# Patient Record
Sex: FEMALE | Race: WHITE | Employment: OTHER | URBAN - METROPOLITAN AREA
[De-identification: names, ages, dates, MRNs, and addresses within clinical notes are randomized per-mention and may not be internally consistent; named-entity substitution may affect disease eponyms.]

---

## 2017-02-02 ENCOUNTER — HOSPITAL ENCOUNTER (EMERGENCY)
Age: 82
Discharge: HOME | End: 2017-02-03
Attending: EMERGENCY MEDICINE
Payer: MEDICARE

## 2017-02-02 ENCOUNTER — APPOINTMENT (OUTPATIENT)
Dept: GENERAL RADIOLOGY | Age: 82
End: 2017-02-02
Attending: PHYSICIAN ASSISTANT
Payer: MEDICARE

## 2017-02-02 ENCOUNTER — HOSPITAL ENCOUNTER (OUTPATIENT)
Dept: MAMMOGRAPHY | Age: 82
Discharge: HOME | End: 2017-02-02
Payer: MEDICARE

## 2017-02-02 DIAGNOSIS — D64.9 NORMOCYTIC ANEMIA: ICD-10-CM

## 2017-02-02 DIAGNOSIS — R94.31 ABNORMAL EKG: ICD-10-CM

## 2017-02-02 DIAGNOSIS — I10 ESSENTIAL HYPERTENSION: ICD-10-CM

## 2017-02-02 DIAGNOSIS — R11.2 NAUSEA AND VOMITING IN ADULT: ICD-10-CM

## 2017-02-02 DIAGNOSIS — I83.891 BLEEDING FROM VARICOSE VEINS OF LOWER EXTREMITY, RIGHT: Primary | ICD-10-CM

## 2017-02-02 DIAGNOSIS — N18.30 CKD (CHRONIC KIDNEY DISEASE) STAGE 3, GFR 30-59 ML/MIN (HCC): ICD-10-CM

## 2017-02-02 DIAGNOSIS — R73.9 HYPERGLYCEMIA: ICD-10-CM

## 2017-02-02 DIAGNOSIS — Z12.31 VISIT FOR SCREENING MAMMOGRAM: ICD-10-CM

## 2017-02-02 PROCEDURE — 93005 ELECTROCARDIOGRAM TRACING: CPT

## 2017-02-02 PROCEDURE — 80053 COMPREHEN METABOLIC PANEL: CPT | Performed by: PHYSICIAN ASSISTANT

## 2017-02-02 PROCEDURE — 82550 ASSAY OF CK (CPK): CPT | Performed by: PHYSICIAN ASSISTANT

## 2017-02-02 PROCEDURE — 96374 THER/PROPH/DIAG INJ IV PUSH: CPT

## 2017-02-02 PROCEDURE — 77063 BREAST TOMOSYNTHESIS BI: CPT

## 2017-02-02 PROCEDURE — 85025 COMPLETE CBC W/AUTO DIFF WBC: CPT | Performed by: PHYSICIAN ASSISTANT

## 2017-02-02 PROCEDURE — 96361 HYDRATE IV INFUSION ADD-ON: CPT

## 2017-02-02 PROCEDURE — 83690 ASSAY OF LIPASE: CPT | Performed by: PHYSICIAN ASSISTANT

## 2017-02-02 PROCEDURE — 81003 URINALYSIS AUTO W/O SCOPE: CPT | Performed by: PHYSICIAN ASSISTANT

## 2017-02-02 PROCEDURE — 77030022017 HC DRSG HEMO QCLOT ZMED -A

## 2017-02-02 PROCEDURE — 99285 EMERGENCY DEPT VISIT HI MDM: CPT

## 2017-02-02 RX ORDER — MELATONIN
5000 DAILY
COMMUNITY

## 2017-02-02 RX ORDER — GABAPENTIN 300 MG/1
1 CAPSULE ORAL 3 TIMES DAILY
Refills: 2 | COMMUNITY
Start: 2016-12-22

## 2017-02-02 RX ORDER — SILVER NITRATE 38.21; 12.74 MG/1; MG/1
STICK TOPICAL
Status: DISCONTINUED
Start: 2017-02-02 | End: 2017-02-03 | Stop reason: HOSPADM

## 2017-02-02 RX ORDER — ONDANSETRON 2 MG/ML
4 INJECTION INTRAMUSCULAR; INTRAVENOUS
Status: COMPLETED | OUTPATIENT
Start: 2017-02-02 | End: 2017-02-03

## 2017-02-02 RX ORDER — ASPIRIN 325 MG
325 TABLET ORAL DAILY
COMMUNITY
End: 2017-03-01

## 2017-02-03 ENCOUNTER — APPOINTMENT (OUTPATIENT)
Dept: GENERAL RADIOLOGY | Age: 82
End: 2017-02-03
Attending: PHYSICIAN ASSISTANT
Payer: MEDICARE

## 2017-02-03 VITALS
SYSTOLIC BLOOD PRESSURE: 125 MMHG | RESPIRATION RATE: 13 BRPM | DIASTOLIC BLOOD PRESSURE: 81 MMHG | HEART RATE: 81 BPM | OXYGEN SATURATION: 97 % | TEMPERATURE: 97.3 F

## 2017-02-03 LAB
ALBUMIN SERPL BCP-MCNC: 3.2 G/DL (ref 3.4–5)
ALBUMIN/GLOB SERPL: 1.1 {RATIO} (ref 0.8–1.7)
ALP SERPL-CCNC: 96 U/L (ref 45–117)
ALT SERPL-CCNC: 15 U/L (ref 13–56)
ANION GAP BLD CALC-SCNC: 11 MMOL/L (ref 3–18)
APPEARANCE UR: CLEAR
AST SERPL W P-5'-P-CCNC: 11 U/L (ref 15–37)
ATRIAL RATE: 73 BPM
ATRIAL RATE: 79 BPM
BASOPHILS # BLD AUTO: 0.1 K/UL (ref 0–0.06)
BASOPHILS # BLD: 1 % (ref 0–2)
BILIRUB SERPL-MCNC: 0.4 MG/DL (ref 0.2–1)
BILIRUB UR QL: NEGATIVE
BUN SERPL-MCNC: 33 MG/DL (ref 7–18)
BUN/CREAT SERPL: 23 (ref 12–20)
CALCIUM SERPL-MCNC: 7.9 MG/DL (ref 8.5–10.1)
CALCULATED P AXIS, ECG09: 44 DEGREES
CALCULATED P AXIS, ECG09: 56 DEGREES
CALCULATED R AXIS, ECG10: 19 DEGREES
CALCULATED T AXIS, ECG11: 111 DEGREES
CALCULATED T AXIS, ECG11: 94 DEGREES
CHLORIDE SERPL-SCNC: 110 MMOL/L (ref 100–108)
CK MB CFR SERPL CALC: 1.7 % (ref 0–4)
CK MB SERPL-MCNC: 1.1 NG/ML (ref 0.5–3.6)
CK SERPL-CCNC: 66 U/L (ref 26–192)
CO2 SERPL-SCNC: 24 MMOL/L (ref 21–32)
COLOR UR: YELLOW
CREAT SERPL-MCNC: 1.43 MG/DL (ref 0.6–1.3)
DIAGNOSIS, 93000: NORMAL
DIAGNOSIS, 93000: NORMAL
DIFFERENTIAL METHOD BLD: ABNORMAL
EOSINOPHIL # BLD: 0.2 K/UL (ref 0–0.4)
EOSINOPHIL NFR BLD: 3 % (ref 0–5)
ERYTHROCYTE [DISTWIDTH] IN BLOOD BY AUTOMATED COUNT: 15.9 % (ref 11.6–14.5)
GLOBULIN SER CALC-MCNC: 2.8 G/DL (ref 2–4)
GLUCOSE SERPL-MCNC: 165 MG/DL (ref 74–99)
GLUCOSE UR STRIP.AUTO-MCNC: NEGATIVE MG/DL
HCT VFR BLD AUTO: 31.1 % (ref 35–45)
HGB BLD-MCNC: 9.6 G/DL (ref 12–16)
HGB UR QL STRIP: NEGATIVE
KETONES UR QL STRIP.AUTO: NEGATIVE MG/DL
LEUKOCYTE ESTERASE UR QL STRIP.AUTO: NEGATIVE
LIPASE SERPL-CCNC: 203 U/L (ref 73–393)
LYMPHOCYTES # BLD AUTO: 26 % (ref 21–52)
LYMPHOCYTES # BLD: 2.3 K/UL (ref 0.9–3.6)
MCH RBC QN AUTO: 25.9 PG (ref 24–34)
MCHC RBC AUTO-ENTMCNC: 30.9 G/DL (ref 31–37)
MCV RBC AUTO: 84.1 FL (ref 74–97)
MONOCYTES # BLD: 0.5 K/UL (ref 0.05–1.2)
MONOCYTES NFR BLD AUTO: 6 % (ref 3–10)
NEUTS SEG # BLD: 5.7 K/UL (ref 1.8–8)
NEUTS SEG NFR BLD AUTO: 64 % (ref 40–73)
NITRITE UR QL STRIP.AUTO: NEGATIVE
P-R INTERVAL, ECG05: 144 MS
P-R INTERVAL, ECG05: 160 MS
PH UR STRIP: 5 [PH] (ref 5–8)
PLATELET # BLD AUTO: 204 K/UL (ref 135–420)
PMV BLD AUTO: 11.2 FL (ref 9.2–11.8)
POTASSIUM SERPL-SCNC: 3.9 MMOL/L (ref 3.5–5.5)
PROT SERPL-MCNC: 6 G/DL (ref 6.4–8.2)
PROT UR STRIP-MCNC: NEGATIVE MG/DL
Q-T INTERVAL, ECG07: 398 MS
Q-T INTERVAL, ECG07: 416 MS
QRS DURATION, ECG06: 70 MS
QRS DURATION, ECG06: 70 MS
QTC CALCULATION (BEZET), ECG08: 456 MS
QTC CALCULATION (BEZET), ECG08: 458 MS
RBC # BLD AUTO: 3.7 M/UL (ref 4.2–5.3)
SODIUM SERPL-SCNC: 145 MMOL/L (ref 136–145)
SP GR UR REFRACTOMETRY: >1.03 (ref 1–1.03)
TROPONIN I SERPL-MCNC: <0.02 NG/ML (ref 0–0.04)
UROBILINOGEN UR QL STRIP.AUTO: 1 EU/DL (ref 0.2–1)
VENTRICULAR RATE, ECG03: 73 BPM
VENTRICULAR RATE, ECG03: 79 BPM
WBC # BLD AUTO: 8.8 K/UL (ref 4.6–13.2)

## 2017-02-03 PROCEDURE — 93005 ELECTROCARDIOGRAM TRACING: CPT

## 2017-02-03 PROCEDURE — 71010 XR CHEST PORT: CPT

## 2017-02-03 PROCEDURE — 77030011943

## 2017-02-03 PROCEDURE — 77030018836 HC SOL IRR NACL ICUM -A

## 2017-02-03 PROCEDURE — 74011250636 HC RX REV CODE- 250/636: Performed by: EMERGENCY MEDICINE

## 2017-02-03 PROCEDURE — 74011250636 HC RX REV CODE- 250/636: Performed by: PHYSICIAN ASSISTANT

## 2017-02-03 RX ADMIN — ONDANSETRON 4 MG: 2 INJECTION INTRAMUSCULAR; INTRAVENOUS at 00:34

## 2017-02-03 RX ADMIN — SODIUM CHLORIDE 250 ML: 900 INJECTION, SOLUTION INTRAVENOUS at 00:37

## 2017-02-03 NOTE — ED NOTES
9:20 AM  Dr. Kimberley Holbrook notes. Received a call from the radiologist regarding an soft tissue density noted in the left chest area. Patient was contacted at home an informed of the result. She states that she will be following up with Dr. Oswaldo Queen on 2/22/17 and will make Dr. Bhanu Marquez aware of the abnormal result to arrange for further follow up.

## 2017-02-03 NOTE — DISCHARGE INSTRUCTIONS
[employer] Activation    Thank you for requesting access to [employer]. Please follow the instructions below to securely access and download your online medical record. [employer] allows you to send messages to your doctor, view your test results, renew your prescriptions, schedule appointments, and more. How Do I Sign Up? 1. In your internet browser, go to [email or web address]  2. Click on the First Time User? Click Here link in the Sign In box. You will be redirect to the New Member Sign Up page. 3. Enter your [employer] Access Code exactly as it appears below. You will not need to use this code after youve completed the sign-up process. If you do not sign up before the expiration date, you must request a new code. [employer] Access Code: [email or web address]  Expires: 2017  3:21 PM (This is the date your [employer] access code will )    4. Enter the last four digits of your Social Security Number (xxxx) and Date of Birth (mm/dd/yyyy) as indicated and click Submit. You will be taken to the next sign-up page. 5. Create a [employer] ID. This will be your [employer] login ID and cannot be changed, so think of one that is secure and easy to remember. 6. Create a [employer] password. You can change your password at any time. 7. Enter your Password Reset Question and Answer. This can be used at a later time if you forget your password. 8. Enter your e-mail address. You will receive e-mail notification when new information is available in [address] 9. Click Sign Up. You can now view and download portions of your medical record. 10. Click the Download Summary menu link to download a portable copy of your medical information. Additional Information    If you have questions, please visit the Frequently Asked Questions section of the [employer] website at [email or web address] Remember, [employer] is NOT to be used for urgent needs. For medical emergencies, dial 911.

## 2017-02-03 NOTE — ED NOTES
Patients wound cleaned, and dressed. Bleeding has stopped at this time. Dressing is clean, dry, intact. Purposeful rounding completed:    Side rails up x 1:  YES  Bed low and wheels and locked: YES  Call bell in reach: YES  Comfort addressed: YES    Toileting needs addressed: YES  Plan of care reviewed/updated with patient and or family members: YES  IV site assessed: YES  Pain assessed and addressed: YES, 0.

## 2017-02-03 NOTE — ED PROVIDER NOTES
HPI Comments: Isra Flores is a  [de-identified] y.o. Frail elderly white female h/o Varicose Veins, TIA x 2, Rhabdomyolysis, Leukocytosis, HTN, NIDDM, CKD Stage 3, CAD, Elevated TSH, Normocytic Anemia, Vitamin D Deficiency, Chronic Osteomyelitis Ankle, Lumbar Spinal Stenosis, Splenomegaly presents to the ED via EMS c/o right lateral leg bleeding varicose vein that she says began after she was scratching the affected area. She says she vomited once at home, continues to feel nauseous. She denies fever/chills, dizziness/LOC, HA, slurred speech, jaw/neck pain/stiffness, cp, palpitations, AMOS, Orthopnea, Calf Pain/Swelling, SOB, Abd Pain, blood in vomitus, diarrhea, constipation, brbpr, melena, flank pain, blood in urine, difficulty urinating, decreased urination, burning/urgency/freq of urination, extremity weakness/numbness/tingling, difficulty ambulating, back pain. PSX: BLE Vein Stripping 9/18/2013 by Dr. Samuel Human       Patient is a [de-identified] y.o. female presenting with nausea. Nausea    Pertinent negatives include no chills, no fever, no abdominal pain, no diarrhea, no headaches, no arthralgias, no cough and no headaches.         Past Medical History:   Diagnosis Date    CAD (coronary artery disease)     Chronic osteomyelitis of ankle ([address]     CKD (chronic kidney disease) stage 3, GFR 30-59 ml/min 06/24/2013     Worst noted (08/06/13) BUN/sCr: 54/2.49, GFR 20.     DM (diabetes mellitus) (HCC)     Elevated TSH 06/25/2012     5.08     Hypertension     Leukocytosis     Lumbar spinal stenosis     Normocytic anemia     Rhabdomyolysis     Splenomegaly     Stroke ([address]     Vitamin D deficiency        Past Surgical History:   Procedure Laterality Date    Hx cholecystectomy      Hx free skin graft      Pr debridement open wound 20 sq cm<      Hx hysterectomy      Hx open reduction internal fixation Left      Tx LLE Ankle Trimalleolar F[facility])    Hx orthopaedic Left 06/11/2013     [address] Ilya MOLINA Ankle Removal of Hardware     Hx vein stripping Bilateral 09/18/2013     Dr. Georgina Potts         Family History:   Problem Relation Age of Onset    Diabetes Other     Hypertension Other        Social History     Social History    Marital status:      Spouse name: N/A    Number of children: N/A    Years of education: N/A     Occupational History    Not on file. Social History Main Topics    Smoking status: Never Smoker    Smokeless tobacco: Not on file    Alcohol use No    Drug use: No    Sexual activity: No     Other Topics Concern    Not on file     Social History Narrative         ALLERGIES: Review of patient's allergies indicates no known allergies. Review of Systems   Constitutional: Negative for activity change, appetite change, chills, diaphoresis, fatigue and fever. HENT: Negative for ear pain and sore throat. Eyes: Negative for pain and visual disturbance. Respiratory: Negative for cough and shortness of breath. Cardiovascular: Negative for chest pain, palpitations and leg swelling. Gastrointestinal: Positive for nausea and vomiting. Negative for abdominal pain, blood in stool, constipation and diarrhea. Genitourinary: Negative for decreased urine volume, difficulty urinating, dysuria, flank pain, frequency, hematuria and urgency. Musculoskeletal: Negative for arthralgias, back pain, joint swelling, neck pain and neck stiffness. Skin: Positive for wound (RLE lateral leg). Negative for color change, pallor and rash. Neurological: Negative for dizziness, syncope, weakness, light-headedness and headaches. Psychiatric/Behavioral: Negative for behavioral problems. The patient is not nervous/anxious.         Vitals:    02/03/17 0115 02/03/17 0215 02/03/17 0230 02/03/17 0245   BP: 127/56 139/43 142/75 125/81   Pulse: 80 75 85 81   Resp: 20 18 15 13   Temp:       SpO2: 100% 99% 97%             Physical Exam   Constitutional: She is oriented to person, place, and time. She appears well-developed and well-nourished. No distress. HENT:   Head: Normocephalic and atraumatic. Right Ear: Hearing, tympanic membrane, external ear and ear canal normal.   Left Ear: Hearing, tympanic membrane, external ear and ear canal normal.   Nose: Nose normal. No mucosal edema or rhinorrhea. Mouth/Throat: Uvula is midline, oropharynx is clear and moist and mucous membranes are normal. No uvula swelling. No oropharyngeal exudate, posterior oropharyngeal edema, posterior oropharyngeal erythema or tonsillar abscesses. Eyes: Conjunctivae and EOM are normal. Pupils are equal, round, and reactive to light. Right eye exhibits no discharge. Left eye exhibits no discharge. No scleral icterus. Neck: Trachea normal, normal range of motion, full passive range of motion without pain and phonation normal. Neck supple. No tracheal tenderness, no spinous process tenderness and no muscular tenderness present. No rigidity. No edema, no erythema and normal range of motion present. No thyroid mass and no thyromegaly present. Cardiovascular: Normal rate, regular rhythm, normal heart sounds and intact distal pulses. Exam reveals no gallop and no friction rub. No murmur heard. Pulmonary/Chest: Effort normal and breath sounds normal. No accessory muscle usage. No tachypnea. No respiratory distress. She has no decreased breath sounds. She has no wheezes. She has no rhonchi. She has no rales. Abdominal: Soft. Normal appearance and bowel sounds are normal. She exhibits no distension, no abdominal bruit and no pulsatile midline mass. There is no tenderness. There is no rigidity, no rebound, no guarding, no CVA tenderness, no tenderness at McBurney's point and negative Kline's sign. Normal inspection. Non-distended. Normoactive BS. Soft. Completely NTTP. No guarding. No rebound TTP. No CVAT.         Musculoskeletal:        Right lower leg: Normal. She exhibits no tenderness, no bony tenderness, no swelling, no edema, no deformity and no laceration. Left lower leg: Normal. She exhibits no tenderness, no bony tenderness, no swelling, no edema, no deformity and no laceration. Right foot: Normal. There is normal range of motion, no tenderness, no bony tenderness, no swelling, normal capillary refill, no crepitus, no deformity and no laceration. Left foot: Normal. There is normal range of motion, no tenderness, no bony tenderness, no swelling, normal capillary refill, no crepitus, no deformity and no laceration. Lymphadenopathy:     She has no cervical adenopathy. Neurological: She is alert and oriented to person, place, and time. She has normal strength and normal reflexes. She is not disoriented. No cranial nerve deficit or sensory deficit. A&O x 3. Speech is clear. No focal neuro deficits. MS 5/5 BUE/BLE. Skin: Skin is warm and dry. Abrasion noted. No bruising, no ecchymosis, no lesion, no petechiae and no rash noted. She is not diaphoretic. No cyanosis or erythema. No pallor. RLE Lateral Aspect of the lower leg < 1.5 cm area of Varicosity, scabbed without current bleeding. No surrounding erythema. No hematoma. DP/PT 2+ BLE. Neurvascularly intact. Compartments of the BLE are soft. Ext are warm. No pallor or pulselessness. Psychiatric: She has a normal mood and affect. Her behavior is normal.   Nursing note and vitals reviewed.        MDM  Number of Diagnoses or Management Options  Abnormal EKG: new and requires workup  Bleeding from varicose veins of lower extremity, right: new and requires workup  CKD (chronic kidney disease) stage 3, GFR 30-59 ml/min: new and requires workup  Essential hypertension: new and requires workup  Hyperglycemia: new and requires workup  Nausea and vomiting in adult: new and requires workup  Normocytic anemia: new and requires workup  Diagnosis management comments: DDX: Abrasion, Laceration (Simple v. Complex), Epidermal Inclusion Cyst, Cutaneous Abscess, Contact Dermatitis, Allergic Reaction, Urticaria, Anaphylaxis, Cellulitis, Erysipelas, Viral Exanthem, Burn Injury, Contusion, Hematoma, Bleeding Varicosity (Venous v. Arterial), DVT, Superficial Thrombophlebitis. ED EKG interpretation:  Rhythm: Sinus Rhythm w/ Marked Sinus Arrhythmia w/ Frequent Premature Ventricular Complexes; Vent rate: 73 bpm; VT Interval: 144 ms; QRS duration: 70 ms; QT/QTc: 416/458 ms; P-R-T axes: 44 -1 94. Other findings: Possible Inferior Infarct, Age Undetermined, Anteroseptal Infarct, Age Undetermined, Abnormal ekg. EKG was completed at 2335. This EKG was interpreted by Dr. Barbra Novoa (ED attending) at [phone]. Radiology Initial Reading:    Patient had a CXR Portable performed, pt tolerated procedure well, No acute process or infiltrate seen by my eyes. 1:15 AM     2:33 AM: Awaiting UA results. Pt reports feeling much improved. Nursing has cleaned the affected area which has not bled since PTA. She has placed QuickClot dressing topically and then Pressure dressing. Distal Sensation remains intact. Cap refill < 2 seconds after dressing. DP/PT 2+ BLE following dressing application. Rechecked EKG, pulse felt irregular. ED EKG interpretation:  Rhythm: Sinus Rhythm w/ Marked Sinus Arrhythmia w/ PAC;  Vent rate: 79 bpm; VT Interval: 160 ms; QRS duration: 70 ms; QT/QTc: 398/456 ms; P-R-T axes: 56 19 111. Other findings: Cannot r/o Anteroseptal Infarct, Age Undetermined, T Wave Abnormality, Consider Lateral Ischemia, abnormal ekg. EKG was completed at 0241. This EKG was interpreted by Dr. Barbra Novoa (ED attending) at 2181. Consulted with Dr. Barbra Novoa concerning patient Abdullahiota Bang, standard discussion of reason for visit, HPI, ROS, PE, and current results available. Recommendation for discharge w/ outpatient follow-up regarding abnormal EKG, as patient is feeling better, VSS.     BRANDIN Espinoza  February 3, 2017  2:53 AM Reviewed workup results, any meds, and discharge instructions OR admission plan with patient and any family present. Answered all questions. BRANDIN Hernandez  2:53 AM    PLEASE FOLLOW-UP AS DIRECTED WITHOUT FAIL WITHIN THE TIME FRAME RECOMMENDED AS FAILURE TO DO SO COULD RESULT IN WORSENING OF YOUR PHYSICAL CONDITION, DEATH, AND OR PERMANENT DISABILITY. RETURN TO THE EMERGENCY DEPARTMENT AT IF YOU ARE UNABLE TO FOLLOW-UP AS DIRECTED. RETURN TO THE EMERGENCY DEPARTMENT AT ONCE IF YOU HAVE SYMPTOMS THAT DO NOT IMPROVE WITH TREATMENT, NEW SYMPTOMS, WORSENING SYMPTOMS, OR ANY OTHER CONCERNS. THE PATIENT AGREES WITH THE DISCHARGE PLAN AND FOLLOW-UP INSTRUCTIONS. THE PATIENT AGREES TO REVIEW ALL HANDOUTS. Amount and/or Complexity of Data Reviewed  Clinical lab tests: reviewed and ordered  Tests in the radiology section of CPT®: ordered and reviewed  Tests in the medicine section of CPT®: reviewed and ordered  Discussion of test results with the performing providers: yes  Decide to obtain previous medical records or to obtain history from someone other than the patient: yes  Obtain history from someone other than the patient: yes ()  Review and summarize past medical records: yes  Independent visualization of images, tracings, or specimens: yes    Risk of Complications, Morbidity, and/or Mortality  Presenting problems: moderate  Diagnostic procedures: moderate  Management options: moderate    Patient Progress  Patient progress: stable      Procedures    Diagnosis:   1. Bleeding from varicose veins of lower extremity, right    2. Nausea and vomiting in adult    3. Normocytic anemia    4. Hyperglycemia    5. CKD (chronic kidney disease) stage 3, GFR 30-59 ml/min    6. Abnormal EKG    7.  Essential hypertension          Disposition: HOME    Follow-up Information     Follow up With Details Comments Contact Info    [facility] EMERGENCY DEPT  As needed, If symptoms worsen [address] [place] [address] [address] [facility]    Noralyn Cockayne, MD Call today Schedule appointment to be seen same day without fail for re-evaluation, review all labs/imaging results, recheck blood pressure. [address] [facility] [address] [address] [facility],  Call today Cardiology: Schedule appointment to be seen within 3 days for evaluation of Abnormal EKG without fail. [address] [address] [address] [facility]      Demetrius Kapoor MD Call today Schedule appointment to be seen within 3-5 days for evaluation of Varicose Veins. [address] [facility] [address] [facility]            Patient's Medications   Start Taking    No medications on file   Continue Taking    ASPIRIN (ASPIRIN) 325 MG TABLET    Take 325 mg by mouth daily. CHOLECALCIFEROL (VITAMIN D3) 1,000 UNIT TABLET    Take 3,000 Units by mouth daily. CYCLOBENZAPRINE (FLEXERIL) 10 MG TABLET    Take 10 mg by mouth three (3) times daily as needed. ERGOCALCIFEROL (VITAMIN D2) 50,000 UNIT CAPSULE    Take 50,000 Units by mouth daily. GABAPENTIN (NEURONTIN) 300 MG CAPSULE    Take 1 Cap by mouth three (3) times daily. 12/22/16, QTY#90, 30 Days, 2 Refills. Dr. Ester Casper    LISINOPRIL-HYDROCHLOROTHIAZIDE (PRINZIDE, ZESTORETIC) 20-25 MG PER TABLET    Take 1 Tab by mouth daily. METOPROLOL (LOPRESSOR) 25 MG TABLET    Take 25 mg by mouth two (2) times a day. These Medications have changed    No medications on file   Stop Taking    GABAPENTIN (NEURONTIN) 100 MG CAPSULE    Take 200 mg by mouth three (3) times daily.        Recent Results (from the past 24 hour(s))   EKG, 12 LEAD, INITIAL    Collection Time: 02/02/17 11:35 PM   Result Value Ref Range    Ventricular Rate 73 BPM    Atrial Rate 73 BPM    P-R Interval 144 ms    QRS Duration 70 ms    Q-T Interval 416 ms    QTC Calculation (Bezet) 458 ms    Calculated P Axis 44 degrees    Calculated T Axis 94 degrees    Diagnosis Sinus rhythm with marked sinus arrhythmia with frequent premature ventricular   complexes  Possible Inferior infarct , age undetermined  Anteroseptal infarct (cited on or before 21-JUN-2012)  Abnormal ECG  When compared with ECG of 12-MAY-2016 12:27,  premature ventricular complexes are now present  Borderline criteria for Inferior infarct are now present  ST no longer elevated in Inferior leads     CBC WITH AUTOMATED DIFF    Collection Time: 02/02/17 11:47 PM   Result Value Ref Range    WBC 8.8 4.6 - 13.2 K/uL    RBC 3.70 (L) 4.20 - 5.30 M/uL    HGB 9.6 (L) 12.0 - 16.0 g/dL    HCT 31.1 (L) 35.0 - 45.0 %    MCV 84.1 74.0 - 97.0 FL    MCH 25.9 24.0 - 34.0 PG    MCHC 30.9 (L) 31.0 - 37.0 g/dL    RDW 15.9 (H) 11.6 - 14.5 %    PLATELET 697 042 - 511 K/uL    MPV 11.2 9.2 - 11.8 FL    NEUTROPHILS 64 40 - 73 %    LYMPHOCYTES 26 21 - 52 %    MONOCYTES 6 3 - 10 %    EOSINOPHILS 3 0 - 5 %    BASOPHILS 1 0 - 2 %    ABS. NEUTROPHILS 5.7 1.8 - 8.0 K/UL    ABS. LYMPHOCYTES 2.3 0.9 - 3.6 K/UL    ABS. MONOCYTES 0.5 0.05 - 1.2 K/UL    ABS. EOSINOPHILS 0.2 0.0 - 0.4 K/UL    ABS.  BASOPHILS 0.1 (H) 0.0 - 0.06 K/UL    DF AUTOMATED     CARDIAC PANEL,(CK, CKMB & TROPONIN)    Collection Time: 02/02/17 11:47 PM   Result Value Ref Range    CK 66 26 - 192 U/L    CK - MB 1.1 0.5 - 3.6 ng/ml    CK-MB Index 1.7 0.0 - 4.0 %    Troponin-I, Qt. <0.02 0.0 - 8.119 NG/ML   METABOLIC PANEL, COMPREHENSIVE    Collection Time: 02/02/17 11:47 PM   Result Value Ref Range    Sodium 145 136 - 145 mmol/L    Potassium 3.9 3.5 - 5.5 mmol/L    Chloride 110 (H) 100 - 108 mmol/L    CO2 24 21 - 32 mmol/L    Anion gap 11 3.0 - 18 mmol/L    Glucose 165 (H) 74 - 99 mg/dL    BUN 33 (H) 7.0 - 18 MG/DL    Creatinine 1.43 (H) 0.6 - 1.3 MG/DL    BUN/Creatinine ratio 23 (H) 12 - 20      GFR est AA 43 (L) >60 ml/min/1.73m2    GFR est non-AA 35 (L) >60 ml/min/1.73m2    Calcium 7.9 (L) 8.5 - 10.1 MG/DL    Bilirubin, total 0.4 0.2 - 1.0 MG/DL    ALT (SGPT) 15 13 - 56 U/L AST (SGOT) 11 (L) 15 - 37 U/L    Alk.  phosphatase 96 45 - 117 U/L    Protein, total 6.0 (L) 6.4 - 8.2 g/dL    Albumin 3.2 (L) 3.4 - 5.0 g/dL    Globulin 2.8 2.0 - 4.0 g/dL    A-G Ratio 1.1 0.8 - 1.7     LIPASE    Collection Time: 02/02/17 11:47 PM   Result Value Ref Range    Lipase 203 73 - 393 U/L   EKG, 12 LEAD, SUBSEQUENT    Collection Time: 02/03/17  2:41 AM   Result Value Ref Range    Ventricular Rate 79 BPM    Atrial Rate 79 BPM    P-R Interval 160 ms    QRS Duration 70 ms    Q-T Interval 398 ms    QTC Calculation (Bezet) 456 ms    Calculated P Axis 56 degrees    Calculated R Axis 19 degrees    Calculated T Axis 111 degrees    Diagnosis       Sinus rhythm with marked sinus arrhythmia with premature atrial complexes  Low voltage QRS  Cannot rule out Anteroseptal infarct (cited on or before 21-JUN-2012)  T wave abnormality, consider lateral ischemia  Abnormal ECG  When compared with ECG of 02-FEB-2017 23:35,  premature ventricular complexes are no longer present  premature atrial complexes are now present  Borderline criteria for Inferior infarct are no longer present  T wave inversion more evident in Lateral leads

## 2017-02-03 NOTE — ED TRIAGE NOTES
Pt reports that she scratched her right lower leg and popped a varicose vein that has continued to bleed. Bleeding controlled at this time with kerlix.

## 2017-02-09 ENCOUNTER — HOSPITAL ENCOUNTER (OUTPATIENT)
Dept: GENERAL RADIOLOGY | Age: 82
Discharge: HOME | End: 2017-02-09
Payer: MEDICARE

## 2017-02-09 DIAGNOSIS — R93.89 ABNORMAL CHEST X-RAY: ICD-10-CM

## 2017-02-09 PROCEDURE — 71020 XR CHEST PA LAT: CPT

## 2017-02-25 ENCOUNTER — HOSPITAL ENCOUNTER (INPATIENT)
Age: 82
LOS: 4 days | Discharge: HOME HEALTH | DRG: 812 | End: 2017-03-01
Attending: EMERGENCY MEDICINE | Admitting: HOSPITALIST
Payer: MEDICARE

## 2017-02-25 ENCOUNTER — APPOINTMENT (OUTPATIENT)
Dept: CT IMAGING | Age: 82
DRG: 812 | End: 2017-02-25
Attending: EMERGENCY MEDICINE
Payer: MEDICARE

## 2017-02-25 ENCOUNTER — APPOINTMENT (OUTPATIENT)
Dept: GENERAL RADIOLOGY | Age: 82
DRG: 812 | End: 2017-02-25
Attending: EMERGENCY MEDICINE
Payer: MEDICARE

## 2017-02-25 DIAGNOSIS — R50.9 FEVER, UNSPECIFIED FEVER CAUSE: Primary | ICD-10-CM

## 2017-02-25 DIAGNOSIS — D64.9 SEVERE ANEMIA: ICD-10-CM

## 2017-02-25 DIAGNOSIS — L03.119 CELLULITIS OF LOWER EXTREMITY, UNSPECIFIED LATERALITY: ICD-10-CM

## 2017-02-25 LAB
ALBUMIN SERPL BCP-MCNC: 3.1 G/DL (ref 3.4–5)
ALBUMIN/GLOB SERPL: 1 {RATIO} (ref 0.8–1.7)
ALP SERPL-CCNC: 92 U/L (ref 45–117)
ALT SERPL-CCNC: 14 U/L (ref 13–56)
ANION GAP BLD CALC-SCNC: 9 MMOL/L (ref 3–18)
APPEARANCE UR: CLEAR
AST SERPL W P-5'-P-CCNC: 13 U/L (ref 15–37)
BACTERIA URNS QL MICRO: ABNORMAL /HPF
BASOPHILS # BLD AUTO: 0 K/UL (ref 0–0.06)
BASOPHILS # BLD: 0 % (ref 0–2)
BILIRUB SERPL-MCNC: 0.3 MG/DL (ref 0.2–1)
BILIRUB UR QL: NEGATIVE
BUN SERPL-MCNC: 31 MG/DL (ref 7–18)
BUN/CREAT SERPL: 21 (ref 12–20)
CALCIUM SERPL-MCNC: 8.4 MG/DL (ref 8.5–10.1)
CHLORIDE SERPL-SCNC: 108 MMOL/L (ref 100–108)
CO2 SERPL-SCNC: 24 MMOL/L (ref 21–32)
COLOR UR: YELLOW
CREAT SERPL-MCNC: 1.51 MG/DL (ref 0.6–1.3)
DIFFERENTIAL METHOD BLD: ABNORMAL
EOSINOPHIL # BLD: 0.2 K/UL (ref 0–0.4)
EOSINOPHIL NFR BLD: 3 % (ref 0–5)
EPITH CASTS URNS QL MICRO: ABNORMAL /LPF (ref 0–5)
ERYTHROCYTE [DISTWIDTH] IN BLOOD BY AUTOMATED COUNT: 16.2 % (ref 11.6–14.5)
GLOBULIN SER CALC-MCNC: 3.2 G/DL (ref 2–4)
GLUCOSE SERPL-MCNC: 125 MG/DL (ref 74–99)
GLUCOSE UR STRIP.AUTO-MCNC: NEGATIVE MG/DL
HCT VFR BLD AUTO: 22.7 % (ref 35–45)
HGB BLD-MCNC: 6.9 G/DL (ref 12–16)
HGB UR QL STRIP: NEGATIVE
HYALINE CASTS URNS QL MICRO: ABNORMAL /LPF (ref 0–2)
KETONES UR QL STRIP.AUTO: NEGATIVE MG/DL
LACTATE BLD-SCNC: 1.3 MMOL/L (ref 0.4–2)
LEUKOCYTE ESTERASE UR QL STRIP.AUTO: ABNORMAL
LYMPHOCYTES # BLD AUTO: 18 % (ref 21–52)
LYMPHOCYTES # BLD: 1.4 K/UL (ref 0.9–3.6)
MCH RBC QN AUTO: 24.6 PG (ref 24–34)
MCHC RBC AUTO-ENTMCNC: 30.4 G/DL (ref 31–37)
MCV RBC AUTO: 80.8 FL (ref 74–97)
MONOCYTES # BLD: 0.7 K/UL (ref 0.05–1.2)
MONOCYTES NFR BLD AUTO: 9 % (ref 3–10)
NEUTS SEG # BLD: 5.4 K/UL (ref 1.8–8)
NEUTS SEG NFR BLD AUTO: 70 % (ref 40–73)
NITRITE UR QL STRIP.AUTO: NEGATIVE
PH UR STRIP: 5 [PH] (ref 5–8)
PLATELET # BLD AUTO: 229 K/UL (ref 135–420)
PMV BLD AUTO: 11 FL (ref 9.2–11.8)
POTASSIUM SERPL-SCNC: 3.9 MMOL/L (ref 3.5–5.5)
PROT SERPL-MCNC: 6.3 G/DL (ref 6.4–8.2)
PROT UR STRIP-MCNC: NEGATIVE MG/DL
RBC # BLD AUTO: 2.81 M/UL (ref 4.2–5.3)
RBC #/AREA URNS HPF: ABNORMAL /HPF (ref 0–5)
SODIUM SERPL-SCNC: 141 MMOL/L (ref 136–145)
SP GR UR REFRACTOMETRY: 1.02 (ref 1–1.03)
UROBILINOGEN UR QL STRIP.AUTO: 0.2 EU/DL (ref 0.2–1)
WBC # BLD AUTO: 7.6 K/UL (ref 4.6–13.2)
WBC URNS QL MICRO: ABNORMAL /HPF (ref 0–4)

## 2017-02-25 PROCEDURE — 36430 TRANSFUSION BLD/BLD COMPNT: CPT

## 2017-02-25 PROCEDURE — 87040 BLOOD CULTURE FOR BACTERIA: CPT | Performed by: EMERGENCY MEDICINE

## 2017-02-25 PROCEDURE — 65270000029 HC RM PRIVATE

## 2017-02-25 PROCEDURE — 96365 THER/PROPH/DIAG IV INF INIT: CPT

## 2017-02-25 PROCEDURE — 99285 EMERGENCY DEPT VISIT HI MDM: CPT

## 2017-02-25 PROCEDURE — 80053 COMPREHEN METABOLIC PANEL: CPT | Performed by: EMERGENCY MEDICINE

## 2017-02-25 PROCEDURE — 96367 TX/PROPH/DG ADDL SEQ IV INF: CPT

## 2017-02-25 PROCEDURE — 74011000258 HC RX REV CODE- 258: Performed by: EMERGENCY MEDICINE

## 2017-02-25 PROCEDURE — 74176 CT ABD & PELVIS W/O CONTRAST: CPT

## 2017-02-25 PROCEDURE — 96366 THER/PROPH/DIAG IV INF ADDON: CPT

## 2017-02-25 PROCEDURE — 71010 XR CHEST PORT: CPT

## 2017-02-25 PROCEDURE — 81001 URINALYSIS AUTO W/SCOPE: CPT | Performed by: EMERGENCY MEDICINE

## 2017-02-25 PROCEDURE — 83605 ASSAY OF LACTIC ACID: CPT

## 2017-02-25 PROCEDURE — 70450 CT HEAD/BRAIN W/O DYE: CPT

## 2017-02-25 PROCEDURE — 51701 INSERT BLADDER CATHETER: CPT

## 2017-02-25 PROCEDURE — 30233N1 TRANSFUSION OF NONAUTOLOGOUS RED BLOOD CELLS INTO PERIPHERAL VEIN, PERCUTANEOUS APPROACH: ICD-10-PCS | Performed by: INTERNAL MEDICINE

## 2017-02-25 PROCEDURE — 85025 COMPLETE CBC W/AUTO DIFF WBC: CPT | Performed by: EMERGENCY MEDICINE

## 2017-02-25 PROCEDURE — 74011250636 HC RX REV CODE- 250/636: Performed by: EMERGENCY MEDICINE

## 2017-02-25 PROCEDURE — 74011250637 HC RX REV CODE- 250/637: Performed by: EMERGENCY MEDICINE

## 2017-02-25 PROCEDURE — 77030011943

## 2017-02-25 PROCEDURE — P9016 RBC LEUKOCYTES REDUCED: HCPCS | Performed by: EMERGENCY MEDICINE

## 2017-02-25 PROCEDURE — 93005 ELECTROCARDIOGRAM TRACING: CPT

## 2017-02-25 PROCEDURE — 86900 BLOOD TYPING SEROLOGIC ABO: CPT | Performed by: EMERGENCY MEDICINE

## 2017-02-25 PROCEDURE — 74011636320 HC RX REV CODE- 636/320: Performed by: EMERGENCY MEDICINE

## 2017-02-25 PROCEDURE — 86920 COMPATIBILITY TEST SPIN: CPT | Performed by: EMERGENCY MEDICINE

## 2017-02-25 RX ORDER — SODIUM CHLORIDE 9 MG/ML
50 INJECTION, SOLUTION INTRAVENOUS CONTINUOUS
Status: DISCONTINUED | OUTPATIENT
Start: 2017-02-25 | End: 2017-03-01 | Stop reason: HOSPADM

## 2017-02-25 RX ORDER — SODIUM CHLORIDE 9 MG/ML
250 INJECTION, SOLUTION INTRAVENOUS AS NEEDED
Status: DISCONTINUED | OUTPATIENT
Start: 2017-02-25 | End: 2017-03-01 | Stop reason: HOSPADM

## 2017-02-25 RX ORDER — MAGNESIUM SULFATE 100 %
4 CRYSTALS MISCELLANEOUS AS NEEDED
Status: DISCONTINUED | OUTPATIENT
Start: 2017-02-25 | End: 2017-03-01 | Stop reason: HOSPADM

## 2017-02-25 RX ORDER — ACETAMINOPHEN 325 MG/1
975 TABLET ORAL
Status: COMPLETED | OUTPATIENT
Start: 2017-02-25 | End: 2017-02-25

## 2017-02-25 RX ORDER — INSULIN LISPRO 100 [IU]/ML
INJECTION, SOLUTION INTRAVENOUS; SUBCUTANEOUS
Status: DISCONTINUED | OUTPATIENT
Start: 2017-02-26 | End: 2017-03-01 | Stop reason: HOSPADM

## 2017-02-25 RX ORDER — DEXTROSE 50 % IN WATER (D50W) INTRAVENOUS SYRINGE
25-50 AS NEEDED
Status: DISCONTINUED | OUTPATIENT
Start: 2017-02-25 | End: 2017-03-01 | Stop reason: HOSPADM

## 2017-02-25 RX ADMIN — SODIUM CHLORIDE 1000 MG: 900 INJECTION, SOLUTION INTRAVENOUS at 22:17

## 2017-02-25 RX ADMIN — IOHEXOL 50 ML: 240 INJECTION, SOLUTION INTRATHECAL; INTRAVASCULAR; INTRAVENOUS; ORAL at 16:02

## 2017-02-25 RX ADMIN — SODIUM CHLORIDE 1000 MG: 900 INJECTION, SOLUTION INTRAVENOUS at 21:46

## 2017-02-25 RX ADMIN — CEFTRIAXONE SODIUM 1 G: 1 INJECTION, POWDER, FOR SOLUTION INTRAMUSCULAR; INTRAVENOUS at 21:07

## 2017-02-25 RX ADMIN — ACETAMINOPHEN 975 MG: 325 TABLET, FILM COATED ORAL at 13:21

## 2017-02-25 NOTE — ED NOTES
The Sepsis Screening has been completed on arrival in the Emergency Department. Vital signs:  Patient Vitals for the past 4 hrs:   Temp Pulse Resp BP SpO2   02/25/17 1250 100 °F (37.8 °C) 71 18 141/44 98 %            =monitored (data validate)  MAP (Calculated): 76    =calculated (manual entry)    Patient meets 2 or more SIRS criteria with suspected infection? NO    IF ANSWER IS YES, INITIATE NURSE DRIVEN SEPSIS ORDERS OR REQUEST SEPSIS BUNDLE ORDER BY PROVIDER.      SIRS Criteria is achieved when two or more of the following are present:    Temperature < 96.8°F (36°C) or > 100.9°F (38.3°C)   Heart Rate > 90 beats per minute   Respiratory Rate > 20 beats per minute   WBC count > 12,000 or <4,000 or > 10% bands

## 2017-02-25 NOTE — IP AVS SNAPSHOT
Smith Tinoco 
 
 
 [address] [place] [address] 
[phone] Patient: Alecia Matson MRN: [id number] CHR:4/3/0604 You are allergic to the following No active allergies Recent Documentation Height  
  
  
  
  
  
 1.575 m Unresulted Labs Order Current Status CULTURE, URINE In process CULTURE, BLOOD Preliminary result CULTURE, BLOOD Preliminary result Emergency Contacts Name Discharge Info Relation Home Work Mobile Willy Bro DISCHARGE CAREGIVER [3] Spouse [3] [phone] About your hospitalization You were admitted on:  February 25, 2017 You last received care in the:  [facility] You were discharged on:  March 1, 2017 Unit phone number:  [phone] Why you were hospitalized Your primary diagnosis was:  Iron (Fe) Deficiency Anemia Your diagnoses also included:  Anemia Providers Seen During Your Hospitalizations Provider Role Specialty Primary office phone Wilmer Trevino MD Attending Provider Emergency Medicine [phone] Jonny Gamino MD Attending Provider Emergency Medicine [phone] Chrissy Middleton MD Attending Provider [facility] [phone] Bard Mejia MD Attending Provider Internal Medicine [phone] Meredith Hinds MD Attending Provider Internal Medicine [phone] Your Primary Care Physician (PCP) Primary Care Physician Office Phone Office Fax Génesis Oreilly [phone] Follow-up Information Follow up With Details Comments Contact Info Renato Willis MD In 1 week  [address] [facility] [address] [facility] [address] [phone] Karley Hurley MD In 1 week  [place] [address] [address] [facility] Leena Skaggs [address] 
[phone] Current Discharge Medication List  
  
 START taking these medications Dose & Instructions Dispensing Information Comments Morning Noon Evening Bedtime  
 cephALEXin 500 mg capsule Commonly known as:  Latrelle Decree Your next dose is: Today, Tomorrow Other:  _________ Dose:  500 mg Take 1 Cap by mouth four (4) times daily. Quantity:  12 Cap Refills:  0  
     
   
   
   
  
 pantoprazole 40 mg tablet Commonly known as:  PROTONIX Your next dose is: Today, Tomorrow Other:  _________ Dose:  40 mg Take 1 Tab by mouth Daily (before breakfast). Quantity:  30 Tab Refills:  0 CONTINUE these medications which have NOT CHANGED Dose & Instructions Dispensing Information Comments Morning Noon Evening Bedtime  
 cholecalciferol 1,000 unit tablet Commonly known as:  VITAMIN D3 Your next dose is: Today, Tomorrow Other:  _________ Dose:  3000 Units Take 3,000 Units by mouth daily. Refills:  0  
     
   
   
   
  
 FLEXERIL 10 mg tablet Generic drug:  cyclobenzaprine Your next dose is: Today, Tomorrow Other:  _________ Dose:  10 mg Take 10 mg by mouth three (3) times daily as needed. Refills:  0  
     
   
   
   
  
 gabapentin 300 mg capsule Commonly known as:  NEURONTIN Your next dose is: Today, Tomorrow Other:  _________ Dose:  1 Cap Take 1 Cap by mouth three (3) times daily. 12/22/16, QTY#90, 30 Days, 2 Refills. Dr. Laura Gonzalez Refills:  2  
     
   
   
   
  
 lisinopril-hydroCHLOROthiazide 20-25 mg per tablet Commonly known as:  Conda Franc Your next dose is: Today, Tomorrow Other:  _________ Dose:  1 Tab Take 1 Tab by mouth daily. Refills:  0  
     
   
   
   
  
 metoprolol tartrate 25 mg tablet Commonly known as:  LOPRESSOR Your next dose is: Today, Tomorrow Other:  _________  Dose:  25 mg  
 Take 25 mg by mouth two (2) times a day. Refills:  0  
     
   
   
   
  
 VITAMIN D2 50,000 unit capsule Generic drug:  ergocalciferol Your next dose is: Today, Tomorrow Other:  _________ Dose:  44304 Units Take 50,000 Units by mouth daily. Refills:  0 STOP taking these medications   
 aspirin 325 mg tablet Commonly known as:  ASPIRIN Where to Get Your Medications Information on where to get these meds will be given to you by the nurse or doctor. ! Ask your nurse or doctor about these medications  
  cephALEXin 500 mg capsule  
 pantoprazole 40 mg tablet Discharge Instructions Anemia: Care Instructions Your Care Instructions Anemia is a low level of red blood cells, which carry oxygen throughout your body. Many things can cause anemia. Lack of iron is one of the most common causes. Your body needs iron to make hemoglobin, a substance in red blood cells that carries oxygen from the lungs to your body's cells. Without enough iron, the body produces fewer and smaller red blood cells. As a result, your body's cells do not get enough oxygen, and you feel tired and weak. And you may have trouble concentrating. Bleeding is the most common cause of a lack of iron. You may have heavy menstrual bleeding or bleeding caused by conditions such as ulcers, hemorrhoids, or cancer. Regular use of aspirin or other anti-inflammatory medicines (such as ibuprofen) also can cause bleeding in some people. A lack of iron in your diet also can cause anemia, especially at times when the body needs more iron, such as during pregnancy, infancy, and the teen years. Your doctor may have prescribed iron pills. It may take several months of treatment for your iron levels to return to normal. Your doctor also may suggest that you eat foods that are rich in iron, such as meat and beans. There are many other causes of anemia. It is not always due to a lack of iron. Finding the specific cause of your anemia will help your doctor find the right treatment for you. Follow-up care is a key part of your treatment and safety. Be sure to make and go to all appointments, and call your doctor if you are having problems. It's also a good idea to know your test results and keep a list of the medicines you take. How can you care for yourself at home? · Take your medicines exactly as prescribed. Call your doctor if you think you are having a problem with your medicine. · If your doctor recommends iron pills, take them as directed: ¨ Try to take the pills on an empty stomach about 1 hour before or 2 hours after meals. But you may need to take iron with food to avoid an upset stomach. ¨ Do not take antacids or drink milk or caffeine drinks (such as coffee, tea, or cola) at the same time or within 2 hours of the time that you take your iron. They can make it hard for your body to absorb the iron. ¨ Vitamin C (from food or supplements) helps your body absorb iron. Try taking iron pills with a glass of orange juice or some other food that is high in vitamin C, such as citrus fruits. ¨ Iron pills may cause stomach problems, such as heartburn, nausea, diarrhea, constipation, and cramps. Be sure to drink plenty of fluids, and include fruits, vegetables, and fiber in your diet each day. Iron pills often make your bowel movements dark or green. ¨ If you forget to take an iron pill, do not take a double dose of iron the next time you take a pill. ¨ Keep iron pills out of the reach of small children. An overdose of iron can be very dangerous. · Follow your doctor's advice about eating iron-rich foods. These include red meat, shellfish, poultry, eggs, beans, raisins, whole-grain bread, and leafy green vegetables. · Steam vegetables to help them keep their iron content. When should you call for help? Call 911 anytime you think you may need emergency care. For example, call if: 
· You have symptoms of a heart attack. These may include: ¨ Chest pain or pressure, or a strange feeling in the chest. 
¨ Sweating. ¨ Shortness of breath. ¨ Nausea or vomiting. ¨ Pain, pressure, or a strange feeling in the back, neck, jaw, or upper belly or in one or both shoulders or arms. ¨ Lightheadedness or sudden weakness. ¨ A fast or irregular heartbeat. After you call 911, the  may tell you to chew 1 adult-strength or 2 to 4 low-dose aspirin. Wait for an ambulance. Do not try to drive yourself. · You passed out (lost consciousness). Call your doctor now or seek immediate medical care if: 
· You have new or increased shortness of breath. · You are dizzy or lightheaded, or you feel like you may faint. · Your fatigue and weakness continue or get worse. · You have any abnormal bleeding, such as: 
¨ Nosebleeds. ¨ Vaginal bleeding that is different (heavier, more frequent, at a different time of the month) than what you are used to. ¨ Bloody or black stools, or rectal bleeding. ¨ Bloody or pink urine. Watch closely for changes in your health, and be sure to contact your doctor if: 
· You do not get better as expected. Where can you learn more? Go to http://dorinda-jn.info/. Enter R301 in the search box to learn more about \"Anemia: Care Instructions. \" Current as of: February 5, 2016 Content Version: 11.1 © 5958-1246 [employer]. Care instructions adapted under license by [employer] (which disclaims liability or warranty for this information). If you have questions about a medical condition or this instruction, always ask your healthcare professional. [place] [address] any warranty or liability for your use of this information. Discharge Orders None Introducing [facility] SERVICES! John Llanes introduces [employer] patient portal. Now you can access parts of your medical record, email your doctor's office, and request medication refills online. 1. In your internet browser, go to [email or web address] 2. Click on the First Time User? Click Here link in the Sign In box. You will see the New Member Sign Up page. 3. Enter your [employer] Access Code exactly as it appears below. You will not need to use this code after youve completed the sign-up process. If you do not sign up before the expiration date, you must request a new code. · [employer] Access Code: [email or web address] Expires: 4/13/2017  3:21 PM 
 
4. Enter the last four digits of your Social Security Number (xxxx) and Date of Birth (mm/dd/yyyy) as indicated and click Submit. You will be taken to the next sign-up page. 5. Create a [employer] ID. This will be your [employer] login ID and cannot be changed, so think of one that is secure and easy to remember. 6. Create a [employer] password. You can change your password at any time. 7. Enter your Password Reset Question and Answer. This can be used at a later time if you forget your password. 8. Enter your e-mail address. You will receive e-mail notification when new information is available in [facility] 9. Click Sign Up. You can now view and download portions of your medical record. 10. Click the Download Summary menu link to download a portable copy of your medical information. If you have questions, please visit the Frequently Asked Questions section of the [employer] website. Remember, [employer] is NOT to be used for urgent needs. For medical emergencies, dial 911. Now available from your iPhone and Android! General Information Please provide this summary of care documentation to your next provider. Patient Signature:  ____________________________________________________________ Date:  ____________________________________________________________  
  
Laisha Childwold Provider Signature:  ____________________________________________________________ Date:  ____________________________________________________________

## 2017-02-25 NOTE — IP AVS SNAPSHOT
Current Discharge Medication List  
  
Take these medications at their scheduled times Dose & Instructions Dispensing Information Comments Morning Noon Evening Bedtime  
 cephALEXin 500 mg capsule Commonly known as:  Merdis Perone Your next dose is: Today, Tomorrow Other:  ____________ Dose:  500 mg Take 1 Cap by mouth four (4) times daily. Quantity:  12 Cap Refills:  0  
     
   
   
   
  
 cholecalciferol 1,000 unit tablet Commonly known as:  VITAMIN D3 Your next dose is: Today, Tomorrow Other:  ____________ Dose:  3000 Units Take 3,000 Units by mouth daily. Refills:  0  
     
   
   
   
  
 gabapentin 300 mg capsule Commonly known as:  NEURONTIN Your next dose is: Today, Tomorrow Other:  ____________ Dose:  1 Cap Take 1 Cap by mouth three (3) times daily. 12/22/16, QTY#90, 30 Days, 2 Refills. Dr. Chau Alvarenga Refills:  2  
     
   
   
   
  
 lisinopril-hydroCHLOROthiazide 20-25 mg per tablet Commonly known as:  Lucetta Juan Your next dose is: Today, Tomorrow Other:  ____________ Dose:  1 Tab Take 1 Tab by mouth daily. Refills:  0  
     
   
   
   
  
 metoprolol tartrate 25 mg tablet Commonly known as:  LOPRESSOR Your next dose is: Today, Tomorrow Other:  ____________ Dose:  25 mg Take 25 mg by mouth two (2) times a day. Refills:  0  
     
   
   
   
  
 pantoprazole 40 mg tablet Commonly known as:  PROTONIX Your next dose is: Today, Tomorrow Other:  ____________ Dose:  40 mg Take 1 Tab by mouth Daily (before breakfast). Quantity:  30 Tab Refills:  0  
     
   
   
   
  
 VITAMIN D2 50,000 unit capsule Generic drug:  ergocalciferol Your next dose is: Today, Tomorrow Other:  ____________ Dose:  63984 Units Take 50,000 Units by mouth daily. Refills:  0 Take these medications as needed Dose & Instructions Dispensing Information Comments Morning Noon Evening Bedtime FLEXERIL 10 mg tablet Generic drug:  cyclobenzaprine Your next dose is: Today, Tomorrow Other:  ____________ Dose:  10 mg Take 10 mg by mouth three (3) times daily as needed. Refills:  0 Where to Get Your Medications Information about where to get these medications is not yet available ! Ask your nurse or doctor about these medications  
  cephALEXin 500 mg capsule  
 pantoprazole 40 mg tablet

## 2017-02-25 NOTE — ED PROVIDER NOTES
HPI Comments: 1:18 PM Fernie Estrella is a [de-identified] y.o. female with history of CAD, anemia, stroke, splenomegaly, HTN, and CKD who presents to the ED c/o lethargy that began one month ago and has worsened this morning. Pt's  states she was so weak this morning she could not hold any objects. Pt denies any vomiting, abdominal pain, urinary problems, rhinorrhea, and congestion or any other symptoms at this time. PCP: Lane Cespedes MD    The history is provided by the patient. Past Medical History:   Diagnosis Date    CAD (coronary artery disease)     Chronic osteomyelitis of ankle (HCC)     CKD (chronic kidney disease) stage 3, GFR 30-59 ml/min 06/24/2013    Worst noted (08/06/13) BUN/sCr: 54/2.49, GFR 20.     DM (diabetes mellitus) (HCC)     Elevated TSH 06/25/2012    5.08     Hypertension     Leukocytosis     Lumbar spinal stenosis     Normocytic anemia     Rhabdomyolysis     Splenomegaly     Stroke ([facility] [address]     Vitamin D deficiency        Past Surgical History:   Procedure Laterality Date    HX CHOLECYSTECTOMY      HX FREE SKIN GRAFT      HX HYSTERECTOMY      HX OPEN REDUCTION INTERNAL FIXATION Left     Tx LLE Ankle Trimalleolar Fx by Dr. Lit [facility])    HX ORTHOPAEDIC Left 06/11/2013    [address] - LLE Ankle Removal of Hardware     HX VEIN STRIPPING Bilateral 09/18/2013    Dr. Garth Servin 20 SQ CM<           Family History:   Problem Relation Age of Onset    Diabetes Other     Hypertension Other        Social History     Social History    Marital status:      Spouse name: N/A    Number of children: N/A    Years of education: N/A     Occupational History    Not on file.      Social History Main Topics    Smoking status: Never Smoker    Smokeless tobacco: Not on file    Alcohol use No    Drug use: No    Sexual activity: No     Other Topics Concern    Not on file     Social History Narrative         ALLERGIES: Review of patient's allergies indicates no known allergies. Review of Systems   Constitutional: Negative for fever. HENT: Negative for congestion, rhinorrhea and trouble swallowing. Respiratory: Negative for shortness of breath. Cardiovascular: Negative for chest pain. Gastrointestinal: Negative for abdominal pain, diarrhea and vomiting. Genitourinary: Negative for difficulty urinating. Musculoskeletal: Negative for back pain and neck pain. Skin: Negative for wound. Neurological: Negative for syncope. Psychiatric/Behavioral: Negative for behavioral problems. All other systems reviewed and are negative. Vitals:    02/25/17 1715 02/25/17 1749 02/25/17 1754 02/25/17 1911   BP: (!) 133/36 120/44  (!) 128/38   Pulse: (!) 59  (!) 59 (!) 58   Resp: 14  19 19   Temp:    98 °F (36.7 °C)   SpO2: 98%  100% 98%   Weight:       Height:                Physical Exam   Constitutional: She is oriented to person, place, and time. She appears well-developed. No distress. Chronically ill-appearing, nad   HENT:   Head: Normocephalic and atraumatic. Eyes: EOM are normal.   Neck: Normal range of motion. Cardiovascular: Normal rate. Pulmonary/Chest: Effort normal and breath sounds normal. No respiratory distress. Abdominal: Soft. There is no tenderness. Genitourinary: Rectal exam shows guaiac negative stool. Musculoskeletal: Normal range of motion. She exhibits edema. Bilateral lower extremity edema and erythema bilat ankle with a small sore lower right ankle   Neurological: She is alert and oriented to person, place, and time. No focal deficits noted   Psychiatric: Her behavior is normal.   Nursing note and vitals reviewed.        MDM  Number of Diagnoses or Management Options  Cellulitis of lower extremity, unspecified laterality:   Fever, unspecified fever cause:   Severe anemia:   Diagnosis management comments: [de-identified] yo CF with PMHx DM, CAD, HTN presents with 1-day no feeling well but no other specific symptoms. Examination significant for fever and bilateral LE redness, which may be cellulitis. Will evaluate for sepsis, acute process. 3:15 PM  Wbc and lactic ok. H/H low at 6.9, which is significant drop from recent. Pt guaiac negative on exam.  Pt was consented for blood, will transfuse. I discussed with hospitalist, Dr. Magali Vieira, who agreed to evaluate pt for admission, recommended CT scan, which is pending. 7:35 PM  CT's unremarkable. Spoke with Dr. Lisa Gallego, who agreed to evaluate pt for admission.        Amount and/or Complexity of Data Reviewed  Clinical lab tests: ordered and reviewed  Tests in the radiology section of CPT®: ordered and reviewed  Decide to obtain previous medical records or to obtain history from someone other than the patient: yes  Obtain history from someone other than the patient: yes  Review and summarize past medical records: yes  Discuss the patient with other providers: yes  Independent visualization of images, tracings, or specimens: yes    Risk of Complications, Morbidity, and/or Mortality  Presenting problems: high  Diagnostic procedures: high  Management options: high    Patient Progress  Patient progress: stable    ED Course       EKG  Date/Time: 2/25/2017 2:49 PM  Performed by: Estee Garcia by: Emerson Islas     ECG reviewed by ED Physician in the absence of a cardiologist: yes    Previous ECG:     Previous ECG:  Compared to current    Comparison ECG info:  2/3/17    Similarity:  No change  Interpretation:     Interpretation: normal    Rate:     ECG rate:  63    ECG rate assessment: normal    Rhythm:     Rhythm: sinus rhythm    Ectopy:     Ectopy: none    QRS:     QRS axis:  Normal    QRS intervals:  Normal  Conduction:     Conduction: normal    ST segments:     ST segments:  Normal  T waves:     T waves: normal                 Vitals:  Patient Vitals for the past 12 hrs:   Temp Pulse Resp BP SpO2   02/25/17 1911 98 °F (36.7 °C) (!) 58 19 (!) 128/38 98 %   02/25/17 1754 - (!) 59 19 - 100 %   02/25/17 1749 - - - 120/44 -   02/25/17 1715 - (!) 59 14 (!) 133/36 98 %   02/25/17 1700 97.9 °F (36.6 °C) (!) 56 15 (!) 122/38 98 %   02/25/17 1645 98.3 °F (36.8 °C) 66 17 (!) 126/34 100 %   02/25/17 1630 98.3 °F (36.8 °C) 66 18 131/51 100 %   02/25/17 1617 98.2 °F (36.8 °C) 85 18 (!) 115/38 100 %   02/25/17 1615 - 65 16 (!) 121/103 100 %   02/25/17 1600 - (!) 56 16 (!) 118/105 98 %   02/25/17 1545 - 63 17 133/43 99 %   02/25/17 1530 - 62 19 119/44 99 %   02/25/17 1515 - (!) 58 23 111/42 98 %   02/25/17 1500 - (!) 59 19 119/47 99 %   02/25/17 1445 - 61 18 128/55 100 %   02/25/17 1430 - (!) 59 17 138/45 98 %   02/25/17 1415 - 66 22 135/42 99 %   02/25/17 1400 - 63 16 137/45 98 %   02/25/17 1345 - 66 17 141/45 98 %   02/25/17 1330 - 67 20 140/43 98 %   02/25/17 1315 - - - 144/50 -   02/25/17 1250 100 °F (37.8 °C) 71 18 141/44 98 %        Medications ordered:   Medications   0.9% sodium chloride infusion 250 mL (not administered)   cefTRIAXone (ROCEPHIN) 1 g in 0.9% sodium chloride (MBP/ADV) 50 mL MBP (not administered)   vancomycin (VANCOCIN) 1,000 mg in 0.9% sodium chloride (MBP/ADV) 250 mL adv (not administered)   vancomycin (VANCOCIN) 1,000 mg in 0.9% sodium chloride (MBP/ADV) 250 mL adv (not administered)   acetaminophen (TYLENOL) tablet 975 mg (975 mg Oral Given 2/25/17 1321)   iohexol (OMNIPAQUE) solution 50 mL (50 mL Oral Given 2/25/17 1602)         Lab findings:  Recent Results (from the past 12 hour(s))   URINALYSIS W/ RFLX MICROSCOPIC    Collection Time: 02/25/17  1:00 PM   Result Value Ref Range    Color YELLOW      Appearance CLEAR      Specific gravity 1.019 1.005 - 1.030      pH (UA) 5.0 5.0 - 8.0      Protein NEGATIVE  NEG mg/dL    Glucose NEGATIVE  NEG mg/dL    Ketone NEGATIVE  NEG mg/dL    Bilirubin NEGATIVE  NEG      Blood NEGATIVE  NEG      Urobilinogen 0.2 0.2 - 1.0 EU/dL    Nitrites NEGATIVE  NEG      Leukocyte Esterase TRACE (A) NEG     URINE MICROSCOPIC ONLY    Collection Time: 02/25/17  1:00 PM   Result Value Ref Range    WBC 4 to 10 0 - 4 /hpf    RBC NONE 0 - 5 /hpf    Epithelial cells FEW 0 - 5 /lpf    Bacteria 1+ (A) NEG /hpf    Hyaline cast 0 to 3 0 - 2 /lpf   METABOLIC PANEL, COMPREHENSIVE    Collection Time: 02/25/17  1:20 PM   Result Value Ref Range    Sodium 141 136 - 145 mmol/L    Potassium 3.9 3.5 - 5.5 mmol/L    Chloride 108 100 - 108 mmol/L    CO2 24 21 - 32 mmol/L    Anion gap 9 3.0 - 18 mmol/L    Glucose 125 (H) 74 - 99 mg/dL    BUN 31 (H) 7.0 - 18 MG/DL    Creatinine 1.51 (H) 0.6 - 1.3 MG/DL    BUN/Creatinine ratio 21 (H) 12 - 20      GFR est AA 40 (L) >60 ml/min/1.73m2    GFR est non-AA 33 (L) >60 ml/min/1.73m2    Calcium 8.4 (L) 8.5 - 10.1 MG/DL    Bilirubin, total 0.3 0.2 - 1.0 MG/DL    ALT (SGPT) 14 13 - 56 U/L    AST (SGOT) 13 (L) 15 - 37 U/L    Alk. phosphatase 92 45 - 117 U/L    Protein, total 6.3 (L) 6.4 - 8.2 g/dL    Albumin 3.1 (L) 3.4 - 5.0 g/dL    Globulin 3.2 2.0 - 4.0 g/dL    A-G Ratio 1.0 0.8 - 1.7     CBC WITH AUTOMATED DIFF    Collection Time: 02/25/17  1:20 PM   Result Value Ref Range    WBC 7.6 4.6 - 13.2 K/uL    RBC 2.81 (L) 4.20 - 5.30 M/uL    HGB 6.9 (L) 12.0 - 16.0 g/dL    HCT 22.7 (L) 35.0 - 45.0 %    MCV 80.8 74.0 - 97.0 FL    MCH 24.6 24.0 - 34.0 PG    MCHC 30.4 (L) 31.0 - 37.0 g/dL    RDW 16.2 (H) 11.6 - 14.5 %    PLATELET 988 292 - 574 K/uL    MPV 11.0 9.2 - 11.8 FL    NEUTROPHILS 70 40 - 73 %    LYMPHOCYTES 18 (L) 21 - 52 %    MONOCYTES 9 3 - 10 %    EOSINOPHILS 3 0 - 5 %    BASOPHILS 0 0 - 2 %    ABS. NEUTROPHILS 5.4 1.8 - 8.0 K/UL    ABS. LYMPHOCYTES 1.4 0.9 - 3.6 K/UL    ABS. MONOCYTES 0.7 0.05 - 1.2 K/UL    ABS. EOSINOPHILS 0.2 0.0 - 0.4 K/UL    ABS.  BASOPHILS 0.0 0.0 - 0.06 K/UL    DF AUTOMATED     POC LACTIC ACID    Collection Time: 02/25/17  1:30 PM   Result Value Ref Range    Lactic Acid (POC) 1.3 0.4 - 2.0 mmol/L   EKG, 12 LEAD, INITIAL    Collection Time: 02/25/17  2:24 PM   Result Value Ref Range Ventricular Rate 63 BPM    Atrial Rate 63 BPM    P-R Interval 162 ms    QRS Duration 74 ms    Q-T Interval 386 ms    QTC Calculation (Bezet) 395 ms    Calculated P Axis 79 degrees    Calculated R Axis 22 degrees    Calculated T Axis 73 degrees    Diagnosis       Normal sinus rhythm  Low voltage QRS  Cannot rule out Anterior infarct (cited on or before 21-JUN-2012)  Abnormal ECG  When compared with ECG of 03-FEB-2017 02:41,  premature atrial complexes are no longer present  QT has shortened     TYPE & CROSSMATCH    Collection Time: 02/25/17  2:55 PM   Result Value Ref Range    Crossmatch Expiration 02/28/2017     ABO/Rh(D) A POSITIVE     Antibody screen NEG     CALLED TO: NINA BROWNE, ER, 2/25/17 @1552 BY [facility]     Unit number [id number]     Blood component type RC LR AS1     Unit division 00     Status of unit ISSUED     Crossmatch result Compatible     Unit number [id number]     Blood component type RC LR AS3,1     Unit division 00     Status of unit ISSUED     Crossmatch result Compatible        EKG interpretation by ED Physician:       X-Ray, CT or other radiology findings or impressions:  XR CHEST PORT   Impression:     1. No acute radiographic or pulmonary abnormality. 2. Unchanged appearance to vague area of peripheral nodularity in the left  midlung. As previously, comparison with prior cross-sectional imaging of the  chest is recommended. If no prior imaging is available, consideration may be  given to nonemergent CT scan of the chest for further characterization.   Per radiologist      CT ABD PELV WO CONT    (Results Pending)   CT HEAD WO CONT    (Results Pending)       Orders:  Orders Placed This Encounter    SEVERE SEPSIS AND SEPTIC SHOCK BUNDLE INITIATED     Standing Status:   Standing     Number of Occurrences:   1    CULTURE, BLOOD     Standing Status:   Standing     Number of Occurrences:   1    CULTURE, BLOOD     Standing Status:   Standing     Number of Occurrences:   1    XR CHEST PORT Standing Status:   Standing     Number of Occurrences:   1     Order Specific Question:   Reason for Exam     Answer:   Sepsis    CT ABD PELV WO CONT     Oral contrast only per MD     Standing Status:   Standing     Number of Occurrences:   1     Order Specific Question:   Transport     Answer:   BED [2]     Order Specific Question:   Is Patient Allergic to Contrast Dye? Answer:   No    CT HEAD WO CONT     Standing Status:   Standing     Number of Occurrences:   1     Order Specific Question:   Transport     Answer:   BED [2]     Order Specific Question:   Is Patient Allergic to Contrast Dye? Answer: Yes    URINALYSIS W/ RFLX MICROSCOPIC     Standing Status:   Standing     Number of Occurrences:   1    METABOLIC PANEL, COMPREHENSIVE     Standing Status:   Standing     Number of Occurrences:   1    CBC WITH AUTOMATED DIFF     Standing Status:   Standing     Number of Occurrences:   1    URINE MICROSCOPIC ONLY     Standing Status:   Standing     Number of Occurrences:   1    VITAL SIGNS - PER UNIT ROUTINE     PER UNIT ROUTINE     Standing Status:   Standing     Number of Occurrences:   1    STRICT I & O     Standing Status:   Standing     Number of Occurrences:   1    NEUROLOGIC STATUS ASSESSMENT - PER UNIT ROUTINE     PER UNIT ROUTINE     Standing Status:   Standing     Number of Occurrences:   1    POC LACTIC ACID     Standing Status:   Standing     Number of Occurrences:   1    STRAIGHT CATHETER (NURSING) ONE TIME STAT     Standing Status:   Standing     Number of Occurrences:   1    TRANSFUSE PACKED RBC'S     Standing Status:   Standing     Number of Occurrences:   2     Order Specific Question:   Reason for transfusion     Answer:   Hgb LESS THAN 7 g/dL in asymptomatic and/or hemodynamically stable patient without significant comorbidities.     EKG NOTEWRITER(ASAP ONLY)     This order was created via procedure documentation     Standing Status:   Standing     Number of Occurrences:   1    POC LACTIC ACID     Standing Status:   Standing     Number of Occurrences:   1    EKG, 12 LEAD, INITIAL     Standing Status:   Standing     Number of Occurrences:   1     Order Specific Question:   Reason for Exam:     Answer:   Sepsis    BLOOD BANK REQUEST FOR RED BLOOD CELLS     Standing Status:   Standing     Number of Occurrences:   1     Order Specific Question:   Type of Product     Answer:   PRBC (Packed RBCs)     Order Specific Question:   Date needed for transfusion     Answer:   2/25/2017     Order Specific Question:   Number of units requested     Answer:   2     Order Specific Question:   Has patient been transfused or pregnant in the last 3 mos. ? Answer:   Unknown    SALINE LOCK IV ONE TIME STAT     Standing Status:   Standing     Number of Occurrences:   1    acetaminophen (TYLENOL) tablet 975 mg    0.9% sodium chloride infusion 250 mL    iohexol (OMNIPAQUE) solution 50 mL    cefTRIAXone (ROCEPHIN) 1 g in 0.9% sodium chloride (MBP/ADV) 50 mL MBP     Order Specific Question:   Antibiotic Indications     Answer:   Skin and Soft Tissue Infection    vancomycin (VANCOCIN) 1,000 mg in 0.9% sodium chloride (MBP/ADV) 250 mL adv     Pharmacy to dose after once dose. Order Specific Question:   Antibiotic Indications     Answer:   Skin and Soft Tissue Infection    vancomycin (VANCOCIN) 1,000 mg in 0.9% sodium chloride (MBP/ADV) 250 mL adv     Order Specific Question:   Antibiotic Indications     Answer:   Skin and Soft Tissue Infection    IP CONSULT TO HOSPITALIST     Standing Status:   Standing     Number of Occurrences:   1     Order Specific Question:   Reason for Consult: Answer: FEVER     Order Specific Question:   Did you call or speak to the consulting provider? Answer:   No     Order Specific Question:   Consult To     Answer:   hospitalist    IP CONSULT TO Shyam Greenfield to dose vancomycin after once dose.      Standing Status:   Standing     Number of Occurrences:   1     Order Specific Question:   Reason for Consult: Answer:   Pharmacy to dose vancomycin     Order Specific Question:   Antibiotic Indications     Answer:   Skin and Soft Tissue Infection       Reevaluation, Progress notes, Consult notes, or additional Procedure notes:   3:11 PM Consult: I discussed care with Dr. Ruth Denton (Hospitalist). It was a standard discussion including patient history, chief complaint, available diagnostic results, and predicted treatment course. He agrees to evaluate patient for admission. Disposition:  Diagnosis:   1. Fever, unspecified fever cause    2. Severe anemia    3. Cellulitis of lower extremity, unspecified laterality        Disposition: Admit    Follow-up Information     None           Patient's Medications   Start Taking    No medications on file   Continue Taking    ASPIRIN (ASPIRIN) 325 MG TABLET    Take 325 mg by mouth daily. CHOLECALCIFEROL (VITAMIN D3) 1,000 UNIT TABLET    Take 3,000 Units by mouth daily. CYCLOBENZAPRINE (FLEXERIL) 10 MG TABLET    Take 10 mg by mouth three (3) times daily as needed. ERGOCALCIFEROL (VITAMIN D2) 50,000 UNIT CAPSULE    Take 50,000 Units by mouth daily. GABAPENTIN (NEURONTIN) 300 MG CAPSULE    Take 1 Cap by mouth three (3) times daily. 12/22/16, QTY#90, 30 Days, 2 Refills. Dr. Corrie Irwin    LISINOPRIL-HYDROCHLOROTHIAZIDE (PRINZIDE, ZESTORETIC) 20-25 MG PER TABLET    Take 1 Tab by mouth daily. METOPROLOL (LOPRESSOR) 25 MG TABLET    Take 25 mg by mouth two (2) times a day.    These Medications have changed    No medications on file   Stop Taking    No medications on file         Scribe Attestation  Jeffrey Quick scribing for and in the presence of Hillary Saavedra MD (02/25/17)      Physician Attestation  I personally performed the services described in this documentation, reviewed and edited the documentation which was dictated to the scribe in my presence, and it accurately records my words and actions.     Beckie Steward MD (02/25/17)      Signed by: Ray Vera, February 25, 2017 at 7:36 PM

## 2017-02-26 LAB
ABO + RH BLD: NORMAL
ANION GAP BLD CALC-SCNC: 9 MMOL/L (ref 3–18)
ATRIAL RATE: 63 BPM
BLD PROD TYP BPU: NORMAL
BLD PROD TYP BPU: NORMAL
BLOOD GROUP ANTIBODIES SERPL: NORMAL
BPU ID: NORMAL
BPU ID: NORMAL
BUN SERPL-MCNC: 26 MG/DL (ref 7–18)
BUN/CREAT SERPL: 22 (ref 12–20)
CALCIUM SERPL-MCNC: 8.2 MG/DL (ref 8.5–10.1)
CALCULATED P AXIS, ECG09: 79 DEGREES
CALCULATED R AXIS, ECG10: 22 DEGREES
CALCULATED T AXIS, ECG11: 73 DEGREES
CALLED TO:,BCALL1: NORMAL
CHLORIDE SERPL-SCNC: 106 MMOL/L (ref 100–108)
CO2 SERPL-SCNC: 25 MMOL/L (ref 21–32)
CREAT SERPL-MCNC: 1.17 MG/DL (ref 0.6–1.3)
CROSSMATCH RESULT,%XM: NORMAL
CROSSMATCH RESULT,%XM: NORMAL
DIAGNOSIS, 93000: NORMAL
EST. AVERAGE GLUCOSE BLD GHB EST-MCNC: 111 MG/DL
GLUCOSE BLD STRIP.AUTO-MCNC: 104 MG/DL (ref 70–110)
GLUCOSE BLD STRIP.AUTO-MCNC: 108 MG/DL (ref 70–110)
GLUCOSE BLD STRIP.AUTO-MCNC: 110 MG/DL (ref 70–110)
GLUCOSE BLD STRIP.AUTO-MCNC: 119 MG/DL (ref 70–110)
GLUCOSE BLD STRIP.AUTO-MCNC: 128 MG/DL (ref 70–110)
GLUCOSE SERPL-MCNC: 105 MG/DL (ref 74–99)
HBA1C MFR BLD: 5.5 % (ref 4.2–5.6)
HCT VFR BLD AUTO: 29.2 % (ref 35–45)
HCT VFR BLD AUTO: 30 % (ref 35–45)
HGB BLD-MCNC: 9 G/DL (ref 12–16)
HGB BLD-MCNC: 9.1 G/DL (ref 12–16)
P-R INTERVAL, ECG05: 162 MS
POTASSIUM SERPL-SCNC: 4.3 MMOL/L (ref 3.5–5.5)
Q-T INTERVAL, ECG07: 386 MS
QRS DURATION, ECG06: 74 MS
QTC CALCULATION (BEZET), ECG08: 395 MS
SODIUM SERPL-SCNC: 140 MMOL/L (ref 136–145)
SPECIMEN EXP DATE BLD: NORMAL
STATUS OF UNIT,%ST: NORMAL
STATUS OF UNIT,%ST: NORMAL
UNIT DIVISION, %UDIV: 0
UNIT DIVISION, %UDIV: 0
VENTRICULAR RATE, ECG03: 63 BPM

## 2017-02-26 PROCEDURE — 80048 BASIC METABOLIC PNL TOTAL CA: CPT | Performed by: HOSPITALIST

## 2017-02-26 PROCEDURE — 74011250636 HC RX REV CODE- 250/636: Performed by: EMERGENCY MEDICINE

## 2017-02-26 PROCEDURE — 74011250636 HC RX REV CODE- 250/636: Performed by: HOSPITALIST

## 2017-02-26 PROCEDURE — 74011250637 HC RX REV CODE- 250/637: Performed by: INTERNAL MEDICINE

## 2017-02-26 PROCEDURE — 97116 GAIT TRAINING THERAPY: CPT

## 2017-02-26 PROCEDURE — 97162 PT EVAL MOD COMPLEX 30 MIN: CPT

## 2017-02-26 PROCEDURE — 77030032490 HC SLV COMPR SCD KNE COVD -B

## 2017-02-26 PROCEDURE — 77030011256 HC DRSG MEPILEX <16IN NO BORD MOLN -A

## 2017-02-26 PROCEDURE — 83036 HEMOGLOBIN GLYCOSYLATED A1C: CPT | Performed by: HOSPITALIST

## 2017-02-26 PROCEDURE — 65270000029 HC RM PRIVATE

## 2017-02-26 PROCEDURE — 36415 COLL VENOUS BLD VENIPUNCTURE: CPT | Performed by: HOSPITALIST

## 2017-02-26 PROCEDURE — 85018 HEMOGLOBIN: CPT | Performed by: HOSPITALIST

## 2017-02-26 PROCEDURE — 77030020263 HC SOL INJ SOD CL0.9% LFCR 1000ML

## 2017-02-26 PROCEDURE — 74011000258 HC RX REV CODE- 258: Performed by: EMERGENCY MEDICINE

## 2017-02-26 PROCEDURE — 82962 GLUCOSE BLOOD TEST: CPT

## 2017-02-26 RX ORDER — HYDROCODONE BITARTRATE AND ACETAMINOPHEN 5; 325 MG/1; MG/1
1 TABLET ORAL
Status: DISCONTINUED | OUTPATIENT
Start: 2017-02-26 | End: 2017-03-01 | Stop reason: HOSPADM

## 2017-02-26 RX ORDER — DIPHENHYDRAMINE HCL 25 MG
25 CAPSULE ORAL
Status: DISCONTINUED | OUTPATIENT
Start: 2017-02-26 | End: 2017-03-01 | Stop reason: HOSPADM

## 2017-02-26 RX ADMIN — SODIUM CHLORIDE 50 ML/HR: 900 INJECTION, SOLUTION INTRAVENOUS at 23:21

## 2017-02-26 RX ADMIN — SODIUM CHLORIDE 1000 MG: 900 INJECTION, SOLUTION INTRAVENOUS at 23:22

## 2017-02-26 RX ADMIN — HYDROCODONE BITARTRATE AND ACETAMINOPHEN 1 TABLET: 5; 325 TABLET ORAL at 16:24

## 2017-02-26 RX ADMIN — DIPHENHYDRAMINE HYDROCHLORIDE 25 MG: 25 CAPSULE ORAL at 15:43

## 2017-02-26 RX ADMIN — HYDROCODONE BITARTRATE AND ACETAMINOPHEN 1 TABLET: 5; 325 TABLET ORAL at 10:11

## 2017-02-26 RX ADMIN — SODIUM CHLORIDE 50 ML/HR: 900 INJECTION, SOLUTION INTRAVENOUS at 01:06

## 2017-02-26 RX ADMIN — CEFTRIAXONE SODIUM 1 G: 1 INJECTION, POWDER, FOR SOLUTION INTRAMUSCULAR; INTRAVENOUS at 21:18

## 2017-02-26 RX ADMIN — SODIUM CHLORIDE 50 ML/HR: 900 INJECTION, SOLUTION INTRAVENOUS at 18:55

## 2017-02-26 NOTE — PROGRESS NOTES
Assumed care; Pt resting in bed; no distress noted; Pt denies pain; Pt assisted to bedside commode and back to bed; bed in low position; Side rails up x 3; Call light and personal belonging within reach. Will continue to monitor.

## 2017-02-26 NOTE — PROGRESS NOTES
0040: Admitted from ED, AOX4, on room air, no apparent distress, generally weak on both lower extremities; Admission history and assessment done; oriented to unit, room, callbell use; discussed plan of care; IVF NS initiated at 50 ml/hr; NSR on tele; denies any pain or other discomforts at this time; \"I had a varicose vein that I scratched and I have been bleeding from that\" per pt; denies any rectal bleeding; denies any vomiting of blood; wound care done on bilateral ankle areas; mepilex applied; will further monitor pt    0300: quietly sleeping; no apparent distress noted    0500: awake, assisted with bedside commode use; had 600 ml of clear, yellow urine; no bleeding noted    0710: Bedside and Verbal shift change report given to Fifi JOSHI (oncoming nurse) by Kashmir Lewis RN (offgoing nurse). Report included the following information SBAR, Kardex, Intake/Output, Recent Results and Cardiac Rhythm NSR.

## 2017-02-26 NOTE — PROGRESS NOTES
Problem: Mobility Impaired (Adult and Pediatric)  Goal: *Acute Goals and Plan of Care (Insert Text)  Physical Therapy Goals  Initiated 2/26/2017 and to be accomplished within 7 day(s)  1. Patient will move from supine to sit and sit to supine , scoot up and down and roll side to side in bed with modified independence. 2. Patient will transfer from bed to chair and chair to bed with modified independence using the least restrictive device. 3. Patient will perform sit to stand with modified independence. 4. Patient will ambulate with modified independence for >/= 150 feet with the least restrictive device. 5. Patient will ascend/descend 5 stairs with 1 handrail(s) with modified independence. PHYSICAL THERAPY EVALUATION     Patient: Fernie Estrella (73 y.o. female)  Date: 2/26/2017  Primary Diagnosis: Anemia        Precautions: Fall         ASSESSMENT :  Based on the objective data described below, the patient presents with c/o pain, decreased balance, decreased bed mobility independence, decreased transfer independence, decreased gait independence compared with baseline. Patient will benefit from skilled intervention to address the above impairments. Patients rehabilitation potential is considered to be Good  Factors which may influence rehabilitation potential include:   [ ]         None noted  [ ]         Mental ability/status  [X]         Medical condition  [ ]         Home/family situation and support systems  [ ]         Safety awareness  [ ]         Pain tolerance/management  [ ]         Other:      Recommendations for nursing:  Written on communication board: OOB 2-3x/day for meals with assist of 1. May ambulate 2-3x/day as tolerated with rolling walker with assist of 1 .   Verbally communicated to: nurse Calix       PLAN :  Recommendations and Planned Interventions:  [X]           Bed Mobility Training             [ ]    Neuromuscular Re-Education  [X]           Transfer Training [ ]    Orthotic/Prosthetic Training  [X]           Gait Training                          [ ]    Modalities  [ ]           Therapeutic Exercises          [ ]    Edema Management/Control  [ ]           Therapeutic Activities            [X]    Patient and Family Training/Education   [ ]           Other (comment): Plan of care. Advantage of rolling walker versus straight cane in giving additional support for increased balance. Frequency/Duration: Patient will be followed by physical therapy 1 time per day/3-5 days per week to address goals. Discharge Recommendations: Home Health  Further Equipment Recommendations for Discharge: N/A       SUBJECTIVE:   Patient stated I have not been up for 2 days. I'm stiff.       OBJECTIVE DATA SUMMARY:       Past Medical History:   Diagnosis Date    CAD (coronary artery disease)      Chronic osteomyelitis of ankle (HCC)      CKD (chronic kidney disease) stage 3, GFR 30-59 ml/min 06/24/2013     Worst noted (08/06/13) BUN/sCr: 54/2.49, GFR 20.     DM [facility]di[facility]) [facility] [address]      Elevated TSH 06/25/2012     5.08     Hypertension      Leukocytosis      Lumbar spinal stenosis      Normocytic anemia      Rhabdomyolysis      Splenomegaly      Stroke ([facility] [address]      Vitamin D deficiency       Past Surgical History:   Procedure Laterality Date    HX CHOLECYSTECTOMY        HX FREE SKIN GRAFT        HX HYSTERECTOMY        HX OPEN REDUCTION INTERNAL FIXATION Left       Tx LLE Ankle Trimalleolar Fx by Dr. Elizabeth Montano [facility])    HX ORTHOPAEDIC Left 06/11/2013     [address] - LLE Ankle Removal of Hardware     HX VEIN STRIPPING Bilateral 09/18/2013     Dr. Evelia Antoine OPEN WOUND 20 SQ CM<         Barriers to Learning/Limitations: None  Compensate with: visual, verbal, tactile, kinesthetic cues/model     G CODE:Mobility  Current  CL= 60-79%   Goal  CI= 1-19%.   The severity rating is based on the Other [facility] Standing Balance Scale     [facility] Standing Balance Scale  0: Pt performs 25% or less of standing activity (Max assist) CN, 100% impaired. 1: Pt supports self with upper extremities but requires therapist assistance. Pt performs 25-50% of effort (Mod assist) CM, 80% to <100% impaired. 1+: Pt supports self with upper extremities but requires therapist assistance. Pt performs >50% effort. (Min assist). CL, 60% to <80% impaired. 2: Pt supports self independently with both upper extremities (walker, crutches, parallel bars). CL, 60% to <80% impaired. 2+: Pt support self independently with 1 upper extremity (cane, crutch, 1 parallel bar). CK, 40% to <60% impaired. 3: Pt stands without upper extremity support for up to 30 seconds. CK, 40% to <60% impaired. 3+: Pt stands without upper extremity support for 30 seconds or greater. CJ, 20% to <40% impaired. 4: Pt independently moves and returns center of gravity 1-2 inches in one plane. CJ, 20% to <40% impaired. 4+: Pt independently moves and returns center of gravity 1-2 inches in multiple planes. CI, 1% to <20% impaired. 5: Pt independently moves and returns center of gravity in all planes greater than 2 inches. CH, 0% impaired. Eval Complexity: History: MEDIUM  Complexity : 1-2 comorbidities / personal factors will impact the outcome/ POC Exam:MEDIUM Complexity : 3 Standardized tests and measures addressing body structure, function, activity limitation and / or participation in recreation  Presentation: MEDIUM Complexity : Evolving with changing characteristics  Clinical Decision Making:Medium Complexity [facility] Standing Balance Scale Overall Complexity:MEDIUM     Prior Level of Function/Home Situation: Ambulatory with straight cane.   Home Situation  Home Environment: Private residence  # Steps to Enter: 4  One/Two Story Residence: Two story  # of Interior Steps: 16  Height of Each Step (in): 8 inches  [employer]: None  Lift Chair Available: No  Living Alone: No  Support Systems: Family member(s)  Patient Expects to be Discharged to[de-identified] Private residence  Current DME Used/Available at Home: [address], straight  Critical Behavior:  Neurologic State: Alert  Orientation Level: Oriented X4  Cognition: Appropriate decision making; Appropriate for age attention/concentration; Appropriate safety awareness; Follows commands  Safety/Judgement: Awareness of environment;Good awareness of safety precautions; Insight into deficits  Psychosocial  Patient Behaviors: Calm; Cooperative  Family  Behaviors: Calm;Supportive  Purposeful Interaction: Yes  Pt Identified Daily Priority: Clinical issues (comment)  Caritas Process: Establish trust;Nurture loving kindness; Teaching/learning; Attend basic human needs;Create healing environment  Caring Interventions: Reassure; Therapeutic modalities  Reassure: Informing; Therapeutic listening;Quiet presence;Caring rounds  Therapeutic Modalities: Humor;Deep breathing  Skin Condition/Temp: Warm  Family  Behaviors: Calm;Supportive  Skin Integrity: Wound (add Wound LDA) (Scbas to BLE)  Skin Integumentary  Skin Color: Appropriate for ethnicity  Skin Condition/Temp: Warm  Skin Integrity: Wound (add Wound LDA) (Scbas to BLE)  Turgor: Non-tenting  Hair Growth: Sparce  Varicosities: Present     Strength:    Strength: Generally decreased, functional (both LE)      Tone & Sensation:   Tone: Normal       Range Of Motion:  AROM: Generally decreased, functional (both LE)      Functional Mobility:  Bed Mobility:  Rolling: Minimum assistance  Supine to Sit: Minimum assistance  Sit to Supine: Contact guard assistance  Transfers:  Sit to Stand: Minimum assistance  Stand to Sit: Contact guard assistance  Balance:   Sitting - Static: Good (unsupported)  Sitting - Dynamic: Good (unsupported)  Standing - Static: Fair  Standing - Dynamic : Fair (Minus)  Ambulation/Gait Training:  Distance (ft): 85 Feet (ft)  Assistive Device: Walker, rolling  Ambulation - Level of Assistance: Contact guard assistance  Gait Abnormalities: Ataxic;Decreased step clearance;Trunk sway increased  Base of Support: Center of gravity altered  Speed/Hailey: Pace decreased (<100 feet/min)  Step Length: Left shortened;Right shortened     Pain:  Pre treatment pain level:  3, right knee  Post treatment pain level: 6, right knee (informed nursing patient requesting for pain medication.)  Pain Scale 1: Numeric (0 - 10)  Pain Intensity 1: 2  Pain Location 1: Knee  Pain Orientation 1: Anterior;Left;Right  Pain Description 1: Aching  Pain Intervention(s) 1: Medication (see MAR)      Activity Tolerance:   Fair Minus, increased pain right knee with weight bearing     Please refer to the flowsheet for vital signs taken during this treatment. After treatment:   [ ]         Patient left in no apparent distress sitting up in chair  [X]         Patient left in no apparent distress in bed  [X]         Call bell left within reach  [ ]         Nursing notified  [ ]         Caregiver present   [ ]         Bed alarm activated     COMMUNICATION/EDUCATION:   [X]         Fall prevention education was provided and the patient/caregiver indicated understanding. [X]         Patient/family have participated as able in goal setting and plan of care. [X]         Patient/family agree to work toward stated goals and plan of care. [ ]         Patient understands intent and goals of therapy, but is neutral about his/her participation. [ ]         Patient is unable to participate in goal setting and plan of care.      Thank you for this referral.  Triny Cleaning, PT   Time Calculation: 24 mins

## 2017-02-26 NOTE — H&P
History and Physical    Patient: Kaylie Kiran               Sex: female          DOA: 2/25/2017       YOB: 1935      Age:  [de-identified] y.o.        LOS:  LOS: 0 days        HPI:     Kaylie Kiran is a [de-identified] y.o. female who presents to the ED complaining of fatigue. Patient states that beginning last night, she was not feeling well. This morning she began having slurred speech. Was unable to hold anything in her hand, could not write. She denies any headache or blurry vision. While in ED patient found to be anemic. Stool guaiac was negative. 2 units blood to transfuse. Past Medical History:   Diagnosis Date    CAD (coronary artery disease)     Chronic osteomyelitis of ankle (HCC)     CKD (chronic kidney disease) stage 3, GFR 30-59 ml/min 06/24/2013    Worst noted (08/06/13) BUN/sCr: 54/2.49, GFR 20.     DM (diabetes mellitus) (HCC)     Elevated TSH 06/25/2012    5.08     Hypertension     Leukocytosis     Lumbar spinal stenosis     Normocytic anemia     Rhabdomyolysis     Splenomegaly     Stroke ([facility] [address]     Vitamin D deficiency        Past Surgical History:   Procedure Laterality Date    HX CHOLECYSTECTOMY      HX FREE SKIN GRAFT      HX HYSTERECTOMY      HX OPEN REDUCTION INTERNAL FIXATION Left     Tx LLE Ankle Trimalleolar Fx by Dr. Lazaro Guerra [facility])    HX ORTHOPAEDIC Left 06/11/2013    [address] - LLE Ankle Removal of Hardware     HX VEIN STRIPPING Bilateral 09/18/2013    Dr. Latasha Cintron 20 SQ CM<         Social History     Social History    Marital status:      Spouse name: N/A    Number of children: N/A    Years of education: N/A     Occupational History    Not on file.      Social History Main Topics    Smoking status: Never Smoker    Smokeless tobacco: Not on file    Alcohol use No    Drug use: No    Sexual activity: No     Other Topics Concern    Not on file     Social History Narrative       Family History   Problem Relation Age of Onset    Diabetes Other     Hypertension Other        No Known Allergies    Review of Systems:  Positive in bold. CONST: Fever, weight loss, fatigue or chills  GI: Nausea, vomiting, abdominal pain, change in bowel habits, hematochezia, melena, and GERD   INTEG: Dermatitis, abnormal moles  HEENT: Recent changes in vision, vertigo, epistaxis, dysphagia, hoarseness  CV: Chest pain, palpitations, HTN, edema and varicosities  RESP: Cough, shortness of breath, wheezing, hemoptysis, snoring. : Hematuria, dysuria, frequency, urgency, retention, incontinence   MS: Weakness, joint pain and arthritis  ENDO: Diabetes, thyroid disease, polyuria, polydipsia, polyphagia, poor wound healing, heat intolerance, cold intolerance  LYMPH/HEME: Anemia, bruising and history of blood transfusions  NEURO: Dizziness, headache, fainting, seizures and stroke, fatigue  PSYCH: Anxiety , depression    Physical Exam:      Visit Vitals    BP (!) 112/93    Pulse 67    Temp 98 °F (36.7 °C)    Resp 18    Ht 5' 2\" (1.575 m)    Wt 108.9 kg (240 lb)    SpO2 100%    BMI 43.9 kg/m2       Physical Exam:  Gen:  No distress, alert, awake and oriented x 4  HEENT:  Normal cephalic atraumatic, extra-occular movements are intact. Neck:  Supple, No JVD  Lungs:  Clear bilaterally, no wheeze, no rales, normal effort  Cardiovascular:  Regular Rate and Rhythm, normal S1 and J6,+ systolic murmur. Capillary refill: < 3 seconds. Abdomen:  Soft, non tender, non distended, normal bowel sounds, no guarding. Extremities:  Well perfused, no cyanosis, + edema of b/l lower extremities. band aid on right lower shin from prior cut. Neurological:  Awake and alert, CN's are intact, 4/5l strength throughout extremities  Skin:  No rashes or moles. Turgor and color normal  Mental Status:  Normal thought process, does not appear anxious      Laboratory Studies:     All lab results for the last 24 hours reviewed. Assessment/Plan     Active Problems:    * No active hospital problems. *      PLAN:  Infectious: ? cellulits of bilateral lower extremities   Continue IV antibiotics- vancopmycin   Repeat lactic acid levels every 4 hours until normal    CV: diastolic hypertension   Continue home antihypertensives. Heme:  Anemia and fatigue. DVT prophylaxis   Type and cross   Transfuse 2 units   H/H Q8 hours   Apply sequential compression device. Metabolic/Endo: DM   FS AC&HS with sliding scale coverage   Hold all home oral antihyperglycemics   Diabetic diet    Nephro:  Acute renal insufficiency   Continuous hydration. NS @ 75 cc/hr   Recheck chemistry in the am    Misc:  FULL CODE   Physical therapy to evaluate and treat   Fall precautions   Ambulate with assistance. Monitor intake and output   Monitor vitals as per unit   Neurovascular checks   Replace electrolytes as needed. Follow up am labs.           Siddhartha Mcfarland MD

## 2017-02-26 NOTE — PROGRESS NOTES
[facility] Dosing Services     Pharmacy dosing Vancomycin for treatment of Skin and Soft Tissue Infection. Starting on a regimen of Vancomycin 1 g IV x 1 dose followed with Vancomycin 1 g IV x 1 dose to complete 2 g loading dose. Day of Therapy: 0    Other Antibiotics: Ceftriaxone    Labs:   Bun/Serum Creatinine - 31/1.51  CrCl - 34.5 ml/min  WBC - 7.6    Cultures:    2/25/17 Blood x 2: Pending     Plan:  Initiated Vancomycin 1 g IV Q 24H for goal peak/trough of 15-20. Pharmacy to order level before the 4th dose. Pharmacy to follow and will make changes to dose and/or frequency based on clinical status. Thank you for the consult,  Victoria Blanton, PharmD, M.S.   [address] [facility] [address] [place] [address]   21 [phone]

## 2017-02-26 NOTE — PROGRESS NOTES
Problem: Discharge Planning  Goal: *Discharge to safe environment  Outcome: Resolved/Met Date Met:  02/26/17  Home with hh

## 2017-02-26 NOTE — PROGRESS NOTES
[place]   Discharge Planning/ Assessment    Reasons for Intervention: Spoke with pt, lives with spouse, designates him for dcp. States is independent with adls , amb with a cane has a walker at home butdoes not use. pcp Dr Diana Hernandez. Demographics confirmed correct. Has never used hh or snf benefit. Refuses snf , agrees to hh if needed. Plan home, with poss hh.     High Risk Criteria  [x] Yes  []No   Physician Referral  [] Yes  [x]No        Date    Nursing Referral  [] Yes  [x]No        Date    Patient/Family Request  [] Yes  [x]No        Date       Resources:    Medicare  [x] Yes  []No   Medicaid  [] Yes  []No   No Resources  [] Yes  []No   Private Insurance  [x] Yes  []No    Name/Phone Number    Other  [] Yes  []No        (i.e. Workman's Comp)         Prior Services:    Prior Services  [] Yes  [x]No   Home Health  [] Yes  []No   [address] [place]  [] Yes  []No        Number of [address]  [] Yes  []No       Meals on Wheels  [] Yes  []No   Office on Aging  [] Yes  []No   Transportation Services  [] Yes  []No   Nursing Home  [] Yes  []No        Nursing Home Name    [address]  [] Yes  []No        [address] Name    Other       Information Source:      Information obtained from  [x] Patient  [] Parent   [] [address]  [] Child  [] Spouse   [] Significant Other/Partner   [] Friend      [] EMS    [] Nursing Home Chart          [] Other:   Chart Review  [] Yes  []No     Family/Support System:    Patient lives with  [] Alone    [x] Spouse   [] Significant Other  [] Children  [] Caretaker   [] Parent  [] Sibling     [] Other       Other Support System:    Is the patient responsible for care of others  [] Yes  [x]No   Information of person caring for patient on  discharge    Managers financial affairs independently  [] Yes  [x]No   If no, explain:      Status Prior to Admission:    Mental Status  [] Awake  [] Alert  [x] Oriented  [] Quiet/Calm [] Lethargic/Sedated   [] Disoriented  [] Restless/Anxious  [] Combative   Personal Care  [] Dependent  [x] Independent Personal Care  [] Requires Assistance   Meal Preparation Ability  [x] Independent   [] Standby Assistance   [] Minimal Assistance   [] Moderate Assistance  [] Maximum Assistance     [] Total Assistance   Chores  [x] Independent with Chores   [] N/A Nursing Home Resident   [] Requires Assistance   Bowel/Bladder  [] Continent  [] Catheter  [] Incontinent  [] Ostomy Self-Care    [] Urine Diversion Self-Care  [] Maximum Assistance     [] Total Assistance   Number of Persons needed for assistance    DME at home  [] [address], [place]  [x] [address] [facility], Straight   [] Commode    [] Bathroom/Grab Bars  [] Hospital Bed  [] Nebulizer  [] Oxygen           [] Raised Toilet Seat  [] Shower Chair  [] Side Rails for Bed   [] Tub Transfer Bench   [x] Deatreye Nguyen  [] Kassy Locke, Standard      [] Other:   Vendor      Treatment Presently Receiving:    Current Treatments  [] Chemotherapy  [] Dialysis  [] Insulin  [] IVAB [] IVF   [] O2  [] PCA   [] PT   [] RT   [] Tube Feedings   [] Wound Care     Psychosocial Evaluation:    Verbalized Knowledge of Disease Process  [x] Patient  []Family   Coping with Disease Process  [] Patient  []Family   Requires Further Counseling Coping with Disease Process  [] Patient  []Family     Identified Projected Needs:    Home Health Aid  [] Yes  [x]No   Transportation  [] Yes  [x]No   Education  [] Yes  [x]No        Specific Education     Financial Counseling  [] Yes  [x]No   Inability to Care for Self/Will Require 24 hour care  [] Yes  [x]No   Pain Management  [] Yes  [x]No   Home Infusion Therapy  [] Yes  [x]No   Oxygen Therapy  [] Yes  [x]No   DME  [] Yes  [x]No   Long Term Care Placement  [] Yes  [x]No   Rehab  [] Yes  [x]No   Physical Therapy  [x] Yes  []No   Needs Anticipated At This Time  [] Yes  [x]No     Intra-Hospital Referral:    [address] [facility]  [] Yes  [x]No     [] Yes  [x]No Patient Representative  [] Yes  [x]No   Staff for Teaching Needs  [] Yes  [x]No   Specialty Teaching Needs     Diabetic Educator  [] Yes  [x]No   Referral for Diabetic Educator Needed  [] Yes  [x]No  If Yes, place order for Nutritionist or Diabetic Consult     Tentative Discharge Plan:    Home with No Services  [] Yes  []No   Home with Home Health Follow-up  [x] Yes  []No        If Yes, specify type    [address]  [] Yes  []No        If Yes, specify type    Meals on Wheels  [] Yes  []No   Office of Aging  [] Yes  []No   NHP  [] Yes  []No   Return to the [employer]  [] Yes  []No   Rehab Therapy  [] Yes  []No   Acute Rehab  [] Yes  []No   Subacute Rehab  [] Yes  []No   Private Care  [] Yes  []No   Substance Abuse Referral  [] Yes  []No   Transportation  [] Yes  []No   Chore Service  [] Yes  []No   Inpatient Hospice  [] Yes  []No   OP RT  [] Yes  [] No   OP Hemo  [] Yes  [] No   OP PT  [] Yes  []No   Support Group  [] Yes  []No   Reach to Recovery  [] Yes  []No   OP Oncology Clinic  [] Yes  []No   Clinic Appointment  [] Yes  []No   DME  [] Yes  []No   Comments    Name of D/C Planner or  Given to Patient or Family Debora douglass rn cm   Phone Number [phone]        Extension    Date 2/26/17   Time    If you are discharged home, whom do you designate to participate in your discharge plan and receive any information needed?      Enter name of designee         Phone # of designee         Address of designee         Updated         Patient refused to designate any           individual

## 2017-02-26 NOTE — PROGRESS NOTES
Patient has designated ________________spouse________ to participate in his/her discharge plan and to receive any needed information.      Name: Jabiergm ramirez  Address:  Phone number:[phone]

## 2017-02-26 NOTE — PROGRESS NOTES
Hospitalist Progress Note    Subjective:   Daily Progress Note: 2017 10:44 AM  Admitted for anemia,uti,possible celllulitis of legs after she presented with fatigue in the emergency room and was noted to have Hgb 6. 9. Family reports that patient was week and her speech was slurry. Stool for occult blood was negative in ER. Pt received 2 units of blood. She is feeling much better today    Current Facility-Administered Medications   Medication Dose Route Frequency    HYDROcodone-acetaminophen (NORCO) 5-325 mg per tablet 1 Tab  1 Tab Oral Q6H PRN    0.9% sodium chloride infusion 250 mL  250 mL IntraVENous PRN    cefTRIAXone (ROCEPHIN) 1 g in 0.9% sodium chloride (MBP/ADV) 50 mL MBP  1 g IntraVENous Q24H    vancomycin (VANCOCIN) 1,000 mg in 0.9% sodium chloride (MBP/ADV) 250 mL adv  1,000 mg IntraVENous Q24H    0.9% sodium chloride infusion  50 mL/hr IntraVENous CONTINUOUS    insulin lispro (HUMALOG) injection   SubCUTAneous AC&HS    glucose chewable tablet 16 g  4 Tab Oral PRN    glucagon (GLUCAGEN) injection 1 mg  1 mg IntraMUSCular PRN    dextrose (D50W) injection syrg 12.5-25 g  25-50 mL IntraVENous PRN        Review of Systems  Feeling much improving and reporting that she did fine with physical therapy today.     Objective:     Visit Vitals    /58 (BP 1 Location: Right arm, BP Patient Position: At rest)    Pulse 66    Temp 97.8 °F (36.6 °C)    Resp 20    Ht 5' 2\" (1.575 m)    Wt 87.3 kg (192 lb 6.4 oz)    SpO2 100%    Breastfeeding No    BMI 35.19 kg/m2      O2 Device: Room air    Temp (24hrs), Av.3 °F (36.8 °C), Min:97.8 °F (36.6 °C), Max:100 °F (37.8 °C)      701 - 1900  In: -   Out: 400 [Urine:400]  1901 -  07  In: 294.2 [I.V.:294.2]  Out: 1000 [Urine:1000]  P/E  Lying comfortably in bed,talking to visiting family members,NAD  Heent:perrla,at/nc,mouth moist  Lungs ctab  Heart s1s2 nl,no m/g  Extr:no edema,dressing in place rt leg,stasis dermatitis both legs  Neuro:aaox3  Data Review    Recent Results (from the past 12 hour(s))   GLUCOSE, POC    Collection Time: 02/26/17 12:08 AM   Result Value Ref Range    Glucose (POC) 108 70 - 110 mg/dL   HEMOGLOBIN A1C WITH EAG    Collection Time: 02/26/17  4:50 AM   Result Value Ref Range    Hemoglobin A1c 5.5 4.2 - 5.6 %    Est. average glucose 401 mg/dL   METABOLIC PANEL, BASIC    Collection Time: 02/26/17  4:50 AM   Result Value Ref Range    Sodium 140 136 - 145 mmol/L    Potassium 4.3 3.5 - 5.5 mmol/L    Chloride 106 100 - 108 mmol/L    CO2 25 21 - 32 mmol/L    Anion gap 9 3.0 - 18 mmol/L    Glucose 105 (H) 74 - 99 mg/dL    BUN 26 (H) 7.0 - 18 MG/DL    Creatinine 1.17 0.6 - 1.3 MG/DL    BUN/Creatinine ratio 22 (H) 12 - 20      GFR est AA 54 (L) >60 ml/min/1.73m2    GFR est non-AA 45 (L) >60 ml/min/1.73m2    Calcium 8.2 (L) 8.5 - 10.1 MG/DL   HGB & HCT    Collection Time: 02/26/17  4:50 AM   Result Value Ref Range    HGB 9.0 (L) 12.0 - 16.0 g/dL    HCT 29.2 (L) 35.0 - 45.0 %   GLUCOSE, POC    Collection Time: 02/26/17  7:44 AM   Result Value Ref Range    Glucose (POC) 119 (H) 70 - 110 mg/dL         Assessment/Plan:     Active Problems:    Anemia (6/11/2013)      DM    CKD    CAD    Chronic osteomyelitis of ankle    Care Plan   1-Anemia on admission with Hgb 6.9    -Pt received blood transfusion in ER. Hgb 9.0 today    -Stool was neg for occult blood    -Repeat lab in am and if drop in hgb>7.0,consider GI consult.   2-?Cellulitis both legs    -Likely stasis dermatitis    -D/C vanc  3-LAURA    -Resolved  4-CAD/HTN    -Metoprolol 25 mg po bid,lisinopril 20 mg po daily  5-DM    -ssi  Review lab results in am  DVT prophylaxis  Full code  Disposition:2/26

## 2017-02-27 LAB
BASOPHILS # BLD AUTO: 0 K/UL (ref 0–0.06)
BASOPHILS # BLD: 0 % (ref 0–2)
DIFFERENTIAL METHOD BLD: ABNORMAL
EOSINOPHIL # BLD: 0.2 K/UL (ref 0–0.4)
EOSINOPHIL NFR BLD: 3 % (ref 0–5)
ERYTHROCYTE [DISTWIDTH] IN BLOOD BY AUTOMATED COUNT: 16.5 % (ref 11.6–14.5)
GLUCOSE BLD STRIP.AUTO-MCNC: 103 MG/DL (ref 70–110)
GLUCOSE BLD STRIP.AUTO-MCNC: 106 MG/DL (ref 70–110)
GLUCOSE BLD STRIP.AUTO-MCNC: 107 MG/DL (ref 70–110)
GLUCOSE BLD STRIP.AUTO-MCNC: 124 MG/DL (ref 70–110)
HCT VFR BLD AUTO: 28.8 % (ref 35–45)
HCT VFR BLD AUTO: 28.9 % (ref 35–45)
HCT VFR BLD AUTO: 29.4 % (ref 35–45)
HGB BLD-MCNC: 8.7 G/DL (ref 12–16)
HGB BLD-MCNC: 8.9 G/DL (ref 12–16)
HGB BLD-MCNC: 8.9 G/DL (ref 12–16)
LYMPHOCYTES # BLD AUTO: 19 % (ref 21–52)
LYMPHOCYTES # BLD: 1.1 K/UL (ref 0.9–3.6)
MCH RBC QN AUTO: 24.9 PG (ref 24–34)
MCHC RBC AUTO-ENTMCNC: 30.3 G/DL (ref 31–37)
MCV RBC AUTO: 82.1 FL (ref 74–97)
MONOCYTES # BLD: 0.4 K/UL (ref 0.05–1.2)
MONOCYTES NFR BLD AUTO: 7 % (ref 3–10)
NEUTS SEG # BLD: 4.4 K/UL (ref 1.8–8)
NEUTS SEG NFR BLD AUTO: 71 % (ref 40–73)
PLATELET # BLD AUTO: 209 K/UL (ref 135–420)
PMV BLD AUTO: 10.5 FL (ref 9.2–11.8)
RBC # BLD AUTO: 3.58 M/UL (ref 4.2–5.3)
WBC # BLD AUTO: 6.1 K/UL (ref 4.6–13.2)

## 2017-02-27 PROCEDURE — 74011250637 HC RX REV CODE- 250/637: Performed by: INTERNAL MEDICINE

## 2017-02-27 PROCEDURE — 77030020263 HC SOL INJ SOD CL0.9% LFCR 1000ML

## 2017-02-27 PROCEDURE — 74011250636 HC RX REV CODE- 250/636: Performed by: EMERGENCY MEDICINE

## 2017-02-27 PROCEDURE — 82962 GLUCOSE BLOOD TEST: CPT

## 2017-02-27 PROCEDURE — 85025 COMPLETE CBC W/AUTO DIFF WBC: CPT | Performed by: INTERNAL MEDICINE

## 2017-02-27 PROCEDURE — 74011250636 HC RX REV CODE- 250/636: Performed by: HOSPITALIST

## 2017-02-27 PROCEDURE — 85018 HEMOGLOBIN: CPT | Performed by: HOSPITALIST

## 2017-02-27 PROCEDURE — 97530 THERAPEUTIC ACTIVITIES: CPT

## 2017-02-27 PROCEDURE — 36415 COLL VENOUS BLD VENIPUNCTURE: CPT | Performed by: INTERNAL MEDICINE

## 2017-02-27 PROCEDURE — 74011000258 HC RX REV CODE- 258: Performed by: EMERGENCY MEDICINE

## 2017-02-27 PROCEDURE — 65270000029 HC RM PRIVATE

## 2017-02-27 RX ORDER — PANTOPRAZOLE SODIUM 40 MG/1
40 TABLET, DELAYED RELEASE ORAL
Status: DISCONTINUED | OUTPATIENT
Start: 2017-02-27 | End: 2017-03-01 | Stop reason: HOSPADM

## 2017-02-27 RX ORDER — LANOLIN ALCOHOL/MO/W.PET/CERES
1 CREAM (GRAM) TOPICAL 2 TIMES DAILY WITH MEALS
Status: DISCONTINUED | OUTPATIENT
Start: 2017-02-27 | End: 2017-02-28

## 2017-02-27 RX ADMIN — SODIUM CHLORIDE 50 ML/HR: 900 INJECTION, SOLUTION INTRAVENOUS at 20:25

## 2017-02-27 RX ADMIN — SODIUM CHLORIDE 1000 MG: 900 INJECTION, SOLUTION INTRAVENOUS at 21:19

## 2017-02-27 RX ADMIN — FERROUS SULFATE TAB 325 MG (65 MG ELEMENTAL FE) 325 MG: 325 (65 FE) TAB at 17:21

## 2017-02-27 RX ADMIN — DIPHENHYDRAMINE HYDROCHLORIDE 25 MG: 25 CAPSULE ORAL at 05:55

## 2017-02-27 RX ADMIN — DIPHENHYDRAMINE HYDROCHLORIDE 25 MG: 25 CAPSULE ORAL at 21:18

## 2017-02-27 RX ADMIN — FERROUS SULFATE TAB 325 MG (65 MG ELEMENTAL FE) 325 MG: 325 (65 FE) TAB at 10:00

## 2017-02-27 RX ADMIN — CEFTRIAXONE SODIUM 1 G: 1 INJECTION, POWDER, FOR SOLUTION INTRAMUSCULAR; INTRAVENOUS at 20:25

## 2017-02-27 NOTE — PROGRESS NOTES
1900- Bedside and Verbal shift change report given to Fareed Polk RN (oncoming nurse) by Selina Sams RN (offgoing nurse). Report included the following information SBAR, Kardex and MAR.     2350- Removed R hand, 20 gauge IV. Patient demonstrates discomfort upon flush attempt. Site dressed with gauze and tape. No apparent bleeding. Tolerated procedure well. Patient assisted to bathroom. Patient ambulates with one assist. Gait is unsteady and stiff. Patient utilizes staff and wall to maintain balance. 0555- PRN benadryl administered PO for itching to all extremities. 0620-MD paged regarding home BP meds to be transferred in. Patient presenting with elevated BP ranging in the 150's-170's.     0700- Call not returned at this time. Will pass information on to dayshift RN.     0730- Bedside and Verbal shift change report given to Alicia De La Torre RN (oncoming nurse) by Fareed Polk RN (offgoing nurse). Report included the following information SBAR, Kardex, Intake/Output, MAR and Recent Results.

## 2017-02-27 NOTE — CONSULTS
Yves Estrella    Name:  Alphonse Driscoll  MR#:  [id number]  :  1935  Account #:  [de-identified]  Date of Adm:  2017  Date of Consultation:  2017      GASTROINTESTINAL CONSULTATION    DATE OF ADMISSION: 2017    CONSULTATION DATE: 2017    REFERRING PHYSICIAN: Dr. Carrie Braswell: Joseph Ortega. MD Nael    REASON FOR CONSULTATION: Asked to see the patient because of  anemia and possible GI bleed. HISTORY OF PRESENT ILLNESS: The patient is an 81-year-old  female admitted to the hospital several days ago with fatigue,  weakness, poor energy. She reports that perhaps several weeks prior,  she had bleeding from a varicosity in the right lower extremity that she  irritated by scratching. She lost a large amount of blood, came to the  emergency room, was given intravenous fluids, but no transfusions. She was not placed on oral iron. She has had no bleeding from her leg  since then. The patient denies melena, hematochezia. Does not take  ____ or NSAIDs on a regular basis other than Aspirin 81 mg daily for  the last many years after her stroke, but 325 mg once or even twice  maybe over the last 6 months or so. She has no history of peptic ulcer  disease, pancreatitis, hiatal hernia. She has no previous history of  anemia to require blood transfusion or iron therapy. When she was  admitted to the hospital, she was found to have a hemoglobin of 6.9. She received 2 units of packed red blood cells, hemoglobin is up to  8.9. Stool was Hemoccult negative in the emergency room. We are  asked to see the patient regarding possible GI blood loss,  superimposed upon bleeding from her right lower extremity causing  her anemia. In reviewing the patient's chart, she had a colonoscopy performed in   revealing adenomatous polyps anterior lip of the ileocecal valve.   She was advised to return in  for repeat examination which  showed just diverticulosis. She had her last colonoscopy performed in  2010, this time by Dr. Faythe Collet. Colonoscopy showed several  polyps, diverticulosis and hemorrhoids. No new polyps. She was  advised to return in 5 years for next colonoscopy depending on her  overall health. She has not seen us since 2010. The patient has no  chronic regional abdominal pain, indigestion or heartburn. Her appetite  has been good, her weight has been stable. Her main complaint has  been poor exercise tolerance, poor energy, some shortness of breath. She actually felt somewhat weak and was slightly slurred speech and  very weak to the point she was not able to hold anything in her hand or  write when she came to the hospital. These symptoms resolved  completely after 2 units of packed red blood cells. She does report  previous CVA with similar symptoms. PAST MEDICAL HISTORY: Adenomatous polyps. Last colonoscopy  2010. ASCFD, chronic osteomyelitis of the ankle, renal failure, chronic  azotemia stage 3 with baseline BUN and creatinine 54/2.49, diabetes  mellitus, hypertension, lumbar spinal stenosis, normochromic  normocytic anemia, thought related to azotemia,  splenomegaly  etiology uncertain, CVA remote, status post cholecystectomy,  hysterectomy, open reduction internal fixation left lower extremity, vein  stripping in the past, recent bleeding from varicose vein as above. ALLERGIES: VOLTAREN. MEDICATIONS AT TIME OF THIS ADMISSION: Include. 1. Aspirin 325 mg once or twice a day. 2. Vitamin D tablets 3000 mg once a day. 3. Flexeril 10 mg 3 times a day. 4. Vitamin D2 at 50,000 units once a day. 5. Neurontin 300 mg 3 times a day. 6. Prinizide/Zestoretic combination 20/25 mg once a day. 7. Lopressor 25 mg once a day. SOCIAL HISTORY: The patient is , lives with her . Has  a daughter lives close by and looks after her to some extent.  She has  never been a regular drinker of alcoholic beverages, no recreational  drugs, tattoos. Never been a smoker. She is a retired homemaker. FAMILY HISTORY: Negative for colon cancer, polyps, inflammatory  bowel disease, chronic liver disease. PAST SURGICAL HISTORY: In terms from up above in her past  medical history. She has had a cholecystectomy, colonoscopy in 2003,  2005 and 2010, with adenomatous polyps, hysterectomy, bladder  suspension, left ankle, internal fixation and she has had vein stripping. REVIEW OF SYSTEMS  A complete review was taken, positive as per history of present illness. Negatives not listed here. PHYSICAL EXAMINATION  GENERAL: The patient is an elderly, somewhat frail female in no acute  distress. NECK: Supple. No mass or thyroid palpable. NODES: No supraclavicular, axillary, or cervical nodes palpable. CHEST: Clear to auscultation, symmetric breath sounds, good air  movement. HEART: First and second heart sounds are normal. No murmurs, rubs  or gallops. ABDOMEN: Soft, nontender. No organomegaly or mass palpable. Bowel sounds are normal. There are no bruits. EXTREMITIES: No pedal edema. RECTAL: Examination not performed. Hemoccult negative stool in the  ER. LABORATORY STUDIES: From February 25th, hemoglobin and  hematocrit 6.9 and 22.7 with an MCV 80.8, white count of 7.6, platelet  count 364. After 2 units of packed red blood cells, hemoglobin and  hematocrit have been running between 8.9 and 9.1 with hematocrit of  28.9 to 30, white count 6.1, platelet count 976. BMP on admission was  abnormal for glucose of 124, BUN 31, creatinine 1.51. Her total  bilirubin 0.3, total protein 6.3, albumin 3.1, ALT of 14, AST of 13,  alkaline phosphatase is 92. Hemoglobin A1c yesterday was 5.5. CT scan of the abdomen and pelvis on February 25th because of  complaints of abdominal discomfort according to the note.  No evidence  of bowel obstruction, normal caliber small and large intestine, small  hiatal hernia, cholecystectomy, normal appendix, distal left and  sigmoid colonic diverticulosis without diverticulitis. IMPRESSION:  1. Anemia, likely secondary to significant blood loss from varicosity  right lower extremity by the patient's history. She lost a large amount of  blood 3 weeks or so ago, was given intravenous fluids. No iron pills or  transfusions through the ER. Hemoglobin resulting in 6.9. I understand  that the patient has a mild azotemia, but not certain exactly what her  baseline hemoglobin is. She is responding appropriately to 2 units  packed red blood cells with a hemoglobin now post-transfusion 8.9. The patient has Hemoccult negative stool through the emergency  room. She does take aspirin 325-650 mg daily on a regular basis, last  6 months, which may be contributing to mild anemia, but there is  nothing suggesting acute or significant chronic gastrointestinal blood  loss at this point. I am asked specifically however, by Dr. Demarcus Yu who spoken to Dr. Chase Alvarado, the patient's PCP, to  perform upper endoscopy while the patient is in the hospital, looking  for significant upper GI pathology. Colonoscopy originally from as  2010. The likelihood she had a neoplastic process in the colon is very  small. 2. Arteriosclerotic cardiovascular disease. 3. Chronic azotemia with mild anemia at baseline, I am uncertain  the baseline, however. 4. Varicose veins, recent bleeding as above. PLAN:  1. Will arrange upper endoscopy for tomorrow, the patient has already  eaten breakfast.  2. Further suggestions depending above. 3. Strongly advised the patient to reduce her aspirin dose based on her  history of bleeding and unexplained anemia if there is concern about  occult GI blood loss, but it sounds like her acute bleed was secondary  to a varicosity in the right lower extremity.   4. Given the patient's age, I am not clear whether or not colonoscopy is  indicated despite her history of adenomatous polyps, this will be  discussed with the patient on an outpatient basis. LOCATION: Room [facility].         Seth King MD    SD / CD  D:  02/27/2017   12:47  T:  02/27/2017   13:37  Job #:  [id number]

## 2017-02-27 NOTE — PROGRESS NOTES
[facility]  Hospitalist Division    Daily progress Note    Patient: Victor M Camargo MRN: [id number]  [facility]: [id number]    YOB: 1935  Age: [de-identified] y.o. Sex: female    DOA: 2/25/2017 LOS:  LOS: 2 days                    Subjective:     CC: anemia   Alert and awake, NAD  Still c/o rash/itching in both UE     Objective:      Visit Vitals    /64    Pulse 76    Temp 98 °F (36.7 °C)    Resp 16    Ht 5' 2\" (1.575 m)    Wt 92.4 kg (203 lb 11.2 oz)    SpO2 94%    Breastfeeding No    BMI 37.26 kg/m2       Physical Exam:  NC/AT  NO JVD,TMG  S1/S2 RRR  CTAB, NO WHEEZING  NT,ND, NABS  NO EDEMA  Min erythema in Rt LE  B/L UE dry rash         Intake and Output:  Current Shift:  02/27 0701 - 02/27 1900  In: -   Out: 600 [Urine:600]  Last three shifts:  02/25 1901 - 02/27 0700  In: 1595 [I.V.:1595]  Out: 2150 [Urine:2150]    Recent Results (from the past 24 hour(s))   GLUCOSE, POC    Collection Time: 02/26/17  9:21 PM   Result Value Ref Range    Glucose (POC) 110 70 - 110 mg/dL   HGB & HCT    Collection Time: 02/27/17 12:15 AM   Result Value Ref Range    HGB 8.9 (L) 12.0 - 16.0 g/dL    HCT 28.9 (L) 35.0 - 45.0 %   CBC WITH AUTOMATED DIFF    Collection Time: 02/27/17  6:20 AM   Result Value Ref Range    WBC 6.1 4.6 - 13.2 K/uL    RBC 3.58 (L) 4.20 - 5.30 M/uL    HGB 8.9 (L) 12.0 - 16.0 g/dL    HCT 29.4 (L) 35.0 - 45.0 %    MCV 82.1 74.0 - 97.0 FL    MCH 24.9 24.0 - 34.0 PG    MCHC 30.3 (L) 31.0 - 37.0 g/dL    RDW 16.5 (H) 11.6 - 14.5 %    PLATELET 807 860 - 693 K/uL    MPV 10.5 9.2 - 11.8 FL    NEUTROPHILS 71 40 - 73 %    LYMPHOCYTES 19 (L) 21 - 52 %    MONOCYTES 7 3 - 10 %    EOSINOPHILS 3 0 - 5 %    BASOPHILS 0 0 - 2 %    ABS. NEUTROPHILS 4.4 1.8 - 8.0 K/UL    ABS. LYMPHOCYTES 1.1 0.9 - 3.6 K/UL    ABS. MONOCYTES 0.4 0.05 - 1.2 K/UL    ABS. EOSINOPHILS 0.2 0.0 - 0.4 K/UL    ABS.  BASOPHILS 0.0 0.0 - 0.06 K/UL    DF AUTOMATED     GLUCOSE, POC    Collection Time: 02/27/17  8:01 AM   Result Value Ref Range    Glucose (POC) 106 70 - 110 mg/dL   GLUCOSE, POC    Collection Time: 02/27/17 12:19 PM   Result Value Ref Range    Glucose (POC) 103 70 - 110 mg/dL   HGB & HCT    Collection Time: 02/27/17  4:27 PM   Result Value Ref Range    HGB 8.7 (L) 12.0 - 16.0 g/dL    HCT 28.8 (L) 35.0 - 45.0 %   GLUCOSE, POC    Collection Time: 02/27/17  5:17 PM   Result Value Ref Range    Glucose (POC) 107 70 - 110 mg/dL         Current Facility-Administered Medications:     ferrous sulfate tablet 325 mg, 1 Tab, Oral, BID WITH MEALS, Katy Cunningham MD, 325 mg at 02/27/17 1721    HYDROcodone-acetaminophen (NORCO) 5-325 mg per tablet 1 Tab, 1 Tab, Oral, Q6H PRN, Jackie Thompson MD, 1 Tab at 02/26/17 1624    diphenhydrAMINE (BENADRYL) capsule 25 mg, 25 mg, Oral, Q6H PRN, Jackie Thompson MD, 25 mg at 02/27/17 0555    [START ON 2/28/2017] VANCOMYCIN INFORMATION NOTE, , Other, ONCE, Jackie Thompson MD    0.9% sodium chloride infusion 250 mL, 250 mL, IntraVENous, PRN, Giovana Lawrence MD    cefTRIAXone (ROCEPHIN) 1 g in 0.9% sodium chloride (MBP/ADV) 50 mL MBP, 1 g, IntraVENous, Q24H, Giovana Lawrence MD, Last Rate: 100 mL/hr at 02/26/17 2118, 1 g at 02/26/17 2118    vancomycin (VANCOCIN) 1,000 mg in 0.9% sodium chloride (MBP/ADV) 250 mL adv, 1,000 mg, IntraVENous, Q24H, Giovana Lawrence MD, Last Rate: 125 mL/hr at 02/26/17 2322, 1,000 mg at 02/26/17 2322    0.9% sodium chloride infusion, 50 mL/hr, IntraVENous, CONTINUOUS, Alexandre Ramirez MD, Last Rate: 50 mL/hr at 02/26/17 2321, 50 mL/hr at 02/26/17 2321    insulin lispro (HUMALOG) injection, , SubCUTAneous, AC&HS, Alexandre Ramirez MD, Stopped at 02/26/17 0730    glucose chewable tablet 16 g, 4 Tab, Oral, PRN, Will MD James    glucagon (GLUCAGEN) injection 1 mg, 1 mg, IntraMUSCular, PRN, Will MD James    dextrose (D50W) injection syrg 12.5-25 g, 25-50 mL, IntraVENous, PRN, Will MD James    Lab Results   Component Value Date/Time    Glucose 105 02/26/2017 04:50 AM    Glucose 125 02/25/2017 01:20 PM    Glucose 165 02/02/2017 11:47 PM    Glucose 107 05/12/2016 10:58 AM    Glucose 90 08/08/2013 04:59 AM        Assessment/Plan     Active Problems:    Anemia (6/11/2013)      Acute blood loss anemia ?  Etiology LE varicose bleed Vs GI source  Appreciate GI help, for EGD in AM  JANIS, add PO Iron BID    LE cellulitis, improving   LAURA  HTN  DM    moniotr H/H  Add PPI  EGD in AM     Mason Lucero MD  2/27/2017, 5:33 PM

## 2017-02-27 NOTE — PROGRESS NOTES
Assumed care of alert and oriented female pt. Pt denies pain. Drsg to R lower leg dry and intact. SR on monitor, HR 70.

## 2017-02-27 NOTE — PROGRESS NOTES
Problem: Mobility Impaired (Adult and Pediatric)  Goal: *Acute Goals and Plan of Care (Insert Text)  Physical Therapy Goals  Initiated 2/26/2017 and to be accomplished within 7 day(s)  1. Patient will move from supine to sit and sit to supine , scoot up and down and roll side to side in bed with modified independence. 2. Patient will transfer from bed to chair and chair to bed with modified independence using the least restrictive device. 3. Patient will perform sit to stand with modified independence. 4. Patient will ambulate with modified independence for >/= 150 feet with the least restrictive device. 5. Patient will ascend/descend 5 stairs with 1 handrail(s) with modified independence. Outcome: Progressing Towards Goal  PHYSICAL THERAPY TREATMENT     Patient: Aly Floyd (55 y.o. female)  Date: 2/27/2017  Diagnosis: Anemia  anemia <principal problem not specified>  Procedure(s) (LRB):  ESOPHAGOGASTRODUODENOSCOPY (EGD) (N/A)    Precautions:    Chart, physical therapy assessment, plan of care and goals were reviewed. ASSESSMENT:  Pt is progressing well towards goals, limited by decreased overall mobility, strength, and R ankle pain during weight bearing activities. Pt performed sit<->stand from EOB with min A+cues for proper hand placement and technique with pt demonstrating mild instability upon standing secondary to pain. Attempted taking few steps using SPC but pt demo moderate instability, short step length, and increase difficulty weight shifting/ advancing B LE's. Pt amb 100ft CGA using FWW with improved stability and requiring cues for increasing step length during swing, increasing upright posture, and keeping BUCKY inside AD at all times for safety. EDUCATION: Pt and pt's daughter education on utilizing FWW vs. SPC at this time during functional transfers and gait upon return to home in order to maximize overall stability and minimize risk of falls. Pt verbalized understanding. Progression toward goals:  X    Improving appropriately and progressing toward goals        Improving slowly and progressing toward goals        Not making progress toward goals and plan of care will be adjusted       PLAN:  Patient continues to benefit from skilled intervention to address the above impairments. Continue treatment per established plan of care. Discharge Recommendations:  Home Health  Further Equipment Recommendations for Discharge:  Pt owns a rolling walker and single point cane       SUBJECTIVE:   Patient stated I use a cane to walk at home\". OBJECTIVE DATA SUMMARY:   Critical Behavior:  Neurologic State: Alert  Orientation Level: Oriented X4  Cognition: Appropriate decision making  Safety/Judgement: Awareness of environment, Good awareness of safety precautions, Insight into deficits     G CODE:  Functional Mobility Training:  Bed Mobility:  Rolling: Minimum assistance  Supine to Sit: Minimum assistance  Transfers:  Sit to Stand: Minimum assistance  Stand to Sit: Contact guard assistance  Balance:  Sitting: Intact  Sitting - Static: Good (unsupported)  Sitting - Dynamic: Good (unsupported)  Standing: Impaired  Standing - Static: Fair  Standing - Dynamic : Fair (-)  Ambulation/Gait Training:  Distance (ft): 100 Feet (ft)  Assistive Device: Gait belt;Walker, rolling  Ambulation - Level of Assistance: Contact guard assistance  Gait Abnormalities: Antalgic;Decreased step clearance;Shuffling gait; Step to gait  Base of Support: Center of gravity altered  Speed/Hailey: Pace decreased (<100 feet/min); Shuffled  Step Length: Left shortened;Right shortened  Neuro Re-Education:  Therapeutic Exercises:      Vital Signs  Temp: 98.1 °F (36.7 °C)     Pulse (Heart Rate): 72     BP: 148/48     Resp Rate: 18     O2 Sat (%): 100 %  Pain:  Pre treatment pain level: 0/10  Post treatment pain level: 0/10  Pain Scale 1: Numeric (0 - 10)  Pain Intensity 1: 0  Activity Tolerance:   Fair      After treatment: Patient left in no apparent distress sitting up in chair  Patient left in no apparent distress in bed X  Call bell left within reach X  Nursing notified X  Caregiver present  Bed alarm activated      Chantel Tapia PTA   Time Calculation: 24 mins

## 2017-02-27 NOTE — PROGRESS NOTES
conducted an initial consultation and Spiritual Assessment for Jaleesa Siddiqi, who is a [de-identified] y.o.,female. Patients Primary Language is: [place]. According to the patients EMR Rastafari Affiliation is: [place]. The reason the Patient came to the hospital is:   Patient Active Problem List    Diagnosis Date Noted    Normocytic anemia     Stroke ([facility]     Hypertension     DM (diabetes mellitus) ([facility]     Type 2 diabetes mellitus ([facility] 08/08/2013    CVA, old, hemiparesis ([facility] 08/07/2013    Benign hypertension 08/07/2013    Acute on chronic renal insufficiency 07/11/2013    Leukocytosis 07/11/2013    Microscopic hematuria 07/11/2013    Rhabdomyolysis 07/11/2013    Injury of foot 07/10/2013    CKD (chronic kidney disease) stage 3, GFR 30-59 ml/min 06/24/2013    Abnormal finding on thyroid function test 06/11/2013    Anemia 06/11/2013    Chronic osteomyelitis of ankle ([facility] [facility] 06/11/2013    Spinal stenosis of lumbar region 06/11/2013    Vitamin D deficiency 06/11/2013    Elevated TSH 06/25/2012        The  provided the following Interventions:  Initiated a relationship of care and support. Explored issues of win, belief, spirituality and Baptism/ritual needs while hospitalized. Listened empathically. Provided chaplaincy education. Provided information about Spiritual Care Services. Offered prayer and assurance of continued prayers on patient's behalf. Chart reviewed. The following outcomes were achieved:  Patient shared limited information about both their medical narrative and spiritual journey/beliefs. Patient processed feeling about current hospitalization. Patient expressed gratitude for the 's visit. Assessment:  Patient does not have any Baptism/cultural needs that will affect patients preferences in health care. Patient did not indicate any spiritual or Baptism issues which require Spiritual Care Services interventions at this time. Plan:  Chaplains will continue to follow and will provide pastoral care on an as needed/requested basis.  recommends bedside caregivers page  on duty if patient shows signs of acute spiritual or emotional distress.     [facility]   Staff [address] [place] [address]   [phone]

## 2017-02-28 ENCOUNTER — SURGERY (OUTPATIENT)
Age: 82
End: 2017-02-28

## 2017-02-28 PROBLEM — D50.9 IRON (FE) DEFICIENCY ANEMIA: Status: ACTIVE | Noted: 2017-02-28

## 2017-02-28 LAB
DATE LAST DOSE: 0
GLUCOSE BLD STRIP.AUTO-MCNC: 103 MG/DL (ref 70–110)
GLUCOSE BLD STRIP.AUTO-MCNC: 104 MG/DL (ref 70–110)
GLUCOSE BLD STRIP.AUTO-MCNC: 105 MG/DL (ref 70–110)
GLUCOSE BLD STRIP.AUTO-MCNC: 107 MG/DL (ref 70–110)
HCT VFR BLD AUTO: 27.4 % (ref 35–45)
HCT VFR BLD AUTO: 29 % (ref 35–45)
HCT VFR BLD AUTO: 30.1 % (ref 35–45)
HCT VFR BLD AUTO: 30.7 % (ref 35–45)
HGB BLD-MCNC: 8.5 G/DL (ref 12–16)
HGB BLD-MCNC: 8.8 G/DL (ref 12–16)
HGB BLD-MCNC: 9.2 G/DL (ref 12–16)
HGB BLD-MCNC: 9.4 G/DL (ref 12–16)
REPORTED DOSE,DOSE: NORMAL UNITS
REPORTED DOSE/TIME,TMG: 0
VANCOMYCIN TROUGH SERPL-MCNC: 10.1 UG/ML (ref 10–20)

## 2017-02-28 PROCEDURE — 36415 COLL VENOUS BLD VENIPUNCTURE: CPT | Performed by: HOSPITALIST

## 2017-02-28 PROCEDURE — 87086 URINE CULTURE/COLONY COUNT: CPT | Performed by: INTERNAL MEDICINE

## 2017-02-28 PROCEDURE — 82962 GLUCOSE BLOOD TEST: CPT

## 2017-02-28 PROCEDURE — 0DB98ZX EXCISION OF DUODENUM, VIA NATURAL OR ARTIFICIAL OPENING ENDOSCOPIC, DIAGNOSTIC: ICD-10-PCS | Performed by: INTERNAL MEDICINE

## 2017-02-28 PROCEDURE — 74011250637 HC RX REV CODE- 250/637: Performed by: INTERNAL MEDICINE

## 2017-02-28 PROCEDURE — 76040000007: Performed by: INTERNAL MEDICINE

## 2017-02-28 PROCEDURE — 74011250636 HC RX REV CODE- 250/636

## 2017-02-28 PROCEDURE — 88305 TISSUE EXAM BY PATHOLOGIST: CPT | Performed by: INTERNAL MEDICINE

## 2017-02-28 PROCEDURE — 80202 ASSAY OF VANCOMYCIN: CPT | Performed by: INTERNAL MEDICINE

## 2017-02-28 PROCEDURE — 77030009426 HC FCPS BIOP ENDOSC BSC -B: Performed by: INTERNAL MEDICINE

## 2017-02-28 PROCEDURE — 65270000029 HC RM PRIVATE

## 2017-02-28 PROCEDURE — 74011250636 HC RX REV CODE- 250/636: Performed by: EMERGENCY MEDICINE

## 2017-02-28 PROCEDURE — 74011000258 HC RX REV CODE- 258: Performed by: EMERGENCY MEDICINE

## 2017-02-28 PROCEDURE — 74011250637 HC RX REV CODE- 250/637: Performed by: NURSE PRACTITIONER

## 2017-02-28 PROCEDURE — 85018 HEMOGLOBIN: CPT | Performed by: HOSPITALIST

## 2017-02-28 PROCEDURE — 97110 THERAPEUTIC EXERCISES: CPT

## 2017-02-28 PROCEDURE — 74011000250 HC RX REV CODE- 250: Performed by: INTERNAL MEDICINE

## 2017-02-28 PROCEDURE — 0W3P8ZZ CONTROL BLEEDING IN GASTROINTESTINAL TRACT, VIA NATURAL OR ARTIFICIAL OPENING ENDOSCOPIC: ICD-10-PCS | Performed by: INTERNAL MEDICINE

## 2017-02-28 PROCEDURE — 97116 GAIT TRAINING THERAPY: CPT

## 2017-02-28 PROCEDURE — 97530 THERAPEUTIC ACTIVITIES: CPT

## 2017-02-28 PROCEDURE — 76060000032 HC ANESTHESIA 0.5 TO 1 HR: Performed by: INTERNAL MEDICINE

## 2017-02-28 PROCEDURE — 74011250636 HC RX REV CODE- 250/636: Performed by: INTERNAL MEDICINE

## 2017-02-28 PROCEDURE — 77030004927 HC CATH ELECHEMSTAS BSC -C: Performed by: INTERNAL MEDICINE

## 2017-02-28 RX ORDER — FENTANYL CITRATE 50 UG/ML
INJECTION, SOLUTION INTRAMUSCULAR; INTRAVENOUS AS NEEDED
Status: DISCONTINUED | OUTPATIENT
Start: 2017-02-28 | End: 2017-03-01 | Stop reason: HOSPADM

## 2017-02-28 RX ORDER — MIDAZOLAM HYDROCHLORIDE 1 MG/ML
.5-5 INJECTION, SOLUTION INTRAMUSCULAR; INTRAVENOUS
Status: DISCONTINUED | OUTPATIENT
Start: 2017-02-28 | End: 2017-02-28 | Stop reason: HOSPADM

## 2017-02-28 RX ORDER — POLYETHYLENE GLYCOL 3350, SODIUM SULFATE ANHYDROUS, SODIUM BICARBONATE, SODIUM CHLORIDE, POTASSIUM CHLORIDE 236; 22.74; 6.74; 5.86; 2.97 G/4L; G/4L; G/4L; G/4L; G/4L
2000 POWDER, FOR SOLUTION ORAL ONCE
Status: DISCONTINUED | OUTPATIENT
Start: 2017-03-01 | End: 2017-02-28

## 2017-02-28 RX ORDER — METOPROLOL TARTRATE 25 MG/1
25 TABLET, FILM COATED ORAL 2 TIMES DAILY
Status: DISCONTINUED | OUTPATIENT
Start: 2017-02-28 | End: 2017-03-01 | Stop reason: HOSPADM

## 2017-02-28 RX ORDER — MIDAZOLAM HYDROCHLORIDE 5 MG/ML
INJECTION INTRAMUSCULAR; INTRAVENOUS AS NEEDED
Status: DISCONTINUED | OUTPATIENT
Start: 2017-02-28 | End: 2017-03-01 | Stop reason: HOSPADM

## 2017-02-28 RX ORDER — SODIUM CHLORIDE 9 MG/ML
100 INJECTION, SOLUTION INTRAVENOUS CONTINUOUS
Status: DISCONTINUED | OUTPATIENT
Start: 2017-02-28 | End: 2017-02-28 | Stop reason: HOSPADM

## 2017-02-28 RX ORDER — LISINOPRIL 20 MG/1
20 TABLET ORAL DAILY
Status: DISCONTINUED | OUTPATIENT
Start: 2017-02-28 | End: 2017-03-01 | Stop reason: HOSPADM

## 2017-02-28 RX ORDER — MIDAZOLAM HYDROCHLORIDE 1 MG/ML
INJECTION, SOLUTION INTRAMUSCULAR; INTRAVENOUS
Status: DISPENSED
Start: 2017-02-28 | End: 2017-02-28

## 2017-02-28 RX ORDER — HYDRALAZINE HYDROCHLORIDE 20 MG/ML
10 INJECTION INTRAMUSCULAR; INTRAVENOUS
Status: DISCONTINUED | OUTPATIENT
Start: 2017-02-28 | End: 2017-03-01 | Stop reason: HOSPADM

## 2017-02-28 RX ORDER — MIDAZOLAM HYDROCHLORIDE 1 MG/ML
.5-2 INJECTION, SOLUTION INTRAMUSCULAR; INTRAVENOUS
Status: DISCONTINUED | OUTPATIENT
Start: 2017-03-01 | End: 2017-02-28 | Stop reason: HOSPADM

## 2017-02-28 RX ORDER — FENTANYL CITRATE 50 UG/ML
50-100 INJECTION, SOLUTION INTRAMUSCULAR; INTRAVENOUS
Status: DISCONTINUED | OUTPATIENT
Start: 2017-02-28 | End: 2017-02-28 | Stop reason: HOSPADM

## 2017-02-28 RX ORDER — POLYETHYLENE GLYCOL 3350, SODIUM SULFATE ANHYDROUS, SODIUM BICARBONATE, SODIUM CHLORIDE, POTASSIUM CHLORIDE 236; 22.74; 6.74; 5.86; 2.97 G/4L; G/4L; G/4L; G/4L; G/4L
2000 POWDER, FOR SOLUTION ORAL ONCE
Status: DISCONTINUED | OUTPATIENT
Start: 2017-02-28 | End: 2017-02-28

## 2017-02-28 RX ORDER — FENTANYL CITRATE 50 UG/ML
INJECTION, SOLUTION INTRAMUSCULAR; INTRAVENOUS
Status: DISPENSED
Start: 2017-02-28 | End: 2017-02-28

## 2017-02-28 RX ADMIN — LISINOPRIL 20 MG: 20 TABLET ORAL at 17:41

## 2017-02-28 RX ADMIN — MIDAZOLAM HYDROCHLORIDE 0.5 MG: 5 INJECTION, SOLUTION INTRAMUSCULAR; INTRAVENOUS at 10:24

## 2017-02-28 RX ADMIN — METOPROLOL TARTRATE 25 MG: 25 TABLET ORAL at 17:41

## 2017-02-28 RX ADMIN — FENTANYL CITRATE 25 MCG: 50 INJECTION, SOLUTION INTRAMUSCULAR; INTRAVENOUS at 10:25

## 2017-02-28 RX ADMIN — POLYETHYLENE GLYCOL 3350, SODIUM SULFATE ANHYDROUS, SODIUM BICARBONATE, SODIUM CHLORIDE, POTASSIUM CHLORIDE 2000 ML: 236; 22.74; 6.74; 5.86; 2.97 POWDER, FOR SOLUTION ORAL at 17:45

## 2017-02-28 RX ADMIN — MIDAZOLAM HYDROCHLORIDE 0.5 MG: 5 INJECTION, SOLUTION INTRAMUSCULAR; INTRAVENOUS at 10:22

## 2017-02-28 RX ADMIN — IRON SUCROSE 500 MG: 20 INJECTION, SOLUTION INTRAVENOUS at 19:04

## 2017-02-28 RX ADMIN — FENTANYL CITRATE 50 MCG: 50 INJECTION, SOLUTION INTRAMUSCULAR; INTRAVENOUS at 10:22

## 2017-02-28 RX ADMIN — MIDAZOLAM HYDROCHLORIDE 0.5 MG: 5 INJECTION, SOLUTION INTRAMUSCULAR; INTRAVENOUS at 10:27

## 2017-02-28 RX ADMIN — FENTANYL CITRATE 25 MCG: 50 INJECTION, SOLUTION INTRAMUSCULAR; INTRAVENOUS at 10:27

## 2017-02-28 RX ADMIN — DIPHENHYDRAMINE HYDROCHLORIDE 25 MG: 25 CAPSULE ORAL at 13:19

## 2017-02-28 RX ADMIN — DIPHENHYDRAMINE HYDROCHLORIDE 25 MG: 25 CAPSULE ORAL at 21:12

## 2017-02-28 RX ADMIN — CEFTRIAXONE SODIUM 1 G: 1 INJECTION, POWDER, FOR SOLUTION INTRAMUSCULAR; INTRAVENOUS at 19:03

## 2017-02-28 RX ADMIN — DIPHENHYDRAMINE HYDROCHLORIDE 25 MG: 25 CAPSULE ORAL at 05:06

## 2017-02-28 NOTE — PROGRESS NOTES
Problem: Mobility Impaired (Adult and Pediatric)  Goal: *Acute Goals and Plan of Care (Insert Text)  Physical Therapy Goals  Initiated 2/26/2017 and to be accomplished within 7 day(s)  1. Patient will move from supine to sit and sit to supine , scoot up and down and roll side to side in bed with modified independence. 2. Patient will transfer from bed to chair and chair to bed with modified independence using the least restrictive device. 3. Patient will perform sit to stand with modified independence. 4. Patient will ambulate with modified independence for >/= 150 feet with the least restrictive device. 5. Patient will ascend/descend 5 stairs with 1 handrail(s) with modified independence. Outcome: Resolved/Met Date Met:  02/28/17  PHYSICAL THERAPY TREATMENT     Patient: Leann Soliz (81 y.o. female)  Date: 2/28/2017  Diagnosis: Anemia  anemia Iron (Fe) deficiency anemia  Procedure(s) (LRB):  ESOPHAGOGASTRODUODENOSCOPY (EGD) (N/A) Day of Surgery  Precautions:    Chart, physical therapy assessment, plan of care and goals were reviewed. ASSESSMENT:  Pt is seen for PM session and he is combative and pulling at his catheter. Nursing is addressing the pt's agitation and he is able to perform bed mobility and postioning. Pt is being transferred to [facility] for further investigation. Progression toward goals:  [ ]      Improving appropriately and progressing toward goals  [X]      Improving slowly and progressing toward goals  [ ]      Not making progress toward goals and plan of care will be adjusted       PLAN:  Patient continues to benefit from skilled intervention to address the above impairments. Continue treatment per established plan of care. Discharge Recommendations: To Be Determined  Further Equipment Recommendations for Discharge:  N/A       G-CODES:      Mobility  Current  CM= 80-99%. The severity rating is based on the Level of Assistance required for Functional Mobility and ADLs. SUBJECTIVE:   Patient stated No one is listening to me. Stacy Colón      OBJECTIVE DATA SUMMARY:   Critical Behavior:  Neurologic State: Alert  Orientation Level: Oriented X4  Cognition: Appropriate for age attention/concentration  Safety/Judgement: Awareness of environment, Good awareness of safety precautions, Insight into deficits  Functional Mobility Training:  Bed Mobility:  Rolling: Stand-by asssistance  Supine to Sit: Stand-by asssistance  Sit to Supine: Stand-by asssistance  Transfers:  Sit to Stand: Contact guard assistance  Stand to Sit: Contact guard assistance  Balance:  Sitting: Intact  Sitting - Static: Good (unsupported)  Sitting - Dynamic: Good (unsupported)  Standing: Impaired  Standing - Static: Fair  Standing - Dynamic : Fair  Ambulation/Gait Training:  Distance (ft):  (120 ft)  Assistive Device: Gait belt;Walker, rolling  Ambulation - Level of Assistance: Stand-by asssistance     Gait Description (WDL): Exceptions to WDL  Gait Abnormalities: Antalgic;Decreased step clearance;Circumduction  Base of Support: Center of gravity altered;Shift to right     Speed/Hailey: Pace decreased (<100 feet/min)  Step Length: Left shortened   Pain:  Pt reports 0/10 pain or discomfort prior to treatment. Pt reports 0/10 pain or discomfort post treatment. Activity Tolerance: Tolerates rx fair. He is agitated and pulling at his catheter. Please refer to the flowsheet for vital signs taken during this treatment.   After treatment:   [ ] Patient left in no apparent distress sitting up in chair  [X] Patient left in no apparent distress in bed  [ ] Call bell left within reach  [X] Nursing notified (Nurse transferring pt to [facility])  [ ] Caregiver present  [ ] Bed alarm activated      Lani Montano   Time Calculation: 12 mins

## 2017-02-28 NOTE — MED STUDENT NOTES
*ATTENTION:  This note has been created by a medical student for educational purposes only. Please do not refer to the content of this note for clinical decision-making, billing, or other purposes. Please see attending physicians note to obtain clinical information on this patient. *       Student Progress Note  Please refer to attendings daily rounding note for full details      Patient: Toni Light MRN: [id number]  CSN: [id number]    YOB: 1935  Age: [de-identified] y.o. Sex: female    DOA: 2017 LOS:  LOS: 3 days          Chief Complaint:  Fatigue    Assessment/ Plan:         A/P: Anemia= Order and review CBC, Complete Guaiac or FIT testing, Perform Endoscopy and colonoscopy          Diabetes= Continue treatment          Hypertension= continue treatment  -     HPI:     Toni Light is a [de-identified] y.o. female with a hx of CAD, DM, HTN, and Vitamin D defieciency who was admitted due to fatigue on 17. She narrates that it began 17 after she scratched a varicose vein and \"lost a lot of blood\" in her bathroom. Her  and daughter who was in the room during the interview added that they soaked towels to clean up the blood that day. She admits that this has happened before, but not to this extent. She arrived in the ED that day and she was treated with saline prior to discharge. She narrates that since she was discharged she progressively felt worse with activities of daily living. She endorses prior to being admitted on 17 she was getting weak at the bank and she was having slurred speech. She endorses episodes of pruritis, but denies nausea and vomiting.        Medical History:        Past Medical History  CAD  DM  HTN  Anemia  Stroke  Vitamin D defiency    Surgical History  Cholecystectomy  Skin graft (left thigh)  Hysterectomy  ORIF (left ankle)  Vein stripping  Cataracts Surgery    Family History  Mother=  at 80 with Parkinson's disease  Father=  at 54 with a myocardial infarction. Siblings= 4 brothers all healthy  Children= 2 daughters, 2 sons all healthy    Social History      Denies tobacco use  Denies alcohol use  Denies Illicit drug use  Normal western diet  Denies recent travel  [address] are UTD  No [address] [facility] work for exercise. Home medications  Lisinopril/HCTZ 20/25mg 1X/day  Metaprolol- tartrate 25mg 1-2X/day  Alva   Neurotin 300mg 3X/day  Flexeral 3X/day  Allergies  NKA    Review of Systems:      Gen:  Denies weakness, fatigue, fever, and chills. HEENT: Head                 Eyes: Vision changes due to hx with cataracts                  Ears: Denies hearing loss, tinnitus, and vertigo  Cardiovascular: Denies chest pain  Pulm: Denies dyspnes  GI: Denies nausea, vomiting, and rectal bleeding  :  Denies dysuria, nocturia, and hematuria. Musculoskeletal: Varicose veins. Denies weakness   Skin: N/A  Neuro: N/A    Physical Exam:      Visit Vitals    /69 (BP 1 Location: Left arm, BP Patient Position: Supine)    Pulse 89    Temp 98.8 °F (37.1 °C)    Resp 20    Ht 5' 2\" (1.575 m)    Wt 92.4 kg (203 lb 11.2 oz)    SpO2 90%    Breastfeeding No    BMI 37.26 kg/m2         Physical Exam:  Gen: . Well appearing female in no acute distress. She is alert and orientated  To person, place, and time. HEENT:   Head: No contusions or bruises noted. Eyes: Pupils are equally round and reactive to light. Good ROM. Ears: Normal external canal anatomy. Tympanic membrane is white with appropriate light reflect. Nose: Normal mucosa, nasal septum is midline. Nasal turbinates are non erythematous. Throat: Normal oral mucosa. Dentures are present. Neck is supple with normal anatomy. CV:  Heart had irregular regular rate and rhythm. No murmurs , rubs, or bruits noted. Brisk capillary refills (<3secs). Palpable radial pulse.    Resp: Equal excursion present. Lungs are clear to ausculation in all fields. Abd: Relatively flat abdomen. No organomegaly noted with percussion. No tenderness noted with palpation. No guarding or rebound tenderness present. Extrem:  Varicose veins are present bilaterally  Skin: Red rash noted on right arm   Neuro: Normal sensation throughout the face              Equal movement throughout the face              Auditory testing normal (rub fingers)              5/5 strength in spinal accessory muscles. Tongue midline with 5/5 strength. Normal sensation present in all extremities             5/5 strength in all extremities. I have reviewed the patient's Labs and Radiology studies. Jean Carlos Guy  2/28/2017  4:34 PM      *ATTENTION:  This note has been created by a medical student for educational purposes only. Please do not refer to the content of this note for clinical decision-making, billing, or other purposes. Please see attending physicians note to obtain clinical information on this patient. *

## 2017-02-28 NOTE — ROUTINE PROCESS
0800-Assessment completed, call bell within reach, no distress noted, voiced no complaints at this time. 0930-patient taken to Endo via stretcher. 1130-Returned from EGD. Tele-box applied, no distress noted. 1230-no change in condition, no distress noted. 1400-no change, resting in bed. 1630-no change in condition, no distress noted, family at bedside. 1740-pm medications given. GI prep started. 1910-Bedside and Verbal shift change report given to Suni (oncoming nurse) by Jose Morgan (offgoing nurse). Report included the following information SBAR, MAR and Recent Results.

## 2017-02-28 NOTE — PROCEDURES
Endoscopy Procedure Note    Patient: Remigio Herrera MRN: [id number]  SSN: [id number]    YOB: 1935  Age: [de-identified] y.o. Sex: female      Date/Time:  2/28/2017 10:45 AM    Esophagogastroduodenoscopy (EGD) Procedure Report    Procedure: Esophagogastroduodenoscopy with biopsy,Push Enteroscopy with control of bleeding    IMPRESSION:   1. Pinpoint red spot in the duodenal 4th portion with some fresh blood in the area---cauterized with bicap probe  2. Irregular mucosa of the duodenal apex----? ??ulceration or adenoma, Bx'd  3. Several active, shallow white based ulcers in the prepyloric antrum and pyloric channel without stigmata  4. Small sliding hiatal hernia and normal esophageal mucosa    RECOMMENDATIONS:  1. Stop  mg which she has been taking 1-2/day over the last 6 months or so'  2. IV Iron today and give additional doses weekly for 2 more doses  3. Colonoscopy tomorrow to r/o serious colonic pathology -she has a history of adenomas and is 1 -2  years late for her 5 year exam    Indication: iron deficiency anemia  :  Janine Werner MD  Referring Provider:   Soha Lawler MD  Assistants: Endoscopy Technician-1: Aga Willoughby  Endoscopy RN-1: Luca Burnett RN  History: The history and physical exam were reviewed and updated. Endoscope: PCF 190L  ASA: ASA 3 - Patient with moderate systemic disease with functional limitations  Mallampati: II (soft palate, uvula, fauces visible)  Sedation:  Versed 1.5 mg IV and Fentanyl 100 mcg IV    Description of the procedure: The procedure was discussed with the patient including risks, benefits, alternatives including risks of iv sedation, bleeding, perforation and aspiration. A safety timeout was performed. The patient was placed in the left lateral decubitus position. A bite block was placed. The patient was given incremental doses of intravenous medication until moderate sedation was achieved.   The patients vital signs were monitored at all times including heart rate/rhythm, blood pressure and oxygen saturation. The endoscope was then passed under direct visualization into the esophagus, across the GE junction into the stomach and through the pylorus into the duodenal bulb and second, third, fourth portion and proximal jejunum. The endoscope was then slowly withdrawn while visualizing the mucosa. In the stomach a retroflexion was performed and gastric fundus and cardia visualized. .The endoscope was then slowly withdrawn. The patient was then transferred to recovery in stable condition. Findings: see impression above    Complications:   none    EBL: 1 cc    Discharge disposition:  Back to nursing floor    Jeanie Wilkerson.  MD Nael, Lisbeth Huggins, [address] [facility]  February 28, 2017  10:45 AM

## 2017-02-28 NOTE — PROGRESS NOTES
[facility]  Hospitalist Division        Inpatient Daily Progress Note    Daily progress Note    Patient: Aparna Davis MRN: [id number]  CSN: [id number]    YOB: 1935  Age: [de-identified] y.o. Sex: female    DOA: 2/25/2017 LOS:  LOS: 3 days                    Chief Complaint:  Fatigue       Subjective:      Asking to eat. Denies nausea or abdominal pain. In NAD. Objective:      Visit Vitals    /69 (BP 1 Location: Left arm, BP Patient Position: Supine)    Pulse 89    Temp 98.8 °F (37.1 °C)    Resp 20    Ht 5' 2\" (1.575 m)    Wt 92.4 kg (203 lb 11.2 oz)    SpO2 90%    Breastfeeding No    BMI 37.26 kg/m2       Physical Exam:  General appearance: alert and oriented, cooperative, no distress, appears stated age  Head: Normocephalic, without obvious abnormality, atraumatic  Lungs: clear to auscultation throughout   Heart: regular rate and rhythm, S1, S2 normal, no murmur, click, rub or gallop  Abdomen: soft, non tender, non distended. Normoactive bowel sounds. No masses, no organomegaly  Extremities: BUE excoriation rash.  Trace BLE edema, cellulitic appearing rash to BLE   Skin: Skin color, texture, turgor normal.   Neurologic: Grossly normal  PSY: Mood and affect normal, appropriately behaved        Intake and Output:  Current Shift:  02/28 0701 - 02/28 1900  In: 460 [P.O.:460]  Out: -   Last three shifts:  02/26 1901 - 02/28 0700  In: 700 [I.V.:700]  Out: 1800 [Urine:1800]    Recent Results (from the past 24 hour(s))   HGB & HCT    Collection Time: 02/27/17  4:27 PM   Result Value Ref Range    HGB 8.7 (L) 12.0 - 16.0 g/dL    HCT 28.8 (L) 35.0 - 45.0 %   GLUCOSE, POC    Collection Time: 02/27/17  5:17 PM   Result Value Ref Range    Glucose (POC) 107 70 - 110 mg/dL   GLUCOSE, POC    Collection Time: 02/27/17  8:52 PM   Result Value Ref Range    Glucose (POC) 124 (H) 70 - 110 mg/dL   HGB & HCT    Collection Time: 02/28/17 12:00 AM   Result Value Ref Range    HGB 8.5 (L) 12.0 - 16.0 g/dL    HCT 27.4 (L) 35.0 - 45.0 %   HGB & HCT    Collection Time: 02/28/17  7:17 AM   Result Value Ref Range    HGB 8.8 (L) 12.0 - 16.0 g/dL    HCT 29.0 (L) 35.0 - 45.0 %   GLUCOSE, POC    Collection Time: 02/28/17  7:55 AM   Result Value Ref Range    Glucose (POC) 105 70 - 110 mg/dL   GLUCOSE, POC    Collection Time: 02/28/17 11:59 AM   Result Value Ref Range    Glucose (POC) 104 70 - 110 mg/dL           Lab Results   Component Value Date/Time    Glucose 105 02/26/2017 04:50 AM    Glucose 125 02/25/2017 01:20 PM    Glucose 165 02/02/2017 11:47 PM    Glucose 107 05/12/2016 10:58 AM    Glucose 90 08/08/2013 04:59 AM        Assessment/Plan:     Patient Active Problem List   Diagnosis Code    CVA, old, hemiparesis ([facility] I69.359    Type 2 diabetes mellitus (HCC) E11.9    Benign hypertension I10    Abnormal finding on thyroid function test R94.6    Acute on chronic renal insufficiency N28.9, N18.9    Anemia D64.9    Chronic osteomyelitis of ankle ([facility]) M86.679    Injury of foot S99.929A    Leukocytosis D72.829    Spinal stenosis of lumbar region M48.06    Microscopic hematuria R31.29    Rhabdomyolysis M62.82    Vitamin D deficiency E55.9    Stroke ([facility]) I63.9    Hypertension I10    DM (diabetes mellitus) ([facility]) E11.9    Normocytic anemia D64.9    CKD (chronic kidney disease) stage 3, GFR 30-59 ml/min N18.3    Elevated TSH R94.6    Iron (Fe) deficiency anemia D50.9       A/P:  JANIS: ? GI source- GI following- s/p EGD- EGD noted reviewed. Bowel prep tonight for colonoscopy tomorrow. Continue IV Venofer, monitoring H/H   CDK, stage III  HTN- resume Lopressor, Lisinopril. Monitor BP control. Hydralazine PRN   DM- SSI. Monitor glucose   Hx CVA  ?  Cellulitis BLE- continue Vanc  GI prophylaxis- Protonix  DVT prophylaxis- contraindicated at this time 2/2 to above         Bri Perry, FNP-[facility] [address]  Pager:  [phone]  Office: [phone]    I examined the patient , reviewed the history, labs, and radiology reports  independently. I have reviewed the NP documentation, agree with the documentation, medical decision making and treatment plan as outlined by my NP.     Chandrika Akins MD

## 2017-02-28 NOTE — PROGRESS NOTES
Bedside and Verbal shift change report received from [address] [place] [address] (offgoing nurse). Report included the following information SBAR, Kardex, Intake/Output, MAR and Recent Results. 2020-Shift assessment completed, no distress noted, family at bedside, call bell within reach, will continue to monitor. 2118-Pt c/o itching, prn benadryl administered, will continue to monitor. 0020-Reassessment completed, no  changes from previous. 0420-Reassessment com[leted, no changes from previous. 0506-c/o itching, prn benadryl administered. Bedside and Verbal shift change report given to [address] [place] [address] Doris Hickman (oncoming nurse) by Sonya Fuentes RN (offgoing nurse). Report included the following information SBAR, Kardex, Intake/Output, MAR and Recent Results.

## 2017-02-28 NOTE — PROGRESS NOTES
Problem: Mobility Impaired (Adult and Pediatric)  Goal: *Acute Goals and Plan of Care (Insert Text)  Physical Therapy Goals  Initiated 2/26/2017 and to be accomplished within 7 day(s)  1. Patient will move from supine to sit and sit to supine , scoot up and down and roll side to side in bed with modified independence. 2. Patient will transfer from bed to chair and chair to bed with modified independence using the least restrictive device. 3. Patient will perform sit to stand with modified independence. 4. Patient will ambulate with modified independence for >/= 150 feet with the least restrictive device. 5. Patient will ascend/descend 5 stairs with 1 handrail(s) with modified independence. Outcome: Progressing Towards Goal  PHYSICAL THERAPY TREATMENT     Patient: Jaycee Siemens (51 y.o. female)  Date: 2/28/2017  Diagnosis: Anemia  anemia Iron (Fe) deficiency anemia  Procedure(s) (LRB):  ESOPHAGOGASTRODUODENOSCOPY (EGD) (N/A) Day of Surgery  Precautions:    Chart, physical therapy assessment, plan of care and goals were reviewed. ASSESSMENT:  Pt seen for skilled PT session and she is agreeable to transfer, gait and safety training. Pt is making gains functionally with increased distance with ambulation, however she continues to require VCs for RW management for safety as she tends to circumduct LLE out of base of walker. She was able to correct this with ambulation today 75% of the time. Increased mobility noted by decreased assistance with bed mobility and with increased strength of BLEs. Continue to see pt for current deficits with strength, endurance, balance, safety and ambulation.   Progression toward goals:  [X]      Improving appropriately and progressing toward goals  [ ]      Improving slowly and progressing toward goals  [ ]      Not making progress toward goals and plan of care will be adjusted       PLAN:  Patient continues to benefit from skilled intervention to address the above impairments. Continue treatment per established plan of care. Discharge Recommendations: To Be Determined  Further Equipment Recommendations for Discharge:  N/A       G-CODES:      Mobility . The severity rating is based on the Level of Assistance required for Functional Mobility and ADLs. Mobility [id number] Current  CI= 1-19%. The severity rating is based on the Level of Assistance required for Functional Mobility and ADLs. SUBJECTIVE:   Patient stated I am ready to walk and I want to go further than yesterday.       OBJECTIVE DATA SUMMARY:   Critical Behavior:  Neurologic State: Alert  Orientation Level: Oriented X4  Cognition: Appropriate for age attention/concentration  Safety/Judgement: Awareness of environment, Good awareness of safety precautions, Insight into deficits  Functional Mobility Training:  Bed Mobility:  Rolling: Stand-by asssistance  Supine to Sit: Stand-by asssistance  Sit to Supine: Stand-by asssistance                 Transfers:  Sit to Stand: Contact guard assistance  Stand to Sit: Contact guard assistance  Balance:  Sitting: Intact  Sitting - Static: Good (unsupported)  Sitting - Dynamic: Good (unsupported)  Standing: Impaired  Standing - Static: Fair  Standing - Dynamic : Fair  Ambulation/Gait Training:  Distance (ft):  (120 ft)  Assistive Device: Gait belt;Walker, rolling  Ambulation - Level of Assistance: Stand-by asssistance     Gait Description (WDL): Exceptions to WDL  Gait Abnormalities: Antalgic;Decreased step clearance;Circumduction        Base of Support: Center of gravity altered;Shift to right     Speed/Hailey: Pace decreased (<100 feet/min)  Step Length: Left shortened  Therapeutic Exercises:   B-AROM-Seated- TR/HR, LAQs, hip flexion x 15 reps each, 2 sets. Pain:  Pt reports 0/10 pain or discomfort prior to treatment. Pt reports 0/10 pain or discomfort post treatment.          Activity Tolerance:   Good tolerance to session, increase acitvities as tolerance improves  Please refer to the flowsheet for vital signs taken during this treatment.   After treatment:   [ ] Patient left in no apparent distress sitting up in chair  [X] Patient left in no apparent distress in bed  [X] Call bell left within reach  [X] Nursing notified  [ ] Caregiver present  [ ] Bed alarm activated      Zenon Esparza   Time Calculation: 25 mins

## 2017-02-28 NOTE — ROUTINE PROCESS
TRANSFER - OUT REPORT:    Verbal report given to PATIENCE Ahmadi (name) on Anjali Cardona  being transferred to [facility](unit) for routine post - op       Report consisted of patients Situation, Background, Assessment and   Recommendations(SBAR). Information from the following report(s) SBAR and Procedure Summary was reviewed with the receiving nurse. Lines:   Peripheral IV 02/25/17 Left Wrist (Active)   Site Assessment Clean, dry, & intact 2/28/2017  8:00 AM   Phlebitis Assessment 0 2/28/2017  8:00 AM   Infiltration Assessment 0 2/28/2017  8:00 AM   Dressing Status Clean, dry, & intact 2/28/2017  8:00 AM   Dressing Type Transparent 2/28/2017  8:00 AM   Hub Color/Line Status Infusing 2/28/2017  8:00 AM   Action Taken Open ports on tubing capped 2/27/2017  8:20 PM   Alcohol Cap Used Yes 2/28/2017  8:00 AM        Opportunity for questions and clarification was provided.       Patient transported with:   [employer]

## 2017-03-01 ENCOUNTER — SURGERY (OUTPATIENT)
Age: 82
End: 2017-03-01

## 2017-03-01 VITALS
OXYGEN SATURATION: 92 % | BODY MASS INDEX: 37.49 KG/M2 | RESPIRATION RATE: 16 BRPM | TEMPERATURE: 98.3 F | WEIGHT: 203.7 LBS | DIASTOLIC BLOOD PRESSURE: 65 MMHG | HEART RATE: 61 BPM | HEIGHT: 62 IN | SYSTOLIC BLOOD PRESSURE: 135 MMHG

## 2017-03-01 LAB
GLUCOSE BLD STRIP.AUTO-MCNC: 88 MG/DL (ref 70–110)
GLUCOSE BLD STRIP.AUTO-MCNC: 92 MG/DL (ref 70–110)
GLUCOSE BLD STRIP.AUTO-MCNC: 94 MG/DL (ref 70–110)
GLUCOSE BLD STRIP.AUTO-MCNC: 94 MG/DL (ref 70–110)
HCT VFR BLD AUTO: 30.8 % (ref 35–45)
HGB BLD-MCNC: 9.3 G/DL (ref 12–16)

## 2017-03-01 PROCEDURE — 74011250636 HC RX REV CODE- 250/636

## 2017-03-01 PROCEDURE — 85018 HEMOGLOBIN: CPT | Performed by: HOSPITALIST

## 2017-03-01 PROCEDURE — 36415 COLL VENOUS BLD VENIPUNCTURE: CPT | Performed by: HOSPITALIST

## 2017-03-01 PROCEDURE — 0DJD8ZZ INSPECTION OF LOWER INTESTINAL TRACT, VIA NATURAL OR ARTIFICIAL OPENING ENDOSCOPIC: ICD-10-PCS | Performed by: INTERNAL MEDICINE

## 2017-03-01 PROCEDURE — 74011250636 HC RX REV CODE- 250/636: Performed by: EMERGENCY MEDICINE

## 2017-03-01 PROCEDURE — 77030020263 HC SOL INJ SOD CL0.9% LFCR 1000ML

## 2017-03-01 PROCEDURE — 77030031670 HC DEV INFL ENDOTEK BIG60 MRTM -B: Performed by: INTERNAL MEDICINE

## 2017-03-01 PROCEDURE — 74011250636 HC RX REV CODE- 250/636: Performed by: INTERNAL MEDICINE

## 2017-03-01 PROCEDURE — 74011250637 HC RX REV CODE- 250/637: Performed by: NURSE PRACTITIONER

## 2017-03-01 PROCEDURE — 74011250637 HC RX REV CODE- 250/637: Performed by: INTERNAL MEDICINE

## 2017-03-01 PROCEDURE — 76040000007: Performed by: INTERNAL MEDICINE

## 2017-03-01 PROCEDURE — 74011000250 HC RX REV CODE- 250: Performed by: INTERNAL MEDICINE

## 2017-03-01 PROCEDURE — 82962 GLUCOSE BLOOD TEST: CPT

## 2017-03-01 PROCEDURE — 77010033678 HC OXYGEN DAILY

## 2017-03-01 RX ORDER — PANTOPRAZOLE SODIUM 40 MG/1
40 TABLET, DELAYED RELEASE ORAL
Qty: 30 TAB | Refills: 0 | Status: SHIPPED | OUTPATIENT
Start: 2017-03-01 | End: 2017-11-16

## 2017-03-01 RX ORDER — CEPHALEXIN 500 MG/1
500 CAPSULE ORAL 4 TIMES DAILY
Qty: 12 CAP | Refills: 0 | Status: SHIPPED | OUTPATIENT
Start: 2017-03-01 | End: 2017-03-07 | Stop reason: CLARIF

## 2017-03-01 RX ORDER — MIDAZOLAM HYDROCHLORIDE 1 MG/ML
INJECTION, SOLUTION INTRAMUSCULAR; INTRAVENOUS
Status: DISCONTINUED
Start: 2017-03-01 | End: 2017-03-01 | Stop reason: HOSPADM

## 2017-03-01 RX ADMIN — MEPERIDINE HYDROCHLORIDE 50 MG: 100 INJECTION, SOLUTION INTRAMUSCULAR; INTRAVENOUS; SUBCUTANEOUS at 09:56

## 2017-03-01 RX ADMIN — LISINOPRIL 20 MG: 20 TABLET ORAL at 11:48

## 2017-03-01 RX ADMIN — DIPHENHYDRAMINE HYDROCHLORIDE 25 MG: 25 CAPSULE ORAL at 04:43

## 2017-03-01 RX ADMIN — MIDAZOLAM HYDROCHLORIDE 1.5 MG: 5 INJECTION, SOLUTION INTRAMUSCULAR; INTRAVENOUS at 09:56

## 2017-03-01 RX ADMIN — SODIUM CHLORIDE 1000 MG: 900 INJECTION, SOLUTION INTRAVENOUS at 00:52

## 2017-03-01 RX ADMIN — METOPROLOL TARTRATE 25 MG: 25 TABLET ORAL at 11:48

## 2017-03-01 RX ADMIN — POLYETHYLENE GLYCOL 3350, SODIUM SULFATE ANHYDROUS, SODIUM BICARBONATE, SODIUM CHLORIDE, POTASSIUM CHLORIDE 2000 ML: 236; 22.74; 6.74; 5.86; 2.97 POWDER, FOR SOLUTION ORAL at 04:00

## 2017-03-01 NOTE — DISCHARGE INSTRUCTIONS
Anemia: Care Instructions  Your Care Instructions    Anemia is a low level of red blood cells, which carry oxygen throughout your body. Many things can cause anemia. Lack of iron is one of the most common causes. Your body needs iron to make hemoglobin, a substance in red blood cells that carries oxygen from the lungs to your body's cells. Without enough iron, the body produces fewer and smaller red blood cells. As a result, your body's cells do not get enough oxygen, and you feel tired and weak. And you may have trouble concentrating. Bleeding is the most common cause of a lack of iron. You may have heavy menstrual bleeding or bleeding caused by conditions such as ulcers, hemorrhoids, or cancer. Regular use of aspirin or other anti-inflammatory medicines (such as ibuprofen) also can cause bleeding in some people. A lack of iron in your diet also can cause anemia, especially at times when the body needs more iron, such as during pregnancy, infancy, and the teen years. Your doctor may have prescribed iron pills. It may take several months of treatment for your iron levels to return to normal. Your doctor also may suggest that you eat foods that are rich in iron, such as meat and beans. There are many other causes of anemia. It is not always due to a lack of iron. Finding the specific cause of your anemia will help your doctor find the right treatment for you. Follow-up care is a key part of your treatment and safety. Be sure to make and go to all appointments, and call your doctor if you are having problems. It's also a good idea to know your test results and keep a list of the medicines you take. How can you care for yourself at home? · Take your medicines exactly as prescribed. Call your doctor if you think you are having a problem with your medicine. · If your doctor recommends iron pills, take them as directed:  ¨ Try to take the pills on an empty stomach about 1 hour before or 2 hours after meals. But you may need to take iron with food to avoid an upset stomach. ¨ Do not take antacids or drink milk or caffeine drinks (such as coffee, tea, or cola) at the same time or within 2 hours of the time that you take your iron. They can make it hard for your body to absorb the iron. ¨ Vitamin C (from food or supplements) helps your body absorb iron. Try taking iron pills with a glass of orange juice or some other food that is high in vitamin C, such as citrus fruits. ¨ Iron pills may cause stomach problems, such as heartburn, nausea, diarrhea, constipation, and cramps. Be sure to drink plenty of fluids, and include fruits, vegetables, and fiber in your diet each day. Iron pills often make your bowel movements dark or green. ¨ If you forget to take an iron pill, do not take a double dose of iron the next time you take a pill. ¨ Keep iron pills out of the reach of small children. An overdose of iron can be very dangerous. · Follow your doctor's advice about eating iron-rich foods. These include red meat, shellfish, poultry, eggs, beans, raisins, whole-grain bread, and leafy green vegetables. · Steam vegetables to help them keep their iron content. When should you call for help? Call 911 anytime you think you may need emergency care. For example, call if:  · You have symptoms of a heart attack. These may include:  ¨ Chest pain or pressure, or a strange feeling in the chest.  ¨ Sweating. ¨ Shortness of breath. ¨ Nausea or vomiting. ¨ Pain, pressure, or a strange feeling in the back, neck, jaw, or upper belly or in one or both shoulders or arms. ¨ Lightheadedness or sudden weakness. ¨ A fast or irregular heartbeat. After you call 911, the  may tell you to chew 1 adult-strength or 2 to 4 low-dose aspirin. Wait for an ambulance. Do not try to drive yourself. · You passed out (lost consciousness).   Call your doctor now or seek immediate medical care if:  · You have new or increased shortness of breath. · You are dizzy or lightheaded, or you feel like you may faint. · Your fatigue and weakness continue or get worse. · You have any abnormal bleeding, such as:  ¨ Nosebleeds. ¨ Vaginal bleeding that is different (heavier, more frequent, at a different time of the month) than what you are used to. ¨ Bloody or black stools, or rectal bleeding. ¨ Bloody or pink urine. Watch closely for changes in your health, and be sure to contact your doctor if:  · You do not get better as expected. Where can you learn more? Go to http://dorinda-jn.info/. Enter R301 in the search box to learn more about \"Anemia: Care Instructions. \"  Current as of: February 5, 2016  Content Version: 11.1  © 1181-5833 [employer], [employer]. Care instructions adapted under license by [employer] (which disclaims liability or warranty for this information). If you have questions about a medical condition or this instruction, always ask your healthcare professional. [address] any warranty or liability for your use of this information.

## 2017-03-01 NOTE — PROGRESS NOTES
9500- Bedside and Verbal shift change report given to Torrey Frank RN (oncoming nurse) by Daina Madden RN (offgoing nurse). Report included the following information SBAR, Procedure Summary, Intake/Output, MAR, Recent Results. Bed locked and lowered, siderails up x3. Whiteboard updated with information. Call bell at bedside, will continue to monitor. Pt completed colon prep, she is scheduled for a colonoscopy today. Order placed for H and H to be completed. Max Garner from endo called pt is being picked up for transport. Morning medications will be held until her return.     6150 -- Daughter Anahi Connell and pt's spouse Justin at bedside. Aware pt will be leaving shortly for procedure, no questions or concerns at this time. 5241 -- Pt off floor to endoscopy. Heart monitor removed. 1120 -- Pt returned to floor, reports no pain, will continue to monitor. Blood pressure elevated due to morning medications being held. Medications administered will reassess. Heart monitor reapplied. 1300-- Blood pressure WDL.      1403 -- Shift reassessment, pt condition unchanged will continue to monitor.      1645 -- Pt discharged home, reviewed instructions, removed IV and armbands, room cleared for all personal belongings. Influenza vaccine administered prior to admission.

## 2017-03-01 NOTE — PROGRESS NOTES
[facility] Dosing Services     Pharmacy dosing ABX empirically for treatment cellulitis     Day of Therapy: 4    Labs:   Vancomycin trough - 10.1 mcg/ml (23.8 hrs post dose)  Bun/Serum Creatinine - 26 mg/dl / 1.17 mg/dl  CrCl - 40.6 ml/min  WBC - 6.1  Max temp: - 98.8    Cultures:   neg    Plan:   Vancomycin:   Goal trough 15-20. Increase to 1 gm iv q18h. Check Vanco-trough on 3/3. Ceftriaxone:  Continue 1 gm IV q24h. Pharmacy to follow and will make changes to dose and/or frequency based on clinical status. Thanks for the consult.

## 2017-03-01 NOTE — MANAGEMENT PLAN
Discharge plan is home, home health if needed      Piyush Berrios RN BSN  Outcomes Manager  Pager # [phone]

## 2017-03-01 NOTE — PROCEDURES
Colonoscopy Report    Patient: Carson Vaughn MRN: [id number]  SSN: [id number]    YOB: 1935  Age: [de-identified] y.o. Sex: female      Date of Procedure: 3/1/2017    IMPRESSION:  1. Excellent prep----normal terminal ileum for 5 cm  2. Mild diverticulosis in the left and sigmoid colon  3. Normal colonic mucosa from the dentate line to the cecum----no AVM, mass, polyp, colitis  4. Small hemorrhoids in the anus    RECOMMENDATIONS:  1. Stop aspirin completely and avoid all other NSAIAs as well  2. IV venofer next week and again the week after as outpatient----my office will arrange. This should fully replace patient's body iron stores (she received 500 mg Venofer yesterday as inpatient )  3. Repeat Hgb, Iron, TIBC, Ferritin 4-5 weeks after she completes IV Iron to verify that Ferritin is 100 or so and Hgb normal  4. If inadequate response to IV Iron-----normal Iron/ferritin but persistent anemia, then she will need hematology consultation looking for concomitant causes of persistent anemia. If Iron studies and Hgb back to normal however, then I will monitor Hgb and Iron studies periodically with the patient off aspirin and all other NSAIAs. If despite NSAIA avoidance, she develops recurrent JANIS, then she will require further Small Bowel evaluation for ongoing GI blood loss with SBFT and Capsule Endoscopy    Indication:  Iron deficiency anemia and dropping Hgb  History: The history and physical exam were reviewed and updated.    Procedure Performed: Colonoscopy   Endoscopist: Segun Pierson MD  Assistant: Endoscopy Technician-1: Leandro Lindsey  Endoscopy RN-1: Bryan Broussard RN   ASA: ASA 2 - Patient with mild systemic disease with no functional limitations  Mallampati Score: II (soft palate, uvula, fauces visible)  Sedation:  Demerol 50 mg IV and Versed 1.5 mg IV  Endoscope: [id number]   Extent of Examination:terminal ileum  Quality of Preparation: excellent    Technique: The procedure was discussed with the patient including risks, benefits, alternatives including risks of IV sedation, bleeding, perforation and missed polyp. A safety timeout was performed. The patient was placed in left lateral position. The patient was given incremental doses of IV medication to achieve moderate  sedation. The patients vital signs were monitored at all times including heart rate, rhythm, blood pressure and oxygen saturation. When adequate sedation was achieved a perianal inspection and a digital rectal exam were performed. The video colonoscope was introduced into the rectum and advanced under direct vision up to the cecum and 5-15 cm into the terminal ileum. The cecum was identified by IC valve, appendiceal orifice and convergence of the tineal folds. Careful examination of the colonic mucosa was then performed on slow withdrawal of the endoscope. Retroflexion was performed in the rectum and the distal rectum visualized as was the anal canal.  The patient tolerated the procedure very well and was transferred to recovery area. Findings:  See impression above  EBL: none  Specimen: none    Andree Fitzgerald.  Chanell Banks MD, [facility], [address] [facility]  March 1, 2017  10:25 AM

## 2017-03-01 NOTE — ROUTINE PROCESS
TRANSFER - IN REPORT:    Verbal report received from [place] (name) on Kala Cobble  being received from 3000(unit) for ordered procedure      Report consisted of patients Situation, Background, Assessment and   Recommendations(SBAR). Information from the following report(s) SBAR was reviewed with the receiving nurse. Opportunity for questions and clarification was provided. Assessment completed upon patients arrival to unit and care assumed.

## 2017-03-01 NOTE — ROUTINE PROCESS
TRANSFER - OUT REPORT:    Verbal report given to [employer] on [place] [employer]  being transferred to [facility]  for routine post - op       Report consisted of patients Situation, Background, Assessment and   Recommendations(SBAR). Information from the following report(s) Kardex and Procedure Summary was reviewed with the receiving nurse. Lines:   Peripheral IV 02/28/17 Left Antecubital (Active)   Site Assessment Clean, dry, & intact 3/1/2017  8:11 AM   Phlebitis Assessment 0 3/1/2017  8:11 AM   Infiltration Assessment 0 3/1/2017  8:11 AM   Dressing Status Clean, dry, & intact 3/1/2017  8:11 AM   Dressing Type Transparent 3/1/2017  8:11 AM   Hub Color/Line Status End cap changed 3/1/2017  8:11 AM   Action Taken Open ports on tubing capped 3/1/2017  8:11 AM   Alcohol Cap Used Yes 3/1/2017  8:11 AM        Opportunity for questions and clarification was provided.       Patient transported with:   Registered Nurse

## 2017-03-01 NOTE — PROGRESS NOTES
Physical Therapist attempted f/u treatment today at 1500, however pt states she is leaving for home. Pt states she does not have any concerns related to getting into her home or managing inside her home. She has no concerns related to transfers, ambulation, strength or balance. Pt politely refuses treatment today.     HANS OrdazT

## 2017-03-01 NOTE — MED STUDENT NOTES
*ATTENTION:  This note has been created by a medical student for educational purposes only. Please do not refer to the content of this note for clinical decision-making, billing, or other purposes. Please see attending physicians note to obtain clinical information on this patient. *       Student Progress Note  Please refer to attendings daily rounding note for full details      Patient: Gema Arguello MRN: [id number]  CSN: [id number]    YOB: 1935  Age: [de-identified] y.o. Sex: female    DOA: 2017 LOS:  LOS: 4 days          Chief Complaint:  Fatigue    Assessment/ Plan:         A/P: Iron deficiency anemia= Initiate iron supplementation. Follow up with PCP. Follow up with gastroenterology outpatient. -     HPI:     Gema Arguello is a [de-identified] y.o. female with a hx of CAD, DM, HTN, and Vitamin D defieciency who was admitted due to fatigue on 17. She states that she is feeling a lot better and is excited to be discharged. She is in good spirits, eating and moving around well. She has no further complaints. Medical History:        Past Medical History  CAD  DM  HTN  Anemia  Stroke  Vitamin D defiency    Surgical History  Cholecystectomy  Skin graft (left thigh)  Hysterectomy  ORIF (left ankle)  Vein stripping  Cataracts Surgery    Family History  Mother=  at 80 with Parkinson's disease  Father=  at 54 with a myocardial infarction. Siblings= 4 brothers all healthy  Children= 2 daughters, 2 sons all healthy    Social History      Denies tobacco use  Denies alcohol use  Denies Illicit drug use  Normal western diet  Denies recent travel  [address] are UTD  [address] [facility] work for exercise. Home medications  Lisinopril/HCTZ 20/25mg 1X/day  Metaprolol- tartrate 25mg 1-2X/day  Alva   Neurotin 300mg 3X/day  Flexeral 3X/day    Allergies  NKA    Review of Systems:      Gen: Denies weakness, fatigue, fever, and chills.    HEENT: Head  Eyes: Denies vision changes  Ears: Denies hearing loss, tinnitus, and vertigo  Cardiovascular: Denies chest pain  Pulm: Denies dyspnes  GI: Denies nausea, vomiting, and rectal bleeding  : Denies dysuria, nocturia, and hematuria. Musculoskeletal: Varicose veins. Denies weakness        Physical Exam:      Visit Vitals    /65 (BP 1 Location: Right arm, BP Patient Position: At rest)    Pulse 61    Temp 98.3 °F (36.8 °C)    Resp 16    Ht 5' 2\" (1.575 m)    Wt 92.4 kg (203 lb 11.2 oz)    SpO2 92%    Breastfeeding No    BMI 37.26 kg/m2         Physical Exam:  Gen: . Well appearing female who is in no acute distress. She is alert and oriented to person, place, and time. CV: Heart had irregular regular rate and rhythm. No murmurs , rubs, or bruits noted. Brisk capillary refills (<3secs). Palpable radial pulse. Resp: Equal excursion present. Lungs are clear to ausculation in all fields       I have reviewed the patient's Labs and Radiology studies. Jenifer Chilel  3/1/2017  4:16 PM      *ATTENTION:  This note has been created by a medical student for educational purposes only. Please do not refer to the content of this note for clinical decision-making, billing, or other purposes. Please see attending physicians note to obtain clinical information on this patient. *

## 2017-03-01 NOTE — PROGRESS NOTES
Bedside and Verbal shift change report received from Brianna WOOD RN (offgoing nurse). Report included the following information SBAR, Kardex, Intake/Output, MAR and Recent Results. 2010-Shift assessment completed, alert, on room air, no distress noted, call bell within reach, family at bedside, will continue to monitor. 2112-Benadryl administered for itching. 0010-Reassessment completed, no changes from previous. 0410-Reassessment completed, no changes from previous, pt drinking colonoscopy prep, benadryl administered for itching, will reassess. Bedside and Verbal shift change report given to Xavi Burch RN (oncoming nurse) by Belle Gar RN (offgoing nurse). Report included the following information SBAR, Kardex, Intake/Output, MAR and Recent Results.

## 2017-03-02 LAB
BACTERIA SPEC CULT: NORMAL
SERVICE CMNT-IMP: NORMAL

## 2017-03-02 NOTE — DISCHARGE SUMMARY
[facility]    [facility]    Patient: Nic Christopher MRN: [id number]  CSN: [id number]    YOB: 1935  Age: [de-identified] y.o. Sex: female    DOA: 2/25/2017 LOS:  LOS: 4 days   Discharge Date: 3/1/2017     Admission Diagnoses: Anemia  anemia  anemia    Discharge Diagnoses:    Problem List as of 3/1/2017  Date Reviewed: 3/1/2017          Codes Class Noted - Resolved    * (Principal)Iron (Fe) deficiency anemia ICD-10-CM: D50.9  ICD-9-CM: 280.9  2/28/2017 - Present        Normocytic anemia ICD-10-CM: D64.9  ICD-9-CM: 656. 9  Unknown - Present        Stroke ([facility] ICD-10-CM: I63.9  ICD-9-CM: 434.91  Unknown - Present        Hypertension ICD-10-CM: I10  ICD-9-CM: 401.9  Unknown - Present        DM (diabetes mellitus) ([facility] ICD-10-CM: E11.9  ICD-9-CM: 250.00  Unknown - Present        Type 2 diabetes mellitus ([facility] ICD-10-CM: E11.9  ICD-9-CM: 250.00  8/8/2013 - Present        CVA, old, hemiparesis ([facility] ICD-10-CM: P34.542  ICD-9-CM: 438.20  8/7/2013 - Present        Benign hypertension ICD-10-CM: I10  ICD-9-CM: 401.1  8/7/2013 - Present        Acute on chronic renal insufficiency ICD-10-CM: N28.9, N18.9  ICD-9-CM: 593.9, 585.9  7/11/2013 - Present    Overview Signed 2/2/2017 11:39 PM by BRANDIN Noguera     Overview:    7/8/13 7/10/13  Cr  1.2 1.5             Leukocytosis ICD-10-CM: C78.118  ICD-9-CM: 288.60  7/11/2013 - Present    Overview Signed 2/2/2017 11:39 PM by BRANDIN Noguera     Overview:   WBC 11.4  P 86             Microscopic hematuria ICD-10-CM: R31.29  ICD-9-CM: 599.72  7/11/2013 - Present        Rhabdomyolysis ICD-10-CM: D40.36  ICD-9-CM: 728.88  7/11/2013 - Present    Overview Signed 2/2/2017 11:39 PM by BRANDIN Noguera     Overview:   Ck  930, 1053             Injury of foot ICD-10-CM: P38.772G  ICD-9-CM: 959.7  7/10/2013 - Present        CKD (chronic kidney disease) stage 3, GFR 30-59 ml/min ICD-10-CM: N18.3  ICD-9-CM: 585.3  6/24/2013 - Present    Overview Signed 2/3/2017 12:25 AM by Edita Gupta BRANDIN Richards     Worst noted (08/06/13) BUN/sCr: 54/2.49, GFR 20. Abnormal finding on thyroid function test ICD-10-CM: R94.6  ICD-9-CM: 794.5  6/11/2013 - Present        Anemia ICD-10-CM: D64.9  ICD-9-CM: 285.9  6/11/2013 - Present        Chronic osteomyelitis of ankle ([facility] ICD-10-CM: M86.679  ICD-9-CM: 730.17  6/11/2013 - Present        Spinal stenosis of lumbar region ICD-10-CM: M48.06  ICD-9-CM: 724.02  6/11/2013 - Present        Vitamin D deficiency ICD-10-CM: E55.9  ICD-9-CM: 268.9  6/11/2013 - Present        Elevated TSH ICD-10-CM: R94.6  ICD-9-CM: 794.5  6/25/2012 - Present    Overview Signed 2/3/2017 12:29 AM by BRANDIN Silva     5.08              RESOLVED: ARF (acute renal failure) ([facility] ICD-10-CM: N17.9  ICD-9-CM: 584.9  8/7/2013 - 8/8/2013        RESOLVED: Dehydration ICD-10-CM: E86.0  ICD-9-CM: 276.51  8/7/2013 - 8/8/2013        RESOLVED: Altered mental status ICD-10-CM: R41.82  ICD-9-CM: 780.97  8/7/2013 - 8/8/2013        RESOLVED: Confusion ICD-10-CM: R41.0  ICD-9-CM: 298.9  8/7/2013 - 8/8/2013        RESOLVED: Metabolic encephalopathy WKS-93-MM: G93.41  ICD-9-CM: 348.31  8/7/2013 - 8/8/2013              Discharge Condition: Stable    Discharge To: Home    Consults: Gastroenterology and Hospitalist    Hospital Course: Sharon Montejo is a [de-identified] y.o. female who presented to the ED complaining of fatigue. Patient stated that beginning last night, she was not feeling well. This morning she began having slurred speech. Was unable to hold anything in her hand, could not write. She denied any headache or blurry vision. While in ED patient found to be anemic. Patient underwent CT head in ER which showed no acute intracranial abnormality. Stable examination. Stool guaiac was negative. 2 units blood to transfuse. Patient was admitted for further management. GI was consulted. Patient's Hgb increased from 6.9 to 9.2 following 2 units PRBC and remained stable throughout hospital admission.  EGD was performed which showed:   1. Pinpoint red spot in the duodenal 4th portion with some fresh blood in the area---cauterized with bicap probe  2. Irregular mucosa of the duodenal apex----? ??ulceration or adenoma, Bx'd  3. Several active, shallow white based ulcers in the prepyloric antrum and pyloric channel without stigmata  4. Small sliding hiatal hernia and normal esophageal mucosa  Colonoscopy was performed the following day which showed:   1. Excellent prep----normal terminal ileum for 5 cm  2. Mild diverticulosis in the left and sigmoid colon  3. Normal colonic mucosa from the dentate line to the cecum----no AVM, mass, polyp, colitis  4. Small hemorrhoids in the anus  Patient was recommended to stop ASA and avoid all NSAIDs. In addition to two units PRBC patient received 2 doses of IV Venofer. GI office to arrange for 3rd infusion as outpatient. If the patient continues with persistent anemia despite blood transfusion and Venofer, GI to further investigate causes of anemia with Small Bowel evaluation. Patient is stable for discharge home. Patient was advised to follow up with GI within 1-2 weeks for biopsy results.       Significant Diagnostic Studies:   Recent Results (from the past 24 hour(s))   GLUCOSE, POC    Collection Time: 03/01/17 10:43 AM   Result Value Ref Range    Glucose (POC) 94 70 - 110 mg/dL   GLUCOSE, POC    Collection Time: 03/01/17 11:10 AM   Result Value Ref Range    Glucose (POC) 94 70 - 110 mg/dL   GLUCOSE, POC    Collection Time: 03/01/17 11:52 AM   Result Value Ref Range    Glucose (POC) 88 70 - 110 mg/dL         Discharge Medications:   Discharge Medication List as of 3/1/2017  3:41 PM      START taking these medications    Details   pantoprazole (PROTONIX) 40 mg tablet Take 1 Tab by mouth Daily (before breakfast). , Print, Disp-30 Tab, R-0      cephALEXin (KEFLEX) 500 mg capsule Take 1 Cap by mouth four (4) times daily. , Print, Disp-12 Cap, R-0         CONTINUE these medications which have NOT CHANGED    Details   gabapentin (NEURONTIN) 300 mg capsule Take 1 Cap by mouth three (3) times daily. 12/22/16, QTY#90, 30 Days, 2 Refills. Dr. Corrinne Siemens, Historical Med, R-2      cholecalciferol (VITAMIN D3) 1,000 unit tablet Take 3,000 Units by mouth daily. , Historical Med      lisinopril-hydrochlorothiazide (PRINZIDE, ZESTORETIC) 20-25 mg per tablet Take 1 Tab by mouth daily. Historical Med, 1 Tab      metoprolol (LOPRESSOR) 25 mg tablet Take 25 mg by mouth two (2) times a day. Historical Med, 25 mg      cyclobenzaprine (FLEXERIL) 10 mg tablet Take 10 mg by mouth three (3) times daily as needed. Historical Med, 10 mg      ergocalciferol (VITAMIN D2) 50,000 unit capsule Take 50,000 Units by mouth daily. Historical Med, 50,000 Units         STOP taking these medications       aspirin (ASPIRIN) 325 mg tablet Comments:   Reason for Stopping:                 Activity: Activity as tolerated    Diet: Resume previous diet    Wound Care: None needed    Follow-up: PCP in 1 week   GI in 1 week for biopsy results    Discharge time: 40 minutes   Tommy Suazo NP  3/2/2017, 7:40 AM      I examined the patient , reviewed the history, labs, and radiology reports  independently. I have reviewed the NP documentation, agree with the documentation, medical decision making and treatment plan as outlined by my NP.     Jenny Tompkins MD

## 2017-03-03 LAB
BACTERIA SPEC CULT: NORMAL
BACTERIA SPEC CULT: NORMAL
SERVICE CMNT-IMP: NORMAL
SERVICE CMNT-IMP: NORMAL

## 2017-03-06 NOTE — ANCILLARY DISCHARGE INSTRUCTIONS
[facility] [address]  Discharge Phone Call       After-Care Discharge Phone Call Questions:    Were you able to get your prescriptions filled? Comment:      [x] Yes  []No    Comment if answer is \"No\"   Are you taking your medication(s) as your doctor ordered? Do you understand the purpose of your medications? Comment:    [x] Yes  []No    Comment if answer is \"No\"   Are you taking any other medications that are not on the list?  Comment:      [x] Yes  []No    Comment if answer is \"Yes\"   Do you have any questions about your medications? Comment:    [] Yes  [x]No    Comment if answer is \"Yes\"   Did you make your follow-up appointments (if the hospital did not do this before  discharge)? Comment:    [x] Yes  []No    Comment if answer is \"No\"   Is there any reason you might not be able to keep your follow-up appointments? Comment:     [] Yes  [x]No    Comment if answer is \"Yes\"   Do you have any questions about your care plan? Comment:    [] Yes  [x]No    Comment if answer is \"Yes\"   Do you have a good understanding of how you should manage your health? Comment:    [x] Yes  []No    Comment if answer is \"Yes\"   Do you know which symptoms to watch for that would mean you would need to call your doctor right away? Comment:      [x] Yes  []No    Comment if answer is \"No\"   Do you have any questions about the follow up process or any instructions that we have provided? Comment:    [] Yes  [x]No    Comment if answer is \"Yes\"   Did staff take your preferences into account?

## 2017-03-07 ENCOUNTER — HOSPITAL ENCOUNTER (OUTPATIENT)
Dept: INFUSION THERAPY | Age: 82
Discharge: HOME | End: 2017-03-07
Payer: MEDICARE

## 2017-03-07 VITALS
DIASTOLIC BLOOD PRESSURE: 65 MMHG | SYSTOLIC BLOOD PRESSURE: 167 MMHG | BODY MASS INDEX: 32.11 KG/M2 | WEIGHT: 188.1 LBS | HEART RATE: 60 BPM | TEMPERATURE: 96.6 F | HEIGHT: 64 IN | OXYGEN SATURATION: 98 % | RESPIRATION RATE: 20 BRPM

## 2017-03-07 PROCEDURE — 74011250636 HC RX REV CODE- 250/636: Performed by: INTERNAL MEDICINE

## 2017-03-07 PROCEDURE — 96365 THER/PROPH/DIAG IV INF INIT: CPT

## 2017-03-07 PROCEDURE — 96366 THER/PROPH/DIAG IV INF ADDON: CPT

## 2017-03-07 RX ORDER — SODIUM CHLORIDE 0.9 % (FLUSH) 0.9 %
10-40 SYRINGE (ML) INJECTION AS NEEDED
Status: DISCONTINUED | OUTPATIENT
Start: 2017-03-07 | End: 2017-03-11 | Stop reason: HOSPADM

## 2017-03-07 RX ADMIN — Medication 20 ML: at 13:10

## 2017-03-07 RX ADMIN — IRON SUCROSE 500 MG: 20 INJECTION, SOLUTION INTRAVENOUS at 09:41

## 2017-03-07 RX ADMIN — Medication 10 ML: at 09:41

## 2017-03-07 NOTE — PROGRESS NOTES
[facility] Progress Note    Date: 2017    Name: Yannick Solo    MRN: [id number]         : 1935    Venofer infuison 1 of 2    Ms. Roe Jacob was assessed and education was provided. Patient had Venofer infusion while in the hospital on 17. Ms. Duvall Hands vitals were reviewed and patient was observed for 5 minutes prior to treatment. Visit Vitals    /65 (BP 1 Location: Right arm, BP Patient Position: Sitting)    Pulse 60    Temp 96.6 °F (35.9 °C)    Resp 20    Ht 5' 4\" (1.626 m)    Wt 85.3 kg (188 lb 1.6 oz)    SpO2 98%    Breastfeeding No    BMI 32.29 kg/m2     Patient Vitals for the past 12 hrs:   Temp Pulse Resp BP SpO2   17 1305 96.6 °F (35.9 °C) 60 20 167/65 -   17 1229 - 60 20 159/62 -   17 1155 97.1 °F (36.2 °C) 60 20 184/69 -   17 1115 97 °F (36.1 °C) 60 20 153/62 -   17 1041 - (!) 59 20 - -   17 1010 96.8 °F (36 °C) 69 20 137/55 -   17 0916 97.6 °F (36.4 °C) 70 20 157/68 98 %     IV started in left forearm w/22 gauge INT w/o difficulty. Good blood return obtained, then flushed with 10 ml NS. Lab results were obtained and reviewed. No results found for this or any previous visit (from the past 12 hour(s)). Venofer 500 mg was infused over 3 hours and 20 minutes. Patient has history of recent infusion. Patient was observed for 20 minutes, no s/s reaction noted. IV flushed with 20 ml NS, then removed. No irritation noted at site, 2x2 guaze and Coban applied. Ms. Roe Jacob tolerated the infusion, and had no complaints. Patient armband removed and shredded. Ms. Roe Jacob was discharged from [place] [address] in stable condition at 1320. She is to return on 3/14/2017 at 0900 for her next appointment for second and final infusion of Venofer.     Alex Mireles RN  2017  1:22 PM

## 2017-03-14 ENCOUNTER — HOSPITAL ENCOUNTER (OUTPATIENT)
Dept: INFUSION THERAPY | Age: 82
Discharge: HOME | End: 2017-03-14
Payer: MEDICARE

## 2017-03-14 VITALS
OXYGEN SATURATION: 100 % | TEMPERATURE: 97 F | WEIGHT: 184.1 LBS | RESPIRATION RATE: 20 BRPM | BODY MASS INDEX: 31.6 KG/M2 | DIASTOLIC BLOOD PRESSURE: 67 MMHG | SYSTOLIC BLOOD PRESSURE: 152 MMHG | HEART RATE: 55 BPM

## 2017-03-14 PROCEDURE — 74011250636 HC RX REV CODE- 250/636: Performed by: INTERNAL MEDICINE

## 2017-03-14 PROCEDURE — 96366 THER/PROPH/DIAG IV INF ADDON: CPT

## 2017-03-14 PROCEDURE — 96365 THER/PROPH/DIAG IV INF INIT: CPT

## 2017-03-14 RX ORDER — SODIUM CHLORIDE 0.9 % (FLUSH) 0.9 %
10-40 SYRINGE (ML) INJECTION AS NEEDED
Status: DISCONTINUED | OUTPATIENT
Start: 2017-03-14 | End: 2017-03-18 | Stop reason: HOSPADM

## 2017-03-14 RX ADMIN — Medication 10 ML: at 12:45

## 2017-03-14 RX ADMIN — Medication 10 ML: at 09:20

## 2017-03-14 RX ADMIN — IRON SUCROSE 500 MG: 20 INJECTION, SOLUTION INTRAVENOUS at 09:25

## 2017-03-14 NOTE — PROGRESS NOTES
[facility] Progress Note    Date: 2017    Name: Earney Krabbe    MRN: [id number]         : 1935    Ms. Bro arrived at [address] [place] [address]. Ms. Serena Lucero was assessed and education was provided. Ms. Srinath Monroe vitals were reviewed and patient was observed for 5 minutes prior to treatment. Visit Vitals    /67 (BP 1 Location: Left arm, BP Patient Position: Sitting)    Pulse (!) 55    Temp 97 °F (36.1 °C)    Resp 20    Wt 83.5 kg (184 lb 1.6 oz)    SpO2 100%    BMI 31.6 kg/m2       No results found for this or any previous visit (from the past 12 hour(s)). Saline lock started to right arm x1 attempt using 22g catheter, line flushes briskly. Venofer 500mg was infused over 3 hours. Saline lock flushed with normal saline then removed. Gauze and coban applied to site. Ms. Serena Lucero tolerated the infusion, and had no complaints. Patient armband removed and shredded. Ms. Serena Lucero was discharged from [place] [address] in stable condition at [phone]. She is to follow up with physician for her next appointment.     Mary Rice RN  2017  5:05 PM

## 2017-03-30 ENCOUNTER — HOSPITAL ENCOUNTER (OUTPATIENT)
Dept: LAB | Age: 82
Discharge: HOME | End: 2017-03-30
Payer: MEDICARE

## 2017-03-30 DIAGNOSIS — D50.9 IRON DEFICIENCY ANEMIA, UNSPECIFIED: ICD-10-CM

## 2017-03-30 LAB
BASOPHILS # BLD AUTO: 0 K/UL (ref 0–0.06)
BASOPHILS # BLD: 1 % (ref 0–2)
DIFFERENTIAL METHOD BLD: ABNORMAL
EOSINOPHIL # BLD: 0.1 K/UL (ref 0–0.4)
EOSINOPHIL NFR BLD: 2 % (ref 0–5)
ERYTHROCYTE [DISTWIDTH] IN BLOOD BY AUTOMATED COUNT: 20 % (ref 11.6–14.5)
HCT VFR BLD AUTO: 38.7 % (ref 35–45)
HGB BLD-MCNC: 11.8 G/DL (ref 12–16)
LYMPHOCYTES # BLD AUTO: 23 % (ref 21–52)
LYMPHOCYTES # BLD: 1.5 K/UL (ref 0.9–3.6)
MCH RBC QN AUTO: 25.8 PG (ref 24–34)
MCHC RBC AUTO-ENTMCNC: 30.5 G/DL (ref 31–37)
MCV RBC AUTO: 84.5 FL (ref 74–97)
MONOCYTES # BLD: 0.4 K/UL (ref 0.05–1.2)
MONOCYTES NFR BLD AUTO: 6 % (ref 3–10)
NEUTS SEG # BLD: 4.5 K/UL (ref 1.8–8)
NEUTS SEG NFR BLD AUTO: 68 % (ref 40–73)
PLATELET # BLD AUTO: 172 K/UL (ref 135–420)
PMV BLD AUTO: 11.4 FL (ref 9.2–11.8)
RBC # BLD AUTO: 4.58 M/UL (ref 4.2–5.3)
WBC # BLD AUTO: 6.6 K/UL (ref 4.6–13.2)

## 2017-03-30 PROCEDURE — 85025 COMPLETE CBC W/AUTO DIFF WBC: CPT | Performed by: INTERNAL MEDICINE

## 2017-03-30 PROCEDURE — 36415 COLL VENOUS BLD VENIPUNCTURE: CPT | Performed by: INTERNAL MEDICINE

## 2017-06-04 ENCOUNTER — HOSPITAL ENCOUNTER (EMERGENCY)
Age: 82
Discharge: HOME | End: 2017-06-04
Attending: EMERGENCY MEDICINE
Payer: MEDICARE

## 2017-06-04 ENCOUNTER — APPOINTMENT (OUTPATIENT)
Dept: GENERAL RADIOLOGY | Age: 82
End: 2017-06-04
Attending: EMERGENCY MEDICINE
Payer: MEDICARE

## 2017-06-04 VITALS
RESPIRATION RATE: 18 BRPM | OXYGEN SATURATION: 100 % | TEMPERATURE: 97.6 F | DIASTOLIC BLOOD PRESSURE: 95 MMHG | HEART RATE: 75 BPM | SYSTOLIC BLOOD PRESSURE: 195 MMHG

## 2017-06-04 DIAGNOSIS — R03.0 ELEVATED BLOOD PRESSURE READING: ICD-10-CM

## 2017-06-04 DIAGNOSIS — M19.90 ARTHRITIS: ICD-10-CM

## 2017-06-04 DIAGNOSIS — M25.562 ACUTE PAIN OF LEFT KNEE: Primary | ICD-10-CM

## 2017-06-04 PROCEDURE — 73564 X-RAY EXAM KNEE 4 OR MORE: CPT

## 2017-06-04 PROCEDURE — 99284 EMERGENCY DEPT VISIT MOD MDM: CPT

## 2017-06-04 PROCEDURE — L1830 KO IMMOB CANVAS LONG PRE OTS: HCPCS

## 2017-06-04 RX ORDER — MELOXICAM 7.5 MG/1
7.5 TABLET ORAL DAILY
Qty: 15 TAB | Refills: 0 | Status: SHIPPED | OUTPATIENT
Start: 2017-06-04 | End: 2017-11-16

## 2017-06-04 NOTE — ED NOTES
I have reviewed discharge instructions with the patient. The patient verbalized understanding. Patient NAD, VSS. Patient armband removed and shredded.

## 2017-06-04 NOTE — ED PROVIDER NOTES
Patient is a 81 y.o. female presenting with knee pain. The history is provided by the patient. Knee Pain      Jameson Pinon is a 81 y.o. female presents with left knee pain. States rolled over in bed last night and started having knee pain and was unable to bend knee. Has been having knee pain for some time. Denies any new trauma. Past Medical History:   Diagnosis Date    CAD (coronary artery disease)     [facility] osteomyelitis of ankle ([facility] [address]     CKD (chronic kidney disease) stage 3, GFR 30-59 ml/min 06/24/2013    Worst noted (08/06/13) BUN/sCr: 54/2.49, GFR 20.     DM (diabetes mellitus) ([facility]     Elevated TSH 06/25/2012    5.08     Hypertension     Leukocytosis     Lumbar spinal stenosis     Normocytic anemia     Rhabdomyolysis     Splenomegaly     Stroke ([facility] [address]     Vitamin D deficiency        Past Surgical History:   Procedure Laterality Date    COLONOSCOPY N/A 3/1/2017    COLONOSCOPY performed [facility] at [facility]OSCOPY    HX CHOLECYSTECTOMY      HX FREE SKIN GRAFT      HX HYSTERECTOMY      HX OPEN REDUCTION INTERNAL FIXATION Left     Tx LLE Ankle Trimalleolar Fx by Dr. Gregory Scott [facility])    HX ORTHOPAEDIC Left 06/11/2013    [facility] Dr. Gregory Scott - LLE Ankle Removal of Hardware     HX VEIN STRIPPING Bilateral 09/18/2013    Dr. Corrinne Alar 20 SQ CM<           Family History:   Problem Relation Age of Onset    Diabetes Other     Hypertension Other        Social History     Social History    Marital status:      Spouse name: N/A    Number of children: N/A    Years of education: N/A     Occupational History    Not on file.      Social History Main Topics    Smoking status: Never Smoker    Smokeless tobacco: Not on file    Alcohol use No    Drug use: No    Sexual activity: No     Other Topics Concern    Not on file     Social History Narrative         ALLERGIES: Review of patient's allergies indicates no known allergies. Review of Systems  Constitutional:  Denies malaise, fever, chills. Head:  Denies injury. Face:  Denies injury or pain. ENMT:  Denies sore throat. Neck:  Denies injury or pain. Chest:  Denies injury. Cardiac:  Denies chest pain or palpitations. Respiratory:  Denies cough, wheezing, difficulty breathing, shortness of breath. GI/ABD:  Denies injury, pain, distention, nausea, vomiting, diarrhea. :  Denies injury, pain, dysuria or urgency. Back:  Denies injury or pain. Pelvis:  Denies injury or pain. Extremity/MS:  Knee pain  Neuro:  Denies headache, LOC, dizziness, neurologic symptoms/deficits/paresthesias. Skin: Denies injury, rash, itching or skin changes. Vitals:    06/04/17 0956   BP: (!) 195/95   Pulse: 75   Resp: 18   Temp: 97.6 °F (36.4 °C)   SpO2: 100%            Physical Exam   Nursing note and vitals reviewed. CONSTITUTIONAL: Alert, in no apparent distress; well-developed; well-nourished. HEAD:  Normocephalic, atraumatic. EYES: PERRL; EOM's intact. ENTM: Nose: No rhinorrhea; Throat: mucous membranes moist. Posterior pharynx-normal.  Neck:  No JVD, supple without lymphadenopathy. RESP: Chest clear, equal breath sounds. CV: S1 and S2 WNL; No murmurs, gallops or rubs. GI: Abdomen soft and non-tender. No masses or organomegaly. UPPER EXT:  Normal inspection. LOWER EXT: Left knee unable to flex due to pain,  n/v intact, moderate joint line tenderness, no swelling, effusion or edema, no ecchymosis,  Fullness to the knee, Arthritic appearing, Varus Valgus laxity equal bilat. Good pedal pulse, good cap refill, Pt able to flex and dorsiflex foot, able to flex at hip. NEURO: strength 5/5 and sym, sensation intact. SKIN: No rashes; Normal for age and stage. PSYCH:  Alert and oriented, normal affect.        MDM  Number of Diagnoses or Management Options  Diagnosis management comments: DDx: knee strain/sprain, tear/strain (ACL/PCL , med/lat collateral ligament), med/lat meniscus tear, patella dislocation/Fx, septic knee, gout, arthritis, osteomyelitis, Osgood-Schlatter's dz, Baker's cyst, chondromalacia patella, bursitis (prepatellar/superficial infrapatellar, deep infrapatellar and anserine), overuse injury, cellulitis, neuropathy  IMPRESSION AND MEDICAL DECISION MAKING:  Based upon the patient's presentation with noted HPI and PE, along with the work up done in the emergency department, I believe that the patient has 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 She is bone on bone. Will place in immobilizer and have her follow up with Ortho this week. PROGRESS NOTE: One or more blood pressure readings were noted elevated during the Pt's presentation in the emergency department this date. This abnormal reading has been cited in the Pt's diagnosis, and they have been encouraged to follow up with their primary care physician, or referred to a consultant for further evaluation and treatment. Pt advised of elevated BP. Advised Pt the need for follow up for the elevated BP. Advised Pt to monitor and record BP readings. ACEP guidelines are  Initiating treatment for asymptomatic hypertension in the ED is not necessary when patients have follow-up; (2) Rapidly lowering blood pressure in asymptomatic patients in the ED is unnecessary and may be harmful in some patients; (3) When ED treatment for asymptomatic hypertension is initiated, blood pressure management should attempt to gradually lower blood pressure and should not be expected to be normalized during the initial ED visit. The patient will be discharged home. Warning signs of worsening condition were discussed and understood by the patient. Based on patient's age, coexisting illness, exam, and the results of this ED evaluation, the decision to treat as an outpatient was made. Based on the information available at time of discharge, acute pathology requiring immediate intervention was deemed relative unlikely. While it is impossible to completely exclude the possibility of underlying serious disease or worsening of condition, I feel the relative likelihood is extremely low. I discussed this uncertainty with the patient, who understood ED evaluation and treatment and felt comfortable with the outpatient treatment plan. All questions regarding care, test results, and follow up were answered. The patient is stable and appropriate to discharge. They understand that they should return to the emergency department for any new or worsening symptoms. I stressed the importance of follow up for repeat assessment and possibly further evaluation/treatment. Amount and/or Complexity of Data Reviewed  Tests in the radiology section of CPT®: ordered and reviewed  Independent visualization of images, tracings, or specimens: yes (Severe multi compartment degenerative changes. )      ED Course       Procedures      Splint Check Note    Patient: Karoline Blount  MRN: [id number]  Date: 6/4/2017  Age:  81 y.o.,      Sex: female    YOB: 1935      Type of Splint: knee immobilizer    Location: Left knee    Applied neurovascular intact prior to splint placement neurovascular intact after splint placement splint is placed in good position. BRANDIN Edward June 4, 2017 11:01 AM           Vitals:  Patient Vitals for the past 12 hrs:   Temp Pulse Resp BP SpO2   06/04/17 0956 97.6 °F (36.4 °C) 75 18 (!) 195/95 100 %         Medications ordered:   Medications - No data to display      Lab findings:  No results found for this or any previous visit (from the past 12 hour(s)). EKG interpretation by ED Physician:      X-Ray, CT or other radiology findings or impressions:  XR KNEE RT MIN 4 V    (Results Pending)       Progress notes, Consult notes or additional Procedure notes:       Disposition:  Diagnosis:   1. Acute pain of left knee    2. Arthritis    3.  Elevated blood pressure reading        Disposition:   11:05 AM  Pt reevaluated at this time and is resting comfortably in NAD. Discussed results and findings, as well as, diagnosis and plan for discharge. Pt verbalizes understanding and agreement with plan. All questions addressed at this time. Follow-up Information     Follow up With Details Comments Contact Info    Rick Hudson MD Schedule an appointment as soon as possible for a visit in 2 days  [address] [facility] [place]reyda Schedule an appointment as soon as possible for a visit in 2 days  [address] [place] [address]  [phone]      [facility] EMERGENCY DEPT  If symptoms worsen [address]  [phone]           Patient's Medications   Start Taking    MELOXICAM (MOBIC) 7.5 MG TABLET    Take 1 Tab by mouth daily. Continue Taking    CHOLECALCIFEROL (VITAMIN D3) 1,000 UNIT TABLET    Take 3,000 Units by mouth daily. CYCLOBENZAPRINE (FLEXERIL) 10 MG TABLET    Take 10 mg by mouth three (3) times daily as needed. ERGOCALCIFEROL (VITAMIN D2) 50,000 UNIT CAPSULE    Take 50,000 Units by mouth daily. GABAPENTIN (NEURONTIN) 300 MG CAPSULE    Take 1 Cap by mouth three (3) times daily. 12/22/16, QTY#90, 30 Days, 2 Refills. Dr. Mario Alberto Gordon    LISINOPRIL-HYDROCHLOROTHIAZIDE (PRINZIDE, ZESTORETIC) 20-25 MG PER TABLET    Take 1 Tab by mouth daily. METOPROLOL (LOPRESSOR) 25 MG TABLET    Take 25 mg by mouth two (2) times a day. PANTOPRAZOLE (PROTONIX) 40 MG TABLET    Take 1 Tab by mouth Daily (before breakfast).    These Medications have changed    No medications on file   Stop Taking    No medications on file

## 2017-06-04 NOTE — DISCHARGE INSTRUCTIONS
Joint Pain: Care Instructions  Your Care Instructions  Many people have small aches and pains from overuse or injury to muscles and joints. Joint injuries often happen during sports or recreation, work tasks, or projects around the home. An overuse injury can happen when you put too much stress on a joint or when you do an activity that stresses the joint over and over, such as using the computer or rowing a boat. You can take action at home to help your muscles and joints get better. You should feel better in 1 to 2 weeks, but it can take 3 months or more to heal completely. Follow-up care is a key part of your treatment and safety. Be sure to make and go to all appointments, and call your doctor if you are having problems. It's also a good idea to know your test results and keep a list of the medicines you take. How can you care for yourself at home? · Do not put weight on the injured joint for at least a day or two. · For the first day or two after an injury, do not take hot showers or baths, and do not use hot packs. The heat could make swelling worse. · Put ice or a cold pack on the sore joint for 10 to 20 minutes at a time. Try to do this every 1 to 2 hours for the next 3 days (when you are awake) or until the swelling goes down. Put a thin cloth between the ice and your skin. · Wrap the injury in an elastic bandage. Do not wrap it too tightly because this can cause more swelling. · Prop up the sore joint on a pillow when you ice it or anytime you sit or lie down during the next 3 days. Try to keep it above the level of your heart. This will help reduce swelling. · Take an over-the-counter pain medicine, such as acetaminophen (Tylenol), ibuprofen (Advil, Motrin), or naproxen (Aleve). Read and follow all instructions on the label. · After 1 or 2 days of rest, begin moving the joint gently.  While the joint is still healing, you can begin to exercise using activities that do not strain or hurt the painful joint. When should you call for help? Call your doctor now or seek immediate medical care if:  · You have signs of infection, such as:  ¨ Increased pain, swelling, warmth, and redness. ¨ Red streaks leading from the joint. ¨ A fever. Watch closely for changes in your health, and be sure to contact your doctor if:  · Your movement or symptoms are not getting better after 1 to 2 weeks of home treatment. Where can you learn more? Go to http://dorinda-jn.info/. Enter P205 in the search box to learn more about \"Joint Pain: Care Instructions. \"  Current as of: May 23, 2016  Content Version: 11.2  © 9631-5926 [employer]. Care instructions adapted under license by [employer] (which disclaims liability or warranty for this information). If you have questions about a medical condition or this instruction, always ask your healthcare professional. [address] any warranty or liability for your use of this information.

## 2017-11-06 ENCOUNTER — APPOINTMENT (OUTPATIENT)
Dept: CT IMAGING | Age: 82
DRG: 689 | End: 2017-11-06
Attending: EMERGENCY MEDICINE
Payer: MEDICARE

## 2017-11-06 ENCOUNTER — APPOINTMENT (OUTPATIENT)
Dept: GENERAL RADIOLOGY | Age: 82
DRG: 689 | End: 2017-11-06
Attending: PHYSICIAN ASSISTANT
Payer: MEDICARE

## 2017-11-06 ENCOUNTER — HOSPITAL ENCOUNTER (INPATIENT)
Age: 82
LOS: 2 days | Discharge: HOME | DRG: 689 | End: 2017-11-08
Attending: EMERGENCY MEDICINE | Admitting: FAMILY MEDICINE
Payer: MEDICARE

## 2017-11-06 DIAGNOSIS — R53.83 FATIGUE, UNSPECIFIED TYPE: ICD-10-CM

## 2017-11-06 DIAGNOSIS — R47.81 SLURRED SPEECH: ICD-10-CM

## 2017-11-06 DIAGNOSIS — N30.00 ACUTE CYSTITIS WITHOUT HEMATURIA: Primary | ICD-10-CM

## 2017-11-06 DIAGNOSIS — N17.9 AKI (ACUTE KIDNEY INJURY) (HCC): ICD-10-CM

## 2017-11-06 PROBLEM — N39.0 UTI (URINARY TRACT INFECTION): Status: ACTIVE | Noted: 2017-11-06

## 2017-11-06 PROBLEM — G93.40 ENCEPHALOPATHY: Status: ACTIVE | Noted: 2017-11-06

## 2017-11-06 LAB
ALBUMIN SERPL-MCNC: 3.5 G/DL (ref 3.4–5)
ALBUMIN/GLOB SERPL: 0.8 {RATIO} (ref 0.8–1.7)
ALP SERPL-CCNC: 99 U/L (ref 45–117)
ALT SERPL-CCNC: 34 U/L (ref 13–56)
ANION GAP SERPL CALC-SCNC: 12 MMOL/L (ref 3–18)
APPEARANCE UR: ABNORMAL
APTT PPP: 27 SEC (ref 23–36.4)
AST SERPL-CCNC: 24 U/L (ref 15–37)
ATRIAL RATE: 80 BPM
BACTERIA URNS QL MICRO: ABNORMAL /HPF
BASOPHILS # BLD: 0 K/UL (ref 0–0.06)
BASOPHILS NFR BLD: 0 % (ref 0–2)
BILIRUB DIRECT SERPL-MCNC: 0.1 MG/DL (ref 0–0.2)
BILIRUB SERPL-MCNC: 0.4 MG/DL (ref 0.2–1)
BILIRUB UR QL: NEGATIVE
BUN SERPL-MCNC: 44 MG/DL (ref 7–18)
BUN/CREAT SERPL: 21 (ref 12–20)
CALCIUM SERPL-MCNC: 8.9 MG/DL (ref 8.5–10.1)
CALCULATED P AXIS, ECG09: 63 DEGREES
CALCULATED R AXIS, ECG10: 34 DEGREES
CALCULATED T AXIS, ECG11: 104 DEGREES
CHLORIDE SERPL-SCNC: 106 MMOL/L (ref 100–108)
CK MB CFR SERPL CALC: 2.2 % (ref 0–4)
CK MB SERPL-MCNC: 1.3 NG/ML (ref 5–25)
CK SERPL-CCNC: 60 U/L (ref 26–192)
CO2 SERPL-SCNC: 22 MMOL/L (ref 21–32)
COLOR UR: YELLOW
CREAT SERPL-MCNC: 2.11 MG/DL (ref 0.6–1.3)
DIAGNOSIS, 93000: NORMAL
DIFFERENTIAL METHOD BLD: ABNORMAL
EOSINOPHIL # BLD: 0.1 K/UL (ref 0–0.4)
EOSINOPHIL NFR BLD: 1 % (ref 0–5)
EPITH CASTS URNS QL MICRO: ABNORMAL /LPF (ref 0–5)
ERYTHROCYTE [DISTWIDTH] IN BLOOD BY AUTOMATED COUNT: 14.3 % (ref 11.6–14.5)
EST. AVERAGE GLUCOSE BLD GHB EST-MCNC: 123 MG/DL
GLOBULIN SER CALC-MCNC: 4.2 G/DL (ref 2–4)
GLUCOSE SERPL-MCNC: 149 MG/DL (ref 74–99)
GLUCOSE UR STRIP.AUTO-MCNC: NEGATIVE MG/DL
HBA1C MFR BLD: 5.9 % (ref 4.2–5.6)
HCT VFR BLD AUTO: 36.1 % (ref 35–45)
HGB BLD-MCNC: 11.7 G/DL (ref 12–16)
HGB UR QL STRIP: ABNORMAL
INR PPP: 1.1 (ref 0.8–1.2)
KETONES UR QL STRIP.AUTO: ABNORMAL MG/DL
LEUKOCYTE ESTERASE UR QL STRIP.AUTO: ABNORMAL
LIPASE SERPL-CCNC: 192 U/L (ref 73–393)
LYMPHOCYTES # BLD: 1.5 K/UL (ref 0.9–3.6)
LYMPHOCYTES NFR BLD: 15 % (ref 21–52)
MCH RBC QN AUTO: 29 PG (ref 24–34)
MCHC RBC AUTO-ENTMCNC: 32.4 G/DL (ref 31–37)
MCV RBC AUTO: 89.4 FL (ref 74–97)
MONOCYTES # BLD: 0.7 K/UL (ref 0.05–1.2)
MONOCYTES NFR BLD: 7 % (ref 3–10)
NEUTS SEG # BLD: 7.6 K/UL (ref 1.8–8)
NEUTS SEG NFR BLD: 77 % (ref 40–73)
NITRITE UR QL STRIP.AUTO: NEGATIVE
P-R INTERVAL, ECG05: 164 MS
PH UR STRIP: 5 [PH] (ref 5–8)
PLATELET # BLD AUTO: 248 K/UL (ref 135–420)
PMV BLD AUTO: 10.7 FL (ref 9.2–11.8)
POTASSIUM SERPL-SCNC: 3.6 MMOL/L (ref 3.5–5.5)
PROT SERPL-MCNC: 7.7 G/DL (ref 6.4–8.2)
PROT UR STRIP-MCNC: ABNORMAL MG/DL
PROTHROMBIN TIME: 14.1 SEC (ref 11.5–15.2)
Q-T INTERVAL, ECG07: 372 MS
QRS DURATION, ECG06: 76 MS
QTC CALCULATION (BEZET), ECG08: 429 MS
RBC # BLD AUTO: 4.04 M/UL (ref 4.2–5.3)
RBC #/AREA URNS HPF: ABNORMAL /HPF (ref 0–5)
SODIUM SERPL-SCNC: 140 MMOL/L (ref 136–145)
SP GR UR REFRACTOMETRY: 1.02 (ref 1–1.03)
TROPONIN I SERPL-MCNC: <0.02 NG/ML (ref 0–0.04)
UROBILINOGEN UR QL STRIP.AUTO: 0.2 EU/DL (ref 0.2–1)
VENTRICULAR RATE, ECG03: 80 BPM
WBC # BLD AUTO: 10 K/UL (ref 4.6–13.2)
WBC URNS QL MICRO: ABNORMAL /HPF (ref 0–4)

## 2017-11-06 PROCEDURE — 82550 ASSAY OF CK (CPK): CPT | Performed by: PHYSICIAN ASSISTANT

## 2017-11-06 PROCEDURE — 74011250637 HC RX REV CODE- 250/637: Performed by: NURSE PRACTITIONER

## 2017-11-06 PROCEDURE — 96374 THER/PROPH/DIAG INJ IV PUSH: CPT

## 2017-11-06 PROCEDURE — 93005 ELECTROCARDIOGRAM TRACING: CPT

## 2017-11-06 PROCEDURE — 85025 COMPLETE CBC W/AUTO DIFF WBC: CPT | Performed by: EMERGENCY MEDICINE

## 2017-11-06 PROCEDURE — 71010 XR CHEST PORT: CPT

## 2017-11-06 PROCEDURE — 80076 HEPATIC FUNCTION PANEL: CPT | Performed by: EMERGENCY MEDICINE

## 2017-11-06 PROCEDURE — 85610 PROTHROMBIN TIME: CPT | Performed by: EMERGENCY MEDICINE

## 2017-11-06 PROCEDURE — 74011250636 HC RX REV CODE- 250/636: Performed by: FAMILY MEDICINE

## 2017-11-06 PROCEDURE — 99285 EMERGENCY DEPT VISIT HI MDM: CPT

## 2017-11-06 PROCEDURE — 81001 URINALYSIS AUTO W/SCOPE: CPT | Performed by: EMERGENCY MEDICINE

## 2017-11-06 PROCEDURE — 74011000250 HC RX REV CODE- 250: Performed by: NURSE PRACTITIONER

## 2017-11-06 PROCEDURE — 85730 THROMBOPLASTIN TIME PARTIAL: CPT | Performed by: EMERGENCY MEDICINE

## 2017-11-06 PROCEDURE — 87086 URINE CULTURE/COLONY COUNT: CPT | Performed by: NURSE PRACTITIONER

## 2017-11-06 PROCEDURE — 70450 CT HEAD/BRAIN W/O DYE: CPT

## 2017-11-06 PROCEDURE — 83690 ASSAY OF LIPASE: CPT | Performed by: EMERGENCY MEDICINE

## 2017-11-06 PROCEDURE — 83036 HEMOGLOBIN GLYCOSYLATED A1C: CPT | Performed by: FAMILY MEDICINE

## 2017-11-06 PROCEDURE — 80048 BASIC METABOLIC PNL TOTAL CA: CPT | Performed by: EMERGENCY MEDICINE

## 2017-11-06 PROCEDURE — 74011250636 HC RX REV CODE- 250/636: Performed by: NURSE PRACTITIONER

## 2017-11-06 PROCEDURE — 65270000029 HC RM PRIVATE

## 2017-11-06 RX ORDER — SODIUM CHLORIDE 0.9 % (FLUSH) 0.9 %
5-10 SYRINGE (ML) INJECTION AS NEEDED
Status: DISCONTINUED | OUTPATIENT
Start: 2017-11-06 | End: 2017-11-08 | Stop reason: HOSPADM

## 2017-11-06 RX ORDER — SODIUM CHLORIDE 9 MG/ML
125 INJECTION, SOLUTION INTRAVENOUS CONTINUOUS
Status: DISCONTINUED | OUTPATIENT
Start: 2017-11-06 | End: 2017-11-08 | Stop reason: HOSPADM

## 2017-11-06 RX ORDER — HEPARIN SODIUM 5000 [USP'U]/ML
5000 INJECTION, SOLUTION INTRAVENOUS; SUBCUTANEOUS EVERY 8 HOURS
Status: DISCONTINUED | OUTPATIENT
Start: 2017-11-06 | End: 2017-11-08 | Stop reason: HOSPADM

## 2017-11-06 RX ORDER — INSULIN LISPRO 100 [IU]/ML
INJECTION, SOLUTION INTRAVENOUS; SUBCUTANEOUS
Status: DISCONTINUED | OUTPATIENT
Start: 2017-11-06 | End: 2017-11-08 | Stop reason: HOSPADM

## 2017-11-06 RX ORDER — GUAIFENESIN 100 MG/5ML
81 LIQUID (ML) ORAL
Status: COMPLETED | OUTPATIENT
Start: 2017-11-06 | End: 2017-11-06

## 2017-11-06 RX ORDER — SODIUM CHLORIDE 0.9 % (FLUSH) 0.9 %
5-10 SYRINGE (ML) INJECTION EVERY 8 HOURS
Status: DISCONTINUED | OUTPATIENT
Start: 2017-11-06 | End: 2017-11-08 | Stop reason: HOSPADM

## 2017-11-06 RX ORDER — DEXTROSE 50 % IN WATER (D50W) INTRAVENOUS SYRINGE
25-50 AS NEEDED
Status: DISCONTINUED | OUTPATIENT
Start: 2017-11-06 | End: 2017-11-08 | Stop reason: HOSPADM

## 2017-11-06 RX ORDER — ONDANSETRON 2 MG/ML
4 INJECTION INTRAMUSCULAR; INTRAVENOUS
Status: DISCONTINUED | OUTPATIENT
Start: 2017-11-06 | End: 2017-11-08 | Stop reason: HOSPADM

## 2017-11-06 RX ORDER — GABAPENTIN 100 MG/1
300 CAPSULE ORAL 3 TIMES DAILY
Status: DISCONTINUED | OUTPATIENT
Start: 2017-11-06 | End: 2017-11-08 | Stop reason: HOSPADM

## 2017-11-06 RX ORDER — MAGNESIUM SULFATE 100 %
4 CRYSTALS MISCELLANEOUS AS NEEDED
Status: DISCONTINUED | OUTPATIENT
Start: 2017-11-06 | End: 2017-11-08 | Stop reason: HOSPADM

## 2017-11-06 RX ORDER — SODIUM CHLORIDE 9 MG/ML
100 INJECTION, SOLUTION INTRAVENOUS CONTINUOUS
Status: DISCONTINUED | OUTPATIENT
Start: 2017-11-06 | End: 2017-11-08 | Stop reason: HOSPADM

## 2017-11-06 RX ORDER — METOPROLOL TARTRATE 25 MG/1
25 TABLET, FILM COATED ORAL 2 TIMES DAILY
Status: DISCONTINUED | OUTPATIENT
Start: 2017-11-06 | End: 2017-11-08 | Stop reason: HOSPADM

## 2017-11-06 RX ADMIN — SODIUM CHLORIDE 500 ML: 900 INJECTION, SOLUTION INTRAVENOUS at 20:04

## 2017-11-06 RX ADMIN — ASPIRIN 81 MG 81 MG: 81 TABLET ORAL at 20:04

## 2017-11-06 RX ADMIN — WATER 1 G: 1 INJECTION INTRAMUSCULAR; INTRAVENOUS; SUBCUTANEOUS at 18:53

## 2017-11-06 RX ADMIN — SODIUM CHLORIDE 125 ML/HR: 900 INJECTION, SOLUTION INTRAVENOUS at 23:00

## 2017-11-06 NOTE — IP AVS SNAPSHOT
[address] [address] 
[phone] Patient: Rafa Vizcaino MRN: [id number] RUK:[birth year] About your hospitalization You were admitted on:  November 6, 2017 You last received care in the:  [address] NEURO MED You were discharged on:  November 8, 2017 Why you were hospitalized Your primary diagnosis was:  Encephalopathy Your diagnoses also included:  Uti (Urinary Tract Infection), Chet (Acute Kidney Injury) (Hcc), Type 2 Diabetes Mellitus (Hcc), Benign Hypertension Things You Need To Do (next 8 weeks) Schedule an appointment with [facility] OP WOUND CARE as soon as possible for a visit in 1 week(s) Phone:  [phone] Where:  [address], [address] [facility], [address] [facility] Wednesday Nov 15, 2017 Follow up with Lenard Douglas MD  
832am  
  
Phone:  [phone] Where:  [address] [facility], [facility] [address] [facility] [address] Discharge Orders Procedure Order Date Status Priority Quantity Spec Type Associated Dx REFERRAL TO WOUND CARE 11/08/17 1042 Normal Routine 1 Comments:  Bi-weekly wound care A check ifrah indicates which time of day the medication should be taken. My Medications TAKE these medications as instructed Instructions Each Dose to Equal  
 Morning Noon Evening Bedtime  
 cholecalciferol 1,000 unit tablet Commonly known as:  VITAMIN D3 Your last dose was: Your next dose is: Take 3,000 Units by mouth daily. 3000 Units FLEXERIL 10 mg tablet Generic drug:  cyclobenzaprine Your last dose was: Your next dose is: Take 10 mg by mouth three (3) times daily as needed. 10 mg  
    
   
   
   
  
 gabapentin 300 mg capsule Commonly known as:  NEURONTIN Your last dose was: Your next dose is: Take 1 Cap by mouth three (3) times daily. 12/22/16, QTY#90, 30 Days, 2 Refills. Dr. Michelle Tran 1 Cap  
    
   
   
   
  
 lisinopril-hydroCHLOROthiazide 20-25 mg per tablet Commonly known as:  Ronnie Crow Your last dose was: Your next dose is: Take 1 Tab by mouth daily. 1 Tab  
    
   
   
   
  
 meloxicam 7.5 mg tablet Commonly known as:  MOBIC Your last dose was: Your next dose is: Take 1 Tab by mouth daily. 7.5 mg  
    
   
   
   
  
 metoprolol tartrate 25 mg tablet Commonly known as:  LOPRESSOR Your last dose was: Your next dose is: Take 25 mg by mouth two (2) times a day. 25 mg  
    
   
   
   
  
 pantoprazole 40 mg tablet Commonly known as:  PROTONIX Your last dose was: Your next dose is: Take 1 Tab by mouth Daily (before breakfast). 40 mg  
    
   
   
   
  
 trimethoprim-sulfamethoxazole 160-800 mg per tablet Commonly known as:  BACTRIM DS, SEPTRA DS Your last dose was: Your next dose is: Take 1 Tab by mouth two (2) times a day for 7 days. 1 Tab VITAMIN D2 50,000 unit capsule Generic drug:  ergocalciferol Your last dose was: Your next dose is: Take 50,000 Units by mouth daily. 82900 Units Where to Get Your Medications Information on where to get these meds will be given to you by the nurse or doctor. ! Ask your nurse or doctor about these medications  
  trimethoprim-sulfamethoxazole 160-800 mg per tablet Discharge Instructions DISCHARGE SUMMARY from Nurse PATIENT INSTRUCTIONS: 
 
 
F-face looks uneven A-arms unable to move or move unevenly S-speech slurred or non-existent T-time-call 911 as soon as signs and symptoms begin-DO NOT go Back to bed or wait to see if you get better-TIME IS BRAIN. Warning Signs of HEART ATTACK Call 911 if you have these symptoms: 
? Chest discomfort. Most heart attacks involve discomfort in the center of the chest that lasts more than a few minutes, or that goes away and comes back. It can feel like uncomfortable pressure, squeezing, fullness, or pain. ? Discomfort in other areas of the upper body. Symptoms can include pain or discomfort in one or both arms, the back, neck, jaw, or stomach. ? Shortness of breath with or without chest discomfort. ? Other signs may include breaking out in a cold sweat, nausea, or lightheadedness. Don't wait more than five minutes to call [address]! Fast action can save your life. Calling 911 is almost always the fastest way to get lifesaving treatment. Emergency Medical Services staff can begin treatment when they arrive  up to an hour sooner than if someone gets to the hospital by car. The discharge information has been reviewed with the patient. The patient verbalized understanding. Discharge medications reviewed with the patient and appropriate educational materials and side effects teaching were provided. ___________________________________________________________________________________________________________________________________ High Blood Pressure: Care Instructions Your Care Instructions If your blood pressure is usually above 140/90, you have high blood pressure, or hypertension.  That means the top number is 140 or higher or the bottom number is 90 or higher, or both. Despite what a lot of people think, high blood pressure usually doesn't cause headaches or make you feel dizzy or lightheaded. It usually has no symptoms. But it does increase your risk for heart attack, stroke, and kidney or eye damage. The higher your blood pressure, the more your risk increases. Your doctor will give you a goal for your blood pressure. Your goal will be based on your health and your age. An example of a goal is to keep your blood pressure below 140/90. Lifestyle changes, such as eating healthy and being active, are always important to help lower blood pressure. You might also take medicine to reach your blood pressure goal. 
Follow-up care is a key part of your treatment and safety. Be sure to make and go to all appointments, and call your doctor if you are having problems. It's also a good idea to know your test results and keep a list of the medicines you take. How can you care for yourself at home? Medical treatment · If you stop taking your medicine, your blood pressure will go back up. You may take one or more types of medicine to lower your blood pressure. Be safe with medicines. Take your medicine exactly as prescribed. Call your doctor if you think you are having a problem with your medicine. · Talk to your doctor before you start taking aspirin every day. Aspirin can help certain people lower their risk of a heart attack or stroke. But taking aspirin isn't right for everyone, because it can cause serious bleeding. · See your doctor regularly. You may need to see the doctor more often at first or until your blood pressure comes down. · If you are taking blood pressure medicine, talk to your doctor before you take decongestants or anti-inflammatory medicine, such as ibuprofen. Some of these medicines can raise blood pressure. · Learn how to check your blood pressure at home. Lifestyle changes · Stay at a healthy weight. This is especially important if you put on weight around the waist. Losing even 10 pounds can help you lower your blood pressure. · If your doctor recommends it, get more exercise. Walking is a good choice. Bit by bit, increase the amount you walk every day. Try for at least 30 minutes on most days of the week. You also may want to swim, bike, or do other activities. · Avoid or limit alcohol. Talk to your doctor about whether you can drink any alcohol. · Try to limit how much sodium you eat to less than 2,300 milligrams (mg) a day. Your doctor may ask you to try to eat less than 1,500 mg a day. · Eat plenty of fruits (such as bananas and oranges), vegetables, legumes, whole grains, and low-fat dairy products. · Lower the amount of saturated fat in your diet. Saturated fat is found in animal products such as milk, cheese, and meat. Limiting these foods may help you lose weight and also lower your risk for heart disease. · Do not smoke. Smoking increases your risk for heart attack and stroke. If you need help quitting, talk to your doctor about stop-smoking programs and medicines. These can increase your chances of quitting for good. When should you call for help? Call 911 anytime you think you may need emergency care. This may mean having symptoms that suggest that your blood pressure is causing a serious heart or blood vessel problem. Your blood pressure may be over 180/110. ? For example, call 911 if: 
? · You have symptoms of a heart attack. These may include: ¨ Chest pain or pressure, or a strange feeling in the chest. 
¨ Sweating. ¨ Shortness of breath. ¨ Nausea or vomiting. ¨ Pain, pressure, or a strange feeling in the back, neck, jaw, or upper belly or in one or both shoulders or arms. ¨ Lightheadedness or sudden weakness. ¨ A fast or irregular heartbeat. ? · You have symptoms of a stroke. These may include: ¨ Sudden numbness, tingling, weakness, or loss of movement in your face, arm, or leg, especially on only one side of your body. ¨ Sudden vision changes. ¨ Sudden trouble speaking. ¨ Sudden confusion or trouble understanding simple statements. ¨ Sudden problems with walking or balance. ¨ A sudden, severe headache that is different from past headaches. ? · You have severe back or belly pain. ?Do not wait until your blood pressure comes down on its own. Get help right away. ?Call your doctor now or seek immediate care if: 
? · Your blood pressure is much higher than normal (such as 180/110 or higher), but you don't have symptoms. ? · You think high blood pressure is causing symptoms, such as: ¨ Severe headache. ¨ Blurry vision. ? Watch closely for changes in your health, and be sure to contact your doctor if: 
? · Your blood pressure measures 140/90 or higher at least 2 times. That means the top number is 140 or higher or the bottom number is 90 or higher, or both. ? · You think you may be having side effects from your blood pressure medicine. ? · Your blood pressure is usually normal, but it goes above normal at least 2 times. Where can you learn more? Go to http://dorinda-jn.info/. Enter C979 in the search box to learn more about \"High Blood Pressure: Care Instructions. \" Current as of: September 21, 2016 Content Version: 11.4 © 7206-1007 [employer]. Care instructions adapted under license by [employer] (which disclaims liability or warranty for this information). If you have questions about a medical condition or this instruction, always ask your healthcare professional. [address] [address] any warranty or liability for your use of this information. Type 2 Diabetes: Care Instructions Your Care Instructions Type 2 diabetes is a disease that develops when the body's tissues cannot use insulin properly. Over time, the pancreas cannot make enough insulin. Insulin is a hormone that helps the body's cells use sugar (glucose) for energy. It also helps the body store extra sugar in muscle, fat, and liver cells. Without insulin, the sugar cannot get into the cells to do its work. It stays in the blood instead. This can cause high blood sugar levels. A person has diabetes when the blood sugar stays too high too much of the time. Over time, diabetes can lead to diseases of the heart, blood vessels, nerves, kidneys, and eyes. You may be able to control your blood sugar by losing weight, eating a healthy diet, and getting daily exercise. You may also have to take insulin or other diabetes medicine. Follow-up care is a key part of your treatment and safety. Be sure to make and go to all appointments. Call your doctor if you are having problems. It's also a good idea to know your test results and keep a list of the medicines you take. How can you care for yourself at home? · Keep your blood sugar at a target level (which you set with your doctor). ¨ Eat a good diet that spreads carbohydrate throughout the day. Carbohydrate-the body's main source of fuel-affects blood sugar more than any other nutrient. Carbohydrate is in fruits, vegetables, milk, and yogurt. It also is in breads, cereals, vegetables such as potatoes and corn, and sugary foods such as candy and cakes. ¨ Aim for 30 minutes of exercise on most, preferably all, days of the week. Walking is a good choice. You also may want to do other activities, such as running, swimming, cycling, or playing tennis or team sports. If your doctor says it's okay, do muscle-strengthening exercises at least 2 times a week. ¨ Take your medicines exactly as prescribed. Call your doctor if you think you are having a problem with your medicine. You will get more details on the specific medicines your doctor prescribes. · Check your blood sugar as often as your doctor recommends. It is important to keep track of any symptoms you have, such as low blood sugar. Also tell your doctor if you have any changes in your activities, diet, or insulin use. · Talk to your doctor before you start taking aspirin every day. Aspirin can help certain people lower their risk of a heart attack or stroke. But taking aspirin isn't right for everyone, because it can cause serious bleeding. · Do not smoke. If you need help quitting, talk to your doctor about stop-smoking programs and medicines. These can increase your chances of quitting for good. · Keep your cholesterol and blood pressure at normal levels. You may need to take one or more medicines to reach your goals. Take them exactly as directed. Do not stop or change a medicine without talking to your doctor first. 
When should you call for help? Call 911 anytime you think you may need emergency care. For example, call if: 
? · You passed out (lost consciousness), or you suddenly become very sleepy or confused. (You may have very low blood sugar.) ? Call your doctor now or seek immediate medical care if: 
? · Your blood sugar is 300 mg/dL or is higher than the level your doctor has set for you. ? · You have symptoms of low blood sugar, such as: ¨ Sweating. ¨ Feeling nervous, shaky, and weak. ¨ Extreme hunger and slight nausea. ¨ Dizziness and headache. ¨ Blurred vision. ¨ Confusion. ? Watch closely for changes in your health, and be sure to contact your doctor if: 
? · You often have problems controlling your blood sugar. ? · You have symptoms of long-term diabetes problems, such as: ¨ New vision changes. ¨ New pain, numbness, or tingling in your hands or feet. ¨ Skin problems. Where can you learn more? Go to http://dorinda-jn.info/. Enter C553 in the search box to learn more about \"Type 2 Diabetes: Care Instructions. \" Current as of: March 13, 2017 Content Version: 11.4 © 6615-6699 [employer]. Care instructions adapted under license by [employer] (which disclaims liability or warranty for this information). If you have questions about a medical condition or this instruction, always ask your healthcare professional. [address] any warranty or liability for your use of this information. Patient armband removed and shredded. [employer] Announcement We are excited to announce that we are making your provider's discharge notes available to you in [employer]. You will see these notes when they are completed and signed by the physician that discharged you from your recent hospital stay. If you have any questions or concerns about any information you see in [employer], please call the Health Information Department where you were seen or reach out to your Primary Care Provider for more information about your plan of care. Introducing [facility]! John Llanes introduces [employer] patient portal. Now you can access parts of your medical record, email your doctor's office, and request medication refills online. 1. In your internet browser, go to [email or web address] 2. Click on the First Time User? Click Here link in the Sign In box. You will see the New Member Sign Up page. 3. Enter your [employer] Access Code exactly as it appears below. You will not need to use this code after youve completed the sign-up process. If you do not sign up before the expiration date, you must request a new code. · [employer] Access Code: [email or web address] Expires: 2/6/2018  3:15 PM 
 
4. Enter the last four digits of your Social Security Number (xxxx) and Date of Birth (mm/dd/yyyy) as indicated and click Submit. You will be taken to the next sign-up page. 5. Create a [employer] ID.  This will be your [employer] login ID and cannot be changed, so think of one that is secure and easy to remember. 6. Create a [employer] password. You can change your password at any time. 7. Enter your Password Reset Question and Answer. This can be used at a later time if you forget your password. 8. Enter your e-mail address. You will receive e-mail notification when new information is available in [address] 9. Click Sign Up. You can now view and download portions of your medical record. 10. Click the Download Summary menu link to download a portable copy of your medical information. If you have questions, please visit the Frequently Asked Questions section of the ACT[employer] website. Remember, ACT[employer] is NOT to be used for urgent needs. For medical emergencies, dial 911. Now available from your iPhone and Android! Providers Seen During Your Hospitalization Provider Specialty Primary office phone Araceli Groves MD Emergency Medicine [phone] Katie Wang MD Emergency Medicine [phone] Abad Killian MD Emergency Medicine [phone] Lynne Rios MD Family [facility] [phone] Clau Green MD Internal Medicine [phone] Edith Hackberry[address] Internal Medicine [phone] Your Primary Care Physician (PCP) Primary Care Physician Office Phone Office Fax Ursula Ng [phone] You are allergic to the following No active allergies Recent Documentation OB Status Smoking Status Postmenopausal Never Smoker Emergency Contacts Name Discharge Info Relation Home Work Mobile AdaliWilly DISCHARGE CAREGIVER [3] Spouse [3] [phone] Patient Belongings The following personal items are in your possession at time of discharge: 
  Dental Appliances:  At bedside, Lowers, Uppers, With patient  Visual Aid: None      Home Medications: None   Jewelry: Ring, Watch, With patient Clothing: None, At bedside, Footwear, Pants, Shirt, Socks, Undergarments, With patient    Other Valuables: Leonard Gomez, With patient Discharge Instructions Attachments/References UTI (URINARY TRACT INFECTION): FEMALE (ENGLISH) Patient Handouts Urinary Tract Infection in Women: Care Instructions Your Care Instructions A urinary tract infection, or UTI, is a general term for an infection anywhere between the kidneys and the urethra (where urine comes out). Most UTIs are bladder infections. They often cause pain or burning when you urinate. UTIs are caused by bacteria and can be cured with antibiotics. Be sure to complete your treatment so that the infection goes away. Follow-up care is a key part of your treatment and safety. Be sure to make and go to all appointments, and call your doctor if you are having problems. It's also a good idea to know your test results and keep a list of the medicines you take. How can you care for yourself at home? · Take your antibiotics as directed. Do not stop taking them just because you feel better. You need to take the full course of antibiotics. · Drink extra water and other fluids for the next day or two. This may help wash out the bacteria that are causing the infection. (If you have kidney, heart, or liver disease and have to limit fluids, talk with your doctor before you increase your fluid intake.) · Avoid drinks that are carbonated or have caffeine. They can irritate the bladder. · Urinate often. Try to empty your bladder each time. · To relieve pain, take a hot bath or lay a heating pad set on low over your lower belly or genital area. Never go to sleep with a heating pad in place. To prevent UTIs · Drink plenty of water each day. This helps you urinate often, which clears bacteria from your system.  (If you have kidney, heart, or liver disease and have to limit fluids, talk with your doctor before you ____________________________________________________________ Date:  ____________________________________________________________

## 2017-11-06 NOTE — Clinical Note
Status[de-identified] Inpatient [101] Type of Bed: Remote Telemetry [29] Inpatient Hospitalization Certified Necessary for the Following Reasons: 3. Patient receiving treatment that can only be provided in an inpatient setting (further clarification in H&P documentation) Admitting Diagnosis: Encephalopathy [317020] Admitting Diagnosis: UTI (urinary tract infection) [315755] Admitting Diagnosis: [facility] [[id number]] Admitting Physician: Julio Damian Attending Physician: Julio Damian Estimated Length of Stay: 2 Midnights Discharge Plan[de-identified] Home with Office Follow-up

## 2017-11-06 NOTE — ED TRIAGE NOTES
hasnt been eating x 2-3 days. vomiting    Overall feel bad, weak    States she has been intermittently slurring her speech.

## 2017-11-06 NOTE — IP AVS SNAPSHOT
[address] [address] 
[phone] Patient: Isra Flores MRN: [id number] NZF:3/1/[birth year] My Medications TAKE these medications as instructed Instructions Each Dose to Equal  
 Morning Noon Evening Bedtime  
 cholecalciferol 1,000 unit tablet Commonly known as:  VITAMIN D3 Your last dose was: Your next dose is: Take 3,000 Units by mouth daily. 3000 Units FLEXERIL 10 mg tablet Generic drug:  cyclobenzaprine Your last dose was: Your next dose is: Take 10 mg by mouth three (3) times daily as needed. 10 mg  
    
   
   
   
  
 gabapentin 300 mg capsule Commonly known as:  NEURONTIN Your last dose was: Your next dose is: Take 1 Cap by mouth three (3) times daily. 12/22/16, QTY#90, 30 Days, 2 Refills. Dr. Christophe Orta 1 Cap  
    
   
   
   
  
 lisinopril-hydroCHLOROthiazide 20-25 mg per tablet Commonly known as:  Maggie Marie Your last dose was: Your next dose is: Take 1 Tab by mouth daily. 1 Tab  
    
   
   
   
  
 meloxicam 7.5 mg tablet Commonly known as:  MOBIC Your last dose was: Your next dose is: Take 1 Tab by mouth daily. 7.5 mg  
    
   
   
   
  
 metoprolol tartrate 25 mg tablet Commonly known as:  LOPRESSOR Your last dose was: Your next dose is: Take 25 mg by mouth two (2) times a day. 25 mg  
    
   
   
   
  
 pantoprazole 40 mg tablet Commonly known as:  PROTONIX Your last dose was: Your next dose is: Take 1 Tab by mouth Daily (before breakfast). 40 mg  
    
   
   
   
  
 trimethoprim-sulfamethoxazole 160-800 mg per tablet Commonly known as:  BACTRIM DS, SEPTRA DS Your last dose was: Your next dose is: Take 1 Tab by mouth two (2) times a day for 7 days. 1 Tab VITAMIN D2 50,000 unit capsule Generic drug:  ergocalciferol Your last dose was: Your next dose is: Take 50,000 Units by mouth daily. 72378 Units Where to Get Your Medications Information on where to get these meds will be given to you by the nurse or doctor. ! Ask your nurse or doctor about these medications  
  trimethoprim-sulfamethoxazole 160-800 mg per tablet

## 2017-11-06 NOTE — ED NOTES
Fatigued, intermittent slurred speech per family. I performed a brief evaluation, including history and physical, of the patient here in triage and I have determined that pt will need further treatment and evaluation from the main side ER physician. I have placed initial orders to help in expediting patients care. November 06, 2017 at 2:38 PM - Froylan Dillard MD        There were no vitals taken for this visit.

## 2017-11-07 LAB
ANION GAP SERPL CALC-SCNC: 9 MMOL/L (ref 3–18)
BASOPHILS # BLD: 0 K/UL (ref 0–0.06)
BASOPHILS NFR BLD: 0 % (ref 0–2)
BUN SERPL-MCNC: 34 MG/DL (ref 7–18)
BUN/CREAT SERPL: 26 (ref 12–20)
CALCIUM SERPL-MCNC: 8.2 MG/DL (ref 8.5–10.1)
CALCIUM SERPL-MCNC: 8.5 MG/DL (ref 8.5–10.1)
CHLORIDE SERPL-SCNC: 109 MMOL/L (ref 100–108)
CK SERPL-CCNC: 42 U/L (ref 26–192)
CO2 SERPL-SCNC: 26 MMOL/L (ref 21–32)
CREAT SERPL-MCNC: 1.32 MG/DL (ref 0.6–1.3)
DIFFERENTIAL METHOD BLD: ABNORMAL
EOSINOPHIL # BLD: 0.1 K/UL (ref 0–0.4)
EOSINOPHIL NFR BLD: 2 % (ref 0–5)
ERYTHROCYTE [DISTWIDTH] IN BLOOD BY AUTOMATED COUNT: 14.3 % (ref 11.6–14.5)
FOLATE SERPL-MCNC: >20 NG/ML (ref 3.1–17.5)
GLUCOSE BLD STRIP.AUTO-MCNC: 106 MG/DL (ref 70–110)
GLUCOSE BLD STRIP.AUTO-MCNC: 108 MG/DL (ref 70–110)
GLUCOSE BLD STRIP.AUTO-MCNC: 82 MG/DL (ref 70–110)
GLUCOSE BLD STRIP.AUTO-MCNC: 88 MG/DL (ref 70–110)
GLUCOSE BLD STRIP.AUTO-MCNC: 98 MG/DL (ref 70–110)
GLUCOSE SERPL-MCNC: 97 MG/DL (ref 74–99)
HCT VFR BLD AUTO: 32.5 % (ref 35–45)
HGB BLD-MCNC: 10.4 G/DL (ref 12–16)
LYMPHOCYTES # BLD: 1.7 K/UL (ref 0.9–3.6)
LYMPHOCYTES NFR BLD: 26 % (ref 21–52)
MAGNESIUM SERPL-MCNC: 2.2 MG/DL (ref 1.6–2.6)
MCH RBC QN AUTO: 28.7 PG (ref 24–34)
MCHC RBC AUTO-ENTMCNC: 32 G/DL (ref 31–37)
MCV RBC AUTO: 89.5 FL (ref 74–97)
MONOCYTES # BLD: 0.5 K/UL (ref 0.05–1.2)
MONOCYTES NFR BLD: 8 % (ref 3–10)
NEUTS SEG # BLD: 4.3 K/UL (ref 1.8–8)
NEUTS SEG NFR BLD: 64 % (ref 40–73)
PLATELET # BLD AUTO: 189 K/UL (ref 135–420)
PMV BLD AUTO: 10.3 FL (ref 9.2–11.8)
POTASSIUM SERPL-SCNC: 3.4 MMOL/L (ref 3.5–5.5)
RBC # BLD AUTO: 3.63 M/UL (ref 4.2–5.3)
SODIUM SERPL-SCNC: 144 MMOL/L (ref 136–145)
T4 FREE SERPL-MCNC: 1.1 NG/DL (ref 0.7–1.5)
TROPONIN I SERPL-MCNC: <0.02 NG/ML (ref 0–0.04)
TSH SERPL DL<=0.05 MIU/L-ACNC: 1.07 UIU/ML (ref 0.36–3.74)
VIT B12 SERPL-MCNC: 559 PG/ML (ref 211–911)
WBC # BLD AUTO: 6.7 K/UL (ref 4.6–13.2)

## 2017-11-07 PROCEDURE — 97116 GAIT TRAINING THERAPY: CPT

## 2017-11-07 PROCEDURE — 74011250636 HC RX REV CODE- 250/636: Performed by: FAMILY MEDICINE

## 2017-11-07 PROCEDURE — 74011250636 HC RX REV CODE- 250/636: Performed by: NURSE PRACTITIONER

## 2017-11-07 PROCEDURE — 84439 ASSAY OF FREE THYROXINE: CPT | Performed by: FAMILY MEDICINE

## 2017-11-07 PROCEDURE — 84443 ASSAY THYROID STIM HORMONE: CPT | Performed by: FAMILY MEDICINE

## 2017-11-07 PROCEDURE — 74011250636 HC RX REV CODE- 250/636: Performed by: EMERGENCY MEDICINE

## 2017-11-07 PROCEDURE — 85025 COMPLETE CBC W/AUTO DIFF WBC: CPT | Performed by: FAMILY MEDICINE

## 2017-11-07 PROCEDURE — 74011250637 HC RX REV CODE- 250/637: Performed by: FAMILY MEDICINE

## 2017-11-07 PROCEDURE — 65660000000 HC RM CCU STEPDOWN

## 2017-11-07 PROCEDURE — 74011000250 HC RX REV CODE- 250: Performed by: EMERGENCY MEDICINE

## 2017-11-07 PROCEDURE — 82962 GLUCOSE BLOOD TEST: CPT

## 2017-11-07 PROCEDURE — 77030020263 HC SOL INJ SOD CL0.9% LFCR 1000ML

## 2017-11-07 PROCEDURE — 82746 ASSAY OF FOLIC ACID SERUM: CPT | Performed by: FAMILY MEDICINE

## 2017-11-07 PROCEDURE — 83735 ASSAY OF MAGNESIUM: CPT | Performed by: FAMILY MEDICINE

## 2017-11-07 PROCEDURE — 80048 BASIC METABOLIC PNL TOTAL CA: CPT | Performed by: FAMILY MEDICINE

## 2017-11-07 PROCEDURE — 82550 ASSAY OF CK (CPK): CPT | Performed by: FAMILY MEDICINE

## 2017-11-07 PROCEDURE — 84484 ASSAY OF TROPONIN QUANT: CPT | Performed by: FAMILY MEDICINE

## 2017-11-07 PROCEDURE — 82310 ASSAY OF CALCIUM: CPT | Performed by: FAMILY MEDICINE

## 2017-11-07 PROCEDURE — 97162 PT EVAL MOD COMPLEX 30 MIN: CPT

## 2017-11-07 PROCEDURE — 82607 VITAMIN B-12: CPT | Performed by: FAMILY MEDICINE

## 2017-11-07 RX ADMIN — SODIUM CHLORIDE 100 ML/HR: 900 INJECTION, SOLUTION INTRAVENOUS at 14:04

## 2017-11-07 RX ADMIN — HEPARIN SODIUM 5000 UNITS: 5000 INJECTION, SOLUTION INTRAVENOUS; SUBCUTANEOUS at 19:34

## 2017-11-07 RX ADMIN — GABAPENTIN 300 MG: 100 CAPSULE ORAL at 19:25

## 2017-11-07 RX ADMIN — HEPARIN SODIUM 5000 UNITS: 5000 INJECTION, SOLUTION INTRAVENOUS; SUBCUTANEOUS at 00:58

## 2017-11-07 RX ADMIN — GABAPENTIN 300 MG: 100 CAPSULE ORAL at 23:38

## 2017-11-07 RX ADMIN — GABAPENTIN 300 MG: 100 CAPSULE ORAL at 10:04

## 2017-11-07 RX ADMIN — GABAPENTIN 300 MG: 100 CAPSULE ORAL at 00:57

## 2017-11-07 RX ADMIN — Medication 10 ML: at 23:39

## 2017-11-07 RX ADMIN — SODIUM CHLORIDE 125 ML/HR: 900 INJECTION, SOLUTION INTRAVENOUS at 23:34

## 2017-11-07 RX ADMIN — WATER 1 G: 1 INJECTION INTRAMUSCULAR; INTRAVENOUS; SUBCUTANEOUS at 19:29

## 2017-11-07 RX ADMIN — METOPROLOL TARTRATE 25 MG: 25 TABLET, FILM COATED ORAL at 19:26

## 2017-11-07 RX ADMIN — METOPROLOL TARTRATE 25 MG: 25 TABLET, FILM COATED ORAL at 10:04

## 2017-11-07 RX ADMIN — Medication 10 ML: at 00:00

## 2017-11-07 RX ADMIN — SODIUM CHLORIDE 125 ML/HR: 900 INJECTION, SOLUTION INTRAVENOUS at 23:44

## 2017-11-07 RX ADMIN — Medication 10 ML: at 19:32

## 2017-11-07 NOTE — ED NOTES
Patient report received from Ravin Santa RN. Assuming care of patient at this time.  Patient resting in bed in NAD at this time

## 2017-11-07 NOTE — ED NOTES
Spoke with PT at this time who was asking whether patient was going to floor. I notifed PT that patient is supposed to be going to floor but their are holds down in the ER. They stated they would wait and see patient on the floor.  I told PT to come see patient in ER because unsure when patient will get bed assigned

## 2017-11-07 NOTE — PROGRESS NOTES
Daily Progress Note: 11/7/2017 3:47 PM   Admit Date: 11/6/2017    Patient seen in follow up for multiple medical problems as listed below:  Patient Active Problem List   Diagnosis Code    CVA, old, hemiparesis ([facility] [address] I69.359    Type 2 diabetes mellitus ([facility] E11.9    Benign hypertension I10    Abnormal finding on thyroid function test R94.6    Acute on chronic renal insufficiency N28.9, N18.9    Anemia D64.9    Chronic osteomyelitis of ankle ([facility]) M86.679    Injury of foot S99.929A    Leukocytosis D72.829    Spinal stenosis of lumbar region M48.061    Microscopic hematuria R31.29    Rhabdomyolysis M62.82    Vitamin D deficiency E55.9    Stroke ([facility]) I63.9    Hypertension I10    DM (diabetes mellitus) ([facility]) E11.9    Normocytic anemia D64.9    CKD (chronic kidney disease) stage 3, GFR 30-59 ml/min N18.3    Elevated TSH R94.6    Iron (Fe) deficiency anemia D50.9    UTI (urinary tract infection) N39.0    Encephalopathy G93.40    LAURA (acute kidney injury) ([facility]) N17.9       Assesment     81 y.o. female who presents with malaise and generalized weakness today. Patient reports that she was having difficulty ambulating and weak in her legs. She denies any visual changes or change in speech or unilateral weakness. She also had suprapubic abdominal pain and emesis x 3. Negative for chest pain, unilateral weakness, back pain, fever, cough, sob. In the ED she was seen and evaluated by tele neurology. She had CT head was negative for acute abnormal finding. Her UA was positive for UTI . She was started on rocephin IV . She feels much better. Patient will be admitted for encephalopathy UTI. Mentation greatly improved 11/7.     Encephalopathy - improved ,2/2 UTI. CT head wnl. Continue IV rocephin and IVF . If not at baseline soon MRI brain.     UTI - Rocephin pending urine culture-NGTD      LAURA - cr 2.11->1.32  Hold Prinzide      HTN - Home med as appropraite     DM - diet controlled .  Check A1c     Neuropathy - gabapentin      Gait abnormality - 2/2 AMS , as above , recommend MRI if not ok by AM. PT eval     Chronic anemia - due to GI bleed. Admitted 17 on PPI    DVT Protocol Active: yes  Code Status:  DNR     Disposition: Home tomorrow likely    Subjective:     CC: Other    Interval History: mentation has greatly improved. Cr improving. ROS: 11 point ROS negative except for    Objective:     Visit Vitals    /58 (BP 1 Location: Left arm, BP Patient Position: At rest)    Pulse 74    Temp 97.6 °F (36.4 °C)    Resp 15    SpO2 100%       Temp (24hrs), Av.5 °F (36.4 °C), Min:97.4 °F (36.3 °C), Max:97.6 °F (36.4 °C)      No intake or output data in the 24 hours ending 17 1547    Gen: AOx3, NAD  HEENT:  DARIA, EOMI. Neck: No Bruits/JVD   Lungs:   CTAB. Good respiratory effort  Heart:   RR S1 S2 without M/R/G  Abdomen: ND,NT, BSX4,   Extremities:   No LE edema. No cyanosis.   Skin:  no jaundice/lesions      Data Review:     Meds/Labs/Tests reviewed    Current Shift:     Last three shifts:     Recent Labs      17   0555  17   1512   WBC  6.7  10.0   RBC  3.63*  4.04*   HGB  10.4*  11.7*   HCT  32.5*  36.1   PLT  189  248   GRANS  64  77*   LYMPH  26  15*   EOS  2  1       Recent Labs      17   0555  17   0001  17   1512   BUN  34*   --   44*   CREA  1.32*   --   2.11*   CA  8.2*  8.5  8.9   ALB   --    --   3.5   K  3.4*   --   3.6   NA  144   --   140   CL  109*   --   106   CO2  26   --   22   GLU  97   --   149*        Lab Results   Component Value Date/Time    Glucose 97 2017 05:55 AM    Glucose 149 2017 03:12 PM    Glucose 105 2017 04:50 AM    Glucose 125 2017 01:20 PM    Glucose 165 2017 11:47 PM          Care coordination with Nursing/Consultants/staff: 15  Prior history, labs, and charting reviewed: 15    Procedures/Imaging:  CT head    Total time spent with chart review, patient examination/education, discussion with staff on case,documentation and medication management / adjustment  :  30 Minutes      Dr Michael Gallegos  [phone]

## 2017-11-07 NOTE — ED NOTES
Patient ambulatory to the restroom with assistance from staff, using a cane, pt A&O x 4. Denies pain.

## 2017-11-07 NOTE — PROGRESS NOTES
Problem: Mobility Impaired (Adult and Pediatric)  Goal: *Acute Goals and Plan of Care (Insert Text)  Physical Therapy Goals  Initiated 11/7/2017 and to be accomplished within 7 day(s)  1. Patient will move from supine to sit and sit to supine , scoot up and down and roll side to side in bed with independence. 2.  Patient will transfer from bed to chair and chair to bed with independence using the least restrictive device. 3.  Patient will perform sit to stand with independence. 4.  Patient will ambulate with independence for 150 feet with the least restrictive device. 5.  Patient will ascend/descend 16 stairs with handrail(s) with independence. physical Therapy EVALUATION    Patient: Pamela Garcia (66 y.o. female)  Date: 11/7/2017  Primary Diagnosis: Encephalopathy  UTI (urinary tract infection)  LAURA (acute kidney injury) ([facility] [address]  Encephalopathy  UTI (urinary tract infection)  Precautions: Fall    ASSESSMENT :  Upon evaluation, pt presented with decreased bed mobility, transfers, gait, balance, stair negotiation, and general functional mobility. Pt educated on role and benefit of skilled PT services. Supervision for bed mobility and transfers. Pt had noted TTP on medial upper R calf; no warmth, redness, or edema noted. Strength assessed at 4/5 in BLE. Gait training of 40 ft with SPC in RUE and SBA. Pt demonstrated fair balance with widened BUCKY and slight circumduction d/t lack of knee flexion in BLE. No increase in pain with ambulation. Pt left in ER bed. Vitals HR 66, /56, O2 sat 96%. Nursing notified. Patient presents with deficits in:   Bed Mobility, Transfers, Gait, Strength, Balance and Stairs    Patient will benefit from skilled intervention to address the above impairments.   Patients rehabilitation potential is considered to be Good  Factors which may influence rehabilitation potential include:   []         None noted  []         Mental ability/status  [x]         Medical condition  [] Home/family situation and support systems  [x]         Safety awareness  [x]         Pain tolerance/management  []         Other:        PLAN :  Recommendations and Planned Interventions:  [x]           Bed Mobility Training             [x]    Neuromuscular Re-Education  [x]           Transfer Training                   []    Orthotic/Prosthetic Training  [x]           Gait Training                          []    Modalities  [x]           Therapeutic Exercises          []    Edema Management/Control  [x]           Therapeutic Activities            [x]    Patient and Family   Training/Education  []           Other (comment):    Frequency/Duration: Patient will be followed by physical therapy 3-5 times a week to address goals.   Discharge Recommendations: home/ home with Home Health  Further Equipment Recommendations for Discharge: straight cane and rolling walker     SUBJECTIVE:   Agreeable to PT.    OBJECTIVE DATA SUMMARY:     Past Medical History:   Diagnosis Date    CAD (coronary artery disease)     Chronic osteomyelitis of ankle ([facility] [address]     CKD (chronic kidney disease) stage 3, GFR 30-59 ml/min 06/24/2013    Worst noted (08/06/13) BUN/sCr: 54/2.49, GFR 20.     DM (diabetes mellitus) ([facility] [address]     Elevated TSH 06/25/2012    5.08     Hypertension     Leukocytosis     Lumbar spinal stenosis     Normocytic anemia     Rhabdomyolysis     Splenomegaly     Stroke ([facility] [address]     Vitamin D deficiency      Past Surgical History:   Procedure Laterality Date    COLONOSCOPY N/A 3/1/2017    COLONOSCOPY performed by Yannick Robertson MD at [address] HX CHOLECYSTECTOMY      HX FREE SKIN GRAFT      HX HYSTERECTOMY      HX OPEN REDUCTION INTERNAL FIXATION Left     Tx LLE Ankle Trimalleolar Fx by Dr. Magdaleno Goodpasture [facility])    HX ORTHOPAEDIC Left 06/11/2013    [address] Magdaleno Goodpasture - LLE Ankle Removal of Hardware     HX VEIN STRIPPING Bilateral 09/18/2013    Dr. Gela Dunaway 20 SQ CM<       Barriers to Learning/Limitations: none  Compensate with: visual, verbal, tactile, kinesthetic cues/model    G CODE:Mobility [id number] Current  CK= 40-59%   Goal  CH= 0%. The severity rating is based on the [employer] Balance Scale: 2+/5    Eval Complexity: History: HIGH Complexity :3+ comorbidities / personal factors will impact the outcome/ POC Exam:MEDIUM Complexity : 3 Standardized tests and measures addressing body structure, function, activity limitation and / or participation in recreation  Presentation: MEDIUM Complexity : Evolving with changing characteristics  Clinical Decision Making:Medium Complexity [employer] Balance Scale: 2+/5 Overall Complexity:MEDIUM    [employer] Sitting Balance Scale: 3/5  0: Pt performs 25% or less of sitting activity (Max assist) CN, 100% impaired. 1: Pt supports self with upper extremities but requires therapist assistance. Pt performs 25-50% of effort (Mod assist) CM, 80% to <100% impaired. 1+: Pt supports self with upper extremities but requires therapist assistance. Pt performs >50% effort. (Min assist). CL, 60% to <80% impaired. 2: Pt supports self independently with both upper extremities. CL, 60% to <80% impaired. 2+: Pt support self independently with 1 upper extremity. CK, 40% to <60% impaired. 3: Pt sits without upper extremity support for up to 30 seconds. CK, 40% to <60% impaired. 3+: Pt sits without upper extremity support for 30 seconds or greater. CJ, 20% to <40% impaired. 4: Pt moves and returns trunkal midpoint 1-2 inches in one plane. CJ, 20% to <40% impaired. 4+: Pt moves and returns trunkal midpoint 1-2 inches in multiple planes. CI, 1% to <20% impaired. 5: Pt moves and returns trunkal midpoint in all planes greater than 2 inches. CH, 0% impaired. [employer] Standing Balance Scale: 2+/5  0: Pt performs 25% or less of standing activity (Max assist) CN, 100% impaired.   1: Pt supports self with upper extremities but requires therapist assistance. Pt performs 25-50% of effort (Mod assist) CM, 80% to <100% impaired. 1+: Pt supports self with upper extremities but requires therapist assistance. Pt performs >50% effort. (Min assist). CL, 60% to <80% impaired. 2: Pt supports self independently with both upper extremities (walker, crutches, parallel bars). CL, 60% to <80% impaired. 2+: Pt support self independently with 1 upper extremity (cane, crutch, 1 parallel bar). CK, 40% to <60% impaired. 3: Pt stands without upper extremity support for up to 30 seconds. CK, 40% to <60% impaired. 3+: Pt stands without upper extremity support for 30 seconds or greater. CJ, 20% to <40% impaired. 4: Pt independently moves and returns center of gravity 1-2 inches in one plane. CJ, 20% to <40% impaired. 4+: Pt independently moves and returns center of gravity 1-2 inches in multiple planes. CI, 1% to <20% impaired. 5: Pt independently moves and returns center of gravity in all planes greater than 2 inches. CH, 0% impaired. Prior Level of Function/Home Situation:   Home Situation  Home Environment: Private residence  # Steps to Enter: 5  Rails to Enter: Yes  Hand Rails : Left  Wheelchair Ramp: No  One/Two Story Residence: Two story  # of Interior Steps: 16  Living Alone: No  Support Systems: Spouse/Significant Other/Partner  Patient Expects to be Discharged to[de-identified] Private residence  Current DME Used/Available at Home: maria g [employer]  Critical Behavior:  Neurologic State: Alert  Orientation Level: Appropriate for age  Cognition: Appropriate decision making; Appropriate safety awareness; Follows commands  Strength:    Strength:  Within functional limits (BLE 4+/5)  Range Of Motion:  AROM: Within functional limits  PROM: Within functional limits  Functional Mobility:  Bed Mobility:  Supine to Sit: Supervision  Sit to Supine: Supervision  Transfers:  Sit to Stand: Supervision  Stand to Sit: Supervision  Balance: Sitting: Intact; With support  Standing: Intact; With support  Standing - Static: Good  Standing - Dynamic : Fair  Ambulation/Gait Training:  Distance (ft): 40 Feet (ft)  Assistive Device: Cane, straight  Ambulation - Level of Assistance: Stand-by asssistance  Gait Description (WDL): Exceptions to WDL  Gait Abnormalities: Circumduction; Step to gait  Base of Support: Widened  Speed/Hailey: Slow  Step Length: Left shortened;Right shortened  Swing Pattern: Left symmetrical;Right symmetrical  Activity Tolerance:   Fair  Please refer to the flowsheet for vital signs taken during this treatment. After treatment:   []         Patient left in no apparent distress sitting up in chair  [x]         Patient left in no apparent distress in bed  [x]         Call bell left within reach  [x]         Nursing notified  []         Caregiver present  []         Bed alarm activated    COMMUNICATION/EDUCATION:   [x]         Fall prevention education was provided and the patient/caregiver indicated understanding. [x]         Patient/family have participated as able in goal setting and plan of care. [x]         Patient/family agree to work toward stated goals and plan of care. []         Patient understands intent and goals of therapy, but is neutral about his/her participation. []         Patient is unable to participate in goal setting and plan of care.     Thank you for this referral.  Dianelys Brand, SPT   Time Calculation: 12 mins

## 2017-11-07 NOTE — PROGRESS NOTES
Received patient from ED accompanied by RN. Patient is alert, awake. Patient denies any pain at this time. Bed is locked and in lowest position and call bell is within reach. Not in acute distress.

## 2017-11-07 NOTE — ED NOTES
Bedside shift change report given to Kaleb Stewart RN (oncoming nurse) by Finesse Leonard RN   (offgoing nurse). Report included the following information SBAR.

## 2017-11-07 NOTE — H&P
History and Physical    Patient: Clarisse Johnson               Sex: female          DOA: 11/6/2017       YOB: 1935      Age:  81 y.o.        LOS:  LOS: 0 days        Chief Complaint   Patient presents with    Other         HPI:     Clarisse Johnson is a 81 y.o. female who presents with malaise and generalized weakness today. Patient reports that she was having difficulty ambulating and weak in her legs. She denies any visual changes or change in speech or unilateral weakness. She also had suprapubic abdominal pain and emesis x 3. Negative for chest pain, unilateral weakness, back pain, fever, cough, sob. In the ED she was seen and evaluated by tele neurology. She had CT head was negative for acute abnormal finding. Her UA was positive for UTI . She was started on rocephin IV . She feels much better. Patient will be admitted for encephalopathy UTI. Per neurology recommend MRI in AM if AMS is not improved. Past Medical History:   Diagnosis Date    CAD (coronary artery disease)     Chronic osteomyelitis of ankle ([facility] [address]     CKD (chronic kidney disease) stage 3, GFR 30-59 ml/min 06/24/2013    Worst noted (08/06/13) BUN/sCr: 54/2.49, GFR 20.     [facility]di[facility] mellitus[facility]     Elevated TSH 06/25/2012    5.08     Hypertension     Leukocytosis     Lumbar spinal stenosis     Normocytic anemia     Rhabdomyolysis     Splenomegaly     Stroke ([facility] [address]     Vitamin D deficiency    .     Past Surgical History:   Procedure Laterality Date    COLONOSCOPY N/A 3/1/2017    COLONOSCOPY performed by Haylee Lee MD at [address] [place] [address] HX CHOLECYSTECTOMY      HX FREE SKIN GRAFT      HX HYSTERECTOMY      HX OPEN REDUCTION INTERNAL FIXATION Left     Tx LLE Ankle Trimalleolar Fx by Dr. Justin Palmer [facility])    HX ORTHOPAEDIC Left 06/11/2013    [facility] Dr. Justin Palmer - LLE Ankle Removal of Hardware     HX VEIN STRIPPING Bilateral 09/18/2013    Dr. Reji Lozano 20 SQ CM<         No current facility-administered medications on file prior to encounter. Current Outpatient Prescriptions on File Prior to Encounter   Medication Sig Dispense Refill    meloxicam (MOBIC) 7.5 mg tablet Take 1 Tab by mouth daily. 15 Tab 0    pantoprazole (PROTONIX) 40 mg tablet Take 1 Tab by mouth Daily (before breakfast). 30 Tab 0    gabapentin (NEURONTIN) 300 mg capsule Take 1 Cap by mouth three (3) times daily. 12/22/16, QTY#90, 30 Days, 2 Refills. Dr. Abi Landa  2    cholecalciferol (VITAMIN D3) 1,000 unit tablet Take 3,000 Units by mouth daily.  lisinopril-hydrochlorothiazide (PRINZIDE, ZESTORETIC) 20-25 mg per tablet Take 1 Tab by mouth daily.  metoprolol (LOPRESSOR) 25 mg tablet Take 25 mg by mouth two (2) times a day.  cyclobenzaprine (FLEXERIL) 10 mg tablet Take 10 mg by mouth three (3) times daily as needed.  ergocalciferol (VITAMIN D2) 50,000 unit capsule Take 50,000 Units by mouth daily. Social History     Social History    Marital status:      Spouse name: N/A    Number of children: N/A    Years of education: N/A     Occupational History    Not on file. Social History Main Topics    Smoking status: Never Smoker    Smokeless tobacco: Not on file    Alcohol use No    Drug use: No    Sexual activity: No     Other Topics Concern    Not on file     Social History Narrative       Prior to Admission Medications   Prescriptions Last Dose Informant Patient Reported? Taking? cholecalciferol (VITAMIN D3) 1,000 unit tablet   Yes No   Sig: Take 3,000 Units by mouth daily. cyclobenzaprine (FLEXERIL) 10 mg tablet   Yes No   Sig: Take 10 mg by mouth three (3) times daily as needed. ergocalciferol (VITAMIN D2) 50,000 unit capsule   Yes No   Sig: Take 50,000 Units by mouth daily. gabapentin (NEURONTIN) 300 mg capsule   Yes No   Sig: Take 1 Cap by mouth three (3) times daily. 12/22/16, QTY#90, 30 Days, 2 Refills.  Dr. Abi Landa lisinopril-hydrochlorothiazide (PRINZIDE, ZESTORETIC) 20-25 mg per tablet   Yes No   Sig: Take 1 Tab by mouth daily. meloxicam (MOBIC) 7.5 mg tablet   No No   Sig: Take 1 Tab by mouth daily. metoprolol (LOPRESSOR) 25 mg tablet   Yes No   Sig: Take 25 mg by mouth two (2) times a day. pantoprazole (PROTONIX) 40 mg tablet   No No   Sig: Take 1 Tab by mouth Daily (before breakfast). Facility-Administered Medications: None       Family History   Problem Relation Age of Onset    Diabetes Other     Hypertension Other        No Known Allergies  Review of Systems   Constitutional: Positive for malaise/fatigue. Negative for chills and fever. HENT: Negative. Eyes: Negative. Respiratory: Negative. Cardiovascular: Negative. Gastrointestinal: Positive for abdominal pain and vomiting. Negative for constipation and diarrhea. Genitourinary: Negative for dysuria and flank pain. Musculoskeletal: Negative. Skin: Negative. Neurological: Positive for weakness. Negative for speech change, focal weakness, seizures and loss of consciousness. Psychiatric/Behavioral: Negative. Physical Exam:       Visit Vitals    BP (!) 142/116    Pulse 80    Temp 98.6 °F (37 °C)    Resp 15    SpO2 100%       Physical Exam   Constitutional: She is oriented to person, place, and time. She appears well-developed and well-nourished. No distress. HENT:   Head: Normocephalic and atraumatic. Mouth/Throat: No oropharyngeal exudate. Eyes: Conjunctivae are normal. No scleral icterus. Neck: Neck supple. Cardiovascular: Normal rate, regular rhythm and normal heart sounds. No murmur heard. Pulmonary/Chest: Effort normal and breath sounds normal. No respiratory distress. She has no wheezes. She has no rales. Abdominal: Soft. Bowel sounds are normal. She exhibits no distension. There is tenderness. There is no rebound and no guarding. Musculoskeletal: She exhibits no edema.    Neurological: She is alert and oriented to person, place, and time. She has normal strength. No sensory deficit. Skin: Skin is warm. No rash noted. She is not diaphoretic. No erythema. No pallor. Psychiatric: She has a normal mood and affect. Ancillary Studies: All lab and imaging reviewed for the past 24 hours. Recent Results (from the past 24 hour(s))   EKG, 12 LEAD, INITIAL    Collection Time: 11/06/17  3:03 PM   Result Value Ref Range    Ventricular Rate 80 BPM    Atrial Rate 80 BPM    P-R Interval 164 ms    QRS Duration 76 ms    Q-T Interval 372 ms    QTC Calculation (Bezet) 429 ms    Calculated P Axis 63 degrees    Calculated R Axis 34 degrees    Calculated T Axis 104 degrees    Diagnosis       Normal sinus rhythm  Low voltage QRS  Septal infarct (cited on or before 21-JUN-2012)  Abnormal ECG  When compared with ECG of 25-FEB-2017 14:24,  Nonspecific T wave abnormality, worse in Lateral leads     CBC WITH AUTOMATED DIFF    Collection Time: 11/06/17  3:12 PM   Result Value Ref Range    WBC 10.0 4.6 - 13.2 K/uL    RBC 4.04 (L) 4.20 - 5.30 M/uL    HGB 11.7 (L) 12.0 - 16.0 g/dL    HCT 36.1 35.0 - 45.0 %    MCV 89.4 74.0 - 97.0 FL    MCH 29.0 24.0 - 34.0 PG    MCHC 32.4 31.0 - 37.0 g/dL    RDW 14.3 11.6 - 14.5 %    PLATELET 340 596 - 723 K/uL    MPV 10.7 9.2 - 11.8 FL    NEUTROPHILS 77 (H) 40 - 73 %    LYMPHOCYTES 15 (L) 21 - 52 %    MONOCYTES 7 3 - 10 %    EOSINOPHILS 1 0 - 5 %    BASOPHILS 0 0 - 2 %    ABS. NEUTROPHILS 7.6 1.8 - 8.0 K/UL    ABS. LYMPHOCYTES 1.5 0.9 - 3.6 K/UL    ABS. MONOCYTES 0.7 0.05 - 1.2 K/UL    ABS. EOSINOPHILS 0.1 0.0 - 0.4 K/UL    ABS.  BASOPHILS 0.0 0.0 - 0.06 K/UL    DF AUTOMATED     METABOLIC PANEL, BASIC    Collection Time: 11/06/17  3:12 PM   Result Value Ref Range    Sodium 140 136 - 145 mmol/L    Potassium 3.6 3.5 - 5.5 mmol/L    Chloride 106 100 - 108 mmol/L    CO2 22 21 - 32 mmol/L    Anion gap 12 3.0 - 18 mmol/L    Glucose 149 (H) 74 - 99 mg/dL    BUN 44 (H) 7.0 - 18 MG/DL    Creatinine 2.11 (H) 0.6 - 1.3 MG/DL    BUN/Creatinine ratio 21 (H) 12 - 20      GFR est AA 27 (L) >60 ml/min/1.73m2    GFR est non-AA 22 (L) >60 ml/min/1.73m2    Calcium 8.9 8.5 - 10.1 MG/DL   PTT    Collection Time: 11/06/17  3:12 PM   Result Value Ref Range    aPTT 27.0 23.0 - 36.4 SEC   PROTHROMBIN TIME + INR    Collection Time: 11/06/17  3:12 PM   Result Value Ref Range    Prothrombin time 14.1 11.5 - 15.2 sec    INR 1.1 0.8 - 1.2     HEPATIC FUNCTION PANEL    Collection Time: 11/06/17  3:12 PM   Result Value Ref Range    Protein, total 7.7 6.4 - 8.2 g/dL    Albumin 3.5 3.4 - 5.0 g/dL    Globulin 4.2 (H) 2.0 - 4.0 g/dL    A-G Ratio 0.8 0.8 - 1.7      Bilirubin, total 0.4 0.2 - 1.0 MG/DL    Bilirubin, direct 0.1 0.0 - 0.2 MG/DL    Alk.  phosphatase 99 45 - 117 U/L    AST (SGOT) 24 15 - 37 U/L    ALT (SGPT) 34 13 - 56 U/L   LIPASE    Collection Time: 11/06/17  3:12 PM   Result Value Ref Range    Lipase 192 73 - 393 U/L   CARDIAC PANEL,(CK, CKMB & TROPONIN)    Collection Time: 11/06/17  3:12 PM   Result Value Ref Range    CK 60 26 - 192 U/L    CK - MB 1.3 <3.6 ng/ml    CK-MB Index 2.2 0.0 - 4.0 %    Troponin-I, Qt. <0.02 0.0 - 0.045 NG/ML   URINALYSIS W/ RFLX MICROSCOPIC    Collection Time: 11/06/17  5:30 PM   Result Value Ref Range    Color YELLOW      Appearance CLOUDY      Specific gravity 1.021 1.005 - 1.030      pH (UA) 5.0 5.0 - 8.0      Protein TRACE (A) NEG mg/dL    Glucose NEGATIVE  NEG mg/dL    Ketone TRACE (A) NEG mg/dL    Bilirubin NEGATIVE  NEG      Blood MODERATE (A) NEG      Urobilinogen 0.2 0.2 - 1.0 EU/dL    Nitrites NEGATIVE  NEG      Leukocyte Esterase LARGE (A) NEG     URINE MICROSCOPIC ONLY    Collection Time: 11/06/17  5:30 PM   Result Value Ref Range    WBC TOO NUMEROUS TO COUNT 0 - 4 /hpf    RBC 3 to 6 0 - 5 /hpf    Epithelial cells FEW 0 - 5 /lpf    Bacteria 3+ (A) NEG /hpf       Assessment/Plan     Principal Problem:    Encephalopathy (11/6/2017)    Active Problems:    Type 2 diabetes mellitus ([facility] [address] (8/8/2013)      Benign hypertension (8/7/2013)      UTI (urinary tract infection) (11/6/2017)      LAURA (acute kidney injury) ([facility] [address] (11/6/2017)      Neuropathy    PLAN:    Encephalopathy - improved , more klikely 2/2 UTI. Continue IV rocephin and IVF . If not at baseline by AM will recommend MRI brain r/o Stroke    UTI - Rocephin pending urine culture     LAURA - cr 2.11 , hydrate patient and recheck BMP. Hold Prinzide     HTN - Home med as appropraite    DM - diet controlled .  Check A1c    Neuropathy - gabapentin     Gait abnormality - 2/2 AMS , as above , recommend MRI if not ok by AM. PT eval    DVT prophylaxis - heparin    Code status - DNR    Nenita Murphy MD  11/6/2017  8:07 PM

## 2017-11-08 ENCOUNTER — HOME HEALTH ADMISSION (OUTPATIENT)
Dept: HOME HEALTH SERVICES | Facility: HOME HEALTH | Age: 82
End: 2017-11-08

## 2017-11-08 ENCOUNTER — HOME CARE VISIT (OUTPATIENT)
Dept: HOME HEALTH SERVICES | Facility: HOME HEALTH | Age: 82
End: 2017-11-08

## 2017-11-08 VITALS
OXYGEN SATURATION: 98 % | HEART RATE: 76 BPM | DIASTOLIC BLOOD PRESSURE: 76 MMHG | SYSTOLIC BLOOD PRESSURE: 177 MMHG | TEMPERATURE: 98 F | RESPIRATION RATE: 17 BRPM

## 2017-11-08 LAB
ANION GAP SERPL CALC-SCNC: 7 MMOL/L (ref 3–18)
BACTERIA SPEC CULT: NORMAL
BUN SERPL-MCNC: 20 MG/DL (ref 7–18)
BUN/CREAT SERPL: 20 (ref 12–20)
CALCIUM SERPL-MCNC: 8.7 MG/DL (ref 8.5–10.1)
CHLORIDE SERPL-SCNC: 110 MMOL/L (ref 100–108)
CO2 SERPL-SCNC: 26 MMOL/L (ref 21–32)
CREAT SERPL-MCNC: 0.98 MG/DL (ref 0.6–1.3)
GLUCOSE BLD STRIP.AUTO-MCNC: 97 MG/DL (ref 70–110)
GLUCOSE BLD STRIP.AUTO-MCNC: 99 MG/DL (ref 70–110)
GLUCOSE SERPL-MCNC: 108 MG/DL (ref 74–99)
POTASSIUM SERPL-SCNC: 4.2 MMOL/L (ref 3.5–5.5)
SERVICE CMNT-IMP: NORMAL
SODIUM SERPL-SCNC: 143 MMOL/L (ref 136–145)

## 2017-11-08 PROCEDURE — 77030011255 HC DSG AQUACEL AG BMS -A

## 2017-11-08 PROCEDURE — 74011250637 HC RX REV CODE- 250/637: Performed by: FAMILY MEDICINE

## 2017-11-08 PROCEDURE — 36415 COLL VENOUS BLD VENIPUNCTURE: CPT | Performed by: INTERNAL MEDICINE

## 2017-11-08 PROCEDURE — 97530 THERAPEUTIC ACTIVITIES: CPT

## 2017-11-08 PROCEDURE — 77030011256 HC DRSG MEPILEX <16IN NO BORD MOLN -A

## 2017-11-08 PROCEDURE — 74011250636 HC RX REV CODE- 250/636: Performed by: INTERNAL MEDICINE

## 2017-11-08 PROCEDURE — 82962 GLUCOSE BLOOD TEST: CPT

## 2017-11-08 PROCEDURE — 80048 BASIC METABOLIC PNL TOTAL CA: CPT | Performed by: INTERNAL MEDICINE

## 2017-11-08 PROCEDURE — 74011250636 HC RX REV CODE- 250/636: Performed by: FAMILY MEDICINE

## 2017-11-08 RX ORDER — CEFDINIR 300 MG/1
300 CAPSULE ORAL 2 TIMES DAILY
Qty: 6 CAP | Refills: 0 | Status: SHIPPED | OUTPATIENT
Start: 2017-11-08 | End: 2017-11-08

## 2017-11-08 RX ORDER — HYDRALAZINE HYDROCHLORIDE 20 MG/ML
20 INJECTION INTRAMUSCULAR; INTRAVENOUS ONCE
Status: COMPLETED | OUTPATIENT
Start: 2017-11-08 | End: 2017-11-08

## 2017-11-08 RX ORDER — SULFAMETHOXAZOLE AND TRIMETHOPRIM 800; 160 MG/1; MG/1
1 TABLET ORAL 2 TIMES DAILY
Qty: 14 TAB | Refills: 0 | Status: SHIPPED | OUTPATIENT
Start: 2017-11-08 | End: 2017-11-16

## 2017-11-08 RX ADMIN — HEPARIN SODIUM 5000 UNITS: 5000 INJECTION, SOLUTION INTRAVENOUS; SUBCUTANEOUS at 03:00

## 2017-11-08 RX ADMIN — Medication 10 ML: at 06:24

## 2017-11-08 RX ADMIN — METOPROLOL TARTRATE 25 MG: 25 TABLET, FILM COATED ORAL at 10:44

## 2017-11-08 RX ADMIN — HEPARIN SODIUM 5000 UNITS: 5000 INJECTION, SOLUTION INTRAVENOUS; SUBCUTANEOUS at 10:46

## 2017-11-08 RX ADMIN — HYDRALAZINE HYDROCHLORIDE 20 MG: 20 INJECTION INTRAMUSCULAR; INTRAVENOUS at 14:20

## 2017-11-08 NOTE — PROGRESS NOTES
Problem: Mobility Impaired (Adult and Pediatric)  Goal: *Acute Goals and Plan of Care (Insert Text)  Physical Therapy Goals  Initiated 11/7/2017 and to be accomplished within 7 day(s)  1. Patient will move from supine to sit and sit to supine , scoot up and down and roll side to side in bed with independence. 2.  Patient will transfer from bed to chair and chair to bed with independence using the least restrictive device. 3.  Patient will perform sit to stand with independence. 4.  Patient will ambulate with independence for 150 feet with the least restrictive device. 5.  Patient will ascend/descend 16 stairs with handrail(s) with independence. Outcome: Progressing Towards Goal  physical Therapy TREATMENT    Patient: Talat Villegas (50 y.o. female)  Date: 11/8/2017  Diagnosis: Encephalopathy  UTI (urinary tract infection)  LAURA (acute kidney injury) ([facility] [address]  Encephalopathy  UTI (urinary tract infection) Encephalopathy  Precautions: Fall   Chart, physical therapy assessment, plan of care and goals were reviewed. ASSESSMENT:  Nurse reports pt BP elevated this a.m. And had been given BP meds. Pt sat up at EOB without assistance, good sitting balance without UE support. No reports of dizziness or lightheadedness. BP sitting /75, nurse asks that we defer PT at this time. Pt able to scoot up to [facility] without assistance and returned to supine. Nurse to notify Dr about pt BP. Education: n/a  Progression toward goals:  []      Improving appropriately and progressing toward goals  [x]      Improving slowly and progressing toward goals  []      Not making progress toward goals and plan of care will be adjusted     PLAN:  Patient continues to benefit from skilled intervention to address the above impairments. Continue treatment per established plan of care.   Discharge Recommendations:  None  Further Equipment Recommendations for Discharge:  N/A     SUBJECTIVE:   Patient stated I have been doing stairs for a long time, I am ok.     OBJECTIVE DATA SUMMARY:   Critical Behavior:  Neurologic State: Alert  Orientation Level: Oriented X4  Cognition: Appropriate decision making, Appropriate safety awareness, Appropriate for age attention/concentration, Follows commands    Functional Mobility Training:  Bed Mobility:  Supine to Sit: Supervision  Sit to Supine: Supervision  Balance:  Sitting: Intact  Pain:  Pre:0  Post:0  Pain Scale 1: Numeric (0 - 10)  Activity Tolerance:   Elevated BP  Please refer to the flowsheet for vital signs taken during this treatment.   After treatment:   [] Patient left in no apparent distress sitting up in chair  [x] Patient left in no apparent distress in bed  [x] Call bell left within reach  [x] Nursing notified  [] Caregiver present  [] Bed alarm activated      Janis Carlin PTA   Time Calculation: 14 mins

## 2017-11-08 NOTE — PROGRESS NOTES
Problem: Falls - Risk of  Goal: *Absence of Falls  Document Tomasa Fall Risk and appropriate interventions in the flowsheet.    Outcome: Progressing Towards Goal  Fall Risk Interventions:  Mobility Interventions: Patient to call before getting OOB, PT Consult for mobility concerns              Elimination Interventions: Call light in reach, Patient to call for help with toileting needs    History of Falls Interventions: Room close to nurse's station, Door open when patient unattended, Investigate reason for fall

## 2017-11-08 NOTE — DISCHARGE INSTRUCTIONS
DISCHARGE SUMMARY from Nurse    PATIENT INSTRUCTIONS:    After general anesthesia or intravenous sedation, for 24 hours or while taking prescription Narcotics:  · Limit your activities  · Do not drive and operate hazardous machinery  · Do not make important personal or business decisions  · Do  not drink alcoholic beverages  · If you have not urinated within 8 hours after discharge, please contact your surgeon on call. Report the following to your surgeon:  · Excessive pain, swelling, redness or odor of or around the surgical area  · Temperature over 100.5  · Nausea and vomiting lasting longer than 4 hours or if unable to take medications  · Any signs of decreased circulation or nerve impairment to extremity: change in color, persistent  numbness, tingling, coldness or increase pain  · Any questions    What to do at Home:  Recommended activity: as physician advised    If you experience any of the following symptoms listed under Warning Signs of a Heart Attack, Signs and Symptoms of a Stroke and \"When to Call for Help\" listed under discharge teaching please follow up with your primary care physician and/or call 911. *  Please give a list of your current medications to your Primary Care Provider. *  Please update this list whenever your medications are discontinued, doses are      changed, or new medications (including over-the-counter products) are added. *  Please carry medication information at all times in case of emergency situations. These are general instructions for a healthy lifestyle:    No smoking/ No tobacco products/ Avoid exposure to second hand smoke  Surgeon General's Warning:  Quitting smoking now greatly reduces serious risk to your health.     Obesity, smoking, and sedentary lifestyle greatly increases your risk for illness    A healthy diet, regular physical exercise & weight monitoring are important for maintaining a healthy lifestyle    You may be retaining fluid if you have a history of heart failure or if you experience any of the following symptoms:  Weight gain of 3 pounds or more overnight or 5 pounds in a week, increased swelling in our hands or feet or shortness of breath while lying flat in bed. Please call your doctor as soon as you notice any of these symptoms; do not wait until your next office visit. Recognize signs and symptoms of STROKE:    F-face looks uneven    A-arms unable to move or move unevenly    S-speech slurred or non-existent    T-time-call 911 as soon as signs and symptoms begin-DO NOT go       Back to bed or wait to see if you get better-TIME IS BRAIN. Warning Signs of HEART ATTACK     Call 911 if you have these symptoms:   Chest discomfort. Most heart attacks involve discomfort in the center of the chest that lasts more than a few minutes, or that goes away and comes back. It can feel like uncomfortable pressure, squeezing, fullness, or pain.  Discomfort in other areas of the upper body. Symptoms can include pain or discomfort in one or both arms, the back, neck, jaw, or stomach.  Shortness of breath with or without chest discomfort.  Other signs may include breaking out in a cold sweat, nausea, or lightheadedness. Don't wait more than five minutes to call 911 - MINUTES MATTER! Fast action can save your life. Calling 911 is almost always the fastest way to get lifesaving treatment. Emergency Medical Services staff can begin treatment when they arrive -- up to an hour sooner than if someone gets to the hospital by car. The discharge information has been reviewed with the patient. The patient verbalized understanding. Discharge medications reviewed with the patient and appropriate educational materials and side effects teaching were provided.   ___________________________________________________________________________________________________________________________________  High Blood Pressure: Care Instructions  Your Care Instructions    If your blood pressure is usually above 140/90, you have high blood pressure, or hypertension. That means the top number is 140 or higher or the bottom number is 90 or higher, or both. Despite what a lot of people think, high blood pressure usually doesn't cause headaches or make you feel dizzy or lightheaded. It usually has no symptoms. But it does increase your risk for heart attack, stroke, and kidney or eye damage. The higher your blood pressure, the more your risk increases. Your doctor will give you a goal for your blood pressure. Your goal will be based on your health and your age. An example of a goal is to keep your blood pressure below 140/90. Lifestyle changes, such as eating healthy and being active, are always important to help lower blood pressure. You might also take medicine to reach your blood pressure goal.  Follow-up care is a key part of your treatment and safety. Be sure to make and go to all appointments, and call your doctor if you are having problems. It's also a good idea to know your test results and keep a list of the medicines you take. How can you care for yourself at home? Medical treatment  · If you stop taking your medicine, your blood pressure will go back up. You may take one or more types of medicine to lower your blood pressure. Be safe with medicines. Take your medicine exactly as prescribed. Call your doctor if you think you are having a problem with your medicine. · Talk to your doctor before you start taking aspirin every day. Aspirin can help certain people lower their risk of a heart attack or stroke. But taking aspirin isn't right for everyone, because it can cause serious bleeding. · See your doctor regularly. You may need to see the doctor more often at first or until your blood pressure comes down. · If you are taking blood pressure medicine, talk to your doctor before you take decongestants or anti-inflammatory medicine, such as ibuprofen.  Some of these medicines can raise blood pressure. · Learn how to check your blood pressure at home. Lifestyle changes  · Stay at a healthy weight. This is especially important if you put on weight around the waist. Losing even 10 pounds can help you lower your blood pressure. · If your doctor recommends it, get more exercise. Walking is a good choice. Bit by bit, increase the amount you walk every day. Try for at least 30 minutes on most days of the week. You also may want to swim, bike, or do other activities. · Avoid or limit alcohol. Talk to your doctor about whether you can drink any alcohol. · Try to limit how much sodium you eat to less than 2,300 milligrams (mg) a day. Your doctor may ask you to try to eat less than 1,500 mg a day. · Eat plenty of fruits (such as bananas and oranges), vegetables, legumes, whole grains, and low-fat dairy products. · Lower the amount of saturated fat in your diet. Saturated fat is found in animal products such as milk, cheese, and meat. Limiting these foods may help you lose weight and also lower your risk for heart disease. · Do not smoke. Smoking increases your risk for heart attack and stroke. If you need help quitting, talk to your doctor about stop-smoking programs and medicines. These can increase your chances of quitting for good. When should you call for help? Call 911 anytime you think you may need emergency care. This may mean having symptoms that suggest that your blood pressure is causing a serious heart or blood vessel problem. Your blood pressure may be over 180/110. ? For example, call 911 if:  ? · You have symptoms of a heart attack. These may include:  ¨ Chest pain or pressure, or a strange feeling in the chest.  ¨ Sweating. ¨ Shortness of breath. ¨ Nausea or vomiting. ¨ Pain, pressure, or a strange feeling in the back, neck, jaw, or upper belly or in one or both shoulders or arms. ¨ Lightheadedness or sudden weakness. ¨ A fast or irregular heartbeat.    ? · You have symptoms of a stroke. These may include:  ¨ Sudden numbness, tingling, weakness, or loss of movement in your face, arm, or leg, especially on only one side of your body. ¨ Sudden vision changes. ¨ Sudden trouble speaking. ¨ Sudden confusion or trouble understanding simple statements. ¨ Sudden problems with walking or balance. ¨ A sudden, severe headache that is different from past headaches. ? · You have severe back or belly pain. ?Do not wait until your blood pressure comes down on its own. Get help right away. ?Call your doctor now or seek immediate care if:  ? · Your blood pressure is much higher than normal (such as 180/110 or higher), but you don't have symptoms. ? · You think high blood pressure is causing symptoms, such as:  ¨ Severe headache. ¨ Blurry vision. ? Watch closely for changes in your health, and be sure to contact your doctor if:  ? · Your blood pressure measures 140/90 or higher at least 2 times. That means the top number is 140 or higher or the bottom number is 90 or higher, or both. ? · You think you may be having side effects from your blood pressure medicine. ? · Your blood pressure is usually normal, but it goes above normal at least 2 times. Where can you learn more? Go to http://dorinda-jn.info/. Enter W654 in the search box to learn more about \"High Blood Pressure: Care Instructions. \"  Current as of: September 21, 2016  Content Version: 11.4  © 3390-9825 [employer]. Care instructions adapted under license by [employer] (which disclaims liability or warranty for this information). If you have questions about a medical condition or this instruction, always ask your healthcare professional. [place] [address] any warranty or liability for your use of this information.          Type 2 Diabetes: Care Instructions  Your Care Instructions    Type 2 diabetes is a disease that develops when the body's tissues cannot use insulin properly. Over time, the pancreas cannot make enough insulin. Insulin is a hormone that helps the body's cells use sugar (glucose) for energy. It also helps the body store extra sugar in muscle, fat, and liver cells. Without insulin, the sugar cannot get into the cells to do its work. It stays in the blood instead. This can cause high blood sugar levels. A person has diabetes when the blood sugar stays too high too much of the time. Over time, diabetes can lead to diseases of the heart, blood vessels, nerves, kidneys, and eyes. You may be able to control your blood sugar by losing weight, eating a healthy diet, and getting daily exercise. You may also have to take insulin or other diabetes medicine. Follow-up care is a key part of your treatment and safety. Be sure to make and go to all appointments. Call your doctor if you are having problems. It's also a good idea to know your test results and keep a list of the medicines you take. How can you care for yourself at home? · Keep your blood sugar at a target level (which you set with your doctor). ¨ Eat a good diet that spreads carbohydrate throughout the day. Carbohydrate-the body's main source of fuel-affects blood sugar more than any other nutrient. Carbohydrate is in fruits, vegetables, milk, and yogurt. It also is in breads, cereals, vegetables such as potatoes and corn, and sugary foods such as candy and cakes. ¨ Aim for 30 minutes of exercise on most, preferably all, days of the week. Walking is a good choice. You also may want to do other activities, such as running, swimming, cycling, or playing tennis or team sports. If your doctor says it's okay, do muscle-strengthening exercises at least 2 times a week. ¨ Take your medicines exactly as prescribed. Call your doctor if you think you are having a problem with your medicine. You will get more details on the specific medicines your doctor prescribes.   · Check your blood sugar as often as your doctor recommends. It is important to keep track of any symptoms you have, such as low blood sugar. Also tell your doctor if you have any changes in your activities, diet, or insulin use. · Talk to your doctor before you start taking aspirin every day. Aspirin can help certain people lower their risk of a heart attack or stroke. But taking aspirin isn't right for everyone, because it can cause serious bleeding. · Do not smoke. If you need help quitting, talk to your doctor about stop-smoking programs and medicines. These can increase your chances of quitting for good. · Keep your cholesterol and blood pressure at normal levels. You may need to take one or more medicines to reach your goals. Take them exactly as directed. Do not stop or change a medicine without talking to your doctor first.  When should you call for help? Call 911 anytime you think you may need emergency care. For example, call if:  ? · You passed out (lost consciousness), or you suddenly become very sleepy or confused. (You may have very low blood sugar.)   ? Call your doctor now or seek immediate medical care if:  ? · Your blood sugar is 300 mg/dL or is higher than the level your doctor has set for you. ? · You have symptoms of low blood sugar, such as:  ¨ Sweating. ¨ Feeling nervous, shaky, and weak. ¨ Extreme hunger and slight nausea. ¨ Dizziness and headache. ¨ Blurred vision. ¨ Confusion. ? Watch closely for changes in your health, and be sure to contact your doctor if:  ? · You often have problems controlling your blood sugar. ? · You have symptoms of long-term diabetes problems, such as:  ¨ New vision changes. ¨ New pain, numbness, or tingling in your hands or feet. ¨ Skin problems. Where can you learn more? Go to http://dorinda-jn.info/. Enter C553 in the search box to learn more about \"Type 2 Diabetes: Care Instructions. \"  Current as of: March 13, 2017  Content Version: 11.4  © 2024-4750 [employer]. Care instructions adapted under license by [employer] (which disclaims liability or warranty for this information). If you have questions about a medical condition or this instruction, always ask your healthcare professional. [place] [address] any warranty or liability for your use of this information. Patient armband removed and shredded.

## 2017-11-08 NOTE — PROGRESS NOTES
Pt is agreeable for a [facility] visit and verified the contact information on the face sheet is correct. Referral sent to intake via [employer] and called to the [facility] for f/u.

## 2017-11-08 NOTE — PROGRESS NOTES
1342:  Attempted a 2nd PT session and pt ambulating from the bathroom and adamantly refusing any PT. Pt discharging today.

## 2017-11-08 NOTE — PROGRESS NOTES
[facility]/[address]   Discharge Planning/ Assessment    Reasons for Intervention: Spoke with pt, lives with spouse, and a dtr, designates spouse for dcp and transport home. States she is independent with adls and amb.has a walker, cane and crutches. Demographics correct, pcp dr Aroldo Mackey. Plan home. Pt refuses hh.     High Risk Criteria  [x] Yes  []No   Physician Referral  [] Yes  [x]No        Date    Nursing Referral  [] Yes  [x]No        Date    Patient/Family Request  [] Yes  [x]No        Date       Resources:    Medicare  [x] Yes  []No   Medicaid  [] Yes  []No   No Resources  [] Yes  []No   Private Insurance  [x] Yes  []No    Name/Phone Number    Other  [] Yes  []No        (i.e. Workman's Comp)         Prior Services:    Prior Services  [] Yes  [x]No   Home Health  [] Yes  []No   [address] [place]  [] Yes  []No        Number of [address]  [] Yes  []No       Meals on Wheels  [] Yes  []No   Office on Aging  [] Yes  []No   Transportation Services  [] Yes  []No   Nursing Home  [] Yes  []No        Nursing Home Name    [email or web address]  [] Yes  []No        [address] Name    Other       Information Source:      Information obtained from  [x] Patient  [] Parent   [] [address]   [] Child  [] Spouse   [] Significant Other/Partner   [] Friend      [] EMS    [] Nursing Home Chart          [] Other:   Chart Review  [] Yes  []No     Family/Support System:    Patient lives with  [] Alone    [x] Spouse   [] Significant Other  [x] Children  [] Caretaker   [] Parent  [] Sibling     [] Other       Other Support System:    Is the patient responsible for care of others  [] Yes  [x]No   Information of person caring for patient on  discharge    Managers financial affairs independently  [] Yes  [x]No   If no, explain:      Status Prior to Admission:    Mental Status  [] Awake  [] Alert  [x] Oriented  [] Quiet/Calm [] Lethargic/Sedated   [] Disoriented  [] Restless/Anxious  [] Combative   Personal Care  [] Dependent  [x] Independent Personal Care  [] Requires Assistance   Meal Preparation Ability  [x] Independent   [] Standby Assistance   [] Minimal Assistance   [] Moderate Assistance  [] Maximum Assistance     [] Total Assistance   Chores  [x] Independent with Chores   [] N/A Nursing Home Resident   [] Requires Assistance   Bowel/Bladder  [x] Continent  [] Catheter  [] Incontinent  [] Ostomy Self-Care    [] Urine Diversion Self-Care  [] Maximum Assistance     [] Total Assistance   Number of Persons needed for assistance    DME at home  [] Alka Engel Sis  [x] Bethany Engel   [] Commode    [] Bathroom/Grab Bars  [] Hospital Bed  [] Nebulizer  [] Oxygen           [] Raised Toilet Seat  [] Shower Chair  [] Side Rails for Bed   [] Tub Transfer Bench   [x] Mingo Fuentes  [] Braden Canavan, Standard      [] Other:   Vendor      Treatment Presently Receiving:    Current Treatments  [] Chemotherapy  [] Dialysis  [] Insulin  [] IVAB [] IVF   [] O2  [] PCA   [] PT   [] RT   [] Tube Feedings   [] Wound Care     Psychosocial Evaluation:    Verbalized Knowledge of Disease Process  [x] Patient  []Family   Coping with Disease Process  [] Patient  []Family   Requires Further Counseling Coping with Disease Process  [] Patient  []Family     Identified Projected Needs:    Home Health Aid  [] Yes  []No   Transportation  [] Yes  []No   Education  [] Yes  []No        Specific Education     Financial Counseling  [] Yes  []No   Inability to Care for Self/Will Require 24 hour care  [] Yes  []No   Pain Management  [] Yes  []No   Home Infusion Therapy  [] Yes  []No   Oxygen Therapy  [] Yes  []No   DME  [] Yes  []No   Long Term Care Placement  [] Yes  []No   Rehab  [] Yes  []No   Physical Therapy  [x] Yes  []No   Needs Anticipated At This Time  [] Yes  []No     Intra-Hospital Referral:    [address] [facility]  [] Yes  [x]No     [] Yes  [x]No   Patient Representative  [] Yes  [x]No   Staff for Teaching Needs  [] Yes [x]No   Specialty Teaching Needs     Diabetic Educator  [] Yes  [x]No   Referral for Diabetic Educator Needed  [] Yes  [x]No  If Yes, place order for Nutritionist or Diabetic Consult     Tentative Discharge Plan:    Home with No Services  [] Yes  []No   Home with Home Health Follow-up  [x] Yes  []No        If Yes, specify type    [address]  [] Yes  []No        If Yes, specify type    Meals on Wheels  [] Yes  []No   Office of Aging  [] Yes  []No   NHP  [] Yes  []No   Return to the [facility]  [] Yes  []No   Rehab Therapy  [] Yes  []No   Acute Rehab  [] Yes  []No   Subacute Rehab  [] Yes  []No   Private Care  [] Yes  []No   Substance Abuse Referral  [] Yes  []No   Transportation  [] Yes  []No   Chore Service  [] Yes  []No   Inpatient Hospice  [] Yes  []No   OP RT  [] Yes  [] No   OP Hemo  [] Yes  [] No   OP PT  [] Yes  []No   Support Group  [] Yes  []No   Reach to Recovery  [] Yes  []No   OP Oncology Clinic  [] Yes  []No   Clinic Appointment  [] Yes  []No   DME  [] Yes  []No   Comments    Name of D/C Planner or  Given to Patient or Family Debora douglass rn cm   Phone Number [phone]        Extension    Date 11/8/17   Time    If you are discharged home, whom do you designate to participate in your discharge plan and receive any information needed?      Enter name of designee         Phone # of designee         Address of designee         Updated         Patient refused to designate any           individual

## 2017-11-08 NOTE — PROGRESS NOTES
0815: Bedside and Verbal shift change report given to Catherine Armendariz RN (oncoming nurse) by Zoe Landry RN (offgoing nurse). Report included the following information SBAR, Kardex and MAR.

## 2017-11-08 NOTE — PROGRESS NOTES
Problem: Discharge Planning  Goal: *Discharge to safe environment  Outcome: Progressing Towards Goal  Home with hh

## 2017-11-08 NOTE — PROGRESS NOTES
Patient has designated _________spouse_______________ to participate in his/her discharge plan and to receive any needed information.      Name: Knvg ramirez   Address:  Phone number:[phone]

## 2017-11-08 NOTE — PROGRESS NOTES
conducted an initial consultation and Spiritual Assessment for Pamela Garcia, who is a 81 y.o.,female. Patients Primary Language is: [place]. According to the patients EMR Mormonism Affiliation is: Mena. The reason the Patient came to the hospital is:   Patient Active Problem List    Diagnosis Date Noted    UTI (urinary tract infection) 11/06/2017    Encephalopathy 11/06/2017    LAURA (acute kidney injury) ([address] 11/06/2017    Iron (Fe) deficiency anemia 02/28/2017    Normocytic anemia     Stroke ([address]     Hypertension     DM (diabetes mellitus) ([address]     Type 2 diabetes mellitus ([address] 08/08/2013    CVA, old, hemiparesis ([address] 08/07/2013    Benign hypertension 08/07/2013    Acute on chronic renal insufficiency 07/11/2013    Leukocytosis 07/11/2013    Microscopic hematuria 07/11/2013    Rhabdomyolysis 07/11/2013    Injury of foot 07/10/2013    CKD (chronic kidney disease) stage 3, GFR 30-59 ml/min 06/24/2013    Abnormal finding on thyroid function test 06/11/2013    Anemia 06/11/2013    Chronic osteomyelitis of ankle ([address] 06/11/2013    Spinal stenosis of lumbar region 06/11/2013    Vitamin D deficiency 06/11/2013    Elevated TSH 06/25/2012        The  provided the following Interventions:  Initiated a relationship of care and support. Explored issues of win, spirituality and/or Advent needs while hospitalized. Listened empathically. Provided chaplaincy education. Provided information about Spiritual Care Services. Offered assurance of continued prayers on patient's behalf. Chart reviewed. The following outcomes were achieved:  Patient shared some information about their medical narrative and spiritual journey/beliefs. Patient processed feeling about current hospitalization. Patient expressed gratitude for the 's visit.     Assessment:  Patient did not indicate any spiritual or Advent issues which require Spiritual Care Services interventions at this time. Patient does not have any Alevism/cultural needs that will affect patients preferences in health care. Plan:  Chaplains will continue to follow and will provide pastoral care on an as needed or requested basis.  recommends bedside caregivers page  on duty if patient shows signs of acute spiritual or emotional distress.     [address], [facility] [address] [facility]

## 2017-11-08 NOTE — ROUTINE PROCESS
Bedside and Verbal shift change report given to Mely Rose RN (oshncoming nurse) by Te Cruz RN (offgoing nurse). Report included the folowing information SBAR, Kardex, Intake/Output, MAR and Recent Results.

## 2017-11-08 NOTE — WOUND CARE
Received IP WOUND CARE CONSULT per Dr. Delia Harada and reviewed EMR. Visited patient, patient resting in bed, no distress noted. Left ankle and lower extremity noted to have increased edema, redness, and warmth. Wound noted on lateral aspect of left ankle and one wound with 2 scabs noted on medial aspect of ankle. Cleansed, assessed, and dressed ankle. Please see details below. Assessed right ankle and lower extremity. Increased redness noted, no edema or increased warmth noted. Partial thickness abrasion noted to lateral aspect of right ankle. Cleansed, assessed, and dressed right ankle. Please see details below. Instructed patient on wound care and dressing changes. Verbalized understanding. Provided patient with WC information, encouraged patient to call and make appointments for outpatient follow up. Reported findings and wound care to [facility]. Wound Care team recommends evaluation and treatment for left lower extremity cellulitis. Reported findings and wound care to Dr. Delia Harada, suggested evaluation and treatment for  left lower extremity cellulitis. PLEASE SEE WOUND CARE ORDERS.                      11/08/17 1022   Wound Ankle Lateral;Right   Date First Assessed/Time First Assessed: 11/08/17 1000   POA: Yes  Wound Type: Abrasion  Location: Ankle  Orientation: Lateral;Right   DRESSING TYPE Open to air   Non-Pressure Injury Partial thickness (epider/derm)   Wound Length (cm) 0.7 cm   Wound Width (cm) 0.9 cm   Wound Depth (cm) 0.1   Wound Surface area (cm^3) 0.06 cm^2   Condition of Base Pink   Condition of Edges Open   Tissue Type Pink   Tissue Type Percent Pink 100   Drainage Amount  None   Wound Odor None   Periwound Skin Condition Intact   Cleansing and Cleansing Agents  Dermal wound cleanser   Dressing Type Applied Other (Comment)  (Mepilex Foam Border)   Procedure Tolerated Well   Wound Ankle Left;Lateral   Date First Assessed/Time First Assessed: 11/08/17 1000   POA: Yes  Wound Type: Diabetic Location: Ankle  Orientation: Left;Lateral   DRESSING TYPE Open to air   Non-Pressure Injury Full thickness (subcut/muscle)   Wound Length (cm) 2.2 cm   Wound Width (cm) 1.5 cm   Wound Depth (cm) 0.3   Wound Surface area (cm^3) 0.99 cm^2   Condition of Base Pink;Slough   Condition of Edges Open   Tissue Type Pink;Yellow   Tissue Type Percent Pink 70   Tissue Type Percent Yellow 30   Drainage Amount  Small    Drainage Color Serous   Wound Odor None   Periwound Skin Condition Erythema, blanchable;Edematous; Increased warmth   Cleansing and Cleansing Agents  Dermal wound cleanser   Dressing Type Applied Other (Comment)  (Aquacel AG, Mepilex Foam border)   Procedure Tolerated Well   Wound Ankle Left;Medial   Date First Assessed/Time First Assessed: 11/08/17 1000   POA: Yes  Wound Type: Diabetic  Location: Ankle  Orientation: Left;Medial   DRESSING TYPE Open to air   Non-Pressure Injury Full thickness (subcut/muscle)   Wound Length (cm) 2.2 cm   Wound Width (cm) 1.8 cm   Wound Depth (cm) 0.4   Wound Surface area (cm^3) 1.58 cm^2   Condition of Base Pink;Slough   Condition of Edges Open   Tissue Type Pink;Yellow   Tissue Type Percent Pink 50   Tissue Type Percent Yellow 50   Drainage Amount  Moderate   Drainage Color Serous   Wound Odor None   Periwound Skin Condition Edematous; Erythema, blanchable; Increased warmth   Cleansing and Cleansing Agents  Dermal wound cleanser   Dressing Type Applied Other (Comment)  (Aquacel AG, Mepilex Foam border)   Procedure Tolerated Well

## 2017-11-08 NOTE — PROGRESS NOTES
2000: Received report from Marcus Loaiza [facility] (offgoing nurse). Report included SBAR, Kardex, and MAR. Patient resting in bed, denies pain. Call light and possessions within reach. 2128: Performed complex assessment. Patient alert and orientated x4.  Wound found on patient's ankle bilateral     0245: Patient's orientation reassessed, patient alert and orientated x4

## 2017-11-08 NOTE — PROGRESS NOTES
Care Management Interventions  PCP Verified by CM:  Yes  Palliative Care Criteria Met (RRAT>21 & CHF Dx)?: No  Transition of Care Consult (CM Consult): Home Health [facility])  [address]: Yes  Physical Therapy Consult: Yes  Current Support Network: Lives with Spouse  Plan discussed with Pt/Family/Caregiver: Yes  Freedom of Choice Offered: Yes  Discharge Location  Discharge Placement: Home with home health

## 2017-11-08 NOTE — DISCHARGE SUMMARY
2 [facility]  Hospitalist Division    Discharge Summary      Patient: Keisha Crum MRN: [id number]  CSN: [id number]    YOB: 1935  Age: 81 y.o. Sex: female    DOA: 11/6/2017 LOS:  LOS: 2 days   Discharge Date: 11/08/17     PCP:  Nella Fuentes MD    Chief Complaint:    Chief Complaint   Patient presents with    Other     Encephalopathy    Admission Diagnosis:   Hospital Problems as of 11/8/2017  Date Reviewed: 3/1/2017          Codes Class Noted - Resolved POA    UTI (urinary tract infection) ICD-10-CM: N39.0  ICD-9-CM: 599.0  11/6/2017 - Present Unknown        * (Principal)Encephalopathy ICD-10-CM: G93.40  ICD-9-CM: 348.30  11/6/2017 - Present Unknown        LAURA (acute kidney injury) ([facility] ICD-10-CM: N17.9  ICD-9-CM: 584.9  11/6/2017 - Present Unknown        Type 2 diabetes mellitus ([facility] ICD-10-CM: E11.9  ICD-9-CM: 250.00  8/8/2013 - Present Yes        Benign hypertension ICD-10-CM: I10  ICD-9-CM: 401.1  8/7/2013 - Present Yes              Discharge Diagnoses:    Encephalopathy - improved ,2/2 UTI. CT head wnl. Continue IV rocephin and IVF . Resolved 11/7. At baseline 11/8      UTI - Rocephin pending urine culture-NGTD. Plan for TMP/SMX      LAURA - cr 2.11->1.32 ->0.98 . Hold Prinzide till discharge     L ankle cellulitis - wound care as OP. TMP/SMX to complete      HTN - Home med as appropraite      DM - diet controlled . A1c 5.9      Neuropathy - gabapentin       Gait abnormality - 2/2 AMS , as above. PT eval. Refused home Health      Chronic anemia - due to GI bleed. Admitted 2/25/17 on PPI    Hospital Course:   81 y.o. female who presents with malaise and generalized weakness today. Patient reports that she was having difficulty ambulating and weak in her legs. She denies any visual changes or change in speech or unilateral weakness. She also had suprapubic abdominal pain and emesis x 3.  Negative for chest pain, unilateral weakness, back pain, fever, cough, sob. In the ED she was seen and evaluated by tele neurology. She had CT head was negative for acute abnormal finding. Her UA was positive for UTI . She was started on rocephin IV . She feels much better. Patient will be admitted for encephalopathy UTI. Mentation greatly improved 11/7. Cr down to 0.98 at time of discharge. She was hypertensive however this was thought due to holding her lisinopril/HCTZ due to the LAURA. Urine had no growth. She did have L ankle cellulitis after fall injury and wound care was consulted. She refused home services and would like to follow-up with wound care. Rx provided. Significant Diagnostic Studies:  CT head    Consults:  Treatment Team: Consulting Provider: Janelle Lord MD; Consulting Provider: Luba Ng MD; Care Manager: Mercedez Grace RN   Wound Care  Tele Neuro    Operative Procedures:  N/A    Disposition:  Home    Diet:  Regular/Diabetic    Discharge Condition:   Good    Discharge Medications:    Discharge Medication List as of 11/8/2017  3:15 PM      START taking these medications    Details   trimethoprim-sulfamethoxazole (BACTRIM DS, SEPTRA DS) 160-800 mg per tablet Take 1 Tab by mouth two (2) times a day for 7 days. , Print, Disp-14 Tab, R-0         CONTINUE these medications which have NOT CHANGED    Details   meloxicam (MOBIC) 7.5 mg tablet Take 1 Tab by mouth daily. , Print, Disp-15 Tab, R-0      pantoprazole (PROTONIX) 40 mg tablet Take 1 Tab by mouth Daily (before breakfast). , Print, Disp-30 Tab, R-0      gabapentin (NEURONTIN) 300 mg capsule Take 1 Cap by mouth three (3) times daily. 12/22/16, QTY#90, 30 Days, 2 Refills. Dr. Christophe Orta, Historical Med, R-2      cholecalciferol (VITAMIN D3) 1,000 unit tablet Take 3,000 Units by mouth daily. , Historical Med      lisinopril-hydrochlorothiazide (PRINZIDE, ZESTORETIC) 20-25 mg per tablet Take 1 Tab by mouth daily. Historical Med, 1 Tab      metoprolol (LOPRESSOR) 25 mg tablet Take 25 mg by mouth two (2) times a day. Historical Med, 25 mg      cyclobenzaprine (FLEXERIL) 10 mg tablet Take 10 mg by mouth three (3) times daily as needed. Historical Med, 10 mg      ergocalciferol (VITAMIN D2) 50,000 unit capsule Take 50,000 Units by mouth daily. Historical Med, 50,000 Units             Recent Results (from the past 24 hour(s))   GLUCOSE, POC    Collection Time: 11/07/17  4:56 PM   Result Value Ref Range    Glucose (POC) 88 70 - 110 mg/dL   GLUCOSE, POC    Collection Time: 11/07/17  9:32 PM   Result Value Ref Range    Glucose (POC) 98 70 - 110 mg/dL   GLUCOSE, POC    Collection Time: 11/08/17  9:05 AM   Result Value Ref Range    Glucose (POC) 97 70 - 752 mg/dL   METABOLIC PANEL, BASIC    Collection Time: 11/08/17 10:00 AM   Result Value Ref Range    Sodium 143 136 - 145 mmol/L    Potassium 4.2 3.5 - 5.5 mmol/L    Chloride 110 (H) 100 - 108 mmol/L    CO2 26 21 - 32 mmol/L    Anion gap 7 3.0 - 18 mmol/L    Glucose 108 (H) 74 - 99 mg/dL    BUN 20 (H) 7.0 - 18 MG/DL    Creatinine 0.98 0.6 - 1.3 MG/DL    BUN/Creatinine ratio 20 12 - 20      GFR est AA >60 >60 ml/min/1.73m2    GFR est non-AA 54 (L) >60 ml/min/1.73m2    Calcium 8.7 8.5 - 10.1 MG/DL   GLUCOSE, POC    Collection Time: 11/08/17 11:17 AM   Result Value Ref Range    Glucose (POC) 99 70 - 110 mg/dL       Follow-Up And Discharge Instructions:    Follow-up Information     Follow up With Details Comments [facility] Jerrod Ramirez MD On 11/15/2017 [address]  [address] [place] [address] OP WOUND CARE Schedule an appointment as soon as possible for a visit in 1 week  [phone]            Wound Care/Supplies:   N/A      Dr Alba Knox        Time Spent:  35min    Cc: Marcell Mosquera MD

## 2017-11-08 NOTE — PROGRESS NOTES
Problem: Falls - Risk of  Goal: *Absence of Falls  Document Tomasa Fall Risk and appropriate interventions in the flowsheet. Outcome: Progressing Towards Goal  Fall Risk Interventions:  Mobility Interventions: Patient to call before getting OOB              Elimination Interventions: Call light in reach    History of Falls Interventions: Room close to nurse's station        Problem: Falls - Risk of  Goal: *Absence of Falls  Document Tomasa Fall Risk and appropriate interventions in the flowsheet.   Outcome: Progressing Towards Goal  Fall Risk Interventions:  Mobility Interventions: Patient to call before getting OOB              Elimination Interventions: Call light in reach    History of Falls Interventions: Room close to nurse's station

## 2017-11-16 ENCOUNTER — HOSPITAL ENCOUNTER (OUTPATIENT)
Dept: WOUND CARE | Age: 82
Discharge: HOME | End: 2017-11-16
Payer: MEDICARE

## 2017-11-16 VITALS
DIASTOLIC BLOOD PRESSURE: 63 MMHG | SYSTOLIC BLOOD PRESSURE: 160 MMHG | HEART RATE: 93 BPM | RESPIRATION RATE: 18 BRPM | TEMPERATURE: 98.3 F

## 2017-11-16 DIAGNOSIS — R09.89 WEAK PULSE: ICD-10-CM

## 2017-11-16 DIAGNOSIS — I83.023 VENOUS ULCER OF ANKLE, LEFT (HCC): ICD-10-CM

## 2017-11-16 DIAGNOSIS — L97.329 VENOUS ULCER OF ANKLE, LEFT (HCC): ICD-10-CM

## 2017-11-16 DIAGNOSIS — L97.322 SKIN ULCER OF LEFT ANKLE, WITH FAT LAYER EXPOSED (HCC): Primary | ICD-10-CM

## 2017-11-16 PROCEDURE — 77030011255 HC DSG AQUACEL AG BMS -A: Performed by: PODIATRIST

## 2017-11-16 PROCEDURE — 74011000250 HC RX REV CODE- 250

## 2017-11-16 PROCEDURE — 77030011256 HC DRSG MEPILEX <16IN NO BORD MOLN -A: Performed by: PODIATRIST

## 2017-11-16 PROCEDURE — 11042 DBRDMT SUBQ TIS 1ST 20SQCM/<: CPT

## 2017-11-16 RX ORDER — LIDOCAINE AND PRILOCAINE 25; 25 MG/G; MG/G
CREAM TOPICAL
Status: COMPLETED
Start: 2017-11-16 | End: 2017-11-16

## 2017-11-16 RX ADMIN — LIDOCAINE AND PRILOCAINE 10 G: 25; 25 CREAM TOPICAL at 10:50

## 2017-11-16 NOTE — WOUND CARE
Patient here for evaluation of bilateral lower leg/ankle ulcers. Vascular studies ordered and scheduled for Tuesday 11/21/17. Patient refused home care and states her  and daughter will do q 48 h dressing changes with Aquacel Ag and bordered foam.  Light tubigrip was applied to hold dressing in place. Supplies ordered for patients from [address] [place] [address]. She will follow up with Dr. Kizzy Jorge in 3 weeks. 11/16/17 1008   Wound Ankle Left;Medial;Other (comment)   Date First Assessed/Time First Assessed: 11/08/17 1000   POA: Yes  Wound Type: Diabetic  Location: Ankle  Orientation: (c) Left;Medial;Other (comment)   DRESSING STATUS Removed   DRESSING TYPE Foam  (Bordered Foam)   Non-Pressure Injury Partial thickness (epider/derm)   Wound Length (cm) 1.9 cm   Wound Width (cm) 1 cm   Wound Depth (cm) 0.1   Wound Surface area (cm^3) 0.19 cm^2   Condition of Base Granulation;Slough   Condition of Edges Open   Tissue Type Red;Yellow   Tissue Type Percent Red 50   Tissue Type Percent Yellow 50   Drainage Amount  Moderate   Drainage Color Serosanguinous   Wound Odor None   Periwound Skin Condition Edematous; Erythema, blanchable   Cleansing and Cleansing Agents  Dermal wound cleanser   Dressing Type Applied Other (Comment)  (Aquacel AG, Mepilex Border, tubigrip F)   Wound Ankle Right   Date First Assessed/Time First Assessed: 02/26/17 0100   POA: Yes  Wound Type: Abrasion  Location: Ankle  Wound Description (Optional): diffuse area of abrasion  Orientation: Right   DRESSING STATUS Removed   DRESSING TYPE Foam  (Bordered Foam)   Non-Pressure Injury Partial thickness (epider/derm)   Wound Length (cm) 0.7 cm   Wound Width (cm) 0.6 cm   Wound Depth (cm) 0.1   Wound Surface area (cm^3) 0.04 cm^2   Condition of Base Granulation;Slough   Condition of Edges Open   Tissue Type Yellow;Red   Tissue Type Percent Red 50   Tissue Type Percent Yellow 50   Drainage Amount  Scant   Drainage Color Serous   Wound Odor None Periwound Skin Condition Erythema, blanchable;Edematous   Cleansing and Cleansing Agents  Dermal wound cleanser   Dressing Type Applied Other (Comment)  (Aquacel AG, Mepilex Border, tubigrip F)   Wound Ankle Left;Medial;Distal   Date First Assessed: 11/16/17   POA: Yes  Wound Type: Vascular  Location: Ankle  Orientation: Left;Medial;Distal   DRESSING STATUS Removed   DRESSING TYPE Other (Comment)  (Aquacel Ag, bordered foam)   Non-Pressure Injury Partial thickness (epider/derm)   Wound Length (cm) 1.4 cm   Wound Width (cm) 0.7 cm   Wound Depth (cm) 0.2   Wound Surface area (cm^3) 0.2 cm^2   Condition of Base Slough   Tissue Type Yellow   Tissue Type Percent Yellow 100   Drainage Amount  Moderate   Drainage Color Serosanguinous   Wound Odor None   Periwound Skin Condition Erythema, blanchable   Cleansing and Cleansing Agents  Dermal wound cleanser   Dressing Type Applied Silver products  (Aquacel AG, Mepilex Border, tubigrip F)   Procedure Tolerated Well   Wound Ankle Left;Medial;Proximal   Date First Assessed: 11/16/17   POA: Yes  Wound Type: Vascular  Location: Ankle  Orientation: Left;Medial;Proximal   DRESSING STATUS Removed   DRESSING TYPE Other (Comment)  (Aquacel ag, bordered foam)   Non-Pressure Injury Partial thickness (epider/derm)   Wound Length (cm) 2.3 cm   Wound Width (cm) 1.9 cm   Wound Depth (cm) 0.2   Wound Surface area (cm^3) 0.87 cm^2   Condition of Base Granulation;Slough   Tissue Type Red;Yellow   Tissue Type Percent Red 5   Tissue Type Percent Yellow 95   Drainage Amount  Moderate   Drainage Color Serosanguinous   Wound Odor None   Periwound Skin Condition Erythema, blanchable;Edematous   Cleansing and Cleansing Agents  Dermal wound cleanser   Dressing Type Applied Other (Comment)  (Aquacel AG, Mepilex Border, tubigrip F)   Procedure Tolerated Well   Wound Toe Left   Date First Assessed: 11/16/17   POA: Yes  Wound Type: Vascular  Location: Toe  Wound Description (Optional): (c)   Orientation: Left   DRESSING TYPE Open to air   Non-Pressure Injury Partial thickness (epider/derm)   Wound Length (cm) 0.6 cm   Wound Width (cm) 1.4 cm   Wound Depth (cm) 0.1   Wound Surface area (cm^3) 0.08 cm^2   Condition of Base Other (comment)  (Hard, crusty, dried serous)   Tissue Type Yellow   Tissue Type Percent Yellow 100   Drainage Amount  None   Wound Odor None   Cleansing and Cleansing Agents  Dermal wound cleanser   Dressing Type Applied Open to air   Wound Toe Left   Date First Assessed: 11/16/17   POA: Yes  Wound Type: Vascular  Location: Toe  Wound Description (Optional): (c)   Orientation: Left   DRESSING STATUS Removed   DRESSING TYPE Open to air   Non-Pressure Injury Partial thickness (epider/derm)   Wound Length (cm) 0.3 cm   Wound Width (cm) 0.4 cm   Wound Depth (cm) 0.1   Wound Surface area (cm^3) 0.01 cm^2   Condition of Base Other (comment)  (Hard, dried serous drainage)   Tissue Type Yellow   Tissue Type Percent Yellow 100   Drainage Amount  None   Wound Odor None   Periwound Skin Condition Intact   Dressing Type Applied Open to air   Wound Ankle Left;Lateral   Date First Assessed/Time First Assessed: 11/16/17 1327   POA: Yes  Wound Type: Vascular  Location: Ankle  Orientation: Left;Lateral   DRESSING STATUS Removed   DRESSING TYPE Foam  (Bordered foam)   Wound Length (cm) 2.2 cm   Wound Width (cm) 1.6 cm   Wound Depth (cm) 0.2   Wound Surface area (cm^3) 0.7 cm^2   Condition of Base Granulation;Slough   Tissue Type Percent Pink 10   Tissue Type Percent Yellow 90   Drainage Amount  Moderate   Drainage Color Serosanguinous   Wound Odor None   Periwound Skin Condition Edematous   Cleansing and Cleansing Agents  Dermal wound cleanser   Dressing Type Applied Other (Comment)  (Aquacel AG, Mepilex Border, tubigrip F)   Procedure Tolerated Well

## 2017-11-16 NOTE — PROGRESS NOTES
Podiatry Consultation Note    Assessment:       Patient Active Problem List   Diagnosis Code    CVA, old, hemiparesis ([facility] I69.359    Type 2 diabetes mellitus ([facility] E11.9    Benign hypertension I10    Abnormal finding on thyroid function test R94.6    Acute on chronic renal insufficiency N28.9, N18.9    Anemia D64.9    Chronic osteomyelitis of ankle ([facility]) M86.679    Injury of foot S99.929A    Leukocytosis D72.829    Spinal stenosis of lumbar region M48.061    Microscopic hematuria R31.29    Rhabdomyolysis M62.82    Vitamin D deficiency E55.9    Stroke ([facility]) I63.9    Hypertension I10    DM (diabetes mellitus) ([facility]) E11.9    Normocytic anemia D64.9    CKD (chronic kidney disease) stage 3, GFR 30-59 ml/min N18.3    Elevated TSH R94.6    Iron (Fe) deficiency anemia D50.9    UTI (urinary tract infection) N39.0    Encephalopathy G93.40    LAURA (acute kidney injury) ([facility] N17.9    Skin ulcer of left ankle, with fat layer exposed ([facility] L97.322    Venous ulcer of ankle, left ([facility]) I83.023    Weak pulse R09.89       Plan:     Selective excisional debridement with LWC with Aquacel AG and bordered foam with light tubigrip. Will refer for JOLANTA c DBI studies. She is to complete her current antibx     Subjective:     I was asked to evaluate Victor M Camargo for wounds to the LLE and RLE after falling on her steps several weeks ago. She states she did use antibx ointment and dressings. She does have a h/o ORIF left ankle She  is a 81 y.o. female admitted on 11/16/2017 for open wound. No Known Allergies    Current Outpatient Prescriptions   Medication Sig    trimethoprim-sulfamethoxazole (BACTRIM DS, SEPTRA DS) 160-800 mg per tablet Take 1 Tab by mouth two (2) times a day for 7 days.  gabapentin (NEURONTIN) 300 mg capsule Take 1 Cap by mouth three (3) times daily. 12/22/16, QTY#90, 30 Days, 2 Refills.  Dr. Jeni Reyez cholecalciferol (VITAMIN D3) 1,000 unit tablet Take 3,000 Units by mouth daily.  lisinopril-hydrochlorothiazide (PRINZIDE, ZESTORETIC) 20-25 mg per tablet Take 1 Tab by mouth daily.  metoprolol (LOPRESSOR) 25 mg tablet Take 25 mg by mouth two (2) times a day.  cyclobenzaprine (FLEXERIL) 10 mg tablet Take 10 mg by mouth three (3) times daily as needed. No current facility-administered medications for this encounter. Past Medical History:   Diagnosis Date    CAD (coronary artery disease)     Chronic osteomyelitis of ankle ([facility]     CKD (chronic kidney disease) stage 3, GFR 30-59 ml/min 06/24/2013    Worst noted (08/06/13) BUN/sCr: 54/2.49, GFR 20.     DM (diabetes mellitus) ([facility])     Elevated TSH 06/25/2012    5.08     Hypertension     Leukocytosis     Lumbar spinal stenosis     Normocytic anemia     Rhabdomyolysis     Splenomegaly     Stroke ([facility]     Vitamin D deficiency      Past Surgical History:   Procedure Laterality Date    COLONOSCOPY N/A 3/1/2017    COLONOSCOPY performed by [facility]ckinley Rojas [facility]COPY    HX CHOLECYSTECTOMY      HX FREE SKIN GRAFT      HX HYSTERECTOMY      HX OPEN REDUCTION INTERNAL FIXATION Left     Tx LLE Ankle Trimalleolar Fx by Dr. Mitchell Avila [facility])    HX ORTHOPAEDIC Left 06/11/2013    [facility]. [facility] - [facility] Ankle Removal of Hardware     HX VEIN STRIPPING Bilateral 09/18/2013    Dr. Lisbeth Briceño 20 SQ CM<       Family History   Problem Relation Age of Onset    Diabetes Other     Hypertension Other      Social History     Social History    Marital status:      Spouse name: N/A    Number of children: N/A    Years of education: N/A     Occupational History    Not on file.      Social History Main Topics    Smoking status: Never Smoker    Smokeless tobacco: Not on file    Alcohol use No    Drug use: No    Sexual activity: No     Other Topics Concern    Not on file     Social History Narrative       Physical Exam:  GENERAL: alert, cooperative, no distress, appears stated age    REVIEW OF SYSTEMS:  General: denies chronic fatigue, weight loss, fever, anemia, bruising, depression, nervousness, panic attacks  HEENT: denies ringing in ears, ear infections, dizzy spells, poor vision, glaucoma, sinus trouble, hoarseness, eye infections  GI: denies diarrhea, gas, bloating, heartburn, regurgitation, difficulty swallowing, painful swallowing, nausea, vomiting, constipation, abdominal pain, decreased appetite, blood in stools, black stools, jaundice, dark urine  Lungs: denies pneumonia, asthma, cough, SOB, hemoptysis  Heart: denies chest pain, irregular heart beat, ankle swelling, HTN  Skin: denies rashes, hives, allergic reaction  Urinary: denies UTI, kidney stones, decreased urine force and flow, urination at night, blood in urine, painful urination  Bones and Joints: denies arthritis, rheumatism, back pain, gout, osteoporosis  Neurologic: denies stroke, seizures, headaches, numbness, tingling      Vitals:    11/16/17 1006   BP: 160/63   Pulse: 93   Resp: 18   Temp: 98.3 °F (36.8 °C)       OBJECTIVE:    Patient is alert, well developed, well nourished, cooperative, pleasant and in no apparent distress. Lower Extremity Exam:     VASCULAR EXAM:. Pedal pulses are non palpable 0/4 DP 0/4 PT right and left foot. Skin temperature is warm to warm right and left foot. Digital capillary fill time is 6 seconds right and left foot. There is edema of the right and left foot and ankle. NEUROLOGICAL EXAM:. Sensation Intact with 5.07g monofilament wire right and left foot. Deep tendon reflexes intact and symmetrical on the right and left foot. MUSCULOSKELETAL EXAM:. Muscle tone is normal.  Muscle strength of the flexor and extensor group inversion and eversion Bilateral. 4/5.        DERMATOLOGICAL EXAM:. Skin is of abnormal texture and turgor with atrophic skin changes noting hair growth, nail changes (thickening), Bilateral. There is a ulcer full thickness noted over the lateral and medial lower legs      No results found for this or any previous visit (from the past 24 hour(s)). Imaging: deferred        EXCISIONAL DEBRIDEMENT NOTE    Selective sharp instrument debridement of slough and devitilized tissue    After the benefits/risks/SE were discussed, the patient agreed to proceed. Time out was done:   * Patient was identified by name and date of birth   * Agreement on procedure being performed was verified   * Procedure site verified and marked as necessary   * Patient was positioned for comfort   * Consent was signed and verified. Site: Medial and lateral LLE (4) and lateral RLE    Instruments used:    [x]  Dermal curette  [] Blade        [] #15  [] #10  [] Forceps  [] Tissue nippers  [] sterile scissors  [] Other     Anesthesia:    [x]  EMLA 2.5% cream: applied to wound beds for approximately 15minutes. []   Lidocaine 2% Topical Gel      []  Lidocaine injectable 1% with epinephrine 1:100,000    []  Lidocaine injectable 1% without epinephrine    []  Other:     []  None         [] patient is insensate due to neuropathy         [] patient declines        [] allergy to anesthetic        [] tissue for debridement is either superficial, loosely adherent and/or necrotic and denervated     After satisfactory anesthesia achieved, the wound/s was/were sharply excisional debrided necrotic, devitalized and granulation tissue down to the sub Q layer, revealing a clean and viable wound bed. Post debridement measurement was2.0_x1.5_x_0.2_cm medial proximal LLE . Post debridement measurement was 2.1_x1.1x_0.2__cm  Lateral LLE    Post debridement measurement was 2.0_x_1.0__x_0.1__cm medial LLE    Post debridement measurement was 1.5_x0.7__x0.2___cm distal LLE  Bleeding: <5mL Resolved with light focal pressure. Wounds were cleaned and irrigated with saline.      Wound care applied:   []   Hydrogell   []  Hypergel   [x]   Hydrofiber/Aquacel  AG    []   Cadexomer Iodine (Iodosorb)   []  Silver Alginate    []   Medihoney:    []   Collagenase:Santyl   []  Calcium Alginate   []   Collagen:    []   Foam   []  Non-Adherent Contact Layer   []   Xeroform    []   Adaptec:   []   Hydrocolloid   []   Transparent Film    []   Promogran   []   Lisa   []   Promogran       []  Antibiotic ointment/cream  []   Wound VAC   []   Other (see below)     Other:     []   Dry Gauze and Roll Gauze    []   Foam and Roll Gauze    []   Dry Gauze    []    Bordered gauze:     []   Secure with Tape    [x]   Other  Mepilex    [x]   Compression Wrap:          []   Unna Boot    []Multi-Layer    [x]Tubular Bandages       Ligia-Ulcer Care    []   Cream     []   Lotion     []   Ointment     []   Barrier     []   Other:       The patient tolerated the procedure well with no complications. The patient left the exam room in satisfactory and stable condition.          Vlad Ramirez DPM  [facility]  [phone] [place]  11/16/2017, 11:10 AM

## 2017-11-21 ENCOUNTER — HOSPITAL ENCOUNTER (OUTPATIENT)
Dept: VASCULAR SURGERY | Age: 82
Discharge: HOME | End: 2017-11-21
Attending: PODIATRIST
Payer: MEDICARE

## 2017-11-21 DIAGNOSIS — L97.329 VENOUS ULCER OF ANKLE, LEFT (HCC): ICD-10-CM

## 2017-11-21 DIAGNOSIS — L97.322 SKIN ULCER OF LEFT ANKLE, WITH FAT LAYER EXPOSED (HCC): ICD-10-CM

## 2017-11-21 DIAGNOSIS — R09.89 WEAK PULSE: ICD-10-CM

## 2017-11-21 DIAGNOSIS — I83.023 VENOUS ULCER OF ANKLE, LEFT (HCC): ICD-10-CM

## 2017-11-21 PROCEDURE — 93923 UPR/LXTR ART STDY 3+ LVLS: CPT

## 2017-11-21 NOTE — PROCEDURES
[facility]  *** FINAL REPORT ***    Name: Teresia Canavan  MRN: [id number]    Outpatient  : 1935  HIS Order #: [id number]  [id number] [facility] Visit #: [id number]  Date: 2017    TYPE OF TEST: Peripheral Arterial Testing    REASON FOR TEST  Extremity ulceration (both sides), Ulcer    Right Leg  Segmentals: Abnormal                     mmHg  Brachial         167  High thigh  Low thigh  Calf  Posterior tibial 128  Dorsalis pedis  Peroneal  Metatarsal  Toe pressure      82  Doppler:    Abnormal  Ankle/Brachial: 0.77    Site of occlusive disease:-  tibioperoneal segment    Left Leg  Segmentals: Abnormal                     mmHg  Brachial         158  High thigh  Low thigh  Calf  Posterior tibial  Dorsalis pedis   116  Peroneal  Metatarsal  Toe pressure      49  Doppler:    Abnormal  Ankle/Brachial: 0.69    Site of occlusive disease:-  femoral segment    INTERPRETATION/FINDINGS  Physiologic testing was performed using continuous wave Doppler and  segmental pressures. Technically difficult, limited examination due to multiple ulcers on  lower legs and patient pain. Only able to obtain distal pressures from   one site bilaterally, patient could not tolerate discomfort from  pressure. 1. Moderate peripheral arterial disease indicated at rest in the right   leg. 2. Disease is located in the tibio/peroneal segment on the right side. 3. Moderate peripheral arterial disease indicated at rest in the left  leg. 4. Disease is located in the ilio-femoral segment on the left side. 5. The right ankle/brachial index is 0.77 and the left ankle/brachial  index is 0.69. 6. The digital brachial index is abnormal measuring <.60 suggestive of   digital disease bilaterally. ADDITIONAL COMMENTS    I have personally reviewed the data relevant to the interpretation of  this  study. TECHNOLOGIST: Patty Rich ChatID  Signed: 2017 12:06 PM    PHYSICIAN: Clarke Munson MD  Signed: 2017 03:49 PM

## 2017-11-28 ENCOUNTER — HOSPITAL ENCOUNTER (OUTPATIENT)
Dept: MRI IMAGING | Age: 82
Discharge: HOME | End: 2017-11-28
Payer: MEDICARE

## 2017-11-28 DIAGNOSIS — I67.9 CEREBROVASCULAR DISEASE: ICD-10-CM

## 2017-11-28 PROCEDURE — 70551 MRI BRAIN STEM W/O DYE: CPT

## 2017-12-03 ENCOUNTER — APPOINTMENT (OUTPATIENT)
Dept: GENERAL RADIOLOGY | Age: 82
DRG: 689 | End: 2017-12-03
Attending: EMERGENCY MEDICINE
Payer: MEDICARE

## 2017-12-03 ENCOUNTER — APPOINTMENT (OUTPATIENT)
Dept: CT IMAGING | Age: 82
DRG: 689 | End: 2017-12-03
Attending: EMERGENCY MEDICINE
Payer: MEDICARE

## 2017-12-03 ENCOUNTER — HOSPITAL ENCOUNTER (INPATIENT)
Age: 82
LOS: 2 days | Discharge: HOME | DRG: 689 | End: 2017-12-05
Attending: EMERGENCY MEDICINE | Admitting: HOSPITALIST
Payer: MEDICARE

## 2017-12-03 DIAGNOSIS — N39.0 URINARY TRACT INFECTION WITHOUT HEMATURIA, SITE UNSPECIFIED: ICD-10-CM

## 2017-12-03 DIAGNOSIS — G93.40 ENCEPHALOPATHY: ICD-10-CM

## 2017-12-03 DIAGNOSIS — N17.9 AKI (ACUTE KIDNEY INJURY) (HCC): ICD-10-CM

## 2017-12-03 DIAGNOSIS — R55 SYNCOPE AND COLLAPSE: Primary | ICD-10-CM

## 2017-12-03 LAB
ALBUMIN SERPL-MCNC: 3.3 G/DL (ref 3.4–5)
ALBUMIN/GLOB SERPL: 0.9 {RATIO} (ref 0.8–1.7)
ALP SERPL-CCNC: 89 U/L (ref 45–117)
ALT SERPL-CCNC: 18 U/L (ref 13–56)
ANION GAP SERPL CALC-SCNC: 10 MMOL/L (ref 3–18)
APPEARANCE UR: ABNORMAL
AST SERPL-CCNC: 17 U/L (ref 15–37)
ATRIAL RATE: 76 BPM
BACTERIA URNS QL MICRO: ABNORMAL /HPF
BASOPHILS # BLD: 0 K/UL (ref 0–0.06)
BASOPHILS NFR BLD: 0 % (ref 0–2)
BILIRUB SERPL-MCNC: 0.4 MG/DL (ref 0.2–1)
BILIRUB UR QL: NEGATIVE
BUN SERPL-MCNC: 45 MG/DL (ref 7–18)
BUN/CREAT SERPL: 28 (ref 12–20)
CALCIUM SERPL-MCNC: 8.4 MG/DL (ref 8.5–10.1)
CALCULATED P AXIS, ECG09: 80 DEGREES
CALCULATED R AXIS, ECG10: 15 DEGREES
CALCULATED T AXIS, ECG11: 114 DEGREES
CHLORIDE SERPL-SCNC: 110 MMOL/L (ref 100–108)
CK MB CFR SERPL CALC: 2.5 % (ref 0–4)
CK MB SERPL-MCNC: 2.3 NG/ML (ref 5–25)
CK SERPL-CCNC: 91 U/L (ref 26–192)
CO2 SERPL-SCNC: 22 MMOL/L (ref 21–32)
COLOR UR: YELLOW
CREAT SERPL-MCNC: 1.6 MG/DL (ref 0.6–1.3)
DIAGNOSIS, 93000: NORMAL
DIFFERENTIAL METHOD BLD: ABNORMAL
EOSINOPHIL # BLD: 0.3 K/UL (ref 0–0.4)
EOSINOPHIL NFR BLD: 4 % (ref 0–5)
EPITH CASTS URNS QL MICRO: ABNORMAL /LPF (ref 0–5)
ERYTHROCYTE [DISTWIDTH] IN BLOOD BY AUTOMATED COUNT: 14.3 % (ref 11.6–14.5)
GLOBULIN SER CALC-MCNC: 3.5 G/DL (ref 2–4)
GLUCOSE SERPL-MCNC: 114 MG/DL (ref 74–99)
GLUCOSE UR STRIP.AUTO-MCNC: NEGATIVE MG/DL
HCT VFR BLD AUTO: 33.7 % (ref 35–45)
HGB BLD-MCNC: 10.6 G/DL (ref 12–16)
HGB UR QL STRIP: NEGATIVE
KETONES UR QL STRIP.AUTO: NEGATIVE MG/DL
LEUKOCYTE ESTERASE UR QL STRIP.AUTO: ABNORMAL
LYMPHOCYTES # BLD: 1.3 K/UL (ref 0.9–3.6)
LYMPHOCYTES NFR BLD: 18 % (ref 21–52)
MCH RBC QN AUTO: 28 PG (ref 24–34)
MCHC RBC AUTO-ENTMCNC: 31.5 G/DL (ref 31–37)
MCV RBC AUTO: 88.9 FL (ref 74–97)
MONOCYTES # BLD: 0.6 K/UL (ref 0.05–1.2)
MONOCYTES NFR BLD: 9 % (ref 3–10)
MUCOUS THREADS URNS QL MICRO: ABNORMAL /LPF
NEUTS SEG # BLD: 4.7 K/UL (ref 1.8–8)
NEUTS SEG NFR BLD: 69 % (ref 40–73)
NITRITE UR QL STRIP.AUTO: NEGATIVE
P-R INTERVAL, ECG05: 154 MS
PH UR STRIP: 5 [PH] (ref 5–8)
PLATELET # BLD AUTO: 190 K/UL (ref 135–420)
PMV BLD AUTO: 11.5 FL (ref 9.2–11.8)
POTASSIUM SERPL-SCNC: 3.6 MMOL/L (ref 3.5–5.5)
PROT SERPL-MCNC: 6.8 G/DL (ref 6.4–8.2)
PROT UR STRIP-MCNC: ABNORMAL MG/DL
Q-T INTERVAL, ECG07: 374 MS
QRS DURATION, ECG06: 72 MS
QTC CALCULATION (BEZET), ECG08: 420 MS
RBC # BLD AUTO: 3.79 M/UL (ref 4.2–5.3)
RBC #/AREA URNS HPF: ABNORMAL /HPF (ref 0–5)
SODIUM SERPL-SCNC: 142 MMOL/L (ref 136–145)
SP GR UR REFRACTOMETRY: 1.01 (ref 1–1.03)
TROPONIN I SERPL-MCNC: <0.02 NG/ML (ref 0–0.04)
UROBILINOGEN UR QL STRIP.AUTO: 0.2 EU/DL (ref 0.2–1)
VENTRICULAR RATE, ECG03: 76 BPM
WBC # BLD AUTO: 6.9 K/UL (ref 4.6–13.2)
WBC URNS QL MICRO: >100 /HPF (ref 0–4)

## 2017-12-03 PROCEDURE — 81001 URINALYSIS AUTO W/SCOPE: CPT

## 2017-12-03 PROCEDURE — 70450 CT HEAD/BRAIN W/O DYE: CPT

## 2017-12-03 PROCEDURE — 94761 N-INVAS EAR/PLS OXIMETRY MLT: CPT

## 2017-12-03 PROCEDURE — 36415 COLL VENOUS BLD VENIPUNCTURE: CPT | Performed by: HOSPITALIST

## 2017-12-03 PROCEDURE — 85025 COMPLETE CBC W/AUTO DIFF WBC: CPT

## 2017-12-03 PROCEDURE — 93005 ELECTROCARDIOGRAM TRACING: CPT

## 2017-12-03 PROCEDURE — 74011000250 HC RX REV CODE- 250: Performed by: EMERGENCY MEDICINE

## 2017-12-03 PROCEDURE — 74011250636 HC RX REV CODE- 250/636: Performed by: HOSPITALIST

## 2017-12-03 PROCEDURE — 96374 THER/PROPH/DIAG INJ IV PUSH: CPT

## 2017-12-03 PROCEDURE — 74011250637 HC RX REV CODE- 250/637: Performed by: HOSPITALIST

## 2017-12-03 PROCEDURE — 80053 COMPREHEN METABOLIC PANEL: CPT

## 2017-12-03 PROCEDURE — 77030020263 HC SOL INJ SOD CL0.9% LFCR 1000ML

## 2017-12-03 PROCEDURE — 65660000000 HC RM CCU STEPDOWN

## 2017-12-03 PROCEDURE — 74011250636 HC RX REV CODE- 250/636: Performed by: EMERGENCY MEDICINE

## 2017-12-03 PROCEDURE — 99285 EMERGENCY DEPT VISIT HI MDM: CPT

## 2017-12-03 PROCEDURE — 82550 ASSAY OF CK (CPK): CPT

## 2017-12-03 PROCEDURE — 71010 XR CHEST PORT: CPT

## 2017-12-03 RX ORDER — SODIUM CHLORIDE 9 MG/ML
75 INJECTION, SOLUTION INTRAVENOUS CONTINUOUS
Status: DISCONTINUED | OUTPATIENT
Start: 2017-12-03 | End: 2017-12-05

## 2017-12-03 RX ORDER — CEFTRIAXONE 1 G/1
1 INJECTION, POWDER, FOR SOLUTION INTRAMUSCULAR; INTRAVENOUS
Status: DISCONTINUED | OUTPATIENT
Start: 2017-12-03 | End: 2017-12-03 | Stop reason: DRUGHIGH

## 2017-12-03 RX ORDER — GABAPENTIN 300 MG/1
300 CAPSULE ORAL 3 TIMES DAILY
Status: DISCONTINUED | OUTPATIENT
Start: 2017-12-03 | End: 2017-12-05 | Stop reason: HOSPADM

## 2017-12-03 RX ORDER — LORAZEPAM 2 MG/ML
1 INJECTION INTRAMUSCULAR
Status: DISCONTINUED | OUTPATIENT
Start: 2017-12-03 | End: 2017-12-05 | Stop reason: HOSPADM

## 2017-12-03 RX ORDER — HALOPERIDOL 5 MG/ML
4 INJECTION INTRAMUSCULAR
Status: DISCONTINUED | OUTPATIENT
Start: 2017-12-03 | End: 2017-12-05 | Stop reason: HOSPADM

## 2017-12-03 RX ORDER — METOPROLOL TARTRATE 25 MG/1
25 TABLET, FILM COATED ORAL 2 TIMES DAILY
Status: DISCONTINUED | OUTPATIENT
Start: 2017-12-03 | End: 2017-12-04

## 2017-12-03 RX ORDER — HYDROCHLOROTHIAZIDE 25 MG/1
25 TABLET ORAL DAILY
Status: DISCONTINUED | OUTPATIENT
Start: 2017-12-03 | End: 2017-12-05 | Stop reason: HOSPADM

## 2017-12-03 RX ORDER — LISINOPRIL 20 MG/1
20 TABLET ORAL DAILY
Status: DISCONTINUED | OUTPATIENT
Start: 2017-12-03 | End: 2017-12-05 | Stop reason: HOSPADM

## 2017-12-03 RX ORDER — HEPARIN SODIUM 5000 [USP'U]/ML
5000 INJECTION, SOLUTION INTRAVENOUS; SUBCUTANEOUS EVERY 12 HOURS
Status: DISCONTINUED | OUTPATIENT
Start: 2017-12-03 | End: 2017-12-05 | Stop reason: HOSPADM

## 2017-12-03 RX ADMIN — WATER 1 G: 1 INJECTION INTRAMUSCULAR; INTRAVENOUS; SUBCUTANEOUS at 05:30

## 2017-12-03 RX ADMIN — LORAZEPAM 1 MG: 2 INJECTION INTRAMUSCULAR; INTRAVENOUS at 10:57

## 2017-12-03 RX ADMIN — SODIUM CHLORIDE 75 ML/HR: 900 INJECTION, SOLUTION INTRAVENOUS at 14:21

## 2017-12-03 RX ADMIN — GABAPENTIN 300 MG: 300 CAPSULE ORAL at 21:06

## 2017-12-03 RX ADMIN — HALOPERIDOL LACTATE 4 MG: 5 INJECTION, SOLUTION INTRAMUSCULAR at 09:01

## 2017-12-03 RX ADMIN — HEPARIN SODIUM 5000 UNITS: 5000 INJECTION, SOLUTION INTRAVENOUS; SUBCUTANEOUS at 15:46

## 2017-12-03 NOTE — ED NOTES
Assumed care of pt. Pt removing monitor and clothing, climbing out of stretcher, and attempting to walk around ER. Pt assisted back onto stretcher, hospital gown placed, monitor reconnected. Pt alert and oriented only to self. Charge nurse aware pt is a fall risk and should have sitter at bedside for pt safety.

## 2017-12-03 NOTE — ROUTINE PROCESS
1400- Assumed care. Pt in bed sleeping. No acute distress. 1430- Shift assessment completed. No respiratory distress noted. No complaints of pain. Alert to self. Drowsy. Call light in reach. Spouse at the bed side. 1545- Attempted to give due meds. Pt is still drowsy and unable to swallow pills at this time. Will continue to monitor. 1800- Attempted to give oral meds. Pt still drowsy. Unable to swallow pills at this time. Bedside and Verbal shift change report given to [address] (oncoming nurse) by Gualberto Shin RN   (offgoing nurse). Report included the following information SBAR, Kardex, Procedure Summary, Intake/Output, MAR, Recent Results and Med Rec Status.

## 2017-12-03 NOTE — ED NOTES
Pt pulled out IV in left hand, removed gown, and took off monitor. Bandage placed over hand and bleeding controlled. Gown placed back on pt and monitor placed back on pt.

## 2017-12-03 NOTE — H&P
[facility]  Hospitalist Division      History & Physical    Patient: Darby Zhou MRN: [id number]  [facility]: [id number]    YOB: 1935  Age: 81 y.o. Sex: female    DOA: 12/3/2017 LOS:  LOS: 0 days        DOA: 12/3/2017        Assessment/Plan     Active Problems:    Syncope (12/3/2017)        Plan:  1. Syncope & collapse - unclear etiology, Tele monitoring , Echo , cardiac enzymes, Carotid PVL   2. UTI - IV Rocephin   3. H/o HTN - continue home meds - monitor BP   4. ? Neuropathy - continue Neurontin   DVT Px - Heparin   Full code                 HPI:     Darby Zhou is a 81 y.o. female who is being admitted to the hosp 2y to syncope. She is a poor historian & is unable to provide any significant info -- currently sleepy , arousable , is able to answer a few questions. She has a hx of chronic kidney disease, diabetes, and CAD who presents to the ED via EMS from home after she was found on the ground by her daughter RENETTA. Pt's daughter reports that she was in the shower when she heard the patient fall on the porch. She rushed out and found the patient with her purse underneath her and her cane behind her laying on the porch. The pt is unsure how she fell, she does not remember the incident at all. Her daughter reports that they were seen a couple of weeks ago due to the pt developing slurred speech and that slurred speech has persisted since then. Other than that, the pt seems confused from baseline as her memory is usually pretty good, but she is unable to recall the events leading to her fall. . She currently lives at home with her daughter and her . She is currently being followed by the wound clinic for her legs, they have been steadily improving since she started being seen at the wound clinic. No further complaints at this time.   ER eval - CT head negative   Hospitalist service to admit pt for further eval of syncope       Past Medical History: Diagnosis Date    CAD (coronary artery disease)     [facility]     CKD (chronic kidney disease) stage 3, GFR 30-59 ml/min 06/24/2013    Worst noted (08/06/13) BUN/sCr: 54/2.49, GFR 20.     DM (diabetes mellitus) ([facility])     Elevated TSH 06/25/2012    5.08     Hypertension     Leukocytosis     Lumbar spinal stenosis     Normocytic anemia     Rhabdomyolysis     Splenomegaly     Stroke ([facility]     Vitamin D deficiency        Past Surgical History:   Procedure Laterality Date    COLONOSCOPY N/A 3/1/2017    [facility]COPY    HX CHOLECYSTECTOMY      HX FREE SKIN GRAFT      HX HYSTERECTOMY      HX OPEN REDUCTION INTERNAL FIXATION Left     Tx LLE Ankle Trimalleolar Fx by Dr. Mitchell Avila [facility])    HX ORTHOPAEDIC Left 06/11/2013    [address] - LLE Ankle Removal of Hardware     HX VEIN STRIPPING Bilateral 09/18/2013    Dr. Lisbeth Briceño 20 SQ CM<         Family History   Problem Relation Age of Onset    Diabetes Other     Hypertension Other        Social History     Social History    Marital status:      Spouse name: N/A    Number of children: N/A    Years of education: N/A     Social History Main Topics    Smoking status: Never Smoker    Smokeless tobacco: None    Alcohol use No    Drug use: No    Sexual activity: No     Other Topics Concern    None     Social History Narrative       Prior to Admission medications    Medication Sig Start Date End Date Taking? Authorizing Provider   gabapentin (NEURONTIN) 300 mg capsule Take 1 Cap by mouth three (3) times daily. 12/22/16, QTY#90, 30 Days, 2 Refills. Dr. Jessica Cade 12/22/16   Akash Yu MD   cholecalciferol (VITAMIN D3) 1,000 unit tablet Take 3,000 Units by mouth daily. Akash Yu MD   lisinopril-hydrochlorothiazide (PRINZIDE, ZESTORETIC) 20-25 mg per tablet Take 1 Tab by mouth daily.     Akash Yu MD metoprolol (LOPRESSOR) 25 mg tablet Take 25 mg by mouth two (2) times a day. Akash Yu MD   cyclobenzaprine (FLEXERIL) 10 mg tablet Take 10 mg by mouth three (3) times daily as needed. Akash Yu MD       No Known Allergies    Review of Systems  Review of systems not obtained due to patient factors. Physical Exam:      Visit Vitals    BP (!) 141/109    Pulse (!) 108    Temp 97.9 °F (36.6 °C)    Resp 21    Ht 5' 6\" (1.676 m)    Wt 78.5 kg (173 lb)    SpO2 100%    BMI 27.92 kg/m2       Physical Exam:    Gen: In general, this is a well nourished female in no acute distress  HEENT: Sclerae nonicteric. Oral mucous membranes moist. Dentition normal  Neck: Supple with midline trachea. CV: RRR without murmur or rub appreciated. Resp:Respirations are unlabored without use of accessory muscles. Lung fields bilaterally without wheezes or rhonchi. Abd: Soft, nontender, nondistended. Extrem: Extremities are warm, without cyanosis or clubbing. No pitting pretibial edema. Redness & wounds on both lower extremities   Skin: Warm, no visible rashes. Neuro: Patient is sleepy but arousable     Labs Reviewed:    Recent Results (from the past 24 hour(s))   CBC WITH AUTOMATED DIFF    Collection Time: 12/03/17  3:41 AM   Result Value Ref Range    WBC 6.9 4.6 - 13.2 K/uL    RBC 3.79 (L) 4.20 - 5.30 M/uL    HGB 10.6 (L) 12.0 - 16.0 g/dL    HCT 33.7 (L) 35.0 - 45.0 %    MCV 88.9 74.0 - 97.0 FL    MCH 28.0 24.0 - 34.0 PG    MCHC 31.5 31.0 - 37.0 g/dL    RDW 14.3 11.6 - 14.5 %    PLATELET 651 042 - 114 K/uL    MPV 11.5 9.2 - 11.8 FL    NEUTROPHILS 69 40 - 73 %    LYMPHOCYTES 18 (L) 21 - 52 %    MONOCYTES 9 3 - 10 %    EOSINOPHILS 4 0 - 5 %    BASOPHILS 0 0 - 2 %    ABS. NEUTROPHILS 4.7 1.8 - 8.0 K/UL    ABS. LYMPHOCYTES 1.3 0.9 - 3.6 K/UL    ABS. MONOCYTES 0.6 0.05 - 1.2 K/UL    ABS. EOSINOPHILS 0.3 0.0 - 0.4 K/UL    ABS.  BASOPHILS 0.0 0.0 - 0.06 K/UL    DF AUTOMATED     METABOLIC PANEL, COMPREHENSIVE Collection Time: 12/03/17  3:41 AM   Result Value Ref Range    Sodium 142 136 - 145 mmol/L    Potassium 3.6 3.5 - 5.5 mmol/L    Chloride 110 (H) 100 - 108 mmol/L    CO2 22 21 - 32 mmol/L    Anion gap 10 3.0 - 18 mmol/L    Glucose 114 (H) 74 - 99 mg/dL    BUN 45 (H) 7.0 - 18 MG/DL    Creatinine 1.60 (H) 0.6 - 1.3 MG/DL    BUN/Creatinine ratio 28 (H) 12 - 20      GFR est AA 37 (L) >60 ml/min/1.73m2    GFR est non-AA 31 (L) >60 ml/min/1.73m2    Calcium 8.4 (L) 8.5 - 10.1 MG/DL    Bilirubin, total 0.4 0.2 - 1.0 MG/DL    ALT (SGPT) 18 13 - 56 U/L    AST (SGOT) 17 15 - 37 U/L    Alk.  phosphatase 89 45 - 117 U/L    Protein, total 6.8 6.4 - 8.2 g/dL    Albumin 3.3 (L) 3.4 - 5.0 g/dL    Globulin 3.5 2.0 - 4.0 g/dL    A-G Ratio 0.9 0.8 - 1.7     CARDIAC PANEL,(CK, CKMB & TROPONIN)    Collection Time: 12/03/17  3:41 AM   Result Value Ref Range    CK 91 26 - 192 U/L    CK - MB 2.3 <3.6 ng/ml    CK-MB Index 2.5 0.0 - 4.0 %    Troponin-I, Qt. <0.02 0.0 - 0.045 NG/ML   EKG, 12 LEAD, INITIAL    Collection Time: 12/03/17  3:51 AM   Result Value Ref Range    Ventricular Rate 76 BPM    Atrial Rate 76 BPM    P-R Interval 154 ms    QRS Duration 72 ms    Q-T Interval 374 ms    QTC Calculation (Bezet) 420 ms    Calculated P Axis 80 degrees    Calculated R Axis 15 degrees    Calculated T Axis 114 degrees    Diagnosis       Sinus rhythm with premature atrial complexes  Low voltage QRS  Cannot rule out Anteroseptal infarct (cited on or before 21-JUN-2012)  T wave abnormality, consider lateral ischemia  Abnormal ECG  When compared with ECG of 06-NOV-2017 15:03,  premature atrial complexes are now present  Questionable change in initial forces of Anterior leads     URINALYSIS W/ RFLX MICROSCOPIC    Collection Time: 12/03/17  4:20 AM   Result Value Ref Range    Color YELLOW      Appearance CLOUDY      Specific gravity 1.014 1.005 - 1.030      pH (UA) 5.0 5.0 - 8.0      Protein TRACE (A) NEG mg/dL    Glucose NEGATIVE  NEG mg/dL    Ketone NEGATIVE  NEG mg/dL    Bilirubin NEGATIVE  NEG      Blood NEGATIVE  NEG      Urobilinogen 0.2 0.2 - 1.0 EU/dL    Nitrites NEGATIVE  NEG      Leukocyte Esterase LARGE (A) NEG     URINE MICROSCOPIC ONLY    Collection Time: 12/03/17  4:20 AM   Result Value Ref Range    WBC >100 (H) 0 - 4 /hpf    RBC NONE 0 - 5 /hpf    Epithelial cells 3+ 0 - 5 /lpf    Bacteria 1+ (A) NEG /hpf    Mucus FEW (A) NEG /lpf       Imaging Reviewed:    CT head - Negative - prelim report , final report pending           Devin Jin MD  12/3/2017, 6:39 AM

## 2017-12-03 NOTE — PROGRESS NOTES
Problem: Falls - Risk of  Goal: *Absence of Falls  Document Tomasa Fall Risk and appropriate interventions in the flowsheet. Outcome: Progressing Towards Goal  Fall Risk Interventions:  Mobility Interventions: Patient to call before getting OOB    Mentation Interventions: Adequate sleep, hydration, pain control    Medication Interventions: Patient to call before getting OOB    Elimination Interventions: Call light in reach    History of Falls Interventions:  Investigate reason for fall

## 2017-12-03 NOTE — IP AVS SNAPSHOT
Aleja Fuentes 
 
 
 [address] [place] [address] 
[phone] Patient: Sharon Montejo MRN: [id number] About your hospitalization You were admitted on:  December 3, 2017 You last received care in the:  [address] [place] [address] Road You were discharged on:  December 5, 2017 Why you were hospitalized Your primary diagnosis was:  Syncope Your diagnoses also included:  Ckd (Chronic Kidney Disease) Stage 3, Gfr 30-59 Ml/Min, Benign Hypertension, Uti (Urinary Tract Infection) Things You Need To Do (next 8 weeks) Thursday Dec 07, 2017 WOUND CARE FOLLOW UP with LIU Bills DPM at  9:00 AM  
Where:  [facility] OP WOUND CARE [facility]) Tuesday Dec 12, 2017 Follow up with Noralyn Cockayne, MD  
1pm   
  
Phone:  [phone] Where:  [facility] [address] Discharge Orders None A check ifrah indicates which time of day the medication should be taken. My Medications STOP taking these medications FLEXERIL 10 mg tablet Generic drug:  cyclobenzaprine TAKE these medications as instructed Instructions Each Dose to Equal  
 Morning Noon Evening Bedtime  
 cholecalciferol 1,000 unit tablet Commonly known as:  VITAMIN D3 Your last dose was: Your next dose is: Take 3,000 Units by mouth daily. 3000 Units  
    
   
   
   
  
 gabapentin 300 mg capsule Commonly known as:  NEURONTIN Your last dose was: Your next dose is: Take 1 Cap by mouth three (3) times daily. 12/22/16, QTY#90, 30 Days, 2 Refills. Dr. Ester Casper 1 Cap  
    
   
   
   
  
 levoFLOXacin 500 mg tablet Commonly known as:  Darlene Chaffee Start taking on:  12/7/2017 Your last dose was: Your next dose is: Take 1 Tab by mouth every fourty-eight (48) hours.   
 500 mg  
    
   
   
 lisinopril-hydroCHLOROthiazide 20-25 mg per tablet Commonly known as:  Kristen Mora Your last dose was: Your next dose is: Take 1 Tab by mouth daily. 1 Tab  
    
   
   
   
  
 metoprolol tartrate 50 mg tablet Commonly known as:  LOPRESSOR Your last dose was: Your next dose is: Take 1 Tab by mouth two (2) times a day. 50 mg Where to Get Your Medications Information on where to get these meds will be given to you by the nurse or doctor. ! Ask your nurse or doctor about these medications  
  levoFLOXacin 500 mg tablet  
 metoprolol tartrate 50 mg tablet Discharge Instructions FOLLOW UP VISIT Appointment in: Other (Specify) PCP in 1 week- repeat BMP DISCHARGE SUMMARY from Nurse PATIENT INSTRUCTIONS: 
 
 
F-face looks uneven A-arms unable to move or move unevenly S-speech slurred or non-existent T-time-call 911 as soon as signs and symptoms begin-DO NOT go Back to bed or wait to see if you get better-TIME IS BRAIN. Warning Signs of HEART ATTACK Call 911 if you have these symptoms: 
? Chest discomfort. Most heart attacks involve discomfort in the center of the chest that lasts more than a few minutes, or that goes away and comes back. It can feel like uncomfortable pressure, squeezing, fullness, or pain. ? Discomfort in other areas of the upper body. Symptoms can include pain or discomfort in one or both arms, the back, neck, jaw, or stomach. ? Shortness of breath with or without chest discomfort. ? Other signs may include breaking out in a cold sweat, nausea, or lightheadedness. Don't wait more than five minutes to call [address]! Fast action can save your life. Calling 911 is almost always the fastest way to get lifesaving treatment. Emergency Medical Services staff can begin treatment when they arrive  up to an hour sooner than if someone gets to the hospital by car. The discharge information has been reviewed with the patient and daughter. The patient and daughter verbalized understanding. Discharge medications reviewed with the patient and daughter and appropriate educational materials and side effects teaching were provided. ___________________________________________________________________________________________________________________________________ Patient armband removed and shredded. [employer] Activation Thank you for enrolling in [facility]. Please follow the instructions below to securely access your online medical record. CreatorBox allows you to send messages to your doctor, view your test results, renew your prescriptions, schedule appointments, and more. How Do I Sign Up? 1. In your internet browser, go to [email or web address]. 2. Click on the First Time User? Click Here link in the Sign In box. You will see the New Member Sign Up page. 3. Enter your CreatorBox Access Code exactly as it appears below. You will not need to use this code after youve completed the sign-up process. If you do not sign up before the expiration date, you must request a new code. CreatorBox Access Code: [email or web address] Expires: 2/6/2018  3:15 PM  
 
4. Enter the last four digits of your Social Security Number (xxxx) and Date of Birth (mm/dd/yyyy) as indicated and click Submit. You will be taken to the next sign-up page. 5. Create a [employer]t ID. This will be your MyChart login ID and cannot be changed, so think of one that is secure and easy to remember. 6. Create a CreatorBox password. You can change your password at any time. 7. Enter your Password Reset Question and Answer. This can be used at a later time if you forget your password. 8. Enter your e-mail address. You will receive e-mail notification when new information is available in [address] 9. Click Sign Up. You can now view your medical record. Additional Information Remember, [employer] is NOT to be used for urgent needs. For medical emergencies, dial 911. Now available from your iPhone and Android! Fainting: Care Instructions Your Care Instructions When you faint, or pass out, you lose consciousness for a short time. A brief drop in blood flow to the brain often causes it. When you fall or lie down, more blood flows to your brain and you regain consciousness. Emotional stress, pain, or overheating-especially if you have been standing-can make you faint. In these cases, fainting is usually not serious. But fainting can be a sign of a more serious problem. Your doctor may want you to have more tests to rule out other causes. The treatment you need depends on the reason why you fainted. The doctor has checked you carefully, but problems can develop later. If you notice any problems or new symptoms, get medical treatment right away. Follow-up care is a key part of your treatment and safety. Be sure to make and go to all appointments, and call your doctor if you are having problems. It's also a good idea to know your test results and keep a list of the medicines you take. How can you care for yourself at home? · Drink plenty of fluids to prevent dehydration. If you have kidney, heart, or liver disease and have to limit fluids, talk with your doctor before you increase your fluid intake. When should you call for help? Call 911 anytime you think you may need emergency care. For example, call if: 
? · You have symptoms of a heart problem. These may include: ¨ Chest pain or pressure. ¨ Severe trouble breathing. ¨ A fast or irregular heartbeat. ¨ Lightheadedness or sudden weakness. ¨ Coughing up pink, foamy mucus. ¨ Passing out. After you call 911, the  may tell you to chew 1 adult-strength or 2 to 4 low-dose aspirin. Wait for an ambulance. Do not try to drive yourself. ? · You have symptoms of a stroke. These may include: 
¨ Sudden numbness, tingling, weakness, or loss of movement in your face, arm, or leg, especially on only one side of your body. ¨ Sudden vision changes. ¨ Sudden trouble speaking. ¨ Sudden confusion or trouble understanding simple statements. ¨ Sudden problems with walking or balance. ¨ A sudden, severe headache that is different from past headaches. ? · You passed out (lost consciousness) again. ? Watch closely for changes in your health, and be sure to contact your doctor if: 
? · You do not get better as expected. Where can you learn more? Go to http://dorindaEPSjn.info/. Enter W140 in the search box to learn more about \"Fainting: Care Instructions. \" Current as of: March 20, 2017 Content Version: 11.4 © 3272-0686 [employer]. Care instructions adapted under license by [employer] (which disclaims liability or warranty for this information). If you have questions about a medical condition or this instruction, always ask your healthcare professional. [address] any warranty or liability for your use of this information. DASH Diet: Care Instructions Your Care Instructions The DASH diet is an eating plan that can help lower your blood pressure. DASH stands for Dietary Approaches to Stop Hypertension. Hypertension is high blood pressure. The DASH diet focuses on eating foods that are high in calcium, potassium, and magnesium. These nutrients can lower blood pressure. The foods that are highest in these nutrients are fruits, vegetables, low-fat dairy products, nuts, seeds, and legumes.  But taking calcium, potassium, and magnesium supplements instead of eating foods that are high in those nutrients does not have the same effect. The DASH diet also includes whole grains, fish, and poultry. The DASH diet is one of several lifestyle changes your doctor may recommend to lower your high blood pressure. Your doctor may also want you to decrease the amount of sodium in your diet. Lowering sodium while following the DASH diet can lower blood pressure even further than just the DASH diet alone. Follow-up care is a key part of your treatment and safety. Be sure to make and go to all appointments, and call your doctor if you are having problems. It's also a good idea to know your test results and keep a list of the medicines you take. How can you care for yourself at home? Following the DASH diet · Eat 4 to 5 servings of fruit each day. A serving is 1 medium-sized piece of fruit, ½ cup chopped or canned fruit, 1/4 cup dried fruit, or 4 ounces (½ cup) of fruit juice. Choose fruit more often than fruit juice. · Eat 4 to 5 servings of vegetables each day. A serving is 1 cup of lettuce or raw leafy vegetables, ½ cup of chopped or cooked vegetables, or 4 ounces (½ cup) of vegetable juice. Choose vegetables more often than vegetable juice. · Get 2 to 3 servings of low-fat and fat-free dairy each day. A serving is 8 ounces of milk, 1 cup of yogurt, or 1 ½ ounces of cheese. · Eat 6 to 8 servings of grains each day. A serving is 1 slice of bread, 1 ounce of dry cereal, or ½ cup of cooked rice, pasta, or cooked cereal. Try to choose whole-grain products as much as possible. · Limit lean meat, poultry, and fish to 2 servings each day. A serving is 3 ounces, about the size of a deck of cards. · Eat 4 to 5 servings of nuts, seeds, and legumes (cooked dried beans, lentils, and split peas) each week. A serving is 1/3 cup of nuts, 2 tablespoons of seeds, or ½ cup of cooked beans or peas. · Limit fats and oils to 2 to 3 servings each day. A serving is 1 teaspoon of vegetable oil or 2 tablespoons of salad dressing. · Limit sweets and added sugars to 5 servings or less a week. A serving is 1 tablespoon jelly or jam, ½ cup sorbet, or 1 cup of lemonade. · Eat less than 2,300 milligrams (mg) of sodium a day. If you limit your sodium to 1,500 mg a day, you can lower your blood pressure even more. Tips for success · Start small. Do not try to make dramatic changes to your diet all at once. You might feel that you are missing out on your favorite foods and then be more likely to not follow the plan. Make small changes, and stick with them. Once those changes become habit, add a few more changes. · Try some of the following: ¨ Make it a goal to eat a fruit or vegetable at every meal and at snacks. This will make it easy to get the recommended amount of fruits and vegetables each day. ¨ Try yogurt topped with fruit and nuts for a snack or healthy dessert. ¨ Add lettuce, tomato, cucumber, and onion to sandwiches. ¨ Combine a ready-made pizza crust with low-fat mozzarella cheese and lots of vegetable toppings. Try using tomatoes, squash, spinach, broccoli, carrots, cauliflower, and onions. ¨ Have a variety of cut-up vegetables with a low-fat dip as an appetizer instead of chips and dip. ¨ Sprinkle sunflower seeds or chopped almonds over salads. Or try adding chopped walnuts or almonds to cooked vegetables. ¨ Try some vegetarian meals using beans and peas. Add garbanzo or kidney beans to salads. Make burritos and tacos with mashed villatoro beans or black beans. Where can you learn more? Go to http://dorinda-jn.info/. Enter Z996 in the search box to learn more about \"DASH Diet: Care Instructions. \" Current as of: September 21, 2016 Content Version: 11.4 © 6504-6740 Healthwise, [place].  Care instructions adapted under license by [address] (which disclaims liability or warranty for this information). If you have questions about a medical condition or this instruction, always ask your healthcare professional. [address] any warranty or liability for your use of this information. Acute High Blood Pressure: Care Instructions Your Care Instructions Acute high blood pressure is very high blood pressure. It's a serious problem. Very high blood pressure can damage your brain, heart, eyes, and kidneys. You may have been given medicines to lower your blood pressure. You may have gotten them as pills or through a needle in one of your veins. This is called an IV. And maybe you were given other medicines too. These can be needed when high blood pressure causes other problems. To keep your blood pressure at a lower level, you may need to make healthy lifestyle changes. And you will probably need to take medicines. Be sure to follow up with your doctor about your blood pressure and what you can do about it. Follow-up care is a key part of your treatment and safety. Be sure to make and go to all appointments, and call your doctor if you are having problems. It's also a good idea to know your test results and keep a list of the medicines you take. How can you care for yourself at home? · See your doctor as often as he or she recommends. This is to make sure your blood pressure is under control. You will probably go at least 2 times a year. But it may be more often at first. 
· Take your blood pressure medicine exactly as prescribed. You may take one or more types. They include diuretics, beta-blockers, ACE inhibitors, calcium channel blockers, and angiotensin II receptor blockers. Call your doctor if you think you are having a problem with your medicine.  
· If you take blood pressure medicine, talk to your doctor before you take decongestants or anti-inflammatory medicine, such as ibuprofen. These can raise blood pressure. · Learn how to check your blood pressure at home. Check it often. · Ask your doctor if it's okay to drink alcohol. · Talk to your doctor about lifestyle changes that can help blood pressure. These include being active and not smoking. When should you call for help? Call 911 anytime you think you may need emergency care. This may mean having symptoms that suggest that your blood pressure is causing a serious heart or blood vessel problem. Your blood pressure may be over 180/110. ? For example, call 911 if: 
? · You have symptoms of a heart attack. These may include: ¨ Chest pain or pressure, or a strange feeling in the chest. 
¨ Sweating. ¨ Shortness of breath. ¨ Nausea or vomiting. ¨ Pain, pressure, or a strange feeling in the back, neck, jaw, or upper belly or in one or both shoulders or arms. ¨ Lightheadedness or sudden weakness. ¨ A fast or irregular heartbeat. ? · You have symptoms of a stroke. These may include: 
¨ Sudden numbness, tingling, weakness, or loss of movement in your face, arm, or leg, especially on only one side of your body. ¨ Sudden vision changes. ¨ Sudden trouble speaking. ¨ Sudden confusion or trouble understanding simple statements. ¨ Sudden problems with walking or balance. ¨ A sudden, severe headache that is different from past headaches. ? · You have severe back or belly pain. ?Do not wait until your blood pressure comes down on its own. Get help right away. ?Call your doctor now or seek immediate care if: 
? · Your blood pressure is much higher than normal (such as 180/110 or higher), but you don't have symptoms. ? · You think high blood pressure is causing symptoms, such as: ¨ Severe headache. ¨ Blurry vision. ? Watch closely for changes in your health, and be sure to contact your doctor if: 
? · Your blood pressure measures 140/90 or higher at least 2 times.  That means the top number is 140 or higher or the bottom number is 90 or higher, or both. ? · You think you may be having side effects from your blood pressure medicine. ? · Your blood pressure is usually normal, but it goes above normal at least 2 times. Where can you learn more? Go to http://dorinda-jn.info/. Enter J595 in the search box to learn more about \"Acute High Blood Pressure: Care Instructions. \" Current as of: September 21, 2016 Content Version: 11.4 © 9689-4798 [employer]. Care instructions adapted under license by [employer] (which disclaims liability or warranty for this information). If you have questions about a medical condition or this instruction, always ask your healthcare professional. [address] any warranty or liability for your use of this information. Lightheadedness or Faintness: Care Instructions Your Care Instructions Lightheadedness is a feeling that you are about to faint or \"pass out. \" You do not feel as if you or your surroundings are moving. It is different from vertigo, which is the feeling that you or things around you are spinning or tilting. Lightheadedness usually goes away or gets better when you lie down. If lightheadedness gets worse, it can lead to a fainting spell. It is common to feel lightheaded from time to time. Lightheadedness usually is not caused by a serious problem. It often is caused by a short-lasting drop in blood pressure and blood flow to your head that occurs when you get up too quickly from a seated or lying position. Follow-up care is a key part of your treatment and safety. Be sure to make and go to all appointments, and call your doctor if you are having problems. It's also a good idea to know your test results and keep a list of the medicines you take. How can you care for yourself at home? · Lie down for 1 or 2 minutes when you feel lightheaded.  After lying down, sit up slowly and remain sitting for 1 to 2 minutes before slowly standing up. · Avoid movements, positions, or activities that have made you lightheaded in the past. 
· Get plenty of rest, especially if you have a cold or flu, which can cause lightheadedness. · Make sure you drink plenty of fluids, especially if you have a fever or have been sweating. · Do not drive or put yourself and others in danger while you feel lightheaded. When should you call for help? Call 911 anytime you think you may need emergency care. For example, call if: 
? · You have symptoms of a stroke. These may include: 
¨ Sudden numbness, tingling, weakness, or loss of movement in your face, arm, or leg, especially on only one side of your body. ¨ Sudden vision changes. ¨ Sudden trouble speaking. ¨ Sudden confusion or trouble understanding simple statements. ¨ Sudden problems with walking or balance. ¨ A sudden, severe headache that is different from past headaches. ? · You have symptoms of a heart attack. These may include: ¨ Chest pain or pressure, or a strange feeling in the chest. 
¨ Sweating. ¨ Shortness of breath. ¨ Nausea or vomiting. ¨ Pain, pressure, or a strange feeling in the back, neck, jaw, or upper belly or in one or both shoulders or arms. ¨ Lightheadedness or sudden weakness. ¨ A fast or irregular heartbeat. After you call 911, the  may tell you to chew 1 adult-strength or 2 to 4 low-dose aspirin. Wait for an ambulance. Do not try to drive yourself. ? Watch closely for changes in your health, and be sure to contact your doctor if: 
? · Your lightheadedness gets worse or does not get better with home care. Where can you learn more? Go to http://dorinda-jn.info/. Enter N362 in the search box to learn more about \"Lightheadedness or Faintness: Care Instructions. \" Current as of: March 20, 2017 Content Version: 11.4 © 5914-3089 Healthwise, [place]. Care instructions adapted under license by [employer] (which disclaims liability or warranty for this information). If you have questions about a medical condition or this instruction, always ask your healthcare professional. [address] any warranty or liability for your use of this information. Low Sodium Diet (2,000 Milligram): Care Instructions Your Care Instructions Too much sodium causes your body to hold on to extra water. This can raise your blood pressure and force your heart and kidneys to work harder. In very serious cases, this could cause you to be put in the hospital. It might even be life-threatening. By limiting sodium, you will feel better and lower your risk of serious problems. The most common source of sodium is salt. People get most of the salt in their diet from canned, prepared, and packaged foods. Fast food and restaurant meals also are very high in sodium. Your doctor will probably limit your sodium to less than 2,000 milligrams (mg) a day. This limit counts all the sodium in prepared and packaged foods and any salt you add to your food. Follow-up care is a key part of your treatment and safety. Be sure to make and go to all appointments, and call your doctor if you are having problems. It's also a good idea to know your test results and keep a list of the medicines you take. How can you care for yourself at home? Read food labels · Read labels on cans and food packages. The labels tell you how much sodium is in each serving. Make sure that you look at the serving size. If you eat more than the serving size, you have eaten more sodium. · Food labels also tell you the Percent Daily Value for sodium. Choose products with low Percent Daily Values for sodium.  
· Be aware that sodium can come in forms other than salt, including monosodium glutamate (MSG), sodium citrate, and sodium bicarbonate (baking soda). MSG is often added to Asian food. When you eat out, you can sometimes ask for food without MSG or added salt. Buy low-sodium foods · Buy foods that are labeled \"unsalted\" (no salt added), \"sodium-free\" (less than 5 mg of sodium per serving), or \"low-sodium\" (less than 140 mg of sodium per serving). Foods labeled \"reduced-sodium\" and \"light sodium\" may still have too much sodium. Be sure to read the label to see how much sodium you are getting. · Buy fresh vegetables, or frozen vegetables without added sauces. Buy low-sodium versions of canned vegetables, soups, and other canned goods. Prepare low-sodium meals · Cut back on the amount of salt you use in cooking. This will help you adjust to the taste. Do not add salt after cooking. One teaspoon of salt has about 2,300 mg of sodium. · Take the salt shaker off the table. · Flavor your food with garlic, lemon juice, onion, vinegar, herbs, and spices. Do not use soy sauce, lite soy sauce, steak sauce, onion salt, garlic salt, celery salt, mustard, or ketchup on your food. · Use low-sodium salad dressings, sauces, and ketchup. Or make your own salad dressings and sauces without adding salt. · Use less salt (or none) when recipes call for it. You can often use half the salt a recipe calls for without losing flavor. Other foods such as rice, pasta, and grains do not need added salt. · Rinse canned vegetables, and cook them in fresh water. This removes some-but not all-of the salt. · Avoid water that is naturally high in sodium or that has been treated with water softeners, which add sodium. Call your local water company to find out the sodium content of your water supply. If you buy bottled water, read the label and choose a sodium-free brand. Avoid high-sodium foods · Avoid eating: ¨ Smoked, cured, salted, and canned meat, fish, and poultry. ¨ Ham, kwon, hot dogs, and luncheon meats. ¨ Regular, hard, and processed cheese and regular peanut butter. ¨ Crackers with salted tops, and other salted snack foods such as pretzels, chips, and salted popcorn. ¨ Frozen prepared meals, unless labeled low-sodium. ¨ Canned and dried soups, broths, and bouillon, unless labeled sodium-free or low-sodium. ¨ Canned vegetables, unless labeled sodium-free or low-sodium. ¨ Western [place] fries, pizza, tacos, and other fast foods. ¨ Pickles, olives, ketchup, and other condiments, especially soy sauce, unless labeled sodium-free or low-sodium. Where can you learn more? Go to http://dorinda-jn.info/. Enter U927 in the search box to learn more about \"Low Sodium Diet (2,000 Milligram): Care Instructions. \" Current as of: May 12, 2017 Content Version: 11.4 © 4528-4282 Storage By [employer] Box. Care instructions adapted under license by Traction (which disclaims liability or warranty for this information). If you have questions about a medical condition or this instruction, always ask your healthcare professional. [place] [address] any warranty or liability for your use of this information. Urinary Tract Infection in Women: Care Instructions Your Care Instructions A urinary tract infection, or UTI, is a general term for an infection anywhere between the kidneys and the urethra (where urine comes out). Most UTIs are bladder infections. They often cause pain or burning when you urinate. UTIs are caused by bacteria and can be cured with antibiotics. Be sure to complete your treatment so that the infection goes away. Follow-up care is a key part of your treatment and safety. Be sure to make and go to all appointments, and call your doctor if you are having problems. It's also a good idea to know your test results and keep a list of the medicines you take. How can you care for yourself at home? · Take your antibiotics as directed.  Do not stop taking them just because you feel better. You need to take the full course of antibiotics. · Drink extra water and other fluids for the next day or two. This may help wash out the bacteria that are causing the infection. (If you have kidney, heart, or liver disease and have to limit fluids, talk with your doctor before you increase your fluid intake.) · Avoid drinks that are carbonated or have caffeine. They can irritate the bladder. · Urinate often. Try to empty your bladder each time. · To relieve pain, take a hot bath or lay a heating pad set on low over your lower belly or genital area. Never go to sleep with a heating pad in place. To prevent UTIs · Drink plenty of water each day. This helps you urinate often, which clears bacteria from your system. (If you have kidney, heart, or liver disease and have to limit fluids, talk with your doctor before you increase your fluid intake.) · Urinate when you need to. · Urinate right after you have sex. · Change sanitary pads often. · Avoid douches, bubble baths, feminine hygiene sprays, and other feminine hygiene products that have deodorants. · After going to the bathroom, wipe from front to back. When should you call for help? Call your doctor now or seek immediate medical care if: 
? · Symptoms such as fever, chills, nausea, or vomiting get worse or appear for the first time. ? · You have new pain in your back just below your rib cage. This is called flank pain. ? · There is new blood or pus in your urine. ? · You have any problems with your antibiotic medicine. ? Watch closely for changes in your health, and be sure to contact your doctor if: 
? · You are not getting better after taking an antibiotic for 2 days. ? · Your symptoms go away but then come back. Where can you learn more? Go to http://dorinda-jn.info/. Enter C591 in the search box to learn more about \"Urinary Tract Infection in Women: Care Instructions. \" Current as of: May 12, 2017 Content Version: 11.4 © 8591-4806 Dejam[employer]. Care instructions adapted under license by Kixer (which disclaims liability or warranty for this information). If you have questions about a medical condition or this instruction, always ask your healthcare professional. [address] any warranty or liability for your use of this information. Vasovagal Syncope: Care Instructions Your Care Instructions Vasovagal syncope (say \"was-zsh-UGF-tomasl KZFO-kdh-hvw\")is sudden dizziness or fainting that can be set off by things such as pain, stress, fear, or trauma. You may sweat or feel lightheaded, sick to your stomach, or tingly. The problem causes the heart rate to slow and the blood vessels to widen, or dilate, for a short time. When this happens, blood pools in the lower body, and less blood goes to the brain. You can usually get relief by lying down with your legs raised (elevated). This helps more blood to flow to your brain and may help relieve symptoms like feeling dizzy. Some doctors may recommend a technique that involves tensing your fists and arms. This type of fainting is often easy to predict. For example, it happens to some people when they see blood or have to get a shot. They may feel symptoms before they faint. An episode of vasovagal syncope usually responds well to self-care. Other treatment often isn't needed. But if the fainting keeps happening, your doctor may suggest further treatments. Follow-up care is a key part of your treatment and safety. Be sure to make and go to all appointments, and call your doctor if you are having problems. It's also a good idea to know your test results and keep a list of the medicines you take. How can you care for yourself at home? · Drink plenty of fluids to prevent dehydration.  If you have kidney, heart, or liver disease and have to limit fluids, talk with your doctor before you increase your fluid intake. · Try to avoid things that you think may set off vasovagal syncope. · Talk to your doctor about any medicines you take. Some medicines may increase the chance of this condition occurring. · If you feel symptoms, lie down with your legs raised. Talk to your doctor about what to do if your symptoms come back. When should you call for help? Call 911 anytime you think you may need emergency care. For example, call if: 
? · You have symptoms of a heart problem. These may include: ¨ Chest pain or pressure. ¨ Severe trouble breathing. ¨ A fast or irregular heartbeat. ? Watch closely for changes in your health, and be sure to contact your doctor if: 
? · You have more episodes of fainting at home. ? · You do not get better as expected. Where can you learn more? Go to http://dorinda-jn.info/. Enter L754 in the search box to learn more about \"Vasovagal Syncope: Care Instructions. \" Current as of: March 20, 2017 Content Version: 11.4 © 2187-0408 Blue Ocean Software. Care instructions adapted under license by Follica (which disclaims liability or warranty for this information). If you have questions about a medical condition or this instruction, always ask your healthcare professional. [address] any warranty or liability for your use of this information. [email or web address] Announcement We are excited to announce that we are making your provider's discharge notes available to you in [email or web address]. You will see these notes when they are completed and signed by the physician that discharged you from your recent hospital stay. If you have any questions or concerns about any information you see in [email or web address], please call the Health Information Department where you were seen or reach out to your Primary Care Provider for more information about your plan of care. Introducing [facility]! Yokasta Sofia introduces [employer] patient portal. Now you can access parts of your medical record, email your doctor's office, and request medication refills online. 1. In your internet browser, go to [email or web address] 2. Click on the First Time User? Click Here link in the Sign In box. You will see the New Member Sign Up page. 3. Enter your [employer] Access Code exactly as it appears below. You will not need to use this code after youve completed the sign-up process. If you do not sign up before the expiration date, you must request a new code. · [employer] Access Code: [email or web address] Expires: 2/6/2018  3:15 PM 
 
4. Enter the last four digits of your Social Security Number (xxxx) and Date of Birth (mm/dd/yyyy) as indicated and click Submit. You will be taken to the next sign-up page. 5. Create a [employer] ID. This will be your [employer] login ID and cannot be changed, so think of one that is secure and easy to remember. 6. Create a [employer] password. You can change your password at any time. 7. Enter your Password Reset Question and Answer. This can be used at a later time if you forget your password. 8. Enter your e-mail address. You will receive e-mail notification when new information is available in [address] 9. Click Sign Up. You can now view and download portions of your medical record. 10. Click the Download Summary menu link to download a portable copy of your medical information. If you have questions, please visit the Frequently Asked Questions section of the [employer] website. Remember, [employer] is NOT to be used for urgent needs. For medical emergencies, dial 911. Now available from your iPhone and Android! Providers Seen During Your Hospitalization Provider Specialty Primary office phone Micheline Bianchi MD Emergency Medicine [phone] Wendi Arroyo MD Internal Medicine [phone] Denver Lares, MD Internal Medicine [phone] Your Primary Care Physician (PCP) Primary Care Physician Office Phone Office Fax Avril Potts [phone] You are allergic to the following No active allergies Recent Documentation Height Weight BMI OB Status Smoking Status 1.676 m 74.8 kg 26.63 kg/m2 Postmenopausal Never Smoker Emergency Contacts Name Discharge Info Relation Home Work Mobile Willy Bro DISCHARGE CAREGIVER [3] Spouse [3] [phone] [facility] CAREGIVER [3] Child [2] [phone] Patient Belongings The following personal items are in your possession at time of discharge: 
     Visual Aid: None Please provide this summary of care documentation to your next provider. Signatures-by signing, you are acknowledging that this After Visit Summary has been reviewed with you and you have received a copy. Patient Signature:  ____________________________________________________________ Date:  ____________________________________________________________  
  
Courtney José Provider Signature:  ____________________________________________________________ Date:  ____________________________________________________________

## 2017-12-03 NOTE — ED NOTES
Bedside shift change report given to Kala Olea RN (oncoming nurse) by Ysabel Calero RN (offgoing nurse). Report included the following information SBAR.

## 2017-12-03 NOTE — ED PROVIDER NOTES
EMERGENCY DEPARTMENT HISTORY AND PHYSICAL EXAM    2:46 AM      Date: 12/3/2017  Patient Name: Ratna Leija    History of Presenting Illness     Chief Complaint   Patient presents with    Fall         History Provided By: Patient, Patient's Daughter, Patient's Father, and EMS    Chief Complaint: Found on the ground  Duration:  Minutes  Timing:  Acute  Location:   Quality:   Severity: Moderate  Modifying Factors: None  Associated Symptoms: None      Additional History (Context): Ratna Leija is a 81 y.o. female with a hx of chronic kidney disease, diabetes, and CAD who presents to the ED via EMS from home after she was found on the ground by her daughter RENETTA. Pt's daughter reports that she was in the shower when she heard the patient fall on the porch. She rushed out and found the patient with her purse underneath her and her cane behind her laying on the porch. The pt is unsure how she fell, she does not remember the incident at all. Her daughter reports that they were seen a couple of weeks ago due to the pt developing slurred speech and that slurred speech has persisted since then. Other than that, the pt seems confused from baseline as her memory is usually pretty good, but she is unable to recall the events leading to her fall. . She currently lives at home with her daughter and her . She is currently being followed by the wound clinic for her legs, they have been steadily improving since she started being seen at the wound clinic. No further complaints at this time.      PCP: Jayjay Rosado MD    Current Facility-Administered Medications   Medication Dose Route Frequency Provider Last Rate Last Dose    [START ON 12/4/2017] cefTRIAXone (ROCEPHIN) 1 g in sterile water (preservative free) 10 mL IV syringe  1 g IntraVENous Q24H Gregorio Lima MD        haloperidol lactate (HALDOL) injection 4 mg  4 mg IntraVENous Q6H PRN Connor Solano MD   4 mg at 12/03/17 0901     Current Outpatient Prescriptions   Medication Sig Dispense Refill    gabapentin (NEURONTIN) 300 mg capsule Take 1 Cap by mouth three (3) times daily. 12/22/16, QTY#90, 30 Days, 2 Refills. Dr. Jose Cruz Dougherty  2    cholecalciferol (VITAMIN D3) 1,000 unit tablet Take 3,000 Units by mouth daily.  lisinopril-hydrochlorothiazide (PRINZIDE, ZESTORETIC) 20-25 mg per tablet Take 1 Tab by mouth daily.  metoprolol (LOPRESSOR) 25 mg tablet Take 25 mg by mouth two (2) times a day.  cyclobenzaprine (FLEXERIL) 10 mg tablet Take 10 mg by mouth three (3) times daily as needed.          Past History     Past Medical History:  Past Medical History:   Diagnosis Date    CAD (coronary artery disease)     Chronic osteomyelitis of ankle (HCC)     CKD (chronic kidney disease) stage 3, GFR 30-59 ml/min 06/24/2013    Worst noted (08/06/13) BUN/sCr: 54/2.49, GFR 20.     DM (diabetes mellitus) (HCC)     Elevated TSH 06/25/2012    5.08     Hypertension     Leukocytosis     Lumbar spinal stenosis     Normocytic anemia     Rhabdomyolysis     Splenomegaly     Stroke ([facility] [address]     Vitamin D deficiency        Past Surgical History:  Past Surgical History:   Procedure Laterality Date    COLONOSCOPY N/A 3/1/2017    [facility] ENDOSCOPY    HX CHOLECYSTECTOMY      HX FREE SKIN GRAFT      HX HYSTERECTOMY      HX OPEN REDUCTION INTERNAL FIXATION Left     Tx LLE Ankle Trimalleolar Fx by Dr. Ian Najera [facility])    HX ORTHOPAEDIC Left 06/11/2013    [address] - LLE Ankle Removal of Hardware     HX VEIN STRIPPING Bilateral 09/18/2013    Dr. Fifi Carolina 20 SQ CM<         Family History:  Family History   Problem Relation Age of Onset    Diabetes Other     Hypertension Other        Social History:  Social History   Substance Use Topics    Smoking status: Never Smoker    Smokeless tobacco: None    Alcohol use No       Allergies:  No Known Allergies      Review of Systems       Review of Systems   Constitutional: Negative for chills. HENT: Negative for congestion, drooling and sore throat. Respiratory: Positive for cough. Negative for shortness of breath. Cardiovascular: Negative for chest pain. Gastrointestinal: Negative for abdominal pain, diarrhea, nausea and vomiting. Genitourinary: Negative for dysuria. Musculoskeletal: Negative for back pain. Skin: Negative for rash. Neurological: Negative for dizziness and headaches. All other systems reviewed and are negative. Physical Exam     Visit Vitals    /73    Pulse 73    Temp 97.9 °F (36.6 °C)    Resp 21    Ht 5' 6\" (1.676 m)    Wt 78.5 kg (173 lb)    SpO2 98%    BMI 27.92 kg/m2         Physical Exam   Constitutional: She is oriented to person, place, and time. She appears well-developed and well-nourished. Non-toxic appearance. She does not appear ill. No distress. Appears elderly and frail   HENT:   Head: Normocephalic and atraumatic. Mouth/Throat: Oropharynx is clear and moist.   Eyes: Conjunctivae and EOM are normal. Pupils are equal, round, and reactive to light. Right eye exhibits no discharge. Left eye exhibits no discharge. Neck: Normal range of motion. Neck supple. Cardiovascular: Normal rate, regular rhythm, normal heart sounds and intact distal pulses. Exam reveals no gallop and no friction rub. No murmur heard. Pulmonary/Chest: Effort normal and breath sounds normal. No respiratory distress. She has no wheezes. She has no rales. Abdominal: Soft. Bowel sounds are normal. She exhibits no distension. There is no tenderness. There is no rebound and no guarding. Musculoskeletal: Normal range of motion. She exhibits edema (bilateral lower extremity edema). She exhibits no tenderness. Erythema to the bilateral lower extremities   Lymphadenopathy:     She has no cervical adenopathy.    Neurological: She is alert and oriented to person, place, and time. She has normal strength. No cranial nerve deficit or sensory deficit. GCS eye subscore is 4. GCS verbal subscore is 5. GCS motor subscore is 6. No focal deficits   Skin: Skin is warm and dry. No rash noted. Psychiatric: She has a normal mood and affect. Nursing note and vitals reviewed. Diagnostic Study Results     Labs -  Recent Results (from the past 12 hour(s))   CBC WITH AUTOMATED DIFF    Collection Time: 12/03/17  3:41 AM   Result Value Ref Range    WBC 6.9 4.6 - 13.2 K/uL    RBC 3.79 (L) 4.20 - 5.30 M/uL    HGB 10.6 (L) 12.0 - 16.0 g/dL    HCT 33.7 (L) 35.0 - 45.0 %    MCV 88.9 74.0 - 97.0 FL    MCH 28.0 24.0 - 34.0 PG    MCHC 31.5 31.0 - 37.0 g/dL    RDW 14.3 11.6 - 14.5 %    PLATELET 389 220 - 210 K/uL    MPV 11.5 9.2 - 11.8 FL    NEUTROPHILS 69 40 - 73 %    LYMPHOCYTES 18 (L) 21 - 52 %    MONOCYTES 9 3 - 10 %    EOSINOPHILS 4 0 - 5 %    BASOPHILS 0 0 - 2 %    ABS. NEUTROPHILS 4.7 1.8 - 8.0 K/UL    ABS. LYMPHOCYTES 1.3 0.9 - 3.6 K/UL    ABS. MONOCYTES 0.6 0.05 - 1.2 K/UL    ABS. EOSINOPHILS 0.3 0.0 - 0.4 K/UL    ABS. BASOPHILS 0.0 0.0 - 0.06 K/UL    DF AUTOMATED     METABOLIC PANEL, COMPREHENSIVE    Collection Time: 12/03/17  3:41 AM   Result Value Ref Range    Sodium 142 136 - 145 mmol/L    Potassium 3.6 3.5 - 5.5 mmol/L    Chloride 110 (H) 100 - 108 mmol/L    CO2 22 21 - 32 mmol/L    Anion gap 10 3.0 - 18 mmol/L    Glucose 114 (H) 74 - 99 mg/dL    BUN 45 (H) 7.0 - 18 MG/DL    Creatinine 1.60 (H) 0.6 - 1.3 MG/DL    BUN/Creatinine ratio 28 (H) 12 - 20      GFR est AA 37 (L) >60 ml/min/1.73m2    GFR est non-AA 31 (L) >60 ml/min/1.73m2    Calcium 8.4 (L) 8.5 - 10.1 MG/DL    Bilirubin, total 0.4 0.2 - 1.0 MG/DL    ALT (SGPT) 18 13 - 56 U/L    AST (SGOT) 17 15 - 37 U/L    Alk.  phosphatase 89 45 - 117 U/L    Protein, total 6.8 6.4 - 8.2 g/dL    Albumin 3.3 (L) 3.4 - 5.0 g/dL    Globulin 3.5 2.0 - 4.0 g/dL    A-G Ratio 0.9 0.8 - 1.7     CARDIAC PANEL,(CK, CKMB & TROPONIN) Collection Time: 12/03/17  3:41 AM   Result Value Ref Range    CK 91 26 - 192 U/L    CK - MB 2.3 <3.6 ng/ml    CK-MB Index 2.5 0.0 - 4.0 %    Troponin-I, Qt. <0.02 0.0 - 0.045 NG/ML   EKG, 12 LEAD, INITIAL    Collection Time: 12/03/17  3:51 AM   Result Value Ref Range    Ventricular Rate 76 BPM    Atrial Rate 76 BPM    P-R Interval 154 ms    QRS Duration 72 ms    Q-T Interval 374 ms    QTC Calculation (Bezet) 420 ms    Calculated P Axis 80 degrees    Calculated R Axis 15 degrees    Calculated T Axis 114 degrees    Diagnosis       Sinus rhythm with premature atrial complexes  Low voltage QRS  Cannot rule out Anteroseptal infarct (cited on or before 21-JUN-2012)  T wave abnormality, consider lateral ischemia  Abnormal ECG  When compared with ECG of 06-NOV-2017 15:03,  premature atrial complexes are now present  Questionable change in initial forces of Anterior leads     URINALYSIS W/ RFLX MICROSCOPIC    Collection Time: 12/03/17  4:20 AM   Result Value Ref Range    Color YELLOW      Appearance CLOUDY      Specific gravity 1.014 1.005 - 1.030      pH (UA) 5.0 5.0 - 8.0      Protein TRACE (A) NEG mg/dL    Glucose NEGATIVE  NEG mg/dL    Ketone NEGATIVE  NEG mg/dL    Bilirubin NEGATIVE  NEG      Blood NEGATIVE  NEG      Urobilinogen 0.2 0.2 - 1.0 EU/dL    Nitrites NEGATIVE  NEG      Leukocyte Esterase LARGE (A) NEG     URINE MICROSCOPIC ONLY    Collection Time: 12/03/17  4:20 AM   Result Value Ref Range    WBC >100 (H) 0 - 4 /hpf    RBC NONE 0 - 5 /hpf    Epithelial cells 3+ 0 - 5 /lpf    Bacteria 1+ (A) NEG /hpf    Mucus FEW (A) NEG /lpf       Radiologic Studies -   CT HEAD WO CONT      1. No acute intracranial process  2. Mild burden of probable chronic white matter ischemic changes and cerebral atrophy greater on the right   XR CHEST PORT     No effusion  No pneumothorax  No infiltrate  Per Dr. Emanuel Watts am the first provider for this patient.     I reviewed the vital signs, available nursing notes, past medical history, past surgical history, family history and social history. Vital Signs-Reviewed the patient's vital signs. Pulse Oximetry Analysis -  100% on room air (Interpretation)WNL    Cardiac Monitor:  Rate: 108 BPM  Rhythm:  Sinus Tachycardia     EKG: Interpreted by the EP. Time Interpreted: 351   Rate: 76 BPM   Rhythm: Normal Sinus Rhythm with frequent PACs   Interpretation:No STEMI   Comparison: None    Records Reviewed: Nursing Notes (Time of Review: 2:46 AM)    ED Course: Progress Notes, Reevaluation, and Consults:  Consult:  Discussed care with Dr. Talon Orozco, Specialty: Hispitalist  Standard discussion; including history of patients chief complaint, available diagnostic results, and treatment course. 5:46 AM, 12/03/17         Provider Notes (Medical Decision Making): Pt presented with fall due to unclear cause. Pt unable to recall events of fall. Concern for syncope as no clear mechanical reason for fall. AO x 3 otherwise. NIH 0. EKG and labs reviewed. Pt with UTI. Started on antibiotics. Per family, leg wounds improving. Aware of plan for admission. For Hospitalized Patients:    1. Hospitalization Decision Time:  The decision to hospitalize the patient was made by Dr. Alonso Bains at 5:07 AM on 12/3/2017    2. Aspirin: Aspirin was not given because the patient did not present with a stroke at the time of their Emergency Department evaluation    Diagnosis     Clinical Impression:   1. Syncope and collapse    2. Urinary tract infection without hematuria, site unspecified    3. LAURA (acute kidney injury) ([facility] [address]    4.  Encephalopathy        Disposition: Admit  _______________________________    Attestations:  Tisha Aranda MD acting as a scribe for and in the presence of Sherly Peralta MD      December 03, 2017 at 9:03 AM       Provider Attestation:      I personally performed the services described in the documentation, reviewed the documentation, as recorded by the scribe in my presence, and it accurately and completely records my words and actions.  December 03, 2017 at 9:03 AM - Jacy Avitia MD    _______________________________

## 2017-12-03 NOTE — PROGRESS NOTES
Patient will need to continue tele monitor today, abx for uti and get echo. If everything is negative and clinical course does not worsen plan dc tomorrow    Update:  Patient apparently extremely agitated, encephalopathic, pulling ivs etc.  Ordered haldol and ativan, monitor. It appears patient may need rehab.

## 2017-12-03 NOTE — ED NOTES
Pt medicated per MD order, see MAR. Pt changed, cleaned, new brief placed, monitor reconnected, and warm blankets placed on pt.

## 2017-12-03 NOTE — ED NOTES
TRANSFER - OUT REPORT:    SBAR given on Jaleesa Siddiqi  being transferred to 3018(unit) for routine progression of care       Report consisted of patients Situation, Background, Assessment and   Recommendations(SBAR). Information from the following report(s) SBAR, ED Summary, Intake/Output, MAR, Recent Results and Med Rec Status was reviewed with the receiving nurse. Lines:   Peripheral IV 12/03/17 Left Wrist (Active)   Site Assessment Clean, dry, & intact 12/3/2017  8:37 AM   Phlebitis Assessment 0 12/3/2017  8:37 AM   Infiltration Assessment 0 12/3/2017  8:37 AM   Dressing Status Clean, dry, & intact 12/3/2017  8:37 AM        Opportunity for questions and clarification was provided.       Patient transported with:   Monitor  Registered Nurse

## 2017-12-03 NOTE — PROGRESS NOTES
Patient arrived to floor via stretcher with ED tech and daughter at bedside. Patient alert but confused. Patient transferred over to cardiac bed. Patient incontinent of urine, fazal care provided and bed alarm applied for patient safety.

## 2017-12-04 LAB
ALBUMIN SERPL-MCNC: 3 G/DL (ref 3.4–5)
ALBUMIN/GLOB SERPL: 0.9 {RATIO} (ref 0.8–1.7)
ALP SERPL-CCNC: 87 U/L (ref 45–117)
ALT SERPL-CCNC: 17 U/L (ref 13–56)
ANION GAP SERPL CALC-SCNC: 10 MMOL/L (ref 3–18)
AST SERPL-CCNC: 16 U/L (ref 15–37)
BILIRUB SERPL-MCNC: 0.7 MG/DL (ref 0.2–1)
BUN SERPL-MCNC: 27 MG/DL (ref 7–18)
BUN/CREAT SERPL: 27 (ref 12–20)
CALCIUM SERPL-MCNC: 8.4 MG/DL (ref 8.5–10.1)
CHLORIDE SERPL-SCNC: 111 MMOL/L (ref 100–108)
CHOLEST SERPL-MCNC: 159 MG/DL
CO2 SERPL-SCNC: 22 MMOL/L (ref 21–32)
CREAT SERPL-MCNC: 1.02 MG/DL (ref 0.6–1.3)
ERYTHROCYTE [DISTWIDTH] IN BLOOD BY AUTOMATED COUNT: 14.4 % (ref 11.6–14.5)
GLOBULIN SER CALC-MCNC: 3.3 G/DL (ref 2–4)
GLUCOSE SERPL-MCNC: 85 MG/DL (ref 74–99)
HCT VFR BLD AUTO: 33.3 % (ref 35–45)
HDLC SERPL-MCNC: 44 MG/DL (ref 40–60)
HDLC SERPL: 3.6 {RATIO} (ref 0–5)
HGB BLD-MCNC: 10.4 G/DL (ref 12–16)
LDLC SERPL CALC-MCNC: 91.6 MG/DL (ref 0–100)
LIPID PROFILE,FLP: NORMAL
MCH RBC QN AUTO: 28.1 PG (ref 24–34)
MCHC RBC AUTO-ENTMCNC: 31.2 G/DL (ref 31–37)
MCV RBC AUTO: 90 FL (ref 74–97)
PLATELET # BLD AUTO: 234 K/UL (ref 135–420)
PMV BLD AUTO: 11.7 FL (ref 9.2–11.8)
POTASSIUM SERPL-SCNC: 3.4 MMOL/L (ref 3.5–5.5)
PROT SERPL-MCNC: 6.3 G/DL (ref 6.4–8.2)
RBC # BLD AUTO: 3.7 M/UL (ref 4.2–5.3)
SODIUM SERPL-SCNC: 143 MMOL/L (ref 136–145)
TRIGL SERPL-MCNC: 117 MG/DL (ref ?–150)
VLDLC SERPL CALC-MCNC: 23.4 MG/DL
WBC # BLD AUTO: 6.1 K/UL (ref 4.6–13.2)

## 2017-12-04 PROCEDURE — 93306 TTE W/DOPPLER COMPLETE: CPT

## 2017-12-04 PROCEDURE — 77030011255 HC DSG AQUACEL AG BMS -A

## 2017-12-04 PROCEDURE — 74011250636 HC RX REV CODE- 250/636: Performed by: HOSPITALIST

## 2017-12-04 PROCEDURE — 74011250637 HC RX REV CODE- 250/637: Performed by: HOSPITALIST

## 2017-12-04 PROCEDURE — 85027 COMPLETE CBC AUTOMATED: CPT | Performed by: HOSPITALIST

## 2017-12-04 PROCEDURE — 80053 COMPREHEN METABOLIC PANEL: CPT | Performed by: HOSPITALIST

## 2017-12-04 PROCEDURE — 65660000000 HC RM CCU STEPDOWN

## 2017-12-04 PROCEDURE — 77030020263 HC SOL INJ SOD CL0.9% LFCR 1000ML

## 2017-12-04 PROCEDURE — 36415 COLL VENOUS BLD VENIPUNCTURE: CPT | Performed by: HOSPITALIST

## 2017-12-04 PROCEDURE — 74011000250 HC RX REV CODE- 250: Performed by: HOSPITALIST

## 2017-12-04 PROCEDURE — 80061 LIPID PANEL: CPT | Performed by: HOSPITALIST

## 2017-12-04 PROCEDURE — 77030011256 HC DRSG MEPILEX <16IN NO BORD MOLN -A

## 2017-12-04 RX ORDER — IBUPROFEN 200 MG
800 TABLET ORAL
Status: DISCONTINUED | OUTPATIENT
Start: 2017-12-04 | End: 2017-12-05 | Stop reason: HOSPADM

## 2017-12-04 RX ORDER — METOPROLOL TARTRATE 50 MG/1
50 TABLET ORAL 2 TIMES DAILY
Status: DISCONTINUED | OUTPATIENT
Start: 2017-12-04 | End: 2017-12-05 | Stop reason: HOSPADM

## 2017-12-04 RX ADMIN — HEPARIN SODIUM 5000 UNITS: 5000 INJECTION, SOLUTION INTRAVENOUS; SUBCUTANEOUS at 14:30

## 2017-12-04 RX ADMIN — GABAPENTIN 300 MG: 300 CAPSULE ORAL at 21:38

## 2017-12-04 RX ADMIN — METOPROLOL TARTRATE 50 MG: 50 TABLET ORAL at 18:05

## 2017-12-04 RX ADMIN — HYDROCHLOROTHIAZIDE 25 MG: 25 TABLET ORAL at 08:55

## 2017-12-04 RX ADMIN — HEPARIN SODIUM 5000 UNITS: 5000 INJECTION, SOLUTION INTRAVENOUS; SUBCUTANEOUS at 04:25

## 2017-12-04 RX ADMIN — METOPROLOL TARTRATE 25 MG: 25 TABLET ORAL at 09:00

## 2017-12-04 RX ADMIN — WATER 1 G: 1 INJECTION INTRAMUSCULAR; INTRAVENOUS; SUBCUTANEOUS at 06:03

## 2017-12-04 RX ADMIN — GABAPENTIN 300 MG: 300 CAPSULE ORAL at 08:56

## 2017-12-04 RX ADMIN — LISINOPRIL 20 MG: 20 TABLET ORAL at 08:56

## 2017-12-04 RX ADMIN — IBUPROFEN 800 MG: 200 TABLET, FILM COATED ORAL at 14:30

## 2017-12-04 RX ADMIN — GABAPENTIN 300 MG: 300 CAPSULE ORAL at 18:05

## 2017-12-04 RX ADMIN — HALOPERIDOL LACTATE 4 MG: 5 INJECTION, SOLUTION INTRAMUSCULAR at 21:39

## 2017-12-04 NOTE — ACP (ADVANCE CARE PLANNING)
Patient has designated __spouse and child______________________ to participate in his/her discharge plan and to receive any needed information.      Name: Unique Giang and [address] [place] [address] as pt  Phone number: [phone] and [phone]

## 2017-12-04 NOTE — PROGRESS NOTES
conducted an initial consultation and Spiritual Assessment for Aparna Davis, who is a 81 y.o.,female. Patients Primary Language is: [place]. According to the patients EMR Restorationism Affiliation is: [place]. The reason the Patient came to the hospital is:   Patient Active Problem List    Diagnosis Date Noted    Syncope 12/03/2017    Skin ulcer of left ankle, with fat layer exposed ([address] 11/16/2017    Venous ulcer of ankle, left (HCC) 11/16/2017    Weak pulse 11/16/2017    UTI (urinary tract infection) 11/06/2017    Encephalopathy 11/06/2017    LAURA (acute kidney injury) ([address] 11/06/2017    Iron (Fe) deficiency anemia 02/28/2017    Normocytic anemia     Stroke ([address]     Hypertension     DM (diabetes mellitus) ([address]     Type 2 diabetes mellitus ([address] 08/08/2013    CVA, old, hemiparesis ([address] 08/07/2013    Benign hypertension 08/07/2013    Acute on chronic renal insufficiency 07/11/2013    Leukocytosis 07/11/2013    Microscopic hematuria 07/11/2013    Rhabdomyolysis 07/11/2013    Injury of foot 07/10/2013    CKD (chronic kidney disease) stage 3, GFR 30-59 ml/min 06/24/2013    Abnormal finding on thyroid function test 06/11/2013    Anemia 06/11/2013    Chronic osteomyelitis of ankle ([address] 06/11/2013    Spinal stenosis of lumbar region 06/11/2013    Vitamin D deficiency 06/11/2013    Elevated TSH 06/25/2012        The  provided the following Interventions:  Initiated a relationship of care and support. Explored issues of win, spirituality and/or Protestant needs while hospitalized. Listened empathically. Provided chaplaincy education. Provided information about Spiritual Care Services. Offered prayer and assurance of continued prayers on patient's behalf. Chart reviewed. The following outcomes were achieved:  Patient shared some information about their medical narrative and spiritual journey/beliefs.   Patient processed feeling about current hospitalization. Patient expressed gratitude for the 's visit. Assessment:  Patient did not indicate any spiritual or Islam issues which require Spiritual Care Services interventions at this time. Patient does not have any Islam/cultural needs that will affect patients preferences in health care. Plan:  Chaplains will continue to follow and will provide pastoral care on an as needed or requested basis.  recommends bedside caregivers page  on duty if patient shows signs of acute spiritual or emotional distress.     [facility]   Staff [facility]   [phone]

## 2017-12-04 NOTE — WOUND CARE
Received IP WOUND CARE CONSULT per Dr. Dmitriy Osborn and reviewed EMR. Patient known to Wound Care Team. Visited patient at bedside, family present, no distress noted. Bilateral lower extremities assessed, multiple vascular wounds noted. Please see details below. Cleansed with Dermal Wound Cleanser and Normal Saline, dried with 4 x 4 gauze. Applied Aquacel AG sheet to wound bases. Wrapped bilateral lower extremities with roll gauze and secured with tape. Patient tolerated well. Possible d/c tomorrow, encouraged patient to keep appointment in [facility] clinic for 12.7. 16 with Dr. Emmy Mariscal. Discussed findings and wound care with [facility]. PLEASE SEE WOUND CARE ORDERS:                 12/04/17 1220   Wound Ankle Left;Medial;Distal   Date First Assessed: 11/16/17   POA: Yes  Wound Type: Vascular  Location: Ankle  Orientation: Left;Medial;Distal   DRESSING STATUS Removed   DRESSING TYPE Foam   Non-Pressure Injury Partial thickness (epider/derm)   Wound Length (cm) 2 cm   Wound Width (cm) 1.2 cm   Wound Depth (cm) 0.2   Wound Surface area (cm^3) 0.48 cm^2   Condition of Base Slough   Condition of Edges Open   Tissue Type Yellow   Tissue Type Percent Yellow 100   Drainage Amount  Small    Drainage Color Serous   Wound Odor Mild   Periwound Skin Condition Edematous; Erythema, blanchable   Cleansing and Cleansing Agents  Normal saline   Dressing Type Applied Other (Comment)  (Aquacel AG sheet, roll gauze, tape)   Procedure Tolerated Well   Wound Ankle Left;Medial;Proximal   Date First Assessed: 11/16/17   POA: Yes  Wound Type: Vascular  Location: Ankle  Orientation: Left;Medial;Proximal   DRESSING STATUS Removed   DRESSING TYPE Foam   Non-Pressure Injury Partial thickness (epider/derm)   Wound Length (cm) 2.5 cm   Wound Width (cm) 1.7 cm   Wound Depth (cm) 0.2   Wound Surface area (cm^3) 0.85 cm^2   Condition of Base Pink;Slough   Condition of Edges Open   Tissue Type Pink;Yellow   Tissue Type Percent Pink 60   Tissue Type Percent Yellow 40   Drainage Amount  Moderate   Drainage Color Serous   Wound Odor Mild   Periwound Skin Condition Edematous; Erythema, blanchable   Cleansing and Cleansing Agents  Normal saline   Dressing Type Applied Other (Comment)  (Aquacel AG sheet, roll gauze, tape)   Procedure Tolerated Well   Wound Ankle Left;Lateral   Date First Assessed/Time First Assessed: 11/16/17 1327   POA: Yes  Wound Type: Vascular  Location: Ankle  Orientation: Left;Lateral   DRESSING STATUS Removed   DRESSING TYPE Foam   Non-Pressure Injury Partial thickness (epider/derm)   Wound Length (cm) 5 cm   Wound Width (cm) 3.7 cm   Wound Depth (cm) 0.2   Wound Surface area (cm^3) 3.7 cm^2   Condition of Base Pink;Slough   Condition of Edges Open   Tissue Type Pink;Yellow   Tissue Type Percent Pink 50   Tissue Type Percent Yellow 50   Drainage Amount  Moderate   Drainage Color Serous   Wound Odor Mild   Periwound Skin Condition Edematous; Erythema, blanchable   Cleansing and Cleansing Agents  Normal saline   Dressing Type Applied Other (Comment)  (Aquacel AG sheet, roll gauze, tape)   Procedure Tolerated Well   Wound Foot Left;Medial   Date First Assessed/Time First Assessed: 12/04/17 1100   POA: Yes  Wound Type: Vascular  Location: Foot  Orientation: Left;Medial   DRESSING STATUS Removed   DRESSING TYPE Foam   Non-Pressure Injury Partial thickness (epider/derm)   Wound Length (cm) 1.4 cm   Wound Width (cm) 0.7 cm   Wound Depth (cm) 0.2   Wound Surface area (cm^3) 0.2 cm^2   Condition of Base Pink   Condition of Edges Open   Tissue Type Pink   Tissue Type Percent Pink 100   Drainage Amount  Small    Drainage Color Serous   Wound Odor Mild   Periwound Skin Condition Edematous; Erythema, blanchable   Cleansing and Cleansing Agents  Normal saline   Dressing Type Applied Other (Comment)  (Aquacel AG sheet, Roll gauze, Tape)   Procedure Tolerated Well   Wound Leg Lower Right; Anterior; Lateral   Date First Assessed/Time First Assessed: 12/04/17 1100   POA: Yes  Wound Type: Vascular  Location: Leg Lower  Orientation: Right; Anterior; Lateral   DRESSING STATUS Removed   DRESSING TYPE Foam   Non-Pressure Injury Partial thickness (epider/derm)   Wound Length (cm) 1 cm   Wound Width (cm) 0.8 cm   Wound Depth (cm) 0.1   Wound Surface area (cm^3) 0.08 cm^2   Condition of Base Slough   Condition of Edges Open   Tissue Type Yellow   Tissue Type Percent Yellow 100   Drainage Amount  Small    Drainage Color Serous   Wound Odor Mild   Periwound Skin Condition Edematous; Erythema, blanchable   Cleansing and Cleansing Agents  Normal saline   Dressing Type Applied Other (Comment)  (Aquacel AG sheet, Roll gauze, Tape)   Procedure Tolerated Well   Wound Leg Lower Right; Anterior;Medial   Date First Assessed/Time First Assessed: 12/04/17 1100   POA: Yes  Wound Type: Vascular  Location: Leg Lower  Orientation: Right; Anterior;Medial   DRESSING STATUS Removed   DRESSING TYPE Foam   Non-Pressure Injury Partial thickness (epider/derm)   Wound Length (cm) 0.8 cm   Wound Width (cm) 0.7 cm   Wound Depth (cm) 0.1   Wound Surface area (cm^3) 0.06 cm^2   Condition of Base Pink   Condition of Edges Open   Tissue Type Pink   Tissue Type Percent Pink 100   Drainage Amount  None   Wound Odor None   Periwound Skin Condition Edematous; Erythema, blanchable   Cleansing and Cleansing Agents  Normal saline   Dressing Type Applied Other (Comment)  (Aquacel AG sheet, Roll gauze, Tape)   Procedure Tolerated Well

## 2017-12-04 NOTE — PROGRESS NOTES
Medicine Progress Note    Patient: Kaylie Kiran   Age:  81 y.o.  DOA: 12/3/2017   Admit Dx / CC: Syncope  LOS:  LOS: 1 day     Assessment/Plan   Principal Problem:    Syncope (12/3/2017)    Active Problems:    Benign hypertension (8/7/2013)      CKD (chronic kidney disease) stage 3, GFR 30-59 ml/min (6/24/2013)      Overview: Worst noted (08/06/13) BUN/sCr: 54/2.49, GFR 20. UTI (urinary tract infection) (11/6/2017)        Additional Plan notes       Plan:  1. Syncope & collapse - unclear etiology, Tele monitoring , Echo pending  2. UTI - IV Rocephin   3. H/o HTN - continue home meds, increase lopressor    Encephalopathy resolved, patient back to baseline    DISPO     Anticipated Date of Discharge: 1 day  Anticipated Disposition (home, SNF) : home    Subjective:   Patient seen and examined. Feeling better today, patient alert, much less confused    Objective:     Visit Vitals    /72 (BP 1 Location: Right arm, BP Patient Position: Supine)    Pulse 86    Temp 98.5 °F (36.9 °C)    Resp 16    Ht 5' 6\" (1.676 m)    Wt 75 kg (165 lb 5.5 oz)    SpO2 97%    BMI 26.69 kg/m2       Physical Exam:  General appearance: alert, cooperative, no distress, appears stated age  Head: Normocephalic, without obvious abnormality, atraumatic  Neck: supple, trachea midline  Lungs: clear to auscultation bilaterally  Heart: regular rate and rhythm, S1, S2 normal, no murmur, click, rub or gallop  Abdomen: soft, non-tender. Bowel sounds normal. No masses,  no organomegaly  Extremities: extremities normal, atraumatic, no cyanosis or edema  Skin: Skin color, texture, turgor normal. No rashes or lesions  Neurologic: Grossly normal    Intake and Output:  Current Shift:     Last three shifts:  12/02 1901 - 12/04 0700  In: 1186.3 [P.O.:290;  I.V.:896.3]  Out: 950 [Urine:950]    Lab/Data Reviewed:  CMP:   Lab Results   Component Value Date/Time     12/04/2017 04:35 AM    K 3.4 (L) 12/04/2017 04:35 AM     (H) 12/04/2017 04:35 AM    CO2 22 12/04/2017 04:35 AM    AGAP 10 12/04/2017 04:35 AM    GLU 85 12/04/2017 04:35 AM    BUN 27 (H) 12/04/2017 04:35 AM    CREA 1.02 12/04/2017 04:35 AM    GFRAA >60 12/04/2017 04:35 AM    GFRNA 52 (L) 12/04/2017 04:35 AM    CA 8.4 (L) 12/04/2017 04:35 AM    ALB 3.0 (L) 12/04/2017 04:35 AM    TP 6.3 (L) 12/04/2017 04:35 AM    GLOB 3.3 12/04/2017 04:35 AM    AGRAT 0.9 12/04/2017 04:35 AM    SGOT 16 12/04/2017 04:35 AM    ALT 17 12/04/2017 04:35 AM     CBC:   Lab Results   Component Value Date/Time    WBC 6.1 12/04/2017 04:35 AM    HGB 10.4 (L) 12/04/2017 04:35 AM    HCT 33.3 (L) 12/04/2017 04:35 AM     12/04/2017 04:35 AM     All Cardiac Markers in the last 24 hours:   Lab Results   Component Value Date/Time    TROIQ <0.02 12/03/2017 09:00 PM    TROIQ <0.02 12/03/2017 04:42 PM       Medications Reviewed:  Current Facility-Administered Medications   Medication Dose Route Frequency    metoprolol tartrate (LOPRESSOR) tablet 50 mg  50 mg Oral BID    lisinopril (PRINIVIL, ZESTRIL) tablet 20 mg  20 mg Oral DAILY    gabapentin (NEURONTIN) capsule 300 mg  300 mg Oral TID    0.9% sodium chloride infusion  75 mL/hr IntraVENous CONTINUOUS    heparin (porcine) injection 5,000 Units  5,000 Units SubCUTAneous Q12H    cefTRIAXone (ROCEPHIN) 1 g in sterile water (preservative free) 10 mL IV syringe  1 g IntraVENous Q24H    haloperidol lactate (HALDOL) injection 4 mg  4 mg IntraVENous Q6H PRN    LORazepam (ATIVAN) injection 1 mg  1 mg IntraVENous Q3H PRN    hydroCHLOROthiazide (HYDRODIURIL) tablet 25 mg  25 mg Oral DAILY       Michael Franks MD    December 4, 2017

## 2017-12-04 NOTE — PROGRESS NOTES
Problem: Falls - Risk of  Goal: *Absence of Falls  Document Tomasa Fall Risk and appropriate interventions in the flowsheet.    Outcome: Progressing Towards Goal  Fall Risk Interventions:  Mobility Interventions: Bed/chair exit alarm    Mentation Interventions: Bed/chair exit alarm, Door open when patient unattended, Reorient patient, Update white board, Room close to nurse's station    Medication Interventions: Bed/chair exit alarm    Elimination Interventions: Call light in reach, Bed/chair exit alarm    History of Falls Interventions: Bed/chair exit alarm, Door open when patient unattended, Investigate reason for fall, Room close to nurse's station

## 2017-12-04 NOTE — INTERDISCIPLINARY ROUNDS
Patient off the floor for 2D Echo during rounds. Primary RN without concerns. Dispo: home.  Oumou Garcia, AVRIL-BC

## 2017-12-04 NOTE — PROGRESS NOTES
1934 --  Bedside, Verbal and Written shift change report given to [address]  (oncoming nurse) by Heriberto Mcleod (offgoing nurse). Report included the following information SBAR, Kardex, Intake/Output, MAR and Recent Results. Unable to complete Required documentation, pt A/Ox0, will continue to monitor. Wound consult to be added. Pt was evaluated by Lawanda Murillo (Podiatry) 11/16/17 for fall with injury to LLE and RLE. Fall bracelet placed on pt, sign placed at bedside. Pt has had 2 falls within 30 days. 0000 -- Shift reassessment, pt condition unchanged, will continue to monitor. 0220 -- Pt provided with bath, gown and linen changed. Mexiplex placed bilateral LE for weeping and protection. 0430 -- Shift reassessment, pt condition unchanged, will continue to monitor. 0715 -- Bedside, Verbal and Written shift change report given to Heriberto Mcleod (oncoming nurse) by ARMOND (offgoing nurse).   Report included the following information SBAR, Kardex, Intake/Output, MAR and Recent Results

## 2017-12-04 NOTE — MANAGEMENT PLAN
[facility]   Discharge Planning/ Assessment    Reasons for Intervention:   Interviewed patient. Verified demographics listed on face sheet with patient; all information correct. Pt has Medicare A&B and AARP for secondary. Patient stated their PCP is Dr Nga Collier and last appt 3 weeks ago. Patient lives in single family home with  and daughter. Patient's NOK is  and daughter: Eliud Harley and Mj Faizan at [phone] and [phone] . Patient moderately independent with ADLs prior to admission. DME prior to admission: cane. Has rolling walker.  Discharge plan is Home and she denies need for [address] [employer] RN BSN  Outcomes Manager  Pager # [phone]  High Risk Criteria  [x] Yes  []No   Physician Referral  [] Yes  []No        Date    Nursing Referral  [] Yes  []No        Date    Patient/Family Request  [] Yes  []No        Date       Resources:    Medicare  [x] Yes  []No   Medicaid  [] Yes  []No   No Resources  [] Yes  []No   Private Insurance  [x] Yes  []No    Name/Phone Number    Other  [] Yes  []No        (i.e. Workman's Comp)         Prior Services:    Prior Services  [x] Yes  []No   Home Health  [x] Yes  []No   [address] [place]  [] Yes  []No        Number of [address]  [] Yes  []No       Meals on Wheels  [] Yes  []No   Office on Aging  [] Yes  []No   Transportation Services  [] Yes  []No   Nursing Home  [] Yes  []No        Nursing Home Name    [address]  [] Yes  []No        [address] Name    Other       Information Source:      Information obtained from  [x] Patient  [] Parent   [] Katharina Coels  [] Child  [] Spouse   [] Significant Other/Partner   [] Friend      [] EMS    [] Nursing Home Chart          [] Other:   Chart Review  [x] Yes  []No     Family/Support System:    Patient lives with  [] Alone    [x] Spouse   [] Significant Other  [x] Children  [] Caretaker   [] Parent  [] Sibling     [] Other Other Support System:    Is the patient responsible for care of others  [] Yes  [x]No   Information of person caring for patient on  discharge    Managers financial affairs independently  [x] Yes  []No   If no, explain:      Status Prior to Admission:    Mental Status  [x] Awake  [x] Alert  [x] Oriented  [] Quiet/Calm [] Lethargic/Sedated   [] Disoriented  [] Restless/Anxious  [] Combative   Personal Care  [] Dependent  [x] [address]  [] Requires Assistance   Meal Preparation Ability  [] Independent   [] Standby Assistance   [] Minimal Assistance   [x] Moderate Assistance  [] Maximum Assistance     [] Total Assistance   Chores  [] Independent with Chores   [] [address] Resident   [x] Requires Assistance   Bowel/Bladder  [x] Continent  [] Catheter  [] Incontinent  [] Ostomy Self-Care    [] Urine Diversion Self-Care  [] Maximum Assistance     [] Total Assistance   Number of Persons needed for assistance    DME at home  [] Alka Engel Sis  [x] Bethany Engel   [] Commode    [] Bathroom/Grab Bars  [] Hospital Bed  [] Nebulizer  [] Oxygen           [] Raised Toilet Seat  [] Shower Chair  [] Side Rails for Bed   [] Tub Transfer Bench   [x] Mingo Fuentes  [] Braden Canavan, Standard      [] Other:   Vendor      Treatment Presently Receiving:    Current Treatments  [] Chemotherapy  [] Dialysis  [] Insulin  [] IVAB [x] IVF   [] O2  [] PCA   [] PT   [] RT   [] Tube Feedings   [] Wound Care     Psychosocial Evaluation:    Verbalized Knowledge of Disease Process  [] Patient  []Family   Coping with Disease Process  [] Patient  []Family   Requires Further Counseling Coping with Disease Process  [] Patient  []Family     Identified Projected Needs:    Home Health Aid  [] Yes  []No   Transportation  [] Yes  []No   Education  [] Yes  []No        Specific Education     Financial Counseling  [] Yes  []No   Inability to Care for Self/Will Require 24 hour care  [] Yes  []No   Pain Management  [] Yes  []No   Home Infusion Therapy  [] Yes  []No   Oxygen Therapy  [] Yes  []No   DME  [] Yes  []No   Long Term Care Placement  [] Yes  []No   Rehab  [] Yes  []No   Physical Therapy  [x] Yes  []No   Needs Anticipated At This Time  [x] Yes  []No     Intra-Hospital Referral:    [facility]  [] Yes  []No     [] Yes  []No   Patient Representative  [] Yes  []No   Staff for Teaching Needs  [] Yes  []No   Specialty Teaching Needs     Diabetic Educator  [] Yes  []No   Referral for Diabetic Educator Needed  [] Yes  []No  If Yes, place order for Nutritionist or Diabetic Consult     Tentative Discharge Plan:    Home with No Services  [] Yes  []No   Home with [address]  [] Yes  []No        If Yes, specify type    Home Care Program  [x] Yes  []No        If Yes, specify type SN, PT/OT   Meals on Wheels  [] Yes  []No   Office of Aging  [] Yes  []No   NHP  [] Yes  []No   Return to the [employer]  [] Yes  []No   Rehab Therapy  [] Yes  []No   Acute Rehab  [] Yes  []No   Subacute Rehab  [] Yes  []No   Private Care  [] Yes  []No   Substance Abuse Referral  [] Yes  []No   Transportation  [] Yes  []No   Chore Service  [] Yes  []No   Inpatient Hospice  [] Yes  []No   OP RT  [] Yes  [] No   OP Hemo  [] Yes  [] No   OP PT  [] Yes  []No   Support Group  [] Yes  []No   Reach to Recovery  [] Yes  []No   OP Oncology Clinic  [] Yes  []No   Clinic Appointment  [] Yes  []No   DME  [] Yes  []No   Comments    Name of D/C Planner or  Given to Patient or Family Rani Vazquez RN BSN  Outcomes Manager  Pager # [phone]   Phone Number         Extension    Date 12/4/2017   Time 0830   If you are discharged home, whom do you designate to participate in your discharge plan and receive any information needed?      Enter name of designee  and daughter        Phone # of designee         Address of designee         Updated         Patient refused to designate any           individual

## 2017-12-04 NOTE — ROUTINE PROCESS
9543- Assumed care. Pt in bed sleeping. NAD.     0900- Due meds given. Tolerated well. Shift assessment completed. No respiratory distress noted. No complaints of pain. Alert to self only. Call light in reach. Bed alarm on.     1300- Pt taken to Echo    1400- Pt back from echo    1500- Shift reassessment completed. Complaints of bilateral leg pain from \"wound care\". Order for motrin received. Given. Pt is now more alert and oriented x 4. Call light in reach. Bedside and Verbal shift change report given to [address] [place] [address] (oncoming nurse) by Romero Miller RN   (offgoing nurse). Report included the following information SBAR, Kardex, Procedure Summary, Intake/Output, MAR, Recent Results and Med Rec Status.

## 2017-12-05 VITALS
TEMPERATURE: 97.3 F | RESPIRATION RATE: 18 BRPM | HEART RATE: 60 BPM | DIASTOLIC BLOOD PRESSURE: 71 MMHG | OXYGEN SATURATION: 92 % | WEIGHT: 165 LBS | HEIGHT: 66 IN | BODY MASS INDEX: 26.52 KG/M2 | SYSTOLIC BLOOD PRESSURE: 126 MMHG

## 2017-12-05 PROCEDURE — 74011250637 HC RX REV CODE- 250/637: Performed by: NURSE PRACTITIONER

## 2017-12-05 PROCEDURE — 97162 PT EVAL MOD COMPLEX 30 MIN: CPT

## 2017-12-05 PROCEDURE — 74011250637 HC RX REV CODE- 250/637: Performed by: HOSPITALIST

## 2017-12-05 PROCEDURE — 97165 OT EVAL LOW COMPLEX 30 MIN: CPT

## 2017-12-05 PROCEDURE — 74011250636 HC RX REV CODE- 250/636: Performed by: HOSPITALIST

## 2017-12-05 PROCEDURE — 97530 THERAPEUTIC ACTIVITIES: CPT

## 2017-12-05 PROCEDURE — 74011000250 HC RX REV CODE- 250: Performed by: HOSPITALIST

## 2017-12-05 PROCEDURE — 77030020263 HC SOL INJ SOD CL0.9% LFCR 1000ML

## 2017-12-05 RX ORDER — METOPROLOL TARTRATE 50 MG/1
50 TABLET ORAL 2 TIMES DAILY
Qty: 60 TAB | Refills: 0 | Status: ON HOLD | OUTPATIENT
Start: 2017-12-05 | End: 2018-10-12

## 2017-12-05 RX ORDER — LEVOFLOXACIN 500 MG/1
500 TABLET, FILM COATED ORAL
Qty: 3 TAB | Refills: 0 | Status: SHIPPED | OUTPATIENT
Start: 2017-12-07 | End: 2018-01-03 | Stop reason: ALTCHOICE

## 2017-12-05 RX ORDER — POTASSIUM CHLORIDE 20 MEQ/1
20 TABLET, EXTENDED RELEASE ORAL
Status: COMPLETED | OUTPATIENT
Start: 2017-12-05 | End: 2017-12-05

## 2017-12-05 RX ADMIN — IBUPROFEN 800 MG: 200 TABLET, FILM COATED ORAL at 01:23

## 2017-12-05 RX ADMIN — WATER 1 G: 1 INJECTION INTRAMUSCULAR; INTRAVENOUS; SUBCUTANEOUS at 05:00

## 2017-12-05 RX ADMIN — METOPROLOL TARTRATE 50 MG: 50 TABLET ORAL at 09:54

## 2017-12-05 RX ADMIN — GABAPENTIN 300 MG: 300 CAPSULE ORAL at 09:54

## 2017-12-05 RX ADMIN — HYDROCHLOROTHIAZIDE 25 MG: 25 TABLET ORAL at 09:54

## 2017-12-05 RX ADMIN — LISINOPRIL 20 MG: 20 TABLET ORAL at 09:54

## 2017-12-05 RX ADMIN — HEPARIN SODIUM 5000 UNITS: 5000 INJECTION, SOLUTION INTRAVENOUS; SUBCUTANEOUS at 03:27

## 2017-12-05 RX ADMIN — SODIUM CHLORIDE 75 ML/HR: 900 INJECTION, SOLUTION INTRAVENOUS at 05:00

## 2017-12-05 RX ADMIN — POTASSIUM CHLORIDE 20 MEQ: 1500 TABLET, FILM COATED, EXTENDED RELEASE ORAL at 09:54

## 2017-12-05 NOTE — PROGRESS NOTES
Problem: Falls - Risk of  Goal: *Absence of Falls  Document Tomasa Fall Risk and appropriate interventions in the flowsheet.    Outcome: Progressing Towards Goal  Fall Risk Interventions:  Mobility Interventions: Assess mobility with egress test, Communicate number of staff needed for ambulation/transfer, Patient to call before getting OOB, PT Consult for mobility concerns, PT Consult for assist device competence, Utilize walker, cane, or other assitive device    Mentation Interventions: Adequate sleep, hydration, pain control, Bed/chair exit alarm, Door open when patient unattended, Increase mobility, More frequent rounding, Room close to nurse's station, Toileting rounds, Update white board    Medication Interventions: Bed/chair exit alarm, Evaluate medications/consider consulting pharmacy, Patient to call before getting OOB, Teach patient to arise slowly    Elimination Interventions: Call light in reach, Patient to call for help with toileting needs, Toileting schedule/hourly rounds    History of Falls Interventions: Consult care management for discharge planning, Door open when patient unattended, Room close to nurse's station

## 2017-12-05 NOTE — ROUTINE PROCESS
Bedside, Verbal and Written shift change report given to Oksana Herrera RN (oncoming nurse) by Patrecia Duverney, RN (offgoing nurse). Report included the following information SBAR, Kardex, Intake/Output, MAR, Recent Results, Med Rec Status, Procedure Summary and Cardiac Rhythm NSR w/PAC's.      9193 - Shift assessment completed. Pt alert and oriented x4, with some confusion at times. No respiratory distress noted. No c/o pain reported. Call bell within reach, bed in low position. Will continue to monitor.      1100 - I have reviewed discharge instructions with the patient, spouse and daughter. The patient, spouse and daughter verbalized understanding. Discharge medications reviewed with patient, spouse and daughter and appropriate educational materials and side effects teaching were provided. IV catheter discontinued intact. Site without signs and symptoms of complications. Dressing and pressure applied. Patient armband removed and shredded. 1200 - Pt discharged to home with her spouse and daughter. All pt belongings went with her.

## 2017-12-05 NOTE — DISCHARGE INSTRUCTIONS
FOLLOW UP VISIT Appointment in: Other (Specify) PCP in 1 week- repeat BMP      DISCHARGE SUMMARY from Nurse    PATIENT INSTRUCTIONS:    After general anesthesia or intravenous sedation, for 24 hours or while taking prescription Narcotics:  · Limit your activities  · Do not drive and operate hazardous machinery  · Do not make important personal or business decisions  · Do  not drink alcoholic beverages  · If you have not urinated within 8 hours after discharge, please contact your surgeon on call. Report the following to your surgeon:  · Excessive pain, swelling, redness or odor of or around the surgical area  · Temperature over 100.5  · Nausea and vomiting lasting longer than 4 hours or if unable to take medications  · Any signs of decreased circulation or nerve impairment to extremity: change in color, persistent  numbness, tingling, coldness or increase pain  · Any questions    What to do at Home:  Recommended activity: Activity as tolerated,     If you experience any of the following symptoms of dizziness, or signs/symptoms of urinary tract infection, please follow up with primary care physician. *  Please give a list of your current medications to your Primary Care Provider. *  Please update this list whenever your medications are discontinued, doses are      changed, or new medications (including over-the-counter products) are added. *  Please carry medication information at all times in case of emergency situations. These are general instructions for a healthy lifestyle:    No smoking/ No tobacco products/ Avoid exposure to second hand smoke  Surgeon General's Warning:  Quitting smoking now greatly reduces serious risk to your health.     Obesity, smoking, and sedentary lifestyle greatly increases your risk for illness    A healthy diet, regular physical exercise & weight monitoring are important for maintaining a healthy lifestyle    You may be retaining fluid if you have a history of heart failure or if you experience any of the following symptoms:  Weight gain of 3 pounds or more overnight or 5 pounds in a week, increased swelling in our hands or feet or shortness of breath while lying flat in bed. Please call your doctor as soon as you notice any of these symptoms; do not wait until your next office visit. Recognize signs and symptoms of STROKE:    F-face looks uneven    A-arms unable to move or move unevenly    S-speech slurred or non-existent    T-time-call 911 as soon as signs and symptoms begin-DO NOT go       Back to bed or wait to see if you get better-TIME IS BRAIN. Warning Signs of HEART ATTACK     Call 911 if you have these symptoms:   Chest discomfort. Most heart attacks involve discomfort in the center of the chest that lasts more than a few minutes, or that goes away and comes back. It can feel like uncomfortable pressure, squeezing, fullness, or pain.  Discomfort in other areas of the upper body. Symptoms can include pain or discomfort in one or both arms, the back, neck, jaw, or stomach.  Shortness of breath with or without chest discomfort.  Other signs may include breaking out in a cold sweat, nausea, or lightheadedness. Don't wait more than five minutes to call 911 - MINUTES MATTER! Fast action can save your life. Calling 911 is almost always the fastest way to get lifesaving treatment. Emergency Medical Services staff can begin treatment when they arrive -- up to an hour sooner than if someone gets to the hospital by car. The discharge information has been reviewed with the patient and daughter. The patient and daughter verbalized understanding. Discharge medications reviewed with the patient and daughter and appropriate educational materials and side effects teaching were provided.   ___________________________________________________________________________________________________________________________________    Patient armband removed and shredded. MyChart Activation    Thank you for enrolling in [address] Please follow the instructions below to securely access your online medical record. [employer] allows you to send messages to your doctor, view your test results, renew your prescriptions, schedule appointments, and more. How Do I Sign Up? 1. In your internet browser, go to [email or web address]. 2. Click on the First Time User? Click Here link in the Sign In box. You will see the New Member Sign Up page. 3. Enter your [employer] Access Code exactly as it appears below. You will not need to use this code after youve completed the sign-up process. If you do not sign up before the expiration date, you must request a new code. [employer] Access Code: [email or web address]  Expires: 2/6/2018  3:15 PM     4. Enter the last four digits of your Social Security Number (xxxx) and Date of Birth (mm/dd/yyyy) as indicated and click Submit. You will be taken to the next sign-up page. 5. Create a [employer] ID. This will be your [employer] login ID and cannot be changed, so think of one that is secure and easy to remember. 6. Create a [employer] password. You can change your password at any time. 7. Enter your Password Reset Question and Answer. This can be used at a later time if you forget your password. 8. Enter your e-mail address. You will receive e-mail notification when new information is available in [address] 9. Click Sign Up. You can now view your medical record. Additional Information    Remember, [employer] is NOT to be used for urgent needs. For medical emergencies, dial 911. Now available from your iPhone and Android! Fainting: Care Instructions  Your Care Instructions    When you faint, or pass out, you lose consciousness for a short time. A brief drop in blood flow to the brain often causes it. When you fall or lie down, more blood flows to your brain and you regain consciousness.   Emotional stress, pain, or overheating-especially if you have been standing-can make you faint. In these cases, fainting is usually not serious. But fainting can be a sign of a more serious problem. Your doctor may want you to have more tests to rule out other causes. The treatment you need depends on the reason why you fainted. The doctor has checked you carefully, but problems can develop later. If you notice any problems or new symptoms, get medical treatment right away. Follow-up care is a key part of your treatment and safety. Be sure to make and go to all appointments, and call your doctor if you are having problems. It's also a good idea to know your test results and keep a list of the medicines you take. How can you care for yourself at home? · Drink plenty of fluids to prevent dehydration. If you have kidney, heart, or liver disease and have to limit fluids, talk with your doctor before you increase your fluid intake. When should you call for help? Call 911 anytime you think you may need emergency care. For example, call if:  ? · You have symptoms of a heart problem. These may include:  ¨ Chest pain or pressure. ¨ Severe trouble breathing. ¨ A fast or irregular heartbeat. ¨ Lightheadedness or sudden weakness. ¨ Coughing up pink, foamy mucus. ¨ Passing out. After you call 911, the  may tell you to chew 1 adult-strength or 2 to 4 low-dose aspirin. Wait for an ambulance. Do not try to drive yourself. ? · You have symptoms of a stroke. These may include:  ¨ Sudden numbness, tingling, weakness, or loss of movement in your face, arm, or leg, especially on only one side of your body. ¨ Sudden vision changes. ¨ Sudden trouble speaking. ¨ Sudden confusion or trouble understanding simple statements. ¨ Sudden problems with walking or balance. ¨ A sudden, severe headache that is different from past headaches. ? · You passed out (lost consciousness) again. ? Watch closely for changes in your health, and be sure to contact your doctor if:  ? · You do not get better as expected. Where can you learn more? Go to http://dorinda-jn.info/. Enter N394 in the search box to learn more about \"Fainting: Care Instructions. \"  Current as of: March 20, 2017  Content Version: 11.4  © 9993-8316 [employer]). Care instructions adapted under license by [employer] (which disclaims liability or warranty for this information). If you have questions about a medical condition or this instruction, always ask your healthcare professional. [address] any warranty or liability for your use of this information. DASH Diet: Care Instructions  Your Care Instructions    The DASH diet is an eating plan that can help lower your blood pressure. DASH stands for Dietary Approaches to Stop Hypertension. Hypertension is high blood pressure. The DASH diet focuses on eating foods that are high in calcium, potassium, and magnesium. These nutrients can lower blood pressure. The foods that are highest in these nutrients are fruits, vegetables, low-fat dairy products, nuts, seeds, and legumes. But taking calcium, potassium, and magnesium supplements instead of eating foods that are high in those nutrients does not have the same effect. The DASH diet also includes whole grains, fish, and poultry. The DASH diet is one of several lifestyle changes your doctor may recommend to lower your high blood pressure. Your doctor may also want you to decrease the amount of sodium in your diet. Lowering sodium while following the DASH diet can lower blood pressure even further than just the DASH diet alone. Follow-up care is a key part of your treatment and safety. Be sure to make and go to all appointments, and call your doctor if you are having problems. It's also a good idea to know your test results and keep a list of the medicines you take. How can you care for yourself at home?   Following the [facility] diet  · Eat 4 to 5 servings of fruit each day. A serving is 1 medium-sized piece of fruit, ½ cup chopped or canned fruit, 1/4 cup dried fruit, or 4 ounces (½ cup) of fruit juice. Choose fruit more often than fruit juice. · Eat 4 to 5 servings of vegetables each day. A serving is 1 cup of lettuce or raw leafy vegetables, ½ cup of chopped or cooked vegetables, or 4 ounces (½ cup) of vegetable juice. Choose vegetables more often than vegetable juice. · Get 2 to 3 servings of low-fat and fat-free dairy each day. A serving is 8 ounces of milk, 1 cup of yogurt, or 1 ½ ounces of cheese. · Eat 6 to 8 servings of grains each day. A serving is 1 slice of bread, 1 ounce of dry cereal, or ½ cup of cooked rice, pasta, or cooked cereal. Try to choose whole-grain products as much as possible. · Limit lean meat, poultry, and fish to 2 servings each day. A serving is 3 ounces, about the size of a deck of cards. · Eat 4 to 5 servings of nuts, seeds, and legumes (cooked dried beans, lentils, and split peas) each week. A serving is 1/3 cup of nuts, 2 tablespoons of seeds, or ½ cup of cooked beans or peas. · Limit fats and oils to 2 to 3 servings each day. A serving is 1 teaspoon of vegetable oil or 2 tablespoons of salad dressing. · Limit sweets and added sugars to 5 servings or less a week. A serving is 1 tablespoon jelly or jam, ½ cup sorbet, or 1 cup of lemonade. · Eat less than 2,300 milligrams (mg) of sodium a day. If you limit your sodium to 1,500 mg a day, you can lower your blood pressure even more. Tips for success  · Start small. Do not try to make dramatic changes to your diet all at once. You might feel that you are missing out on your favorite foods and then be more likely to not follow the plan. Make small changes, and stick with them. Once those changes become habit, add a few more changes. · Try some of the following:  ¨ Make it a goal to eat a fruit or vegetable at every meal and at snacks.  This will make it easy to get the recommended amount of fruits and vegetables each day. ¨ Try yogurt topped with fruit and nuts for a snack or healthy dessert. ¨ Add lettuce, tomato, cucumber, and onion to sandwiches. ¨ Combine a ready-made pizza crust with low-fat mozzarella cheese and lots of vegetable toppings. Try using tomatoes, squash, spinach, broccoli, carrots, cauliflower, and onions. ¨ Have a variety of cut-up vegetables with a low-fat dip as an appetizer instead of chips and dip. ¨ Sprinkle sunflower seeds or chopped almonds over salads. Or try adding chopped walnuts or almonds to cooked vegetables. ¨ Try some vegetarian meals using beans and peas. Add garbanzo or kidney beans to salads. Make burritos and tacos with mashed villatoro beans or black beans. Where can you learn more? Go to http://dorinda-jn.info/. Enter C483 in the search box to learn more about \"DASH Diet: Care Instructions. \"  Current as of: September 21, 2016  Content Version: 11.4  © 0732-6631 [employer]. Care instructions adapted under license by [employer] (which disclaims liability or warranty for this information). If you have questions about a medical condition or this instruction, always ask your healthcare professional. [place] [address] any warranty or liability for your use of this information. Acute High Blood Pressure: Care Instructions  Your Care Instructions    Acute high blood pressure is very high blood pressure. It's a serious problem. Very high blood pressure can damage your brain, heart, eyes, and kidneys. You may have been given medicines to lower your blood pressure. You may have gotten them as pills or through a needle in one of your veins. This is called an IV. And maybe you were given other medicines too. These can be needed when high blood pressure causes other problems.   To keep your blood pressure at a lower level, you may need to make healthy lifestyle changes. And you will probably need to take medicines. Be sure to follow up with your doctor about your blood pressure and what you can do about it. Follow-up care is a key part of your treatment and safety. Be sure to make and go to all appointments, and call your doctor if you are having problems. It's also a good idea to know your test results and keep a list of the medicines you take. How can you care for yourself at home? · See your doctor as often as he or she recommends. This is to make sure your blood pressure is under control. You will probably go at least 2 times a year. But it may be more often at first.  · Take your blood pressure medicine exactly as prescribed. You may take one or more types. They include diuretics, beta-blockers, ACE inhibitors, calcium channel blockers, and angiotensin II receptor blockers. Call your doctor if you think you are having a problem with your medicine. · If you take blood pressure medicine, talk to your doctor before you take decongestants or anti-inflammatory medicine, such as ibuprofen. These can raise blood pressure. · Learn how to check your blood pressure at home. Check it often. · Ask your doctor if it's okay to drink alcohol. · Talk to your doctor about lifestyle changes that can help blood pressure. These include being active and not smoking. When should you call for help? Call 911 anytime you think you may need emergency care. This may mean having symptoms that suggest that your blood pressure is causing a serious heart or blood vessel problem. Your blood pressure may be over 180/110. ? For example, call 911 if:  ? · You have symptoms of a heart attack. These may include:  ¨ Chest pain or pressure, or a strange feeling in the chest.  ¨ Sweating. ¨ Shortness of breath. ¨ Nausea or vomiting. ¨ Pain, pressure, or a strange feeling in the back, neck, jaw, or upper belly or in one or both shoulders or arms. ¨ Lightheadedness or sudden weakness.   ¨ A fast or irregular heartbeat. ? · You have symptoms of a stroke. These may include:  ¨ Sudden numbness, tingling, weakness, or loss of movement in your face, arm, or leg, especially on only one side of your body. ¨ Sudden vision changes. ¨ Sudden trouble speaking. ¨ Sudden confusion or trouble understanding simple statements. ¨ Sudden problems with walking or balance. ¨ A sudden, severe headache that is different from past headaches. ? · You have severe back or belly pain. ?Do not wait until your blood pressure comes down on its own. Get help right away. ?Call your doctor now or seek immediate care if:  ? · Your blood pressure is much higher than normal (such as 180/110 or higher), but you don't have symptoms. ? · You think high blood pressure is causing symptoms, such as:  ¨ Severe headache. ¨ Blurry vision. ? Watch closely for changes in your health, and be sure to contact your doctor if:  ? · Your blood pressure measures 140/90 or higher at least 2 times. That means the top number is 140 or higher or the bottom number is 90 or higher, or both. ? · You think you may be having side effects from your blood pressure medicine. ? · Your blood pressure is usually normal, but it goes above normal at least 2 times. Where can you learn more? Go to http://dorinda-jn.info/. Enter X161 in the search box to learn more about \"Acute High Blood Pressure: Care Instructions. \"  Current as of: September 21, 2016  Content Version: 11.4  © 9234-9502 [employer]. Care instructions adapted under license by [employer] (which disclaims liability or warranty for this information). If you have questions about a medical condition or this instruction, always ask your healthcare professional. [place] [address] any warranty or liability for your use of this information.          Lightheadedness or Faintness: Care Instructions  Your Care Instructions  Lightheadedness is a feeling that you are about to faint or \"pass out. \" You do not feel as if you or your surroundings are moving. It is different from vertigo, which is the feeling that you or things around you are spinning or tilting. Lightheadedness usually goes away or gets better when you lie down. If lightheadedness gets worse, it can lead to a fainting spell. It is common to feel lightheaded from time to time. Lightheadedness usually is not caused by a serious problem. It often is caused by a short-lasting drop in blood pressure and blood flow to your head that occurs when you get up too quickly from a seated or lying position. Follow-up care is a key part of your treatment and safety. Be sure to make and go to all appointments, and call your doctor if you are having problems. It's also a good idea to know your test results and keep a list of the medicines you take. How can you care for yourself at home? · Lie down for 1 or 2 minutes when you feel lightheaded. After lying down, sit up slowly and remain sitting for 1 to 2 minutes before slowly standing up. · Avoid movements, positions, or activities that have made you lightheaded in the past.  · Get plenty of rest, especially if you have a cold or flu, which can cause lightheadedness. · Make sure you drink plenty of fluids, especially if you have a fever or have been sweating. · Do not drive or put yourself and others in danger while you feel lightheaded. When should you call for help? Call 911 anytime you think you may need emergency care. For example, call if:  ? · You have symptoms of a stroke. These may include:  ¨ Sudden numbness, tingling, weakness, or loss of movement in your face, arm, or leg, especially on only one side of your body. ¨ Sudden vision changes. ¨ Sudden trouble speaking. ¨ Sudden confusion or trouble understanding simple statements. ¨ Sudden problems with walking or balance.   ¨ A sudden, severe headache that is different from past headaches. ? · You have symptoms of a heart attack. These may include:  ¨ Chest pain or pressure, or a strange feeling in the chest.  ¨ Sweating. ¨ Shortness of breath. ¨ Nausea or vomiting. ¨ Pain, pressure, or a strange feeling in the back, neck, jaw, or upper belly or in one or both shoulders or arms. ¨ Lightheadedness or sudden weakness. ¨ A fast or irregular heartbeat. After you call 911, the  may tell you to chew 1 adult-strength or 2 to 4 low-dose aspirin. Wait for an ambulance. Do not try to drive yourself. ? Watch closely for changes in your health, and be sure to contact your doctor if:  ? · Your lightheadedness gets worse or does not get better with home care. Where can you learn more? Go to http://dorinda-jn.info/. Enter A799 in the search box to learn more about \"Lightheadedness or Faintness: Care Instructions. \"  Current as of: March 20, 2017  Content Version: 11.4  © 2066-1550 [employer]. Care instructions adapted under license by [employer] (which disclaims liability or warranty for this information). If you have questions about a medical condition or this instruction, always ask your healthcare professional. [address] any warranty or liability for your use of this information. Low Sodium Diet (2,000 Milligram): Care Instructions  Your Care Instructions    Too much sodium causes your body to hold on to extra water. This can raise your blood pressure and force your heart and kidneys to work harder. In very serious cases, this could cause you to be put in the hospital. It might even be life-threatening. By limiting sodium, you will feel better and lower your risk of serious problems. The most common source of sodium is salt. People get most of the salt in their diet from canned, prepared, and packaged foods. Fast food and restaurant meals also are very high in sodium.  Your doctor will probably limit your sodium to less than 2,000 milligrams (mg) a day. This limit counts all the sodium in prepared and packaged foods and any salt you add to your food. Follow-up care is a key part of your treatment and safety. Be sure to make and go to all appointments, and call your doctor if you are having problems. It's also a good idea to know your test results and keep a list of the medicines you take. How can you care for yourself at home? Read food labels  · Read labels on cans and food packages. The labels tell you how much sodium is in each serving. Make sure that you look at the serving size. If you eat more than the serving size, you have eaten more sodium. · Food labels also tell you the Percent Daily Value for sodium. Choose products with low Percent Daily Values for sodium. · Be aware that sodium can come in forms other than salt, including monosodium glutamate (MSG), sodium citrate, and sodium bicarbonate (baking soda). MSG is often added to Asian food. When you eat out, you can sometimes ask for food without MSG or added salt. Buy low-sodium foods  · Buy foods that are labeled \"unsalted\" (no salt added), \"sodium-free\" (less than 5 mg of sodium per serving), or \"low-sodium\" (less than 140 mg of sodium per serving). Foods labeled \"reduced-sodium\" and \"light sodium\" may still have too much sodium. Be sure to read the label to see how much sodium you are getting. · Buy fresh vegetables, or frozen vegetables without added sauces. Buy low-sodium versions of canned vegetables, soups, and other canned goods. Prepare low-sodium meals  · Cut back on the amount of salt you use in cooking. This will help you adjust to the taste. Do not add salt after cooking. One teaspoon of salt has about 2,300 mg of sodium. · Take the salt shaker off the table. · Flavor your food with garlic, lemon juice, onion, vinegar, herbs, and spices.  Do not use soy sauce, lite soy sauce, steak sauce, onion salt, garlic salt, celery salt, mustard, or ketchup on your food. · Use low-sodium salad dressings, sauces, and ketchup. Or make your own salad dressings and sauces without adding salt. · Use less salt (or none) when recipes call for it. You can often use half the salt a recipe calls for without losing flavor. Other foods such as rice, pasta, and grains do not need added salt. · Rinse canned vegetables, and cook them in fresh water. This removes some-but not all-of the salt. · Avoid water that is naturally high in sodium or that has been treated with water softeners, which add sodium. Call your local water company to find out the sodium content of your water supply. If you buy bottled water, read the label and choose a sodium-free brand. Avoid high-sodium foods  · Avoid eating:  ¨ Smoked, cured, salted, and canned meat, fish, and poultry. ¨ Ham, kwon, hot dogs, and luncheon meats. ¨ Regular, hard, and processed cheese and regular peanut butter. ¨ Crackers with salted tops, and other salted snack foods such as pretzels, chips, and salted popcorn. ¨ Frozen prepared meals, unless labeled low-sodium. ¨ Canned and dried soups, broths, and bouillon, unless labeled sodium-free or low-sodium. ¨ Canned vegetables, unless labeled sodium-free or low-sodium. ¨ Western [place] fries, pizza, tacos, and other fast foods. ¨ Pickles, olives, ketchup, and other condiments, especially soy sauce, unless labeled sodium-free or low-sodium. Where can you learn more? Go to http://dorinda-jn.info/. Enter Z818 in the search box to learn more about \"Low Sodium Diet (2,000 Milligram): Care Instructions. \"  Current as of: May 12, 2017  Content Version: 11.4  © 3480-2690 [employer]. Care instructions adapted under license by [employer] (which disclaims liability or warranty for this information).  If you have questions about a medical condition or this instruction, always ask your healthcare professional. [employer] disclaims any warranty or liability for your use of this information. Urinary Tract Infection in Women: Care Instructions  Your Care Instructions    A urinary tract infection, or UTI, is a general term for an infection anywhere between the kidneys and the urethra (where urine comes out). Most UTIs are bladder infections. They often cause pain or burning when you urinate. UTIs are caused by bacteria and can be cured with antibiotics. Be sure to complete your treatment so that the infection goes away. Follow-up care is a key part of your treatment and safety. Be sure to make and go to all appointments, and call your doctor if you are having problems. It's also a good idea to know your test results and keep a list of the medicines you take. How can you care for yourself at home? · Take your antibiotics as directed. Do not stop taking them just because you feel better. You need to take the full course of antibiotics. · Drink extra water and other fluids for the next day or two. This may help wash out the bacteria that are causing the infection. (If you have kidney, heart, or liver disease and have to limit fluids, talk with your doctor before you increase your fluid intake.)  · Avoid drinks that are carbonated or have caffeine. They can irritate the bladder. · Urinate often. Try to empty your bladder each time. · To relieve pain, take a hot bath or lay a heating pad set on low over your lower belly or genital area. Never go to sleep with a heating pad in place. To prevent UTIs  · Drink plenty of water each day. This helps you urinate often, which clears bacteria from your system. (If you have kidney, heart, or liver disease and have to limit fluids, talk with your doctor before you increase your fluid intake.)  · Urinate when you need to. · Urinate right after you have sex. · Change sanitary pads often.   · Avoid douches, bubble baths, feminine hygiene sprays, and other feminine hygiene products that have deodorants. · After going to the bathroom, wipe from front to back. When should you call for help? Call your doctor now or seek immediate medical care if:  ? · Symptoms such as fever, chills, nausea, or vomiting get worse or appear for the first time. ? · You have new pain in your back just below your rib cage. This is called flank pain. ? · There is new blood or pus in your urine. ? · You have any problems with your antibiotic medicine. ? Watch closely for changes in your health, and be sure to contact your doctor if:  ? · You are not getting better after taking an antibiotic for 2 days. ? · Your symptoms go away but then come back. Where can you learn more? Go to http://dorinda-jn.info/. Enter T226 in the search box to learn more about \"Urinary Tract Infection in Women: Care Instructions. \"  Current as of: May 12, 2017  Content Version: 11.4  © 4422-5218 [employer]. Care instructions adapted under license by [employer] (which disclaims liability or warranty for this information). If you have questions about a medical condition or this instruction, always ask your healthcare professional. [place] [address] any warranty or liability for your use of this information. Vasovagal Syncope: Care Instructions  Your Care Instructions    Vasovagal syncope (say \"vke-gry-HCY-gul KLPU-fel-twb\")is sudden dizziness or fainting that can be set off by things such as pain, stress, fear, or trauma. You may sweat or feel lightheaded, sick to your stomach, or tingly. The problem causes the heart rate to slow and the blood vessels to widen, or dilate, for a short time. When this happens, blood pools in the lower body, and less blood goes to the brain. You can usually get relief by lying down with your legs raised (elevated). This helps more blood to flow to your brain and may help relieve symptoms like feeling dizzy.  Some doctors may recommend a technique that involves tensing your fists and arms. This type of fainting is often easy to predict. For example, it happens to some people when they see blood or have to get a shot. They may feel symptoms before they faint. An episode of vasovagal syncope usually responds well to self-care. Other treatment often isn't needed. But if the fainting keeps happening, your doctor may suggest further treatments. Follow-up care is a key part of your treatment and safety. Be sure to make and go to all appointments, and call your doctor if you are having problems. It's also a good idea to know your test results and keep a list of the medicines you take. How can you care for yourself at home? · Drink plenty of fluids to prevent dehydration. If you have kidney, heart, or liver disease and have to limit fluids, talk with your doctor before you increase your fluid intake. · Try to avoid things that you think may set off vasovagal syncope. · Talk to your doctor about any medicines you take. Some medicines may increase the chance of this condition occurring. · If you feel symptoms, lie down with your legs raised. Talk to your doctor about what to do if your symptoms come back. When should you call for help? Call 911 anytime you think you may need emergency care. For example, call if:  ? · You have symptoms of a heart problem. These may include:  ¨ Chest pain or pressure. ¨ Severe trouble breathing. ¨ A fast or irregular heartbeat. ? Watch closely for changes in your health, and be sure to contact your doctor if:  ? · You have more episodes of fainting at home. ? · You do not get better as expected. Where can you learn more? Go to http://dorinda-jn.info/. Enter L754 in the search box to learn more about \"Vasovagal Syncope: Care Instructions. \"  Current as of: March 20, 2017  Content Version: 11.4  © 9831-0992 [employer].  Care instructions adapted under license by [employer] (which disclaims liability or warranty for this information). If you have questions about a medical condition or this instruction, always ask your healthcare professional. [address] any warranty or liability for your use of this information.

## 2017-12-05 NOTE — PROGRESS NOTES
Problem: Mobility Impaired (Adult and Pediatric)  Goal: *Acute Goals and Plan of Care (Insert Text)  Physical Therapy Goals  Initiated 12/5/2017 and to be accomplished within 7 day(s)  1. Patient will move from supine to sit and sit to supine , scoot up and down and roll side to side in bed with modified independence. 2.  Patient will transfer from bed to chair and chair to bed with modified independence using the least restrictive device. 3.  Patient will perform sit to stand with modified independence. 4.  Patient will ambulate with modified independence for 150 feet with the least restrictive device. 5.  Patient will ascend/descend 3 stairs with  handrail(s) with supervision/set-up. Outcome: Progressing Towards Goal  physical Therapy EVALUATION    Patient: Jasvir Branch (62 y.o. female)  Date: 12/5/2017  Primary Diagnosis: Syncope        Precautions:   Fall    PROBLEM LIST:  Patient presents with the following problems:   Bed Mobility, Transfers, Gait, Strength, Balance, Home Exercise Program, Stairs and Precautions  ASSESSMENT :   Patient requires between minimal assistance/contact guard assist and supervision/set-up for bed mobility, transfers and ambulation. patient moved from bed to toilet and back using rw no pain reported  Reviewed and  Gave written home program of exercises needs reinforcement family also educated. Patient will benefit from skilled intervention to address the above impairments.   Patients rehabilitation potential is considered to be Fair  Factors which may influence rehabilitation potential include:   []         None noted  []         Mental ability/status  [x]         Medical condition  []         Home/family situation and support systems  []         Safety awareness  []         Pain tolerance/management  []         Other:      PLAN :  Recommendations and Planned Interventions:  [x]           Bed Mobility Training             [x]    Neuromuscular Re-Education  [x] Transfer Training                   []    Orthotic/Prosthetic Training  [x]           Gait Training                          []    Modalities  [x]           Therapeutic Exercises          []    Edema Management/Control  [x]           Therapeutic Activities            [x]    Patient and Family Training/Education  []           Other (comment):    Frequency/Duration: Patient will be followed by physical therapy 1-2 times per day/4-7 days per week to address goals. Discharge Recommendations: Rehab and Home Health  Further Equipment Recommendations for Discharge: N/A     SUBJECTIVE:   Patient stated I don't want home health .     OBJECTIVE DATA SUMMARY:     Past Medical History:   Diagnosis Date    CAD (coronary artery disease)     [facility]omyel[facility]     CKD (chronic kidney disease) stage 3, GFR 30-59 ml/min 06/24/2013    Worst noted (08/06/13) BUN/sCr: 54/2.49, GFR 20.     DM (diabetes mellitus) ([facility]     Elevated TSH 06/25/2012    5.08     Hypertension     Leukocytosis     Lumbar spinal stenosis     Normocytic anemia     Rhabdomyolysis     Splenomegaly     Stroke ([facility]     Vitamin D deficiency      Past Surgical History:   Procedure Laterality Date    COLONOSCOPY N/A 3/1/2017    COLONOSCOPY performed by Dakota Poe MD at [address] [place] [address] HX CHOLECYSTECTOMY      HX FREE SKIN GRAFT      HX HYSTERECTOMY      HX OPEN REDUCTION INTERNAL FIXATION Left     Tx LLE Ankle Trimalleolar Fx by Dr. Lazaro Guerra [facility])    HX ORTHOPAEDIC Left 06/11/2013    [facility] Dr. Lazaro Guerra - LLE Ankle Removal of Hardware     HX VEIN STRIPPING Bilateral 09/18/2013    Dr. Marcella Rader OPEN WOUND 20 SQ CM<       Barriers to Learning/Limitations: None  Compensate with: visual, verbal, tactile, kinesthetic cues/model    G CODES:Mobility  Current  CK= 40-59%   Goal  CJ= 20-39%  D/C  CK= 40-59%.   The severity rating is based on the [employer] Balance Scale3/5   [facility] Balance Scale3/5  0: Pt performs 25% or less of standing activity (Max assist) CN, 100% impaired. 1: Pt supports self with upper extremities but requires therapist assistance. Pt performs 25-50% of effort (Mod assist) CM, 80% to <100% impaired. 1+: Pt supports self with upper extremities but requires therapist assistance. Pt performs >50% effort. (Min assist). CL, 60% to <80% impaired. 2: Pt supports self independently with both upper extremities (walker, crutches, parallel bars). CL, 60% to <80% impaired. 2+: Pt support self independently with 1 upper extremity (cane, crutch, 1 parallel bar). CK, 40% to <60% impaired. 3: Pt stands without upper extremity support for up to 30 seconds. CK, 40% to <60% impaired. 3+: Pt stands without upper extremity support for 30 seconds or greater. CJ, 20% to <40% impaired. 4: Pt independently moves and returns center of gravity 1-2 inches in one plane. CJ, 20% to <40% impaired. 4+: Pt independently moves and returns center of gravity 1-2 inches in multiple planes. CI, 1% to <20% impaired. 5: Pt independently moves and returns center of gravity in all planes greater than 2 inches. CH, 0% impaired.       Eval Complexity: History: MEDIUM  Complexity : 1-2 comorbidities / personal factors will impact the outcome/ POC Exam:MEDIUM Complexity : 3 Standardized tests and measures addressing body structure, function, activity limitation and / or participation in recreation  Presentation: MEDIUM Complexity : Evolving with changing characteristics  Clinical Decision Making:Medium Complexity [facility] Standing Balance Scale3/5 Overall Complexity:MEDIUM    Prior Level of Function/Home Situation:I with adl's and ambulation with cane in home   Home Situation  Home Environment: Private residence  # Steps to Enter: 3  Rails to Enter: Yes  Hand Rails : Bilateral  One/Two Story Residence: One story  Living Alone: No  Support Systems: Child(krysta), Family member(s)  Patient Expects to be Discharged to[de-identified] Private residence  Current DME Used/Available at Home: Kandy Hamper or Shower Type: Tub/Shower combination  Critical Behavior:  Neurologic State: Alert  Orientation Level: Oriented X4  Cognition: Follows commands  Safety/Judgement: Fall prevention  Psychosocial  Patient Behaviors: Calm  Family  Behaviors: Calm;Supportive  Needs Expressed: Educational  Purposeful Interaction: Yes  Pt Identified Daily Priority: Clinical issues (comment)  Caritas Process: Nurture loving kindness;Establish trust;Teaching/learning; Attend basic human needs;Create healing environment;Supportive expression  Caring Interventions: Reassure; Therapeutic modalities  Reassure: Therapeutic listening; Informing; Acceptance; Support family;Quiet presence; Sit with patient;Caring rounds  Therapeutic Modalities: Humor; Intentional therapeutic touch  Skin Condition/Temp: Dry;Warm;Flaky; Other (comment); Fragile (BLE inflamed)  Family  Behaviors: Calm;Supportive  Skin Integrity: Wound (add Wound LDA) (BLE)  Skin Integumentary  Skin Color: Appropriate for ethnicity  Skin Condition/Temp: Dry;Warm;Flaky; Other (comment); Fragile (BLE inflamed)  Skin Integrity: Wound (add Wound LDA) (BLE)  Turgor: Epidermis thin w/ loss of subcut tissue  Hair Growth: Absent  Varicosities: Present  Strength:    Generally decreased 3+/5 both legs   Tone & Sensation:   Tone: Normal (UEs)  Sensation: Intact (UEs)  Range Of Motion:  AROM: Within functional limits (UEs)  Functional Mobility:  Bed Mobility:  Supine to Sit: Minimum assistance  Sit to Supine: Minimum assistance  Transfers:  Sit to Stand: Contact guard assistance  Stand to Sit: Contact guard assistance  Balance:   Sitting: Intact  Standing: With support  Standing - Static: Fair;Good  Standing - Dynamic : Fair  Ambulation/Gait Training:  Distance (ft): 7 Feet (ft) (x2)  Assistive Device: Walker, rolling  Ambulation - Level of Assistance: Contact guard assistance;Stand-by asssistance; Additional time;Assist x1  Gait Description (WDL): Exceptions to WDL  Gait Abnormalities: Decreased step clearance;Shuffling gait; Path deviations  Base of Support: Center of gravity altered  Speed/Hailey: Delayed; Slow  Step Length: Left shortened;Right shortened  Interventions: Safety awareness training;Verbal cues; Visual/Demos  Therapeutic Exercises:   ankle pumps, glut sets quad sets hip abd/add laq's shoulder abd/add log rolling  Handout given and reviewed copy placed in chart . Pain:  Pre treatment pain level:0  Post treatment pain level:0  Pain Scale 1: Numeric (0 - 10)  Pain Intensity 1: 0  Activity Tolerance:   Fair   Please refer to the flowsheet for vital signs taken during this treatment. After treatment:   []         Patient left in no apparent distress sitting up in chair  [x]         Patient left in no apparent distress in bed  [x]         Call bell left within reach  []         Nursing notified  []         Caregiver present  []         Bed alarm activated    COMMUNICATION/EDUCATION:   [x]         Fall prevention education was provided and the patient/caregiver indicated understanding. [x]         Patient/family have participated as able in goal setting and plan of care. [x]         Patient/family agree to work toward stated goals and plan of care. []         Patient understands intent and goals of therapy, but is neutral about his/her participation. []         Patient is unable to participate in goal setting and plan of care. Patient educated on the role of physical therapy during the acute stay  and the importance of mobility. VU.   Needs reinforcement     Thank you for this referral.  Ifeanyi Cui, PT   Time Calculation: 39 mins

## 2017-12-05 NOTE — PROGRESS NOTES
Problem: Self Care Deficits Care Plan (Adult)  Goal: *Acute Goals and Plan of Care (Insert Text)  Outcome: Resolved/Met Date Met: 12/05/17  Occupational Therapy EVALUATION/discharge    Patient: Suzie Maharaj (67 y.o. female)  Date: 12/5/2017  Primary Diagnosis: Syncope        Precautions:   Fall    ASSESSMENT AND RECOMMENDATIONS:  Based on the objective data described below, the patient presents at her PLOF with basic self care tasks and has supportive family at home to assist her prn. CGA given for functional standing and transfers. She has a SPC and RW at home for use. Discussed use of a seat in the shower for safety and patient's daughter to purchase after discharge. Patient declines need for further therapy. Skilled occupational therapy is not indicated at this time. Discharge Recommendations: None  Further Equipment Recommendations for Discharge: Shower chair      SUBJECTIVE:   Patient stated I have all these people at home to help me.     OBJECTIVE DATA SUMMARY:     Past Medical History:   Diagnosis Date    CAD (coronary artery disease)     [facility]     CKD (chronic kidney disease) stage 3, GFR 30-59 ml/min 06/24/2013    Worst noted (08/06/13) BUN/sCr: 54/2.49, GFR 20.     DM (diabetes mellitus) (HCC)     Elevated TSH 06/25/2012    5.08     Hypertension     Leukocytosis     Lumbar spinal stenosis     Normocytic anemia     Rhabdomyolysis     Splenomegaly     Stroke ([facility]     Vitamin D deficiency      Past Surgical History:   Procedure Laterality Date    COLONOSCOPY N/A 3/1/2017    COLONOSCOPY performed by Jennifer Herrera MD at [address] HX CHOLECYSTECTOMY      HX FREE SKIN GRAFT      HX HYSTERECTOMY      HX OPEN REDUCTION INTERNAL FIXATION Left     Tx LLE Ankle Trimalleolar F[facility]    HX ORTHOPAEDIC Left 06/11/2013    [facility] Dr. Dee Harrington [facility] Ankle Removal of Hardware     HX VEIN STRIPPING Bilateral 09/18/2013 Dr. Jade Wilburn 20 SQ CM<       Barriers to Learning/Limitations: None  Compensate with: visual, verbal, tactile, kinesthetic cues/model  G CODES:  Self Care  Current  CJ= 20-39%   Goal  CJ= 20-39%   D/C  CJ= 20-39%. The severity rating is based on the Other :functional assessment    Eval Complexity: History: LOW Complexity : Brief history review ; Examination: LOW Complexity : 1-3 performance deficits relating to physical, cognitive , or psychosocial skils that result in activity limitations and / or participation restrictions ; Decision Making:LOW Complexity : No comorbidities that affect functional and no verbal or physical assistance needed to complete eval tasks   Prior Level of Function/Home Situation: Pt required min assist at times with basic self care tasks and used a [facility] for functional mobility PTA. Home Situation  Home Environment: Private residence  One/Two Story Residence: One story  Living Alone: No  Support Systems: Child(krysta), Family member(s)  Patient Expects to be Discharged to[de-identified] Private residence  Current DME Used/Available at Home: Emily Tammy or Shower Type: Tub/Shower combination  [x]     Right hand dominant   []     Left hand dominant  Cognitive/Behavioral Status:  Neurologic State: Alert  Orientation Level: Oriented X4  Cognition: Follows commands  Safety/Judgement: Fall prevention  Skin: Intact on UEs  Edema: None noted in UEs  Vision/Perceptual:    Acuity: Within Defined Limits    Coordination:  Fine Motor Skills-Upper: Left Intact; Right Intact    Gross Motor Skills-Upper: Left Intact; Right Intact  Balance:  Sitting: Intact  Standing: With support  Strength:  Strength: Generally decreased, functional (UEs; 4/5 throughout)  Tone & Sensation:  Tone: Normal (UEs)  Sensation: Intact (UEs)  Range of Motion:  AROM: Within functional limits (UEs)  Functional Mobility and Transfers for ADLs:  Bed Mobility:  Supine to Sit: Minimum assistance  Sit to Supine: Minimum assistance  Transfers:  Sit to Stand: Contact guard assistance   Toilet Transfer : Contact guard assistance  ADL Assessment:  Feeding: Independent    Oral Facial Hygiene/Grooming: Independent    Bathing: Contact guard assistance    Upper Body Dressing: Setup    Lower Body Dressing: Minimum assistance    Toileting: Supervision    Pain:  Pre-treatment pain level: 0/10  Post treatment pain level  Pain Scale 1: Adult Nonverbal Pain Scale  Pain Intensity 1: 0  Pain Location 1: Leg  Pain Orientation 1: Left;Right  Pain Description 1: Aching;Burning  Pain Intervention(s) 1: Medication (see MAR)  Activity Tolerance:   Good  Please refer to the flowsheet for vital signs taken during this treatment. After treatment:   []  Patient left in no apparent distress sitting up in chair  [x]  Patient left in no apparent distress in bed  [x]  Call bell left within reach  []  Nursing notified  [x]  Caregiver present  []  Bed alarm activated    COMMUNICATION/EDUCATION: Patient educated on role of OT and POC. She verbalized understanding. Communication/Collaboration:  [x]      Home safety education was provided and the patient/caregiver indicated understanding. [x]      Patient/family have participated as able and agree with findings and recommendations. []      Patient is unable to participate in plan of care at this time.     Dariela Cancino MS OTR/L  Time Calculation: 10 mins

## 2017-12-05 NOTE — PROGRESS NOTES
Care Management Interventions  PCP Verified by CM: Yes (Dr Nancy Spurling)  Last Visit to PCP: 11/13/17  Mode of Transport at Discharge:  Other (see comment)  Transition of Care Consult (CM Consult): [address]: No  Reason Outside [place]: Patient declined ordered home care/hospice services  Current Support Network: Lives with Spouse, Own Home (and daughter)  Confirm Follow Up Transport: Family  Plan discussed with Pt/Family/Caregiver: Yes  Discharge Location  Discharge Placement: Home with home health

## 2017-12-05 NOTE — DISCHARGE SUMMARY
[facility] [place]    Discharge Summary    Patient: Carlton Moraes MRN: [id number]  CSN: [id number]    YOB: 1935  Age: 81 y.o. Sex: female    DOA: 12/3/2017 LOS:  LOS: 2 days   Discharge Date: 12/5/2017     Admission Diagnoses: Syncope    Discharge Diagnoses:    Problem List as of 12/5/2017  Date Reviewed: 11/16/2017          Codes Class Noted - Resolved    * (Principal)Syncope ICD-10-CM: R55  ICD-9-CM: 780.2  12/3/2017 - Present        Skin ulcer of left ankle, with fat layer exposed ([facility] ICD-10-CM: O60.273  ICD-9-CM: 707.13  11/16/2017 - Present        Venous ulcer of ankle, left ([facility] ICD-10-CM: E40.342  ICD-9-CM: 454.0  11/16/2017 - Present        Weak pulse ICD-10-CM: R09.89  ICD-9-CM: 785.9  11/16/2017 - Present        UTI (urinary tract infection) ICD-10-CM: N39.0  ICD-9-CM: 599.0  11/6/2017 - Present        Encephalopathy ICD-10-CM: G93.40  ICD-9-CM: 348.30  11/6/2017 - Present        LAURA (acute kidney injury) ([facility] ICD-10-CM: N17.9  ICD-9-CM: 584.9  11/6/2017 - Present        Iron (Fe) deficiency anemia ICD-10-CM: D50.9  ICD-9-CM: 280.9  2/28/2017 - Present        Normocytic anemia ICD-10-CM: D64.9  ICD-9-CM: 285. 9  Unknown - Present        Stroke ([facility] ICD-10-CM: I63.9  ICD-9-CM: 434.91  Unknown - Present        Hypertension ICD-10-CM: I10  ICD-9-CM: 401.9  Unknown - Present        DM (diabetes mellitus) ([facility] ICD-10-CM: E11.9  ICD-9-CM: 250.00  Unknown - Present        Type 2 diabetes mellitus ([facility] ICD-10-CM: E11.9  ICD-9-CM: 250.00  8/8/2013 - Present        CVA, old, hemiparesis ([facility] ICD-10-CM: B27.309  ICD-9-CM: 438.20  8/7/2013 - Present        Benign hypertension ICD-10-CM: I10  ICD-9-CM: 401.1  8/7/2013 - Present        Acute on chronic renal insufficiency ICD-10-CM: N28.9, N18.9  ICD-9-CM: 593.9, 585.9  7/11/2013 - Present    Overview Signed 2/2/2017 11:39 PM by Shamir Henry     Overview:    7/8/13 7/10/13  Cr  1.2 1.5             Leukocytosis ICD-10-CM: Z74.218  ICD-9-CM: 288.60 7/11/2013 - Present    Overview Signed 2/2/2017 11:39 PM by Saji Rios     Overview:   WBC 11.4  P 86             Microscopic hematuria ICD-10-CM: R31.29  ICD-9-CM: 599.72  7/11/2013 - Present        Rhabdomyolysis ICD-10-CM: M62.82  ICD-9-CM: 728.88  7/11/2013 - Present    Overview Signed 2/2/2017 11:39 PM by Saji Rios     Overview:   Ck  930, 1519             Injury of foot ICD-10-CM: O41.293L  ICD-9-CM: 959.7  7/10/2013 - Present        CKD (chronic kidney disease) stage 3, GFR 30-59 ml/min ICD-10-CM: N18.3  ICD-9-CM: 585.3  6/24/2013 - Present    Overview Signed 2/3/2017 12:25 AM by Murphy corado (08/06/13) BUN/sCr: 54/2.49, GFR 20. Abnormal finding on thyroid function test ICD-10-CM: R94.6  ICD-9-CM: 794.5  6/11/2013 - Present        Anemia ICD-10-CM: D64.9  ICD-9-CM: 285.9  6/11/2013 - Present        Chronic osteomyelitis of ankle ([facility] ICD-10-CM: M86.679  ICD-9-CM: 730.17  6/11/2013 - Present        Spinal stenosis of lumbar region ICD-10-CM: M48.061  ICD-9-CM: 724.02  6/11/2013 - Present        Vitamin D deficiency ICD-10-CM: E55.9  ICD-9-CM: 268.9  6/11/2013 - Present        Elevated TSH ICD-10-CM: R94.6  ICD-9-CM: 794.5  6/25/2012 - Present    Overview Signed 2/3/2017 12:29 AM by Saji Rios     5.08              RESOLVED: ARF (acute renal failure) ([facility] ICD-10-CM: N17.9  ICD-9-CM: 584.9  8/7/2013 - 8/8/2013        RESOLVED: Dehydration ICD-10-CM: E86.0  ICD-9-CM: 276.51  8/7/2013 - 8/8/2013        RESOLVED: Altered mental status ICD-10-CM: R41.82  ICD-9-CM: 780.97  8/7/2013 - 8/8/2013        RESOLVED: Confusion ICD-10-CM: R41.0  ICD-9-CM: 298.9  8/7/2013 - 8/8/2013        RESOLVED: Metabolic encephalopathy FCX-70-FH: G93.41  ICD-9-CM: 348.31  8/7/2013 - 8/8/2013              Discharge Condition: Stable    Discharge To: Home    Consults: Hospitalist    Hospital Course:   Clarisse Johnson is a 81 y.o. female who was admitted to the hospital secondary to syncope.   She is a poor historian and was unable to provide any significant info -- currently sleepy , arousable , is able to answer a few questions. She has a hx of chronic kidney disease, diabetes, and CAD who presented to the ED via EMS from home after she was found on the ground by her daughter RENETTA. Patient's daughter reported that she was in the shower when she heard the patient fall on the porch. She rushed out and found the patient with her purse underneath her and her cane behind her laying on the porch. The patient was unsure how she fell. She did not remember the incident at all. Her daughter reported that they were seen a couple of weeks ago due to the patient developing slurred speech and that slurred speech has persisted since then. Other than that, the patient seemed confused from baseline as her memory is usually pretty good, but she was unable to recall the events leading to her fall. She currently lived at home with her daughter and her . She is currently being followed by the wound clinic for her legs, they have been steadily improving since she started being seen at the wound clinic. No further complaints at this time. ER eval - CT head negative. Patient was admitted for further syncope workup. CT head was negative for acute process. 2D Echo showed Left ventricle: Systolic function was normal by visual assessment. Ejection  fraction was estimated to be 55 %. No obvious wall motion abnormalities identified in the views obtained. Wall  thickness was mildly to moderately increased. Left ventricular diastolic function parameters were normal for the patient's age. Patient was noted to be UTI positive on UA. Patient improved with IV Rocephin and was subsequently switched to PO Levaquin in anticipation of discharge. Mental status improved to baseline. Patient's daughter questioned patient being diagnosed with diabetes. Patient's daughter stated she is not currently on medications for diabetes.  HgbA1C was noted to be 5.9 on 11/6/2017. Patient and daughter were educated on DASH diet along with exercise and to follow up with PCP for HgbA1C recheck in February. Patient is stable for discharge home. Physical Exam:  General appearance: alert, cooperative, no distress, appears stated age  Lungs: clear to auscultation throughout, no wheezes   Heart: regular rate and rhythm, S1, S2 normal, no murmur, click, rub or gallop  Abdomen: soft, non-tender. Bowel sounds normal. No masses,  no organomegaly  Extremities: extremities normal, atraumatic, no cyanosis or edema  Skin: Skin color, texture, turgor normal. No rashes or lesions  Neurologic: Grossly normal  PSY: Mood and affect normal, appropriately behaved    Significant Diagnostic Studies:   CT Head 12/3/2017  IMPRESSION:   No evidence of acute intracranial process      2D Echo 12/4/2017:  SUMMARY:  Left ventricle: Systolic function was normal by visual assessment. Ejection fraction was estimated to be 55 %. No  obvious wall motion abnormalities identified in the views obtained. Wall  thickness was mildly to moderately increased. Left ventricular diastolic function parameters were normal for the patient's age. Discharge Medications:     Current Discharge Medication List      START taking these medications    Details   levoFLOXacin (LEVAQUIN) 500 mg tablet Take 1 Tab by mouth every fourty-eight (48) hours. Qty: 3 Tab, Refills: 0         CONTINUE these medications which have CHANGED    Details   metoprolol tartrate (LOPRESSOR) 50 mg tablet Take 1 Tab by mouth two (2) times a day. Qty: 60 Tab, Refills: 0         CONTINUE these medications which have NOT CHANGED    Details   gabapentin (NEURONTIN) 300 mg capsule Take 1 Cap by mouth three (3) times daily. 12/22/16, QTY#90, 30 Days, 2 Refills. Dr. Jovana Menard: 2      cholecalciferol (VITAMIN D3) 1,000 unit tablet Take 3,000 Units by mouth daily.       lisinopril-hydrochlorothiazide (Camella Rasp) 20-25 mg per tablet Take 1 Tab by mouth daily.          STOP taking these medications       cyclobenzaprine (FLEXERIL) 10 mg tablet Comments:   Reason for Stopping:               Activity: Activity as tolerated    Diet: Cardiac Regular Diet    Wound Care: None needed    Follow-up: PCP within 1 week- repeat BMP    Discharge time: 40 minutes   Ryann Rodriguez NP  12/5/2017, 9:27 AM

## 2017-12-05 NOTE — ROUTINE PROCESS
Bedside and Verbal shift change report given to OPAL Lloyd RN (oncoming nurse) by Alejandra Mathew RN (offgoing nurse). Report included the following information SBAR, Kardex and MAR. Patient had no acute events overnight. Currently resting in NAD. No express needs reported at this time.

## 2017-12-05 NOTE — PROGRESS NOTES
Met with the pt to discuss home care services, multiple family members were at the bedside. Pt refused home care stating, \" I go to the [facility] here at the hospital, don't want anyone else coming to my house, I'll f/u ith my doctor is I need anything else\".

## 2017-12-07 ENCOUNTER — HOME HEALTH ADMISSION (OUTPATIENT)
Dept: HOME HEALTH SERVICES | Facility: HOME HEALTH | Age: 82
End: 2017-12-07

## 2017-12-07 ENCOUNTER — HOSPITAL ENCOUNTER (OUTPATIENT)
Dept: WOUND CARE | Age: 82
Discharge: HOME | End: 2017-12-07
Payer: MEDICARE

## 2017-12-07 VITALS
TEMPERATURE: 98.6 F | SYSTOLIC BLOOD PRESSURE: 169 MMHG | RESPIRATION RATE: 18 BRPM | DIASTOLIC BLOOD PRESSURE: 70 MMHG | OXYGEN SATURATION: 94 % | HEART RATE: 87 BPM

## 2017-12-07 PROBLEM — L97.312 NON-PRESSURE CHRONIC ULCER OF RIGHT ANKLE WITH FAT LAYER EXPOSED (HCC): Status: ACTIVE | Noted: 2017-12-07

## 2017-12-07 PROBLEM — I73.9 VASCULAR DISEASE, PERIPHERAL (HCC): Status: ACTIVE | Noted: 2017-12-07

## 2017-12-07 PROCEDURE — 97602 WOUND(S) CARE NON-SELECTIVE: CPT

## 2017-12-07 PROCEDURE — 77030011255 HC DSG AQUACEL AG BMS -A: Performed by: PODIATRIST

## 2017-12-07 NOTE — WOUND CARE
Patient arrived to [facility] clinic via wheelchair with family, no distress noted. Assessed bilateral lower extremities. Left LE has noted edema and multiple scattered wounds, with moderate amount of serous drainage, and no odor. Right LE has blanchable redness with multiple scattered wounds. Cleansed, assessed, and dressed. Patient tolerated well. Follow up appointment in 2 weeks. Patient agreed to start home health care, will fax new orders to [facility]. Will coordinate scheduling appointment for vascular doctor as well. Left clinic, no distress noted. 12/07/17 0956   Wound Ankle Left;Medial;Distal   Date First Assessed: 11/16/17   POA: Yes  Wound Type: Vascular  Location: Ankle  Orientation: Left;Medial;Distal   DRESSING STATUS Removed   DRESSING TYPE Other (Comment)  (Roll gauze, Aquacel AG)   Non-Pressure Injury Partial thickness (epider/derm)   Wound Length (cm) 1.9 cm   Wound Width (cm) 2 cm   Wound Depth (cm) 0.1   Wound Surface area (cm^3) 0.38 cm^2   Condition of Base Pink;Slough   Condition of Edges Open   Tissue Type Pink;Yellow   Tissue Type Percent Pink 50   Tissue Type Percent Yellow 50   Drainage Amount  Moderate   Drainage Color Serous   Wound Odor None   Periwound Skin Condition Edematous; Erythema, blanchable   Cleansing and Cleansing Agents  Soap and water   Dressing Type Applied Other (Comment)  (Aquacel AG, roll gauze, tape)   Procedure Tolerated Well   Wound Ankle Left;Medial;Proximal   Date First Assessed: 11/16/17   POA: Yes  Wound Type: Vascular  Location: Ankle  Orientation: Left;Medial;Proximal   DRESSING STATUS Removed;Breakthrough drainage; Saturated   DRESSING TYPE Other (Comment)  (Roll Gauze, Aquacel AG)   Non-Pressure Injury Partial thickness (epider/derm)   Wound Length (cm) 2.6 cm   Wound Width (cm) 1.8 cm   Wound Depth (cm) 0.2   Wound Surface area (cm^3) 0.94 cm^2   Condition of Base Pink;Slough   Condition of Edges Open   Tissue Type Pink;Yellow   Tissue Type Percent Pink 70   Tissue Type Percent Yellow 30   Drainage Amount  Moderate   Drainage Color Serous   Wound Odor None   Periwound Skin Condition Edematous; Erythema, blanchable   Cleansing and Cleansing Agents  Soap and water   Dressing Type Applied Other (Comment)  (Aquacel AG sheet, roll gauze)   Procedure Tolerated Well   Wound Toe Left   Date First Assessed: 11/16/17   POA: Yes  Wound Type: Vascular  Location: Toe  Wound Description (Optional): (c)   Orientation: Left   DRESSING TYPE Open to air   Wound Toe Left   Date First Assessed: 11/16/17   POA: Yes  Wound Type: Vascular  Location: Toe  Wound Description (Optional): (c)   Orientation: Left   DRESSING TYPE Open to air   Wound Ankle Left;Lateral   Date First Assessed/Time First Assessed: 11/16/17 1327   POA: Yes  Wound Type: Vascular  Location: Ankle  Orientation: Left;Lateral   DRESSING STATUS Removed   DRESSING TYPE Other (Comment)  (Roll Gauze, Aquacel AG)   Non-Pressure Injury Partial thickness (epider/derm)   Wound Length (cm) 7.4 cm   Wound Width (cm) 4.5 cm   Wound Depth (cm) 0.2  (hypergranulated with slough)   Wound Surface area (cm^3) 6.66 cm^2   Condition of Base Granulation;Slough   Condition of Edges Open   Tissue Type Pink;Yellow   Tissue Type Percent Red 50   Tissue Type Percent Yellow 50   Drainage Amount  Moderate   Drainage Color Serous   Wound Odor None   Periwound Skin Condition Edematous; Erythema, blanchable   Cleansing and Cleansing Agents  Soap and water   Wound Ankle Left;Medial;Other (comment)   Date First Assessed/Time First Assessed: 11/08/17 1000   POA: Yes  Wound Type: Diabetic  Location: Ankle  Orientation: (c) Left;Medial;Other (comment)   DRESSING STATUS Removed   DRESSING TYPE Other (Comment)  (Roll Gauze, Aquacel Ag)   Wound Length (cm) 0.5 cm   Wound Width (cm) 1.3 cm   Wound Depth (cm) 0.1   Wound Surface area (cm^3) 0.06 cm^2   Condition of Base Pink   Tissue Type Pink   Tissue Type Percent Pink 100   Drainage Amount Moderate   Drainage Color Serous   Wound Odor None   Periwound Skin Condition Erythema, blanchable;Edematous   Cleansing and Cleansing Agents  Soap and water   Dressing Type Applied Other (Comment)  (Aquacel Ag, roll gauze, tape)   Procedure Tolerated Well   Wound Ankle Right   Date First Assessed/Time First Assessed: 02/26/17 0100   POA: Yes  Wound Type: Abrasion  Location: Ankle  Wound Description (Optional): diffuse area of abrasion  Orientation: Right   DRESSING STATUS Removed   DRESSING TYPE Other (Comment)  (Roll Gauze, Aquacel AG)   Non-Pressure Injury Partial thickness (epider/derm)   Wound Length (cm) 1 cm   Wound Width (cm) 1 cm   Wound Depth (cm) 0.1   Wound Surface area (cm^3) 0.1 cm^2   Condition of Base Slough   Condition of Edges Open   Tissue Type Yellow   Tissue Type Percent Yellow 100   Drainage Amount  Moderate   Drainage Color Serous   Wound Odor None   Periwound Skin Condition Edematous; Erythema, blanchable   Cleansing and Cleansing Agents  Soap and water   Dressing Type Applied Other (Comment)  (Aquacel AG, roll gauze, tape)   Procedure Tolerated Well

## 2017-12-07 NOTE — DISCHARGE INSTRUCTIONS
Wound Care Instructions    Patient: Nic Christopher MRN: [id number]  SSN: [id number]     YOB: 1935  Age:82 y.o. Sex: female       Ms. Chet Caballero, Dr. Naina Conde recommends the following discharge instruction:    Offloading    []   Felt/Foam Offloading   [] Removable Cast [place]       []   Wedge Shoe   []  Wheelchair     []   Total Contact Cast   []  Multiplodus Splint     []   Crutches   []  Surgical Shoe    Mattress:   Other:     Edema Control    [x]   Elevated legs as much as possible. Recommend above level of heart.     []   Layered Wraps             []   Left      []   Right      []  BILATERAL          - Type:            []   Unna Boot       []  Multi-Layer                                   []   Two Layers     []  Three Layers   []  Four Layers     []   Tubular Bandages        []   Left      []   Right     SIZE:      []   Stockings                      []   Left      []   Right     []   Compression Pump     []   Left      []   Right    ___ millimeters of mercury  ___ millimeters of mercury for ___ minutes for ___ day(s)        Other:       Nutritional Supplements    [x]  Multi-Vitamin     []  Other:       [employer]     [x]   Home Health: [id number] [facility]   []    Long Term Care:      []  DME:     Consult    []   Nutrition     []   Vascular     []   Orthotist/Pedorthotist     []   Infectious Disease     []   Lymphedema Therapist   Other:     Dressings:  Another brand of generically equivalent product may be dispensed unless specifically ordered otherwise.     Home Ulcer/wound Hygiene (cleanse with)      []   Distilled Water     []   Normal Saline     [x]   Wound Cleanser     []   Mild antimicrobial soap and water     []   Chlorhexidine liquid soap and water or saline     []   Other:     Apply    []   Hydrogel   []  Hypergel   [x]   Aquacel Ag: Bilateral lower extremities     []   Cadexomer Iodine                 (Iodosorb)   []  Silver Alginate    []   Medihoney:     []   Collagenase:Santyl   [] Calcium Alginate   []   Collagen:     []   Plain Foam   []  Non-Adherent Contact         Layer   []   Xeroform     []   Silver foam   []   Hydrocolloid   []   Transparent Film     []   Acticoat   []   Adaptic:      Other/Specific Instructions:    Cover With    [x]    Roll Gauze     []   Foam and Roll Gauze     []   Dry Gauze     []   Bordered gauze:     []   Foam      []    Bordered         []   Nonbordered     [x]   Secure with Tape     [x]   Other  : ABD pads to lower extermities     Ligia-Ulcer Care    []   Cream     [x]   Lotion     []   Ointment     []   Barrier     []   Other:     Change Dressing    []   Once Daily     []   Every Other Day     []   Every 3 Days       []   Every 5 Days     []   Every 7 Days     []   Do Not Change       []   2 x per week (Mon and Thurs. )     [x]   3 x per week (Mon, Wed, Fri. )     []   Other          Follow-Up:   [x] Schedule follow up in 2 weeks with:   []  As Needed (PRN)     []   Delicia Enriquez MD    []   Rahul Looney DPM    []   Marcellus Edmonds DPM    [x]   Ash Tucker DPM       []   Cynthia APODACAM    []   Nurse Visit        Please call the wound clinic ([phone]) regarding any questions or concerns.

## 2017-12-07 NOTE — IP AVS SNAPSHOT
Summary of Care Report The Summary of Care report has been created to help improve care coordination. Users with access to [facility] or [address] (Web-based application) may access additional patient information including the Discharge Summary. If you are not currently a [address] user and need more information, please call the number listed below in the [place] [address] section and ask to be connected with Medical Records. Facility Information Name Address Phone [address] [facility] [address] [facility] [address] [id number] [phone] Patient Information Patient Name Sex PROSPER Guzman ([id number]) Female 1935 Discharge Information Admitting Provider Service Area Unit  
 (none) [facility] / [phone] Discharge Provider Discharge Date/Time Discharge Disposition Destination (none) (none) (none) (none) Patient Language Language ENGLISH [13] Hospital Problems as of 2017  Reviewed: 2017 10:43 AM by Anthony Tompknis DPM  
  
  
  
 Class Noted - Resolved Last Modified POA Active Problems Skin ulcer of left ankle, with fat layer exposed ([address]  2017 - Present 2017 by Anthony Tompkins DPM Yes Entered by Anthony Tompkins DPM  
  Venous ulcer of ankle, left ([address]  2017 - Present 2017 by Anthony Tompkins DPM Yes Entered by Anthony Tompkins DPM  
  Vascular disease, peripheral ([address]  2017 - Present 2017 by Anthony Tompkins DPM Yes Entered by Anthony Tompkins DPM  
  Non-pressure chronic ulcer of right ankle with fat layer exposed ([address]  2017 - Present 2017 by Anthony Tompkins DPM Yes   Entered by Anthony Tompkins DPM  
  
Non-Hospital Problems as of 2017  Reviewed: 2017 10:43 AM by Anthony Tompkins DPM  
  
  
  
 Class Noted - Resolved Last Modified Active Problems CVA, old, hemiparesis ([facility]  8/7/2013 - Present 8/8/2013 Entered by Edi Parsons Type 2 diabetes mellitus ([facility]  8/8/2013 - Present 11/6/2017 by Bk Davila MD  
  Entered by Silvina Kent MD  
  Benign hypertension  8/7/2013 - Present 12/3/2017 by Jesica Camarillo MD  
  Entered by Silvina Kent MD  
  Abnormal finding on thyroid function test  6/11/2013 - Present 2/2/2017 by Saji Rios Entered by Saji Rios Acute on chronic renal insufficiency  7/11/2013 - Present 12/3/2017 by Jesica Camarillo MD  
  Entered by Saji Rios Overview Signed 2/2/2017 11:39 PM by Saji Rios Overview:  
 7/8/13 7/10/13 Cr  1.2 1.5 Anemia  6/11/2013 - Present 2/25/2017 by Bao Vance MD  
  Entered by Saji Rios Chronic osteomyelitis of ankle ([facility]  6/11/2013 - Present 2/2/2017 by Saji Rios Entered by Saji Rios Injury of foot  7/10/2013 - Present 2/2/2017 by Saji Rios Entered by Saji Rios Leukocytosis  7/11/2013 - Present 2/2/2017 by Saji Rios Entered by Saji Rios Overview Signed 2/2/2017 11:39 PM by Saji Rios Overview: WBC 11.4  P 86 Spinal stenosis of lumbar region  6/11/2013 - Present 2/2/2017 by Saji Rios Entered by Saji Rios Microscopic hematuria  7/11/2013 - Present 2/2/2017 by Saji Rios Entered by Saji Rios Rhabdomyolysis  7/11/2013 - Present 2/2/2017 by Saji Rios Entered by Saji Rios Overview Signed 2/2/2017 11:39 PM by Saji Rios Overview:  
Ck  930, 1053 Vitamin D deficiency  6/11/2013 - Present 2/2/2017 by Saji Rios Entered by Saji Rios Stroke [facility])  Unknown - Present 2/2/2017 by Saji Rios Entered by Saji Rios Hypertension  Unknown - Present 2/2/2017 by Saji Rois Entered by Saji Rios DM (diabetes mellitus) ([facility]  Unknown - Present 2/2/2017 by Renard Lawrence Entered by Renard Lawrence Normocytic anemia  Unknown - Present 2/3/2017 by Renard Lawrence Entered by Renard Lawrence CKD (chronic kidney disease) stage 3, GFR 30-59 ml/min  6/24/2013 - Present 12/3/2017 by Jennifer De Santiago MD  
  Entered by Renard Lawrence Overview Signed 2/3/2017 12:25 AM by Renard Lawrence Worst noted (08/06/13) BUN/sCr: 54/2.49, GFR 20. Elevated TSH  6/25/2012 - Present 2/3/2017 by Renard Lawrence Entered by Renard Lawrnece Overview Signed 2/3/2017 12:29 AM by Renard Lawrence 5.08 Iron (Fe) deficiency anemia  2/28/2017 - Present 2/28/2017 by Rosalina Davila MD  
  Entered by Rosalina Davila MD  
  UTI (urinary tract infection)  11/6/2017 - Present 12/3/2017 by Jennifer De Santiago MD  
  Entered by Emil Freeman MD  
  Encephalopathy  11/6/2017 - Present 11/6/2017 by Emil Freeman MD  
  Entered by Emil Freeman MD  
  LAURA (acute kidney injury) ([facility]  11/6/2017 - Present 11/6/2017 by Emil Freeman MD  
  Entered by Emil Freeman MD  
  Weak pulse  11/16/2017 - Present 11/16/2017 by Sudha Ahumada DPM  
  Entered by Sudha Ahumada DPM  
  Syncope  12/3/2017 - Present 12/3/2017 by Jennifer De Santiago MD  
  Entered by Anshu Franco MD  
  
You are allergic to the following No active allergies Current Discharge Medication List  
  
ASK your doctor about these medications Dose & Instructions Dispensing Information Comments  
 cholecalciferol 1,000 unit tablet Commonly known as:  VITAMIN D3 Dose:  3000 Units Take 3,000 Units by mouth daily. Refills:  0  
   
 gabapentin 300 mg capsule Commonly known as:  NEURONTIN Dose:  1 Cap Take 1 Cap by mouth three (3) times daily. 12/22/16, QTY#90, 30 Days, 2 Refills. Dr. Laura Gonzalez Refills:  2  IBUPROFEN PO  
 Take  by mouth. Refills:  0  
   
 levoFLOXacin 500 mg tablet Commonly known as:  Soren [place] Dose:  500 mg Take 1 Tab by mouth every fourty-eight (48) hours. Quantity:  3 Tab Refills:  0  
   
 lisinopril-hydroCHLOROthiazide 20-25 mg per tablet Commonly known as:  Tildon Gloss Dose:  1 Tab Take 1 Tab by mouth daily. Refills:  0  
   
 metoprolol tartrate 50 mg tablet Commonly known as:  LOPRESSOR Dose:  50 mg Take 1 Tab by mouth two (2) times a day. Quantity:  60 Tab Refills:  0 Current Immunizations Name Date Influenza Vaccine 10/1/2017, 9/28/2016 Pneumococcal Polysaccharide (PPSV-23) 6/12/2005 Follow-up Information None Discharge Instructions Wound Care Instructions Patient: Leann Soliz MRN: [id number]  SSN: [id number] YOB: 1935  Age:82 y.o. Sex: female [address] \[facility]\"" recommends the following discharge instruction: 
 
Ayana Brenda Felt/Foam Offloading    Removable Cast Checo Amsler Wedge Shoe     Wheelchair Total Contact Cast     Multiplodus Splint Crutches     Surgical Shoe Mattress:  
Other:  
 
Edema Control Elevated legs as much as possible. Recommend above level of heart. Layered Wraps                Left         Right        BILATERAL 
        - Type:               Unna Boot         Multi-Layer Two Layers       Three Layers     Four Layers Tubular Bandages           Left         Right     SIZE:   
     Stockings                         Left         Right Compression Pump        Left         Right   
___ millimeters of mercury 
___ millimeters of mercury for ___ minutes for ___ day(s) Other:  
 
 
Nutritional Supplements Multi-Vitamin Other:   
  
[facility]: [address] [facility]:   
    DME:  
 
Consult Nutrition Vascular Orthotist/Pedorthotist  
     Infectious Disease Lymphedema Therapist  
Other:  
 
Dressings: 
Another brand of generically equivalent product may be dispensed unless specifically ordered otherwise. Home Ulcer/wound Hygiene (cleanse with) Distilled Water Normal Saline Wound Cleanser Mild antimicrobial soap and water Chlorhexidine liquid soap and water or saline Other:  
 
Apply Hydrogel     Hypergel      Aquacel Ag: Bilateral lower extremities Cadexomer Iodine                 (Iodosorb)     Silver Alginate       Medihoney:  
     Collagenase:Santyl     Calcium Alginate      Collagen:  
     Plain Foam     Non-Adherent Contact         Layer      Xeroform Silver foam      Hydrocolloid      Transparent Film Acticoat      Adaptic:   
 
Other/Specific Instructions: 
 
Cover With 
      Roll Gauze Foam and Roll Gauze Dry Gauze Bordered gauze: Foam          Bordered            Nonbordered Secure with Tape Other  : ABD pads to lower extermities Ligia-Ulcer Care Cream  
     Lotion Ointment Barrier Other:  
 
Change Dressing Once Daily Every Other Day Every 3 Days Every 5 Days Every 7 Days Do Not Change 2 x per week (Mon and Thurs. ) 3 x per week (Mon, Wed, Fri. ) Other Follow-Up: 
  Schedule follow up in 2 weeks with: 
   As Needed (PRN) PAKO Avelar MD, DPM Wynta Williams DPM Watt Modest DPM  
    Nurse Visit Please call the wound clinic ([phone]) regarding any questions or concerns. Chart Review Routing History Recipient Method Report Sent By Colin Irizarry MD  
Fax: [phone] Phone: [phone] Fax Provider Comm Report Harvey Blizzard [[id number]] 5/14/2012 12:49 PM 05/11/2012 Storm Fuentes MD  
Fax: [phone] Phone: [phone] Fax Provider Comm Report Harvey Blizzard [[id number]] 5/14/2012 12:49 PM 05/11/2012 Rachel Cooper MD  
Fax: [phone] Phone: [phone] Fax IP Auto Routed Rowdy Huerta MD [[id number]] 2/28/2017  8:28 AM 02/28/2017 Quirino Yi MD  
Fax: [phone] Phone: [phone] Fax IP Auto Routed Rowdy Huerta MD [[id number]] 2/28/2017  8:28 AM 02/28/2017

## 2017-12-07 NOTE — PROGRESS NOTES
Podiatry Surgery Progress Note      Patient: Kala Cisse MRN: [id number]  SSN: [id number]    YOB: 1935  Age: 81 y.o. Sex: female      Assessment:     Patient Active Problem List   Diagnosis Code    CVA, old, hemiparesis ([facility] I69.359    Type 2 diabetes mellitus ([facility] E11.9    Benign hypertension I10    Abnormal finding on thyroid function test R94.6    Acute on chronic renal insufficiency N28.9, N18.9    Anemia D64.9    Chronic osteomyelitis of ankle ([facility]) M86.679    Injury of foot S99.929A    Leukocytosis D72.829    Spinal stenosis of lumbar region M48.061    Microscopic hematuria R31.29    Rhabdomyolysis M62.82    Vitamin D deficiency E55.9    Stroke ([facility]) I63.9    Hypertension I10    DM (diabetes mellitus) ([facility]) E11.9    Normocytic anemia D64.9    CKD (chronic kidney disease) stage 3, GFR 30-59 ml/min N18.3    Elevated TSH R94.6    Iron (Fe) deficiency anemia D50.9    UTI (urinary tract infection) N39.0    Encephalopathy G93.40    LAURA (acute kidney injury) ([facility]) N17.9    Skin ulcer of left ankle, with fat layer exposed ([address] L97.322    Venous ulcer of ankle, left ([facility]) I83.023    Weak pulse R09.89    Syncope R55    Vascular disease, peripheral ([facility]) I73.9    Non-pressure chronic ulcer of right ankle with fat layer exposed ([facility] L97.312          Plan:   Continue LWC with Aquacel AG and rolled gauze Q 48 hrs. Reviewed vascular testing results with the patient and family. She will benefit from an appointment with the vascular doctor. [facility], patient has agreed at this visit. Complete abx as given upon discharge on 12/05/17 from [facility]    Total time spent with patient: [address] discussed with: Patient, Family and Nursing Staff    Discussed:  Care Plan and home health    Disposition:  Stable      Ms. Nash Martin is a 81 y.o. female who was admitted for VLU with a vascular insufficient component BLE.   She was recently hospitalized for Syncope and has been placed on Levaquin        Subjective:   Past Medical History  Past Medical History:   Diagnosis Date    CAD (coronary artery disease)     Chronic osteomyelitis of ankle ([address]     CKD (chronic kidney disease) stage 3, GFR 30-59 ml/min 06/24/2013    Worst noted (08/06/13) BUN/sCr: 54/2.49, GFR 20.     DM (diabetes mellitus) ([facility])     Elevated TSH 06/25/2012    5.08     Hypertension     Leukocytosis     Lumbar spinal stenosis     Normocytic anemia     Rhabdomyolysis     Splenomegaly     Stroke ([facility])     Vitamin D deficiency      Social History     Social History    Marital status:      Spouse name: N/A    Number of children: N/A    Years of education: N/A     Occupational History    Not on file. Social History Main Topics    Smoking status: Never Smoker    Smokeless tobacco: Not on file    Alcohol use No    Drug use: No    Sexual activity: No     Other Topics Concern    Not on file     Social History Narrative       Current Medications  Current Outpatient Prescriptions   Medication Sig Dispense Refill    IBUPROFEN PO Take  by mouth.  levoFLOXacin (LEVAQUIN) 500 mg tablet Take 1 Tab by mouth every fourty-eight (48) hours. 3 Tab 0    metoprolol tartrate (LOPRESSOR) 50 mg tablet Take 1 Tab by mouth two (2) times a day. 60 Tab 0    gabapentin (NEURONTIN) 300 mg capsule Take 1 Cap by mouth three (3) times daily. 12/22/16, QTY#90, 30 Days, 2 Refills. Dr. Brina Anthony  2    cholecalciferol (VITAMIN D3) 1,000 unit tablet Take 3,000 Units by mouth daily.  lisinopril-hydrochlorothiazide (PRINZIDE, ZESTORETIC) 20-25 mg per tablet Take 1 Tab by mouth daily.          Patient Allergies  No Known Allergies       Objective:   General Exam  alert, cooperative, no distress, appears stated age    [de-identified]  Visit Vitals    /70 (BP Patient Position: Supine)    Pulse 87    Temp 98.6 °F (37 °C)    Resp 18    SpO2 94%       REVIEW OF SYSTEMS:  General: denies chronic fatigue, weight loss, fever, anemia, bruising, depression, nervousness, panic attacks  HEENT: denies ringing in ears, ear infections, dizzy spells, poor vision, glaucoma, sinus trouble, hoarseness, eye infections  GI: denies diarrhea, gas, bloating, heartburn, regurgitation, difficulty swallowing, painful swallowing, nausea, vomiting, constipation, abdominal pain, decreased appetite, blood in stools, black stools, jaundice, dark urine  Lungs: denies pneumonia, asthma, cough, SOB, hemoptysis  Heart: denies chest pain, irregular heart beat, ankle swelling, HTN  Skin: denies rashes, hives, allergic reaction  Urinary: denies UTI, kidney stones, decreased urine force and flow, urination at night, blood in urine, painful urination  Bones and Joints: denies arthritis, rheumatism, back pain, gout, osteoporosis  Neurologic: denies stroke, seizures, headaches, numbness, tingling    Foot Exam  VASCULAR EXAM:. Pedal pulses are non palpable 0/4 DP 0/4 PT right and left foot. Skin temperature is warm to warm right and left foot. Digital capillary fill time is 6 seconds right and left foot. There is edema of the right and left foot and ankle.      NEUROLOGICAL EXAM:. Sensation Intact with 5.07g monofilament wire right and left foot.  Deep tendon reflexes intact and symmetrical on the right and left foot.     MUSCULOSKELETAL EXAM:. Muscle tone is normal.  Muscle strength of the flexor and extensor group inversion and eversion Bilateral. 4/5.         DERMATOLOGICAL EXAM:. Skin is of abnormal texture and turgor with atrophic skin changes noting hair growth, nail changes (thickening), Bilateral. There is a ulcer full thickness noted over the lateral and medial lower legs       Ulcer  Ulcer Location: R lower leg medial, R lower leg lateral, L lower leg medial, L lower leg lateral, Ulcer base: Mixed Granular/Fibrotic and Ulcer exudate: Scant/small amount Serous exudate  Harris Scale: Stage 2    Wound Ankle Left (Active)   Number of days:1583       Wound Ankle Left (Active)   Number of days:1583       Wound Ankle Left (Active)   Number of days:1583       Wound Ankle Left;Lateral (Active)   Number of days:284       Wound Ankle Right (Active)   DRESSING STATUS Removed 12/7/2017  9:56 AM   DRESSING TYPE Other (Comment) 12/7/2017  9:56 AM   Non-Pressure Injury Partial thickness (epider/derm) 11/16/2017 10:08 AM   Wound Length (cm) 1 cm 12/7/2017  9:56 AM   Wound Width (cm) 1 cm 12/7/2017  9:56 AM   Wound Depth (cm) 0.1 12/7/2017  9:56 AM   Wound Surface area (cm^3) 0.1 cm^2 12/7/2017  9:56 AM   Condition of [address] 12/7/2017  9:56 AM   Condition of Ed[place] Open 11/16/2017 10:08 AM   Tissue Type Yellow 12/7/2017  9:56 AM   Tissue Type Percent Red 50 11/16/2017 10:08 AM   Tissue Type Percent Yellow 100 12/7/2017  9:56 AM   Drainage Amount  Moderate 12/7/2017  9:56 AM   Drainage Color Serous 12/7/2017  9:56 AM   Wound Odor None 12/7/2017  9:56 AM   Periwound Skin Condition Edematous; Erythema, blanchable 12/7/2017  9:56 AM   Cleansing and Cleansing Agents  Soap and water 12/7/2017  9:56 AM   Dressing Type Applied Other (Comment) 11/16/2017 10:08 AM   Number of days:284       Wound Ankle Lateral;Right (Active)   Number of days:29       Wound Ankle Left;Lateral (Active)   Number of days:29       Wound Ankle Left;Medial;Other (comment) (Active)   DRESSING STATUS Removed 12/7/2017  9:56 AM   DRESSING TYPE Other (Comment) 12/7/2017  9:56 AM   Non-Pressure Injury Partial thickness (epider/derm) 11/16/2017 10:08 AM   Wound Length (cm) 0.5 cm 12/7/2017  9:56 AM   Wound Width (cm) 1.3 cm 12/7/2017  9:56 AM   Wound Depth (cm) 0.1 12/7/2017  9:56 AM   Wound Surface area (cm^3) 0.06 cm^2 12/7/2017  9:56 AM   Condition of [place] 12/7/2017  9:56 AM   Condition of Ed[place] Open 11/16/2017 10:08 AM   Tissue Type Pink 12/7/2017  9:56 AM   Tissue Type Percent Pink 100 12/7/2017  9:56 AM   Tissue Type Percent Red 50 11/16/2017 10:08 AM Tissue Type Percent Yellow 50 11/16/2017 10:08 AM   Drainage Amount  Moderate 12/7/2017  9:56 AM   Drainage Color Serous 12/7/2017  9:56 AM   Wound Odor None 12/7/2017  9:56 AM   Periwound Skin Condition Erythema, blanchable;Edematous 12/7/2017  9:56 AM   Cleansing and Cleansing Agents  Soap and water 12/7/2017  9:56 AM   Dressing Type Applied Other (Comment) 11/16/2017 10:08 AM   Number of days:29       Wound Ankle Left;Medial;Distal (Active)   DRESSING STATUS Removed 12/7/2017  9:56 AM   DRESSING TYPE Other (Comment) 12/7/2017  9:56 AM   Non-Pressure Injury Partial thickness (epider/derm) 11/16/2017 10:08 AM   Wound Length (cm) 1.9 cm 12/7/2017  9:56 AM   Wound Width (cm) 2 cm 12/7/2017  9:56 AM   Wound Depth (cm) 0.1 12/7/2017  9:56 AM   Wound Surface area (cm^3) 0.38 cm^2 12/7/2017  9:56 AM   Condition of [place] 12/7/2017  9:56 AM   Tissue Type Pink;Yellow 12/7/2017  9:56 AM   Tissue Type Percent Pink 50 12/7/2017  9:56 AM   Tissue Type Percent Yellow 50 12/7/2017  9:56 AM   Drainage Amount  Moderate 12/7/2017  9:56 AM   Drainage Color Serous 12/7/2017  9:56 AM   Wound Odor None 12/7/2017  9:56 AM   Periwound Skin Condition Edematous; Erythema, blanchable 12/7/2017  9:56 AM   Cleansing and Cleansing Agents  Soap and water 12/7/2017  9:56 AM   Dressing Type Applied Silver products 11/16/2017 10:08 AM   Procedure Tolerated Well 11/16/2017 10:08 AM   Number of days:21       Wound Ankle Left;Medial;Proximal (Active)   DRESSING STATUS Removed;Breakthrough drainage; Saturated 12/7/2017  9:56 AM   DRESSING TYPE Other (Comment) 12/7/2017  9:56 AM   Non-Pressure Injury Partial thickness (epider/derm) 11/16/2017 10:08 AM   Wound Length (cm) 2.6 cm 12/7/2017  9:56 AM   Wound Width (cm) 1.8 cm 12/7/2017  9:56 AM   Wound Depth (cm) 0.2 12/7/2017  9:56 AM   Wound Surface area (cm^3) 0.94 cm^2 12/7/2017  9:56 AM   Condition of [place] 12/7/2017  9:56 AM   Tissue Type Pink;Yellow 12/7/2017  9:56 AM   Tissue Type Percent Pink 70 12/7/2017  9:56 AM   Tissue Type Percent Red 5 11/16/2017 10:08 AM   Tissue Type Percent Yellow 30 12/7/2017  9:56 AM   Drainage Amount  Moderate 12/7/2017  9:56 AM   Drainage Color Serous 12/7/2017  9:56 AM   Wound Odor None 12/7/2017  9:56 AM   Periwound Skin Condition Edematous; Erythema, blanchable 12/7/2017  9:56 AM   Cleansing and Cleansing Agents  Soap and water 12/7/2017  9:56 AM   Dressing Type Applied Other (Comment) 11/16/2017 10:08 AM   Procedure Tolerated Well 11/16/2017 10:08 AM   Number of days:21       Wound Toe Left (Active)   DRESSING TYPE Open to air 12/7/2017  9:56 AM   Non-Pressure Injury Partial thickness (epider/derm) 11/16/2017 10:08 AM   Wound Length (cm) 0.6 cm 11/16/2017 10:08 AM   Wound Width (cm) 1.4 cm 11/16/2017 10:08 AM   Wound Depth (cm) 0.1 11/16/2017 10:08 AM   Wound Surface area (cm^3) 0.08 cm^2 11/16/2017 10:08 AM   Condition of Base Other (comment) 11/16/2017 10:08 AM   Tissue Type Yellow 11/16/2017 10:08 AM   Tissue Type Percent Yellow 100 11/16/2017 10:08 AM   Drainage Amount  None 11/16/2017 10:08 AM   Wound Odor None 11/16/2017 10:08 AM   Cleansing and Cleansing Agents  Dermal wound cleanser 11/16/2017 10:08 AM   Dressing Type Applied Open to air 11/16/2017 10:08 AM   Number of days:21       Wound Toe Left (Active)   DRESSING STATUS Removed 11/16/2017 10:08 AM   DRESSING TYPE Open to air 12/7/2017  9:56 AM   Non-Pressure Injury Partial thickness (epider/derm) 11/16/2017 10:08 AM   Wound Length (cm) 0.3 cm 11/16/2017 10:08 AM   Wound Width (cm) 0.4 cm 11/16/2017 10:08 AM   Wound Depth (cm) 0.1 11/16/2017 10:08 AM   Wound Surface area (cm^3) 0.01 cm^2 11/16/2017 10:08 AM   Condition of Base Other (comment) 11/16/2017 10:08 AM   Tissue Type Yellow 11/16/2017 10:08 AM   Tissue Type Percent Yellow 100 11/16/2017 10:08 AM   Drainage Amount  None 11/16/2017 10:08 AM   Wound Odor None 11/16/2017 10:08 AM   Periwound Skin Condition Intact 11/16/2017 10:08 AM Dressing Type Applied Open to air 2017 10:08 AM   Number of days:21       Wound Ankle Left;Lateral (Active)   DRESSING STATUS Removed 2017  9:56 AM   DRESSING TYPE Other (Comment) 2017  9:56 AM   Wound Length (cm) 7.4 cm 2017  9:56 AM   Wound Width (cm) 4.5 cm 2017  9:56 AM   Wound Depth (cm) 0.2 2017  9:56 AM   Wound Surface area (cm^3) 6.66 cm^2 2017  9:56 AM   Condition of Base [place];Slough 2017  9:56 AM   Tissue Type Percent Pink 10 2017 10:08 AM   Tissue Type Percent Red 50 2017  9:56 AM   Tissue Type Percent Yellow 50 2017  9:56 AM   Drainage Amount  Moderate 2017  9:56 AM   Drainage Color Serous 2017  9:56 AM   Wound Odor None 2017  9:56 AM   Periwound Skin Condition Edematous; Erythema, blanchable 2017  9:56 AM   Cleansing and Cleansing Agents  Soap and water 2017  9:56 AM   Dressing Type Applied Other (Comment) 2017 10:08 AM   Procedure Tolerated Well 2017 10:08 AM   Number of days:21       Wound Foot Left;Medial (Active)   Number of days:3       Wound Leg Lower Right; Anterior; Lateral (Active)   Number of days:3       Wound Leg Lower Right; Anterior;Medial (Active)   Number of days:3       Vascular testing   Name: Meryl Stuart  MRN: [id number]    Outpatient  : 1935  HIS Order #: [id number]  [address] [address] [facility] Visit #: [id number]  Date: 2017     TYPE OF TEST: Peripheral Arterial Testing     REASON FOR TEST  Extremity ulceration (both sides), Ulcer     Right Leg  Segmentals: Abnormal                      mmHg  Brachial         167  High thigh  Low thigh  Calf  Posterior tibial 128  Dorsalis pedis  Peroneal  Metatarsal  Toe pressure      82  Doppler:    Abnormal  Ankle/Brachial: 0.77     Site of occlusive disease:-  tibioperoneal segment     Left Leg  Segmentals: Abnormal                      mmHg  Brachial         158  High thigh  Low thigh  Calf  Posterior tibial  Dorsalis pedis 116  Peroneal  Metatarsal  Toe pressure      49  Doppler:    Abnormal  Ankle/Brachial: 0.69     Site of occlusive disease:-  femoral segment     INTERPRETATION/FINDINGS  Physiologic testing was performed using continuous wave Doppler and  segmental pressures. Technically difficult, limited examination due to multiple ulcers on  lower legs and patient pain. Only able to obtain distal pressures from   one site bilaterally, patient could not tolerate discomfort from  pressure. 1. Moderate peripheral arterial disease indicated at rest in the right   leg. 2. Disease is located in the tibio/peroneal segment on the right side. 3. Moderate peripheral arterial disease indicated at rest in the left  leg. 4. Disease is located in the ilio-femoral segment on the left side. 5. The right ankle/brachial index is 0.77 and the left ankle/brachial  index is 0.69. 6. The digital brachial index is abnormal measuring <.60 suggestive of   digital disease bilaterally.     ADDITIONAL COMMENTS     I have personally reviewed the data relevant to the interpretation of  this  study.     TECHNOLOGIST: Kyle Oviedo  Signed: 11/21/2017 12:06 PM     PHYSICIAN: Pushpa Quick MD  Signed: 11/22/2017 03:49 PM    Labs  No results found for this or any previous visit (from the past 24 hour(s)).     X-Ray:  deferred    Procedures:   none               LIU Lozoya DPM  December 7, 2017

## 2017-12-21 ENCOUNTER — HOSPITAL ENCOUNTER (OUTPATIENT)
Dept: WOUND CARE | Age: 82
Discharge: HOME | End: 2017-12-21
Payer: MEDICARE

## 2017-12-21 VITALS — HEART RATE: 78 BPM | SYSTOLIC BLOOD PRESSURE: 151 MMHG | DIASTOLIC BLOOD PRESSURE: 63 MMHG | RESPIRATION RATE: 16 BRPM

## 2017-12-21 PROCEDURE — 77030011255 HC DSG AQUACEL AG BMS -A: Performed by: PODIATRIST

## 2017-12-21 PROCEDURE — 74011000250 HC RX REV CODE- 250

## 2017-12-21 PROCEDURE — 97602 WOUND(S) CARE NON-SELECTIVE: CPT

## 2017-12-21 RX ORDER — LIDOCAINE HYDROCHLORIDE 20 MG/ML
JELLY TOPICAL
Status: COMPLETED
Start: 2017-12-21 | End: 2017-12-21

## 2017-12-21 RX ADMIN — LIDOCAINE HYDROCHLORIDE: 20 JELLY TOPICAL at 10:23

## 2017-12-21 NOTE — PROGRESS NOTES
Podiatry Surgery Progress Note      Patient: Tosha Moore MRN: [id number]  SSN: [id number]    YOB: 1935  Age: 81 y.o. Sex: female      Assessment:     Patient Active Problem List   Diagnosis Code    CVA, old, hemiparesis ([facility] I69.359    Type 2 diabetes mellitus ([facility] E11.9    Benign hypertension I10    Abnormal finding on thyroid function test R94.6    Acute on chronic renal insufficiency N28.9, N18.9    Anemia D64.9    Chronic osteomyelitis of ankle ([facility]) M86.679    Injury of foot S99.929A    Leukocytosis D72.829    Spinal stenosis of lumbar region M48.061    Microscopic hematuria R31.29    Rhabdomyolysis M62.82    Vitamin D deficiency E55.9    Stroke ([facility]) I63.9    Hypertension I10    DM (diabetes mellitus) ([facility]) E11.9    Normocytic anemia D64.9    CKD (chronic kidney disease) stage 3, GFR 30-59 ml/min N18.3    Elevated TSH R94.6    Iron (Fe) deficiency anemia D50.9    UTI (urinary tract infection) N39.0    Encephalopathy G93.40    LAURA (acute kidney injury) ([facility]) N17.9    Skin ulcer of left ankle, with fat layer exposed ([facility] L97.322    Venous ulcer of ankle, left ([facility]) I83.023    Weak pulse R09.89    Syncope R55    Vascular disease, peripheral ([facility]) I73.9    Non-pressure chronic ulcer of right ankle with fat layer exposed ([facility] L97.312          Plan: Will check on appointment with vascular, once again reviewed JOLANTA results. Advised patient to take MVI w/ A,C and zinc daily. Continue LWC with Aquacel AG and rolled gauze Q 48-72 hours LLE/ankle    Total time spent with patient: 30 [address] discussed with: Patient and Nursing Staff    Discussed:  Care Plan and home health    Disposition:  Stable      Ms. Mimi Coombs is a 81 y.o. female who was seen at the wound clinic for chronic ulcers with a vascular component. She relates she has not seen the vascular doctor.          Subjective:   Past Medical History  Past Medical History:   Diagnosis Date    CAD (coronary artery disease)     [facility] osteomyelitis [facility] ankle ([facility]     CKD (chronic kidney disease) stage 3, GFR 30-59 ml/min 06/24/2013    Worst noted (08/06/13) BUN/sCr: 54/2.49, GFR 20.     DM (diabetes mellitus) ([facility])     Elevated TSH 06/25/2012    5.08     Hypertension     Leukocytosis     Lumbar spinal stenosis     Normocytic anemia     Rhabdomyolysis     Splenomegaly     Stroke (H[facility])     Vitamin D deficiency      Social History     Social History    Marital status:      Spouse name: N/A    Number of children: N/A    Years of education: N/A     Occupational History    Not on file. Social History Main Topics    Smoking status: Never Smoker    Smokeless tobacco: Not on file    Alcohol use No    Drug use: No    Sexual activity: No     Other Topics Concern    Not on file     Social History Narrative       Current Medications  Current Outpatient Prescriptions   Medication Sig Dispense Refill    IBUPROFEN PO Take  by mouth.  levoFLOXacin (LEVAQUIN) 500 mg tablet Take 1 Tab by mouth every fourty-eight (48) hours. 3 Tab 0    metoprolol tartrate (LOPRESSOR) 50 mg tablet Take 1 Tab by mouth two (2) times a day. 60 Tab 0    gabapentin (NEURONTIN) 300 mg capsule Take 1 Cap by mouth three (3) times daily. 12/22/16, QTY#90, 30 Days, 2 Refills. Dr. Jimmie Serrato  2    cholecalciferol (VITAMIN D3) 1,000 unit tablet Take 3,000 Units by mouth daily.  lisinopril-hydrochlorothiazide (PRINZIDE, ZESTORETIC) 20-25 mg per tablet Take 1 Tab by mouth daily. Patient Allergies  No Known Allergies       Objective:   General Exam  alert, cooperative, no distress, appears stated age    Vitals  Visit Vitals    /63 (BP Patient Position: Supine)    Pulse 78    Resp 16       REVIEW OF SYSTEMS:  No changes      Foot Exam    VASCULAR EXAM:. Pedal pulses are non palpable 0/4 DP 0/4 PT right and left foot. Skin temperature is warm to warm right and left foot.  Digital capillary fill time is 7 seconds right and left foot. There is edema of the right and left foot and ankle.       NEUROLOGICAL EXAM:. Sensation Intact with 5.07g monofilament wire right and left foot.  Deep tendon reflexes intact and symmetrical on the right and left foot.      MUSCULOSKELETAL EXAM:. Muscle tone is normal.  Muscle strength of the flexor and extensor group inversion and eversion Bilateral. 4/5.         DERMATOLOGICAL EXAM:. Skin is of abnormal texture and turgor with atrophic skin changes noting hair growth, nail changes (thickening), Bilateral. There is a ulcer full thickness noted over the lateral and medial lower legs with improved measurements as stated below      Ulcer  Ulcer Location: R lower leg medial, R lower leg lateral, L lower leg medial, L lower leg lateral, Ulcer base: Mixed Granular/Fibrotic and Ulcer exudate: Scant/small amount Serous exudate  Harris Scale: Stage 2     Wound Ankle Left (Active)   Number of days:1597       Wound Ankle Left (Active)   Number of days:1597       Wound Ankle Left (Active)   Number of days:1597       Wound Ankle Left;Lateral (Active)   Number of days:298       Wound Ankle Right (Active)   DRESSING STATUS Removed 12/21/2017  9:42 AM   DRESSING TYPE Other (Comment) 12/21/2017  9:42 AM   Non-Pressure Injury Partial thickness (epider/derm) 12/21/2017  9:42 AM   Wound Length (cm) 0.7 cm 12/21/2017  9:42 AM   Wound Width (cm) 0.8 cm 12/21/2017  9:42 AM   Wound Depth (cm) 0.1 12/21/2017  9:42 AM   Wound Surface area (cm^3) 0.06 cm^2 12/21/2017  9:42 AM   Condition of Base [facility] 12/21/2017  9:42 AM   Condition of Edges Open 12/21/2017  9:42 AM   Tissue Type Yellow 12/21/2017  9:42 AM   Tissue Type Percent Red 50 11/16/2017 10:08 AM   Tissue Type Percent Yellow 100 12/21/2017  9:42 AM   Drainage Amount  Moderate 12/21/2017  9:42 AM   Drainage Color Serous 12/21/2017  9:42 AM   Wound Odor None 12/21/2017  9:42 AM   Periwound Skin Condition Erythema, blanchable 12/21/2017  9:42 AM   Cleansing and Cleansing Agents  Dermal wound cleanser; Soap and water 12/21/2017  9:42 AM   Dressing Type Applied Other (Comment) 12/7/2017  9:56 AM   Procedure Tolerated Well 12/7/2017  9:56 AM   Number of days:298       Wound Ankle Lateral;Right (Active)   Number of days:43       Wound Ankle Left;Lateral (Active)   Number of days:43       Wound Ankle Left;Medial;Other (comment) (Active)   DRESSING STATUS Removed 12/21/2017  9:42 AM   DRESSING TYPE Other (Comment) 12/21/2017  9:42 AM   Non-Pressure Injury Partial thickness (epider/derm) 11/16/2017 10:08 AM   Wound Length (cm) 0.5 cm 12/21/2017  9:42 AM   Wound Width (cm) 0.1 cm 12/21/2017  9:42 AM   Wound Depth (cm) 0.1 12/21/2017  9:42 AM   Wound Surface area (cm^3) 0 cm^2 12/21/2017  9:42 AM   Condition of Base Gross 12/21/2017  9:42 AM   Condition of Edges Open 11/16/2017 10:08 AM   Tissue Type Pink 12/21/2017  9:42 AM   Tissue Type Percent Pink 100 12/21/2017  9:42 AM   Tissue Type Percent Red 50 11/16/2017 10:08 AM   Tissue Type Percent Yellow 50 11/16/2017 10:08 AM   Drainage Amount  Small  12/21/2017  9:42 AM   Drainage Color Serous 12/7/2017  9:56 AM   Wound Odor None 12/7/2017  9:56 AM   Periwound Skin Condition Erythema, blanchable;Edematous 12/7/2017  9:56 AM   Cleansing and Cleansing Agents  Soap and water 12/7/2017  9:56 AM   Dressing Type Applied Other (Comment) 12/7/2017  9:56 AM   Procedure Tolerated Well 12/7/2017  9:56 AM   Number of days:43       Wound Ankle Left;Medial;Distal (Active)   DRESSING STATUS Removed 12/21/2017  9:42 AM   DRESSING TYPE Other (Comment) 12/21/2017  9:42 AM   Non-Pressure Injury Partial thickness (epider/derm) 12/7/2017  9:56 AM   Wound Length (cm) 1.5 cm 12/21/2017  9:42 AM   Wound Width (cm) 0.6 cm 12/21/2017  9:42 AM   Wound Depth (cm) 0.1 12/21/2017  9:42 AM   Wound Surface area (cm^3) 0.09 cm^2 12/21/2017  9:42 AM   Condition of Base Gross;Slough 12/21/2017  9:42 AM   Condition of Edges Open 12/21/2017  9:42 AM   Tissue Type Pink;Yellow 12/21/2017  9:42 AM   Tissue Type Percent Pink 50 12/7/2017  9:56 AM   Tissue Type Percent Yellow 50 12/7/2017  9:56 AM   Drainage Amount  Small  12/21/2017  9:42 AM   Drainage Color Serous 12/21/2017  9:42 AM   Wound Odor None 12/21/2017  9:42 AM   Periwound Skin Condition Erythema, blanchable 12/21/2017  9:42 AM   Cleansing and Cleansing Agents  Dermal wound cleanser; Soap and water 12/21/2017  9:42 AM   Dressing Type Applied Other (Comment) 12/7/2017  9:56 AM   Procedure Tolerated Well 12/7/2017  9:56 AM   Number of days:35       Wound Ankle Left;Medial;Proximal (Active)   DRESSING STATUS Removed 12/21/2017  9:42 AM   DRESSING TYPE Other (Comment) 12/21/2017  9:42 AM   Non-Pressure Injury Partial thickness (epider/derm) 12/21/2017  9:42 AM   Wound Length (cm) 2 cm 12/21/2017  9:42 AM   Wound Width (cm) 1.1 cm 12/21/2017  9:42 AM   Wound Depth (cm) 0.2 12/21/2017  9:42 AM   Wound Surface area (cm^3) 0.44 cm^2 12/21/2017  9:42 AM   Condition of Base [place];Slough 12/21/2017  9:42 AM   Condition of Edges Open 12/21/2017  9:42 AM   Tissue Type Pink;Yellow 12/21/2017  9:42 AM   Tissue Type Percent Pink 50 12/21/2017  9:42 AM   Tissue Type Percent Red 5 11/16/2017 10:08 AM   Tissue Type Percent Yellow 50 12/21/2017  9:42 AM   Drainage Amount  Small  12/21/2017  9:42 AM   Drainage Color Serous 12/21/2017  9:42 AM   Wound Odor None 12/21/2017  9:42 AM   Periwound Skin Condition Erythema, blanchable 12/21/2017  9:42 AM   Cleansing and Cleansing Agents  Dermal wound cleanser; Soap and water 12/21/2017  9:42 AM   Dressing Type Applied Other (Comment) 12/7/2017  9:56 AM   Procedure Tolerated Well 12/7/2017  9:56 AM   Number of days:35       Wound Toe Left (Active)   DRESSING TYPE Open to air 12/7/2017  9:56 AM   Non-Pressure Injury Partial thickness (epider/derm) 11/16/2017 10:08 AM   Wound Length (cm) 0.6 cm 11/16/2017 10:08 AM   Wound Width (cm) 1.4 cm 11/16/2017 10:08 AM   Wound Depth (cm) 0.1 11/16/2017 10:08 AM   Wound Surface area (cm^3) 0.08 cm^2 11/16/2017 10:08 AM   Condition of Base Other (comment) 11/16/2017 10:08 AM   Tissue Type Yellow 11/16/2017 10:08 AM   Tissue Type Percent Yellow 100 11/16/2017 10:08 AM   Drainage Amount  None 11/16/2017 10:08 AM   Wound Odor None 11/16/2017 10:08 AM   Cleansing and Cleansing Agents  Dermal wound cleanser 11/16/2017 10:08 AM   Dressing Type Applied Open to air 11/16/2017 10:08 AM   Number of days:35       Wound Toe Left (Active)   DRESSING STATUS Removed 11/16/2017 10:08 AM   DRESSING TYPE Open to air 12/7/2017  9:56 AM   Non-Pressure Injury Partial thickness (epider/derm) 11/16/2017 10:08 AM   Wound Length (cm) 0.3 cm 11/16/2017 10:08 AM   Wound Width (cm) 0.4 cm 11/16/2017 10:08 AM   Wound Depth (cm) 0.1 11/16/2017 10:08 AM   Wound Surface area (cm^3) 0.01 cm^2 11/16/2017 10:08 AM   Condition of Base Other (comment) 11/16/2017 10:08 AM   Tissue Type Yellow 11/16/2017 10:08 AM   Tissue Type Percent Yellow 100 11/16/2017 10:08 AM   Drainage Amount  None 11/16/2017 10:08 AM   Wound Odor None 11/16/2017 10:08 AM   Periwound Skin Condition Intact 11/16/2017 10:08 AM   Dressing Type Applied Open to air 11/16/2017 10:08 AM   Number of days:35       Wound Ankle Left;Lateral (Active)   DRESSING STATUS Removed 12/21/2017  9:42 AM   DRESSING TYPE Other (Comment) 12/21/2017  9:42 AM   Non-Pressure Injury Partial thickness (epider/derm) 12/21/2017  9:42 AM   Wound Length (cm) 1.1 cm 12/21/2017  9:42 AM   Wound Width (cm) 0.9 cm 12/21/2017  9:42 AM   Wound Depth (cm) 0.1 12/21/2017  9:42 AM   Wound Surface area (cm^3) 0.1 cm^2 12/21/2017  9:42 AM   Condition of Base Granulation;Slough 12/21/2017  9:42 AM   Condition of Edges Open 12/21/2017  9:42 AM   Tissue Type Pink;Yellow 12/21/2017  9:42 AM   Tissue Type Percent Pink 10 11/16/2017 10:08 AM   Tissue Type Percent Red 50 12/21/2017  9:42 AM   Tissue Type Percent Yellow 50 12/21/2017  9:42 AM   Drainage Amount  Small  2017  9:42 AM   Drainage Color Serous 2017  9:42 AM   Wound Odor None 2017  9:42 AM   Periwound Skin Condition Erythema, blanchable;Edematous 2017  9:42 AM   Cleansing and Cleansing Agents  Dermal wound cleanser; Soap and water 2017  9:42 AM   Dressing Type Applied Other (Comment) 2017 10:08 AM   Procedure Tolerated Well 2017 10:08 AM   Number of days:35       Wound Foot Left;Medial (Active)   Number of days:17       Wound Leg Lower Right; Anterior; Lateral (Active)   Number of days:17       Wound Leg Lower Right; Anterior;Medial (Active)   Number of days:17            Labs  No results found for this or any previous visit (from the past 24 hour(s)). Name: Debby Taylor  MRN: [id number]    Outpatient  : 1935  HIS Order #: [id number]  [id number] [employer] Visit #: [id number]  Date: 2017     TYPE OF TEST: Peripheral Arterial Testing     REASON FOR TEST  Extremity ulceration (both sides), Ulcer     Right Leg  Segmentals: Abnormal                      mmHg  Brachial         167  High thigh  Low thigh  Calf  Posterior tibial 128  Dorsalis pedis  Peroneal  Metatarsal  Toe pressure      82  Doppler:    Abnormal  Ankle/Brachial: 0.77     Site of occlusive disease:-  tibioperoneal segment     Left Leg  Segmentals: Abnormal                      mmHg  Brachial         158  High thigh  Low thigh  Calf  Posterior tibial  Dorsalis pedis   116  Peroneal  Metatarsal  Toe pressure      49  Doppler:    Abnormal  Ankle/Brachial: 0.69     Site of occlusive disease:-  femoral segment     INTERPRETATION/FINDINGS  Physiologic testing was performed using continuous wave Doppler and  segmental pressures. Technically difficult, limited examination due to multiple ulcers on  lower legs and patient pain. Only able to obtain distal pressures from   one site bilaterally, patient could not tolerate discomfort from  pressure.   1. Moderate peripheral arterial disease indicated at rest in the right   leg. 2. Disease is located in the tibio/peroneal segment on the right side. 3. Moderate peripheral arterial disease indicated at rest in the left  leg. 4. Disease is located in the ilio-femoral segment on the left side. 5. The right ankle/brachial index is 0.77 and the left ankle/brachial  index is 0.69. 6. The digital brachial index is abnormal measuring <.60 suggestive of   digital disease bilaterally.     ADDITIONAL COMMENTS     I have personally reviewed the data relevant to the interpretation of  this  study.     TECHNOLOGIST: Graham Bahena  Signed: 11/21/2017 12:06 PM     PHYSICIAN: Maria Guadalupe Scott MD  Signed: 11/22/2017 03:49 PM       X-Ray:  deferred    Procedures:   none               Kimberlee Nguyen DPM  December 21, 2017

## 2017-12-26 ENCOUNTER — TELEPHONE (OUTPATIENT)
Dept: WOUND CARE | Age: 82
End: 2017-12-26

## 2017-12-26 ENCOUNTER — APPOINTMENT (OUTPATIENT)
Dept: CT IMAGING | Age: 82
End: 2017-12-26
Attending: EMERGENCY MEDICINE
Payer: MEDICARE

## 2017-12-26 ENCOUNTER — APPOINTMENT (OUTPATIENT)
Dept: GENERAL RADIOLOGY | Age: 82
End: 2017-12-26
Attending: EMERGENCY MEDICINE
Payer: MEDICARE

## 2017-12-26 ENCOUNTER — HOSPITAL ENCOUNTER (EMERGENCY)
Age: 82
Discharge: HOME | End: 2017-12-26
Attending: EMERGENCY MEDICINE | Admitting: EMERGENCY MEDICINE
Payer: MEDICARE

## 2017-12-26 VITALS
RESPIRATION RATE: 23 BRPM | HEART RATE: 93 BPM | SYSTOLIC BLOOD PRESSURE: 149 MMHG | DIASTOLIC BLOOD PRESSURE: 72 MMHG | OXYGEN SATURATION: 100 % | TEMPERATURE: 98 F | BODY MASS INDEX: 30.01 KG/M2 | WEIGHT: 185.9 LBS

## 2017-12-26 DIAGNOSIS — T07.XXXA MULTIPLE ABRASIONS: Primary | ICD-10-CM

## 2017-12-26 DIAGNOSIS — R41.0 CONFUSION: ICD-10-CM

## 2017-12-26 LAB
ANION GAP SERPL CALC-SCNC: 10 MMOL/L (ref 3–18)
APPEARANCE UR: CLEAR
BASOPHILS # BLD: 0 K/UL (ref 0–0.06)
BASOPHILS NFR BLD: 0 % (ref 0–2)
BILIRUB UR QL: NEGATIVE
BUN SERPL-MCNC: 35 MG/DL (ref 7–18)
BUN/CREAT SERPL: 23 (ref 12–20)
CALCIUM SERPL-MCNC: 8.9 MG/DL (ref 8.5–10.1)
CHLORIDE SERPL-SCNC: 108 MMOL/L (ref 100–108)
CO2 SERPL-SCNC: 26 MMOL/L (ref 21–32)
COLOR UR: YELLOW
CREAT SERPL-MCNC: 1.52 MG/DL (ref 0.6–1.3)
DIFFERENTIAL METHOD BLD: ABNORMAL
EOSINOPHIL # BLD: 0.3 K/UL (ref 0–0.4)
EOSINOPHIL NFR BLD: 4 % (ref 0–5)
ERYTHROCYTE [DISTWIDTH] IN BLOOD BY AUTOMATED COUNT: 15.5 % (ref 11.6–14.5)
GLUCOSE SERPL-MCNC: 137 MG/DL (ref 74–99)
GLUCOSE UR STRIP.AUTO-MCNC: NEGATIVE MG/DL
HCT VFR BLD AUTO: 32.8 % (ref 35–45)
HGB BLD-MCNC: 10.5 G/DL (ref 12–16)
HGB UR QL STRIP: ABNORMAL
KETONES UR QL STRIP.AUTO: NEGATIVE MG/DL
LEUKOCYTE ESTERASE UR QL STRIP.AUTO: NEGATIVE
LYMPHOCYTES # BLD: 1.2 K/UL (ref 0.9–3.6)
LYMPHOCYTES NFR BLD: 16 % (ref 21–52)
MCH RBC QN AUTO: 28.2 PG (ref 24–34)
MCHC RBC AUTO-ENTMCNC: 32 G/DL (ref 31–37)
MCV RBC AUTO: 87.9 FL (ref 74–97)
MONOCYTES # BLD: 0.7 K/UL (ref 0.05–1.2)
MONOCYTES NFR BLD: 9 % (ref 3–10)
NEUTS SEG # BLD: 5.5 K/UL (ref 1.8–8)
NEUTS SEG NFR BLD: 71 % (ref 40–73)
NITRITE UR QL STRIP.AUTO: NEGATIVE
PH UR STRIP: 5 [PH] (ref 5–8)
PLATELET # BLD AUTO: 174 K/UL (ref 135–420)
PMV BLD AUTO: 11.5 FL (ref 9.2–11.8)
POTASSIUM SERPL-SCNC: 3.2 MMOL/L (ref 3.5–5.5)
PROT UR STRIP-MCNC: ABNORMAL MG/DL
RBC # BLD AUTO: 3.73 M/UL (ref 4.2–5.3)
RBC #/AREA URNS HPF: NEGATIVE /HPF (ref 0–5)
SODIUM SERPL-SCNC: 144 MMOL/L (ref 136–145)
SP GR UR REFRACTOMETRY: 1.02 (ref 1–1.03)
TSH SERPL DL<=0.05 MIU/L-ACNC: 2.37 UIU/ML (ref 0.36–3.74)
UROBILINOGEN UR QL STRIP.AUTO: 0.2 EU/DL (ref 0.2–1)
WBC # BLD AUTO: 7.8 K/UL (ref 4.6–13.2)
WBC URNS QL MICRO: NEGATIVE /HPF (ref 0–4)

## 2017-12-26 PROCEDURE — 81001 URINALYSIS AUTO W/SCOPE: CPT | Performed by: EMERGENCY MEDICINE

## 2017-12-26 PROCEDURE — 99285 EMERGENCY DEPT VISIT HI MDM: CPT

## 2017-12-26 PROCEDURE — 84443 ASSAY THYROID STIM HORMONE: CPT | Performed by: EMERGENCY MEDICINE

## 2017-12-26 PROCEDURE — 74011250636 HC RX REV CODE- 250/636: Performed by: EMERGENCY MEDICINE

## 2017-12-26 PROCEDURE — 80048 BASIC METABOLIC PNL TOTAL CA: CPT | Performed by: EMERGENCY MEDICINE

## 2017-12-26 PROCEDURE — 96360 HYDRATION IV INFUSION INIT: CPT

## 2017-12-26 PROCEDURE — 71010 XR CHEST PORT: CPT

## 2017-12-26 PROCEDURE — 85025 COMPLETE CBC W/AUTO DIFF WBC: CPT | Performed by: EMERGENCY MEDICINE

## 2017-12-26 PROCEDURE — 90715 TDAP VACCINE 7 YRS/> IM: CPT | Performed by: EMERGENCY MEDICINE

## 2017-12-26 PROCEDURE — 90471 IMMUNIZATION ADMIN: CPT

## 2017-12-26 PROCEDURE — 70450 CT HEAD/BRAIN W/O DYE: CPT

## 2017-12-26 RX ORDER — SODIUM CHLORIDE 9 MG/ML
100 INJECTION, SOLUTION INTRAVENOUS CONTINUOUS
Status: DISCONTINUED | OUTPATIENT
Start: 2017-12-26 | End: 2017-12-26 | Stop reason: HOSPADM

## 2017-12-26 RX ORDER — POTASSIUM CHLORIDE 20 MEQ/1
40 TABLET, EXTENDED RELEASE ORAL
Status: DISCONTINUED | OUTPATIENT
Start: 2017-12-26 | End: 2017-12-26 | Stop reason: HOSPADM

## 2017-12-26 RX ADMIN — SODIUM CHLORIDE 500 ML: 900 INJECTION, SOLUTION INTRAVENOUS at 08:02

## 2017-12-26 RX ADMIN — SODIUM CHLORIDE 100 ML/HR: 900 INJECTION, SOLUTION INTRAVENOUS at 08:02

## 2017-12-26 RX ADMIN — TETANUS TOXOID, REDUCED DIPHTHERIA TOXOID AND ACELLULAR PERTUSSIS VACCINE, ADSORBED 0.5 ML: 5; 2.5; 8; 8; 2.5 SUSPENSION INTRAMUSCULAR at 07:07

## 2017-12-26 NOTE — ED NOTES
Pt being discharged home. Discharge instructions given, and pt expresses understanding. Discontinued IV and helped patient to gather belongings. Pt discharged with family member. Redressed patients leg wounds with vaseline gauze and roll gauze, secured with tape. Cleaned with normal saline. Patient brought out to car in wheelchair.

## 2017-12-26 NOTE — ED TRIAGE NOTES
Patient is has early onset dementia according to . Patient wandered out of the front door of her house this morning and wandered around the house to the back yard. Patient tried to get into the house through the back door but found it locked. Patient attempted to gain access to the house by busting out the windows of the door with a hammer. Patient then began yelling for help and police happened to be in the area and heard her yelling and responded and found the patient sitting on the ground against the door.

## 2017-12-26 NOTE — ED PROVIDER NOTES
HPI Comments: Patient with a history of CVA diabetes hypertension renal insufficiency presents via EMS. She was found outside of her house by police. Evidently she was having difficulty entering her house, she found a hammer and attempted to break in through the window was unable do so and was yelling for help when police in the area found her. Per the  the patient had a similar episode of this previously. She was admitted in the past for urinary tract infection. She does not currently on antibiotics. She has no diagnosis of dementia. The  x-ray of her at home she also has multiple children but they were jobs and are not able to watch her all the time,  feels that she should be watched constantly. The patient has no complaints only that she is cold, she denies any chest pain shortness of breath abdominal pain vomiting diarrhea. She does have chronic bilateral lower extremity venous stasis ulcers with pain but is due to see wound care today. History limited due to her depressed mental status    Patient is a 81 y.o. female presenting with altered mental status.    Altered mental status           Past Medical History:   Diagnosis Date    CAD (coronary artery disease)     [facility]el[facility] ([facility])     CKD (chronic kidney disease) stage 3, GFR 30-59 ml/min 06/24/2013    Worst noted (08/06/13) BUN/sCr: 54/2.49, GFR 20.     DM (diabetes mellitus) ([facility])     Elevated TSH 06/25/2012    5.08     Hypertension     Leukocytosis     Lumbar spinal stenosis     Normocytic anemia     Rhabdomyolysis     Splenomegaly     Stroke ([facility]     Vitamin D deficiency        Past Surgical History:   Procedure Laterality Date    COLONOSCOPY N/A 3/1/2017    COLONOSCOPY performed by Marquez Garay MD at [address] [place] [address] HX CHOLECYSTECTOMY      HX FREE SKIN GRAFT      HX HYSTERECTOMY      HX OPEN REDUCTION INTERNAL FIXATION Left     Tx LLE Ankle Trimalleolar Fx by Dr. Ariel Shane TIMUR TIPTON [facility])    HX ORTHOPAEDIC Left 06/11/2013    [address] [facility] Ankle Removal of Hardware     HX VEIN STRIPPING Bilateral 09/18/2013    Dr. Ashkan Hunter 20 SQ CM<           Family History:   Problem Relation Age of Onset    Diabetes Other     Hypertension Other        Social History     Social History    Marital status:      Spouse name: N/A    Number of children: N/A    Years of education: N/A     Occupational History    Not on file. Social History Main Topics    Smoking status: Never Smoker    Smokeless tobacco: Never Used    Alcohol use No    Drug use: No    Sexual activity: No     Other Topics Concern    Not on file     Social History Narrative         ALLERGIES: Review of patient's allergies indicates no known allergies. Review of Systems   Unable to perform ROS: Mental status change       Vitals:    12/26/17 0615 12/26/17 0630 12/26/17 0645 12/26/17 0700   BP: 114/41 132/51 114/58 116/52   Pulse: 71 81 76 80   Resp: 14 27 22 24   Temp:       SpO2: 99% 99% 99% 98%   Weight:                Physical Exam   Constitutional:   General:  Well-developed, well-nourished, lying on stretcher with multiple blankets, eyes closed and slow to follow commands or answer questions  Head:  Normocephalic atraumatic. Eyes:  Pupils midrange extraocular movements intact. No pallor or conjunctival injection. Nose:  No rhinorrhea, inspection grossly normal.    Ears:  Grossly normal to inspection, no discharge. Mouth:  Mucous membranes moist, no appreciable intraoral lesion. Neck/Back:  Trachea midline, no asymmetry. No pain on palpation down cervical, thoracic, or lumbar spine step-off or deformity. Chest:  Grossly normal inspection, symmetric chest rise. Pulmonary:  Clear to auscultation bilaterally no wheezes rhonchi or rales. Cardiovascular:  S1-S2 no murmurs rubs or gallops.     Abdomen: Soft, nontender, nondistended no guarding rebound or peritoneal signs. Extremities:  Grossly normal to inspection, peripheral pulses intact  chronic venous stasis changes bilateral erythema with pitting edema, abrasion RIGHT forearm  Neurologic:  Alert and oriented ×3 able tell me her name location and the date  time prescription equally, legs move equally. No truncal ataxia  Skin:  Warm and dry  Psychiatric:  Grossly normal mood and affect. Nursing note reviewed, vital signs reviewed. MDM  Number of Diagnoses or Management Options  Multiple abrasions:   Diagnosis management comments: ED course:  Patient presenting with odd behavior trying to break into her house after being found outside. She is AAO ×3, suspected early dementia we'll evaluate for urinary tract infection otherwise she is afebrile and not tachycardic saturation normal on room air. Spoke with her  and her daughter about the need for planned if she does require more intensive monitoring and care physician be followed by her primary care physician. Labs unremarkable white count not elevated    Urinalysis is not consistent with urinary tract infection    It is now the end of my shift, I am still awaiting CT head and chest xray. Patient will be signed out to the oncoming physician Dr. Fallon Jason at 0700. Disposition:    Pending      Portions of this chart were created with Dragon medical speech to text program.   Unrecognized errors may be present.         ED Course       Procedures

## 2017-12-26 NOTE — TELEPHONE ENCOUNTER
Patient's family came to the wound center to request we cancel her appointment at the vascular specialist as she is currently in the ED and expected to be admitted. Call placed to [address] and Vascular, appointment cancelled.

## 2017-12-26 NOTE — ED NOTES
7:33 AM  Assumed care of pt chart and labs reviewed    Visit Vitals    /52    Pulse 80    Temp 98 °F (36.7 °C)    Resp 24    Wt 84.3 kg (185 lb 14.4 oz)    SpO2 98%    BMI 30.01 kg/m2     Pt sleepy arouses to voice difficult to open eyes per family has been acting like this for weeks with periods of confusion active behavior then sleeping for hours. Similar to behavior tonight. Ct head neg in ED pt back to baseline per family discussed possible need for increased level of care home vs assisted living. Diagnosis:   1. Multiple abrasions    2. Confusion          Disposition: home     Follow-up Information     None          Patient's Medications   Start Taking    No medications on file   Continue Taking    CHOLECALCIFEROL (VITAMIN D3) 1,000 UNIT TABLET    Take 3,000 Units by mouth daily. GABAPENTIN (NEURONTIN) 300 MG CAPSULE    Take 1 Cap by mouth three (3) times daily. 12/22/16, QTY#90, 30 Days, 2 Refills. Dr. Lopez Kil    IBUPROFEN PO    Take  by mouth. LEVOFLOXACIN (LEVAQUIN) 500 MG TABLET    Take 1 Tab by mouth every fourty-eight (48) hours. LISINOPRIL-HYDROCHLOROTHIAZIDE (PRINZIDE, ZESTORETIC) 20-25 MG PER TABLET    Take 1 Tab by mouth daily. METOPROLOL TARTRATE (LOPRESSOR) 50 MG TABLET    Take 1 Tab by mouth two (2) times a day.    These Medications have changed    No medications on file   Stop Taking    No medications on file

## 2017-12-26 NOTE — ED NOTES
Bedside shift report completed with CF, RN  Safety measures were reviewed  Lines traced/IV pump and IV site double checked  Activity level, PO status and vital sign trends reviewed   Monitor alarm limits on and set per order/protocol   Patient and or family participated in handoff   Clinical quality measures reviewed

## 2017-12-26 NOTE — PROGRESS NOTES
Referral received from ED MD to assist with placement/home health. Chart reviewed. Noted pt was admitted on 12/3/17-12/5/17 and pt refused home health. Pt had followed up with wound care. Spoke with ED MD, Dr Fallon Jason, and states that she spoke with pt's PCP. PCP is working with Dr Kulwinder Longoria, neurologist, to come up with a diagnosis for appropriate discharge plan. Pt has family support at home.

## 2018-01-03 ENCOUNTER — OFFICE VISIT (OUTPATIENT)
Dept: CARDIOLOGY CLINIC | Age: 83
End: 2018-01-03

## 2018-01-03 VITALS
OXYGEN SATURATION: 98 % | SYSTOLIC BLOOD PRESSURE: 158 MMHG | DIASTOLIC BLOOD PRESSURE: 78 MMHG | WEIGHT: 173 LBS | BODY MASS INDEX: 27.8 KG/M2 | HEIGHT: 66 IN | HEART RATE: 60 BPM

## 2018-01-03 DIAGNOSIS — R09.89 BRUIT: Primary | ICD-10-CM

## 2018-01-03 DIAGNOSIS — I10 ESSENTIAL HYPERTENSION: ICD-10-CM

## 2018-01-03 DIAGNOSIS — M86.679 CHRONIC OSTEOMYELITIS OF ANKLE (HCC): ICD-10-CM

## 2018-01-03 DIAGNOSIS — I69.359 CVA, OLD, HEMIPARESIS (HCC): ICD-10-CM

## 2018-01-03 DIAGNOSIS — R00.2 PALPITATIONS: ICD-10-CM

## 2018-01-03 DIAGNOSIS — E11.40 TYPE 2 DIABETES MELLITUS WITH DIABETIC NEUROPATHY, WITHOUT LONG-TERM CURRENT USE OF INSULIN (HCC): ICD-10-CM

## 2018-01-03 DIAGNOSIS — E11.21 TYPE 2 DIABETES MELLITUS WITH NEPHROPATHY (HCC): ICD-10-CM

## 2018-01-03 DIAGNOSIS — I11.9 HYPERTENSIVE LEFT VENTRICULAR HYPERTROPHY, WITHOUT HEART FAILURE: ICD-10-CM

## 2018-01-03 DIAGNOSIS — I73.9 VASCULAR DISEASE, PERIPHERAL (HCC): ICD-10-CM

## 2018-01-03 DIAGNOSIS — I49.8 ATRIAL ARRHYTHMIA: ICD-10-CM

## 2018-01-03 DIAGNOSIS — R55 SYNCOPE, UNSPECIFIED SYNCOPE TYPE: ICD-10-CM

## 2018-01-03 DIAGNOSIS — N18.30 CKD (CHRONIC KIDNEY DISEASE) STAGE 3, GFR 30-59 ML/MIN (HCC): ICD-10-CM

## 2018-01-03 NOTE — PROGRESS NOTES
1. Have you been to the ER, urgent care clinic since your last visit? Hospitalized since your last visit? No    2. Have you seen or consulted any other health care providers outside of the [address] [place] [place] [address] since your last visit? Include any pap smears or colon screening.  No

## 2018-01-03 NOTE — MR AVS SNAPSHOT
Visit Information Date & Time Provider Department Dept. Phone Encounter #  
 1/3/2018 11:30 AM David Mccarty  [facility] Specialist at [facility] [facility] [address] [phone] [id number] Follow-up Instructions Return in about 4 weeks (around 1/31/2018). Upcoming Health Maintenance Date Due  
 FOOT EXAM Q1 6/3/1945 MICROALBUMIN Q1 6/3/1945 EYE EXAM RETINAL OR DILATED Q1 6/3/1945 ZOSTER VACCINE AGE 60> 4/3/1996 GLAUCOMA SCREENING Q2Y 6/3/2001 OSTEOPOROSIS SCREENING (DEXA) 6/3/2001 MEDICARE YEARLY EXAM 6/3/2001 Pneumococcal 65+ High/Highest Risk (2 of 2 - PCV13) 6/12/2006 HEMOGLOBIN A1C Q6M 5/6/2018 LIPID PANEL Q1 12/4/2018 DTaP/Tdap/Td series (2 - Td) 12/26/2027 Allergies as of 1/3/2018  Review Complete On: 1/3/2018 By: Callie Salazar LPN No Known Allergies Current Immunizations  Reviewed on 12/4/2017 Name Date Influenza Vaccine 10/1/2017, 9/28/2016 Pneumococcal Polysaccharide (PPSV-23) 6/12/2005 Tdap 12/26/2017  7:07 AM  
  
 Not reviewed this visit You Were Diagnosed With   
  
 Codes Comments Bruit    -  Primary ICD-10-CM: R09.89 ICD-9-CM: 785.9 Palpitations     ICD-10-CM: R00.2 ICD-9-CM: 785.1 Vitals BP Pulse Height(growth percentile) Weight(growth percentile) SpO2 BMI  
 158/78 60 5' 6\" (1.676 m) 173 lb (78.5 kg) 98% 27.92 kg/m2 OB Status Smoking Status Postmenopausal Never Smoker BMI and BSA Data Body Mass Index Body Surface Area  
 27.92 kg/m 2 1.91 m 2 Your Updated Medication List  
  
   
This list is accurate as of: 1/3/18 12:06 PM.  Always use your most recent med list.  
  
  
  
  
 cholecalciferol 1,000 unit tablet Commonly known as:  VITAMIN D3 Take 3,000 Units by mouth daily. gabapentin 300 mg capsule Commonly known as:  NEURONTIN Take 1 Cap by mouth three (3) times daily. 12/22/16, QTY#90, 30 Days, 2 Refills. Dr. Butts Lowers IBUPROFEN PO Take  by mouth.  
  
 lisinopril-hydroCHLOROthiazide 20-25 mg per tablet Commonly known as:  Clifford Wei Take 1 Tab by mouth daily. metoprolol tartrate 50 mg tablet Commonly known as:  LOPRESSOR Take 1 Tab by mouth two (2) times a day. Follow-up Instructions Return in about 4 weeks (around 1/31/2018). To-Do List   
 01/18/2018 9:00 AM  
  Appointment with Margarita Burks DPM at [address] [facility] ([phone]) Introducing [facility] SERVICES! Elizabeth Gaona introduces [employer] patient portal. Now you can access parts of your medical record, email your doctor's office, and request medication refills online. 1. In your internet browser, go to [email or web address] 2. Click on the First Time User? Click Here link in the Sign In box. You will see the New Member Sign Up page. 3. Enter your [employer] Access Code exactly as it appears below. You will not need to use this code after youve completed the sign-up process. If you do not sign up before the expiration date, you must request a new code. · [employer] Access Code: [email or web address] Expires: 2/6/2018  3:15 PM 
 
4. Enter the last four digits of your Social Security Number (xxxx) and Date of Birth (mm/dd/yyyy) as indicated and click Submit. You will be taken to the next sign-up page. 5. Create a [employer] ID. This will be your [employer] login ID and cannot be changed, so think of one that is secure and easy to remember. 6. Create a [employer] password. You can change your password at any time. 7. Enter your Password Reset Question and Answer. This can be used at a later time if you forget your password. 8. Enter your e-mail address. You will receive e-mail notification when new information is available in [address] 9. Click Sign Up. You can now view and download portions of your medical record.  
10. Click the Download Summary menu link to download a portable copy of your medical information. If you have questions, please visit the Frequently Asked Questions section of the [employer] website. Remember, [employer] is NOT to be used for urgent needs. For medical emergencies, dial 911. Now available from your iPhone and Android! Please provide this summary of care documentation to your next provider. Your primary care clinician is listed as Derick Mccartney. If you have any questions after today's visit, please call [phone].

## 2018-01-15 PROBLEM — I11.9 HYPERTENSIVE LEFT VENTRICULAR HYPERTROPHY, WITHOUT HEART FAILURE: Status: ACTIVE | Noted: 2018-01-15

## 2018-01-15 PROBLEM — I49.8 ATRIAL ARRHYTHMIA: Status: ACTIVE | Noted: 2018-01-15

## 2018-01-15 NOTE — PROGRESS NOTES
Subjective:      Jackie Min is in the office today for cardiac evaluation. She is an 81 y.o. woman who has had two recent episodes of syncope, the last which occurred on 12/26/17 and resulted in a fall and subsequent hospitalization. The patient states the first episode occurred sometime in late November, early December. She took her purse, went out the front door, and had a fall. The patient had no knowledge of the event. On 12/26/17, she once again went out the front door. She went around to the back door and using a hammer beat the glass out of the back door. She had very little memory of that event. She relates no history of palpitations. She does get dizzy when she assumes an upright posture at times. Her daughter says she \"talks off her mind\" at times. She tends to slur her words at times. She had an 12-lead electrocardiogram done in December which showed sinus rhythm with a brief run of atrial tachycardia. She has no known history of atrial fibrillation. She had no  seizure-like activity. She had no urinary or fecal incontinence.                 Patient Active Problem List    Diagnosis Date Noted    Hypertensive left ventricular hypertrophy, without heart failure 01/15/2018    Atrial arrhythmia 01/15/2018    Type 2 diabetes mellitus with nephropathy ([address] 01/03/2018    Type 2 diabetes mellitus with diabetic neuropathy ([address] 01/03/2018    Vascular disease, peripheral ([address] 12/07/2017    Non-pressure chronic ulcer of right ankle with fat layer exposed ([address] 12/07/2017    Syncope 12/03/2017    Skin ulcer of left ankle, with fat layer exposed ([address] 11/16/2017    Venous ulcer of ankle, left (HCC) 11/16/2017    Weak pulse 11/16/2017    UTI (urinary tract infection) 11/06/2017    Encephalopathy 11/06/2017    LAURA (acute kidney injury) ([address] 11/06/2017    Iron (Fe) deficiency anemia 02/28/2017    Normocytic anemia     Stroke ([address]     Hypertension     DM (diabetes mellitus) (HCC)     Type 2 diabetes mellitus ([facility] 08/08/2013    CVA, old, hemiparesis ([facility] 08/07/2013    Acute on chronic renal insufficiency 07/11/2013    Leukocytosis 07/11/2013    Microscopic hematuria 07/11/2013    Rhabdomyolysis 07/11/2013    Injury of foot 07/10/2013    CKD (chronic kidney disease) stage 3, GFR 30-59 ml/min 06/24/2013    Abnormal finding on thyroid function test 06/11/2013    Anemia 06/11/2013    Chronic osteomyelitis of ankle ([facility] 06/11/2013    Spinal stenosis of lumbar region 06/11/2013    Vitamin D deficiency 06/11/2013    Elevated TSH 06/25/2012     Current Outpatient Prescriptions   Medication Sig Dispense Refill    IBUPROFEN PO Take  by mouth.  metoprolol tartrate (LOPRESSOR) 50 mg tablet Take 1 Tab by mouth two (2) times a day. 60 Tab 0    gabapentin (NEURONTIN) 300 mg capsule Take 1 Cap by mouth three (3) times daily. 12/22/16, QTY#90, 30 Days, 2 Refills. Dr. Alex Hahn  2    cholecalciferol (VITAMIN D3) 1,000 unit tablet Take 3,000 Units by mouth daily.  lisinopril-hydrochlorothiazide (PRINZIDE, ZESTORETIC) 20-25 mg per tablet Take 1 Tab by mouth daily.        No Known Allergies  Past Medical History:   Diagnosis Date    CAD (coronary artery disease)     Chronic osteomyelitis of ankle (HCC)     CKD (chronic kidney disease) stage 3, GFR 30-59 ml/min 06/24/2013    Worst noted (08/06/13) BUN/sCr: 54/2.49, GFR 20.     DM (diabetes mellitus) (HCC)     Elevated TSH 06/25/2012    5.08     Hypertension     Leukocytosis     Lumbar spinal stenosis     Normocytic anemia     Rhabdomyolysis     Splenomegaly     Stroke ([facility]     Vitamin D deficiency      Past Surgical History:   Procedure Laterality Date    COLONOSCOPY N/A 3/1/2017    COLONOSCOPY performed [facility] Shirin Perez [facility] at [facility] ENDOSCOPY    HX CHOLECYSTECTOMY      HX FREE SKIN GRAFT      HX HYSTERECTOMY      HX OPEN REDUCTION INTERNAL FIXATION Left     Tx LLE Ankle Trimalleolar Fx by Dr. Marietta Gaucher H. [facility])  HX ORTHOPAEDIC Left 06/11/2013    [address] - [facility] Ankle Removal of Hardware     HX VEIN STRIPPING Bilateral 09/18/2013    Dr. Ochoa Brennan 20 SQ CM<       Family History   Problem Relation Age of Onset    Diabetes Other     Hypertension Other      History   Smoking Status    Never Smoker   Smokeless Tobacco    Never Used          Review of Systems, additional:  Constitutional: negative  Eyes: negative  Respiratory: negative  Cardiovascular: positive for syncope  Gastrointestinal: negative  Musculoskeletal:positive for chronic osteomyelitis left ankle  Neurological: see HPI  Behvioral/Psych: negative  Endocrine: negative  ENT: negative    Objective:     Visit Vitals    /78    Pulse 60    Ht 5' 6\" (1.676 m)    Wt 173 lb (78.5 kg)    SpO2 98%    BMI 27.92 kg/m2     General:  alert, cooperative, no distress   Chest Wall: inspection normal - no chest wall deformities or tenderness, respiratory effort normal   Lung: clear to auscultation bilaterally   Heart:  normal rate and regular rhythm, S1 and S2 normal, no gallops noted   Abdomen: soft, non-tender. Bowel sounds normal. No masses,  no organomegaly   Extremities: extremities normal, atraumatic, no cyanosis or edema Skin: no rashes   Neuro: alert, oriented, normal speech, no focal findings or movement disorder noted         Assessment/Plan:       ICD-10-CM ICD-9-CM    1. Bruit, will evaluate carotid circulation. R09.89 785.9 DUPLEX CAROTID BILATERAL   2. Palpitations R00.2 785. 1 ECG HOLTER MONITOR, UP TO 48 HRS   3. Vascular disease, peripheral ([facility]) I73.9 443.9    4. Syncope, unspecified syncope type, checking holter. Consider tilt table at next appointment in 4 weeks. R55 780.2    5. Essential hypertension, mildly elevated systolic BP in office today I10 401.9    6. Type 2 diabetes mellitus with diabetic neuropathy, without long-term current use of insulin ([facility]) E11.40 250.60      357.2    7.  Type 2 diabetes mellitus with nephropathy (HCC) E11.21 250.40      583.81    8. CVA, old, hemiparesis ([facility] I69.359 438.20    9. CKD (chronic kidney disease) stage 3, GFR 30-59 ml/min N18.3 585.3    10. Chronic osteomyelitis of ankle ([facility] M86.679 730.17    11. Hypertensive left ventricular hypertrophy, without heart failure I11.9 402.90    12. Atrial arrhythmia, short  Run on 12 lead ECG in December, 2017.  Will order 48 hour holter monitor I49.8 427.9

## 2018-01-22 ENCOUNTER — HOSPITAL ENCOUNTER (OUTPATIENT)
Dept: VASCULAR SURGERY | Age: 83
Discharge: HOME | End: 2018-01-22
Attending: INTERNAL MEDICINE
Payer: MEDICARE

## 2018-01-22 ENCOUNTER — HOSPITAL ENCOUNTER (OUTPATIENT)
Dept: NON INVASIVE DIAGNOSTICS | Age: 83
Discharge: HOME | End: 2018-01-22
Attending: INTERNAL MEDICINE
Payer: MEDICARE

## 2018-01-22 DIAGNOSIS — R00.2 PALPITATIONS: ICD-10-CM

## 2018-01-22 DIAGNOSIS — R09.89 BRUIT: ICD-10-CM

## 2018-01-22 PROCEDURE — 93880 EXTRACRANIAL BILAT STUDY: CPT

## 2018-01-22 PROCEDURE — 93225 XTRNL ECG REC<48 HRS REC: CPT

## 2018-01-22 NOTE — PROCEDURES
[place]  *** FINAL REPORT ***    Name: Franky Hicks  MRN: [id number]    Outpatient  : 1935  HIS Order #: [id number]  [id number] [facility] Visit #: [id number]  Date: 2018    TYPE OF TEST: Cerebrovascular Duplex    REASON FOR TEST  Carotid bruit    Right Carotid:-             Proximal               Mid                 Distal  cm/s  Systolic  Diastolic  Systolic  Diastolic  Systolic  Diastolic  CCA:     07.1      12.0       88.0      13.0       78.0      11.0  Bulb:  ECA:    246.0  ICA:     82.0      22.0      105.0      33.0       88.0      15.0  ICA/CCA:  1.2       2.5    ICA Stenosis: <50%    Right Vertebral:-  Finding: Antegrade  Sys:       85.0  Deanna:       21.0    Right Subclavian: Normal    Left Carotid:-            Proximal                Mid                 Distal  cm/s  Systolic  Diastolic  Systolic  Diastolic  Systolic  Diastolic  CCA:     57.9       9.0       96.0      14.0       74.0      12.0  Bulb:  ECA:    110.0  ICA:    124.0      31.0      109.0      37.0       85.0      21.0  ICA/CCA:  1.3       2.2    ICA Stenosis: 50-69%    Left Vertebral:-  Finding: Antegrade  Sys:       21.0  Deanna:        7.0    Left Subclavian: Normal    INTERPRETATION/FINDINGS  Duplex images were obtained using 2-D gray scale, color flow, and  spectral Doppler analysis. 1. Intimal thickening along walls of common carotid arteries  bilaterally with no significant stenosis. 2. <50% stenosis in the  right internal carotid artery. 3. 50-69% stenosis in the left internal carotid artery, lower end of  percentile. 4. Increased velocities in the right external carotid artery  consistent with moderate stenosis. 5. No significant stenosis in the left external carotid artery. 6. Antegrade flow in both vertebral arteries; dominant on the right. 5. Normal flow in both subclavian arteries. Plaque Morphology:  1. Varying Density plaque in the bulb and right ICA.   2. Varying Density plaque in the bulb and left ICA.    ADDITIONAL COMMENTS    I have personally reviewed the data relevant to the interpretation of  this  study. TECHNOLOGIST: Sydni Mackey.  Fellowes, RTR, RVT  Signed: 01/22/2018 12:45 PM    PHYSICIAN: Vandana Mckenna MD  Signed: 01/22/2018 02:56 PM

## 2018-02-01 ENCOUNTER — HOSPITAL ENCOUNTER (OUTPATIENT)
Dept: WOUND CARE | Age: 83
Discharge: HOME | End: 2018-02-01
Payer: MEDICARE

## 2018-02-01 PROCEDURE — 97602 WOUND(S) CARE NON-SELECTIVE: CPT

## 2018-02-01 PROCEDURE — 77030011255 HC DSG AQUACEL AG BMS -A: Performed by: PODIATRIST

## 2018-02-01 PROCEDURE — 77030011256 HC DRSG MEPILEX <16IN NO BORD MOLN -A: Performed by: PODIATRIST

## 2018-02-01 NOTE — PROGRESS NOTES
Podiatry Surgery Progress Note      Patient: Dionna Swain MRN: [id number]  SSN: [id number]    YOB: 1935  Age: 81 y.o. Sex: female      Assessment:     Patient Active Problem List   Diagnosis Code    CVA, old, hemiparesis ([facility] [address] I69.359    Type 2 diabetes mellitus ([facility] [address] E11.9    Abnormal finding on thyroid function test R94.6    Acute on chronic renal insufficiency N28.9, N18.9    Anemia D64.9    Chronic osteomyelitis of ankle ([facility]) M86.679    Injury of foot S99.929A    Leukocytosis D72.829    Spinal stenosis of lumbar region M48.061    Microscopic hematuria R31.29    Rhabdomyolysis M62.82    Vitamin D deficiency E55.9    Stroke ([facility]) I63.9    Hypertension I10    DM (diabetes mellitus) ([facility]) E11.9    Normocytic anemia D64.9    CKD (chronic kidney disease) stage 3, GFR 30-59 ml/min N18.3    Elevated TSH R94.6    Iron (Fe) deficiency anemia D50.9    UTI (urinary tract infection) N39.0    Encephalopathy G93.40    LAURA (acute kidney injury) ([facility]) N17.9    Skin ulcer of left ankle, with fat layer exposed ([facility] [address] L97.322    Venous ulcer of ankle, left ([facility]) I83.023    Weak pulse R09.89    Syncope R55    Vascular disease, peripheral ([facility]) I73.9    Non-pressure chronic ulcer of right ankle with fat layer exposed ([facility] [address] L97.312    Type 2 diabetes mellitus with nephropathy ([facility]) E11.21    Type 2 diabetes mellitus with diabetic neuropathy ([facility]) E11.40    Hypertensive left ventricular hypertrophy, without heart failure I11.9    Atrial arrhythmia I49.8          Plan:   Explained to patient and family she will need keep her vascular appointment which may increase blood supply to the lesions. Will continue current [address] until after seen at vascular which is on 2/13/18.  Will see after this date    Total time spent with patient: [phone] [address] discussed with: Patient, Family and Nursing Staff    Discussed:  Care Plan and home health    Disposition: Stable      Ms. Wiliam Schmitz is a 81 y.o. female who was admitted for chronic bilateral leg ulcers with a vascular component. She has still not seen the vascular doctor       Subjective:   Past Medical History  Past Medical History:   Diagnosis Date    CAD (coronary artery disease)     Chronic osteomyelitis of ankle (HCC)     CKD (chronic kidney disease) stage 3, GFR 30-59 ml/min 06/24/2013    Worst noted (08/06/13) BUN/sCr: 54/2.49, GFR 20.     DM (diabetes mellitus) (HCC)     Elevated TSH 06/25/2012    5.08     Hypertension     Leukocytosis     Lumbar spinal stenosis     Normocytic anemia     Rhabdomyolysis     Splenomegaly     Stroke (HCC)     Vitamin D deficiency      Social History     Social History    Marital status:      Spouse name: N/A    Number of children: N/A    Years of education: N/A     Occupational History    Not on file. Social History Main Topics    Smoking status: Never Smoker    Smokeless tobacco: Never Used    Alcohol use No    Drug use: No    Sexual activity: No     Other Topics Concern    Not on file     Social History Narrative       Current Medications  Current Outpatient Prescriptions   Medication Sig Dispense Refill    IBUPROFEN PO Take  by mouth.  metoprolol tartrate (LOPRESSOR) 50 mg tablet Take 1 Tab by mouth two (2) times a day. 60 Tab 0    gabapentin (NEURONTIN) 300 mg capsule Take 1 Cap by mouth three (3) times daily. 12/22/16, QTY#90, 30 Days, 2 Refills. Dr. Darletta Burkitt  2    cholecalciferol (VITAMIN D3) 1,000 unit tablet Take 3,000 Units by mouth daily.  lisinopril-hydrochlorothiazide (PRINZIDE, ZESTORETIC) 20-25 mg per tablet Take 1 Tab by mouth daily. Patient Allergies  No Known Allergies       Objective:   General Exam  alert, cooperative, no distress, appears stated age    Vitals  There were no vitals taken for this visit.     REVIEW OF SYSTEMS:  General: denies chronic fatigue, weight loss, fever, anemia, bruising, depression, nervousness, panic attacks  HEENT: denies ringing in ears, ear infections, dizzy spells, poor vision, glaucoma, sinus trouble, hoarseness, eye infections  GI: denies diarrhea, gas, bloating, heartburn, regurgitation, difficulty swallowing, painful swallowing, nausea, vomiting, constipation, abdominal pain, decreased appetite, blood in stools, black stools, jaundice, dark urine  Lungs: denies pneumonia, asthma, cough, SOB, hemoptysis  Heart: denies chest pain, irregular heart beat, ankle swelling, HTN  Skin: denies rashes, hives, allergic reaction  Urinary: denies UTI, kidney stones, decreased urine force and flow, urination at night, blood in urine, painful urination  Bones and Joints: denies arthritis, rheumatism, back pain, gout, osteoporosis  Neurologic: denies stroke, seizures, headaches, numbness, tingling    Foot Exam  VASCULAR EXAM:. Pedal pulses are non palpable 0/4 DP 0/4 PT right and left foot. Skin temperature is warm to warm right and left foot. Digital capillary fill time is 7 seconds right and left foot. There is edema of the right and left foot and ankle.       NEUROLOGICAL EXAM:. Sensation Intact with 5.07g monofilament wire right and left foot.  Deep tendon reflexes intact and symmetrical on the right and left foot.      MUSCULOSKELETAL EXAM:. Muscle tone is normal.  Muscle strength of the flexor and extensor group inversion and eversion Bilateral. 4/5.         DERMATOLOGICAL EXAM:. Skin is of abnormal texture and turgor with atrophic skin changes noting hair growth, nail changes (thickening), Bilateral. There is a ulcer full thickness noted over the lateral and medial lower legs with improved measurements as stated below     Wound Ankle Left (Active)   Number of days:1639       Wound Ankle Left (Active)   Number of days:1639       Wound Ankle Left (Active)   Number of days:1639       Wound Ankle Left;Lateral (Active)   Number of days:340       Wound Ankle Right (Active)   DRESSING STATUS Removed 12/21/2017  9:42 AM   DRESSING TYPE Other (Comment) 12/21/2017  9:42 AM   Non-Pressure Injury Partial thickness (epider/derm) 12/21/2017  9:42 AM   Wound Length (cm) 0.7 cm 12/21/2017  9:42 AM   Wound Width (cm) 0.8 cm 12/21/2017  9:42 AM   Wound Depth (cm) 0.1 12/21/2017  9:42 AM   Wound Surface area (cm^3) 0.06 cm^2 12/21/2017  9:42 AM   Condition of Base [facility] 12/21/2017  9:42 AM   Condition of Edges Open 12/21/2017  9:42 AM   Tissue Type Yellow 12/21/2017  9:42 AM   Tissue Type Percent Red 50 11/16/2017 10:08 AM   Tissue Type Percent Yellow 100 12/21/2017  9:42 AM   Drainage Amount  Moderate 12/21/2017  9:42 AM   Drainage Color Serous 12/21/2017  9:42 AM   Wound Odor None 12/21/2017  9:42 AM   Periwound Skin Condition Erythema, blanchable 12/21/2017  9:42 AM   Cleansing and Cleansing Agents  Dermal wound cleanser; Soap and water 12/21/2017  9:42 AM   Dressing Type Applied Other (Comment) 12/21/2017  9:42 AM   Procedure Tolerated Well 12/21/2017  9:42 AM   Number of days:340       Wound Ankle Lateral;Right (Active)   Number of days:85       Wound Ankle Left;Lateral (Active)   Number of days:85       Wound Ankle Left;Medial;Other (comment) (Active)   DRESSING STATUS Removed 12/21/2017  9:42 AM   DRESSING TYPE Other (Comment) 12/21/2017  9:42 AM   Non-Pressure Injury Partial thickness (epider/derm) 12/21/2017  9:42 AM   Wound Length (cm) 0.5 cm 12/21/2017  9:42 AM   Wound Width (cm) 0.1 cm 12/21/2017  9:42 AM   Wound Depth (cm) 0.1 12/21/2017  9:42 AM   Wound Surface area (cm^3) 0 cm^2 12/21/2017  9:42 AM   Condition of Base [place] 12/21/2017  9:42 AM   Condition of Edges Open 11/16/2017 10:08 AM   Tissue Type Pink 12/21/2017  9:42 AM   Tissue Type Percent Pink 100 12/21/2017  9:42 AM   Tissue Type Percent Red 50 11/16/2017 10:08 AM   Tissue Type Percent Yellow 50 11/16/2017 10:08 AM   Drainage Amount  Moderate 12/21/2017  9:42 AM   Drainage Color Serosanguinous 12/21/2017  9:42 AM   Wound Odor None 12/21/2017  9:42 AM   Periwound Skin Condition Erythema, blanchable 12/21/2017  9:42 AM   Cleansing and Cleansing Agents  Dermal wound cleanser; Soap and water 12/21/2017  9:42 AM   Dressing Type Applied Other (Comment) 12/21/2017  9:42 AM   Procedure Tolerated Well 12/21/2017  9:42 AM   Number of days:85       Wound Ankle Left;Medial;Distal (Active)   DRESSING STATUS Removed 12/21/2017  9:42 AM   DRESSING TYPE Other (Comment) 12/21/2017  9:42 AM   Non-Pressure Injury Partial thickness (epider/derm) 12/7/2017  9:56 AM   Wound Length (cm) 1.5 cm 12/21/2017  9:42 AM   Wound Width (cm) 0.6 cm 12/21/2017  9:42 AM   Wound Depth (cm) 0.1 12/21/2017  9:42 AM   Wound Surface area (cm^3) 0.09 cm^2 12/21/2017  9:42 AM   Condition of Base [place];Slough 12/21/2017  9:42 AM   Condition of Edges Open 12/21/2017  9:42 AM   Tissue Type Pink;Yellow 12/21/2017  9:42 AM   Tissue Type Percent Pink 50 12/21/2017  9:42 AM   Tissue Type Percent Yellow 50 12/21/2017  9:42 AM   Drainage Amount  Moderate 12/21/2017  9:42 AM   Drainage Color Serous 12/21/2017  9:42 AM   Wound Odor None 12/21/2017  9:42 AM   Periwound Skin Condition Erythema, blanchable 12/21/2017  9:42 AM   Cleansing and Cleansing Agents  Dermal wound cleanser; Soap and water 12/21/2017  9:42 AM   Dressing Type Applied Other (Comment) 12/21/2017  9:42 AM   Procedure Tolerated Well 12/21/2017  9:42 AM   Number of days:77       Wound Ankle Left;Medial;Proximal (Active)   DRESSING STATUS Removed 12/21/2017  9:42 AM   DRESSING TYPE Open to air 2/1/2018 12:56 PM   Non-Pressure Injury Partial thickness (epider/derm) 2/1/2018 12:56 PM   Wound Length (cm) 4.2 cm 2/1/2018 12:56 PM   Wound Width (cm) 3.5 cm 2/1/2018 12:56 PM   Wound Depth (cm) 0.3 2/1/2018 12:56 PM   Wound Surface area (cm^3) 14.7 cm^2 2/1/2018 12:56 PM   Condition of Base Pink;Slough 2/1/2018 12:56 PM   Condition of Edges Open 2/1/2018 12:56 PM   Tissue Type Pink;Yellow 2/1/2018 12:56 PM   Tissue Type Percent Pink 30 2/1/2018 12:56 PM   Tissue Type Percent Red 5 11/16/2017 10:08 AM   Tissue Type Percent Yellow 70 2/1/2018 12:56 PM   Drainage Amount  Moderate 2/1/2018 12:56 PM   Drainage Color Serous 2/1/2018 12:56 PM   Wound Odor None 2/1/2018 12:56 PM   Periwound Skin Condition Erythema, blanchable 2/1/2018 12:56 PM   Cleansing and Cleansing Agents  Dermal wound cleanser 2/1/2018 12:56 PM   Dressing Type Applied Other (Comment) 2/1/2018 12:56 PM   Procedure Tolerated Well 2/1/2018 12:56 PM   Number of days:77       Wound Toe Left (Active)   DRESSING TYPE Open to air 12/7/2017  9:56 AM   Non-Pressure Injury Partial thickness (epider/derm) 11/16/2017 10:08 AM   Wound Length (cm) 0.6 cm 11/16/2017 10:08 AM   Wound Width (cm) 1.4 cm 11/16/2017 10:08 AM   Wound Depth (cm) 0.1 11/16/2017 10:08 AM   Wound Surface area (cm^3) 0.08 cm^2 11/16/2017 10:08 AM   Condition of Base Other (comment) 11/16/2017 10:08 AM   Tissue Type Yellow 11/16/2017 10:08 AM   Tissue Type Percent Yellow 100 11/16/2017 10:08 AM   Drainage Amount  None 11/16/2017 10:08 AM   Wound Odor None 11/16/2017 10:08 AM   Cleansing and Cleansing Agents  Dermal wound cleanser 11/16/2017 10:08 AM   Dressing Type Applied Open to air 11/16/2017 10:08 AM   Number of days:77       Wound Toe Left (Active)   DRESSING STATUS Removed 11/16/2017 10:08 AM   DRESSING TYPE Open to air 12/7/2017  9:56 AM   Non-Pressure Injury Partial thickness (epider/derm) 11/16/2017 10:08 AM   Wound Length (cm) 0.3 cm 11/16/2017 10:08 AM   Wound Width (cm) 0.4 cm 11/16/2017 10:08 AM   Wound Depth (cm) 0.1 11/16/2017 10:08 AM   Wound Surface area (cm^3) 0.01 cm^2 11/16/2017 10:08 AM   Condition of Base Other (comment) 11/16/2017 10:08 AM   Tissue Type Yellow 11/16/2017 10:08 AM   Tissue Type Percent Yellow 100 11/16/2017 10:08 AM   Drainage Amount  None 11/16/2017 10:08 AM   Wound Odor None 11/16/2017 10:08 AM   Periwound Skin Condition Intact 11/16/2017 10:08 AM   Dressing Type Applied Open to air 11/16/2017 10:08 AM   Number of days:77       Wound Ankle Left;Lateral (Active)   DRESSING STATUS Removed 12/21/2017  9:42 AM   DRESSING TYPE Other (Comment) 12/21/2017  9:42 AM   Non-Pressure Injury Partial thickness (epider/derm) 12/21/2017  9:42 AM   Wound Length (cm) 1.1 cm 12/21/2017  9:42 AM   Wound Width (cm) 0.9 cm 12/21/2017  9:42 AM   Wound Depth (cm) 0.1 12/21/2017  9:42 AM   Wound Surface area (cm^3) 0.1 cm^2 12/21/2017  9:42 AM   Condition of Base [place];Slough 12/21/2017  9:42 AM   Condition of Edges Open 12/21/2017  9:42 AM   Tissue Type Pink;Yellow 12/21/2017  9:42 AM   Tissue Type Percent Pink 10 11/16/2017 10:08 AM   Tissue Type Percent Red 50 12/21/2017  9:42 AM   Tissue Type Percent Yellow 50 12/21/2017  9:42 AM   Drainage Amount  Moderate 12/21/2017  9:42 AM   Drainage Color Serous 12/21/2017  9:42 AM   Wound Odor None 12/21/2017  9:42 AM   Periwound Skin Condition Erythema, blanchable;Edematous 12/21/2017  9:42 AM   Cleansing and Cleansing Agents  Dermal wound cleanser; Soap and water 12/21/2017  9:42 AM   Dressing Type Applied Other (Comment) 12/21/2017  9:42 AM   Procedure Tolerated Well 12/21/2017  9:42 AM   Number of days:77       Wound Foot Left;Medial (Active)   DRESSING TYPE Open to air 2/1/2018  1:05 PM   Non-Pressure Injury Partial thickness (epider/derm) 2/1/2018  1:05 PM   Wound Length (cm) 2.4 cm 2/1/2018  1:05 PM   Wound Width (cm) 1.1 cm 2/1/2018  1:05 PM   Wound Depth (cm) 0.3 2/1/2018  1:05 PM   Wound Surface area (cm^3) 2.64 cm^2 2/1/2018  1:05 PM   Condition of Base Pink;Slough 2/1/2018  1:05 PM   Condition of Edges Open 2/1/2018  1:05 PM   Tissue Type Yellow;Pink 2/1/2018  1:05 PM   Tissue Type Percent Pink 30 2/1/2018  1:05 PM   Tissue Type Percent Yellow 70 2/1/2018  1:05 PM   Drainage Amount  Small  2/1/2018  1:05 PM   Drainage Color Serous 2/1/2018  1:05 PM   Wound Odor None 2/1/2018  1:05 PM   Periwound Skin Condition Erythema, blanchable;Edematous 2/1/2018  1:05 PM Cleansing and Cleansing Agents  Dermal wound cleanser 2/1/2018  1:05 PM   Dressing Type Applied Other (Comment) 2/1/2018  1:05 PM   Procedure Tolerated Well 2/1/2018  1:05 PM   Number of days:59       Wound Leg Lower Right; Anterior; Lateral (Active)   Number of days:59       Wound Leg Lower Right; Anterior;Medial (Active)   Number of days:59            Labs  No results found for this or any previous visit (from the past 24 hour(s)).     X-Ray:  deferred    Procedures:   none               LIU Hollis DPM  February 1, 2018

## 2018-02-07 ENCOUNTER — TELEPHONE (OUTPATIENT)
Dept: CARDIOLOGY CLINIC | Age: 83
End: 2018-02-07

## 2018-02-07 DIAGNOSIS — R94.31 HOLTER MONITOR, ABNORMAL: ICD-10-CM

## 2018-02-07 DIAGNOSIS — R06.02 SOB (SHORTNESS OF BREATH): Primary | ICD-10-CM

## 2018-02-07 DIAGNOSIS — I49.3 PVC'S (PREMATURE VENTRICULAR CONTRACTIONS): ICD-10-CM

## 2018-02-07 NOTE — TELEPHONE ENCOUNTER
Per Dr. Jesús Delaney, patient was called this morning to advise that her holter monitor showed some \"extra beats\" and he would like to order a stress test at this time. Patient does not need to follow up in office today unless she has any additional concerns. Pt verbalized understanding and is in agreement of stress test and will follow up after testing.        Verbal order and read back per Paris Myers MD

## 2018-02-12 ENCOUNTER — HOSPITAL ENCOUNTER (OUTPATIENT)
Dept: NON INVASIVE DIAGNOSTICS | Age: 83
Discharge: HOME | End: 2018-02-12
Attending: INTERNAL MEDICINE
Payer: MEDICARE

## 2018-02-12 ENCOUNTER — HOSPITAL ENCOUNTER (OUTPATIENT)
Dept: NUCLEAR MEDICINE | Age: 83
Discharge: HOME | End: 2018-02-12
Attending: INTERNAL MEDICINE
Payer: MEDICARE

## 2018-02-12 DIAGNOSIS — I49.3 PVC'S (PREMATURE VENTRICULAR CONTRACTIONS): ICD-10-CM

## 2018-02-12 DIAGNOSIS — R94.31 HOLTER MONITOR, ABNORMAL: ICD-10-CM

## 2018-02-12 DIAGNOSIS — R06.02 SOB (SHORTNESS OF BREATH): ICD-10-CM

## 2018-02-12 PROCEDURE — 74011250636 HC RX REV CODE- 250/636: Performed by: INTERNAL MEDICINE

## 2018-02-12 PROCEDURE — 93017 CV STRESS TEST TRACING ONLY: CPT | Performed by: INTERNAL MEDICINE

## 2018-02-12 PROCEDURE — 78452 HT MUSCLE IMAGE SPECT MULT: CPT

## 2018-02-12 RX ADMIN — REGADENOSON 0.4 MG: 0.08 INJECTION, SOLUTION INTRAVENOUS at 10:57

## 2018-02-15 ENCOUNTER — HOSPITAL ENCOUNTER (OUTPATIENT)
Dept: WOUND CARE | Age: 83
Discharge: HOME | End: 2018-02-15
Payer: MEDICARE

## 2018-02-15 VITALS
DIASTOLIC BLOOD PRESSURE: 69 MMHG | RESPIRATION RATE: 16 BRPM | SYSTOLIC BLOOD PRESSURE: 172 MMHG | HEART RATE: 61 BPM | OXYGEN SATURATION: 99 % | TEMPERATURE: 96.7 F

## 2018-02-15 PROCEDURE — 77030011256 HC DRSG MEPILEX <16IN NO BORD MOLN -A: Performed by: PODIATRIST

## 2018-02-15 PROCEDURE — 97602 WOUND(S) CARE NON-SELECTIVE: CPT

## 2018-02-15 NOTE — WOUND CARE
Patient here for follow up for lower leg ulcers. ALl ulcers open to air upon arrival. Right lower leg ulcers are now healed. Left lower leg ulcers remain open. Patient saw vascular Dr. Jovita Fisher two days ago and patient states her circulation is \"pretty bad\" . She is scheduled for vascular intervention on 2/22/18. She is doing her own dressing changes with Aquacel Ag. She has small supply of Aquacel Ag at home but requires additional bandages. Coversite/bordered gauze ordered from [facility].   She will return in 4 weeks for follow up.      02/15/18 1136   Wound Foot Left;Medial   Date First Assessed/Time First Assessed: 12/04/17 1100   POA: Yes  Wound Type: Vascular  Location: Foot  Orientation: Left;Medial   DRESSING TYPE Open to air   Wound Length (cm) 2.2 cm   Wound Width (cm) 0.8 cm   Wound Depth (cm) 0.3   Wound Surface area (cm^2) 1.76 cm^2   Condition of Base Pink   Condition of Edges Open   Tissue Type Percent Pink 100  (pale pink)   Drainage Amount  Scant   Drainage Color Serous   Wound Odor None   Cleansing and Cleansing Agents  Dermal wound cleanser   Dressing Type Applied Other (Comment)  (AquacelAG,Mepilex Border Foam)   Wound Ankle Left;Medial;Distal   Date First Assessed: 11/16/17   POA: Yes  Wound Type: Vascular  Location: Ankle  Orientation: Left;Medial;Distal   DRESSING TYPE Open to air   Wound Length (cm) 1.6 cm   Wound Width (cm) 0.8 cm   Wound Depth (cm) 0.2   Wound Surface area (cm^2) 1.28 cm^2   Condition of Base Pink;Slough   Condition of Edges Open   Tissue Type Pink;Yellow   Tissue Type Percent Pink 40   Tissue Type Percent Yellow 60   Drainage Amount  Scant   Drainage Color Serous   Wound Odor None   Periwound Skin Condition Erythema, blanchable;Edematous   Cleansing and Cleansing Agents  Dermal wound cleanser   Dressing Type Applied Other (Comment)  (AquacelAG,Mepilex Border Foam)   Wound Ankle Left;Medial;Proximal   Date First Assessed: 11/16/17   POA: Yes  Wound Type: Vascular  Location: Ankle  Orientation: Left;Medial;Proximal   DRESSING TYPE Open to air   Wound Length (cm) 1.3 cm   Wound Width (cm) 0.8 cm   Wound Depth (cm) 0.1   Wound Surface area (cm^2) 1.04 cm^2   Condition of Base Pink;Slough   Condition of Edges Open   Tissue Type Yellow;Pink   Tissue Type Percent Pink 50   Tissue Type Percent Yellow 50   Drainage Amount  Scant   Drainage Color Serous   Wound Odor None   Periwound Skin Condition Edematous; Erythema, blanchable   Cleansing and Cleansing Agents  Dermal wound cleanser   Dressing Type Applied Other (Comment)  (AquacelAG,Mepilex Border Foam)   [REMOVED] Wound Leg Lower Right; Anterior; Lateral   Final Assessment Date/Final Assessment Time: 02/15/18 1138  Date First Assessed/Time First Assessed: 12/04/17 1100   POA: Yes  Wound Type: Vascular  Location: Leg Lower  Orientation: Right; Anterior; Lateral   DRESSING TYPE Open to air   Wound Length (cm) (Healed)

## 2018-02-15 NOTE — PROGRESS NOTES
Podiatry Surgery Progress Note      Patient: Mingo Cowart MRN: [id number]  SSN: [id number]    YOB: 1935  Age: 81 y.o. Sex: female      Assessment:     Patient Active Problem List   Diagnosis Code    CVA, old, hemiparesis ([facility] [address] I69.359    Type 2 diabetes mellitus ([facility] [address] E11.9    Abnormal finding on thyroid function test R94.6    Acute on chronic renal insufficiency N28.9, N18.9    Anemia D64.9    Chronic osteomyelitis of ankle ([facility]) M86.679    Injury of foot S99.929A    Leukocytosis D72.829    Spinal stenosis of lumbar region M48.061    Microscopic hematuria R31.29    Rhabdomyolysis M62.82    Vitamin D deficiency E55.9    Stroke ([facility]) I63.9    Hypertension I10    DM (diabetes mellitus) ([facility]) E11.9    Normocytic anemia D64.9    CKD (chronic kidney disease) stage 3, GFR 30-59 ml/min N18.3    Elevated TSH R94.6    Iron (Fe) deficiency anemia D50.9    UTI (urinary tract infection) N39.0    Encephalopathy G93.40    LAURA (acute kidney injury) ([facility]) N17.9    Skin ulcer of left ankle, with fat layer exposed ([facility] [address] L97.322    Venous ulcer of ankle, left ([facility]) I83.023    Weak pulse R09.89    Syncope R55    Vascular disease, peripheral ([facility]) I73.9    Non-pressure chronic ulcer of right ankle with fat layer exposed ([facility] [address] L97.312    Type 2 diabetes mellitus with nephropathy ([facility]) E11.21    Type 2 diabetes mellitus with diabetic neuropathy ([facility]) E11.40    Hypertensive left ventricular hypertrophy, without heart failure I11.9    Atrial arrhythmia I49.8          Plan:   Keep Angio appointment with Dr Jose David Rajan as scheduled. [facility]. Patient to make appointment at the office for vascular foot care. Total time spent with patient: [phone] discussed with: Patient, Family and Nursing Staff    Discussed:  Care Plan and home health    Disposition:  Stable      Ms. Marcy Henson is a 81 y.o. female who was admitted for vascular ulcer LLE. She states she has seen Dr Estella Rossi and she has an angio scheduled for 02/22/18. .        Subjective:   Past Medical History  Past Medical History:   Diagnosis Date    CAD (coronary artery disease)     Chronic osteomyelitis of ankle (HCC)     CKD (chronic kidney disease) stage 3, GFR 30-59 ml/min 06/24/2013    Worst noted (08/06/13) BUN/sCr: 54/2.49, GFR 20.     DM (diabetes mellitus) (HCC)     Elevated TSH 06/25/2012    5.08     Hypertension     Leukocytosis     Lumbar spinal stenosis     Normocytic anemia     Rhabdomyolysis     Splenomegaly     Stroke (HCC)     Vitamin D deficiency      Social History     Social History    Marital status:      Spouse name: N/A    Number of children: N/A    Years of education: N/A     Occupational History    Not on file. Social History Main Topics    Smoking status: Never Smoker    Smokeless tobacco: Never Used    Alcohol use No    Drug use: No    Sexual activity: No     Other Topics Concern    Not on file     Social History Narrative       Current Medications  Current Outpatient Prescriptions   Medication Sig Dispense Refill    IBUPROFEN PO Take  by mouth.  metoprolol tartrate (LOPRESSOR) 50 mg tablet Take 1 Tab by mouth two (2) times a day. 60 Tab 0    gabapentin (NEURONTIN) 300 mg capsule Take 1 Cap by mouth three (3) times daily. 12/22/16, QTY#90, 30 Days, 2 Refills. Dr. Alondra Diaz  2    cholecalciferol (VITAMIN D3) 1,000 unit tablet Take 3,000 Units by mouth daily.  lisinopril-hydrochlorothiazide (PRINZIDE, ZESTORETIC) 20-25 mg per tablet Take 1 Tab by mouth daily.          Patient Allergies  No Known Allergies       Objective:   General Exam  alert, cooperative, no distress, appears stated age    Vitals  Visit Vitals    /69 (BP Patient Position: Supine)    Pulse 61    Temp 96.7 °F (35.9 °C)    Resp 16    SpO2 99%       REVIEW OF SYSTEMS:  General: denies chronic fatigue, weight loss, fever, anemia, bruising, depression, nervousness, panic attacks  HEENT: denies ringing in ears, ear infections, dizzy spells, poor vision, glaucoma, sinus trouble, hoarseness, eye infections  GI: denies diarrhea, gas, bloating, heartburn, regurgitation, difficulty swallowing, painful swallowing, nausea, vomiting, constipation, abdominal pain, decreased appetite, blood in stools, black stools, jaundice, dark urine  Lungs: denies pneumonia, asthma, cough, SOB, hemoptysis  Heart: denies chest pain, irregular heart beat, ankle swelling, HTN  Skin: denies rashes, hives, allergic reaction  Urinary: denies UTI, kidney stones, decreased urine force and flow, urination at night, blood in urine, painful urination  Bones and Joints: denies arthritis, rheumatism, back pain, gout, osteoporosis  Neurologic: denies stroke, seizures, headaches, numbness, tingling    Foot Exam      VASCULAR EXAM:. Pedal pulses are non palpable 0/4 DP 0/4 PT right and left foot. Skin temperature is warm to warm right and left foot. Digital capillary fill time is 7 seconds right and left foot. There is edema of the right and left foot and ankle.       NEUROLOGICAL EXAM:. Sensation Intact with 5.07g monofilament wire right and left foot.  Deep tendon reflexes intact and symmetrical on the right and left foot.      MUSCULOSKELETAL EXAM:. Muscle tone is normal.  Muscle strength of the flexor and extensor group inversion and eversion Bilateral. 4/5.         DERMATOLOGICAL EXAM:. Skin is of abnormal texture and turgor with atrophic skin changes noting hair growth, nail changes (thickening), Bilateral. There is a ulcer full thickness noted over the lateral and medial lower legs with improved measurements as stated below with decrease erythema  Wound Ankle Left (Active)   Number of days:1653       Wound Ankle Left (Active)   Number of days:1653       Wound Ankle Left (Active)   Number of days:1653       Wound Ankle Left;Lateral (Active)   Number of days:354       Wound Ankle Right (Active)   DRESSING STATUS Removed 12/21/2017  9:42 AM   DRESSING TYPE Other (Comment) 12/21/2017  9:42 AM   Non-Pressure Injury Partial thickness (epider/derm) 12/21/2017  9:42 AM   Wound Length (cm) 0.7 cm 12/21/2017  9:42 AM   Wound Width (cm) 0.8 cm 12/21/2017  9:42 AM   Wound Depth (cm) 0.1 12/21/2017  9:42 AM   Wound Surface area (cm^2) 0.06 cm^2 12/21/2017  9:42 AM   Condition of Base [facility] 12/21/2017  9:42 AM   Condition of Edges Open 12/21/2017  9:42 AM   Tissue Type Yellow 12/21/2017  9:42 AM   Tissue Type Percent Red 50 11/16/2017 10:08 AM   Tissue Type Percent Yellow 100 12/21/2017  9:42 AM   Drainage Amount  Moderate 12/21/2017  9:42 AM   Drainage Color Serous 12/21/2017  9:42 AM   Wound Odor None 12/21/2017  9:42 AM   Periwound Skin Condition Erythema, blanchable 12/21/2017  9:42 AM   Cleansing and Cleansing Agents  Dermal wound cleanser; Soap and water 12/21/2017  9:42 AM   Dressing Type Applied Other (Comment) 12/21/2017  9:42 AM   Procedure Tolerated Well 12/21/2017  9:42 AM   Number of days:354       Wound Ankle Lateral;Right (Active)   Number of days:99       Wound Ankle Left;Lateral (Active)   Number of days:99       Wound Ankle Left;Medial;Other (comment) (Active)   DRESSING STATUS Removed 12/21/2017  9:42 AM   DRESSING TYPE Other (Comment) 12/21/2017  9:42 AM   Non-Pressure Injury Partial thickness (epider/derm) 12/21/2017  9:42 AM   Wound Length (cm) 0.5 cm 12/21/2017  9:42 AM   Wound Width (cm) 0.1 cm 12/21/2017  9:42 AM   Wound Depth (cm) 0.1 12/21/2017  9:42 AM   Wound Surface area (cm^2) 0 cm^2 12/21/2017  9:42 AM   Condition of Base [place] 12/21/2017  9:42 AM   Condition of Edges Open 11/16/2017 10:08 AM   Tissue Type Pink 12/21/2017  9:42 AM   Tissue Type Percent Pink 100 12/21/2017  9:42 AM   Tissue Type Percent Red 50 11/16/2017 10:08 AM   Tissue Type Percent Yellow 50 11/16/2017 10:08 AM   Drainage Amount  Moderate 12/21/2017  9:42 AM   Drainage Color Serosanguinous 12/21/2017  9:42 AM   Wound Odor None 12/21/2017  9:42 AM   Periwound Skin Condition Erythema, blanchable 12/21/2017  9:42 AM   Cleansing and Cleansing Agents  Dermal wound cleanser; Soap and water 12/21/2017  9:42 AM   Dressing Type Applied Other (Comment) 12/21/2017  9:42 AM   Procedure Tolerated Well 12/21/2017  9:42 AM   Number of days:99       Wound Ankle Left;Medial;Distal (Active)   DRESSING STATUS Removed 12/21/2017  9:42 AM   DRESSING TYPE Open to air 2/15/2018 11:36 AM   Non-Pressure Injury Partial thickness (epider/derm) 12/7/2017  9:56 AM   Wound Length (cm) 1.6 cm 2/15/2018 10:52 AM   Wound Width (cm) 0.8 cm 2/15/2018 10:52 AM   Wound Depth (cm) 0.2 2/15/2018 10:52 AM   Wound Surface area (cm^2) 1.28 cm^2 2/15/2018 10:52 AM   Condition of Base [place] 2/15/2018 10:52 AM   Condition of Edges Open 12/21/2017  9:42 AM   Tissue Type Pink; Other (comment) 2/15/2018 10:52 AM   Tissue Type Percent Pink 50 12/21/2017  9:42 AM   Tissue Type Percent Yellow 60 2/15/2018 10:52 AM   Tissue Type Percent Other (comment) 40 2/15/2018 10:52 AM   Drainage Amount  Scant 2/15/2018 11:36 AM   Drainage Color Serous 2/15/2018 11:36 AM   Wound Odor None 2/15/2018 11:36 AM   Periwound Skin Condition Erythema, blanchable 2/15/2018 10:52 AM   Cleansing and Cleansing Agents  Dermal wound cleanser 2/15/2018 11:36 AM   Dressing Type Applied Other (Comment) 2/15/2018 11:36 AM   Procedure Tolerated Well 12/21/2017  9:42 AM   Number of days:91       Wound Ankle Left;Medial;Proximal (Active)   DRESSING STATUS Removed 12/21/2017  9:42 AM   DRESSING TYPE Open to air 2/15/2018 11:36 AM   Non-Pressure Injury Partial thickness (epider/derm) 2/15/2018 10:52 AM   Wound Length (cm) 1.3 cm 2/15/2018 10:52 AM   Wound Width (cm) 0.8 cm 2/15/2018 10:52 AM   Wound Depth (cm) 0.1 2/15/2018 10:52 AM   Wound Surface area (cm^2) 1.04 cm^2 2/15/2018 10:52 AM   Condition of Base Pink;Slough 2/1/2018 12:56 PM   Condition of Edges Open 2/1/2018 12:56 PM   Tissue Type Pink;Yellow 2/15/2018 10:52 AM   Tissue Type Percent Pink 50 2/15/2018 10:52 AM   Tissue Type Percent Red 5 11/16/2017 10:08 AM   Tissue Type Percent Yellow 50 2/15/2018 10:52 AM   Drainage Amount  Scant 2/15/2018 11:36 AM   Drainage Color Serous 2/15/2018 11:36 AM   Wound Odor None 2/15/2018 11:36 AM   Periwound Skin Condition Edematous; Erythema, blanchable 2/15/2018 11:36 AM   Cleansing and Cleansing Agents  Dermal wound cleanser 2/15/2018 11:36 AM   Dressing Type Applied Other (Comment) 2/15/2018 11:36 AM   Procedure Tolerated Well 2/1/2018 12:56 PM   Number of days:91       Wound Toe Left (Active)   DRESSING TYPE Open to air 12/7/2017  9:56 AM   Non-Pressure Injury Partial thickness (epider/derm) 11/16/2017 10:08 AM   Wound Length (cm) 0.6 cm 11/16/2017 10:08 AM   Wound Width (cm) 1.4 cm 11/16/2017 10:08 AM   Wound Depth (cm) 0.1 11/16/2017 10:08 AM   Wound Surface area (cm^2) 0.08 cm^2 11/16/2017 10:08 AM   Condition of Base Other (comment) 11/16/2017 10:08 AM   Tissue Type Yellow 11/16/2017 10:08 AM   Tissue Type Percent Yellow 100 11/16/2017 10:08 AM   Drainage Amount  None 11/16/2017 10:08 AM   Wound Odor None 11/16/2017 10:08 AM   Cleansing and Cleansing Agents  Dermal wound cleanser 11/16/2017 10:08 AM   Dressing Type Applied Open to air 11/16/2017 10:08 AM   Number of days:91       Wound Toe Left (Active)   DRESSING STATUS Removed 11/16/2017 10:08 AM   DRESSING TYPE Open to air 12/7/2017  9:56 AM   Non-Pressure Injury Partial thickness (epider/derm) 11/16/2017 10:08 AM   Wound Length (cm) 0.3 cm 11/16/2017 10:08 AM   Wound Width (cm) 0.4 cm 11/16/2017 10:08 AM   Wound Depth (cm) 0.1 11/16/2017 10:08 AM   Wound Surface area (cm^2) 0.01 cm^2 11/16/2017 10:08 AM   Condition of Base Other (comment) 11/16/2017 10:08 AM   Tissue Type Yellow 11/16/2017 10:08 AM   Tissue Type Percent Yellow 100 11/16/2017 10:08 AM   Drainage Amount  None 11/16/2017 10:08 AM   Wound Odor None 11/16/2017 10:08 AM   Periwound Skin Condition Intact 11/16/2017 10:08 AM   Dressing Type Applied Open to air 11/16/2017 10:08 AM   Number of days:91       Wound Ankle Left;Lateral (Active)   DRESSING STATUS Removed 12/21/2017  9:42 AM   DRESSING TYPE Other (Comment) 12/21/2017  9:42 AM   Non-Pressure Injury Partial thickness (epider/derm) 12/21/2017  9:42 AM   Wound Length (cm) 1.1 cm 12/21/2017  9:42 AM   Wound Width (cm) 0.9 cm 12/21/2017  9:42 AM   Wound Depth (cm) 0.1 12/21/2017  9:42 AM   Wound Surface area (cm^2) 0.1 cm^2 12/21/2017  9:42 AM   Condition of Base [place];Slough 12/21/2017  9:42 AM   Condition of Edges Open 12/21/2017  9:42 AM   Tissue Type Pink;Yellow 12/21/2017  9:42 AM   Tissue Type Percent Pink 10 11/16/2017 10:08 AM   Tissue Type Percent Red 50 12/21/2017  9:42 AM   Tissue Type Percent Yellow 50 12/21/2017  9:42 AM   Drainage Amount  Moderate 12/21/2017  9:42 AM   Drainage Color Serous 12/21/2017  9:42 AM   Wound Odor None 12/21/2017  9:42 AM   Periwound Skin Condition Erythema, blanchable;Edematous 12/21/2017  9:42 AM   Cleansing and Cleansing Agents  Dermal wound cleanser; Soap and water 12/21/2017  9:42 AM   Dressing Type Applied Other (Comment) 12/21/2017  9:42 AM   Procedure Tolerated Well 12/21/2017  9:42 AM   Number of days:91       Wound Foot Left;Medial (Active)   DRESSING TYPE Open to air 2/15/2018 11:36 AM   Non-Pressure Injury Partial thickness (epider/derm) 2/15/2018 10:52 AM   Wound Length (cm) 2.2 cm 2/15/2018 10:52 AM   Wound Width (cm) 0.8 cm 2/15/2018 10:52 AM   Wound Depth (cm) 0.3 2/15/2018 10:52 AM   Wound Surface area (cm^2) 1.76 cm^2 2/15/2018 10:52 AM   Condition of Base [place] 2/15/2018 10:52 AM   Condition of Edges Open 2/15/2018 10:52 AM   Tissue Type Pink 2/15/2018 10:52 AM   Tissue Type Percent Pink 100 2/15/2018 10:52 AM   Tissue Type Percent Yellow 70 2/1/2018  1:05 PM   Drainage Amount  Scant 2/15/2018 11:36 AM   Drainage Color Serous 2/15/2018 11:36 AM   Wound Odor None 2/15/2018 11:36 AM Periwound Skin Condition Erythema, blanchable 2/15/2018 10:52 AM   Cleansing and Cleansing Agents  Dermal wound cleanser 2/15/2018 11:36 AM   Dressing Type Applied Other (Comment) 2/15/2018 11:36 AM   Procedure Tolerated Well 2/1/2018  1:05 PM   Number of days:73       Wound Leg Lower Right; Anterior;Medial (Active)   Number of days:73       [REMOVED] Wound Leg Lower Right; Anterior; Lateral (Removed)   Removed 02/15/18 1138   DRESSING TYPE Open to air 2/15/2018 11:36 AM   Non-Pressure Injury Partial thickness (epider/derm) 2/1/2018  1:05 PM   Wound Length (cm) 0.9 cm 2/1/2018  1:05 PM   Wound Width (cm) 0.6 cm 2/1/2018  1:05 PM   Wound Depth (cm) 0.1 2/1/2018  1:05 PM   Wound Surface area (cm^2) 0.54 cm^2 2/1/2018  1:05 PM   Condition of Base [place] 2/1/2018  1:05 PM   Condition of Edges Open 2/1/2018  1:05 PM   Tissue Type Red 2/1/2018  1:05 PM   Tissue Type Percent Red 100 2/1/2018  1:05 PM   Drainage Amount  Scant 2/1/2018  1:05 PM   Drainage Color Serosanguinous 2/1/2018  1:05 PM   Wound Odor None 2/1/2018  1:05 PM   Periwound Skin Condition Edematous; Erythema, blanchable 2/1/2018  1:05 PM   Cleansing and Cleansing Agents  Dermal wound cleanser 2/1/2018  1:05 PM   Dressing Type Applied Other (Comment) 2/1/2018  1:05 PM   Procedure Tolerated Well 2/1/2018  1:05 PM   Number of days:73        Labs  No results found for this or any previous visit (from the past 24 hour(s)).     X-Ray:  deferred    Procedures:   none               LIU Spear DPM  February 15, 2018

## 2018-03-15 ENCOUNTER — HOSPITAL ENCOUNTER (OUTPATIENT)
Dept: WOUND CARE | Age: 83
Discharge: HOME | End: 2018-03-15
Payer: MEDICARE

## 2018-03-15 VITALS
TEMPERATURE: 97 F | RESPIRATION RATE: 18 BRPM | DIASTOLIC BLOOD PRESSURE: 61 MMHG | HEART RATE: 63 BPM | OXYGEN SATURATION: 97 % | SYSTOLIC BLOOD PRESSURE: 167 MMHG

## 2018-03-15 PROCEDURE — 77030011255 HC DSG AQUACEL AG BMS -A: Performed by: PODIATRIST

## 2018-03-15 PROCEDURE — 97602 WOUND(S) CARE NON-SELECTIVE: CPT

## 2018-03-15 NOTE — WOUND CARE
Patient here for follow up appointment with Dr. Joy Dobson for left ankle/foot ulcers. She had a vascular intervention about a month ago by Dr. Vilma Gordon. Wounds are healing with current regimen. She returns to see Dr. Vilma Gordon 3/27/18 and will return to wound clinic in 4 weeks to Dr. Joy Dobson. She will cancel appointment if wounds are healed. Bordered gauze dressings ordered from [facility] as requested by patient. 03/15/18 1401   Wound Foot Left;Medial   Date First Assessed/Time First Assessed: 12/04/17 1100   POA: Yes  Wound Type: Vascular  Location: Foot  Orientation: Left;Medial   DRESSING TYPE Other (Comment)  (bordered gauze, Aquacel AG)   Wound Length (cm) 1 cm   Wound Width (cm) 0.5 cm   Wound Depth (cm) 0.2   Wound Surface area (cm^2) 0.5 cm^2   Condition of Base Pink;Slough   Condition of Edges Open   Tissue Type Percent Pink 80   Tissue Type Percent Yellow 20   Drainage Amount  Scant   Drainage Color Serous   Wound Odor None   Periwound Skin Condition Erythema, blanchable;Edematous   Cleansing and Cleansing Agents  Dermal wound cleanser   Dressing Type Applied Silver products  (Aquacel AG, Kerlix, tape)   Wound Ankle Left;Medial;Distal   Date First Assessed: 11/16/17   POA: Yes  Wound Type: Vascular  Location: Ankle  Orientation: Left;Medial;Distal   DRESSING STATUS Removed   DRESSING TYPE Other (Comment)  (Bordered gauze, Aquacel AG)   Wound Length (cm) 0.1 cm   Wound Width (cm) 0.1 cm   Wound Depth (cm) 0.1   Wound Surface area (cm^2) 0.01 cm^2   Condition of Edges Closed; Open   Epithelialization (%) 100   Tissue Type Pink   Tissue Type Percent Pink 100   Drainage Amount  Scant   Drainage Color Serous   Wound Odor None   Periwound Skin Condition Edematous; Erythema, blanchable   Cleansing and Cleansing Agents  Dermal wound cleanser   Dressing Type Applied Silver products  (Aquacel Ag, Kerlix, tape)   Wound Ankle Left;Medial;Proximal   Date First Assessed: 11/16/17   POA: Yes  Wound Type: Vascular Location: Ankle  Orientation: Left;Medial;Proximal   DRESSING STATUS Removed   DRESSING TYPE Other (Comment)  (Border gauze, Aquacel AG)   Wound Length (cm) 0.5 cm   Wound Width (cm) 0.2 cm   Wound Depth (cm) 0.1   Wound Surface area (cm^2) 0.1 cm^2   Condition of Base Pink   Condition of Edges Open   Tissue Type Pink   Tissue Type Percent Pink 100   Drainage Amount  Scant   Drainage Color Serous   Wound Odor None   Periwound Skin Condition Edematous; Erythema, blanchable   Cleansing and Cleansing Agents  Dermal wound cleanser   Dressing Type Applied Silver products  (Aquacel Ag, Kerlix, tape)

## 2018-05-17 ENCOUNTER — HOSPITAL ENCOUNTER (OUTPATIENT)
Dept: WOUND CARE | Age: 83
Discharge: HOME | End: 2018-05-17
Payer: MEDICARE

## 2018-05-17 VITALS
RESPIRATION RATE: 18 BRPM | OXYGEN SATURATION: 98 % | HEART RATE: 74 BPM | DIASTOLIC BLOOD PRESSURE: 69 MMHG | SYSTOLIC BLOOD PRESSURE: 175 MMHG | TEMPERATURE: 97.7 F

## 2018-05-17 PROBLEM — L97.422 MIDFOOT ULCER, LEFT, WITH FAT LAYER EXPOSED (HCC): Status: ACTIVE | Noted: 2018-05-17

## 2018-05-17 PROCEDURE — 74011250637 HC RX REV CODE- 250/637

## 2018-05-17 PROCEDURE — 11042 DBRDMT SUBQ TIS 1ST 20SQCM/<: CPT

## 2018-05-17 PROCEDURE — 74011000250 HC RX REV CODE- 250

## 2018-05-17 PROCEDURE — 77030011255 HC DSG AQUACEL AG BMS -A: Performed by: PODIATRIST

## 2018-05-17 PROCEDURE — 77030011256 HC DRSG MEPILEX <16IN NO BORD MOLN -A: Performed by: PODIATRIST

## 2018-05-17 RX ORDER — LIDOCAINE AND PRILOCAINE 25; 25 MG/G; MG/G
CREAM TOPICAL
Status: COMPLETED
Start: 2018-05-17 | End: 2018-05-17

## 2018-05-17 RX ADMIN — LIDOCAINE AND PRILOCAINE 5 G: 25; 25 CREAM TOPICAL at 08:44

## 2018-05-17 RX ADMIN — COLLAGENASE SANTYL: 250 OINTMENT TOPICAL at 09:19

## 2018-05-17 NOTE — PROGRESS NOTES
Progress and Debridement Note    Patient: Osman Lobo MRN: [id number]  SSN: [id number]    YOB: 1935  Age: 81 y.o. Sex: female      Assessment:     Active Problems:    Skin ulcer of left ankle, with fat layer exposed ([facility] (11/16/2017)      Vascular disease, peripheral ([facility] (12/7/2017)      Type 2 diabetes mellitus with diabetic neuropathy ([facility] (1/3/2018)        Plan:      Selective excisional debridement with [address] with Aquacel AG and mepilex Q 48 hrs to the left medial ankle and Santyl to the dorsal left foot Q 48 hrs with mepilex. Keep vascular appointment. If there is improvement with start Lisa. RTC in 2 weeks        Subjective:     82 y/o female presents for ulcer care. She states she has a new ulcer on the top of the left foot from dropping and object on the foot a couple of weeks ago. She states she does have an appointment with vascular in 2 weeks.     Objective:     Patient Vitals for the past 24 hrs:   Temp Pulse Resp BP SpO2   05/17/18 0825 97.7 °F (36.5 °C) 74 18 175/69 98 %       Physical Exam:     General Exam  alert, cooperative, no distress, appears stated age    REVIEW OF SYSTEMS:  General: denies chronic fatigue, weight loss, fever, anemia, bruising, depression, nervousness, panic attacks  HEENT: denies ringing in ears, ear infections, dizzy spells, poor vision, glaucoma, sinus trouble, hoarseness, eye infections  GI: denies diarrhea, gas, bloating, heartburn, regurgitation, difficulty swallowing, painful swallowing, nausea, vomiting, constipation, abdominal pain, decreased appetite, blood in stools, black stools, jaundice, dark urine  Lungs: denies pneumonia, asthma, cough, SOB, hemoptysis  Heart: denies chest pain, irregular heart beat, ankle swelling, HTN  Skin: denies rashes, hives, allergic reaction  Urinary: denies UTI, kidney stones, decreased urine force and flow, urination at night, blood in urine, painful urination  Bones and Joints: denies arthritis, rheumatism, back pain, gout, osteoporosis  Neurologic: denies stroke, seizures, headaches, numbness, tingling    VASCULAR EXAM:. Pedal pulses are trace palpable 1/4 DP and non palpable 0/4 PT right and left foot. Skin temperature is warm to warm right and left foot. Digital capillary fill time is 5 seconds right and left foot. There is decreased edema of the right and left foot and ankle.       NEUROLOGICAL EXAM:. Sensation Intact with 5.07g monofilament wire right and left foot. Deep tendon reflexes intact and symmetrical on the right and left foot.      MUSCULOSKELETAL EXAM:. Muscle tone is normal.  Muscle strength of the flexor and extensor group inversion and eversion Bilateral. 4/5.         DERMATOLOGICAL EXAM:. Skin is of abnormal texture and turgor with atrophic skin changes noting hair growth, nail changes (thickening), Bilateral. There is a new ulcer full thickness noted over dorsal left foot with slough noted in the wound bed, 12 o'clock to 12 o'clock. There is still noted lesion over the medial aspect of the left ankle with 100% granulation tissue noted. There is no noted erythema or asc lymphangitis noted.     Wound Ankle Left (Active)   Number of days:1744       Wound Ankle Left (Active)   Number of days:1744       Wound Ankle Left (Active)   Number of days:1744       Wound Ankle Left;Lateral (Active)   Number of days:445       Wound Ankle Right (Active)   DRESSING STATUS Removed 12/21/2017  9:42 AM   DRESSING TYPE Other (Comment) 12/21/2017  9:42 AM   Non-Pressure Injury Partial thickness (epider/derm) 12/21/2017  9:42 AM   Wound Length (cm) 0.7 cm 12/21/2017  9:42 AM   Wound Width (cm) 0.8 cm 12/21/2017  9:42 AM   Wound Depth (cm) 0.1 12/21/2017  9:42 AM   Wound Surface area (cm^2) 0.06 cm^2 12/21/2017  9:42 AM   Condition of Base [facility] 12/21/2017  9:42 AM   Condition of Edges Open 12/21/2017  9:42 AM   Tissue Type Yellow 12/21/2017  9:42 AM   Tissue Type Percent Red 50 11/16/2017 10:08 AM   Tissue Type Percent Yellow 100 12/21/2017  9:42 AM   Drainage Amount  Moderate 12/21/2017  9:42 AM   Drainage Color Serous 12/21/2017  9:42 AM   Wound Odor None 12/21/2017  9:42 AM   Periwound Skin Condition Erythema, blanchable 12/21/2017  9:42 AM   Cleansing and Cleansing Agents  Dermal wound cleanser; Soap and water 12/21/2017  9:42 AM   Dressing Type Applied Other (Comment) 12/21/2017  9:42 AM   Procedure Tolerated Well 12/21/2017  9:42 AM   Number of days:445       Wound Ankle Lateral;Right (Active)   Number of days:190       Wound Ankle Left;Lateral (Active)   Number of days:190       Wound Ankle Left;Medial;Other (comment) (Active)   DRESSING STATUS Removed 12/21/2017  9:42 AM   DRESSING TYPE Other (Comment) 12/21/2017  9:42 AM   Non-Pressure Injury Partial thickness (epider/derm) 12/21/2017  9:42 AM   Wound Length (cm) 0.5 cm 12/21/2017  9:42 AM   Wound Width (cm) 0.1 cm 12/21/2017  9:42 AM   Wound Depth (cm) 0.1 12/21/2017  9:42 AM   Wound Surface area (cm^2) 0 cm^2 12/21/2017  9:42 AM   Condition of Base [place] 12/21/2017  9:42 AM   Condition of Edges Open 11/16/2017 10:08 AM   Tissue Type Pink 12/21/2017  9:42 AM   Tissue Type Percent Pink 100 12/21/2017  9:42 AM   Tissue Type Percent Red 50 11/16/2017 10:08 AM   Tissue Type Percent Yellow 50 11/16/2017 10:08 AM   Drainage Amount  Moderate 12/21/2017  9:42 AM   Drainage Color Serosanguinous 12/21/2017  9:42 AM   Wound Odor None 12/21/2017  9:42 AM   Periwound Skin Condition Erythema, blanchable 12/21/2017  9:42 AM   Cleansing and Cleansing Agents  Dermal wound cleanser; Soap and water 12/21/2017  9:42 AM   Dressing Type Applied Other (Comment) 12/21/2017  9:42 AM   Procedure Tolerated Well 12/21/2017  9:42 AM   Number of days:190       Wound Ankle Left;Medial;Distal (Active)   DRESSING STATUS Removed 3/15/2018  2:01 PM   DRESSING TYPE Other (Comment) 3/15/2018  2:01 PM   Non-Pressure Injury Partial thickness (epider/derm) 12/7/2017  9:56 AM   Wound Length (cm) 0.1 cm 3/15/2018  2:01 PM   Wound Width (cm) 0.1 cm 3/15/2018  2:01 PM   Wound Depth (cm) 0.1 3/15/2018  2:01 PM   Wound Surface area (cm^2) 0.01 cm^2 3/15/2018  2:01 PM   Condition of Base Pink;Slough 2/15/2018 11:36 AM   Condition of Edges Closed; Open 3/15/2018  2:01 PM   Epithelialization (%) 100 3/15/2018  2:01 PM   Tissue Type Pink 3/15/2018  2:01 PM   Tissue Type Percent Pink 100 3/15/2018  2:01 PM   Tissue Type Percent Yellow 60 2/15/2018 11:36 AM   Tissue Type Percent Other (comment) 40 2/15/2018 10:52 AM   Drainage Amount  Scant 3/15/2018  2:01 PM   Drainage Color Serous 3/15/2018  2:01 PM   Wound Odor None 3/15/2018  2:01 PM   Periwound Skin Condition Edematous; Erythema, blanchable 3/15/2018  2:01 PM   Cleansing and Cleansing Agents  Dermal wound cleanser 3/15/2018  2:01 PM   Dressing Type Applied Silver products 3/15/2018  2:01 PM   Procedure Tolerated Well 12/21/2017  9:42 AM   Number of days:182       Wound Ankle Left;Medial;Proximal (Active)   DRESSING STATUS Removed 3/15/2018  2:01 PM   DRESSING TYPE Other (Comment) 3/15/2018  2:01 PM   Non-Pressure Injury Partial thickness (epider/derm) 2/15/2018 10:52 AM   Wound Length (cm) 0.5 cm 3/15/2018  2:01 PM   Wound Width (cm) 0.2 cm 3/15/2018  2:01 PM   Wound Depth (cm) 0.1 3/15/2018  2:01 PM   Wound Surface area (cm^2) 0.1 cm^2 3/15/2018  2:01 PM   Condition of Base [place] 3/15/2018  2:01 PM   Condition of Edges Open 3/15/2018  2:01 PM   Tissue Type Pink 3/15/2018  2:01 PM   Tissue Type Percent Pink 100 3/15/2018  2:01 PM   Tissue Type Percent Red 5 11/16/2017 10:08 AM   Tissue Type Percent Yellow 50 2/15/2018 11:36 AM   Drainage Amount  Scant 3/15/2018  2:01 PM   Drainage Color Serous 3/15/2018  2:01 PM   Wound Odor None 3/15/2018  2:01 PM   Periwound Skin Condition Edematous; Erythema, blanchable 3/15/2018  2:01 PM   Cleansing and Cleansing Agents  Dermal wound cleanser 3/15/2018  2:01 PM   Dressing Type Applied Silver products 3/15/2018  2:01 PM   Procedure Tolerated Well 2/1/2018 12:56 PM   Number of days:182       Wound Toe Left (Active)   DRESSING TYPE Open to air 12/7/2017  9:56 AM   Non-Pressure Injury Partial thickness (epider/derm) 11/16/2017 10:08 AM   Wound Length (cm) 0.6 cm 11/16/2017 10:08 AM   Wound Width (cm) 1.4 cm 11/16/2017 10:08 AM   Wound Depth (cm) 0.1 11/16/2017 10:08 AM   Wound Surface area (cm^2) 0.08 cm^2 11/16/2017 10:08 AM   Condition of Base Other (comment) 11/16/2017 10:08 AM   Tissue Type Yellow 11/16/2017 10:08 AM   Tissue Type Percent Yellow 100 11/16/2017 10:08 AM   Drainage Amount  None 11/16/2017 10:08 AM   Wound Odor None 11/16/2017 10:08 AM   Cleansing and Cleansing Agents  Dermal wound cleanser 11/16/2017 10:08 AM   Dressing Type Applied Open to air 11/16/2017 10:08 AM   Number of days:182       Wound Toe Left (Active)   DRESSING STATUS Removed 11/16/2017 10:08 AM   DRESSING TYPE Open to air 12/7/2017  9:56 AM   Non-Pressure Injury Partial thickness (epider/derm) 11/16/2017 10:08 AM   Wound Length (cm) 0.3 cm 11/16/2017 10:08 AM   Wound Width (cm) 0.4 cm 11/16/2017 10:08 AM   Wound Depth (cm) 0.1 11/16/2017 10:08 AM   Wound Surface area (cm^2) 0.01 cm^2 11/16/2017 10:08 AM   Condition of Base Other (comment) 11/16/2017 10:08 AM   Tissue Type Yellow 11/16/2017 10:08 AM   Tissue Type Percent Yellow 100 11/16/2017 10:08 AM   Drainage Amount  None 11/16/2017 10:08 AM   Wound Odor None 11/16/2017 10:08 AM   Periwound Skin Condition Intact 11/16/2017 10:08 AM   Dressing Type Applied Open to air 11/16/2017 10:08 AM   Number of days:182       Wound Ankle Left;Lateral (Active)   DRESSING STATUS Removed 12/21/2017  9:42 AM   DRESSING TYPE Other (Comment) 12/21/2017  9:42 AM   Non-Pressure Injury Partial thickness (epider/derm) 12/21/2017  9:42 AM   Wound Length (cm) 1.1 cm 12/21/2017  9:42 AM   Wound Width (cm) 0.9 cm 12/21/2017  9:42 AM   Wound Depth (cm) 0.1 12/21/2017  9:42 AM   Wound Surface area (cm^2) 0.1 cm^2 12/21/2017  9:42 AM Condition of Base Granulation;Slough 12/21/2017  9:42 AM   Condition of Edges Open 12/21/2017  9:42 AM   Tissue Type Pink;Yellow 12/21/2017  9:42 AM   Tissue Type Percent Pink 10 11/16/2017 10:08 AM   Tissue Type Percent Red 50 12/21/2017  9:42 AM   Tissue Type Percent Yellow 50 12/21/2017  9:42 AM   Drainage Amount  Moderate 12/21/2017  9:42 AM   Drainage Color Serous 12/21/2017  9:42 AM   Wound Odor None 12/21/2017  9:42 AM   Periwound Skin Condition Erythema, blanchable;Edematous 12/21/2017  9:42 AM   Cleansing and Cleansing Agents  Dermal wound cleanser; Soap and water 12/21/2017  9:42 AM   Dressing Type Applied Other (Comment) 12/21/2017  9:42 AM   Procedure Tolerated Well 12/21/2017  9:42 AM   Number of days:182       Wound Foot Left;Medial (Active)   DRESSING TYPE Other (Comment) 3/15/2018  2:01 PM   Non-Pressure Injury Partial thickness (epider/derm) 2/15/2018 10:52 AM   Wound Length (cm) 1 cm 3/15/2018  2:01 PM   Wound Width (cm) 0.5 cm 3/15/2018  2:01 PM   Wound Depth (cm) 0.2 3/15/2018  2:01 PM   Wound Surface area (cm^2) 0.5 cm^2 3/15/2018  2:01 PM   Condition of Base [place];Slough 3/15/2018  2:01 PM   Condition of Edges Open 3/15/2018  2:01 PM   Tissue Type Pink 2/15/2018 10:52 AM   Tissue Type Percent Pink 80 3/15/2018  2:01 PM   Tissue Type Percent Yellow 20 3/15/2018  2:01 PM   Drainage Amount  Scant 3/15/2018  2:01 PM   Drainage Color Serous 3/15/2018  2:01 PM   Wound Odor None 3/15/2018  2:01 PM   Periwound Skin Condition Erythema, blanchable;Edematous 3/15/2018  2:01 PM   Cleansing and Cleansing Agents  Dermal wound cleanser 3/15/2018  2:01 PM   Dressing Type Applied Silver products 3/15/2018  2:01 PM   Procedure Tolerated Well 2/1/2018  1:05 PM   Number of days:164       Wound Leg Lower Right; Anterior;Medial (Active)   Number of days:164       Wound Dorsal (Active)   Number of days:0       [REMOVED] Wound Leg Lower Right; Anterior; Lateral (Removed)   Removed 02/15/18 1138   DRESSING TYPE Open to air 2/15/2018 11:36 AM   Non-Pressure Injury Partial thickness (epider/derm) 2/1/2018  1:05 PM   Wound Length (cm) 0.9 cm 2/1/2018  1:05 PM   Wound Width (cm) 0.6 cm 2/1/2018  1:05 PM   Wound Depth (cm) 0.1 2/1/2018  1:05 PM   Wound Surface area (cm^2) 0.54 cm^2 2/1/2018  1:05 PM   Condition of Base [place] 2/1/2018  1:05 PM   Condition of Edges Open 2/1/2018  1:05 PM   Tissue Type Red 2/1/2018  1:05 PM   Tissue Type Percent Red 100 2/1/2018  1:05 PM   Drainage Amount  Scant 2/1/2018  1:05 PM   Drainage Color Serosanguinous 2/1/2018  1:05 PM   Wound Odor None 2/1/2018  1:05 PM   Periwound Skin Condition Edematous; Erythema, blanchable 2/1/2018  1:05 PM   Cleansing and Cleansing Agents  Dermal wound cleanser 2/1/2018  1:05 PM   Dressing Type Applied Other (Comment) 2/1/2018  1:05 PM   Procedure Tolerated Well 2/1/2018  1:05 PM   Number of days:73          Lab/Data Review:  No results found for this or any previous visit (from the past 24 hour(s)). none      PROCEDURE    Selective sharp instrument excisional debridement of slough and devitilized tissue    After the benefits/risks/SE were discussed, the patient agreed to proceed. Time out was done:   * Patient was identified by name and date of birth   * Agreement on procedure being performed was verified   * Procedure site verified and marked as necessary   * Patient was positioned for comfort   * Consent was signed and verified. Site: dorsal left foot    Instruments used:    [x]  Dermal curette  [x] Blade        [x] #15  [] #10  [] Forceps  [] Tissue nippers  [] sterile scissors  [] Other     Anesthesia:    [x]  EMLA 2.5% cream: applied to wound beds for approximately 15minutes.      []   Lidocaine 2% Topical Gel      []  Lidocaine injectable 1% with epinephrine 1:100,000    []  Lidocaine injectable 1% without epinephrine    []  Other:     []  None         [] patient is insensate due to neuropathy         [] patient declines        [] allergy to anesthetic [] tissue for debridement is either superficial, loosely adherent and/or necrotic and denervated     After satisfactory anesthesia achieved, the wound/s was/were sharply debrided necrotic, devitalized and granulation tissue down to the sub Q layer, revealing a clean and viable wound bed. Post debridement measurement was 1.8_x_1.7_x_0.5_cm . Bleeding: <5mL Resolved with light focal pressure. Wounds were cleaned and irrigated with saline. Wound care applied:   []   Hydrogell   []  Hypergel   [x]   Hydrofiber/Aquacel  AG medial ankle left    []   Cadexomer Iodine (Iodosorb)   []  Silver Alginate    []   Medihoney:    [x]   Collagenase:Santyl dorsal left foot   []  Calcium Alginate   []   Collagen:    []   Foam   []  Non-Adherent Contact Layer   []   Xeroform    []   Adaptec:   []   Hydrocolloid   []   Transparent Film    []  Antibiotic ointment/cream     []   Other (see below)     Other:     []   Dry Gauze and Roll Gauze    []   Foam and Roll Gauze    []   Dry Gauze    []   Bordered gauze:     []   Secure with Tape    [x]   Bordered foam       []   Other      [x]   Compression Wrap:          []   Unna Boot    []Multi-Layer    [x]Tubular Bandages       Ligia-Ulcer Care    []   Cream     []   Lotion     []   Ointment     []   Barrier     []   Other:       The patient tolerated the procedure well with no complications. The patient left the exam room in satisfactory and stable condition.      Signed By: Samuel Pathak DPM     May 17, 2018 8:44 AM

## 2018-05-17 NOTE — WOUND CARE
Patient here for follow up appointment for ankle ulcers. She presents today with a new wound on her lef dorsal foot from dropping a pan lid, and then a broom handle. Original wounds on her medial foot and ankle are improving slowly. Debridement was performed on the dorsal wound by Dr. Dangelo Amador [employer], with [facility] ordered for application at home. Medial foot and ankle ulcers will continue with Aquacel Ag covered with foam.  Dressings ordered from [facility] (Aquacel AG and bordered foam). This nurse, along with Dr. Dangelo Amador [employer], offered to order home care but patient refused. She states her  and daughter can assist her with dressing changes. She will follow up in 2 weeks with Dr. Dangelo Amador and [employer].           05/17/18 0844 05/17/18 0903   Wound Dorsal   Date First Assessed/Time First Assessed: 05/17/18 0830   Wound Type: Trauma  Orientation: Dorsal   DRESSING STATUS Removed --    DRESSING TYPE Adhesive wound dressing (Band-Aid) --    Non-Pressure Injury Full thickness (subcut/muscle) --    Wound Length (cm) 1.7 cm --    Wound Width (cm) 1.5 cm --    Wound Depth (cm) 0.4 --    Wound Surface area (cm^2) 2.55 cm^2 --    Condition of Base Slough --    Condition of Edges Open --    Tissue Type Yellow --    Tissue Type Percent Yellow 100 --    Drainage Amount  Moderate --    Drainage Color Serous --    Wound Odor None --    Periwound Skin Condition Macerated;Edematous --    Cleansing and Cleansing Agents  Dermal wound cleanser --    Dressing Type Applied --  Other (Comment)  (Santyl,NS moist gauze,Mepilex Border Foam)   Procedure Tolerated --  Well   Wound Foot Left;Medial   Date First Assessed/Time First Assessed: 12/04/17 1100   POA: Yes  Wound Type: Vascular  Location: Foot  Orientation: Left;Medial   DRESSING TYPE Open to air --    Wound Length (cm) 1.2 cm --    Wound Width (cm) 0.5 cm --    Wound Depth (cm) 0.3 --    Wound Surface area (cm^2) 0.6 cm^2 --    Condition of Base Pink;Slough --    Condition of Edges Open --    Tissue Type Pink;Yellow --    Tissue Type Percent Pink 60 --    Tissue Type Percent Yellow 40 --    Drainage Amount  Small  --    Drainage Color Serous --    Wound Odor None --    Periwound Skin Condition Edematous --    Cleansing and Cleansing Agents  Dermal wound cleanser --    Dressing Type Applied --  Other (Comment)  (Aquacel Ag, Mepilex Border Foam)   Wound Ankle Left;Medial;Distal   Date First Assessed: 11/16/17   POA: Yes  Wound Type: Vascular  Location: Ankle  Orientation: Left;Medial;Distal   DRESSING STATUS Removed --    DRESSING TYPE Adhesive wound dressing (Band-Aid) --    Wound Length (cm) 0.9 cm --    Wound Width (cm) 0.6 cm --    Wound Depth (cm) 0.2 --    Wound Surface area (cm^2) 0.54 cm^2 --    Condition of Base Slough --    Condition of Edges Open --    Epithelialization (%) 100 --    Tissue Type Yellow --    Tissue Type Percent Yellow 100 --    Drainage Amount  Moderate --    Drainage Color Serous --    Wound Odor None --    Periwound Skin Condition Erythema, blanchable;Edematous; Macerated --    Cleansing and Cleansing Agents  Dermal wound cleanser --    Dressing Type Applied --  Other (Comment)  (Aquacel Ag, Mepilex Border Foam)   Wound Ankle Left;Medial;Proximal   Date First Assessed: 11/16/17   POA: Yes  Wound Type: Vascular  Location: Ankle  Orientation: Left;Medial;Proximal   Wound Length (cm) 2.4 cm  (2 other openings measure 2.4x8x0.2 & 1.4x0.6x0.1) --    Wound Width (cm) 0.8 cm --    Wound Depth (cm) 0.2 --    Wound Surface area (cm^2) 1.92 cm^2 --    Condition of Base Pink;Slough --    Condition of Edges Open --    Tissue Type Pink;Yellow --    Tissue Type Percent Pink 50 --    Tissue Type Percent Yellow 50 --    Drainage Amount  Moderate --    Drainage Color Serous --    Wound Odor None --    Periwound Skin Condition Edematous; Erythema, blanchable;Macerated --    Cleansing and Cleansing Agents  Dermal wound cleanser --    Dressing Type Applied --  Other (Comment)  (Aquacel Ag, Mepilex Border Foam)

## 2018-05-31 ENCOUNTER — HOSPITAL ENCOUNTER (OUTPATIENT)
Dept: WOUND CARE | Age: 83
Discharge: HOME | End: 2018-05-31
Payer: MEDICARE

## 2018-05-31 VITALS
RESPIRATION RATE: 18 BRPM | DIASTOLIC BLOOD PRESSURE: 64 MMHG | TEMPERATURE: 97 F | SYSTOLIC BLOOD PRESSURE: 164 MMHG | OXYGEN SATURATION: 96 % | HEART RATE: 57 BPM

## 2018-05-31 PROBLEM — L23.1 CONTACT DERMATITIS DUE TO ADHESIVE BANDAGE: Status: ACTIVE | Noted: 2018-05-31

## 2018-05-31 PROBLEM — Z91.199 PERSONAL HISTORY OF NONCOMPLIANCE WITH MEDICAL TREATMENT, PRESENTING HAZARDS TO HEALTH: Status: ACTIVE | Noted: 2018-05-31

## 2018-05-31 PROCEDURE — 74011250637 HC RX REV CODE- 250/637

## 2018-05-31 PROCEDURE — 77030011256 HC DRSG MEPILEX <16IN NO BORD MOLN -A: Performed by: PODIATRIST

## 2018-05-31 PROCEDURE — 74011000250 HC RX REV CODE- 250

## 2018-05-31 PROCEDURE — 97597 DBRDMT OPN WND 1ST 20 CM/<: CPT

## 2018-05-31 RX ORDER — LIDOCAINE AND PRILOCAINE 25; 25 MG/G; MG/G
CREAM TOPICAL
Status: COMPLETED
Start: 2018-05-31 | End: 2018-05-31

## 2018-05-31 RX ADMIN — LIDOCAINE AND PRILOCAINE 5 G: 25; 25 CREAM TOPICAL at 08:51

## 2018-05-31 NOTE — DISCHARGE INSTRUCTIONS
Wound Care Instructions    Patient: Kyleigh Gifford MRN: [id number]  SSN: [id number]     YOB: 1935  Age:82 y.o. Sex: female       Ms. Willard Steward, Dr. Elliot Pichardo recommends the following discharge instruction:    [place] Brand    []   Felt/Foam Offloading   [] Removable Cast [place]       []   Wedge Shoe   [x]  Wheelchair     []   Total Contact Cast   []  Surgical Shoe     []   Crutches        Other   Mattress:   Other:     Edema Control    [x]   Elevated legs as much as possible. Recommend above level of heart.     []   Layered Wraps             []   Left      []   Right      []  BILATERAL          - Type:            []   Unna Boot       []  Multi-Layer                                   []   Two Layers     []  Three Layers   []  Four Layers     []   Tubular Bandages        []   Left      []   Right     SIZE:      []   Stockings                      []   Left      []   Right     []   Compression Pump     []   Left      []   Right    ___ millimeters of mercury  ___ millimeters of mercury for ___ minutes for ___ day(s)        Other:       Nutritional Supplements    [x]  Multi-Vitamin     []  Other:       [employer]     [x]  Home Health: [facility]     []  Long Term Care:      []  DME:     Consult    []   Nutrition     []   Vascular     []   Orthotist/Pedorthotist     []   Infectious Disease     []   Lymphedema Therapist   Other:     Dressings:  Another brand of generically equivalent product may be dispensed unless specifically ordered otherwise.     Home Ulcer/wound Hygiene (cleanse with)      []   Distilled Water     [x]   Normal Saline ONLY     []   Wound Cleanser     []   Mild antimicrobial soap and water     []   Chlorhexidine liquid soap and water     []   Other:     Apply    []   Hydrogel   []  Hypergel   []   Aquacel Ag     []   Cadexomer Iodine                     (Iodosorb)   [x]  Silver Alginate to Left Ankle wounds   []   Medihoney:     [x]   Collagenase:Santyl to Left foot DORSAL and MEDIAL wounds   []  Calcium Alginate   []   Collagen:     []   Plain Foam   []  Non-Adherent Contact                Layer   []   Xeroform     []   Silver foam   []   Hydrocolloid        []   Acticoat   []   Adaptic:      Other/Specific Instructions:    Cover With    [x]   NS Gauze to LEFT DORSAL and MEDIAL FOOT     [x]    Roll Gauze to entire foot     []   Dry Gauze     []   Bordered gauze:     []   Foam      []    Bordered         []   Nonbordered     [x]   Secure with Tape     []   Other       Ligia-Ulcer Care    []   Cream     []   Lotion     []   Ointment     []   Barrier     [x]   Other: TRIAMCINOLONE CREAM     Change Dressing    []   Once Daily     []   Every Other Day     []   Every 3 Days       []   Every 5 Days     []   Every 7 Days     []   Do Not Change       []   2 x per week (Mon and Thurs. )     [x]   3 x per week (Mon, Wed, Fri. )     []   Other          Follow-Up:   [x]  Follow up 3 weeks: June 21st                            []  As Needed (PRN)     []   Emy Paul MD    []  Claudine Caicedo DPM    [x]  Mary Graf DPM   []   Srinath Walters DPM      []   Nurse Visit            Please call the wound clinic ([phone]) regarding any questions or concerns.

## 2018-05-31 NOTE — WOUND CARE
Patient arrived to [address], no distress noted. Dr. Carolee Chen preformed sharp instrument debridement of left foot and ankle wounds. Patient tolerated well. Cleansed, assessed, and dressed wounds per Dr. Carolee Chen. Spoke with patient about [place] graft and provided information pamphlet. Also, spoke with patient about receiving home care nursing services, patient agreed to [facility], [facility] fax home health information. Provided patient with Triamcinolone and Santyl for home dressing changes. Reinforced dressing change information. Patient verbalized understanding to all information. Left clinic accompanied by this nurse to [facility], no distress noted. 05/31/18 0829   Wound Foot Left;Dorsal   Date First Assessed/Time First Assessed: 05/17/18 0830   Wound Type: Trauma  Location: Foot  Orientation: Left;Dorsal   DRESSING STATUS Removed   DRESSING TYPE Adhesive wound dressing (Band-Aid)  (NS moist gauze, Santyl)   Non-Pressure Injury Full thickness (subcut/muscle)   Wound Length (cm) 1.9 cm   Wound Width (cm) 1.3 cm   Wound Depth (cm) 0.9   Wound Surface area (cm^2) 2.47 cm^2   Condition of Base Pink;Slough   Condition of Edges Open   Tissue Type Yellow;Pink   Tissue Type Percent Pink 10   Tissue Type Percent Yellow 90   Drainage Amount  Small    Drainage Color Serous   Wound Odor None   Periwound Skin Condition Edematous; Erythema, blanchable;Petechiae   Cleansing and Cleansing Agents  Dermal wound cleanser   Dressing Type Applied Other (Comment)  (Triamcinolone, Santyl, NS gauze, Gauze 4 x 4, gauze rolll)   Procedure Tolerated Well   Wound Foot Left;Medial   Date First Assessed/Time First Assessed: 12/04/17 1100   POA: Yes  Wound Type: Vascular  Location: Foot  Orientation: Left;Medial   DRESSING STATUS Removed   DRESSING TYPE Adhesive wound dressing (Band-Aid)  (gauze)   Non-Pressure Injury Full thickness (subcut/muscle)   Wound Length (cm) 1 cm   Wound Width (cm) 0.6 cm   Wound Depth (cm) 0.4   Wound Surface area (cm^2) 0.6 cm^2   Condition of Base Pink;Slough   Condition of Edges Open   Tissue Type Pink;Yellow   Tissue Type Percent Pink 30   Tissue Type Percent Yellow 70   Drainage Amount  Small    Drainage Color Serosanguinous   Wound Odor None   Periwound Skin Condition Erythema, blanchable;Edematous; Petechiae   Cleansing and Cleansing Agents  Dermal wound cleanser   Dressing Type Applied Other (Comment)  (Triamcinolone, Santyl, NS gauze, gauze 4 x 4, roll gauze)   Procedure Tolerated Well   Wound Ankle Left;Medial;Proximal   Date First Assessed: 11/16/17   POA: Yes  Wound Type: Vascular  Location: Ankle  Orientation: Left;Medial;Proximal   DRESSING STATUS Removed   DRESSING TYPE Adhesive wound dressing (Band-Aid)  (silver alginate)   Non-Pressure Injury Full thickness (subcut/muscle)   Wound Length (cm) 2.5 cm   Wound Width (cm) 0.7 cm   Wound Depth (cm) 0.4   Wound Surface area (cm^2) 1.75 cm^2   Condition of Base Pink;Slough   Condition of Edges Open   Tissue Type Percent Pink 10   Tissue Type Percent Yellow 90   Drainage Amount  Small    Drainage Color Serosanguinous   Wound Odor None   Periwound Skin Condition Edematous; Erythema, blanchable;Petechiae   Cleansing and Cleansing Agents  Dermal wound cleanser   Dressing Type Applied Other (Comment)  (Aquacel AG, roll gauze, tape)   Procedure Tolerated Well   Wound Ankle Left;Medial;Distal   Date First Assessed: 11/16/17   POA: Yes  Wound Type: Vascular  Location: Ankle  Orientation: Left;Medial;Distal   DRESSING STATUS Removed   DRESSING TYPE Adhesive wound dressing (Band-Aid)  (silver alginate)   Non-Pressure Injury Full thickness (subcut/muscle)   Wound Length (cm) 0.7 cm   Wound Width (cm) 0.5 cm   Wound Depth (cm) 0.2   Wound Surface area (cm^2) 0.35 cm^2   Condition of Base Pink;Slough   Condition of Edges Open   Tissue Type Pink;Yellow   Tissue Type Percent Pink 50   Tissue Type Percent Yellow 50   Drainage Amount  Small Drainage Color Serosanguinous   Wound Odor None   Periwound Skin Condition Edematous; Erythema, blanchable;Petechiae   Cleansing and Cleansing Agents  Dermal wound cleanser   Dressing Type Applied Other (Comment)  (Aquacel AG, roll gauze, tape)   Procedure Tolerated Well   Wound Ankle Left;Medial;Other (comment)   Date First Assessed/Time First Assessed: 11/08/17 1000   POA: Yes  Wound Type: Diabetic  Location: Ankle  Orientation: (c) Left;Medial;Other (comment)   DRESSING STATUS Removed   DRESSING TYPE Adhesive wound dressing (Band-Aid)  (silver alginate)   Non-Pressure Injury Full thickness (subcut/muscle)   Wound Length (cm) 0.9 cm   Wound Width (cm) 0.4 cm   Wound Depth (cm) 0.2   Wound Surface area (cm^2) 0.36 cm^2   Condition of Base Pink;Slough   Condition of Edges Open   Tissue Type Red;Yellow   Tissue Type Percent Red 80   Tissue Type Percent Yellow 20   Drainage Amount  Small    Drainage Color Serosanguinous   Wound Odor None   Periwound Skin Condition Edematous; Erythema, blanchable;Petechiae   Cleansing and Cleansing Agents  Dermal wound cleanser   Dressing Type Applied Other (Comment)  (Aquacel AG, roll gauze, and tape)   Procedure Tolerated Well

## 2018-05-31 NOTE — IP AVS SNAPSHOT
Summary of Care Report The Summary of Care report has been created to help improve care coordination. Users with access to [facility] or [address] (Web-based application) may access additional patient information including the Discharge Summary. If you are not currently a [address] user and need more information, please call the number listed below in the [place] [address] section and ask to be connected with Medical Records. Facility Information Name Address Phone [facility] [address] [facility] [address] [id number] [phone] Patient Information Patient Name Sex  [place] Bis ([id number]) Female 1935 Discharge Information Admitting Provider Service Area Unit  
 (none) [address] [facility] / [phone] Discharge Provider Discharge Date/Time Discharge Disposition Destination (none) (none) (none) (none) Patient Language Language ENGLISH [13] Hospital Problems as of 2018  Reviewed: 2018 10:19 AM by Sadaf Pettit DPM  
  
  
  
 Class Noted - Resolved Last Modified POA Active Problems Skin ulcer of left ankle, with fat layer exposed ([address]  2017 - Present 2018 by Sadaf Pettit DPM Yes Entered by Sadaf Pettit DPM  
  Vascular disease, peripheral ([address]  2017 - Present 2018 by Sadaf Pettit DPM Yes Entered by Sadaf Pettit DPM  
  Midfoot ulcer, left, with fat layer exposed ([address]  2018 - Present 2018 by Sadaf Pettit DPM Yes Entered by Sadaf Pettit DPM  
  Personal history of noncompliance with medical treatment, presenting hazards to health  2018 - Present 2018 by Sadaf Pettit DPM Yes Entered by Sadaf Pettit DPM  
  Contact dermatitis due to adhesive bandage  2018 - Present 2018 by Sadaf Pettit DPM Yes Entered by Cristóbal Alarcon DPM  
  
Non-Hospital Problems as of 5/31/2018  Reviewed: 5/31/2018 10:19 AM by Cristóbal Alarcon DPM  
  
  
  
 Class Noted - Resolved Last Modified Active Problems CVA, old, hemiparesis ([facility]  8/7/2013 - Present 8/8/2013 Entered by Lianet Caldwell Type 2 diabetes mellitus ([facility]  8/8/2013 - Present 11/6/2017 by Anna Fang MD  
  Entered by Rebekah Cummins MD  
  Abnormal finding on thyroid function test  6/11/2013 - Present 2/2/2017 by Robbie Herrera Entered by Robbie Herrera Acute on chronic renal insufficiency  7/11/2013 - Present 12/3/2017 by Rj Augustin MD  
  Entered by Robbie Herrera Overview Signed 2/2/2017 11:39 PM by Robbie Herrera Overview:  
 7/8/13 7/10/13 Cr  1.2 1.5 Anemia  6/11/2013 - Present 2/25/2017 by Tristen Obregon MD  
  Entered by Robbie Herrera Chronic osteomyelitis of ankle ([address]  6/11/2013 - Present 2/2/2017 by Robbie Herrera Entered by Robbie Herrera Injury of foot  7/10/2013 - Present 2/2/2017 by Robbie Herrera Entered by Robbie Herrera Leukocytosis  7/11/2013 - Present 2/2/2017 by Robbie Herrera Entered by Robbie Herrera Overview Signed 2/2/2017 11:39 PM by Robbie Herrera Overview: WBC 11.4  P 86 Spinal stenosis of lumbar region  6/11/2013 - Present 2/2/2017 by Robbie Herrera Entered by Robbie Herrera Microscopic hematuria  7/11/2013 - Present 2/2/2017 by Robbie Herrera Entered by Robbie Herrera Rhabdomyolysis  7/11/2013 - Present 2/2/2017 by Robbie Herrera Entered by Robbie Herrera Overview Signed 2/2/2017 11:39 PM by Robbie Herrera Overview:  
Ck  930, 1053 Vitamin D deficiency  6/11/2013 - Present 2/2/2017 by Robbie Herrera Entered by Robbie Herrera Stroke [facility])  Unknown - Present 2/2/2017 by Robbie Herrera Entered by Robbie Herrera Hypertension  Unknown - Present 2/2/2017 by Robbie Herrera Entered by Danuta Abel DM (diabetes mellitus) ([facility]  Unknown - Present 2/2/2017 by Danuta Abel Entered by Danuta Abel Normocytic anemia  Unknown - Present 2/3/2017 by Danuta Alu Entered by Danuta Abel CKD (chronic kidney disease) stage 3, GFR 30-59 ml/min  6/24/2013 - Present 12/3/2017 by Cathryn Montes MD  
  Entered by Danuta Abel Overview Signed 2/3/2017 12:25 AM by Danuta Abel Worst noted (08/06/13) BUN/sCr: 54/2.49, GFR 20. Elevated TSH  6/25/2012 - Present 2/3/2017 by Danuta Abel Entered by Danuta Alu Overview Signed 2/3/2017 12:29 AM by Danuta Abel 5.08   Iron (Fe) deficiency anemia  2/28/2017 - Present 2/28/2017 by Yessi Reyes MD  
  Entered by Yessi Reyes MD  
  UTI (urinary tract infection)  11/6/2017 - Present 12/3/2017 by Cathryn Montes MD  
  Entered by Tricia Ling MD  
  Encephalopathy  11/6/2017 - Present 11/6/2017 by Tricia Ling MD  
  Entered by Tricia Ling MD  
  LAURA (acute kidney injury) ([facility]  11/6/2017 - Present 11/6/2017 by Tricia Ling MD  
  Entered by Tricia Ling MD  
  Venous ulcer of ankle, left ([facility]  11/16/2017 - Present 12/7/2017 by CONSTANZA BarnesM  
  Entered by Sulema Groves DPM  
  Weak pulse  11/16/2017 - Present 11/16/2017 by CONSTANZA BarnesM  
  Entered by Sulema Groves DPM  
  Syncope  12/3/2017 - Present 12/3/2017 by Cathryn Montes MD  
  Entered by Karen Jordan MD  
  Non-pressure chronic ulcer of right ankle with fat layer exposed ([facility]  12/7/2017 - Present 5/17/2018 by CONSTANZA BarnesM  
  Entered by Sulema Groves DPM  
  Type 2 diabetes mellitus with nephropathy ([facility]  1/3/2018 - Present 1/3/2018 by Miladis Hutton MD  
  Entered by Miladis Hutton MD  
  Type 2 diabetes mellitus with diabetic neuropathy ([facility]  1/3/2018 - Present 5/17/2018 by Sulema Groves DPM  
 Entered by Lisbeth Charlton MD  
  Hypertensive left ventricular hypertrophy, without heart failure  1/15/2018 - Present 1/15/2018 by Lisbeth Charlton MD  
  Entered by Lisbeth hCarlton MD  
  Atrial arrhythmia  1/15/2018 - Present 1/15/2018 by Lisbeth Charlton MD  
  Entered by Lisbeth Charlton MD  
  Overview Signed 1/15/2018  4:08 PM by Lisbeth Charlton MD  
   Noted on 12 lead ECG 12/3/2017 You are allergic to the following No active allergies Current Discharge Medication List  
  
ASK your doctor about these medications Dose & Instructions Dispensing Information Comments  
 cholecalciferol 1,000 unit tablet Commonly known as:  VITAMIN D3 Dose:  3000 Units Take 3,000 Units by mouth daily. Refills:  0  
   
 gabapentin 300 mg capsule Commonly known as:  NEURONTIN Dose:  1 Cap Take 1 Cap by mouth three (3) times daily. 12/22/16, QTY#90, 30 Days, 2 Refills. Dr. Bibi Grossman Refills:  2 IBUPROFEN PO Take  by mouth. Refills:  0  
   
 lisinopril-hydroCHLOROthiazide 20-25 mg per tablet Commonly known as:  Christie Kirks Dose:  1 Tab Take 1 Tab by mouth daily. Refills:  0  
   
 metoprolol tartrate 50 mg tablet Commonly known as:  LOPRESSOR Dose:  50 mg Take 1 Tab by mouth two (2) times a day. Quantity:  60 Tab Refills:  0 Current Immunizations Name Date Influenza Vaccine 10/1/2017, 9/28/2016 Pneumococcal Polysaccharide (PPSV-23) 6/12/2005 Tdap 12/26/2017 Follow-up Information None Discharge Instructions Wound Care Instructions Patient: Linus Brand MRN: [id number]  SSN: [id number] YOB: 1935  Age:82 y.o. Sex: female Dr. Ash Cruz Nurse recommends the following discharge instruction: 
 
Brayan Krueger 
  []   Felt/Foam Offloading   [] Removable Cast Kelly Patel    
  []   Wedge Shoe   [x]  Wheelchair []   Total Contact Cast   []  Surgical Shoe  
  []   Crutches        Other Mattress:  
Other:  
 
Edema Control [x]   Elevated legs as much as possible. Recommend above level of heart.  
  []   Layered Wraps             []   Left      []   Right      []  BILATERAL 
        - Type:            []   Estee Stains       []  Multi-Layer []   Two Layers     []  Three Layers   []  Four Layers  
  []   Tubular Bandages        []   Left      []   Right     SIZE:   
  []   Stockings                      []   Left      []   Right  
  []   Compression Pump     []   Left      []   Right   
___ millimeters of mercury 
___ millimeters of mercury for ___ minutes for ___ day(s) Other:  
 
 
Nutritional Supplements [x]  Multi-Vitamin  
  []  Other:   
  
[facility]  
  [x]  Home Health: [address]  
  []  Long Term Care:   
  []  DME:  
 
Consult 
  []   Nutrition  
  []   Vascular  
  []   Orthotist/Pedorthotist  
  []   Infectious Disease  
  []   Lymphedema Therapist  
Other:  
 
Dressings: 
Another brand of generically equivalent product may be dispensed unless specifically ordered otherwise. Home Ulcer/wound Hygiene (cleanse with)   
  []   Distilled Water  
  [x]   Normal Saline ONLY  
  []   Wound Cleanser []   Mild antimicrobial soap and water  
  []   Chlorhexidine liquid soap and water  
  []   Other:  
 
Apply 
  []   Hydrogel   []  Hypergel   []   Aquacel Ag  
  []   Cadexomer Iodine                     (Iodosorb)   [x]  Silver Alginate to Left Ankle wounds   []   Medihoney:  
  [x]   Collagenase:Santyl to Left foot DORSAL and MEDIAL wounds   []  Calcium Alginate   []   Collagen:  
  []   Plain Foam   []  Non-Adherent Contact                Layer   []   Xeroform  
  []   Silver foam   []   Hydrocolloid     
  []   Acticoat   []   Adaptic:   
 
Other/Specific Instructions: 
 
Cover With 
  [x]   NS Gauze to LEFT DORSAL and MEDIAL FOOT [x]    Roll Gauze to entire foot  
  []   Dry Gauze  
  []   Bordered gauze:  
  []   Foam      []    Bordered         []   Nonbordered  
  [x]   Secure with Tape  
  []   Other Ligia-Ulcer Care 
  []   Cream  
  []   Lotion []   Ointment  
  []   Barrier  
  [x]   Other: TRIAMCINOLONE CREAM  
 
Change Dressing 
  []   Once Daily  
  []   Every Other Day  
  []   Every 3 Days    
  []   Every 5 Days  
  []   Every 7 Days  
  []   Do Not Change    
  []   2 x per week (Mon and Thurs. ) [x]   3 x per week (Mon, Wed, Fri. ) []   Other Follow-Up: 
 [x]  Follow up 3 weeks: June 21st                          
 []  As Needed (PRN) []   Zoe Robert MD  
 []  Gianna Oviedo DPM  
 [x]  Lenora Samuel DPM  
[]   Chase Austin DPM  
  
[]   Nurse Visit Please call the wound clinic ([phone]) regarding any questions or concerns. Chart Review Routing History Recipient Method Report Sent By Mary Noriega MD  
Fax: [phone] Phone: [phone] Fax Provider Comm Report Amador Deiters [[id number]] 5/14/2012 12:49 PM 05/11/2012 Jovanna Bird MD  
Fax: [phone] Phone: [phone] Fax Provider Comm Report Doryaima Deiters [[id number]] 5/14/2012 12:49 PM 05/11/2012 Kiran Apodaca MD  
Fax: [phone] Phone: [phone] Fax IP Auto Routed Abel Tuttle MD [[id number]] 2/28/2017  8:28 AM 02/28/2017 Erick Roman MD  
Fax: [phone] Phone: [phone] Fax IP Auto Routed Abel Tuttle MD [[id number]] 2/28/2017  8:28 AM 02/28/2017

## 2018-05-31 NOTE — PROGRESS NOTES
Progress and Debridement Note    Patient: Desean Dang MRN: [id number]  SSN: [id number]    YOB: 1935  Age: 81 y.o. Sex: female      Assessment:     Active Problems:    Skin ulcer of left ankle, with fat layer exposed ([address] (11/16/2017)      Vascular disease, peripheral ([address] (12/7/2017)      Midfoot ulcer, left, with fat layer exposed ([address] (5/17/2018)      Personal history of noncompliance with medical treatment, presenting hazards to health (5/31/2018)      Contact dermatitis due to adhesive bandage (5/31/2018)        Plan:      Selective excisional debridement with continue [address] with Santyl to the dorsal left foot and Aquacel AG to the medial left ankle/foot. Advised patient to allow [facility] to come and change the dressings consistently. Will have patient return for Theraskin application in 2 weeks. She must elevate feet more than she is currently doing and wear compression. Pls keep vascular appointment. Will apply Triamcinolone cream 0.25% to the periwound to help decrease the dermatitis and use 4x4 and rolled gauze instead of the mepilex      Subjective:     82 y/o female presents for chronic ulcer care left ankle and foot. Patient is still refusing HH and has a family member changing the dressing. She has states the santyl has been applied they have been applying the santyl to the top of the gauze and not directly to the wound bed.   Patient states her vascular appointment is in the middle of June    Objective:     Patient Vitals for the past 24 hrs:   Temp Pulse Resp BP SpO2   05/31/18 0827 97 °F (36.1 °C) (!) 57 18 164/64 96 %       Physical Exam:     General Exam  alert, cooperative, no distress, appears stated age    REVIEW OF SYSTEMS:  General: denies chronic fatigue, weight loss, fever, anemia, bruising, depression, nervousness, panic attacks  HEENT: denies ringing in ears, ear infections, dizzy spells, poor vision, glaucoma, sinus trouble, hoarseness, eye infections  GI: denies diarrhea, gas, bloating, heartburn, regurgitation, difficulty swallowing, painful swallowing, nausea, vomiting, constipation, abdominal pain, decreased appetite, blood in stools, black stools, jaundice, dark urine  Lungs: denies pneumonia, asthma, cough, SOB, hemoptysis  Heart: denies chest pain, irregular heart beat, ankle swelling, HTN  Skin: denies rashes, hives, allergic reaction  Urinary: denies UTI, kidney stones, decreased urine force and flow, urination at night, blood in urine, painful urination  Bones and Joints: denies arthritis, rheumatism, back pain, gout, osteoporosis  Neurologic: denies stroke, seizures, headaches, numbness, tingling    VASCULAR EXAM:. Pedal pulses are trace palpable 1/4 DP and non palpable 0/4 PT right and left foot. Skin temperature is warm to warm right and left foot. Digital capillary fill time is 5 seconds right and left foot. There is still edema of the right and left foot and ankle. Pedal hair is not noted bilateral       NEUROLOGICAL EXAM:. Sensation Intact with 5.07g monofilament wire right and left foot. Deep tendon reflexes intact and symmetrical on the right and left foot.      MUSCULOSKELETAL EXAM:. Muscle tone is normal.  Muscle strength of the flexor and extensor group inversion and eversion Bilateral. 4/5.         DERMATOLOGICAL EXAM:. Skin is of abnormal texture and turgor with atrophic skin changes noting decrease hair growth, nail changes (thickening), Bilateral. There is a new ulcer full thickness noted over dorsal left foot with decrease slough noted in the wound bed, noted at the 1 o'clock to 6 o'clock position. There is still noted multiple lesions over the medial aspect of the left ankle with 80% granulation tissue noted. Noted measurements below.  There is erythematous rash to the periwound dorsal lesion    Wound Ankle Left (Active)   Number of days:1758       Wound Ankle Left (Active)   Number of days:1758       Wound Ankle Left (Active)   Number of [id number] Wound Ankle Left;Lateral (Active)   Number of days:459       Wound Ankle Right (Active)   DRESSING STATUS Removed 12/21/2017  9:42 AM   DRESSING TYPE Other (Comment) 12/21/2017  9:42 AM   Non-Pressure Injury Partial thickness (epider/derm) 12/21/2017  9:42 AM   Wound Length (cm) 0.7 cm 12/21/2017  9:42 AM   Wound Width (cm) 0.8 cm 12/21/2017  9:42 AM   Wound Depth (cm) 0.1 12/21/2017  9:42 AM   Wound Surface area (cm^2) 0.06 cm^2 12/21/2017  9:42 AM   Condition of Base [facility] 12/21/2017  9:42 AM   Condition of Edges Open 12/21/2017  9:42 AM   Tissue Type Yellow 12/21/2017  9:42 AM   Tissue Type Percent Red 50 11/16/2017 10:08 AM   Tissue Type Percent Yellow 100 12/21/2017  9:42 AM   Drainage Amount  Moderate 12/21/2017  9:42 AM   Drainage Color Serous 12/21/2017  9:42 AM   Wound Odor None 12/21/2017  9:42 AM   Periwound Skin Condition Erythema, blanchable 12/21/2017  9:42 AM   Cleansing and Cleansing Agents  Dermal wound cleanser; Soap and water 12/21/2017  9:42 AM   Dressing Type Applied Other (Comment) 12/21/2017  9:42 AM   Procedure Tolerated Well 12/21/2017  9:42 AM   Number of days:459       Wound Ankle Lateral;Right (Active)   Number of days:204       Wound Ankle Left;Lateral (Active)   Number of days:204       Wound Ankle Left;Medial;Other (comment) (Active)   DRESSING STATUS Removed 5/31/2018  8:29 AM   DRESSING TYPE Adhesive wound dressing (Band-Aid) 5/31/2018  8:29 AM   Non-Pressure Injury Partial thickness (epider/derm) 12/21/2017  9:42 AM   Wound Length (cm) 0.9 cm 5/31/2018  8:29 AM   Wound Width (cm) 0.4 cm 5/31/2018  8:29 AM   Wound Depth (cm) 0.2 5/31/2018  8:29 AM   Wound Surface area (cm^2) 0.36 cm^2 5/31/2018  8:29 AM   Condition of Base [place];Slough 5/31/2018  8:29 AM   Condition of Edges Open 5/31/2018  8:29 AM   Tissue Type Red;Yellow 5/31/2018  8:29 AM   Tissue Type Percent Pink 100 12/21/2017  9:42 AM   Tissue Type Percent Red 80 5/31/2018  8:29 AM   Tissue Type Percent Yellow 20 5/31/2018  8:29 AM   Drainage Amount  Small  5/31/2018  8:29 AM   Drainage Color Serosanguinous 5/31/2018  8:29 AM   Wound Odor None 5/31/2018  8:29 AM   Periwound Skin Condition Edematous; Erythema, blanchable;Petechiae 5/31/2018  8:29 AM   Cleansing and Cleansing Agents  Dermal wound cleanser 5/31/2018  8:29 AM   Dressing Type Applied Other (Comment) 12/21/2017  9:42 AM   Procedure Tolerated Well 12/21/2017  9:42 AM   Number of days:204       Wound Ankle Left;Medial;Distal (Active)   DRESSING STATUS Removed 5/31/2018  8:29 AM   DRESSING TYPE Adhesive wound dressing (Band-Aid) 5/31/2018  8:29 AM   Non-Pressure Injury Partial thickness (epider/derm) 12/7/2017  9:56 AM   Wound Length (cm) 0.7 cm 5/31/2018  8:29 AM   Wound Width (cm) 0.5 cm 5/31/2018  8:29 AM   Wound Depth (cm) 0.2 5/31/2018  8:29 AM   Wound Surface area (cm^2) 0.35 cm^2 5/31/2018  8:29 AM   Condition of Base [place];Slough 5/31/2018  8:29 AM   Condition of Edges Open 5/31/2018  8:29 AM   Epithelialization (%) 100 5/17/2018  8:44 AM   Tissue Type Pink;Yellow 5/31/2018  8:29 AM   Tissue Type Percent Pink 50 5/31/2018  8:29 AM   Tissue Type Percent Yellow 50 5/31/2018  8:29 AM   Tissue Type Percent Other (comment) 40 2/15/2018 10:52 AM   Drainage Amount  Small  5/31/2018  8:29 AM   Drainage Color Serosanguinous 5/31/2018  8:29 AM   Wound Odor None 5/31/2018  8:29 AM   Periwound Skin Condition Edematous; Erythema, blanchable;Petechiae 5/31/2018  8:29 AM   Cleansing and Cleansing Agents  Dermal wound cleanser 5/31/2018  8:29 AM   Dressing Type Applied Other (Comment) 5/17/2018  9:03 AM   Procedure Tolerated Well 12/21/2017  9:42 AM   Number of days:196       Wound Ankle Left;Medial;Proximal (Active)   DRESSING STATUS Removed 5/31/2018  8:29 AM   DRESSING TYPE Adhesive wound dressing (Band-Aid) 5/31/2018  8:29 AM   Non-Pressure Injury Partial thickness (epider/derm) 2/15/2018 10:52 AM   Wound Length (cm) 2.5 cm 5/31/2018  8:29 AM   Wound Width (cm) 0.7 cm 5/31/2018  8:29 AM   Wound Depth (cm) 0.4 5/31/2018  8:29 AM   Wound Surface area (cm^2) 1.75 cm^2 5/31/2018  8:29 AM   Condition of Base [place];Slough 5/31/2018  8:29 AM   Condition of Edges Open 5/31/2018  8:29 AM   Tissue Type Pink;Yellow 5/17/2018  8:44 AM   Tissue Type Percent Pink 10 5/31/2018  8:29 AM   Tissue Type Percent Red 5 11/16/2017 10:08 AM   Tissue Type Percent Yellow 90 5/31/2018  8:29 AM   Drainage Amount  Small  5/31/2018  8:29 AM   Drainage Color Serosanguinous 5/31/2018  8:29 AM   Wound Odor None 5/31/2018  8:29 AM   Periwound Skin Condition Edematous; Erythema, blanchable;Petechiae 5/31/2018  8:29 AM   Cleansing and Cleansing Agents  Dermal wound cleanser 5/31/2018  8:29 AM   Dressing Type Applied Other (Comment) 5/17/2018  9:03 AM   Procedure Tolerated Well 2/1/2018 12:56 PM   Number of days:196       Wound Toe Left (Active)   DRESSING TYPE Open to air 12/7/2017  9:56 AM   Non-Pressure Injury Partial thickness (epider/derm) 11/16/2017 10:08 AM   Wound Length (cm) 0.6 cm 11/16/2017 10:08 AM   Wound Width (cm) 1.4 cm 11/16/2017 10:08 AM   Wound Depth (cm) 0.1 11/16/2017 10:08 AM   Wound Surface area (cm^2) 0.08 cm^2 11/16/2017 10:08 AM   Condition of Base Other (comment) 11/16/2017 10:08 AM   Tissue Type Yellow 11/16/2017 10:08 AM   Tissue Type Percent Yellow 100 11/16/2017 10:08 AM   Drainage Amount  None 11/16/2017 10:08 AM   Wound Odor None 11/16/2017 10:08 AM   Cleansing and Cleansing Agents  Dermal wound cleanser 11/16/2017 10:08 AM   Dressing Type Applied Open to air 11/16/2017 10:08 AM   Number of days:196       Wound Toe Left (Active)   DRESSING STATUS Removed 11/16/2017 10:08 AM   DRESSING TYPE Open to air 12/7/2017  9:56 AM   Non-Pressure Injury Partial thickness (epider/derm) 11/16/2017 10:08 AM   Wound Length (cm) 0.3 cm 11/16/2017 10:08 AM   Wound Width (cm) 0.4 cm 11/16/2017 10:08 AM   Wound Depth (cm) 0.1 11/16/2017 10:08 AM   Wound Surface area (cm^2) 0.01 cm^2 11/16/2017 10:08 AM Condition of Base Other (comment) 11/16/2017 10:08 AM   Tissue Type Yellow 11/16/2017 10:08 AM   Tissue Type Percent Yellow 100 11/16/2017 10:08 AM   Drainage Amount  None 11/16/2017 10:08 AM   Wound Odor None 11/16/2017 10:08 AM   Periwound Skin Condition Intact 11/16/2017 10:08 AM   Dressing Type Applied Open to air 11/16/2017 10:08 AM   Number of days:196       Wound Ankle Left;Lateral (Active)   DRESSING STATUS Removed 12/21/2017  9:42 AM   DRESSING TYPE Other (Comment) 12/21/2017  9:42 AM   Non-Pressure Injury Partial thickness (epider/derm) 12/21/2017  9:42 AM   Wound Length (cm) 1.1 cm 12/21/2017  9:42 AM   Wound Width (cm) 0.9 cm 12/21/2017  9:42 AM   Wound Depth (cm) 0.1 12/21/2017  9:42 AM   Wound Surface area (cm^2) 0.1 cm^2 12/21/2017  9:42 AM   Condition of Base [place];Slough 12/21/2017  9:42 AM   Condition of Edges Open 12/21/2017  9:42 AM   Tissue Type Pink;Yellow 12/21/2017  9:42 AM   Tissue Type Percent Pink 10 11/16/2017 10:08 AM   Tissue Type Percent Red 50 12/21/2017  9:42 AM   Tissue Type Percent Yellow 50 12/21/2017  9:42 AM   Drainage Amount  Moderate 12/21/2017  9:42 AM   Drainage Color Serous 12/21/2017  9:42 AM   Wound Odor None 12/21/2017  9:42 AM   Periwound Skin Condition Erythema, blanchable;Edematous 12/21/2017  9:42 AM   Cleansing and Cleansing Agents  Dermal wound cleanser; Soap and water 12/21/2017  9:42 AM   Dressing Type Applied Other (Comment) 12/21/2017  9:42 AM   Procedure Tolerated Well 12/21/2017  9:42 AM   Number of days:196       Wound Foot Left;Medial (Active)   DRESSING STATUS Removed 5/31/2018  8:29 AM   DRESSING TYPE Adhesive wound dressing (Band-Aid) 5/31/2018  8:29 AM   Non-Pressure Injury Partial thickness (epider/derm) 2/15/2018 10:52 AM   Wound Length (cm) 1 cm 5/31/2018  8:29 AM   Wound Width (cm) 0.6 cm 5/31/2018  8:29 AM   Wound Depth (cm) 0.4 5/31/2018  8:29 AM   Wound Surface area (cm^2) 0.6 cm^2 5/31/2018  8:29 AM   Condition of Base [place];Slough 5/31/2018  8:29 AM   Condition of Edges Open 5/31/2018  8:29 AM   Tissue Type Pink;Yellow 5/31/2018  8:29 AM   Tissue Type Percent Pink 30 5/31/2018  8:29 AM   Tissue Type Percent Yellow 70 5/31/2018  8:29 AM   Drainage Amount  Small  5/31/2018  8:29 AM   Drainage Color Serosanguinous 5/31/2018  8:29 AM   Wound Odor None 5/31/2018  8:29 AM   Periwound Skin Condition Erythema, blanchable;Edematous; Petechiae 5/31/2018  8:29 AM   Cleansing and Cleansing Agents  Dermal wound cleanser 5/17/2018  8:44 AM   Dressing Type Applied Other (Comment) 5/17/2018  9:03 AM   Procedure Tolerated Well 2/1/2018  1:05 PM   Number of days:178       Wound Leg Lower Right; Anterior;Medial (Active)   Number of days:178       Wound Foot Left;Dorsal (Active)   DRESSING STATUS Removed 5/31/2018  8:29 AM   DRESSING TYPE Adhesive wound dressing (Band-Aid) 5/31/2018  8:29 AM   Non-Pressure Injury Full thickness (subcut/muscle) 5/17/2018  8:44 AM   Wound Length (cm) 1.9 cm 5/31/2018  8:29 AM   Wound Width (cm) 1.3 cm 5/31/2018  8:29 AM   Wound Depth (cm) 0.9 5/31/2018  8:29 AM   Wound Surface area (cm^2) 2.47 cm^2 5/31/2018  8:29 AM   Condition of Base [place];Slough 5/31/2018  8:29 AM   Condition of Edges Open 5/31/2018  8:29 AM   Tissue Type Yellow;Pink 5/31/2018  8:29 AM   Tissue Type Percent Pink 10 5/31/2018  8:29 AM   Tissue Type Percent Yellow 90 5/31/2018  8:29 AM   Drainage Amount  Small  5/31/2018  8:29 AM   Drainage Color Serous 5/17/2018  8:44 AM   Wound Odor None 5/31/2018  8:29 AM   Periwound Skin Condition Edematous; Erythema, blanchable;Petechiae 5/31/2018  8:29 AM   Cleansing and Cleansing Agents  Dermal wound cleanser 5/17/2018  8:44 AM   Dressing Type Applied Other (Comment) 5/17/2018  9:03 AM   Procedure Tolerated Well 5/17/2018  9:03 AM   Number of days:14       [REMOVED] Wound Leg Lower Right; Anterior; Lateral (Removed)   Removed 02/15/18 1138   DRESSING TYPE Open to air 2/15/2018 11:36 AM   Non-Pressure Injury Partial thickness (epider/derm) 2/1/2018  1:05 PM   Wound Length (cm) 0.9 cm 2/1/2018  1:05 PM   Wound Width (cm) 0.6 cm 2/1/2018  1:05 PM   Wound Depth (cm) 0.1 2/1/2018  1:05 PM   Wound Surface area (cm^2) 0.54 cm^2 2/1/2018  1:05 PM   Condition of Base [place] 2/1/2018  1:05 PM   Condition of Edges Open 2/1/2018  1:05 PM   Tissue Type Red 2/1/2018  1:05 PM   Tissue Type Percent Red 100 2/1/2018  1:05 PM   Drainage Amount  Scant 2/1/2018  1:05 PM   Drainage Color Serosanguinous 2/1/2018  1:05 PM   Wound Odor None 2/1/2018  1:05 PM   Periwound Skin Condition Edematous; Erythema, blanchable 2/1/2018  1:05 PM   Cleansing and Cleansing Agents  Dermal wound cleanser 2/1/2018  1:05 PM   Dressing Type Applied Other (Comment) 2/1/2018  1:05 PM   Procedure Tolerated Well 2/1/2018  1:05 PM   Number of days:73          Lab/Data Review:  No results found for this or any previous visit (from the past 24 hour(s)). none      PROCEDURE    Selective sharp instrument excisional debridement of slough and devitilized tissue    After the benefits/risks/SE were discussed, the patient agreed to proceed. Time out was done:   * Patient was identified by name and date of birth   * Agreement on procedure being performed was verified   * Procedure site verified and marked as necessary   * Patient was positioned for comfort   * Consent was signed and verified. Site: left foot and ankle    Instruments used:    [x]  Dermal curette  [] Blade        [] #15  [] #10  [] Forceps  [] Tissue nippers  [] sterile scissors  [] Other     Anesthesia:    [x]  EMLA 2.5% cream: applied to wound beds for approximately 15minutes.      []   Lidocaine 2% Topical Gel      []  Lidocaine injectable 1% with epinephrine 1:100,000    []  Lidocaine injectable 1% without epinephrine    []  Other:     []  None         [] patient is insensate due to neuropathy         [] patient declines        [] allergy to anesthetic        [] tissue for debridement is either superficial, loosely adherent and/or necrotic and denervated     After satisfactory anesthesia achieved, the wound/s was/were sharply debrided necrotic, devitalized and granulation tissue down to the sub Q layer, revealing a clean and viable wound bed. Post debridement measurement was 2.0_x_1.4_x_0.8_cm dorsal left foot . Bleeding: <5mL Resolved with light focal pressure. Wounds were cleaned and irrigated with saline. Wound care applied:   []   Hydrogell   []  Hypergel   [x]   Hydrofiber/Aquacel AG medial left ankle and foot     []   Cadexomer Iodine (Iodosorb)   []  Silver Alginate    []   Medihoney:    [x]   Collagenase:Santyl dorsal left foot   []  Calcium Alginate   []   Collagen:    []   Foam   []  Non-Adherent Contact Layer   []   Xeroform    []   Adaptec:   []   Hydrocolloid   []   Transparent Film    []  Antibiotic ointment/cream     []   Other (see below)     Other:     [x]   Dry Gauze and Roll Gauze    []   Foam and Roll Gauze    []   Dry Gauze    []   Bordered gauze:     []   Secure with Tape    []   Bordered foam       []   Other      []   Compression Wrap:          []   Unna Boot    []Multi-Layer    []Tubular Bandages       Ligia-Ulcer Care    []   Cream     []   Lotion     []   Ointment     []   Barrier     [x]   Other:triamcinolone cream 0.25 %       The patient tolerated the procedure well with no complications. The patient left the exam room in satisfactory and stable condition.      Signed By: Essie Leonard DPM     May 31, 2018 9:09 AM

## 2018-06-04 ENCOUNTER — HOME HEALTH ADMISSION (OUTPATIENT)
Dept: HOME HEALTH SERVICES | Facility: HOME HEALTH | Age: 83
End: 2018-06-04
Payer: MEDICARE

## 2018-06-05 ENCOUNTER — HOME CARE VISIT (OUTPATIENT)
Dept: SCHEDULING | Facility: HOME HEALTH | Age: 83
End: 2018-06-05
Payer: MEDICARE

## 2018-06-05 ENCOUNTER — HOME CARE VISIT (OUTPATIENT)
Dept: HOME HEALTH SERVICES | Facility: HOME HEALTH | Age: 83
End: 2018-06-05

## 2018-06-05 PROCEDURE — 400013 HH SOC

## 2018-06-05 PROCEDURE — 3331090001 HH PPS REVENUE CREDIT

## 2018-06-05 PROCEDURE — 3331090002 HH PPS REVENUE DEBIT

## 2018-06-05 PROCEDURE — G0299 HHS/HOSPICE OF RN EA 15 MIN: HCPCS

## 2018-06-06 ENCOUNTER — HOME CARE VISIT (OUTPATIENT)
Dept: HOME HEALTH SERVICES | Facility: HOME HEALTH | Age: 83
End: 2018-06-06
Payer: MEDICARE

## 2018-06-06 PROCEDURE — A4649 SURGICAL SUPPLIES: HCPCS

## 2018-06-06 PROCEDURE — A6197 ALGINATE DRSG >16 <=48 SQ IN: HCPCS

## 2018-06-06 PROCEDURE — 3331090001 HH PPS REVENUE CREDIT

## 2018-06-06 PROCEDURE — A6446 CONFORM BAND S W>=3" <5"/YD: HCPCS

## 2018-06-06 PROCEDURE — A6216 NON-STERILE GAUZE<=16 SQ IN: HCPCS

## 2018-06-06 PROCEDURE — A4927 NON-STERILE GLOVES: HCPCS

## 2018-06-06 PROCEDURE — 3331090002 HH PPS REVENUE DEBIT

## 2018-06-07 ENCOUNTER — HOME CARE VISIT (OUTPATIENT)
Dept: SCHEDULING | Facility: HOME HEALTH | Age: 83
End: 2018-06-07
Payer: MEDICARE

## 2018-06-07 VITALS
TEMPERATURE: 98.4 F | HEART RATE: 64 BPM | DIASTOLIC BLOOD PRESSURE: 60 MMHG | RESPIRATION RATE: 18 BRPM | OXYGEN SATURATION: 98 % | SYSTOLIC BLOOD PRESSURE: 148 MMHG

## 2018-06-07 PROCEDURE — G0299 HHS/HOSPICE OF RN EA 15 MIN: HCPCS

## 2018-06-07 PROCEDURE — 3331090002 HH PPS REVENUE DEBIT

## 2018-06-07 PROCEDURE — 3331090001 HH PPS REVENUE CREDIT

## 2018-06-08 PROCEDURE — 3331090002 HH PPS REVENUE DEBIT

## 2018-06-08 PROCEDURE — 3331090001 HH PPS REVENUE CREDIT

## 2018-06-09 ENCOUNTER — HOME CARE VISIT (OUTPATIENT)
Dept: SCHEDULING | Facility: HOME HEALTH | Age: 83
End: 2018-06-09
Payer: MEDICARE

## 2018-06-09 PROCEDURE — 3331090002 HH PPS REVENUE DEBIT

## 2018-06-09 PROCEDURE — 3331090001 HH PPS REVENUE CREDIT

## 2018-06-09 PROCEDURE — G0299 HHS/HOSPICE OF RN EA 15 MIN: HCPCS

## 2018-06-10 VITALS
RESPIRATION RATE: 20 BRPM | OXYGEN SATURATION: 98 % | SYSTOLIC BLOOD PRESSURE: 147 MMHG | HEART RATE: 78 BPM | TEMPERATURE: 97 F | DIASTOLIC BLOOD PRESSURE: 58 MMHG

## 2018-06-10 PROCEDURE — 3331090001 HH PPS REVENUE CREDIT

## 2018-06-10 PROCEDURE — 3331090002 HH PPS REVENUE DEBIT

## 2018-06-11 ENCOUNTER — HOME CARE VISIT (OUTPATIENT)
Dept: SCHEDULING | Facility: HOME HEALTH | Age: 83
End: 2018-06-11
Payer: MEDICARE

## 2018-06-11 VITALS
DIASTOLIC BLOOD PRESSURE: 64 MMHG | HEART RATE: 57 BPM | SYSTOLIC BLOOD PRESSURE: 121 MMHG | TEMPERATURE: 98.9 F | OXYGEN SATURATION: 100 %

## 2018-06-11 PROCEDURE — 3331090002 HH PPS REVENUE DEBIT

## 2018-06-11 PROCEDURE — 3331090001 HH PPS REVENUE CREDIT

## 2018-06-11 PROCEDURE — G0299 HHS/HOSPICE OF RN EA 15 MIN: HCPCS

## 2018-06-12 PROCEDURE — 3331090001 HH PPS REVENUE CREDIT

## 2018-06-12 PROCEDURE — 3331090002 HH PPS REVENUE DEBIT

## 2018-06-13 ENCOUNTER — HOME CARE VISIT (OUTPATIENT)
Dept: SCHEDULING | Facility: HOME HEALTH | Age: 83
End: 2018-06-13
Payer: MEDICARE

## 2018-06-13 VITALS
DIASTOLIC BLOOD PRESSURE: 65 MMHG | HEART RATE: 60 BPM | SYSTOLIC BLOOD PRESSURE: 135 MMHG | RESPIRATION RATE: 20 BRPM | TEMPERATURE: 97.1 F | OXYGEN SATURATION: 97 %

## 2018-06-13 PROCEDURE — 3331090002 HH PPS REVENUE DEBIT

## 2018-06-13 PROCEDURE — 3331090001 HH PPS REVENUE CREDIT

## 2018-06-13 PROCEDURE — G0299 HHS/HOSPICE OF RN EA 15 MIN: HCPCS

## 2018-06-14 PROCEDURE — 3331090002 HH PPS REVENUE DEBIT

## 2018-06-14 PROCEDURE — 3331090001 HH PPS REVENUE CREDIT

## 2018-06-15 ENCOUNTER — HOME CARE VISIT (OUTPATIENT)
Dept: SCHEDULING | Facility: HOME HEALTH | Age: 83
End: 2018-06-15
Payer: MEDICARE

## 2018-06-15 PROCEDURE — G0299 HHS/HOSPICE OF RN EA 15 MIN: HCPCS

## 2018-06-15 PROCEDURE — 3331090001 HH PPS REVENUE CREDIT

## 2018-06-15 PROCEDURE — 3331090002 HH PPS REVENUE DEBIT

## 2018-06-16 VITALS
TEMPERATURE: 96.9 F | SYSTOLIC BLOOD PRESSURE: 147 MMHG | HEART RATE: 60 BPM | DIASTOLIC BLOOD PRESSURE: 60 MMHG | RESPIRATION RATE: 20 BRPM | OXYGEN SATURATION: 98 %

## 2018-06-16 PROCEDURE — 3331090002 HH PPS REVENUE DEBIT

## 2018-06-16 PROCEDURE — 3331090001 HH PPS REVENUE CREDIT

## 2018-06-17 PROCEDURE — 3331090002 HH PPS REVENUE DEBIT

## 2018-06-17 PROCEDURE — 3331090001 HH PPS REVENUE CREDIT

## 2018-06-18 ENCOUNTER — HOME CARE VISIT (OUTPATIENT)
Dept: SCHEDULING | Facility: HOME HEALTH | Age: 83
End: 2018-06-18
Payer: MEDICARE

## 2018-06-18 VITALS
OXYGEN SATURATION: 98 % | HEART RATE: 72 BPM | RESPIRATION RATE: 16 BRPM | TEMPERATURE: 98.7 F | SYSTOLIC BLOOD PRESSURE: 136 MMHG | DIASTOLIC BLOOD PRESSURE: 70 MMHG

## 2018-06-18 PROCEDURE — G0299 HHS/HOSPICE OF RN EA 15 MIN: HCPCS

## 2018-06-18 PROCEDURE — 3331090002 HH PPS REVENUE DEBIT

## 2018-06-18 PROCEDURE — 3331090001 HH PPS REVENUE CREDIT

## 2018-06-19 PROCEDURE — 3331090001 HH PPS REVENUE CREDIT

## 2018-06-19 PROCEDURE — 3331090002 HH PPS REVENUE DEBIT

## 2018-06-20 ENCOUNTER — HOME CARE VISIT (OUTPATIENT)
Dept: SCHEDULING | Facility: HOME HEALTH | Age: 83
End: 2018-06-20
Payer: MEDICARE

## 2018-06-20 VITALS
HEART RATE: 60 BPM | TEMPERATURE: 98.3 F | SYSTOLIC BLOOD PRESSURE: 150 MMHG | DIASTOLIC BLOOD PRESSURE: 52 MMHG | RESPIRATION RATE: 20 BRPM | OXYGEN SATURATION: 98 %

## 2018-06-20 PROCEDURE — G0300 HHS/HOSPICE OF LPN EA 15 MIN: HCPCS

## 2018-06-20 PROCEDURE — 3331090002 HH PPS REVENUE DEBIT

## 2018-06-20 PROCEDURE — 3331090001 HH PPS REVENUE CREDIT

## 2018-06-21 ENCOUNTER — HOSPITAL ENCOUNTER (OUTPATIENT)
Dept: WOUND CARE | Age: 83
Discharge: HOME | End: 2018-06-21
Payer: MEDICARE

## 2018-06-21 VITALS
SYSTOLIC BLOOD PRESSURE: 155 MMHG | RESPIRATION RATE: 18 BRPM | OXYGEN SATURATION: 100 % | DIASTOLIC BLOOD PRESSURE: 65 MMHG | HEART RATE: 48 BPM | TEMPERATURE: 96.5 F

## 2018-06-21 VITALS
OXYGEN SATURATION: 97 % | TEMPERATURE: 98.5 F | SYSTOLIC BLOOD PRESSURE: 170 MMHG | HEART RATE: 55 BPM | DIASTOLIC BLOOD PRESSURE: 80 MMHG

## 2018-06-21 PROCEDURE — 74011000250 HC RX REV CODE- 250

## 2018-06-21 PROCEDURE — 77030020057 HC DRSG MTRX CLLGN STGN -B: Performed by: PODIATRIST

## 2018-06-21 PROCEDURE — 97602 WOUND(S) CARE NON-SELECTIVE: CPT | Performed by: PODIATRIST

## 2018-06-21 PROCEDURE — 3331090001 HH PPS REVENUE CREDIT

## 2018-06-21 PROCEDURE — 3331090002 HH PPS REVENUE DEBIT

## 2018-06-21 RX ORDER — LIDOCAINE AND PRILOCAINE 25; 25 MG/G; MG/G
CREAM TOPICAL
Status: COMPLETED
Start: 2018-06-21 | End: 2018-06-21

## 2018-06-21 RX ADMIN — LIDOCAINE AND PRILOCAINE: 25; 25 CREAM TOPICAL at 08:54

## 2018-06-21 NOTE — WOUND CARE
Patient arrived to [facility] clinic via wheelchair, no distress noted, family present. Dr. Eduardo Cunningham preformed sharp instrument debridement of left foot and ankle wounds and #1 THERASKIN application. Patient tolerated well. Cleansed, assessed, and dressed wounds per Dr. Eduardo Cunningham. Graft Type: Theraskin 2 x 3    Graft ID#: [id number]    [employer] ID#: [id number]    Retrieved from: [address] [place] [address]    Retrieved and Reconstituted by: Rajni Wesley RN    Reconstitution Method: NS    Amount of graft used (sq cm): 39    Amount discarded (sq cm): 0    Plan: Will follow up in clinic for nurse visit in 1 week and with Dr. Eduardo Cunningham in 2 weeks. Will fax orders to home health care for dressing change of outer dressing ONLY.                        06/21/18 0846   Wound Ankle Left;Medial;Other (comment)   Date First Assessed/Time First Assessed: 11/08/17 1000   POA: Yes  Wound Type: Diabetic  Location: Ankle  Orientation: (c) Left;Medial;Other (comment)   DRESSING STATUS Removed   DRESSING TYPE Other (Comment)  (Roll gauze, Gauze 4x4, Algisite)   Non-Pressure Injury Full thickness (subcut/muscle)   Wound Length (cm) 1.4 cm   Wound Width (cm) 0.6 cm   Wound Depth (cm) 0.2   Wound Surface area (cm^2) 0.84 cm^2   Condition of Base Pink;Slough   Condition of Edges Open   Tissue Type Pink;Yellow   Tissue Type Percent Pink 20   Tissue Type Percent Yellow 80   Drainage Amount  Small    Drainage Color Serosanguinous   Wound Odor None   Periwound Skin Condition Edematous; Erythema, blanchable   Cleansing and Cleansing Agents  Dermal wound cleanser   Dressing Type Applied Other (Comment)  (Theraskin, steris, wound gel, cutimed, gauze, roll gauze)   Procedure Tolerated Well   Wound Foot Left;Dorsal   Date First Assessed/Time First Assessed: 05/17/18 0830   Wound Type: Trauma  Location: Foot  Orientation: Left;Dorsal   DRESSING STATUS Removed   DRESSING TYPE Other (Comment)  (Roll gauze, gauze 4x4, Algisite)   Non-Pressure Injury Full thickness (subcut/muscle)   Wound Length (cm) 1.6 cm   Wound Width (cm) 1 cm   Wound Depth (cm) 0.2   Wound Surface area (cm^2) 1.6 cm^2   Condition of Base Pink;Slough   Condition of Edges Open   Tissue Type Pink;Yellow   Tissue Type Percent Pink 50   Tissue Type Percent Yellow 50   Drainage Amount  Small    Drainage Color Serous   Wound Odor None   Periwound Skin Condition Edematous; Erythema, blanchable   Cleansing and Cleansing Agents  Dermal wound cleanser   Dressing Type Applied Other (Comment)  (Theraskin, Steris, Wound gel, Cutimed, guaze, roll gauze)   Procedure Tolerated Well   Wound Foot Left;Medial   Date First Assessed/Time First Assessed: 12/04/17 1100   POA: Yes  Wound Type: Vascular  Location: Foot  Orientation: Left;Medial   DRESSING STATUS Removed   DRESSING TYPE Other (Comment)  (Roll gauze, gauze 4 x 4, Algisite)   Non-Pressure Injury Full thickness (subcut/muscle)   Wound Length (cm) 1.5 cm   Wound Width (cm) 0.8 cm   Wound Depth (cm) 0.2   Wound Surface area (cm^2) 1.2 cm^2   Condition of Base Pink;Slough   Condition of Edges Open   Tissue Type Pink;Yellow   Tissue Type Percent Pink 70   Tissue Type Percent Yellow 30   Drainage Amount  Small    Drainage Color Serous   Wound Odor None   Periwound Skin Condition Edematous; Erythema, blanchable;Petechiae   Cleansing and Cleansing Agents  Dermal wound cleanser   Dressing Type Applied Other (Comment)  (Theraskin, Steris, Wound gel, Cutimed, gauze, roll gauze)   Procedure Tolerated Well   Wound Ankle Left;Medial;Distal   Date First Assessed: 11/16/17   POA: Yes  Wound Type: Vascular  Location: Ankle  Orientation: Left;Medial;Distal   DRESSING STATUS Removed   DRESSING TYPE Other (Comment)  (Roll gauze, gauze 4 x 4, Algisite)   Non-Pressure Injury Full thickness (subcut/muscle)   Wound Length (cm) 1.1 cm   Wound Width (cm) 0.6 cm   Wound Depth (cm) 0.2   Wound Surface area (cm^2) 0.66 cm^2   Condition of Base Pink;Slough   Condition of Edges Open Tissue Type Pink;Yellow   Tissue Type Percent Pink 50   Tissue Type Percent Yellow 50   Drainage Amount  Scant   Drainage Color Serosanguinous   Wound Odor None   Periwound Skin Condition Edematous; Erythema, blanchable;Petechiae   Cleansing and Cleansing Agents  Dermal wound cleanser   Dressing Type Applied Other (Comment)  (Theraskin, Steris, wound gel, cutimed, gauze, roll gauze)   Procedure Tolerated Well   Wound Ankle Left;Medial;Proximal   Date First Assessed: 11/16/17   POA: Yes  Wound Type: Vascular  Location: Ankle  Orientation: Left;Medial;Proximal   DRESSING STATUS Removed   DRESSING TYPE Other (Comment)  (roll gauze, gauze 4 x 4, Algisite)   Non-Pressure Injury Full thickness (subcut/muscle)   Wound Length (cm) 2.5 cm   Wound Width (cm) 1.2 cm   Wound Depth (cm) 0.3   Wound Surface area (cm^2) 3 cm^2   Condition of Base Pink;Slough   Condition of Edges Open   Tissue Type Pink;Yellow   Tissue Type Percent Pink 30   Tissue Type Percent Yellow 70   Drainage Amount  Small    Drainage Color Serosanguinous   Wound Odor None   Periwound Skin Condition Edematous; Erythema, blanchable   Cleansing and Cleansing Agents  Dermal wound cleanser   Dressing Type Applied Other (Comment)  (Theraskin, Steris, wound gel, Cutimed, gauze, roll gauze)   Procedure Tolerated Well

## 2018-06-21 NOTE — PROGRESS NOTES
Theraskin and Debridement Procedure Note    Pre-Operative Diagnosis: Non-Healing Wound  Post-Operative Diagnosis: Same    Site: left foot dorsal and medial and left ankle distal and proximal    Procedure:   1. Selective sharp instrument debridement of slough and devitilized tissue  2. Application of Skin Biograft - Theraskin    Indications: 1. Removal of devitalized and necrotic tissue to promote healing and evaluate the extent of healing. 2. Stage 1 of a planned staged series of                10          applications of Theraskin in wound not responding to conventional therapy     After the benefits/risks/SE were discussed, the patient agreed to proceed. Time out was done:   * Patient was identified by name and date of birth   * Agreement on procedure being performed was verified   * Procedure site verified and marked as necessary   * Patient was positioned for comfort   * Consent was signed and verified. Instruments used:    [x]  Dermal curette  Blade        [] #15  [] #10  [] Forceps  [] Tissue nippers  [] sterile scissors  [] Other     Anesthesia used:    [x]  EMLA 2.5% cream: applied to wound beds for approximately 15minutes. []   Lidocaine 2% Topical Gel      []  Lidocaine injectable 1% with epinephrine 1:100,000; Amount used: mL    []  Lidocaine injectable 1% without epinephrine; Amount used: mL    []  Other:     []  None         [] patient is insensate due to neuropathy         [] patient declines        [] allergy to anesthetic        [] tissue for debridement is either superficial, loosely adherent and/or necrotic and denervated     Description of Procedure: After usual preparation, sharp debridement of slough and devitalized tissue was performed utilizing the instruments as above. Tissue was removed from the sub Q layer. The wound was debrided to remove non-viable tissue and to clearly reveal the wound margins.  The wound was debrided to an acute state with some bleeding from the capillary bed. Pre-debridement measurement: see accompanying progress note  Post debridement measurement:  1.7_x_1.1_x0.3_cm dorsal left foot . Post debridement measurement: 1.3_x0.8_x_0.2_cm medial left ankle distal .   Post debridement measurement:  2.7x_1.3_x_0.3_cm medial left ankle proximal .   Post debridement measurement: 1.7_x_1.0_x_0.2_cmmedial left foot . Bleeding: <5mL Resolved with light focal pressure. Wounds were cleaned and irrigated with saline. After usual preparation, Theraskin applied to the wound and affixed to the skin with steri strips and covered with non-adherent contact layer. The patient tolerated the procedure without complication. Theraskin 39 sq cm sheet  Theraskin used: 39 sq cm  Theraskin discarded: 0 sq cm    Wound care applied:   [x]   Hydrogell   []  Hypergel   []   Hydrofiber/Aquacel      []   Cadexomer Iodine (Iodosorb)   []  Silver Alginate    []   Medihoney:    []   Collagenase:Santyl   []  Calcium Alginate   []   Collagen:    []   Foam   []  Non-Adherent Contact Layer   []   Xeroform    []   Adaptec:   []   Hydrocolloid   []   Transparent Film    []  Antibiotic ointment/cream    []  hyderofera blue   []   Other (see below)    [] Mesalt       Other:cutimed sorbact     [x]   Dry Gauze and Roll Gauze    []   Foam and Roll Gauze    []   Dry Gauze    []    Bordered gauze:     []   Secure with Tape    []   Other      [x]   Compression Wrap:          []   Unna Boot    []Multi-Layer    [x]Tubular Bandages       Ligia-Ulcer Care    []   Cream     []   Lotion     []   Ointment     []   Barrier     []   Other:       The patient tolerated the procedure well with no complications. The patient left the exam room in satisfactory and stable condition.      Keith Corrigan DPM

## 2018-06-21 NOTE — IP AVS SNAPSHOT
Summary of Care Report The Summary of Care report has been created to help improve care coordination. Users with access to [employer]) or [employer] [facility] (Web-based application) may access additional patient information including the Discharge Summary. If you are not currently a [address] user and need more information, please call the number listed below in the [place] [address] section and ask to be connected with Medical Records. Facility Information Name Address Phone [facility] [address] [facility] [phone] Patient Information Patient Name Sex  Sherrell Moreno ([id number]) Female 1935 Discharge Information Admitting Provider Service Area Unit  
 (none) [address] [facility] / [phone] Discharge Provider Discharge Date/Time Discharge Disposition Destination (none) (none) (none) (none) Patient Language Language ENGLISH [13] Hospital Problems as of 2018  Reviewed: 2018  9:32 AM by Manjit Ramirez DPM  
  
  
  
 Class Noted - Resolved Last Modified POA Active Problems Skin ulcer of left ankle, with fat layer exposed ([address]  2017 - Present 2018 by Manjit Ramirez DPM Yes Entered by Manjit Ramirez DPM  
  Venous ulcer of ankle, left ([address]  2017 - Present 2018 by Manjit Ramirez DPM Yes Entered by Manjit Ramirez DPM  
  Vascular disease, peripheral ([address]  2017 - Present 2018 by Manjit Ramirez DPM Yes Entered by Manjit Ramirez DPM  
  Type 2 diabetes mellitus with diabetic neuropathy ([address]  1/3/2018 - Present 2018 by Manjit Ramirez DPM Yes Entered by Lore Boyd MD  
  Personal history of noncompliance with medical treatment, presenting hazards to health  2018 - Present 2018 by Manjit Ramirez DPM Yes Entered by Abhishek Finn DPM  
  
Non-Hospital Problems as of 6/21/2018  Reviewed: 6/21/2018  9:32 AM by Abhishek Finn DPM  
  
  
  
 Class Noted - Resolved Last Modified Active Problems CVA, old, hemiparesis ([facility]  8/7/2013 - Present 8/8/2013 Entered by Yara Valentin Type 2 diabetes mellitus ([facility]  8/8/2013 - Present 11/6/2017 by Abril Willams MD  
  Entered by Surinder Jackson MD  
  Abnormal finding on thyroid function test  6/11/2013 - Present 2/2/2017 by Xiao Lanes Entered by Xiao Lanes Acute on chronic renal insufficiency  7/11/2013 - Present 12/3/2017 by Evelina Lobo MD  
  Entered by Xiao Lanes Overview Signed 2/2/2017 11:39 PM by Xiao Lanes Overview:  
 7/8/13 7/10/13 Cr  1.2 1.5 Anemia  6/11/2013 - Present 2/25/2017 by Magdiel Garzon MD  
  Entered by Xiaore Lanes Chronic osteomyelitis of ankle ([facility]  6/11/2013 - Present 2/2/2017 by Xiao Lanes Entered by Xiao Lanes Injury of foot  7/10/2013 - Present 2/2/2017 by Xiao Lanes Entered by Xiao Lanes Leukocytosis  7/11/2013 - Present 2/2/2017 by Xiao Lanes Entered by Xiao Lanes Overview Signed 2/2/2017 11:39 PM by Xiao Lanes Overview: WBC 11.4  P 86 Spinal stenosis of lumbar region  6/11/2013 - Present 2/2/2017 by Xiao Lanes Entered by Xiao Lanes Microscopic hematuria  7/11/2013 - Present 2/2/2017 by Xiao Lanes Entered by Xiao Lanes Rhabdomyolysis  7/11/2013 - Present 2/2/2017 by Xiao Lanes Entered by Xiao Lanes Overview Signed 2/2/2017 11:39 PM by Xiaore Lanes Overview:  
Ck  930, 1053 Vitamin D deficiency  6/11/2013 - Present 2/2/2017 by Xiao Lanes Entered by Xiao Lanes Stroke [facility])  Unknown - Present 2/2/2017 by Xiao Lanes Entered by Xiaore Lanes Hypertension  Unknown - Present 2/2/2017 by Xiaore Lanes Entered by Clifton Wood DM (diabetes mellitus) ([facility]  Unknown - Present 2/2/2017 by Clifton Israel Entered by Clifton Wood Normocytic anemia  Unknown - Present 2/3/2017 by Clifton Israel Entered by Clifton Wood CKD (chronic kidney disease) stage 3, GFR 30-59 ml/min  6/24/2013 - Present 12/3/2017 by Jasvir Johnson MD  
  Entered by Clifton Israel Overview Signed 2/3/2017 12:25 AM by Clifton Wood Worst noted (08/06/13) BUN/sCr: 54/2.49, GFR 20. Elevated TSH  6/25/2012 - Present 2/3/2017 by Clifton Israel Entered by Clifton Israel Overview Signed 2/3/2017 12:29 AM by Clifton Wood 5.08   Iron (Fe) deficiency anemia  2/28/2017 - Present 2/28/2017 by Remo Riedel, MD  
  Entered by Remo Riedel, MD  
  UTI (urinary tract infection)  11/6/2017 - Present 12/3/2017 by Jasvir Johnson MD  
  Entered by Tasha Turner MD  
  Encephalopathy  11/6/2017 - Present 11/6/2017 by Tasha Turner MD  
  Entered by Tasha Turner MD  
  LAURA (acute kidney injury) ([facility]  11/6/2017 - Present 11/6/2017 by Tasha Turner MD  
  Entered by Tasha Turner MD  
  Weak pulse  11/16/2017 - Present 11/16/2017 by Mervin Yoo DPM  
  Entered by Mervin Yoo DPM  
  Syncope  12/3/2017 - Present 12/3/2017 by Jasvir Johnson MD  
  Entered by Daniel Andrade MD  
  Non-pressure chronic ulcer of right ankle with fat layer exposed ([facility]  12/7/2017 - Present 5/17/2018 by Mervin Yoo DPM  
  Entered by Mervin Yoo DPM  
  Type 2 diabetes mellitus with nephropathy ([facility]  1/3/2018 - Present 1/3/2018 by Zoe Laboy MD  
  Entered by Zoe Laboy MD  
  Hypertensive left ventricular hypertrophy, without heart failure  1/15/2018 - Present 1/15/2018 by Zoe Laboy MD  
  Entered by Zoe Laboy MD  
  Atrial arrhythmia  1/15/2018 - Present 1/15/2018 by Zoe Laboy MD  
 Entered by Juan Pablo Choi MD  
  Overview Signed 1/15/2018  4:08 PM by Juan Pablo Choi MD  
   Noted on 12 lead ECG 12/3/2017 Midfoot ulcer, left, with fat layer exposed ([address]  5/17/2018 - Present 5/31/2018 by Joesph Wren DPPTARICE  
  Entered by Joesph Wren DPM  
  Contact dermatitis due to adhesive bandage  5/31/2018 - Present 5/31/2018 by Joesph Wren DPM  
  Entered by Joesph Wren DPM  
  
You are allergic to the following No active allergies Current Discharge Medication List  
  
ASK your doctor about these medications Dose & Instructions Dispensing Information Comments  
 cholecalciferol 1,000 unit tablet Commonly known as:  VITAMIN D3 Dose:  3000 Units Take 3,000 Units by mouth daily. Refills:  0  
   
 gabapentin 300 mg capsule Commonly known as:  NEURONTIN Dose:  1 Cap Take 1 Cap by mouth three (3) times daily. 12/22/16, QTY#90, 30 Days, 2 Refills. Dr. Avelino Freeman Refills:  2 IBUPROFEN PO Dose:  200 mg Take 200 mg by mouth as needed (pain). 2 tabs as needed for pain Refills:  0  
   
 lisinopril-hydroCHLOROthiazide 20-25 mg per tablet Commonly known as:  Adonica Frankel Dose:  1 Tab Take 1 Tab by mouth daily. Refills:  0  
   
 metoprolol tartrate 50 mg tablet Commonly known as:  LOPRESSOR Dose:  50 mg Take 1 Tab by mouth two (2) times a day. Quantity:  60 Tab Refills:  0 Current Immunizations Name Date Influenza Vaccine 10/1/2017, 9/28/2016 Pneumococcal Polysaccharide (PPSV-23) 6/12/2005 Tdap 12/26/2017 Follow-up Information None Discharge Instructions Wound Care Instructions Patient: Joe Brewster MRN: [id number]  SSN: [id number] YOB: 1935  Age:83 y.o. Sex: female Dr. Carolee Ortiz recommends the following discharge instruction: 
 
Vidya Roberts []   Felt/Foam Offloading   [] Removable Cast Waker    
  []   Wedge Shoe   []  Wheelchair  
  []   Total Contact Cast   []  Surgical Shoe  
  []   Crutches        Other Mattress:  
Other:  
 
Edema Control [x]   Elevated legs as much as possible. Recommend above level of heart.  
  []   Layered Wraps             []   Left      []   Right      []  BILATERAL 
        - Type:            []   Chu Moores       []  Multi-Layer []   Two Layers     []  Three Layers   []  Four Layers  
  []   Tubular Bandages        []   Left      []   Right     SIZE:   
  []   Stockings                      []   Left      []   Right  
  []   Compression Pump     []   Left      []   Right   
___ millimeters of mercury 
___ millimeters of mercury for ___ minutes for ___ day(s) Other:  
 
 
Nutritional Supplements [x]  Multi-Vitamin  
  []  Other:   
  
[employer]  
  [x]  Home Health: [employer]  
  []  Long Term Care:   
  []  DME:  
 
Consult 
  []   Nutrition  
  []   Vascular  
  []   Orthotist/Pedorthotist  
  []   Infectious Disease  
  []   Lymphedema Therapist  
Other:  
 
Dressings: 
Another brand of generically equivalent product may be dispensed unless specifically ordered otherwise. Home Ulcer/wound Hygiene (cleanse with)   
  []   Distilled Water  
  [x]   Normal Saline OR [x]   Wound Cleanser []   Mild antimicrobial soap and water  
  []   Chlorhexidine liquid soap and water  
  []   Other:  
 
Apply 
  []   Hydrogel   []  Hypergel   []   Aquacel Ag  
  []   Cadexomer Iodine                     (Iodosorb)   []  Silver Alginate    []   Medihoney:  
  []   Collagenase:Santyl   []  Calcium Alginate   []   Collagen:  
  []   Plain Foam   []  Non-Adherent Contact                Layer   []   Xeroform  
  []   Silver foam   []   Hydrocolloid     
  []   Acticoat   []   Adaptic:   
 
Other/Specific Instructions: THERASKIN GRAFT IN PLACE WITH STERISTRIPS. Please leave Cutimed Sorbact swab, Theraskin, and steri strips in place. Please only change gauze 4 x 4 and roll gauze. THANK YOU! Cover With 
  []   Dry Gauze and Roll Gauze  
  []   Foam and Roll Gauze  
  []   Dry Gauze  
  []   Bordered gauze:  
  []   Foam      []    Bordered         []   Nonbordered  
  []   Secure with Tape  
  []   Other Ligia-Ulcer Care 
  []   Cream  
  [x]   Lotion []   Ointment  
  []   Barrier  
  []   Other:  
 
Change Dressing 
  []   Once Daily  
  []   Every Other Day  
  []   Every 3 Days    
  []   Every 5 Days  
  []   Every 7 Days  
  []   Do Not Change    
  []   2 x per week (Mon and Thurs. ) [x]   3 x per week (Mon, Wed, Fri. ) []   Other Follow-Up: 
 [x]  Follow up 1 week                         
 []  As Needed (PRN) []   Marvin Frances MD  
 []  Sherri Easley DPM  
 [x]  Alex eDl Toro DPM  
[]   Phillip Hammans DPPATRICE  
  
[]   Nurse Visit Please call the wound clinic ([phone]) regarding any questions or concerns. Chart Review Routing History Recipient Method Report Sent By Josefina Lutz MD  
Fax: [phone] Phone: [phone] Fax Provider Comm Report Lynnette Salamanca [[id number]] 5/14/2012 12:49 PM 05/11/2012 Trino Nye MD  
Fax: [phone] Phone: [phone] Fax Provider Comm Report Lynnette Salamanca [[id number]] 5/14/2012 12:49 PM 05/11/2012 Dusty Taylor MD  
Fax: [phone] Phone: [phone] Fax IP Auto Routed Erich Sales MD [[id number]] 2/28/2017  8:28 AM 02/28/2017 Calli Guaman MD  
Fax: [phone] Phone: [phone] Fax IP Auto Routed Erich Sales MD [[id number]] 2/28/2017  8:28 AM 02/28/2017

## 2018-06-21 NOTE — DISCHARGE INSTRUCTIONS
Wound Care Instructions    Patient: Joel Woodard MRN: [id number]  SSN: [id number]     YOB: 1935  Age:83 y.o. Sex: female       MsMaryanne Nicole Ivy, Dr. Diana Gonzales recommends the following discharge instruction:    Trinna Pouch    []   Felt/Foam Offloading   [] Removable Cast Jim Heaps       []   Wedge Shoe   []  Wheelchair     []   Total Contact Cast   []  Surgical Shoe     []   Crutches        Other   Mattress:   Other:     Edema Control    [x]   Elevated legs as much as possible. Recommend above level of heart.     []   Layered Wraps             []   Left      []   Right      []  BILATERAL          - Type:            []   Unna Boot       []  Multi-Layer                                   []   Two Layers     []  Three Layers   []  Four Layers     []   Tubular Bandages        []   Left      []   Right     SIZE:      []   Stockings                      []   Left      []   Right     []   Compression Pump     []   Left      []   Right    ___ millimeters of mercury  ___ millimeters of mercury for ___ minutes for ___ day(s)        Other:       Nutritional Supplements    [x]  Multi-Vitamin     []  Other:       [employer] One     [x]  Home Health: [employer] Insurance     []  Long Term Care:      []  DME:     Consult    []   Nutrition     []   Vascular     []   Orthotist/Pedorthotist     []   Infectious Disease     []   Lymphedema Therapist   Other:     Dressings:  Another brand of generically equivalent product may be dispensed unless specifically ordered otherwise.     Home Ulcer/wound Hygiene (cleanse with)      []   Distilled Water     [x]   Normal Saline OR     [x]   Wound Cleanser     []   Mild antimicrobial soap and water     []   Chlorhexidine liquid soap and water     []   Other:     Apply    []   Hydrogel   []  Hypergel   []   Aquacel Ag     []   Cadexomer Iodine                     (Iodosorb)   []  Silver Alginate    []   Medihoney:     []   Collagenase:Santyl   []  Calcium Alginate   []   Collagen:     []   Plain Foam []  Non-Adherent Contact                Layer   []   Xeroform     []   Silver foam   []   Hydrocolloid        []   Acticoat   []   Adaptic:      Other/Specific Instructions: THERASKIN GRAFT IN PLACE WITH STERISTRIPS. Please leave Cutimed Sorbact swab, Theraskin, and steri strips in place. Please only change gauze 4 x 4 and roll gauze. THANK YOU! Cover With    []   Dry Gauze and Roll Gauze     []   Foam and Roll Gauze     []   Dry Gauze     []   Bordered gauze:     []   Foam      []    Bordered         []   Nonbordered     []   Secure with Tape     []   Other       Ligia-Ulcer Care    []   Cream     [x]   Lotion     []   Ointment     []   Barrier     []   Other:     Change Dressing    []   Once Daily     []   Every Other Day     []   Every 3 Days       []   Every 5 Days     []   Every 7 Days     []   Do Not Change       []   2 x per week (Mon and Thurs. )     [x]   3 x per week (Mon, Wed, Fri. )     []   Other          Follow-Up:   [x]  Follow up 1 week                           []  As Needed (PRN)     []   Donnie Rodarte MD    []  Sam Hendrickson DPM    [x]  Kathy Nelson DPM   []   Nikki Obrien DPM      []   Nurse Visit            Please call the wound clinic ([phone]) regarding any questions or concerns.

## 2018-06-22 ENCOUNTER — HOME CARE VISIT (OUTPATIENT)
Dept: SCHEDULING | Facility: HOME HEALTH | Age: 83
End: 2018-06-22
Payer: MEDICARE

## 2018-06-22 ENCOUNTER — TELEPHONE (OUTPATIENT)
Dept: WOUND CARE | Age: 83
End: 2018-06-22

## 2018-06-22 VITALS
DIASTOLIC BLOOD PRESSURE: 90 MMHG | RESPIRATION RATE: 20 BRPM | TEMPERATURE: 97.2 F | HEART RATE: 61 BPM | SYSTOLIC BLOOD PRESSURE: 116 MMHG | OXYGEN SATURATION: 98 %

## 2018-06-22 PROCEDURE — G0299 HHS/HOSPICE OF RN EA 15 MIN: HCPCS

## 2018-06-22 PROCEDURE — 3331090001 HH PPS REVENUE CREDIT

## 2018-06-22 PROCEDURE — 3331090002 HH PPS REVENUE DEBIT

## 2018-06-22 PROCEDURE — A6223 GAUZE >16<=48 NO W/SAL W/O B: HCPCS

## 2018-06-22 NOTE — TELEPHONE ENCOUNTER
Britany Bryan from [facility] called to inform this nurse that the [facility] fell off of the left foot medial wound and the Cutimed swab fell off of the left medial ankle wound. Instructed the nurse to to apply a collagen dressing to the left foot wound and use a non adherent layer on the medial ankle such as Adaptic or Xeroform. Britany Bryan verbalized understanding. Patient has follow up appointment 6.28.18 in [facility] clinic.

## 2018-06-23 PROCEDURE — 3331090001 HH PPS REVENUE CREDIT

## 2018-06-23 PROCEDURE — 3331090002 HH PPS REVENUE DEBIT

## 2018-06-24 PROCEDURE — 3331090001 HH PPS REVENUE CREDIT

## 2018-06-24 PROCEDURE — 3331090002 HH PPS REVENUE DEBIT

## 2018-06-25 ENCOUNTER — HOME CARE VISIT (OUTPATIENT)
Dept: SCHEDULING | Facility: HOME HEALTH | Age: 83
End: 2018-06-25
Payer: MEDICARE

## 2018-06-25 PROCEDURE — 3331090002 HH PPS REVENUE DEBIT

## 2018-06-25 PROCEDURE — 3331090001 HH PPS REVENUE CREDIT

## 2018-06-25 PROCEDURE — G0299 HHS/HOSPICE OF RN EA 15 MIN: HCPCS

## 2018-06-26 VITALS
RESPIRATION RATE: 20 BRPM | TEMPERATURE: 97.1 F | DIASTOLIC BLOOD PRESSURE: 50 MMHG | OXYGEN SATURATION: 98 % | SYSTOLIC BLOOD PRESSURE: 122 MMHG | HEART RATE: 61 BPM

## 2018-06-26 PROCEDURE — 3331090001 HH PPS REVENUE CREDIT

## 2018-06-26 PROCEDURE — 3331090002 HH PPS REVENUE DEBIT

## 2018-06-27 ENCOUNTER — HOME CARE VISIT (OUTPATIENT)
Dept: SCHEDULING | Facility: HOME HEALTH | Age: 83
End: 2018-06-27
Payer: MEDICARE

## 2018-06-27 PROCEDURE — 3331090002 HH PPS REVENUE DEBIT

## 2018-06-27 PROCEDURE — 3331090001 HH PPS REVENUE CREDIT

## 2018-06-27 PROCEDURE — G0299 HHS/HOSPICE OF RN EA 15 MIN: HCPCS

## 2018-06-28 ENCOUNTER — HOSPITAL ENCOUNTER (OUTPATIENT)
Dept: WOUND CARE | Age: 83
Discharge: HOME | End: 2018-06-28
Payer: MEDICARE

## 2018-06-28 PROCEDURE — 97602 WOUND(S) CARE NON-SELECTIVE: CPT

## 2018-06-28 PROCEDURE — 3331090002 HH PPS REVENUE DEBIT

## 2018-06-28 PROCEDURE — 3331090001 HH PPS REVENUE CREDIT

## 2018-06-28 PROCEDURE — 77030020057 HC DRSG MTRX CLLGN STGN -B

## 2018-06-28 NOTE — WOUND CARE
Patient Name: Nic Christopher    : 1935    MRN: [id number]      Situation: Nurse visit for graft check and dressing change. 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 Theraskin graft placed on 18 by Dr. Ana Barnett. Assessment: Patient is here today accompanied by her family. She denies any problems since last visit. There is a dressing of Kerlix gauze and 4x4s in place on her left foot and ankle. Marymata Mika has been changing her dressing. The dressing was removed carefully, and the wounds were cleansed with NS. The graft is no longer in place on the dorsal foot wound. One steri strip was left in place, but the graft and Cutimed layer were no longer evident. The same for the left medial foot ulcer, which was packed with some dry gauze. The medial ankle wound still had a small amount of graft in place. Plan: Lisa collagen with silver and Cutimed to the left dorsal and medial ulcers. Cutimed and Hydrogel to the left medial ankle where the graft is still present.  Follow up with Dr. Ana Barnett in one week

## 2018-06-29 ENCOUNTER — HOME CARE VISIT (OUTPATIENT)
Dept: SCHEDULING | Facility: HOME HEALTH | Age: 83
End: 2018-06-29
Payer: MEDICARE

## 2018-06-29 VITALS
HEART RATE: 56 BPM | RESPIRATION RATE: 20 BRPM | SYSTOLIC BLOOD PRESSURE: 119 MMHG | TEMPERATURE: 97.4 F | DIASTOLIC BLOOD PRESSURE: 50 MMHG | OXYGEN SATURATION: 98 %

## 2018-06-29 PROCEDURE — 3331090002 HH PPS REVENUE DEBIT

## 2018-06-29 PROCEDURE — G0299 HHS/HOSPICE OF RN EA 15 MIN: HCPCS

## 2018-06-29 PROCEDURE — 3331090001 HH PPS REVENUE CREDIT

## 2018-06-30 PROCEDURE — 3331090002 HH PPS REVENUE DEBIT

## 2018-06-30 PROCEDURE — 3331090001 HH PPS REVENUE CREDIT

## 2018-07-01 PROCEDURE — 3331090001 HH PPS REVENUE CREDIT

## 2018-07-01 PROCEDURE — 3331090002 HH PPS REVENUE DEBIT

## 2018-07-02 ENCOUNTER — HOME CARE VISIT (OUTPATIENT)
Dept: SCHEDULING | Facility: HOME HEALTH | Age: 83
End: 2018-07-02
Payer: MEDICARE

## 2018-07-02 VITALS
OXYGEN SATURATION: 98 % | TEMPERATURE: 97.7 F | HEART RATE: 51 BPM | RESPIRATION RATE: 20 BRPM | SYSTOLIC BLOOD PRESSURE: 143 MMHG | DIASTOLIC BLOOD PRESSURE: 54 MMHG

## 2018-07-02 PROCEDURE — G0299 HHS/HOSPICE OF RN EA 15 MIN: HCPCS

## 2018-07-02 PROCEDURE — 3331090002 HH PPS REVENUE DEBIT

## 2018-07-02 PROCEDURE — 3331090001 HH PPS REVENUE CREDIT

## 2018-07-03 PROCEDURE — 3331090002 HH PPS REVENUE DEBIT

## 2018-07-03 PROCEDURE — 3331090001 HH PPS REVENUE CREDIT

## 2018-07-04 PROCEDURE — 3331090002 HH PPS REVENUE DEBIT

## 2018-07-04 PROCEDURE — 3331090001 HH PPS REVENUE CREDIT

## 2018-07-05 ENCOUNTER — HOSPITAL ENCOUNTER (OUTPATIENT)
Dept: WOUND CARE | Age: 83
Discharge: HOME | End: 2018-07-05
Payer: MEDICARE

## 2018-07-05 VITALS
TEMPERATURE: 96.9 F | HEART RATE: 60 BPM | RESPIRATION RATE: 18 BRPM | DIASTOLIC BLOOD PRESSURE: 63 MMHG | SYSTOLIC BLOOD PRESSURE: 165 MMHG | OXYGEN SATURATION: 99 %

## 2018-07-05 PROCEDURE — 3331090002 HH PPS REVENUE DEBIT

## 2018-07-05 PROCEDURE — 74011250637 HC RX REV CODE- 250/637

## 2018-07-05 PROCEDURE — 3331090001 HH PPS REVENUE CREDIT

## 2018-07-05 PROCEDURE — 77030020057 HC DRSG MTRX CLLGN STGN -B: Performed by: PODIATRIST

## 2018-07-05 PROCEDURE — 97602 WOUND(S) CARE NON-SELECTIVE: CPT

## 2018-07-05 RX ORDER — TRIAMCINOLONE ACETONIDE 5 MG/G
CREAM TOPICAL
Status: COMPLETED
Start: 2018-07-05 | End: 2018-07-05

## 2018-07-05 RX ADMIN — TRIAMCINOLONE ACETONIDE: 5 CREAM TOPICAL at 10:38

## 2018-07-05 NOTE — IP AVS SNAPSHOT
Summary of Care Report The Summary of Care report has been created to help improve care coordination. Users with access to [employer] or [address] (Web-based application) may access additional patient information including the Discharge Summary. If you are not currently a [address] user and need more information, please call the number listed below in the [place] [address] section and ask to be connected with Medical Records. Facility Information Name Address Phone [address] [facility] [address] [phone] Patient Information Patient Name Sex  Kathreen Merlin ([id number]) Female 1935 Discharge Information Admitting Provider Service Area Unit  
 (none) [address] [facility] / [phone] Discharge Provider Discharge Date/Time Discharge Disposition Destination (none) (none) (none) (none) Patient Language Language ENGLISH [13] Hospital Problems as of 2018  Reviewed: 2018 10:41 AM by Rhoda Moraes DPM  
  
  
  
 Class Noted - Resolved Last Modified POA Active Problems Skin ulcer of left ankle, with fat layer exposed ([address]  2017 - Present 2018 by Rhoda Moraes DPM Yes Entered by Rhoda Moraes DPM  
  Venous ulcer of ankle, left ([address]  2017 - Present 2018 by Rhoda Moraes DPPATRICE Yes Entered by Rhoda Moraes DPM  
  Vascular disease, peripheral ([address]  2017 - Present 2018 by Rhoda Moraes DPPATRICE Yes Entered by Rhoda Moraes DPM  
  Type 2 diabetes mellitus with diabetic neuropathy ([address]  1/3/2018 - Present 2018 by Rhoda Moraes DPM Yes Entered by Beata Cody MD  
  Midfoot ulcer, left, with fat layer exposed ([address]  2018 - Present 2018 by Rhoda Moraes DPPATRICE Yes   Entered by Rhoda Moraes DPM  
 Personal history of noncompliance with medical treatment, presenting hazards to health  5/31/2018 - Present 7/5/2018 by Ralf Ramirez DPM Yes Entered by Ralf Ramirez DPM  
  Contact dermatitis due to adhesive bandage  5/31/2018 - Present 7/5/2018 by Ralf Ramirez DPM Yes Entered by Ralf Ramirez DPM  
  
Non-Hospital Problems as of 7/5/2018  Reviewed: 7/5/2018 10:41 AM by Ralf Ramirez DPM  
  
  
  
 Class Noted - Resolved Last Modified Active Problems CVA, old, hemiparesis ([facility]  8/7/2013 - Present 8/8/2013 Entered by Bobbi Ernandez Type 2 diabetes mellitus ([facility]  8/8/2013 - Present 11/6/2017 by Gage Lozano MD  
  Entered by Jennifer Boo MD  
  Abnormal finding on thyroid function test  6/11/2013 - Present 2/2/2017 by Rain August Entered by Rain August Acute on chronic renal insufficiency  7/11/2013 - Present 12/3/2017 by Brice Meigs, MD  
  Entered by Rain August Overview Signed 2/2/2017 11:39 PM by Rain August Overview:  
 7/8/13 7/10/13 Cr  1.2 1.5 Anemia  6/11/2013 - Present 2/25/2017 by Jaja Pride MD  
  Entered by Rain August Chronic osteomyelitis of ankle ([facility]  6/11/2013 - Present 2/2/2017 by Rain August Entered by Rain August Injury of foot  7/10/2013 - Present 2/2/2017 by Rain August Entered by Rain August Leukocytosis  7/11/2013 - Present 2/2/2017 by Rain August Entered by Rain August Overview Signed 2/2/2017 11:39 PM by Rain August Overview: WBC 11.4  P 86 Spinal stenosis of lumbar region  6/11/2013 - Present 2/2/2017 by Rain August Entered by Rain August Microscopic hematuria  7/11/2013 - Present 2/2/2017 by Rain August Entered by Rain August Rhabdomyolysis  7/11/2013 - Present 2/2/2017 by Rain August Entered by Rain August Overview Signed 2/2/2017 11:39 PM by Rain August Overview:  
Ck  930, 1053 Vitamin D deficiency  6/11/2013 - Present 2/2/2017 by Juhi Light Entered by Juhi Light Stroke [facility])  Unknown - Present 2/2/2017 by Juhi Light Entered by Juhi Light Hypertension  Unknown - Present 2/2/2017 by Juhi Light Entered by Juhi Light DM (diabetes mellitus) ([facility]  Unknown - Present 2/2/2017 by Juhi Light Entered by Juhi Light Normocytic anemia  Unknown - Present 2/3/2017 by Juhi Light Entered by Juhi Light CKD (chronic kidney disease) stage 3, GFR 30-59 ml/min  6/24/2013 - Present 12/3/2017 by Zeferino Downs MD  
  Entered by Juhi Light Overview Signed 2/3/2017 12:25 AM by Juhi Blair Worst noted (08/06/13) BUN/sCr: 54/2.49, GFR 20. Elevated TSH  6/25/2012 - Present 2/3/2017 by Juhi Light Entered by Juhi Light Overview Signed 2/3/2017 12:29 AM by Juhi Light 5.08   Iron (Fe) deficiency anemia  2/28/2017 - Present 2/28/2017 by Hitesh Ross MD  
  Entered by Hitesh Ross MD  
  UTI (urinary tract infection)  11/6/2017 - Present 12/3/2017 by Zeferino Downs MD  
  Entered by Marilyn Wilson MD  
  Encephalopathy  11/6/2017 - Present 11/6/2017 by Marilyn Wilson MD  
  Entered by Marilyn Wilson MD  
  LAURA (acute kidney injury) ([facility]  11/6/2017 - Present 11/6/2017 by Marilyn Wilson MD  
  Entered by Marilyn Wilson MD  
  Weak pulse  11/16/2017 - Present 11/16/2017 by Fritz Snatiago DPM  
  Entered by Fritz Santiago DPM  
  Syncope  12/3/2017 - Present 12/3/2017 by Zeferino Downs MD  
  Entered by Gentry Gama MD  
  Non-pressure chronic ulcer of right ankle with fat layer exposed ([facility]  12/7/2017 - Present 5/17/2018 by Fritz Santiago DPM  
  Entered by Fritz Santiago DPM  
  Type 2 diabetes mellitus with nephropathy ([facility]  1/3/2018 - Present 1/3/2018 by Ranjit Bruce MD  
  Entered by Ranjit Bruce MD  
  Hypertensive left ventricular hypertrophy, without heart failure  1/15/2018 - Present 1/15/2018 by Ranjit Bruce MD  
  Entered by Ranjit Bruce MD  
  Atrial arrhythmia  1/15/2018 - Present 1/15/2018 by Ranjit Bruce MD  
  Entered by Ranjit Bruce MD  
  Overview Signed 1/15/2018  4:08 PM by Ranjit Bruce MD  
   Noted on 12 lead ECG 12/3/2017 You are allergic to the following No active allergies Current Discharge Medication List  
  
ASK your doctor about these medications Dose & Instructions Dispensing Information Comments  
 cholecalciferol 1,000 unit tablet Commonly known as:  VITAMIN D3 Dose:  3000 Units Take 3,000 Units by mouth daily. Refills:  0  
   
 gabapentin 300 mg capsule Commonly known as:  NEURONTIN Dose:  1 Cap Take 1 Cap by mouth three (3) times daily. 12/22/16, QTY#90, 30 Days, 2 Refills. Dr. Vidal Parsons Refills:  2 IBUPROFEN PO Dose:  200 mg Take 200 mg by mouth as needed (pain). 2 tabs as needed for pain Refills:  0  
   
 lisinopril-hydroCHLOROthiazide 20-25 mg per tablet Commonly known as:  Birder Haste Dose:  1 Tab Take 1 Tab by mouth daily. Refills:  0  
   
 metoprolol tartrate 50 mg tablet Commonly known as:  LOPRESSOR Dose:  50 mg Take 1 Tab by mouth two (2) times a day. Quantity:  60 Tab Refills:  0 Current Immunizations Name Date Influenza Vaccine 10/1/2017, 9/28/2016 Pneumococcal Polysaccharide (PPSV-23) 6/12/2005 Tdap 12/26/2017 Follow-up Information None Discharge Instructions Wound Care Instructions Patient: Ramírez Nunez MRN: [id number]  SSN: [id number] YOB: 1935  Age:83 y.o. Sex: female Ms. MendesWoodruff,Dr. Alberto Corcoran recommends the following discharge instruction: 
 
David Foote []   Felt/Foam Offloading   [] Removable Cast Waker    
  []   Wedge Shoe   []  Wheelchair  
  []   Total Contact Cast   []  Surgical Shoe  
  []   Crutches        Other Mattress:  
Other:  
 
Edema Control 
  []   Elevated legs as much as possible. Recommend above level of heart.  
  []   Layered Wraps             []   Left      []   Right      []  BILATERAL 
        - Type:            []   Helen Shouts       []  Multi-Layer []   Two Layers     []  Three Layers   []  Four Layers  
  []   Tubular Bandages        []   Left      []   Right     SIZE:   
  []   Stockings                      []   Left      []   Right  
  []   Compression Pump     []   Left      []   Right   
___ millimeters of mercury 
___ millimeters of mercury for ___ minutes for ___ day(s) Other:  
 
 
Nutritional Supplements 
  []  Multi-Vitamin  
  []  Other:   
  
[employer]  
  [x]  Home Health: [facility]  
  []  Long Term Care:   
  []  DME:  
 
Consult 
  []   Nutrition  
  []   Vascular  
  []   Orthotist/Pedorthotist  
  []   Infectious Disease  
  []   Lymphedema Therapist  
Other:  
 
Dressings: 
Another brand of generically equivalent product may be dispensed unless specifically ordered otherwise. Home Ulcer/wound Hygiene (cleanse with)   
  []   Distilled Water  
  [x]   Normal Saline  
  []   Wound Cleanser   []   Mild antimicrobial soap and water  
  []   Chlorhexidine liquid soap and water  
  []   Other:  
 
Apply 
  []   Hydrogel   []  Hypergel   []   Aquacel Ag  
  []   Cadexomer Iodine                     (Iodosorb)   []  Silver Alginate    []   Medihoney:  
  []   Collagenase:Santyl   []  Calcium Alginate   [x]   Collagen: Lisa  
  []   Plain Foam   []  Non-Adherent Contact                Layer   []   Xeroform  
  []   Silver foam   []   Hydrocolloid     
  []   Acticoat   []   Adaptic:   
 
Other/Specific Instructions: Cut an ABD to fit over the left foot and ankle wounds. Secure the ABD with Hypafix tape only. Cover With 
  []   Dry Gauze and Roll Gauze  
  []   Foam and Roll Gauze  
  []   Dry Gauze  
  []   Bordered gauze:  
  []   Foam      []    Bordered         []   Nonbordered  
  [x]  ABD pad (cut to fit)  Secure with HypafixTape  
  []   Other Ligia-Ulcer Care 
  []   Cream  
  []   Lotion  
  [x]   Ointment - Triamcinolone  
  []   Barrier  
  []   Other:  
 
Change Dressing 
  []   Once Daily [x]   Every Other Day  
  []   Every 3 Days    
  []   Every 5 Days  
  []   Every 7 Days  
  []   Do Not Change    
  []   2 x per week (Mon and Thurs. )  
  []   3 x per week (Mon, Wed, Fri. ) []   Other Follow-Up: 
 [x]  Follow up                            
 []  As Needed (PRN) []   Teri Parker MD  
 []  CONSTANZA BryanM  
 [x]  Alfred Delatorre DPM  
[]   Livia Swain DPM  
  
[]   Nurse Visit Please call the wound clinic ([phone]) regarding any questions or concerns. Chart Review Routing History Recipient Method Report Sent By Zoe Alan MD  
Fax: [phone] Phone: [phone] Fax Provider Comm Report Vernard Bernheim [[id number]] 5/14/2012 12:49 PM 05/11/2012 Livan Holliday MD  
Fax: [phone] Phone: [phone] Fax Provider Comm Report Vernard Bernheim [[id number]] 5/14/2012 12:49 PM 05/11/2012 Ginger Figueroa MD  
Fax: [phone] Phone: [phone] Fax IP Auto Routed Stefan Raymond MD [[id number]] 2/28/2017  8:28 AM 02/28/2017 Nydia Cueto MD  
Fax: [phone] Phone: [phone] Fax IP Auto Routed Stefan Raymond MD [[id number]] 2/28/2017  8:28 AM 02/28/2017

## 2018-07-05 NOTE — DISCHARGE INSTRUCTIONS
Wound Care Instructions    Patient: Sailaja Hernandez MRN: [id number]  SSN: [id number]     YOB: 1935  Age:83 y.o. Sex: female       Ms. Bro,Dr. Ben Locke recommends the following discharge instruction:    Offloading    []   Felt/Foam Offloading   [] Removable Cast Waker       []   Wedge Shoe   []  Wheelchair     []   Total Contact Cast   []  Surgical Shoe     []   Crutches        Other   Mattress:   Other:     Edema Control    []   Elevated legs as much as possible. Recommend above level of heart.     []   Layered Wraps             []   Left      []   Right      []  BILATERAL          - Type:            []   Unna Boot       []  Multi-Layer                                   []   Two Layers     []  Three Layers   []  Four Layers     []   Tubular Bandages        []   Left      []   Right     SIZE:      []   Stockings                      []   Left      []   Right     []   Compression Pump     []   Left      []   Right    ___ millimeters of mercury  ___ millimeters of mercury for ___ minutes for ___ day(s)        Other:       Nutritional Supplements    []  Multi-Vitamin     []  Other:       [employer]     [x]  Home Health: Petar Yap     []  Long Term Care:      []  DME:     Consult    []   Nutrition     []   Vascular     []   Orthotist/Pedorthotist     []   Infectious Disease     []   Lymphedema Therapist   Other:     Dressings:  Another brand of generically equivalent product may be dispensed unless specifically ordered otherwise.     Home Ulcer/wound Hygiene (cleanse with)      []   Distilled Water     [x]   Normal Saline     []   Wound Cleanser     []   Mild antimicrobial soap and water     []   Chlorhexidine liquid soap and water     []   Other:     Apply    []   Hydrogel   []  Hypergel   []   Aquacel Ag     []   Cadexomer Iodine                     (Iodosorb)   []  Silver Alginate    []   Medihoney:     []   Collagenase:Santyl   []  Calcium Alginate   [x]   Collagen: Lisa     []   Plain Foam   []  Non-Adherent Contact                Layer   []   Xeroform     []   Silver foam   []   Hydrocolloid        []   Acticoat   []   Adaptic:      Other/Specific Instructions: Cut an ABD to fit over the left foot and ankle wounds. Secure the ABD with Hypafix tape only. Cover With    []   Dry Gauze and Roll Gauze     []   Foam and Roll Gauze     []   Dry Gauze     []   Bordered gauze:     []   Foam      []    Bordered         []   Nonbordered     [x]  ABD pad (cut to fit)  Secure with HypafixTape     []   Other       Ligia-Ulcer Care    []   Cream     []   Lotion     [x]   Ointment - Triamcinolone     []   Barrier     []   Other:     Change Dressing    []   Once Daily     [x]   Every Other Day     []   Every 3 Days       []   Every 5 Days     []   Every 7 Days     []   Do Not Change       []   2 x per week (Mon and Thurs. )     []   3 x per week (Mon, Wed, Fri. )     []   Other          Follow-Up:   [x]  Follow up                              []  As Needed (PRN)     []   Anastasiia Harper MD    []  Jose Lantigua DPM    [x]  Daniel Olson DPM   []   Akbar Negro DPM      []   Nurse Visit            Please call the wound clinic ([phone]) regarding any questions or concerns.

## 2018-07-05 NOTE — WOUND CARE
Patient Name: Ratna Leija    : 1935    MRN: [id number]      Situation: Follow up visit with Dr. Kenton Guy. Background: Non-healing left lower ext ulcers. Assessment: Patient has a gauze dressing in place which was placed by the home care nurse. She states the gauze is irritating the skin around her wounds. The gauze was removed and the wounds were cleansed with NS. The skin around the fazal wound is inflamed and red. She states it \"feels sore\". Her [facility] nurse has been using the Triamcinolone cream rx'd by Dr. Kenton Guy on the irritation, and it has helped, but the dressing has not been changed for two days. Today, instead of gauze, a dressing of Lisa, Cutimed, an ABD pad (cut to fit) and Hypafix tape was used, in an effort to prevent more irritation to the area. The patient states \"It already feels better. \"    Plan: Continue with the same dressings. Orders will be sent to [facility] nurse. She will be seeing Dr. Merlin Maid for vascular intervention on 18. She will call after the procedure for appoinment with Dr. Kenton Guy, if necessary. Explained to the patient that she may be following up with her vascular surgeon for a while after her procedure. Patient will keep us updated.

## 2018-07-05 NOTE — PROGRESS NOTES
Podiatry Surgery Progress Note      Patient: Keisha Crum MRN: [id number]  SSN: [id number]    YOB: 1935  Age: 81 y.o. Sex: female      Assessment:     Patient Active Problem List   Diagnosis Code    CVA, old, hemiparesis ([facility] I69.359    Type 2 diabetes mellitus ([facility] E11.9    Abnormal finding on thyroid function test R94.6    Acute on chronic renal insufficiency N28.9, N18.9    Anemia D64.9    Chronic osteomyelitis of ankle ([facility]) M86.679    Injury of foot S99.929A    Leukocytosis D72.829    Spinal stenosis of lumbar region M48.061    Microscopic hematuria R31.29    Rhabdomyolysis M62.82    Vitamin D deficiency E55.9    Stroke ([facility]) I63.9    Hypertension I10    DM (diabetes mellitus) ([facility]) E11.9    Normocytic anemia D64.9    CKD (chronic kidney disease) stage 3, GFR 30-59 ml/min N18.3    Elevated TSH R94.6    Iron (Fe) deficiency anemia D50.9    UTI (urinary tract infection) N39.0    Encephalopathy G93.40    LAURA (acute kidney injury) ([facility]) N17.9    Skin ulcer of left ankle, with fat layer exposed ([facility] L97.322    Venous ulcer of ankle, left ([facility]) I83.023, L97.329    Weak pulse R09.89    Syncope R55    Vascular disease, peripheral ([facility]) I73.9    Non-pressure chronic ulcer of right ankle with fat layer exposed ([facility] L97.312    Type 2 diabetes mellitus with nephropathy ([facility]) E11.21    Type 2 diabetes mellitus with diabetic neuropathy ([facility]) E11.40    Hypertensive left ventricular hypertrophy, without heart failure I11.9    Atrial arrhythmia I49.8    Midfoot ulcer, left, with fat layer exposed ([facility] L97.422    Personal history of noncompliance with medical treatment, presenting hazards to health Z91.19    Contact dermatitis due to adhesive bandage L25.8          Plan: Will hold 2nd Theraskin application until after the vascular procedure on 07/19/18. Will use staples to graft at that time. Will start [facility] with Lisa and gauze.   Will apply Triacinolone cream to the periwound Q 48 hrs    Total time spent with patient: 30 5 [address] discussed with: Patient, Family and Nursing Staff    Discussed:  Care Plan and home health    Disposition:  Stable      Ms. Andrea Draper is a 81 y.o. female who was admitted for chronic ulcer care left foot and ankle. She relates she will have an Angio performed 07/19/18 to [facility]. There is a return of the blockage. She states the theraskin graft was removed. Subjective:   Past Medical History  Past Medical History:   Diagnosis Date    CAD (coronary artery disease)     Chronic osteomyelitis of ankle (HCC)     CKD (chronic kidney disease) stage 3, GFR 30-59 ml/min 06/24/2013    Worst noted (08/06/13) BUN/sCr: 54/2.49, GFR 20.     DM (diabetes mellitus) ([facility])     Elevated TSH 06/25/2012    5.08     Hypertension     Leukocytosis     Lumbar spinal stenosis     Normocytic anemia     Rhabdomyolysis     Splenomegaly     Stroke ([facility])     Vitamin D deficiency      Social History     Social History    Marital status:      Spouse name: N/A    Number of children: N/A    Years of education: N/A     Occupational History    Not on file. Social History Main Topics    Smoking status: Never Smoker    Smokeless tobacco: Never Used    Alcohol use No    Drug use: No    Sexual activity: No     Other Topics Concern    Not on file     Social History Narrative       Current Medications  Current Outpatient Prescriptions   Medication Sig Dispense Refill    IBUPROFEN PO Take 200 mg by mouth as needed (pain). 2 tabs as needed for pain      metoprolol tartrate (LOPRESSOR) 50 mg tablet Take 1 Tab by mouth two (2) times a day. 60 Tab 0    gabapentin (NEURONTIN) 300 mg capsule Take 1 Cap by mouth three (3) times daily. 12/22/16, QTY#90, 30 Days, 2 Refills. Dr. Jimmie Serrato  2    cholecalciferol (VITAMIN D3) 1,000 unit tablet Take 3,000 Units by mouth daily.       lisinopril-hydrochlorothiazide (PRINZIDE, ZESTORETIC) 20-25 mg per tablet Take 1 Tab by mouth daily. Patient Allergies  No Known Allergies       Objective:   General Exam  alert, cooperative, no distress, appears stated age    Sarah Beth Passer  Visit Vitals    /63 (BP 1 Location: Right arm, BP Patient Position: Sitting)    Pulse 60    Temp 96.9 °F (36.1 °C)    Resp 18    SpO2 99%       REVIEW OF SYSTEMS:  General: denies chronic fatigue, weight loss, fever, anemia, bruising, depression, nervousness, panic attacks  HEENT: denies ringing in ears, ear infections, dizzy spells, poor vision, glaucoma, sinus trouble, hoarseness, eye infections  GI: denies diarrhea, gas, bloating, heartburn, regurgitation, difficulty swallowing, painful swallowing, nausea, vomiting, constipation, abdominal pain, decreased appetite, blood in stools, black stools, jaundice, dark urine  Lungs: denies pneumonia, asthma, cough, SOB, hemoptysis  Heart: denies chest pain, irregular heart beat, ankle swelling, HTN  Skin: denies rashes, hives, allergic reaction  Urinary: denies UTI, kidney stones, decreased urine force and flow, urination at night, blood in urine, painful urination  Bones and Joints: denies arthritis, rheumatism, back pain, gout, osteoporosis  Neurologic: denies stroke, seizures, headaches, numbness, tingling    Foot Exam  VASCULAR EXAM:. Pedal pulses are trace palpable 1/4 DP and non palpable 0/4 PT right and left foot. Skin temperature is warm to warm right and left foot. Digital capillary fill time is 5 seconds right and left foot. There is still edema of the right and left foot and ankle. Pedal hair is not noted bilateral       NEUROLOGICAL EXAM:. Sensation Intact with 5.07g monofilament wire right and left foot.  Deep tendon reflexes intact and symmetrical on the right and left foot.      MUSCULOSKELETAL EXAM:. Muscle tone is normal.  Muscle strength of the flexor and extensor group inversion and eversion Bilateral. 4/5.         DERMATOLOGICAL EXAM:. Skin is of abnormal texture and turgor with atrophic skin changes noting decrease hair growth, nail changes (thickening), Bilateral. There is still noted multiple lesions over the dorsal left foot and medial aspect of the left ankle with 90% granulation tissue noted. Noted measurements below with increase in granulation tissue noted to the wound beds. There is returning erythematous rash to the periwound dorsal lesion    Wound Ankle Left;Medial;Proximal (Active)   DRESSING STATUS Removed 7/5/2018 10:27 AM   DRESSING TYPE Other (Comment) 7/5/2018 10:27 AM   Non-Pressure Injury Full thickness (subcut/muscle) 6/28/2018  8:53 AM   Wound Length (cm) 2.7 cm 6/28/2018  8:53 AM   Wound Width (cm) 1.4 cm 6/28/2018  8:53 AM   Wound Depth (cm) 0.2 6/28/2018  8:53 AM   Wound Surface area (cm^2) 3.78 cm^2 6/28/2018  8:53 AM   Condition of Base [place];Slough 6/28/2018  8:53 AM   Condition of Edges Open 6/28/2018  8:53 AM   Tissue Type Pink;Yellow 6/28/2018  8:53 AM   Tissue Type Percent Pink 60 6/28/2018  8:53 AM   Tissue Type Percent Red 5 11/16/2017 10:08 AM   Tissue Type Percent Yellow 40 6/28/2018  8:53 AM   Drainage Amount  Large 7/5/2018 10:27 AM   Drainage Color Serosanguinous 7/5/2018 10:27 AM   Wound Odor None 6/28/2018  8:53 AM   Periwound Skin Condition Edematous; Erythema, blanchable 6/28/2018  8:53 AM   Cleansing and Cleansing Agents  Normal saline 6/28/2018  8:53 AM   Dressing Type Applied Other (Comment) 6/28/2018  8:53 AM   Procedure Tolerated Well 6/28/2018  8:53 AM   Number of days:231       Wound Foot Left;Medial;Distal (Active)   DRESSING STATUS Removed 7/5/2018 10:27 AM   DRESSING TYPE Other (Comment) 7/5/2018 10:27 AM   Non-Pressure Injury Full thickness (subcut/muscle) 6/28/2018  8:53 AM   Wound Length (cm) 1.3 cm 6/28/2018  8:53 AM   Wound Width (cm) 0.5 cm 6/28/2018  8:53 AM   Wound Depth (cm) 0.4 6/28/2018  8:53 AM   Wound Surface area (cm^2) 0.65 cm^2 6/28/2018  8:53 AM   Condition of Base [place];Slough 6/28/2018 8:53 AM   Condition of Edges Open 6/28/2018  8:53 AM   Tissue Type Pink;Yellow 6/28/2018  8:53 AM   Tissue Type Percent Pink 70 6/28/2018  8:53 AM   Tissue Type Percent Yellow 30 6/28/2018  8:53 AM   Drainage Amount  Large 7/5/2018 10:27 AM   Drainage Color Serosanguinous 7/5/2018 10:27 AM   Wound Odor None 6/28/2018  8:53 AM   Periwound Skin Condition Edematous; Erythema, blanchable 6/28/2018  8:53 AM   Cleansing and Cleansing Agents  Normal saline 6/28/2018  8:53 AM   Dressing Type Applied Other (Comment) 6/28/2018  8:53 AM   Procedure Tolerated Well 6/21/2018  8:46 AM   Number of days:213       Wound Leg Lower Anterior;Left;Medial (Active)   Number of days:213       Wound Foot Left;Dorsal (Active)   DRESSING STATUS Removed 7/5/2018 10:27 AM   DRESSING TYPE Gauze 7/5/2018 10:27 AM   Non-Pressure Injury Full thickness (subcut/muscle) 7/5/2018 10:27 AM   Wound Length (cm) 1.6 cm 6/28/2018  8:53 AM   Wound Width (cm) 1.1 cm 6/28/2018  8:53 AM   Wound Depth (cm) 0.2 6/28/2018  8:53 AM   Wound Surface area (cm^2) 1.76 cm^2 6/28/2018  8:53 AM   Condition of Base [place];Slough 6/28/2018  8:53 AM   Condition of Edges Open 6/28/2018  8:53 AM   Tissue Type Pink;Yellow 6/28/2018  8:53 AM   Tissue Type Percent Pink 50 6/28/2018  8:53 AM   Tissue Type Percent Yellow 50 6/28/2018  8:53 AM   Drainage Amount  Small  7/5/2018 10:27 AM   Drainage Color Serous 7/5/2018 10:27 AM   Wound Odor None 6/28/2018  8:53 AM   Periwound Skin Condition Edematous; Erythema, blanchable 6/28/2018  8:53 AM   Cleansing and Cleansing Agents  Normal saline 6/28/2018  8:53 AM   Dressing Type Applied Other (Comment) 6/28/2018  8:53 AM   Procedure Tolerated Well 6/28/2018  8:53 AM   Number of days:49       [REMOVED] Wound Ankle Left (Removed)   Removed 06/11/18    Number of [id number]       [REMOVED] Wound Ankle Left (Removed)   Removed 06/11/18    Number of [id number]       [REMOVED] Wound Ankle Left (Removed)   Removed 06/11/18    Number of [id number] [REMOVED] Wound Ankle Left;Lateral (Removed)   Removed 06/11/18    Number of days:470       [REMOVED] Wound Ankle Right (Removed)   Removed 06/11/18    DRESSING STATUS Removed 12/21/2017  9:42 AM   DRESSING TYPE Other (Comment) 12/21/2017  9:42 AM   Non-Pressure Injury Partial thickness (epider/derm) 12/21/2017  9:42 AM   Wound Length (cm) 0.7 cm 12/21/2017  9:42 AM   Wound Width (cm) 0.8 cm 12/21/2017  9:42 AM   Wound Depth (cm) 0.1 12/21/2017  9:42 AM   Wound Surface area (cm^2) 0.06 cm^2 12/21/2017  9:42 AM   Condition of Base [facility] 12/21/2017  9:42 AM   Condition of Edges Open 12/21/2017  9:42 AM   Tissue Type Yellow 12/21/2017  9:42 AM   Tissue Type Percent Red 50 11/16/2017 10:08 AM   Tissue Type Percent Yellow 100 12/21/2017  9:42 AM   Drainage Amount  Moderate 12/21/2017  9:42 AM   Drainage Color Serous 12/21/2017  9:42 AM   Wound Odor None 12/21/2017  9:42 AM   Periwound Skin Condition Erythema, blanchable 12/21/2017  9:42 AM   Cleansing and Cleansing Agents  Dermal wound cleanser; Soap and water 12/21/2017  9:42 AM   Dressing Type Applied Other (Comment) 12/21/2017  9:42 AM   Procedure Tolerated Well 12/21/2017  9:42 AM   Number of days:470       [REMOVED] Wound Ankle Lateral;Right (Removed)   Removed 06/11/18    Number of days:215       [REMOVED] Wound Ankle Left;Lateral (Removed)   Removed 06/11/18    Number of days:215       [REMOVED] Wound Ankle Left;Medial (Removed)   Removed 06/25/18    DRESSING STATUS Removed 6/21/2018  8:46 AM   DRESSING TYPE Other (Comment) 6/21/2018  8:46 AM   Non-Pressure Injury Full thickness (subcut/muscle) 6/21/2018  8:46 AM   Wound Length (cm) 1.4 cm 6/21/2018  8:46 AM   Wound Width (cm) 0.6 cm 6/21/2018  8:46 AM   Wound Depth (cm) 0.2 6/21/2018  8:46 AM   Wound Surface area (cm^2) 0.84 cm^2 6/21/2018  8:46 AM   Condition of Base [place];Slough 6/21/2018  8:46 AM   Condition of Edges Open 6/21/2018  8:46 AM   Tissue Type Pink;Yellow 6/21/2018  8:46 AM   Tissue Type Percent [place] 20 6/21/2018  8:46 AM   Tissue Type Percent Red 80 5/31/2018  8:29 AM   Tissue Type Percent Yellow 80 6/21/2018  8:46 AM   Drainage Amount  Small  6/21/2018  8:46 AM   Drainage Color Serosanguinous 6/21/2018  8:46 AM   Wound Odor None 6/21/2018  8:46 AM   Periwound Skin Condition Edematous; Erythema, blanchable 6/21/2018  8:46 AM   Cleansing and Cleansing Agents  Dermal wound cleanser 6/21/2018  8:46 AM   Dressing Type Applied Other (Comment) 6/21/2018  8:46 AM   Procedure Tolerated Well 6/21/2018  8:46 AM   Number of days:229       [REMOVED] Wound Ankle Left;Medial;Distal (Removed)   Removed 06/25/18    DRESSING STATUS Removed 6/21/2018  8:46 AM   DRESSING TYPE Other (Comment) 6/21/2018  8:46 AM   Non-Pressure Injury Full thickness (subcut/muscle) 6/21/2018  8:46 AM   Wound Length (cm) 1.1 cm 6/21/2018  8:46 AM   Wound Width (cm) 0.6 cm 6/21/2018  8:46 AM   Wound Depth (cm) 0.2 6/21/2018  8:46 AM   Wound Surface area (cm^2) 0.66 cm^2 6/21/2018  8:46 AM   Condition of Base [place];Slough 6/21/2018  8:46 AM   Condition of Edges Open 6/21/2018  8:46 AM   Epithelialization (%) 100 5/17/2018  8:44 AM   Tissue Type Pink;Yellow 6/21/2018  8:46 AM   Tissue Type Percent Pink 50 6/21/2018  8:46 AM   Tissue Type Percent Yellow 50 6/21/2018  8:46 AM   Tissue Type Percent Other (comment) 40 2/15/2018 10:52 AM   Drainage Amount  Scant 6/21/2018  8:46 AM   Drainage Color Serosanguinous 6/21/2018  8:46 AM   Wound Odor None 6/21/2018  8:46 AM   Periwound Skin Condition Edematous; Erythema, blanchable;Petechiae 6/21/2018  8:46 AM   Cleansing and Cleansing Agents  Dermal wound cleanser 6/21/2018  8:46 AM   Dressing Type Applied Other (Comment) 6/21/2018  8:46 AM   Procedure Tolerated Well 6/21/2018  8:46 AM   Number of days:221       [REMOVED] Wound Toe Left (Removed)   Removed 06/11/18    DRESSING TYPE Open to air 12/7/2017  9:56 AM   Non-Pressure Injury Partial thickness (epider/derm) 11/16/2017 10:08 AM   Wound Length (cm) 0.6 cm 11/16/2017 10:08 AM   Wound Width (cm) 1.4 cm 11/16/2017 10:08 AM   Wound Depth (cm) 0.1 11/16/2017 10:08 AM   Wound Surface area (cm^2) 0.08 cm^2 11/16/2017 10:08 AM   Condition of Base Other (comment) 11/16/2017 10:08 AM   Tissue Type Yellow 11/16/2017 10:08 AM   Tissue Type Percent Yellow 100 11/16/2017 10:08 AM   Drainage Amount  None 11/16/2017 10:08 AM   Wound Odor None 11/16/2017 10:08 AM   Cleansing and Cleansing Agents  Dermal wound cleanser 11/16/2017 10:08 AM   Dressing Type Applied Open to air 11/16/2017 10:08 AM   Number of days:207       [REMOVED] Wound Toe Left (Removed)   Removed 06/11/18    DRESSING STATUS Removed 11/16/2017 10:08 AM   DRESSING TYPE Open to air 12/7/2017  9:56 AM   Non-Pressure Injury Partial thickness (epider/derm) 11/16/2017 10:08 AM   Wound Length (cm) 0.3 cm 11/16/2017 10:08 AM   Wound Width (cm) 0.4 cm 11/16/2017 10:08 AM   Wound Depth (cm) 0.1 11/16/2017 10:08 AM   Wound Surface area (cm^2) 0.01 cm^2 11/16/2017 10:08 AM   Condition of Base Other (comment) 11/16/2017 10:08 AM   Tissue Type Yellow 11/16/2017 10:08 AM   Tissue Type Percent Yellow 100 11/16/2017 10:08 AM   Drainage Amount  None 11/16/2017 10:08 AM   Wound Odor None 11/16/2017 10:08 AM   Periwound Skin Condition Intact 11/16/2017 10:08 AM   Dressing Type Applied Open to air 11/16/2017 10:08 AM   Number of days:207       [REMOVED] Wound Ankle Left;Lateral (Removed)   Removed 06/11/18    DRESSING STATUS Removed 12/21/2017  9:42 AM   DRESSING TYPE Other (Comment) 12/21/2017  9:42 AM   Non-Pressure Injury Partial thickness (epider/derm) 12/21/2017  9:42 AM   Wound Length (cm) 1.1 cm 12/21/2017  9:42 AM   Wound Width (cm) 0.9 cm 12/21/2017  9:42 AM   Wound Depth (cm) 0.1 12/21/2017  9:42 AM   Wound Surface area (cm^2) 0.1 cm^2 12/21/2017  9:42 AM   Condition of Base [place];Slough 12/21/2017  9:42 AM   Condition of Edges Open 12/21/2017  9:42 AM   Tissue Type Pink;Yellow 12/21/2017  9:42 AM   Tissue Type Percent [place] 10 11/16/2017 10:08 AM   Tissue Type Percent Red 50 12/21/2017  9:42 AM   Tissue Type Percent Yellow 50 12/21/2017  9:42 AM   Drainage Amount  Moderate 12/21/2017  9:42 AM   Drainage Color Serous 12/21/2017  9:42 AM   Wound Odor None 12/21/2017  9:42 AM   Periwound Skin Condition Erythema, blanchable;Edematous 12/21/2017  9:42 AM   Cleansing and Cleansing Agents  Dermal wound cleanser; Soap and water 12/21/2017  9:42 AM   Dressing Type Applied Other (Comment) 12/21/2017  9:42 AM   Procedure Tolerated Well 12/21/2017  9:42 AM   Number of days:207       [REMOVED] Wound Leg Lower Right; Anterior; Lateral (Removed)   Removed 02/15/18 1138   DRESSING TYPE Open to air 2/15/2018 11:36 AM   Non-Pressure Injury Partial thickness (epider/derm) 2/1/2018  1:05 PM   Wound Length (cm) 0.9 cm 2/1/2018  1:05 PM   Wound Width (cm) 0.6 cm 2/1/2018  1:05 PM   Wound Depth (cm) 0.1 2/1/2018  1:05 PM   Wound Surface area (cm^2) 0.54 cm^2 2/1/2018  1:05 PM   Condition of Base [place] 2/1/2018  1:05 PM   Condition of Edges Open 2/1/2018  1:05 PM   Tissue Type Red 2/1/2018  1:05 PM   Tissue Type Percent Red 100 2/1/2018  1:05 PM   Drainage Amount  Scant 2/1/2018  1:05 PM   Drainage Color Serosanguinous 2/1/2018  1:05 PM   Wound Odor None 2/1/2018  1:05 PM   Periwound Skin Condition Edematous; Erythema, blanchable 2/1/2018  1:05 PM   Cleansing and Cleansing Agents  Dermal wound cleanser 2/1/2018  1:05 PM   Dressing Type Applied Other (Comment) 2/1/2018  1:05 PM   Procedure Tolerated Well 2/1/2018  1:05 PM   Number of days:73        Labs  No results found for this or any previous visit (from the past 24 hour(s)).     X-Ray:  deferred    Procedures:   S/p Theraskin application 90/63/11               Maylin Valentine DPM  July 5, 2018

## 2018-07-06 VITALS
OXYGEN SATURATION: 97 % | HEART RATE: 60 BPM | SYSTOLIC BLOOD PRESSURE: 168 MMHG | DIASTOLIC BLOOD PRESSURE: 60 MMHG | RESPIRATION RATE: 18 BRPM | TEMPERATURE: 98.4 F

## 2018-07-06 PROCEDURE — 3331090001 HH PPS REVENUE CREDIT

## 2018-07-06 PROCEDURE — 3331090002 HH PPS REVENUE DEBIT

## 2018-07-07 PROCEDURE — 3331090001 HH PPS REVENUE CREDIT

## 2018-07-07 PROCEDURE — 3331090002 HH PPS REVENUE DEBIT

## 2018-07-08 PROCEDURE — 3331090001 HH PPS REVENUE CREDIT

## 2018-07-08 PROCEDURE — 3331090002 HH PPS REVENUE DEBIT

## 2018-07-09 ENCOUNTER — HOME CARE VISIT (OUTPATIENT)
Dept: HOME HEALTH SERVICES | Facility: HOME HEALTH | Age: 83
End: 2018-07-09
Payer: MEDICARE

## 2018-07-09 PROCEDURE — G0299 HHS/HOSPICE OF RN EA 15 MIN: HCPCS

## 2018-07-09 PROCEDURE — A6252 ABSORPT DRG >16 <=48 W/O BDR: HCPCS

## 2018-07-09 PROCEDURE — 3331090002 HH PPS REVENUE DEBIT

## 2018-07-09 PROCEDURE — A4452 WATERPROOF TAPE: HCPCS

## 2018-07-09 PROCEDURE — A6022 COLLAGEN DRSG>16<=48 SQ IN: HCPCS

## 2018-07-09 PROCEDURE — 3331090001 HH PPS REVENUE CREDIT

## 2018-07-10 VITALS
HEART RATE: 79 BPM | DIASTOLIC BLOOD PRESSURE: 98 MMHG | RESPIRATION RATE: 20 BRPM | TEMPERATURE: 96.7 F | SYSTOLIC BLOOD PRESSURE: 119 MMHG | OXYGEN SATURATION: 98 %

## 2018-07-10 PROCEDURE — 3331090002 HH PPS REVENUE DEBIT

## 2018-07-10 PROCEDURE — 3331090001 HH PPS REVENUE CREDIT

## 2018-07-11 PROCEDURE — 3331090001 HH PPS REVENUE CREDIT

## 2018-07-11 PROCEDURE — 3331090002 HH PPS REVENUE DEBIT

## 2018-07-12 PROCEDURE — 3331090002 HH PPS REVENUE DEBIT

## 2018-07-12 PROCEDURE — 3331090001 HH PPS REVENUE CREDIT

## 2018-07-13 ENCOUNTER — HOME CARE VISIT (OUTPATIENT)
Dept: SCHEDULING | Facility: HOME HEALTH | Age: 83
End: 2018-07-13
Payer: MEDICARE

## 2018-07-13 PROCEDURE — G0299 HHS/HOSPICE OF RN EA 15 MIN: HCPCS

## 2018-07-13 PROCEDURE — 3331090002 HH PPS REVENUE DEBIT

## 2018-07-13 PROCEDURE — 3331090001 HH PPS REVENUE CREDIT

## 2018-07-14 PROCEDURE — 3331090002 HH PPS REVENUE DEBIT

## 2018-07-14 PROCEDURE — 3331090001 HH PPS REVENUE CREDIT

## 2018-07-15 PROCEDURE — 3331090002 HH PPS REVENUE DEBIT

## 2018-07-15 PROCEDURE — 3331090001 HH PPS REVENUE CREDIT

## 2018-07-16 ENCOUNTER — HOME CARE VISIT (OUTPATIENT)
Dept: SCHEDULING | Facility: HOME HEALTH | Age: 83
End: 2018-07-16
Payer: MEDICARE

## 2018-07-16 PROCEDURE — 3331090002 HH PPS REVENUE DEBIT

## 2018-07-16 PROCEDURE — G0299 HHS/HOSPICE OF RN EA 15 MIN: HCPCS

## 2018-07-16 PROCEDURE — 3331090001 HH PPS REVENUE CREDIT

## 2018-07-17 VITALS
RESPIRATION RATE: 20 BRPM | TEMPERATURE: 97.7 F | SYSTOLIC BLOOD PRESSURE: 148 MMHG | OXYGEN SATURATION: 98 % | DIASTOLIC BLOOD PRESSURE: 60 MMHG

## 2018-07-17 PROCEDURE — 3331090001 HH PPS REVENUE CREDIT

## 2018-07-17 PROCEDURE — 3331090002 HH PPS REVENUE DEBIT

## 2018-07-18 ENCOUNTER — HOME CARE VISIT (OUTPATIENT)
Dept: SCHEDULING | Facility: HOME HEALTH | Age: 83
End: 2018-07-18
Payer: MEDICARE

## 2018-07-18 VITALS
TEMPERATURE: 98.7 F | OXYGEN SATURATION: 96 % | DIASTOLIC BLOOD PRESSURE: 60 MMHG | RESPIRATION RATE: 16 BRPM | SYSTOLIC BLOOD PRESSURE: 115 MMHG | HEART RATE: 58 BPM

## 2018-07-18 PROCEDURE — 3331090002 HH PPS REVENUE DEBIT

## 2018-07-18 PROCEDURE — G0299 HHS/HOSPICE OF RN EA 15 MIN: HCPCS

## 2018-07-18 PROCEDURE — 3331090001 HH PPS REVENUE CREDIT

## 2018-07-19 PROCEDURE — 3331090001 HH PPS REVENUE CREDIT

## 2018-07-19 PROCEDURE — 3331090002 HH PPS REVENUE DEBIT

## 2018-07-20 ENCOUNTER — HOME CARE VISIT (OUTPATIENT)
Dept: SCHEDULING | Facility: HOME HEALTH | Age: 83
End: 2018-07-20
Payer: MEDICARE

## 2018-07-20 PROCEDURE — 3331090001 HH PPS REVENUE CREDIT

## 2018-07-20 PROCEDURE — 3331090002 HH PPS REVENUE DEBIT

## 2018-07-21 VITALS
TEMPERATURE: 97.7 F | SYSTOLIC BLOOD PRESSURE: 127 MMHG | DIASTOLIC BLOOD PRESSURE: 63 MMHG | OXYGEN SATURATION: 98 % | RESPIRATION RATE: 20 BRPM | HEART RATE: 71 BPM

## 2018-07-21 PROCEDURE — 3331090001 HH PPS REVENUE CREDIT

## 2018-07-21 PROCEDURE — 3331090002 HH PPS REVENUE DEBIT

## 2018-07-22 PROCEDURE — 3331090002 HH PPS REVENUE DEBIT

## 2018-07-22 PROCEDURE — 3331090001 HH PPS REVENUE CREDIT

## 2018-07-23 ENCOUNTER — HOME CARE VISIT (OUTPATIENT)
Dept: SCHEDULING | Facility: HOME HEALTH | Age: 83
End: 2018-07-23
Payer: MEDICARE

## 2018-07-23 PROCEDURE — 3331090002 HH PPS REVENUE DEBIT

## 2018-07-23 PROCEDURE — 3331090001 HH PPS REVENUE CREDIT

## 2018-07-23 PROCEDURE — G0299 HHS/HOSPICE OF RN EA 15 MIN: HCPCS

## 2018-07-24 VITALS
DIASTOLIC BLOOD PRESSURE: 60 MMHG | SYSTOLIC BLOOD PRESSURE: 116 MMHG | OXYGEN SATURATION: 98 % | HEART RATE: 99 BPM | RESPIRATION RATE: 16 BRPM | TEMPERATURE: 98.7 F

## 2018-07-24 VITALS
OXYGEN SATURATION: 98 % | HEART RATE: 63 BPM | RESPIRATION RATE: 20 BRPM | TEMPERATURE: 97.7 F | DIASTOLIC BLOOD PRESSURE: 60 MMHG | SYSTOLIC BLOOD PRESSURE: 141 MMHG

## 2018-07-24 PROCEDURE — 3331090002 HH PPS REVENUE DEBIT

## 2018-07-24 PROCEDURE — 3331090001 HH PPS REVENUE CREDIT

## 2018-07-25 ENCOUNTER — HOME CARE VISIT (OUTPATIENT)
Dept: SCHEDULING | Facility: HOME HEALTH | Age: 83
End: 2018-07-25
Payer: MEDICARE

## 2018-07-25 PROCEDURE — G0299 HHS/HOSPICE OF RN EA 15 MIN: HCPCS

## 2018-07-25 PROCEDURE — 3331090001 HH PPS REVENUE CREDIT

## 2018-07-25 PROCEDURE — 3331090002 HH PPS REVENUE DEBIT

## 2018-07-26 PROCEDURE — 3331090001 HH PPS REVENUE CREDIT

## 2018-07-26 PROCEDURE — 3331090002 HH PPS REVENUE DEBIT

## 2018-07-27 ENCOUNTER — HOME CARE VISIT (OUTPATIENT)
Dept: SCHEDULING | Facility: HOME HEALTH | Age: 83
End: 2018-07-27
Payer: MEDICARE

## 2018-07-27 VITALS
DIASTOLIC BLOOD PRESSURE: 65 MMHG | RESPIRATION RATE: 20 BRPM | SYSTOLIC BLOOD PRESSURE: 123 MMHG | HEART RATE: 56 BPM | OXYGEN SATURATION: 98 % | TEMPERATURE: 96.7 F

## 2018-07-27 PROCEDURE — G0299 HHS/HOSPICE OF RN EA 15 MIN: HCPCS

## 2018-07-27 PROCEDURE — 3331090001 HH PPS REVENUE CREDIT

## 2018-07-27 PROCEDURE — 3331090002 HH PPS REVENUE DEBIT

## 2018-07-28 PROCEDURE — 3331090002 HH PPS REVENUE DEBIT

## 2018-07-28 PROCEDURE — 3331090001 HH PPS REVENUE CREDIT

## 2018-07-29 VITALS
OXYGEN SATURATION: 96 % | DIASTOLIC BLOOD PRESSURE: 62 MMHG | SYSTOLIC BLOOD PRESSURE: 129 MMHG | TEMPERATURE: 98.8 F | HEART RATE: 60 BPM | RESPIRATION RATE: 16 BRPM

## 2018-07-29 PROCEDURE — 3331090002 HH PPS REVENUE DEBIT

## 2018-07-29 PROCEDURE — 3331090001 HH PPS REVENUE CREDIT

## 2018-07-30 ENCOUNTER — HOME CARE VISIT (OUTPATIENT)
Dept: SCHEDULING | Facility: HOME HEALTH | Age: 83
End: 2018-07-30
Payer: MEDICARE

## 2018-07-30 VITALS
DIASTOLIC BLOOD PRESSURE: 64 MMHG | HEART RATE: 66 BPM | OXYGEN SATURATION: 98 % | TEMPERATURE: 98.7 F | SYSTOLIC BLOOD PRESSURE: 159 MMHG

## 2018-07-30 PROCEDURE — 3331090002 HH PPS REVENUE DEBIT

## 2018-07-30 PROCEDURE — 3331090001 HH PPS REVENUE CREDIT

## 2018-07-30 PROCEDURE — G0299 HHS/HOSPICE OF RN EA 15 MIN: HCPCS

## 2018-07-31 PROCEDURE — 3331090001 HH PPS REVENUE CREDIT

## 2018-07-31 PROCEDURE — 3331090002 HH PPS REVENUE DEBIT

## 2018-08-01 ENCOUNTER — HOME CARE VISIT (OUTPATIENT)
Dept: SCHEDULING | Facility: HOME HEALTH | Age: 83
End: 2018-08-01
Payer: MEDICARE

## 2018-08-01 VITALS
HEART RATE: 60 BPM | RESPIRATION RATE: 20 BRPM | TEMPERATURE: 97.2 F | SYSTOLIC BLOOD PRESSURE: 120 MMHG | DIASTOLIC BLOOD PRESSURE: 53 MMHG | OXYGEN SATURATION: 98 %

## 2018-08-01 PROCEDURE — 3331090001 HH PPS REVENUE CREDIT

## 2018-08-01 PROCEDURE — G0299 HHS/HOSPICE OF RN EA 15 MIN: HCPCS

## 2018-08-01 PROCEDURE — 3331090002 HH PPS REVENUE DEBIT

## 2018-08-02 PROCEDURE — 3331090002 HH PPS REVENUE DEBIT

## 2018-08-02 PROCEDURE — 3331090001 HH PPS REVENUE CREDIT

## 2018-08-03 ENCOUNTER — HOME CARE VISIT (OUTPATIENT)
Dept: SCHEDULING | Facility: HOME HEALTH | Age: 83
End: 2018-08-03
Payer: MEDICARE

## 2018-08-03 VITALS
RESPIRATION RATE: 20 BRPM | TEMPERATURE: 97.2 F | DIASTOLIC BLOOD PRESSURE: 52 MMHG | OXYGEN SATURATION: 97 % | SYSTOLIC BLOOD PRESSURE: 123 MMHG | HEART RATE: 58 BPM

## 2018-08-03 PROCEDURE — 3331090001 HH PPS REVENUE CREDIT

## 2018-08-03 PROCEDURE — 3331090002 HH PPS REVENUE DEBIT

## 2018-08-03 PROCEDURE — G0162 HHC RN E&M PLAN SVS, 15 MIN: HCPCS

## 2018-08-04 PROCEDURE — 3331090002 HH PPS REVENUE DEBIT

## 2018-08-04 PROCEDURE — 3331090001 HH PPS REVENUE CREDIT

## 2018-08-05 PROCEDURE — 3331090002 HH PPS REVENUE DEBIT

## 2018-08-05 PROCEDURE — 3331090001 HH PPS REVENUE CREDIT

## 2018-08-06 ENCOUNTER — HOME CARE VISIT (OUTPATIENT)
Dept: SCHEDULING | Facility: HOME HEALTH | Age: 83
End: 2018-08-06
Payer: MEDICARE

## 2018-08-06 PROCEDURE — 3331090001 HH PPS REVENUE CREDIT

## 2018-08-06 PROCEDURE — 400014 HH F/U

## 2018-08-06 PROCEDURE — G0299 HHS/HOSPICE OF RN EA 15 MIN: HCPCS

## 2018-08-06 PROCEDURE — 3331090002 HH PPS REVENUE DEBIT

## 2018-08-07 PROCEDURE — 3331090002 HH PPS REVENUE DEBIT

## 2018-08-07 PROCEDURE — 3331090001 HH PPS REVENUE CREDIT

## 2018-08-08 ENCOUNTER — HOME CARE VISIT (OUTPATIENT)
Dept: SCHEDULING | Facility: HOME HEALTH | Age: 83
End: 2018-08-08
Payer: MEDICARE

## 2018-08-08 VITALS
TEMPERATURE: 98.2 F | HEART RATE: 60 BPM | DIASTOLIC BLOOD PRESSURE: 70 MMHG | OXYGEN SATURATION: 98 % | RESPIRATION RATE: 16 BRPM | SYSTOLIC BLOOD PRESSURE: 120 MMHG

## 2018-08-08 VITALS
DIASTOLIC BLOOD PRESSURE: 70 MMHG | TEMPERATURE: 98 F | SYSTOLIC BLOOD PRESSURE: 140 MMHG | RESPIRATION RATE: 14 BRPM | OXYGEN SATURATION: 98 % | HEART RATE: 60 BPM

## 2018-08-08 PROCEDURE — 3331090002 HH PPS REVENUE DEBIT

## 2018-08-08 PROCEDURE — A6022 COLLAGEN DRSG>16<=48 SQ IN: HCPCS

## 2018-08-08 PROCEDURE — 3331090001 HH PPS REVENUE CREDIT

## 2018-08-08 PROCEDURE — A4452 WATERPROOF TAPE: HCPCS

## 2018-08-08 PROCEDURE — A6252 ABSORPT DRG >16 <=48 W/O BDR: HCPCS

## 2018-08-08 PROCEDURE — G0299 HHS/HOSPICE OF RN EA 15 MIN: HCPCS

## 2018-08-09 PROCEDURE — 3331090001 HH PPS REVENUE CREDIT

## 2018-08-09 PROCEDURE — 3331090002 HH PPS REVENUE DEBIT

## 2018-08-10 ENCOUNTER — HOME CARE VISIT (OUTPATIENT)
Dept: SCHEDULING | Facility: HOME HEALTH | Age: 83
End: 2018-08-10
Payer: MEDICARE

## 2018-08-10 VITALS
DIASTOLIC BLOOD PRESSURE: 58 MMHG | RESPIRATION RATE: 18 BRPM | TEMPERATURE: 98.2 F | SYSTOLIC BLOOD PRESSURE: 130 MMHG | OXYGEN SATURATION: 98 % | HEART RATE: 60 BPM

## 2018-08-10 PROCEDURE — 3331090002 HH PPS REVENUE DEBIT

## 2018-08-10 PROCEDURE — 3331090001 HH PPS REVENUE CREDIT

## 2018-08-10 PROCEDURE — G0300 HHS/HOSPICE OF LPN EA 15 MIN: HCPCS

## 2018-08-11 PROCEDURE — 3331090001 HH PPS REVENUE CREDIT

## 2018-08-11 PROCEDURE — 3331090002 HH PPS REVENUE DEBIT

## 2018-08-12 PROCEDURE — 3331090001 HH PPS REVENUE CREDIT

## 2018-08-12 PROCEDURE — 3331090002 HH PPS REVENUE DEBIT

## 2018-08-13 ENCOUNTER — HOME CARE VISIT (OUTPATIENT)
Dept: SCHEDULING | Facility: HOME HEALTH | Age: 83
End: 2018-08-13
Payer: MEDICARE

## 2018-08-13 VITALS — OXYGEN SATURATION: 98 % | HEART RATE: 72 BPM | TEMPERATURE: 98.9 F

## 2018-08-13 PROCEDURE — 3331090002 HH PPS REVENUE DEBIT

## 2018-08-13 PROCEDURE — 3331090001 HH PPS REVENUE CREDIT

## 2018-08-13 PROCEDURE — G0299 HHS/HOSPICE OF RN EA 15 MIN: HCPCS

## 2018-08-14 PROCEDURE — 3331090001 HH PPS REVENUE CREDIT

## 2018-08-14 PROCEDURE — 3331090002 HH PPS REVENUE DEBIT

## 2018-08-15 ENCOUNTER — HOME CARE VISIT (OUTPATIENT)
Dept: SCHEDULING | Facility: HOME HEALTH | Age: 83
End: 2018-08-15
Payer: MEDICARE

## 2018-08-15 PROCEDURE — 3331090002 HH PPS REVENUE DEBIT

## 2018-08-15 PROCEDURE — 3331090001 HH PPS REVENUE CREDIT

## 2018-08-16 PROCEDURE — 3331090001 HH PPS REVENUE CREDIT

## 2018-08-16 PROCEDURE — 3331090002 HH PPS REVENUE DEBIT

## 2018-08-17 ENCOUNTER — HOME CARE VISIT (OUTPATIENT)
Dept: SCHEDULING | Facility: HOME HEALTH | Age: 83
End: 2018-08-17
Payer: MEDICARE

## 2018-08-17 VITALS
DIASTOLIC BLOOD PRESSURE: 70 MMHG | TEMPERATURE: 99.1 F | OXYGEN SATURATION: 99 % | SYSTOLIC BLOOD PRESSURE: 130 MMHG | HEART RATE: 75 BPM | RESPIRATION RATE: 16 BRPM

## 2018-08-17 PROCEDURE — 3331090001 HH PPS REVENUE CREDIT

## 2018-08-17 PROCEDURE — G0299 HHS/HOSPICE OF RN EA 15 MIN: HCPCS

## 2018-08-17 PROCEDURE — 3331090002 HH PPS REVENUE DEBIT

## 2018-08-18 PROCEDURE — 3331090002 HH PPS REVENUE DEBIT

## 2018-08-18 PROCEDURE — 3331090001 HH PPS REVENUE CREDIT

## 2018-08-19 PROCEDURE — 3331090001 HH PPS REVENUE CREDIT

## 2018-08-19 PROCEDURE — 3331090002 HH PPS REVENUE DEBIT

## 2018-08-20 PROCEDURE — 3331090002 HH PPS REVENUE DEBIT

## 2018-08-20 PROCEDURE — 3331090001 HH PPS REVENUE CREDIT

## 2018-08-21 PROCEDURE — 3331090001 HH PPS REVENUE CREDIT

## 2018-08-21 PROCEDURE — 3331090002 HH PPS REVENUE DEBIT

## 2018-08-22 ENCOUNTER — HOME CARE VISIT (OUTPATIENT)
Dept: SCHEDULING | Facility: HOME HEALTH | Age: 83
End: 2018-08-22
Payer: MEDICARE

## 2018-08-22 PROCEDURE — 3331090002 HH PPS REVENUE DEBIT

## 2018-08-22 PROCEDURE — 3331090001 HH PPS REVENUE CREDIT

## 2018-08-23 PROCEDURE — 3331090002 HH PPS REVENUE DEBIT

## 2018-08-23 PROCEDURE — 3331090001 HH PPS REVENUE CREDIT

## 2018-08-24 PROCEDURE — 3331090002 HH PPS REVENUE DEBIT

## 2018-08-24 PROCEDURE — 3331090001 HH PPS REVENUE CREDIT

## 2018-08-25 ENCOUNTER — HOME CARE VISIT (OUTPATIENT)
Dept: SCHEDULING | Facility: HOME HEALTH | Age: 83
End: 2018-08-25
Payer: MEDICARE

## 2018-08-25 VITALS
SYSTOLIC BLOOD PRESSURE: 142 MMHG | RESPIRATION RATE: 20 BRPM | HEART RATE: 57 BPM | OXYGEN SATURATION: 99 % | DIASTOLIC BLOOD PRESSURE: 59 MMHG | TEMPERATURE: 97.3 F

## 2018-08-25 PROCEDURE — G0299 HHS/HOSPICE OF RN EA 15 MIN: HCPCS

## 2018-08-25 PROCEDURE — 3331090002 HH PPS REVENUE DEBIT

## 2018-08-25 PROCEDURE — 3331090001 HH PPS REVENUE CREDIT

## 2018-08-26 PROCEDURE — 3331090001 HH PPS REVENUE CREDIT

## 2018-08-26 PROCEDURE — 3331090002 HH PPS REVENUE DEBIT

## 2018-08-27 ENCOUNTER — HOME CARE VISIT (OUTPATIENT)
Dept: SCHEDULING | Facility: HOME HEALTH | Age: 83
End: 2018-08-27
Payer: MEDICARE

## 2018-08-27 PROCEDURE — 3331090001 HH PPS REVENUE CREDIT

## 2018-08-27 PROCEDURE — 3331090002 HH PPS REVENUE DEBIT

## 2018-08-27 PROCEDURE — G0299 HHS/HOSPICE OF RN EA 15 MIN: HCPCS

## 2018-08-28 VITALS
TEMPERATURE: 97 F | RESPIRATION RATE: 20 BRPM | DIASTOLIC BLOOD PRESSURE: 71 MMHG | OXYGEN SATURATION: 98 % | HEART RATE: 75 BPM | SYSTOLIC BLOOD PRESSURE: 129 MMHG

## 2018-08-28 PROCEDURE — 3331090002 HH PPS REVENUE DEBIT

## 2018-08-28 PROCEDURE — 3331090001 HH PPS REVENUE CREDIT

## 2018-08-29 ENCOUNTER — HOME CARE VISIT (OUTPATIENT)
Dept: SCHEDULING | Facility: HOME HEALTH | Age: 83
End: 2018-08-29
Payer: MEDICARE

## 2018-08-29 VITALS
TEMPERATURE: 97.8 F | HEART RATE: 76 BPM | OXYGEN SATURATION: 98 % | SYSTOLIC BLOOD PRESSURE: 142 MMHG | DIASTOLIC BLOOD PRESSURE: 58 MMHG | RESPIRATION RATE: 20 BRPM

## 2018-08-29 PROCEDURE — A4930 STERILE, GLOVES PER PAIR: HCPCS

## 2018-08-29 PROCEDURE — G0300 HHS/HOSPICE OF LPN EA 15 MIN: HCPCS

## 2018-08-29 PROCEDURE — 3331090002 HH PPS REVENUE DEBIT

## 2018-08-29 PROCEDURE — A6216 NON-STERILE GAUZE<=16 SQ IN: HCPCS

## 2018-08-29 PROCEDURE — A4649 SURGICAL SUPPLIES: HCPCS

## 2018-08-29 PROCEDURE — 3331090001 HH PPS REVENUE CREDIT

## 2018-08-29 PROCEDURE — A4452 WATERPROOF TAPE: HCPCS

## 2018-08-30 PROCEDURE — 3331090002 HH PPS REVENUE DEBIT

## 2018-08-30 PROCEDURE — 3331090001 HH PPS REVENUE CREDIT

## 2018-08-31 ENCOUNTER — HOME CARE VISIT (OUTPATIENT)
Dept: SCHEDULING | Facility: HOME HEALTH | Age: 83
End: 2018-08-31
Payer: MEDICARE

## 2018-08-31 VITALS
DIASTOLIC BLOOD PRESSURE: 70 MMHG | HEART RATE: 78 BPM | TEMPERATURE: 97.3 F | RESPIRATION RATE: 20 BRPM | OXYGEN SATURATION: 98 % | SYSTOLIC BLOOD PRESSURE: 120 MMHG

## 2018-08-31 PROCEDURE — G0299 HHS/HOSPICE OF RN EA 15 MIN: HCPCS

## 2018-08-31 PROCEDURE — 3331090001 HH PPS REVENUE CREDIT

## 2018-08-31 PROCEDURE — 3331090002 HH PPS REVENUE DEBIT

## 2018-09-01 PROCEDURE — 3331090001 HH PPS REVENUE CREDIT

## 2018-09-01 PROCEDURE — 3331090002 HH PPS REVENUE DEBIT

## 2018-09-02 PROCEDURE — 3331090002 HH PPS REVENUE DEBIT

## 2018-09-02 PROCEDURE — 3331090001 HH PPS REVENUE CREDIT

## 2018-09-03 ENCOUNTER — HOME CARE VISIT (OUTPATIENT)
Dept: SCHEDULING | Facility: HOME HEALTH | Age: 83
End: 2018-09-03
Payer: MEDICARE

## 2018-09-03 PROCEDURE — 3331090002 HH PPS REVENUE DEBIT

## 2018-09-03 PROCEDURE — G0299 HHS/HOSPICE OF RN EA 15 MIN: HCPCS

## 2018-09-03 PROCEDURE — 3331090001 HH PPS REVENUE CREDIT

## 2018-09-04 PROCEDURE — 3331090002 HH PPS REVENUE DEBIT

## 2018-09-04 PROCEDURE — 3331090001 HH PPS REVENUE CREDIT

## 2018-09-05 ENCOUNTER — HOME CARE VISIT (OUTPATIENT)
Dept: SCHEDULING | Facility: HOME HEALTH | Age: 83
End: 2018-09-05
Payer: MEDICARE

## 2018-09-05 VITALS
HEART RATE: 83 BPM | OXYGEN SATURATION: 98 % | DIASTOLIC BLOOD PRESSURE: 52 MMHG | SYSTOLIC BLOOD PRESSURE: 120 MMHG | TEMPERATURE: 97.9 F | RESPIRATION RATE: 20 BRPM

## 2018-09-05 PROCEDURE — G0300 HHS/HOSPICE OF LPN EA 15 MIN: HCPCS

## 2018-09-05 PROCEDURE — 3331090001 HH PPS REVENUE CREDIT

## 2018-09-05 PROCEDURE — 3331090002 HH PPS REVENUE DEBIT

## 2018-09-06 PROCEDURE — 3331090002 HH PPS REVENUE DEBIT

## 2018-09-06 PROCEDURE — 3331090001 HH PPS REVENUE CREDIT

## 2018-09-07 PROCEDURE — 3331090001 HH PPS REVENUE CREDIT

## 2018-09-07 PROCEDURE — 3331090002 HH PPS REVENUE DEBIT

## 2018-09-08 ENCOUNTER — HOME CARE VISIT (OUTPATIENT)
Dept: SCHEDULING | Facility: HOME HEALTH | Age: 83
End: 2018-09-08
Payer: MEDICARE

## 2018-09-08 PROCEDURE — 3331090002 HH PPS REVENUE DEBIT

## 2018-09-08 PROCEDURE — 3331090001 HH PPS REVENUE CREDIT

## 2018-09-09 PROCEDURE — 3331090002 HH PPS REVENUE DEBIT

## 2018-09-09 PROCEDURE — 3331090001 HH PPS REVENUE CREDIT

## 2018-09-10 ENCOUNTER — HOME CARE VISIT (OUTPATIENT)
Dept: SCHEDULING | Facility: HOME HEALTH | Age: 83
End: 2018-09-10
Payer: MEDICARE

## 2018-09-10 PROCEDURE — G0299 HHS/HOSPICE OF RN EA 15 MIN: HCPCS

## 2018-09-10 PROCEDURE — 3331090001 HH PPS REVENUE CREDIT

## 2018-09-10 PROCEDURE — 3331090002 HH PPS REVENUE DEBIT

## 2018-09-11 PROCEDURE — 3331090002 HH PPS REVENUE DEBIT

## 2018-09-11 PROCEDURE — 3331090001 HH PPS REVENUE CREDIT

## 2018-09-12 ENCOUNTER — HOME CARE VISIT (OUTPATIENT)
Dept: SCHEDULING | Facility: HOME HEALTH | Age: 83
End: 2018-09-12
Payer: MEDICARE

## 2018-09-12 VITALS
DIASTOLIC BLOOD PRESSURE: 87 MMHG | SYSTOLIC BLOOD PRESSURE: 128 MMHG | OXYGEN SATURATION: 98 % | TEMPERATURE: 98.4 F | RESPIRATION RATE: 20 BRPM | HEART RATE: 77 BPM

## 2018-09-12 VITALS
HEART RATE: 60 BPM | DIASTOLIC BLOOD PRESSURE: 62 MMHG | TEMPERATURE: 99 F | RESPIRATION RATE: 20 BRPM | SYSTOLIC BLOOD PRESSURE: 116 MMHG | OXYGEN SATURATION: 98 %

## 2018-09-12 VITALS
OXYGEN SATURATION: 98 % | HEART RATE: 64 BPM | RESPIRATION RATE: 20 BRPM | SYSTOLIC BLOOD PRESSURE: 150 MMHG | TEMPERATURE: 97.9 F | DIASTOLIC BLOOD PRESSURE: 63 MMHG

## 2018-09-12 PROCEDURE — 3331090001 HH PPS REVENUE CREDIT

## 2018-09-12 PROCEDURE — G0300 HHS/HOSPICE OF LPN EA 15 MIN: HCPCS

## 2018-09-12 PROCEDURE — 3331090002 HH PPS REVENUE DEBIT

## 2018-09-13 PROCEDURE — 3331090002 HH PPS REVENUE DEBIT

## 2018-09-13 PROCEDURE — 3331090001 HH PPS REVENUE CREDIT

## 2018-09-14 ENCOUNTER — HOME CARE VISIT (OUTPATIENT)
Dept: SCHEDULING | Facility: HOME HEALTH | Age: 83
End: 2018-09-14
Payer: MEDICARE

## 2018-09-14 VITALS
SYSTOLIC BLOOD PRESSURE: 151 MMHG | RESPIRATION RATE: 20 BRPM | OXYGEN SATURATION: 99 % | DIASTOLIC BLOOD PRESSURE: 60 MMHG | HEART RATE: 78 BPM | TEMPERATURE: 97.2 F

## 2018-09-14 PROCEDURE — 3331090002 HH PPS REVENUE DEBIT

## 2018-09-14 PROCEDURE — G0299 HHS/HOSPICE OF RN EA 15 MIN: HCPCS

## 2018-09-14 PROCEDURE — 3331090001 HH PPS REVENUE CREDIT

## 2018-09-15 PROCEDURE — 3331090002 HH PPS REVENUE DEBIT

## 2018-09-15 PROCEDURE — 3331090001 HH PPS REVENUE CREDIT

## 2018-09-16 PROCEDURE — 3331090002 HH PPS REVENUE DEBIT

## 2018-09-16 PROCEDURE — 3331090001 HH PPS REVENUE CREDIT

## 2018-09-17 ENCOUNTER — HOME CARE VISIT (OUTPATIENT)
Dept: SCHEDULING | Facility: HOME HEALTH | Age: 83
End: 2018-09-17
Payer: MEDICARE

## 2018-09-17 VITALS
HEART RATE: 72 BPM | TEMPERATURE: 97 F | DIASTOLIC BLOOD PRESSURE: 60 MMHG | OXYGEN SATURATION: 99 % | RESPIRATION RATE: 20 BRPM | SYSTOLIC BLOOD PRESSURE: 100 MMHG

## 2018-09-17 PROCEDURE — 3331090001 HH PPS REVENUE CREDIT

## 2018-09-17 PROCEDURE — G0299 HHS/HOSPICE OF RN EA 15 MIN: HCPCS

## 2018-09-17 PROCEDURE — 3331090002 HH PPS REVENUE DEBIT

## 2018-09-18 PROCEDURE — 3331090001 HH PPS REVENUE CREDIT

## 2018-09-18 PROCEDURE — 3331090002 HH PPS REVENUE DEBIT

## 2018-09-19 ENCOUNTER — HOME CARE VISIT (OUTPATIENT)
Dept: SCHEDULING | Facility: HOME HEALTH | Age: 83
End: 2018-09-19
Payer: MEDICARE

## 2018-09-19 VITALS
HEART RATE: 65 BPM | RESPIRATION RATE: 18 BRPM | OXYGEN SATURATION: 98 % | DIASTOLIC BLOOD PRESSURE: 80 MMHG | SYSTOLIC BLOOD PRESSURE: 134 MMHG | TEMPERATURE: 98 F

## 2018-09-19 PROCEDURE — 3331090002 HH PPS REVENUE DEBIT

## 2018-09-19 PROCEDURE — G0299 HHS/HOSPICE OF RN EA 15 MIN: HCPCS

## 2018-09-19 PROCEDURE — 3331090001 HH PPS REVENUE CREDIT

## 2018-09-20 ENCOUNTER — HOSPITAL ENCOUNTER (OUTPATIENT)
Dept: WOUND CARE | Age: 83
Discharge: HOME | End: 2018-09-20
Payer: MEDICARE

## 2018-09-20 VITALS
OXYGEN SATURATION: 96 % | TEMPERATURE: 96.8 F | HEART RATE: 63 BPM | DIASTOLIC BLOOD PRESSURE: 68 MMHG | SYSTOLIC BLOOD PRESSURE: 179 MMHG | RESPIRATION RATE: 18 BRPM

## 2018-09-20 PROCEDURE — 3331090001 HH PPS REVENUE CREDIT

## 2018-09-20 PROCEDURE — 74011000250 HC RX REV CODE- 250

## 2018-09-20 PROCEDURE — 77030011256 HC DRSG MEPILEX <16IN NO BORD MOLN -A: Performed by: PODIATRIST

## 2018-09-20 PROCEDURE — 77030020057 HC DRSG MTRX CLLGN STGN -B: Performed by: PODIATRIST

## 2018-09-20 PROCEDURE — 3331090002 HH PPS REVENUE DEBIT

## 2018-09-20 PROCEDURE — 11042 DBRDMT SUBQ TIS 1ST 20SQCM/<: CPT

## 2018-09-20 RX ORDER — LIDOCAINE AND PRILOCAINE 25; 25 MG/G; MG/G
CREAM TOPICAL
Status: COMPLETED
Start: 2018-09-20 | End: 2018-09-20

## 2018-09-20 RX ADMIN — LIDOCAINE AND PRILOCAINE 5 G: 25; 25 CREAM TOPICAL at 08:36

## 2018-09-20 NOTE — DISCHARGE INSTRUCTIONS
Wound Care Instructions    Patient: Peace Butt MRN: [id number]  SSN: [id number]     YOB: 1935  Age:83 y.o. Sex: female       Ms. Sydni ShahidPAKO recommends the following discharge instruction:    Offloading    []   Felt/Foam Offloading   [] Removable Cast Waker       []   Wedge Shoe   []  Wheelchair     []   Total Contact Cast   []  Surgical Shoe     []   Crutches        Other   Mattress:   Other:     Edema Control    [x]   Elevated legs as much as possible. Recommend above level of heart.     []   Layered Wraps             []   Left      []   Right      []  BILATERAL          - Type:            []   Unna Boot       []  Multi-Layer                                   []   Two Layers     []  Three Layers   []  Four Layers     []   Tubular Bandages        []   Left      []   Right     SIZE:      []   Stockings                      []   Left      []   Right     []   Compression Pump     []   Left      []   Right    ___ millimeters of mercury  ___ millimeters of mercury for ___ minutes for ___ day(s)        Other:       Nutritional Supplements    [x]  Multi-Vitamin     []  Other:       [facility]     [x]  Home Health: [facility]     []  Long Term Care:      []  DME:     Consult    []   Nutrition     []   Vascular     []   Orthotist/Pedorthotist     []   Infectious Disease     []   Lymphedema Therapist   Other:     Dressings:  Another brand of generically equivalent product may be dispensed unless specifically ordered otherwise.     Home Ulcer/wound Hygiene (cleanse with)      []   Distilled Water     []   Normal Saline     [x]   Wound Cleanser     []   Mild antimicrobial soap and water     []   Chlorhexidine liquid soap and water     []   Other:     Apply    []   Hydrogel   []  Hypergel   []   Aquacel Ag     []   Cadexomer Iodine                     (Iodosorb)   []  Silver Alginate    []   Medihoney:     []   Collagenase:Santyl   []  Calcium Alginate   [x]   Collagen: left medial foot and left medial ankle     []   Plain Foam   []  Non-Adherent Contact                Layer   []   Xeroform     []   Silver foam   []   Hydrocolloid        []   Acticoat   []   Adaptic:      Other/Specific Instructions:    Cover With    []   Dry Gauze and Roll Gauze     []   Foam and Roll Gauze     []   Dry Gauze     []   Bordered gauze:     [x]   Foam      [x]    Bordered         []   Nonbordered     []   Secure with Tape     []   Other       Ligia-Ulcer Care    []   Cream     []   Lotion     [x]   Ointment: only use Triamcinolone prn     []   Barrier     []   Other:     Change Dressing    []   Once Daily     []   Every Other Day     []   Every 3 Days       []   Every 5 Days     []   Every 7 Days     []   Do Not Change       []   2 x per week (Mon and Thurs. )     [x]   3 x per week (Mon, Wed, Fri. )     []   Other          Follow-Up:   [x]  Follow up 4 weeks                               []  As Needed (PRN)     []   Alba Colon MD    []  Javier Velarde DPM    [x]  Seven Prajapati DPM   []   Mara Richardson DPM      []   Nurse Visit            Please call the wound clinic ([phone]) regarding any questions or concerns.

## 2018-09-20 NOTE — PROGRESS NOTES
Progress and Debridement Note    Patient: Sailaja Hernandez MRN: [id number]  SSN: [id number]    YOB: 1935  Age: 81 y.o. Sex: female      Assessment:     Active Problems:    Skin ulcer of left ankle, with fat layer exposed ([facility] [address] (11/16/2017)      Vascular disease, peripheral ([facility] [address] (12/7/2017)        Plan: Will hold Theraskin since there has been significant improvement in would healing since vascular intervention. Selective excisional debridement with continued LWC lyssa and mepilex Q 48 hrs. Subjective:     82 y/o  female who was admitted for chronic ulcer care left foot and ankle.   She relates she had an Angio performed 07/19/18 to the [facility] and the ulcer on the top of the left foot has healed and the remaining ulcers are getting smaller    Objective:     Patient Vitals for the past 24 hrs:   Temp Pulse Resp BP SpO2   09/20/18 0812 96.8 °F (36 °C) 63 18 179/68 96 %       Physical Exam:     General Exam  alert, cooperative, no distress, appears stated age    REVIEW OF SYSTEMS:  General: denies chronic fatigue, weight loss, fever, anemia, bruising, depression, nervousness, panic attacks  HEENT: denies ringing in ears, ear infections, dizzy spells, poor vision, glaucoma, sinus trouble, hoarseness, eye infections  GI: denies diarrhea, gas, bloating, heartburn, regurgitation, difficulty swallowing, painful swallowing, nausea, vomiting, constipation, abdominal pain, decreased appetite, blood in stools, black stools, jaundice, dark urine  Lungs: denies pneumonia, asthma, cough, SOB, hemoptysis  Heart: denies chest pain, irregular heart beat, ankle swelling, HTN  Skin: denies rashes, hives, allergic reaction  Urinary: denies UTI, kidney stones, decreased urine force and flow, urination at night, blood in urine, painful urination  Bones and Joints: denies arthritis, rheumatism, back pain, gout, osteoporosis  Neurologic: denies stroke, seizures, headaches, numbness, tingling    VASCULAR EXAM:. Pedal pulses are palpable 1/4 DP and trace PT right and left foot. Skin temperature is warm to warm right and left foot. Digital capillary fill time is 5 seconds right and left foot. There is decrease edema of the right and left foot and ankle. Pedal hair is not noted bilateral       NEUROLOGICAL EXAM:. Sensation Intact with 5.07g monofilament wire right and left foot. Deep tendon reflexes intact and symmetrical on the right and left foot.      MUSCULOSKELETAL EXAM:. Muscle tone is normal.  Muscle strength of the flexor and extensor group inversion and eversion Bilateral. 4/5.         DERMATOLOGICAL EXAM:. Skin is of abnormal texture and turgor with atrophic skin changes noting decrease hair growth, nail changes (thickening), Bilateral. There is epithelialized lesion dorsal left foot. Will slough and granulation tissue noted over the medial left ankle and improved measurements. See measurements below    Wound Ankle Left;Medial;Distal (Active)   DRESSING STATUS Removed 6/21/2018  8:46 AM   DRESSING TYPE Other (Comment) 6/21/2018  8:46 AM   Non-Pressure Injury Full thickness (subcut/muscle) 6/21/2018  8:46 AM   Wound Length (cm) 1.1 cm 6/21/2018  8:46 AM   Wound Width (cm) 0.6 cm 6/21/2018  8:46 AM   Wound Depth (cm) 0.2 6/21/2018  8:46 AM   Wound Surface area (cm^2) 0.66 cm^2 6/21/2018  8:46 AM   Condition of Base [place];Slough 6/21/2018  8:46 AM   Condition of Edges Open 6/21/2018  8:46 AM   Epithelialization (%) 100 5/17/2018  8:44 AM   Tissue Type Pink;Yellow 6/21/2018  8:46 AM   Tissue Type Percent Pink 50 6/21/2018  8:46 AM   Tissue Type Percent Yellow 50 6/21/2018  8:46 AM   Tissue Type Percent Other (comment) 40 2/15/2018 10:52 AM   Drainage Amount  Scant 6/21/2018  8:46 AM   Drainage Color Serosanguinous 6/21/2018  8:46 AM   Wound Odor None 6/21/2018  8:46 AM   Periwound Skin Condition Edematous; Erythema, blanchable;Petechiae 6/21/2018  8:46 AM   Cleansing and Cleansing Agents  Dermal wound cleanser 6/21/2018  8:46 AM   Dressing Type Applied Other (Comment) 6/21/2018  8:46 AM   Procedure Tolerated Well 6/21/2018  8:46 AM   Number of days:308       Wound Ankle Left;Lateral (Active)   DRESSING STATUS Removed 12/21/2017  9:42 AM   DRESSING TYPE Other (Comment) 12/21/2017  9:42 AM   Non-Pressure Injury Partial thickness (epider/derm) 12/21/2017  9:42 AM   Wound Length (cm) 1.1 cm 12/21/2017  9:42 AM   Wound Width (cm) 0.9 cm 12/21/2017  9:42 AM   Wound Depth (cm) 0.1 12/21/2017  9:42 AM   Wound Surface area (cm^2) 0.1 cm^2 12/21/2017  9:42 AM   Condition of Base Granulation;Slough 12/21/2017  9:42 AM   Condition of Edges Open 12/21/2017  9:42 AM   Tissue Type Pink;Yellow 12/21/2017  9:42 AM   Tissue Type Percent Pink 10 11/16/2017 10:08 AM   Tissue Type Percent Red 50 12/21/2017  9:42 AM   Tissue Type Percent Yellow 50 12/21/2017  9:42 AM   Drainage Amount  Moderate 12/21/2017  9:42 AM   Drainage Color Serous 12/21/2017  9:42 AM   Wound Odor None 12/21/2017  9:42 AM   Periwound Skin Condition Erythema, blanchable;Edematous 12/21/2017  9:42 AM   Cleansing and Cleansing Agents  Dermal wound cleanser; Soap and water 12/21/2017  9:42 AM   Dressing Type Applied Other (Comment) 12/21/2017  9:42 AM   Procedure Tolerated Well 12/21/2017  9:42 AM   Number of days:308       Wound Leg Lower Anterior;Left;Medial (Active)   Number of days:290       [REMOVED] Wound Ankle Left (Removed)   Removed 06/11/18    Number of [id number]:[id number]       [REMOVED] Wound Ankle Left (Removed)   Removed 06/11/18    Number of YVQS:[id number]       [REMOVED] Wound Ankle Left (Removed)   Removed 06/11/18    Number of PNDH:0115       [REMOVED] Wound Ankle Left;Lateral (Removed)   Removed 06/11/18    Number of days:470       [REMOVED] Wound Ankle Right (Removed)   Removed 06/11/18    DRESSING STATUS Removed 12/21/2017  9:42 AM   DRESSING TYPE Other (Comment) 12/21/2017  9:42 AM   Non-Pressure Injury Partial thickness (epider/derm) 12/21/2017  9:42 AM   Wound Length (cm) 0.7 cm 12/21/2017  9:42 AM   Wound Width (cm) 0.8 cm 12/21/2017  9:42 AM   Wound Depth (cm) 0.1 12/21/2017  9:42 AM   Wound Surface area (cm^2) 0.06 cm^2 12/21/2017  9:42 AM   Condition of [facility] 12/21/2017  9:42 AM   Condition of Edges Open 12/21/2017  9:42 AM   Tissue Type Yellow 12/21/2017  9:42 AM   Tissue Type Percent Red 50 11/16/2017 10:08 AM   Tissue Type Percent Yellow 100 12/21/2017  9:42 AM   Drainage Amount  Moderate 12/21/2017  9:42 AM   Drainage Color Serous 12/21/2017  9:42 AM   Wound Odor None 12/21/2017  9:42 AM   Periwound Skin Condition Erythema, blanchable 12/21/2017  9:42 AM   Cleansing and Cleansing Agents  Dermal wound cleanser; Soap and water 12/21/2017  9:42 AM   Dressing Type Applied Other (Comment) 12/21/2017  9:42 AM   Procedure Tolerated Well 12/21/2017  9:42 AM   Number of days:470       [REMOVED] Wound Ankle Lateral;Right (Removed)   Removed 06/11/18    Number of days:215       [REMOVED] Wound Ankle Left;Lateral (Removed)   Removed 06/11/18    Number of days:215       [REMOVED] Wound Ankle Left;Medial (Removed)   Removed 06/25/18    DRESSING STATUS Removed 6/21/2018  8:46 AM   DRESSING TYPE Other (Comment) 6/21/2018  8:46 AM   Non-Pressure Injury Full thickness (subcut/muscle) 6/21/2018  8:46 AM   Wound Length (cm) 1.4 cm 6/21/2018  8:46 AM   Wound Width (cm) 0.6 cm 6/21/2018  8:46 AM   Wound Depth (cm) 0.2 6/21/2018  8:46 AM   Wound Surface area (cm^2) 0.84 cm^2 6/21/2018  8:46 AM   Condition of Base [place];Slough 6/21/2018  8:46 AM   Condition of Edges Open 6/21/2018  8:46 AM   Tissue Type Pink;Yellow 6/21/2018  8:46 AM   Tissue Type Percent Pink 20 6/21/2018  8:46 AM   Tissue Type Percent Red 80 5/31/2018  8:29 AM   Tissue Type Percent Yellow 80 6/21/2018  8:46 AM   Drainage Amount  Small  6/21/2018  8:46 AM   Drainage Color Serosanguinous 6/21/2018  8:46 AM   Wound Odor None 6/21/2018  8:46 AM   Periwound Skin Condition Edematous; Erythema, blanchable 6/21/2018  8:46 AM Cleansing and Cleansing Agents  Dermal wound cleanser 6/21/2018  8:46 AM   Dressing Type Applied Other (Comment) 6/21/2018  8:46 AM   Procedure Tolerated Well 6/21/2018  8:46 AM   Number of days:229       [REMOVED] Wound Ankle Left;Medial;Proximal (Removed)   Removed 08/13/18    DRESSING STATUS Removed 7/5/2018 10:27 AM   DRESSING TYPE Other (Comment) 7/5/2018 10:27 AM   Non-Pressure Injury Full thickness (subcut/muscle) 7/5/2018 10:27 AM   Wound Length (cm) 2.4 cm 9/20/2018  8:13 AM   Wound Width (cm) 1.9 cm 9/20/2018  8:13 AM   Wound Depth (cm) 0.4 7/5/2018 10:27 AM   Wound Surface area (cm^2) 4.56 cm^2 9/20/2018  8:13 AM   Change in Wound Size % -424.14 9/20/2018  8:13 AM   Condition of Base [place];Slough 7/5/2018 10:27 AM   Condition of Edges Open 7/5/2018 10:27 AM   Tissue Type Pink;Yellow 7/5/2018 10:27 AM   Tissue Type Percent Pink 20 9/20/2018  8:13 AM   Tissue Type Percent Red 5 11/16/2017 10:08 AM   Tissue Type Percent Yellow 80 9/20/2018  8:13 AM   Drainage Amount  Large 7/5/2018 10:27 AM   Drainage Color Serosanguinous 7/5/2018 10:27 AM   Wound Odor None 7/5/2018 10:27 AM   Periwound Skin Condition Erythema, blanchable;Rash 7/5/2018 10:27 AM   Cleansing and Cleansing Agents  Normal saline 7/5/2018 10:27 AM   Dressing Type Applied Other (Comment) 6/28/2018  8:53 AM   Procedure Tolerated Well 7/5/2018 10:27 AM   Number of days:270       [REMOVED] Wound Toe Left (Removed)   Removed 06/11/18    DRESSING TYPE Open to air 12/7/2017  9:56 AM   Non-Pressure Injury Partial thickness (epider/derm) 11/16/2017 10:08 AM   Wound Length (cm) 0.6 cm 11/16/2017 10:08 AM   Wound Width (cm) 1.4 cm 11/16/2017 10:08 AM   Wound Depth (cm) 0.1 11/16/2017 10:08 AM   Wound Surface area (cm^2) 0.08 cm^2 11/16/2017 10:08 AM   Condition of Base Other (comment) 11/16/2017 10:08 AM   Tissue Type Yellow 11/16/2017 10:08 AM   Tissue Type Percent Yellow 100 11/16/2017 10:08 AM   Drainage Amount  None 11/16/2017 10:08 AM   Wound Odor None 11/16/2017 10:08 AM   Cleansing and Cleansing Agents  Dermal wound cleanser 11/16/2017 10:08 AM   Dressing Type Applied Open to air 11/16/2017 10:08 AM   Number of days:207       [REMOVED] Wound Toe Left (Removed)   Removed 06/11/18    DRESSING STATUS Removed 11/16/2017 10:08 AM   DRESSING TYPE Open to air 12/7/2017  9:56 AM   Non-Pressure Injury Partial thickness (epider/derm) 11/16/2017 10:08 AM   Wound Length (cm) 0.3 cm 11/16/2017 10:08 AM   Wound Width (cm) 0.4 cm 11/16/2017 10:08 AM   Wound Depth (cm) 0.1 11/16/2017 10:08 AM   Wound Surface area (cm^2) 0.01 cm^2 11/16/2017 10:08 AM   Condition of Base Other (comment) 11/16/2017 10:08 AM   Tissue Type Yellow 11/16/2017 10:08 AM   Tissue Type Percent Yellow 100 11/16/2017 10:08 AM   Drainage Amount  None 11/16/2017 10:08 AM   Wound Odor None 11/16/2017 10:08 AM   Periwound Skin Condition Intact 11/16/2017 10:08 AM   Dressing Type Applied Open to air 11/16/2017 10:08 AM   Number of days:207       [REMOVED] Wound Foot Left;Medial;Distal (Removed)   Removed 09/17/18    DRESSING STATUS Removed 7/5/2018 10:27 AM   DRESSING TYPE Other (Comment) 7/5/2018 10:27 AM   Non-Pressure Injury Full thickness (subcut/muscle) 7/5/2018 10:27 AM   Wound Length (cm) 0.8 cm 9/20/2018  8:13 AM   Wound Width (cm) 0.2 cm 9/20/2018  8:13 AM   Wound Depth (cm) 0.3 7/5/2018 10:27 AM   Wound Surface area (cm^2) 0.16 cm^2 9/20/2018  8:13 AM   Change in Wound Size % 20 9/20/2018  8:13 AM   Condition of Base [facility] 7/5/2018 10:27 AM   Condition of Edges Open 7/5/2018 10:27 AM   Tissue Type Pink;Yellow 7/5/2018 10:27 AM   Tissue Type Percent Pink 10 7/5/2018 10:27 AM   Tissue Type Percent Red 100 9/20/2018  8:13 AM   Tissue Type Percent Yellow 90 7/5/2018 10:27 AM   Drainage Amount  Large 7/5/2018 10:27 AM   Drainage Color Serosanguinous 7/5/2018 10:27 AM   Wound Odor None 7/5/2018 10:27 AM   Periwound Skin Condition Erythema, blanchable 7/5/2018 10:27 AM   Cleansing and Cleansing Agents Normal saline 7/5/2018 10:27 AM   Dressing Type Applied Other (Comment) 7/5/2018 10:27 AM   Procedure Tolerated Well 7/5/2018 10:27 AM   Number of days:287       [REMOVED] Wound Leg Lower Right; Anterior; Lateral (Removed)   Removed 02/15/18 1138   DRESSING TYPE Open to air 2/15/2018 11:36 AM   Non-Pressure Injury Partial thickness (epider/derm) 2/1/2018  1:05 PM   Wound Length (cm) 0.9 cm 2/1/2018  1:05 PM   Wound Width (cm) 0.6 cm 2/1/2018  1:05 PM   Wound Depth (cm) 0.1 2/1/2018  1:05 PM   Wound Surface area (cm^2) 0.54 cm^2 2/1/2018  1:05 PM   Condition of Base [place] 2/1/2018  1:05 PM   Condition of Edges Open 2/1/2018  1:05 PM   Tissue Type Red 2/1/2018  1:05 PM   Tissue Type Percent Red 100 2/1/2018  1:05 PM   Drainage Amount  Scant 2/1/2018  1:05 PM   Drainage Color Serosanguinous 2/1/2018  1:05 PM   Wound Odor None 2/1/2018  1:05 PM   Periwound Skin Condition Edematous; Erythema, blanchable 2/1/2018  1:05 PM   Cleansing and Cleansing Agents  Dermal wound cleanser 2/1/2018  1:05 PM   Dressing Type Applied Other (Comment) 2/1/2018  1:05 PM   Procedure Tolerated Well 2/1/2018  1:05 PM   Number of days:73       [REMOVED] Wound Foot Left;Dorsal (Removed)   Removed 08/27/18    DRESSING STATUS Removed 7/5/2018 10:27 AM   DRESSING TYPE Gauze 7/5/2018 10:27 AM   Non-Pressure Injury Full thickness (subcut/muscle) 7/5/2018 10:27 AM   Wound Length (cm) 0 cm 9/20/2018  8:13 AM   Wound Width (cm) 0 cm 9/20/2018  8:13 AM   Wound Depth (cm) 0.3 7/5/2018 10:27 AM   Wound Surface area (cm^2) 0 cm^2 9/20/2018  8:13 AM   Change in Wound Size % 100 9/20/2018  8:13 AM   Condition of Base [place] 7/5/2018 10:27 AM   Condition of Edges Open 6/28/2018  8:53 AM   Tissue Type Pink;Yellow 7/5/2018 10:27 AM   Tissue Type Percent Pink 75 7/5/2018 10:27 AM   Tissue Type Percent Red 25 7/5/2018 10:27 AM   Tissue Type Percent Yellow 50 6/28/2018  8:53 AM   Drainage Amount  Small  7/5/2018 10:27 AM   Drainage Color Serous 7/5/2018 10:27 AM   Wound Odor None 6/28/2018  8:53 AM   Periwound Skin Condition Erythema, blanchable 7/5/2018 10:27 AM   Cleansing and Cleansing Agents  Normal saline 7/5/2018 10:27 AM   Dressing Type Applied Other (Comment) 7/5/2018 10:27 AM   Procedure Tolerated Well 7/5/2018 10:27 AM   Number of days:102          Lab/Data Review:  No results found for this or any previous visit (from the past 24 hour(s)). none      PROCEDURE    Selective sharp instrument excisional debridement of slough and devitilized tissue    After the benefits/risks/SE were discussed, the patient agreed to proceed. Time out was done:   * Patient was identified by name and date of birth   * Agreement on procedure being performed was verified   * Procedure site verified and marked as necessary   * Patient was positioned for comfort   * Consent was signed and verified. Site: medial left ankle    Instruments used:    [x]  Dermal curette  [] Blade        [] #15  [] #10  [] Forceps  [] Tissue nippers  [] sterile scissors  [] Other     Anesthesia:    [x]  EMLA 2.5% cream: applied to wound beds for approximately 15minutes. []   Lidocaine 2% Topical Gel      []  Lidocaine injectable 1% with epinephrine 1:100,000    []  Lidocaine injectable 1% without epinephrine    []  Other:     []  None         [] patient is insensate due to neuropathy         [] patient declines        [] allergy to anesthetic        [] tissue for debridement is either superficial, loosely adherent and/or necrotic and denervated     After satisfactory anesthesia achieved, the wound/s was/were sharply debrided necrotic, devitalized and granulation tissue down to the sub Q layer, revealing a clean and viable wound bed. Post debridement measurement was 2.4_x_1.9_x_0.2_cm . Bleeding: <5mL Resolved with light focal pressure. Wounds were cleaned and irrigated with saline.      Wound care applied:   []   Hydrogell   []  Hypergel   []   Hydrofiber/Aquacel      []   Cadexomer Iodine (Iodosorb)   []  Silver Alginate    []   Medihoney:    []   Collagenase:Santyl   []  Calcium Alginate   [x]   Collagen:lyssa    []   Foam   []  Non-Adherent Contact Layer   []   Xeroform    []   Adaptec:   []   Hydrocolloid   []   Transparent Film    []  Antibiotic ointment/cream     []   Other (see below)     Other:     []   Dry Gauze and Roll Gauze    []   Foam and Roll Gauze    []   Dry Gauze    []   Bordered gauze:     []   Secure with Tape    [x]   Bordered foam       []   Other      [x]   Compression Wrap:          []   Unna Boot    []Multi-Layer    [x]Tubular Bandages       Ligia-Ulcer Care    []   Cream     []   Lotion     []   Ointment     []   Barrier     []   Other:       The patient tolerated the procedure well with no complications. The patient left the exam room in satisfactory and stable condition.      Signed By: Monico Dela Cruz DPM     September 20, 2018 8:57 AM

## 2018-09-20 NOTE — WOUND CARE
Patient here for follow up appointment for left ankle and foot ulcers. Home care has been applying collagen three times weekly. Dorsal foot ulcer is now healed and medial foot/ankle ulcers improving. Medial ankle ulcer debrided today. Orders are to continue home care with same dressings and frequencies. Order faxed to [id number].   She will return in 4 weeks for follow up.                      09/20/18 0813   [REMOVED] Wound Foot Left;Dorsal   Final Assessment Date: 08/27/18  Date First Assessed/Time First Assessed: 05/17/18 0830   Wound Type: Trauma  Location: Foot  Orientation: Left;Dorsal   Wound Length (cm) 0 cm   Wound Width (cm) 0 cm   Wound Surface area (cm^2) 0 cm^2   Change in Wound Size % 100   [REMOVED] Wound Foot Left;Medial;Distal   Final Assessment Date: 09/17/18  Date First Assessed/Time First Assessed: 12/04/17 1100   POA: Yes  Wound Type: Vascular  Location: Foot  Orientation: Left;Medial;Distal   Wound Length (cm) 0.8 cm   Wound Width (cm) 0.2 cm   Wound Surface area (cm^2) 0.16 cm^2   Change in Wound Size % 20   Tissue Type Percent Red 100   [REMOVED] Wound Ankle Left;Medial;Proximal   Final Assessment Date: 08/13/18  Date First Assessed: 11/16/17   POA: Yes  Wound Type: Vascular  Location: Ankle  Orientation: Left;Medial;Proximal   Wound Length (cm) 2.4 cm   Wound Width (cm) 1.9 cm   Wound Surface area (cm^2) 4.56 cm^2   Change in Wound Size % -424.14   Tissue Type Percent Pink 20   Tissue Type Percent Yellow 80

## 2018-09-20 NOTE — IP AVS SNAPSHOT
Summary of Care Report The Summary of Care report has been created to help improve care coordination. Users with access to [employer] or [address] (Web-based application) may access additional patient information including the Discharge Summary. If you are not currently a [address] user and need more information, please call the number listed below in the [place] [address] section and ask to be connected with Medical Records. Facility Information Name Address Phone [address] [facility] [address] [facility] 83 [phone] Patient Information Patient Name Sex PROSPER Crooks ([id number]) Female 1935 Discharge Information Admitting Provider Service Area Unit  
 (none) [address]  / [phone] Discharge Provider Discharge Date/Time Discharge Disposition Destination (none) (none) (none) (none) Patient Language Language ENGLISH [13] Hospital Problems as of 2018  Reviewed: 2018 10:41 AM by Matt Guaman DPM  
  
  
  
 Class Noted - Resolved Last Modified POA Active Problems Skin ulcer of left ankle, with fat layer exposed ([address]  2017 - Present 2018 by Matt Guaman DPM Yes Entered by Matt Guaman DPM  
  Vascular disease, peripheral ([address]  2017 - Present 2018 by Matt Guaman DPM Yes Entered by Matt Guaman DPM  
  
Non-Hospital Problems as of 2018  Reviewed: 2018 10:41 AM by Matt Guaman DPM  
  
  
  
 Class Noted - Resolved Last Modified Active Problems CVA, old, hemiparesis ([address]  2013 - Present 2013 Entered by Saw Ramirez   Type 2 diabetes mellitus ([address]  2013 - Present 2017 by Maricel Sloan MD  
  Entered by Ranjana Chaudhari MD  
 Abnormal finding on thyroid function test  6/11/2013 - Present 2/2/2017 by Tamara Blakely Entered by Tamara Blakely Acute on chronic renal insufficiency  7/11/2013 - Present 12/3/2017 by Ambar King MD  
  Entered by Tamara Blakely Overview Signed 2/2/2017 11:39 PM by Tamara Blakely Overview:  
 7/8/13 7/10/13 Cr  1.2 1.5 Anemia  6/11/2013 - Present 2/25/2017 by Vira Bal Entered by Tamara Blakely Chronic osteomyelitis of ankle ([facility]  6/11/2013 - Present 2/2/2017 by Tamara Blakely Entered by Tamara Blakely Injury of foot  7/10/2013 - Present 2/2/2017 by Tamara Blakely Entered by Tamara Blakely Leukocytosis  7/11/2013 - Present 2/2/2017 by Tamara Blakely Entered by Tamara Blakely Overview Signed 2/2/2017 11:39 PM by Tamara Blakely Overview: WBC 11.4  P 86 Spinal stenosis of lumbar region  6/11/2013 - Present 2/2/2017 by Tamara Blakely Entered by Tamara Blakely Microscopic hematuria  7/11/2013 - Present 2/2/2017 by Tamara Blakely Entered by Tamara Blakely Rhabdomyolysis  7/11/2013 - Present 2/2/2017 by Tamara Blakely Entered by Tamara Blakely Overview Signed 2/2/2017 11:39 PM by Tamara Blakely Overview:  
Ck  930, 1053 Vitamin D deficiency  6/11/2013 - Present 2/2/2017 by Tamara Blakely Entered by Tamara Blakely Stroke [facility])  Unknown - Present 2/2/2017 by Tamara Blakely Entered by Tamara Blakely Hypertension  Unknown - Present 2/2/2017 by Tamara Blakely Entered by Tamara Blakely DM (diabetes mellitus) ([facility]  Unknown - Present 2/2/2017 by Tamara Blakely Entered by Tamara Blakely Normocytic anemia  Unknown - Present 2/3/2017 by Tamara Blakely Entered by Tamara Blakely CKD (chronic kidney disease) stage 3, GFR 30-59 ml/min  6/24/2013 - Present 12/3/2017 by Ambar King MD  
  Entered by Tamara Blakely Overview Signed 2/3/2017 12:25 AM by Tamara Blakely Worst noted (08/06/13) BUN/sCr: 54/2.49, GFR 20. Elevated TSH  6/25/2012 - Present 2/3/2017 by Leslie Tubbs Entered by Leslie Tubbs Overview Signed 2/3/2017 12:29 AM by Leslie Tubbs 5.08 Iron (Fe) deficiency anemia  2/28/2017 - Present 2/28/2017 by Nico Esteves MD  
  Entered by Nico Esteves MD  
  UTI (urinary tract infection)  11/6/2017 - Present 12/3/2017 by Monica Alvarado MD  
  Entered by Jenn Kaye MD  
  Encephalopathy  11/6/2017 - Present 11/6/2017 by Jenn Kaye MD  
  Entered by Jenn Kaye MD  
  LAURA (acute kidney injury) ([address]  11/6/2017 - Present 11/6/2017 by Jenn Kaye MD  
  Entered by Jenn Kaye MD  
  Venous ulcer of ankle, left ([address]  11/16/2017 - Present 7/5/2018 by Asaf Hassan DPM  
  Entered by Asaf Hassan DPM  
  Weak pulse  11/16/2017 - Present 11/16/2017 by Asaf Hassan DPM  
  Entered by Asaf Hassan DPM  
  Syncope  12/3/2017 - Present 12/3/2017 by Monica Alvarado MD  
  Entered by Devin Jin MD  
  Non-pressure chronic ulcer of right ankle with fat layer exposed ([address]  12/7/2017 - Present 5/17/2018 by Asaf Hassan DPM  
  Entered by CONSTANZA NunezM  
  Type 2 diabetes mellitus with nephropathy ([address]  1/3/2018 - Present 1/3/2018 by Juan Carlos Ceballos MD  
  Entered by Juan Carlos Ceballos MD  
  Type 2 diabetes mellitus with diabetic neuropathy ([address]  1/3/2018 - Present 7/5/2018 by Asaf Hassan DPM  
  Entered by Juan Carlos Ceballos MD  
  Hypertensive left ventricular hypertrophy, without heart failure  1/15/2018 - Present 1/15/2018 by Juan Carlos Ceballos MD  
  Entered by Juan Carlos Ceballos MD  
  Atrial arrhythmia  1/15/2018 - Present 1/15/2018 by Juan Carlos Ceballos MD  
  Entered by Juan Carlos Ceballos MD  
  Overview Signed 1/15/2018  4:08 PM by Juan Carlos Ceballos MD  
   Noted on 12 lead ECG 12/3/2017 Midfoot ulcer, left, with fat layer exposed ([address]  5/17/2018 - Present 7/5/2018 by Sonia Francis DPM  
  Entered by Sonia Francis DPM  
  Personal history of noncompliance with medical treatment, presenting hazards to health  5/31/2018 - Present 7/5/2018 by Sonia Francis DPM  
  Entered by Sonia Francis DPM  
  Contact dermatitis due to adhesive bandage  5/31/2018 - Present 7/5/2018 by Sonia Francis DPM  
  Entered by Sonia Francis DPM  
  
You are allergic to the following No active allergies Current Discharge Medication List  
  
ASK your doctor about these medications Dose & Instructions Dispensing Information Comments  
 acetaminophen 500 mg tablet Commonly known as:  TYLENOL Dose:  500 mg Take 500 mg by mouth every six (6) hours as needed for Pain or Fever. Refills:  0  
   
 cholecalciferol 1,000 unit tablet Commonly known as:  VITAMIN D3 Dose:  3000 Units Take 3,000 Units by mouth daily. Refills:  0  
   
 gabapentin 300 mg capsule Commonly known as:  NEURONTIN Dose:  1 Cap Take 1 Cap by mouth three (3) times daily. 12/22/16, QTY#90, 30 Days, 2 Refills. Dr. Parish Cannon Refills:  2 IBUPROFEN PO Dose:  200 mg Take 200 mg by mouth as needed (pain). 2 tabs as needed for pain Refills:  0  
   
 lisinopril-hydroCHLOROthiazide 20-25 mg per tablet Commonly known as:  Helyn Blinks Dose:  1 Tab Take 1 Tab by mouth daily. Refills:  0  
   
 metoprolol tartrate 50 mg tablet Commonly known as:  LOPRESSOR Dose:  50 mg Take 1 Tab by mouth two (2) times a day. Quantity:  60 Tab Refills:  0 Current Immunizations Name Date Influenza Vaccine 10/1/2017, 9/28/2016 Pneumococcal Polysaccharide (PPSV-23) 6/12/2005 Tdap 12/26/2017 Follow-up Information None Discharge Instructions Wound Care Instructions Patient: Sailaja Hernandez MRN: [id number]  SSN: [id number] YOB: 1935  Age:83 y.o. Sex: female MsMaryanne Lucio PAKO Cobian recommends the following discharge instruction: 
 
Offloading 
  []   Felt/Foam Offloading   [] Removable Cast Waker    
  []   Wedge Shoe   []  Wheelchair  
  []   Total Contact Cast   []  Surgical Shoe  
  []   Crutches        Other Mattress:  
Other:  
 
Edema Control [x]   Elevated legs as much as possible. Recommend above level of heart.  
  []   Layered Wraps             []   Left      []   Right      []  BILATERAL 
        - Type:            []   Burnadette Dewar       []  Multi-Layer []   Two Layers     []  Three Layers   []  Four Layers  
  []   Tubular Bandages        []   Left      []   Right     SIZE:   
  []   Stockings                      []   Left      []   Right  
  []   Compression Pump     []   Left      []   Right   
___ millimeters of mercury 
___ millimeters of mercury for ___ minutes for ___ day(s) Other:  
 
 
Nutritional Supplements [x]  Multi-Vitamin  
  []  Other:   
  
[employer]  
  [x]  Home Health: [facility]  
  []  Long Term Care:   
  []  DME:  
 
Consult 
  []   Nutrition  
  []   Vascular  
  []   Orthotist/Pedorthotist  
  []   Infectious Disease  
  []   Lymphedema Therapist  
Other:  
 
Dressings: 
Another brand of generically equivalent product may be dispensed unless specifically ordered otherwise. Home Ulcer/wound Hygiene (cleanse with)   
  []   Distilled Water  
  []   Normal Saline [x]   Wound Cleanser []   Mild antimicrobial soap and water  
  []   Chlorhexidine liquid soap and water  
  []   Other:  
 
Apply 
  []   Hydrogel   []  Hypergel   []   Aquacel Ag  
  []   Cadexomer Iodine                     (Iodosorb)   []  Silver Alginate    []   Medihoney:  
  []   Collagenase:Santyl   []  Calcium Alginate   [x]   Collagen: left medial foot and left medial ankle []   Plain Foam   []  Non-Adherent Contact                Layer   []   Xeroform  
  []   Silver foam   []   Hydrocolloid     
  []   Acticoat   []   Adaptic:   
 
Other/Specific Instructions: 
 
Cover With 
  []   Dry Gauze and Roll Gauze  
  []   Foam and Roll Gauze  
  []   Dry Gauze  
  []   Bordered gauze:  
  [x]   Foam      [x]    Bordered         []   Nonbordered  
  []   Secure with Tape  
  []   Other Ligia-Ulcer Care 
  []   Cream  
  []   Lotion  
  [x]   Ointment: only use Triamcinolone prn  
  []   Barrier  
  []   Other:  
 
Change Dressing 
  []   Once Daily  
  []   Every Other Day  
  []   Every 3 Days    
  []   Every 5 Days  
  []   Every 7 Days  
  []   Do Not Change    
  []   2 x per week (Mon and Thurs. ) [x]   3 x per week (Mon, Wed, Fri. ) []   Other Follow-Up: 
 [x]  Follow up 4 weeks                             
 []  As Needed (PRN) []   Anastasiia Harper MD  
 []  CONSTANZA VasquezM  
 [x]  Daniel APODACAM  
[]   Akbar Negro DPM  
  
[]   Nurse Visit Please call the wound clinic ([phone]) regarding any questions or concerns. Chart Review Routing History Recipient Method Report Sent By Zac Ballard MD  
Fax: [phone] Phone: [phone] Fax Provider Comm Report Srinivasan Christie [[id number]] 5/14/2012 12:49 PM 05/11/2012 Quinten Fournier MD  
Fax: [phone] Phone: [phone] Fax Provider Comm Report Srinivasan Christie [[id number]] 5/14/2012 12:49 PM 05/11/2012 Segun Pierson MD  
Fax: [phone] Phone: [phone] Fax IP Auto Routed Christelle Hylton MD [[id number]] 2/28/2017  8:28 AM 02/28/2017 Praveen Wills MD  
Fax: [phone] Phone: [phone] Fax IP Auto Routed Christelle Hylton MD [[id number]] 2/28/2017  8:28 AM 02/28/2017

## 2018-09-21 PROCEDURE — 3331090001 HH PPS REVENUE CREDIT

## 2018-09-21 PROCEDURE — 3331090002 HH PPS REVENUE DEBIT

## 2018-09-22 ENCOUNTER — HOME CARE VISIT (OUTPATIENT)
Dept: SCHEDULING | Facility: HOME HEALTH | Age: 83
End: 2018-09-22
Payer: MEDICARE

## 2018-09-22 VITALS
SYSTOLIC BLOOD PRESSURE: 120 MMHG | RESPIRATION RATE: 20 BRPM | TEMPERATURE: 97 F | OXYGEN SATURATION: 98 % | DIASTOLIC BLOOD PRESSURE: 80 MMHG | HEART RATE: 67 BPM

## 2018-09-22 PROCEDURE — 3331090002 HH PPS REVENUE DEBIT

## 2018-09-22 PROCEDURE — 3331090001 HH PPS REVENUE CREDIT

## 2018-09-22 PROCEDURE — G0299 HHS/HOSPICE OF RN EA 15 MIN: HCPCS

## 2018-09-23 PROCEDURE — 3331090001 HH PPS REVENUE CREDIT

## 2018-09-23 PROCEDURE — 3331090002 HH PPS REVENUE DEBIT

## 2018-09-24 ENCOUNTER — HOME CARE VISIT (OUTPATIENT)
Dept: SCHEDULING | Facility: HOME HEALTH | Age: 83
End: 2018-09-24
Payer: MEDICARE

## 2018-09-24 PROCEDURE — 3331090002 HH PPS REVENUE DEBIT

## 2018-09-24 PROCEDURE — 3331090001 HH PPS REVENUE CREDIT

## 2018-09-24 PROCEDURE — G0299 HHS/HOSPICE OF RN EA 15 MIN: HCPCS

## 2018-09-25 VITALS
OXYGEN SATURATION: 98 % | SYSTOLIC BLOOD PRESSURE: 147 MMHG | TEMPERATURE: 97.2 F | RESPIRATION RATE: 20 BRPM | HEART RATE: 70 BPM | DIASTOLIC BLOOD PRESSURE: 64 MMHG

## 2018-09-25 PROCEDURE — 3331090002 HH PPS REVENUE DEBIT

## 2018-09-25 PROCEDURE — 3331090001 HH PPS REVENUE CREDIT

## 2018-09-26 ENCOUNTER — HOME CARE VISIT (OUTPATIENT)
Dept: SCHEDULING | Facility: HOME HEALTH | Age: 83
End: 2018-09-26
Payer: MEDICARE

## 2018-09-26 VITALS
OXYGEN SATURATION: 98 % | RESPIRATION RATE: 20 BRPM | SYSTOLIC BLOOD PRESSURE: 132 MMHG | HEART RATE: 72 BPM | DIASTOLIC BLOOD PRESSURE: 60 MMHG | TEMPERATURE: 99.6 F

## 2018-09-26 PROCEDURE — 3331090002 HH PPS REVENUE DEBIT

## 2018-09-26 PROCEDURE — 3331090001 HH PPS REVENUE CREDIT

## 2018-09-26 PROCEDURE — G0300 HHS/HOSPICE OF LPN EA 15 MIN: HCPCS

## 2018-09-27 PROCEDURE — 3331090001 HH PPS REVENUE CREDIT

## 2018-09-27 PROCEDURE — 3331090002 HH PPS REVENUE DEBIT

## 2018-09-28 ENCOUNTER — HOME CARE VISIT (OUTPATIENT)
Dept: SCHEDULING | Facility: HOME HEALTH | Age: 83
End: 2018-09-28
Payer: MEDICARE

## 2018-09-28 PROCEDURE — G0299 HHS/HOSPICE OF RN EA 15 MIN: HCPCS

## 2018-09-28 PROCEDURE — 3331090001 HH PPS REVENUE CREDIT

## 2018-09-28 PROCEDURE — 3331090002 HH PPS REVENUE DEBIT

## 2018-09-29 VITALS
RESPIRATION RATE: 16 BRPM | TEMPERATURE: 98.4 F | HEART RATE: 68 BPM | DIASTOLIC BLOOD PRESSURE: 56 MMHG | SYSTOLIC BLOOD PRESSURE: 120 MMHG | OXYGEN SATURATION: 99 %

## 2018-09-29 PROCEDURE — 3331090002 HH PPS REVENUE DEBIT

## 2018-09-29 PROCEDURE — 3331090001 HH PPS REVENUE CREDIT

## 2018-09-30 PROCEDURE — 3331090002 HH PPS REVENUE DEBIT

## 2018-09-30 PROCEDURE — 3331090001 HH PPS REVENUE CREDIT

## 2018-10-01 ENCOUNTER — HOME CARE VISIT (OUTPATIENT)
Dept: SCHEDULING | Facility: HOME HEALTH | Age: 83
End: 2018-10-01
Payer: MEDICARE

## 2018-10-01 PROCEDURE — 3331090002 HH PPS REVENUE DEBIT

## 2018-10-01 PROCEDURE — G0299 HHS/HOSPICE OF RN EA 15 MIN: HCPCS

## 2018-10-01 PROCEDURE — 3331090001 HH PPS REVENUE CREDIT

## 2018-10-02 VITALS
TEMPERATURE: 97 F | RESPIRATION RATE: 20 BRPM | OXYGEN SATURATION: 98 % | HEART RATE: 71 BPM | SYSTOLIC BLOOD PRESSURE: 125 MMHG | DIASTOLIC BLOOD PRESSURE: 51 MMHG

## 2018-10-02 PROCEDURE — 3331090001 HH PPS REVENUE CREDIT

## 2018-10-02 PROCEDURE — 3331090002 HH PPS REVENUE DEBIT

## 2018-10-03 ENCOUNTER — HOME CARE VISIT (OUTPATIENT)
Dept: SCHEDULING | Facility: HOME HEALTH | Age: 83
End: 2018-10-03
Payer: MEDICARE

## 2018-10-03 PROCEDURE — 400014 HH F/U

## 2018-10-03 PROCEDURE — G0299 HHS/HOSPICE OF RN EA 15 MIN: HCPCS

## 2018-10-03 PROCEDURE — 3331090002 HH PPS REVENUE DEBIT

## 2018-10-03 PROCEDURE — 3331090001 HH PPS REVENUE CREDIT

## 2018-10-04 PROCEDURE — 3331090001 HH PPS REVENUE CREDIT

## 2018-10-04 PROCEDURE — 3331090002 HH PPS REVENUE DEBIT

## 2018-10-05 ENCOUNTER — HOME CARE VISIT (OUTPATIENT)
Dept: SCHEDULING | Facility: HOME HEALTH | Age: 83
End: 2018-10-05
Payer: MEDICARE

## 2018-10-05 VITALS
TEMPERATURE: 98.8 F | DIASTOLIC BLOOD PRESSURE: 54 MMHG | RESPIRATION RATE: 18 BRPM | OXYGEN SATURATION: 99 % | HEART RATE: 86 BPM | SYSTOLIC BLOOD PRESSURE: 128 MMHG

## 2018-10-05 VITALS
TEMPERATURE: 98.2 F | HEART RATE: 65 BPM | SYSTOLIC BLOOD PRESSURE: 149 MMHG | DIASTOLIC BLOOD PRESSURE: 75 MMHG | OXYGEN SATURATION: 98 % | RESPIRATION RATE: 20 BRPM

## 2018-10-05 PROCEDURE — G0299 HHS/HOSPICE OF RN EA 15 MIN: HCPCS

## 2018-10-05 PROCEDURE — 3331090002 HH PPS REVENUE DEBIT

## 2018-10-05 PROCEDURE — 3331090001 HH PPS REVENUE CREDIT

## 2018-10-06 PROCEDURE — 3331090001 HH PPS REVENUE CREDIT

## 2018-10-06 PROCEDURE — 3331090002 HH PPS REVENUE DEBIT

## 2018-10-07 PROCEDURE — 3331090002 HH PPS REVENUE DEBIT

## 2018-10-07 PROCEDURE — 3331090001 HH PPS REVENUE CREDIT

## 2018-10-08 ENCOUNTER — HOME CARE VISIT (OUTPATIENT)
Dept: SCHEDULING | Facility: HOME HEALTH | Age: 83
End: 2018-10-08
Payer: MEDICARE

## 2018-10-08 PROCEDURE — G0299 HHS/HOSPICE OF RN EA 15 MIN: HCPCS

## 2018-10-08 PROCEDURE — 3331090002 HH PPS REVENUE DEBIT

## 2018-10-08 PROCEDURE — 3331090001 HH PPS REVENUE CREDIT

## 2018-10-09 ENCOUNTER — HOSPITAL ENCOUNTER (OUTPATIENT)
Dept: MAMMOGRAPHY | Age: 83
Discharge: HOME | End: 2018-10-09
Payer: MEDICARE

## 2018-10-09 ENCOUNTER — HOSPITAL ENCOUNTER (INPATIENT)
Age: 83
LOS: 2 days | Discharge: HOME HEALTH | DRG: 300 | End: 2018-10-12
Attending: EMERGENCY MEDICINE | Admitting: HOSPITALIST
Payer: MEDICARE

## 2018-10-09 ENCOUNTER — HOME CARE VISIT (OUTPATIENT)
Dept: HOME HEALTH SERVICES | Facility: HOME HEALTH | Age: 83
End: 2018-10-09
Payer: MEDICARE

## 2018-10-09 VITALS
HEART RATE: 78 BPM | TEMPERATURE: 98.8 F | RESPIRATION RATE: 20 BRPM | OXYGEN SATURATION: 98 % | DIASTOLIC BLOOD PRESSURE: 67 MMHG | SYSTOLIC BLOOD PRESSURE: 157 MMHG

## 2018-10-09 DIAGNOSIS — Z12.31 VISIT FOR SCREENING MAMMOGRAM: ICD-10-CM

## 2018-10-09 DIAGNOSIS — L97.322 ANKLE ULCER, LEFT, WITH FAT LAYER EXPOSED (HCC): ICD-10-CM

## 2018-10-09 DIAGNOSIS — A41.9 SEPSIS, DUE TO UNSPECIFIED ORGANISM: ICD-10-CM

## 2018-10-09 DIAGNOSIS — L03.116 CELLULITIS OF LEFT ANKLE: Primary | ICD-10-CM

## 2018-10-09 LAB
BASOPHILS # BLD: 0 K/UL (ref 0–0.1)
BASOPHILS NFR BLD: 0 % (ref 0–2)
DIFFERENTIAL METHOD BLD: ABNORMAL
EOSINOPHIL # BLD: 0 K/UL (ref 0–0.4)
EOSINOPHIL NFR BLD: 0 % (ref 0–5)
ERYTHROCYTE [DISTWIDTH] IN BLOOD BY AUTOMATED COUNT: 15.2 % (ref 11.6–14.5)
HCT VFR BLD AUTO: 40.7 % (ref 35–45)
HGB BLD-MCNC: 13 G/DL (ref 12–16)
LYMPHOCYTES # BLD: 0.5 K/UL (ref 0.9–3.6)
LYMPHOCYTES NFR BLD: 3 % (ref 21–52)
MCH RBC QN AUTO: 28.5 PG (ref 24–34)
MCHC RBC AUTO-ENTMCNC: 31.9 G/DL (ref 31–37)
MCV RBC AUTO: 89.3 FL (ref 74–97)
MONOCYTES # BLD: 0.7 K/UL (ref 0.05–1.2)
MONOCYTES NFR BLD: 4 % (ref 3–10)
NEUTS SEG # BLD: 14.7 K/UL (ref 1.8–8)
NEUTS SEG NFR BLD: 93 % (ref 40–73)
PLATELET # BLD AUTO: 156 K/UL (ref 135–420)
PMV BLD AUTO: 11.4 FL (ref 9.2–11.8)
RBC # BLD AUTO: 4.56 M/UL (ref 4.2–5.3)
WBC # BLD AUTO: 15.9 K/UL (ref 4.6–13.2)

## 2018-10-09 PROCEDURE — 3331090002 HH PPS REVENUE DEBIT

## 2018-10-09 PROCEDURE — 99285 EMERGENCY DEPT VISIT HI MDM: CPT

## 2018-10-09 PROCEDURE — 83735 ASSAY OF MAGNESIUM: CPT | Performed by: EMERGENCY MEDICINE

## 2018-10-09 PROCEDURE — 85025 COMPLETE CBC W/AUTO DIFF WBC: CPT | Performed by: EMERGENCY MEDICINE

## 2018-10-09 PROCEDURE — 94762 N-INVAS EAR/PLS OXIMTRY CONT: CPT

## 2018-10-09 PROCEDURE — 77063 BREAST TOMOSYNTHESIS BI: CPT

## 2018-10-09 PROCEDURE — 82550 ASSAY OF CK (CPK): CPT | Performed by: EMERGENCY MEDICINE

## 2018-10-09 PROCEDURE — 84443 ASSAY THYROID STIM HORMONE: CPT | Performed by: EMERGENCY MEDICINE

## 2018-10-09 PROCEDURE — 80053 COMPREHEN METABOLIC PANEL: CPT | Performed by: EMERGENCY MEDICINE

## 2018-10-09 PROCEDURE — 74011250636 HC RX REV CODE- 250/636: Performed by: EMERGENCY MEDICINE

## 2018-10-09 PROCEDURE — 96361 HYDRATE IV INFUSION ADD-ON: CPT

## 2018-10-09 PROCEDURE — 93005 ELECTROCARDIOGRAM TRACING: CPT

## 2018-10-09 PROCEDURE — 3331090001 HH PPS REVENUE CREDIT

## 2018-10-09 RX ADMIN — SODIUM CHLORIDE 1000 ML: 900 INJECTION, SOLUTION INTRAVENOUS at 23:30

## 2018-10-09 NOTE — IP AVS SNAPSHOT
Manuel Bain 
 
 
 [address] [place] [address] [id number] [phone] Patient: Belem Mcclellan MRN: [id number] TSA:1/2/2378 A check ifrah indicates which time of day the medication should be taken. My Medications START taking these medications Instructions Each Dose to Equal  
 Morning Noon Evening Bedtime  
 collagenase 250 unit/gram ointment Commonly known as:  SANTYL Start taking on:  10/13/2018 Your next dose is:  10/13 Apply  to affected area daily. doxycycline 100 mg tablet Commonly known as:  ADOXA Your next dose is:  10/12 bedtime Take 1 Tab by mouth two (2) times a day. 100 mg CHANGE how you take these medications Instructions Each Dose to Equal  
 Morning Noon Evening Bedtime  
 metoprolol tartrate 50 mg tablet Commonly known as:  LOPRESSOR What changed:  when to take this Your next dose is:  10/13 Take 1 Tab by mouth daily. 50 mg CONTINUE taking these medications Instructions Each Dose to Equal  
 Morning Noon Evening Bedtime  
 acetaminophen 500 mg tablet Commonly known as:  TYLENOL Take 500 mg by mouth every six (6) hours as needed for Pain or Fever. 500 mg  
    
   
   
   
  
 cholecalciferol 1,000 unit tablet Commonly known as:  VITAMIN D3 Your next dose is:  10/13 Take 3,000 Units by mouth daily. 3000 Units  
    
   
   
   
  
 gabapentin 300 mg capsule Commonly known as:  NEURONTIN Your next dose is:  10/12 4:00 PM  
   
 Take 1 Cap by mouth three (3) times daily. 12/22/16, QTY#90, 30 Days, 2 Refills. Dr. Kd Schwartz 1 Cap IBUPROFEN PO Take 200 mg by mouth as needed (pain). 2 tabs as needed for pain 200 mg  
    
   
   
   
  
 lisinopril-hydroCHLOROthiazide 20-25 mg per tablet Commonly known as:  Lyly Diss  
 Your next dose is:  10/13 Take 1 Tab by mouth daily. 1 Tab Where to Get Your Medications Information on where to get these meds will be given to you by the nurse or doctor. ! Ask your nurse or doctor about these medications  
  collagenase 250 unit/gram ointment  
 doxycycline 100 mg tablet  
 metoprolol tartrate 50 mg tablet

## 2018-10-09 NOTE — IP AVS SNAPSHOT
Shelli Ferrera 
 
 
 [address] [place] [address] 
[phone] Patient: Patty Martin MRN: [id number] BPK:3/8/0397 About your hospitalization You were admitted on:  October 10, 2018 You last received care in the:  [address] [place] [address] Road You were discharged on:  October 12, 2018 Why you were hospitalized Your primary diagnosis was:  Cellulitis Your diagnoses also included:  Hypertension, Type 2 Diabetes Mellitus (Hcc) Follow-up Information Follow up With Details Comments Contact Info Jayleen Diaz MD   [address] [facility] [address] 
[phone] Your Scheduled Appointments Thursday October 18, 2018  9:00 AM EDT  
WOUND CARE FOLLOW UP with Sakshi Cardozo DPM  
[facility] OP WOUND CARE [address] [place] [address] [address] [id number]  
[phone] Discharge Orders None A check ifrah indicates which time of day the medication should be taken. My Medications START taking these medications Instructions Each Dose to Equal  
 Morning Noon Evening Bedtime  
 collagenase 250 unit/gram ointment Commonly known as:  SANTYL Start taking on:  10/13/2018 Your next dose is:  10/13 Apply  to affected area daily. doxycycline 100 mg tablet Commonly known as:  ADOXA Your next dose is:  10/12 bedtime Take 1 Tab by mouth two (2) times a day. 100 mg CHANGE how you take these medications Instructions Each Dose to Equal  
 Morning Noon Evening Bedtime  
 metoprolol tartrate 50 mg tablet Commonly known as:  LOPRESSOR What changed:  when to take this Your next dose is:  10/13 Take 1 Tab by mouth daily. 50 mg CONTINUE taking these medications Instructions Each Dose to Equal  
 Morning Noon Evening Bedtime  
 acetaminophen 500 mg tablet Commonly known as:  TYLENOL Take 500 mg by mouth every six (6) hours as needed for Pain or Fever. 500 mg  
    
   
   
   
  
 cholecalciferol 1,000 unit tablet Commonly known as:  VITAMIN D3 Your next dose is:  10/13 Take 3,000 Units by mouth daily. 3000 Units  
    
   
   
   
  
 gabapentin 300 mg capsule Commonly known as:  NEURONTIN Your next dose is:  10/12 4:00 PM  
   
 Take 1 Cap by mouth three (3) times daily. 12/22/16, QTY#90, 30 Days, 2 Refills. Dr. Farheen Galeas 1 Cap IBUPROFEN PO Take 200 mg by mouth as needed (pain). 2 tabs as needed for pain 200 mg  
    
   
   
   
  
 lisinopril-hydroCHLOROthiazide 20-25 mg per tablet Commonly known as:  Faby Mei Your next dose is:  10/13 Take 1 Tab by mouth daily. 1 Tab Where to Get Your Medications Information on where to get these meds will be given to you by the nurse or doctor. ! Ask your nurse or doctor about these medications  
  collagenase 250 unit/gram ointment  
 doxycycline 100 mg tablet  
 metoprolol tartrate 50 mg tablet Discharge Instructions Patient armband removed and shredded DISCHARGE SUMMARY from Nurse PATIENT INSTRUCTIONS: 
 
 
F-face looks uneven A-arms unable to move or move unevenly S-speech slurred or non-existent T-time-call 911 as soon as signs and symptoms begin-DO NOT go Back to bed or wait to see if you get better-TIME IS BRAIN. Warning Signs of HEART ATTACK Call 911 if you have these symptoms: 
? Chest discomfort.  Most heart attacks involve discomfort in the center of the chest that lasts more than a few minutes, or that goes away and comes back. It can feel like uncomfortable pressure, squeezing, fullness, or pain. ? Discomfort in other areas of the upper body. Symptoms can include pain or discomfort in one or both arms, the back, neck, jaw, or stomach. ? Shortness of breath with or without chest discomfort. ? Other signs may include breaking out in a cold sweat, nausea, or lightheadedness. Don't wait more than five minutes to call [address]! Fast action can save your life. Calling 911 is almost always the fastest way to get lifesaving treatment. Emergency Medical Services staff can begin treatment when they arrive  up to an hour sooner than if someone gets to the hospital by car. The discharge information has been reviewed with the patient. The patient verbalized understanding. Discharge medications reviewed with the patient and appropriate educational materials and side effects teaching were provided. ___________________________________________________________________________________________________________________________________ SEJENThart Announcement We are excited to announce that we are making your provider's discharge notes available to you in PeerMe. You will see these notes when they are completed and signed by the physician that discharged you from your recent hospital stay. If you have any questions or concerns about any information you see in PeerMe, please call the Health Information Department where you were seen or reach out to your Primary Care Provider for more information about your plan of care. Introducing [facility] HE[facility] SERVICES! Miles Min introduces PeerMe patient portal. Now you can access parts of your medical record, email your doctor's office, and request medication refills online. 1. In your internet browser, go to [email or web address] 2. Click on the First Time User? Click Here link in the Sign In box. You will see the New Member Sign Up page. 3. Enter your Bluetrain.io Access Code exactly as it appears below. You will not need to use this code after youve completed the sign-up process. If you do not sign up before the expiration date, you must request a new code. · [email or web address] Access Code: Josemanuel Dorantes Expires: 11/13/2018  8:28 AM 
 
4. Enter the last four digits of your Social Security Number (xxxx) and Date of Birth (mm/dd/yyyy) as indicated and click Submit. You will be taken to the next sign-up page. 5. Create a Klik Technologiest ID. This will be your Bluetrain.io login ID and cannot be changed, so think of one that is secure and easy to remember. 6. Create a Bluetrain.io password. You can change your password at any time. 7. Enter your Password Reset Question and Answer. This can be used at a later time if you forget your password. 8. Enter your e-mail address. You will receive e-mail notification when new information is available in [facility] [address] [facility] [address]. 9. Click Sign Up. You can now view and download portions of your medical record. 10. Click the Download Summary menu link to download a portable copy of your medical information. If you have questions, please visit the Frequently Asked Questions section of the [email or web address] website. Remember, Bluetrain.io is NOT to be used for urgent needs. For medical emergencies, dial 911. Now available from your iPhone and Android! Introducing Star Coffman As a [employer] patient, I wanted to make you aware of our electronic visit tool called Star Suazoroqueprakash. [employer] 24/7 allows you to connect within minutes with a medical provider 24 hours a day, seven days a week via a mobile device or tablet or logging into a secure website from your computer. You can access Star Coffman from anywhere in the [place].  
 
A virtual visit might be right for you when you have a simple condition and feel like you just dont want to get out of bed, or cant get away from work for an appointment, when your regular [employer] provider is not available (evenings, weekends or holidays), or when youre out of town and need minor care. Electronic visits cost only $49 and if the [employer] 24/7 provider determines a prescription is needed to treat your condition, one can be electronically transmitted to a nearby pharmacy*. Please take a moment to enroll today if you have not already done so. The enrollment process is free and takes just a few minutes. To enroll, please download the [employer] 24/7 mandy to your tablet or phone, or visit [email or web address] to enroll on your computer. And, as an [address] [place] [address] patient with a AmpIdea account, the results of your visits will be scanned into your electronic medical record and your primary care provider will be able to view the scanned results. We urge you to continue to see your regular [employer] provider for your ongoing medical care. And while your primary care provider may not be the one available when you seek a [facility] virtual visit, the peace of mind you get from getting a real diagnosis real time can be priceless. For more information on [facility], view our Frequently Asked Questions (FAQs) at [email or web address]. Sincerely, 
 
Diana Johnson MD 
Chief Medical Officer [facility] Ruby Hunter *:  certain medications cannot be prescribed via [facility] Unresulted Labs-Please follow up with your PCP about these lab tests Order Current Status CULTURE, BLOOD Preliminary result CULTURE, BLOOD Preliminary result Providers Seen During Your Hospitalization Provider Specialty Primary office phone Nicole Jimenez MD Emergency Medicine [phone] Zo Burrows MD Family Practice [phone] Shonda Marquez MD Internal Medicine [phone] Immunizations Administered for This Admission Name Date Influenza Vaccine (Quad) PF  Deferred () Your Primary Care Physician (PCP) Primary Care Physician Office Phone Office Fax Familia Boyd [phone] You are allergic to the following No active allergies Recent Documentation Height Weight Breastfeeding? BMI OB Status Smoking Status 1.702 m 75.3 kg No 26 kg/m2 Postmenopausal Never Smoker Emergency Contacts Name Discharge Info Relation Home Work Mobile Willy Bro DISCHARGE CAREGIVER [3] Spouse [3] [phone] 141 [facility] CAREGIVER [3] Daughter [21] [phone] Patient Belongings The following personal items are in your possession at time of discharge: 
  Dental Appliances: Lowers, Uppers  Visual Aid: Glasses, At bedside      Home Medications: Kept at bedside   Jewelry: Watch, Ring  Clothing: Shirt, Footwear, Pants    Other Valuables: None  Personal Items Sent to Safe: none Please provide this summary of care documentation to your next provider. Signatures-by signing, you are acknowledging that this After Visit Summary has been reviewed with you and you have received a copy. Patient Signature:  ____________________________________________________________ Date:  ____________________________________________________________  
  
Alecia Lewis Provider Signature:  ____________________________________________________________ Date:  ____________________________________________________________

## 2018-10-10 ENCOUNTER — HOME CARE VISIT (OUTPATIENT)
Dept: HOME HEALTH SERVICES | Facility: HOME HEALTH | Age: 83
End: 2018-10-10
Payer: MEDICARE

## 2018-10-10 ENCOUNTER — APPOINTMENT (OUTPATIENT)
Dept: GENERAL RADIOLOGY | Age: 83
DRG: 300 | End: 2018-10-10
Attending: EMERGENCY MEDICINE
Payer: MEDICARE

## 2018-10-10 PROBLEM — L03.90 CELLULITIS: Status: ACTIVE | Noted: 2018-10-10

## 2018-10-10 LAB
ALBUMIN SERPL-MCNC: 4.1 G/DL (ref 3.4–5)
ALBUMIN/GLOB SERPL: 1 {RATIO} (ref 0.8–1.7)
ALP SERPL-CCNC: 102 U/L (ref 45–117)
ALT SERPL-CCNC: 18 U/L (ref 13–56)
ANION GAP SERPL CALC-SCNC: 11 MMOL/L (ref 3–18)
APPEARANCE UR: CLEAR
AST SERPL-CCNC: 14 U/L (ref 15–37)
ATRIAL RATE: 77 BPM
BACTERIA URNS QL MICRO: NEGATIVE /HPF
BILIRUB SERPL-MCNC: 1.1 MG/DL (ref 0.2–1)
BILIRUB UR QL: NEGATIVE
BUN SERPL-MCNC: 23 MG/DL (ref 7–18)
BUN/CREAT SERPL: 18 (ref 12–20)
CALCIUM SERPL-MCNC: 8.9 MG/DL (ref 8.5–10.1)
CALCULATED P AXIS, ECG09: 56 DEGREES
CALCULATED R AXIS, ECG10: 10 DEGREES
CALCULATED T AXIS, ECG11: 108 DEGREES
CHLORIDE SERPL-SCNC: 105 MMOL/L (ref 100–108)
CK MB CFR SERPL CALC: NORMAL % (ref 0–4)
CK MB SERPL-MCNC: <1 NG/ML (ref 5–25)
CK SERPL-CCNC: 79 U/L (ref 26–192)
CO2 SERPL-SCNC: 22 MMOL/L (ref 21–32)
COLOR UR: YELLOW
CREAT SERPL-MCNC: 1.31 MG/DL (ref 0.6–1.3)
DIAGNOSIS, 93000: NORMAL
EPITH CASTS URNS QL MICRO: NORMAL /LPF (ref 0–5)
GLOBULIN SER CALC-MCNC: 4.1 G/DL (ref 2–4)
GLUCOSE BLD STRIP.AUTO-MCNC: 105 MG/DL (ref 70–110)
GLUCOSE SERPL-MCNC: 115 MG/DL (ref 74–99)
GLUCOSE UR STRIP.AUTO-MCNC: NEGATIVE MG/DL
HGB UR QL STRIP: ABNORMAL
KETONES UR QL STRIP.AUTO: NEGATIVE MG/DL
LACTATE BLD-SCNC: 1.9 MMOL/L (ref 0.4–2)
LEUKOCYTE ESTERASE UR QL STRIP.AUTO: ABNORMAL
MAGNESIUM SERPL-MCNC: 1.9 MG/DL (ref 1.6–2.6)
NITRITE UR QL STRIP.AUTO: NEGATIVE
P-R INTERVAL, ECG05: 156 MS
PH UR STRIP: 5.5 [PH] (ref 5–8)
POTASSIUM SERPL-SCNC: 4.1 MMOL/L (ref 3.5–5.5)
PROT SERPL-MCNC: 8.2 G/DL (ref 6.4–8.2)
PROT UR STRIP-MCNC: NEGATIVE MG/DL
Q-T INTERVAL, ECG07: 318 MS
QRS DURATION, ECG06: 72 MS
QTC CALCULATION (BEZET), ECG08: 359 MS
RBC #/AREA URNS HPF: NORMAL /HPF (ref 0–5)
SODIUM SERPL-SCNC: 138 MMOL/L (ref 136–145)
SP GR UR REFRACTOMETRY: 1.02 (ref 1–1.03)
TROPONIN I SERPL-MCNC: <0.02 NG/ML (ref 0–0.04)
TSH SERPL DL<=0.05 MIU/L-ACNC: 0.62 UIU/ML (ref 0.36–3.74)
UROBILINOGEN UR QL STRIP.AUTO: 1 EU/DL (ref 0.2–1)
VENTRICULAR RATE, ECG03: 77 BPM
WBC URNS QL MICRO: NORMAL /HPF (ref 0–4)

## 2018-10-10 PROCEDURE — 74011250636 HC RX REV CODE- 250/636: Performed by: HOSPITALIST

## 2018-10-10 PROCEDURE — 71045 X-RAY EXAM CHEST 1 VIEW: CPT

## 2018-10-10 PROCEDURE — 74011250636 HC RX REV CODE- 250/636: Performed by: EMERGENCY MEDICINE

## 2018-10-10 PROCEDURE — 87077 CULTURE AEROBIC IDENTIFY: CPT | Performed by: EMERGENCY MEDICINE

## 2018-10-10 PROCEDURE — 87086 URINE CULTURE/COLONY COUNT: CPT | Performed by: EMERGENCY MEDICINE

## 2018-10-10 PROCEDURE — 3331090001 HH PPS REVENUE CREDIT

## 2018-10-10 PROCEDURE — 65270000029 HC RM PRIVATE

## 2018-10-10 PROCEDURE — 73610 X-RAY EXAM OF ANKLE: CPT

## 2018-10-10 PROCEDURE — 96361 HYDRATE IV INFUSION ADD-ON: CPT

## 2018-10-10 PROCEDURE — 74011000258 HC RX REV CODE- 258: Performed by: EMERGENCY MEDICINE

## 2018-10-10 PROCEDURE — 96365 THER/PROPH/DIAG IV INF INIT: CPT

## 2018-10-10 PROCEDURE — 82962 GLUCOSE BLOOD TEST: CPT

## 2018-10-10 PROCEDURE — 81001 URINALYSIS AUTO W/SCOPE: CPT | Performed by: EMERGENCY MEDICINE

## 2018-10-10 PROCEDURE — 74011250637 HC RX REV CODE- 250/637: Performed by: HOSPITALIST

## 2018-10-10 PROCEDURE — 83605 ASSAY OF LACTIC ACID: CPT

## 2018-10-10 PROCEDURE — 87040 BLOOD CULTURE FOR BACTERIA: CPT | Performed by: EMERGENCY MEDICINE

## 2018-10-10 PROCEDURE — 74011000258 HC RX REV CODE- 258: Performed by: HOSPITALIST

## 2018-10-10 PROCEDURE — 96368 THER/DIAG CONCURRENT INF: CPT

## 2018-10-10 PROCEDURE — 87186 SC STD MICRODIL/AGAR DIL: CPT | Performed by: EMERGENCY MEDICINE

## 2018-10-10 PROCEDURE — 3331090002 HH PPS REVENUE DEBIT

## 2018-10-10 RX ORDER — SODIUM CHLORIDE 0.9 % (FLUSH) 0.9 %
5-10 SYRINGE (ML) INJECTION AS NEEDED
Status: DISCONTINUED | OUTPATIENT
Start: 2018-10-10 | End: 2018-10-12 | Stop reason: HOSPADM

## 2018-10-10 RX ORDER — DEXTROSE, SODIUM CHLORIDE, AND POTASSIUM CHLORIDE 5; .45; .15 G/100ML; G/100ML; G/100ML
100 INJECTION INTRAVENOUS CONTINUOUS
Status: DISCONTINUED | OUTPATIENT
Start: 2018-10-10 | End: 2018-10-12 | Stop reason: HOSPADM

## 2018-10-10 RX ORDER — GABAPENTIN 100 MG/1
300 CAPSULE ORAL 3 TIMES DAILY
Status: DISCONTINUED | OUTPATIENT
Start: 2018-10-10 | End: 2018-10-12 | Stop reason: HOSPADM

## 2018-10-10 RX ADMIN — GABAPENTIN 300 MG: 100 CAPSULE ORAL at 20:29

## 2018-10-10 RX ADMIN — SODIUM CHLORIDE 259 ML: 9 INJECTION, SOLUTION INTRAVENOUS at 00:37

## 2018-10-10 RX ADMIN — SODIUM CHLORIDE 1750 MG: 900 INJECTION, SOLUTION INTRAVENOUS at 22:09

## 2018-10-10 RX ADMIN — DEXTROSE MONOHYDRATE, SODIUM CHLORIDE, AND POTASSIUM CHLORIDE 100 ML/HR: 50; 4.5; 1.49 INJECTION, SOLUTION INTRAVENOUS at 04:43

## 2018-10-10 RX ADMIN — PIPERACILLIN SODIUM,TAZOBACTAM SODIUM 4.5 G: 4; .5 INJECTION, POWDER, FOR SOLUTION INTRAVENOUS at 06:17

## 2018-10-10 RX ADMIN — SODIUM CHLORIDE 1000 ML: 900 INJECTION, SOLUTION INTRAVENOUS at 00:37

## 2018-10-10 RX ADMIN — PIPERACILLIN SODIUM,TAZOBACTAM SODIUM 4.5 G: 4; .5 INJECTION, POWDER, FOR SOLUTION INTRAVENOUS at 00:35

## 2018-10-10 RX ADMIN — SODIUM CHLORIDE 1000 MG: 900 INJECTION, SOLUTION INTRAVENOUS at 00:39

## 2018-10-10 RX ADMIN — PIPERACILLIN SODIUM,TAZOBACTAM SODIUM 3.38 G: 3; .375 INJECTION, POWDER, FOR SOLUTION INTRAVENOUS at 20:24

## 2018-10-10 RX ADMIN — DEXTROSE MONOHYDRATE, SODIUM CHLORIDE, AND POTASSIUM CHLORIDE 100 ML/HR: 50; 4.5; 1.49 INJECTION, SOLUTION INTRAVENOUS at 20:24

## 2018-10-10 NOTE — PROGRESS NOTES
Pt admitted early am for cellulitis- has chronic ulcer on LLE. WBC 15.9K on admission, lactic acid normal.   Podiatry and wound care consulted. Continue IV Zosyn and Vancomycin. MYCHAL Bosch 7 [facility] Hospitalist Division Pager:  [phone] Office:  [phone]

## 2018-10-10 NOTE — ED NOTES
Patient cleaned up and linens changed due to bedpan miss. Patient resting on stretcher in no distress at this time with family at bedside.

## 2018-10-10 NOTE — ED NOTES
Bedside and Verbal shift change report given to Michele Houston (oncoming nurse) by Carrie Hollis RN (offgoing nurse). Report included the following information SBAR and MAR.

## 2018-10-10 NOTE — ED PROVIDER NOTES
HPI Comments: Vidal Fermin is a 81 y.o. Female who started feeling not well earlier today, with gen weakness, diff walking, possible fever with chills, shakes. No sig cough, headache, abd pain. Had urinary incontinence once. Also c/o worsening swelling to left ankle, redness, drainage. Has chronic ulcers to left foot, ankle that wound care has been managing. Nothing taken. Worse with activity, better at rest 
 
The history is provided by the patient and a relative. Past Medical History:  
Diagnosis Date  CAD (coronary artery disease)  Chronic osteomyelitis of ankle ([facility]  CKD (chronic kidney disease) stage 3, GFR 30-59 ml/min ([facility]) 06/24/2013 Worst noted (08/06/13) BUN/sCr: 54/2.49, GFR 20.   
 DM (diabetes mellitus) ([facility]  Elevated TSH 06/25/2012  
 5.08   
 Hypertension  Leukocytosis  Lumbar spinal stenosis  Menopause  Normocytic anemia  Rhabdomyolysis  Splenomegaly  Stroke ([facility]  Vitamin D deficiency Past Surgical History:  
Procedure Laterality Date  COLONOSCOPY N/A 3/1/2017 COLONOSCOPY performed by Kajal Portillo MD at [address]   HX FREE SKIN GRAFT    
 HX HYSTERECTOMY  HX OOPHORECTOMY  HX OPEN REDUCTION INTERNAL FIXATION Left Tx LLE Ankle Trimalleolar Fx by Dr. Daniel Goodman [facility])  HX ORTHOPAEDIC Left 06/11/2013 [address] - LLE Ankle Removal of Hardware  HX VEIN STRIPPING Bilateral 09/18/2013 Dr. Emmanuel Martin  ME DEBRIDEMENT OPEN WOUND 20 SQ CM<    
 
   
Family History:  
Problem Relation Age of Onset  Diabetes Other  Hypertension Other Social History Social History  Marital status:  Spouse name: N/A  
 Number of children: N/A  
 Years of education: N/A Occupational History  Not on file. Social History Main Topics  Smoking status: Never Smoker  Smokeless tobacco: Never Used  Alcohol use No  
  Drug use: No  
 Sexual activity: No  
 
Other Topics Concern  Not on file Social History Narrative ALLERGIES: Review of patient's allergies indicates no known allergies. Review of Systems Constitutional: Positive for chills, diaphoresis and fever. HENT: Negative for sore throat and trouble swallowing. Eyes: Negative for visual disturbance. Respiratory: Negative for shortness of breath. Cardiovascular: Negative for chest pain. Gastrointestinal: Negative for abdominal pain. Endocrine: Negative for polyuria. Genitourinary: Negative for difficulty urinating. Musculoskeletal: Positive for arthralgias, gait problem and myalgias. Skin: Positive for color change, rash and wound. Allergic/Immunologic: Negative for food allergies. Neurological: Negative for syncope. Psychiatric/Behavioral: Positive for sleep disturbance. Negative for confusion. Vitals:  
 10/09/18 2330 10/09/18 2345 10/10/18 0030 10/10/18 0100 BP: 134/81 161/47 (!) 163/39 (!) 143/33 Pulse: 81 81 83 83 Resp: 25 19 24 24 Temp:      
SpO2: 96% 97% 96% 95% Weight:      
Height:      
      
 
Physical Exam  
Constitutional: She is oriented to person, place, and time. She appears well-developed and well-nourished. Non-toxic appearance. She does not have a sickly appearance. She appears ill. She appears distressed. HENT:  
Head: Normocephalic and atraumatic. Right Ear: External ear normal.  
Left Ear: External ear normal.  
Nose: Nose normal.  
Mouth/Throat: Uvula is midline, oropharynx is clear and moist and mucous membranes are normal.  
Eyes: Conjunctivae are normal. No scleral icterus. Neck: Neck supple. Cardiovascular: Normal rate, regular rhythm, normal heart sounds and intact distal pulses. Pulses: 
     Dorsalis pedis pulses are 2+ on the right side, and 2+ on the left side. Posterior tibial pulses are 2+ on the right side, and 2+ on the left side. Pulmonary/Chest: Effort normal and breath sounds normal.  
Abdominal: Soft. There is no tenderness. Musculoskeletal: She exhibits edema. Feet: 
 
Neurological: She is alert and oriented to person, place, and time. Gait normal.  
Skin: Skin is warm and dry. She is not diaphoretic. Psychiatric: Her behavior is normal.  
Nursing note and vitals reviewed. [facility] 
 
 
ED Course Procedures Vitals: 
Patient Vitals for the past 12 hrs: 
 Temp Pulse Resp BP SpO2  
10/10/18 0100 - 83 24 (!) 143/33 95 % 10/10/18 0030 - 83 24 (!) 163/39 96 % 10/09/18 2345 - 81 19 161/47 97 % 10/09/18 2330 - 81 25 134/81 96 % 10/09/18 2159 99.3 °F (37.4 °C) 90 18 172/71 97 % Medications ordered:  
Medications  
sodium chloride (NS) flush 5-10 mL (not administered)  
vancomycin (VANCOCIN) 1,000 mg in 0.9% sodium chloride (MBP/ADV) 250 mL adv (1,000 mg IntraVENous New Bag 10/10/18 0039) piperacillin-tazobactam (ZOSYN) 4.5 g in 0.9% sodium chloride (MBP/ADV) 100 mL MBP (not administered)  
sodium chloride 0.9 % bolus infusion 1,000 mL (1,000 mL IntraVENous New Bag 10/9/18 2330)  
sodium chloride 0.9 % bolus infusion 1,000 mL (1,000 mL IntraVENous New Bag 10/10/18 0037) Followed by  
sodium chloride 0.9 % bolus infusion 1,000 mL (1,000 mL IntraVENous New Bag 10/10/18 0037) Followed by  
sodium chloride 0.9 % bolus infusion 259 mL (259 mL IntraVENous New Bag 10/10/18 0037) Lab findings: 
Recent Results (from the past 12 hour(s)) CBC WITH AUTOMATED DIFF Collection Time: 10/09/18 11:10 PM  
Result Value Ref Range WBC 15.9 (H) 4.6 - 13.2 K/uL  
 RBC 4.56 4.20 - 5.30 M/uL  
 HGB 13.0 12.0 - 16.0 g/dL HCT 40.7 35.0 - 45.0 % MCV 89.3 74.0 - 97.0 FL  
 MCH 28.5 24.0 - 34.0 PG  
 MCHC 31.9 31.0 - 37.0 g/dL  
 RDW 15.2 (H) 11.6 - 14.5 % PLATELET 831 477 - 917 K/uL MPV 11.4 9.2 - 11.8 FL  
 NEUTROPHILS 93 (H) 40 - 73 % LYMPHOCYTES 3 (L) 21 - 52 % MONOCYTES 4 3 - 10 % EOSINOPHILS 0 0 - 5 % BASOPHILS 0 0 - 2 %  
 ABS. NEUTROPHILS 14.7 (H) 1.8 - 8.0 K/UL  
 ABS. LYMPHOCYTES 0.5 (L) 0.9 - 3.6 K/UL  
 ABS. MONOCYTES 0.7 0.05 - 1.2 K/UL  
 ABS. EOSINOPHILS 0.0 0.0 - 0.4 K/UL  
 ABS. BASOPHILS 0.0 0.0 - 0.1 K/UL  
 DF AUTOMATED METABOLIC PANEL, COMPREHENSIVE Collection Time: 10/09/18 11:10 PM  
Result Value Ref Range Sodium 138 136 - 145 mmol/L Potassium 4.1 3.5 - 5.5 mmol/L Chloride 105 100 - 108 mmol/L  
 CO2 22 21 - 32 mmol/L Anion gap 11 3.0 - 18 mmol/L Glucose 115 (H) 74 - 99 mg/dL BUN 23 (H) 7.0 - 18 MG/DL Creatinine 1.31 (H) 0.6 - 1.3 MG/DL  
 BUN/Creatinine ratio 18 12 - 20 GFR est AA 47 (L) >60 ml/min/1.73m2 GFR est non-AA 39 (L) >60 ml/min/1.73m2 Calcium 8.9 8.5 - 10.1 MG/DL Bilirubin, total 1.1 (H) 0.2 - 1.0 MG/DL  
 ALT (SGPT) 18 13 - 56 U/L  
 AST (SGOT) 14 (L) 15 - 37 U/L Alk. phosphatase 102 45 - 117 U/L Protein, total 8.2 6.4 - 8.2 g/dL Albumin 4.1 3.4 - 5.0 g/dL Globulin 4.1 (H) 2.0 - 4.0 g/dL A-G Ratio 1.0 0.8 - 1.7 MAGNESIUM Collection Time: 10/09/18 11:10 PM  
Result Value Ref Range Magnesium 1.9 1.6 - 2.6 mg/dL TSH 3RD GENERATION Collection Time: 10/09/18 11:10 PM  
Result Value Ref Range TSH 0.62 0.36 - 3.74 uIU/mL CARDIAC PANEL,(CK, CKMB & TROPONIN) Collection Time: 10/09/18 11:10 PM  
Result Value Ref Range CK 79 26 - 192 U/L  
 CK - MB <1.0 <3.6 ng/ml CK-MB Index  0.0 - 4.0 % CALCULATION NOT PERFORMED WHEN RESULT IS BELOW LINEAR LIMIT Troponin-I, Qt. <0.02 0.0 - 0.045 NG/ML  
EKG, 12 LEAD, INITIAL Collection Time: 10/09/18 11:47 PM  
Result Value Ref Range Ventricular Rate 77 BPM  
 Atrial Rate 77 BPM  
 P-R Interval 156 ms QRS Duration 72 ms Q-T Interval 318 ms QTC Calculation (Bezet) 359 ms Calculated P Axis 56 degrees Calculated R Axis 10 degrees Calculated T Axis 108 degrees Diagnosis Normal sinus rhythm Low voltage QRS 
 Cannot rule out Anteroseptal infarct (cited on or before 21-JUN-2012) T wave abnormality, consider lateral ischemia Abnormal ECG When compared with ECG of 12-FEB-2018 10:21, 
premature atrial complexes are no longer present Questionable change in QRS axis URINALYSIS W/ RFLX MICROSCOPIC Collection Time: 10/10/18 12:00 AM  
Result Value Ref Range Color YELLOW Appearance CLEAR Specific gravity 1.017 1.005 - 1.030    
 pH (UA) 5.5 5.0 - 8.0 Protein NEGATIVE  NEG mg/dL Glucose NEGATIVE  NEG mg/dL Ketone NEGATIVE  NEG mg/dL Bilirubin NEGATIVE  NEG Blood TRACE (A) NEG Urobilinogen 1.0 0.2 - 1.0 EU/dL Nitrites NEGATIVE  NEG Leukocyte Esterase TRACE (A) NEG URINE MICROSCOPIC ONLY Collection Time: 10/10/18 12:00 AM  
Result Value Ref Range WBC 0 to 3 0 - 4 /hpf  
 RBC 0 to 3 0 - 5 /hpf Epithelial cells FEW 0 - 5 /lpf Bacteria NEGATIVE  NEG /hpf POC LACTIC ACID Collection Time: 10/10/18 12:28 AM  
Result Value Ref Range Lactic Acid (POC) 1.9 0.4 - 2.0 mmol/L  
 
 
EKG interpretation by ED Physician: 
Nsr with tw abnl lateral leads Rate 77, pr 156, qtc 3259 No sig changes Cardiac monitor: nl rate, reg rhythm no ectopy Pulse ox: 97% ra X-Ray, CT or other radiology findings or impressions: 
XR CHEST PORT    (Results Pending) XR ANKLE RT MIN 3 V    (Results Pending) Progress notes, Consult notes or additional Procedure notes: Will need admission for abx, sepsis. D/w pt and family at bedside D/w dr Kashmir Maki on call for hospitalist who will admit Sepsis Re-Assessment Documentation:  
 
Date: 10/10/2018 Time: 2:38 AM 
 
 
Vital Signs Level of Consciousness: Alert Temp: 99.3 °F (37.4 °C) Temp Source: Oral 
Pulse (Heart Rate): 83 Resp Rate: 24 BP: (!) 143/33 MAP (Monitor): (!) 61 MAP (Calculated): 105 MEWS Score: 1 Nl lactate, distal perfusion, bp stable Reevaluation of patient:  
stable Disposition: 
Diagnosis: 1. Cellulitis of left ankle 2. Ankle ulcer, left, with fat layer exposed ([address] 3. Sepsis, due to unspecified organism ([facility] Disposition: admit Follow-up Information None Patient's Medications Start Taking No medications on file Continue Taking ACETAMINOPHEN (TYLENOL) 500 MG TABLET    Take 500 mg by mouth every six (6) hours as needed for Pain or Fever. CHOLECALCIFEROL (VITAMIN D3) 1,000 UNIT TABLET    Take 3,000 Units by mouth daily. GABAPENTIN (NEURONTIN) 300 MG CAPSULE    Take 1 Cap by mouth three (3) times daily. 12/22/16, QTY#90, 30 Days, 2 Refills. Dr. Obdulia Mendoza IBUPROFEN PO    Take 200 mg by mouth as needed (pain). 2 tabs as needed for pain LISINOPRIL-HYDROCHLOROTHIAZIDE (PRINZIDE, ZESTORETIC) 20-25 MG PER TABLET    Take 1 Tab by mouth daily. METOPROLOL TARTRATE (LOPRESSOR) 50 MG TABLET    Take 1 Tab by mouth two (2) times a day. These Medications have changed No medications on file Stop Taking No medications on file

## 2018-10-10 NOTE — ED NOTES
Verbal shift change report given to Mariam Rodriguez (oncoming nurse) by Mayte Jang RN (offgoing nurse). Report included the following information SBAR and MAR.

## 2018-10-10 NOTE — PROGRESS NOTES
Pharmacy Dosing Services: Vancomycin Indication: cellulitis with abscess Day of therapy: 1 Other Antimicrobials (Include dose, start day & day of therapy): 
PipTazo 3.375 g E.I. IV q8h (adjusted from 4.5g iv q6h, no pseudomonal risk)  day 0: 10/10 Loading dose (date given): 1g 
Current Maintenance dose: new start Goal Vancomycin Level: 15-20 
(Trough 15-20 for most infections, 20 for meningitis/osteomyelitis, pre-HD level ~25) Vancomycin Level (if drawn): pending Significant Cultures: pending Renal function stable? (unstable defined as SCr increase of 0.5 mg/dL or > 50% increase from baseline, whichever is greater) (Y/N): being evaluated CAPD, Hemodialysis or Renal Replacement Therapy (Y/N): N Recent Labs 10/09/18 [address] [place] [address] CREA  1.31* BUN  23* WBC  15.9* Temp (24hrs), Av.1 °F (37.3 °C), Min:98.2 °F (36.8 °C), Max:99.7 °F (37.6 °C) Creatinine Clearance (Creatinine Clearance (ml/min)): 35.1 Regimen assessment: new start Maintenance dose: reload with 2g, then 1 g iv q24h Next scheduled level: 10/12/1930. Recheck BMP in AM, redose as needed. Pharmacy will follow daily and adjust medications as appropriate for renal function and/or serum levels.  
 
Thank you, 
Toi Gamboa PhD

## 2018-10-10 NOTE — PROGRESS NOTES
attempted to complete the initial Spiritual Assessment of the patient in bwd 15 of the emergency room and offer Pastoral Care  support to her and family members present. Found patient resting very peacefully at this time with family members at bedside. Patient does not have any Taoism/cultural needs that will affect patients preferences in health care. Chaplains will continue to follow and will provide pastoral care on an as needed/requested basis. Vanita Perry Board Certified [facility] Spiritual Care Department [phone]

## 2018-10-10 NOTE — H&P
History and Physical 
 
Patient: Yakelin Lester               Sex: female          DOA: 10/9/2018 YOB: 1935      Age:  81 y.o.        LOS:  LOS: 0 days HPI:  
 
Yakelin Lester is a 81 y.o. female who presented to the ER with redness and pain involving her left foot. She has had a long term wound on her foot and she has seen podiatry and wound care. She did not have chest pain or SOB. She did not have cough or SOB. In the ER she is found to have cellulitis involving her foot and will be admitted for ongoing management. Past Medical History:  
Diagnosis Date  CAD (coronary artery disease)  Chronic osteomyelitis of ankle ([facility]  CKD (chronic kidney disease) stage 3, GFR 30-59 ml/min ([facility]) 06/24/2013 Worst noted (08/06/13) BUN/sCr: 54/2.49, GFR 20.   
 DM (diabetes mellitus) ([facility]  Elevated TSH 06/25/2012  
 5.08   
 Hypertension  Leukocytosis  Lumbar spinal stenosis  Menopause  Normocytic anemia  Rhabdomyolysis  Splenomegaly  Stroke ([facility]  Vitamin D deficiency Social History: 
 Tobacco use:  Patient does not use tobacco 
 Alcohol use:  Patient does not use alcohol Patient lives with her  Family History: 
 Mom had Parkinson's Father had CAD Review of Systems Constitutional:  No fever or weight loss HEENT:  No headache or visual changes Cardiovascular:  No chest pain or diaphoresis Respiratory:  No coughing, wheezing, or shortness of breath. GI:  No nausea or vomitting. No diarrhea :  No hematuria or dysuria Skin:  Foot wound with redness involving left foot Neuro:  No seizures or syncope Hematological:  No bruising or bleeding Endocrine:  Patient has known diabetes, no thyroid disease Physical Exam:  
  
Visit Vitals  /46  Pulse 74  Temp 99.7 °F (37.6 °C)  Resp 20  
 Ht 5' 7\" (1.702 m)  Wt 75.3 kg (166 lb)  SpO2 97%  BMI 26 kg/m2 Physical Exam: Gen:  No distress, alert HEENT:  Normal cephalic atraumatic, extra-occular movements are intact. Neck:  Supple, No JVD Lungs:  Clear bilaterally, no wheeze, no rales, normal effort Heart:  Regular Rate and Rhythm, normal S1 and S2, no edema Abdomen:  Soft, non tender, normal bowel sounds, no guarding. Extremities:  Well perfused, no cyanosis or edema Neurological:  Awake and alert, CN's are intact, normal strength throughout extremities Skin:  Skin wound involving medial aspect of right foot. Surrounded with erythema Mental Status:  Normal thought process, does not appear anxious Laboratory Studies: All lab results for the last 24 hours reviewed. Assessment/Plan Principal Problem: 
  Cellulitis (10/10/2018) Diabetes HTN Cerebrovascular disease PLAN: 
 
Will treat with IV antibiotics Podiatry consult Wound care BP control DVT prophylaxis.

## 2018-10-10 NOTE — PROGRESS NOTES
Pharmacy Dosing Services: Vancomycin Indication: cellulitis/ diabetic foot infection Day of therapy: 0 Other Antimicrobials (Include dose, start day & day of therapy): 
Piperacillin-tazobactam 
 
 
Loading dose (date given): 1000 mg given in ER at midnight; will start first maintenace dose of 1000 mg IV q24h at 1200 today for adequate loading dose Current Maintenance dose: 1000 mg IV q24h starting at 1200 today Goal Vancomycin Level: 15-20 
(Trough 15-20 for most infections, 20 for meningitis/osteomyelitis, pre-HD level ~25) Vancomycin Level (if drawn): n/a Significant Cultures: pending Renal function stable? (unstable defined as SCr increase of 0.5 mg/dL or > 50% increase from baseline, whichever is greater) (Y/N): yes CAPD, Hemodialysis or Renal Replacement Therapy (Y/N): No  
 
Recent Labs 10/09/18 [address] [place] [address] CREA  1.31* BUN  23* WBC  15.9* Temp (24hrs), Av.3 °F (37.4 °C), Min:99.3 °F (37.4 °C), Max:99.3 °F (37.4 °C) Creatinine Clearance (Creatinine Clearance (ml/min)): 35.1 Pharmacy will follow daily and adjust medications as appropriate for renal function and/or serum levels. Thank you, Jose E Brand, PHARMD

## 2018-10-10 NOTE — ED NOTES
Assumed care of patient at this time, patient on the bedpan and requesting to stay on it at this time. Patient reports no pain or distress.

## 2018-10-10 NOTE — ED TRIAGE NOTES
Pt brought in by EMS in wheelchair in triage for c/o general malaise x2 days. Denies N/V/D, denies chest pain, and denies SOB. Pt states she was feeling sleepy at the dinner table, and fell asleep, denies LOC, and urinated on herself. Per EMS pt report increased activity over the last few days. Pt brought to ED because family has concerns.

## 2018-10-11 ENCOUNTER — APPOINTMENT (OUTPATIENT)
Dept: VASCULAR SURGERY | Age: 83
DRG: 300 | End: 2018-10-11
Attending: NURSE PRACTITIONER
Payer: MEDICARE

## 2018-10-11 LAB
ANION GAP SERPL CALC-SCNC: 11 MMOL/L (ref 3–18)
BASOPHILS # BLD: 0 K/UL (ref 0–0.1)
BASOPHILS NFR BLD: 0 % (ref 0–2)
BUN SERPL-MCNC: 19 MG/DL (ref 7–18)
BUN/CREAT SERPL: 16 (ref 12–20)
CALCIUM SERPL-MCNC: 7.7 MG/DL (ref 8.5–10.1)
CHLORIDE SERPL-SCNC: 107 MMOL/L (ref 100–108)
CO2 SERPL-SCNC: 21 MMOL/L (ref 21–32)
CREAT SERPL-MCNC: 1.18 MG/DL (ref 0.6–1.3)
DIFFERENTIAL METHOD BLD: ABNORMAL
EOSINOPHIL # BLD: 0 K/UL (ref 0–0.4)
EOSINOPHIL NFR BLD: 0 % (ref 0–5)
ERYTHROCYTE [DISTWIDTH] IN BLOOD BY AUTOMATED COUNT: 15.5 % (ref 11.6–14.5)
GLUCOSE SERPL-MCNC: 107 MG/DL (ref 74–99)
HCT VFR BLD AUTO: 30.7 % (ref 35–45)
HGB BLD-MCNC: 9.7 G/DL (ref 12–16)
LEFT ABI: 0.71
LEFT ANTERIOR TIBIAL: 97 MMHG
LEFT ARM BP: 132 MMHG
LEFT POSTERIOR TIBIAL: 92 MMHG
LEFT TBI: 0.27
LEFT TOE PRESSURE: 37 MMHG
LYMPHOCYTES # BLD: 0.9 K/UL (ref 0.9–3.6)
LYMPHOCYTES NFR BLD: 14 % (ref 21–52)
MCH RBC QN AUTO: 27.9 PG (ref 24–34)
MCHC RBC AUTO-ENTMCNC: 31.6 G/DL (ref 31–37)
MCV RBC AUTO: 88.2 FL (ref 74–97)
MONOCYTES # BLD: 0.3 K/UL (ref 0.05–1.2)
MONOCYTES NFR BLD: 5 % (ref 3–10)
NEUTS SEG # BLD: 5.1 K/UL (ref 1.8–8)
NEUTS SEG NFR BLD: 81 % (ref 40–73)
PLATELET # BLD AUTO: 107 K/UL (ref 135–420)
PMV BLD AUTO: 11.3 FL (ref 9.2–11.8)
POTASSIUM SERPL-SCNC: 3.9 MMOL/L (ref 3.5–5.5)
RBC # BLD AUTO: 3.48 M/UL (ref 4.2–5.3)
RIGHT ABI: 1.04
RIGHT ANTERIOR TIBIAL: 142 MMHG
RIGHT ARM BP: 137 MMHG
RIGHT ATA BP LEVEL: NORMAL
RIGHT POSTERIOR TIBIAL: 141 MMHG
RIGHT TBI: 0.39
RIGHT TOE PRESSURE: 54 MMHG
SODIUM SERPL-SCNC: 139 MMOL/L (ref 136–145)
WBC # BLD AUTO: 6.3 K/UL (ref 4.6–13.2)

## 2018-10-11 PROCEDURE — 74011250636 HC RX REV CODE- 250/636: Performed by: HOSPITALIST

## 2018-10-11 PROCEDURE — 97162 PT EVAL MOD COMPLEX 30 MIN: CPT

## 2018-10-11 PROCEDURE — 74011250637 HC RX REV CODE- 250/637: Performed by: PODIATRIST

## 2018-10-11 PROCEDURE — 85025 COMPLETE CBC W/AUTO DIFF WBC: CPT | Performed by: HOSPITALIST

## 2018-10-11 PROCEDURE — 65270000029 HC RM PRIVATE

## 2018-10-11 PROCEDURE — 3331090002 HH PPS REVENUE DEBIT

## 2018-10-11 PROCEDURE — 93922 UPR/L XTREMITY ART 2 LEVELS: CPT

## 2018-10-11 PROCEDURE — 74011250637 HC RX REV CODE- 250/637: Performed by: HOSPITALIST

## 2018-10-11 PROCEDURE — 97165 OT EVAL LOW COMPLEX 30 MIN: CPT

## 2018-10-11 PROCEDURE — 74011000258 HC RX REV CODE- 258: Performed by: HOSPITALIST

## 2018-10-11 PROCEDURE — 3331090001 HH PPS REVENUE CREDIT

## 2018-10-11 PROCEDURE — 97116 GAIT TRAINING THERAPY: CPT

## 2018-10-11 PROCEDURE — 36415 COLL VENOUS BLD VENIPUNCTURE: CPT | Performed by: HOSPITALIST

## 2018-10-11 PROCEDURE — 80048 BASIC METABOLIC PNL TOTAL CA: CPT | Performed by: HOSPITALIST

## 2018-10-11 PROCEDURE — 97530 THERAPEUTIC ACTIVITIES: CPT

## 2018-10-11 RX ADMIN — DEXTROSE MONOHYDRATE, SODIUM CHLORIDE, AND POTASSIUM CHLORIDE 100 ML/HR: 50; 4.5; 1.49 INJECTION, SOLUTION INTRAVENOUS at 20:04

## 2018-10-11 RX ADMIN — PIPERACILLIN SODIUM,TAZOBACTAM SODIUM 3.38 G: 3; .375 INJECTION, POWDER, FOR SOLUTION INTRAVENOUS at 03:01

## 2018-10-11 RX ADMIN — DEXTROSE MONOHYDRATE, SODIUM CHLORIDE, AND POTASSIUM CHLORIDE 100 ML/HR: 50; 4.5; 1.49 INJECTION, SOLUTION INTRAVENOUS at 09:12

## 2018-10-11 RX ADMIN — GABAPENTIN 300 MG: 100 CAPSULE ORAL at 21:13

## 2018-10-11 RX ADMIN — PIPERACILLIN SODIUM,TAZOBACTAM SODIUM 3.38 G: 3; .375 INJECTION, POWDER, FOR SOLUTION INTRAVENOUS at 20:02

## 2018-10-11 RX ADMIN — GABAPENTIN 300 MG: 100 CAPSULE ORAL at 17:02

## 2018-10-11 RX ADMIN — SODIUM CHLORIDE 1000 MG: 900 INJECTION, SOLUTION INTRAVENOUS at 20:02

## 2018-10-11 RX ADMIN — GABAPENTIN 300 MG: 100 CAPSULE ORAL at 10:50

## 2018-10-11 RX ADMIN — PIPERACILLIN SODIUM,TAZOBACTAM SODIUM 3.38 G: 3; .375 INJECTION, POWDER, FOR SOLUTION INTRAVENOUS at 10:50

## 2018-10-11 RX ADMIN — COLLAGENASE SANTYL: 250 OINTMENT TOPICAL at 21:16

## 2018-10-11 NOTE — PROGRESS NOTES
Problem: Falls - Risk of 
Goal: *Absence of Falls Document Trice Pérez Fall Risk and appropriate interventions in the flowsheet. Outcome: Progressing Towards Goal 
Fall Risk Interventions: 
Mobility Interventions: Mechanical lift, Patient to call before getting OOB Medication Interventions: Patient to call before getting OOB, Teach patient to arise slowly

## 2018-10-11 NOTE — CONSULTS
Consultation Note    Assessment:     Cellulitis and ischemic wound in DM female with PAD  Patient Active Problem List   Diagnosis Code    [facility], old[facility] hemipares[facility] I69.359    Type 2 diabetes mellitus ([facility] E11.9    Abnormal finding on thyroid function test R94.6    Acute on chronic renal insufficiency N28.9, N18.9    Anemia D64.9    Chronic osteomyelitis of ankle ([facility]) M86.679    Injury of foot S99.929A    Leukocytosis D72.829    Spinal stenosis of lumbar region M48.061    Microscopic hematuria R31.29    Rhabdomyolysis M62.82    Vitamin D deficiency E55.9    Stroke ([facility]) I63.9    Hypertension I10    DM (diabetes mellitus) ([facility]) E11.9    Normocytic anemia D64.9    CKD (chronic kidney disease) stage 3, GFR 30-59 ml/min ([facility]) N18.3    Elevated TSH R79.89    Iron (Fe) deficiency anemia D50.9    UTI (urinary tract infection) N39.0    Encephalopathy G93.40    LAURA (acute kidney injury) ([facility]) N17.9    Skin ulcer of left ankle, with fat layer exposed ([facility] L97.322    Venous ulcer of ankle, left ([facility]) I83.023, L97.329    Weak pulse R09.89    Syncope R55    Vascular disease, peripheral ([facility]) I73.9    Non-pressure chronic ulcer of right ankle with fat layer exposed ([facility] L97.312    Type 2 diabetes mellitus with nephropathy ([facility]) E11.21    Type 2 diabetes mellitus with diabetic neuropathy ([facility]) E11.40    Hypertensive left ventricular hypertrophy, without heart failure I11.9    Atrial arrhythmia I49.8    Midfoot ulcer, left, with fat layer exposed ([facility] L97.422    Personal history of noncompliance with medical treatment, presenting hazards to health Z91.19    Contact dermatitis due to adhesive bandage L25.8    Cellulitis L03.90       Plan:     No acute surgical intervention indicated    Continue local wound care by wound care team as she is our patient at the outpatient clinic    Continue IV abx    Vascular sx to decide if additional inpatient or outpatient intervention needed to improve the poor flow to the extremity      Subjective:     I was asked by Dr. Tangela Louis to evaluate Anibal Lara for left foot infection and wound. She  is a 81 y.o. female admitted on 10/9/2018 for Cellulitis. She is familiar to our group and was seen recently by us at the [address] [place] [address], [place] [address] clinic. She was doing well and then became febrile and had the chills and her left le became red and hot several days ago.     No Known Allergies    Current Facility-Administered Medications   Medication Dose Route Frequency    sodium chloride (NS) flush 5-10 mL  5-10 mL IntraVENous PRN    gabapentin (NEURONTIN) capsule 300 mg  300 mg Oral TID    dextrose 5% - 0.45% NaCl with KCl 20 mEq/L infusion  100 mL/hr IntraVENous CONTINUOUS    VANCOMYCIN INFORMATION NOTE   Other Rx Dosing/Monitoring    vancomycin (VANCOCIN) 1,750 mg in 0.9% sodium chloride 500 mL IVPB  1,750 mg IntraVENous ONCE    [START ON 10/11/2018] vancomycin (VANCOCIN) 1,000 mg in 0.9% sodium chloride (MBP/ADV) 250 mL adv  1,000 mg IntraVENous Q24H    piperacillin-tazobactam (ZOSYN) 3.375 g in  ml ## EXTENDED 4 HRS INFUSION  3.375 g IntraVENous Q8H    [START ON 10/12/2018] VANCOMYCIN INFORMATION NOTE   Other ONCE       Past Medical History:   Diagnosis Date    CAD (coronary artery disease)     Chronic osteomyelitis of ankle (HCC)     CKD (chronic kidney disease) stage 3, GFR 30-59 ml/min ([address] 06/24/2013    Worst noted (08/06/13) BUN/sCr: 54/2.49, GFR 20.     DM (diabetes mellitus) ([address]     Elevated TSH 06/25/2012    5.08     Hypertension     Leukocytosis     Lumbar spinal stenosis     Menopause     Normocytic anemia     Rhabdomyolysis     Splenomegaly     Stroke ([address]     Vitamin D deficiency      Past Surgical History:   Procedure Laterality Date    COLONOSCOPY N/A 3/1/2017    COLONOSCOPY performed by Jemma Mcneil MD at [address] [place] [address] ENDOSCOPY    HX CHOLECYSTECTOMY      HX FREE SKIN GRAFT      HX HYSTERECTOMY      HX OOPHORECTOMY      HX OPEN REDUCTION INTERNAL FIXATION Left     Tx LLE Ankle Trimalleolar Fx by Dr. Daniel [facility])    HX ORTHOPAEDIC Left 06/11/2013    [address] - LLE Ankle Removal of Hardware     HX VEIN STRIPPING Bilateral 09/18/2013    Dr. Geneva Barbosa 20 SQ CM<       Family History   Problem Relation Age of Onset    Diabetes Other     Hypertension Other      Social History     Social History    Marital status:      Spouse name: N/A    Number of children: N/A    Years of education: N/A     Occupational History    Not on file. Social History Main Topics    Smoking status: Never Smoker    Smokeless tobacco: Never Used    Alcohol use No    Drug use: No    Sexual activity: No     Other Topics Concern    Not on file     Social History Narrative       Physical Exam:    Vitals:    10/10/18 0740 10/10/18 1200 10/10/18 1322 10/10/18 1556   BP: 118/46 138/53 149/73 137/44   Pulse: 85 85 90 79   Resp: 23 21 21 21   Temp:   98.2 °F (36.8 °C) 99.7 °F (37.6 °C)   SpO2: 95% 97% 95% 96%   Weight:       Height:           OBJECTIVE:    Patient is alert, pleasant and in no apparent distress.       VASCULAR EXAM:. Pedal pulses are tpalpable 1/4 DP and trace PT right and nonpalpable left foot. Skin temperature is warm to warm right and very warm left foot. Digital capillary fill time is 5 seconds right and left foot. There is decrease edema of the right and left foot and ankle. Pedal hair is not noted bilateral       NEUROLOGICAL EXAM:. Sensation Intact with 5.07g monofilament wire right and left foot.  Deep tendon reflexes intact and symmetrical on the right and left foot.      MUSCULOSKELETAL EXAM:. Muscle tone is normal.  Muscle strength of the flexor and extensor group inversion and eversion Bilateral. 4/5.         DERMATOLOGICAL EXAM:. Skin is of abnormal texture and turgor with atrophic skin changes noting decrease hair growth, nail changes (thickening), Bilateral. There is slough with minimal granulation tissue noted over the medial left ankle and surrounding warmth and erythma extending proximally up the medial lower left leg. No flocculence, purulence, or sign of abscess. See measurements in wound care graph. Recent Results (from the past 24 hour(s))   CBC WITH AUTOMATED DIFF    Collection Time: 10/09/18 11:10 PM   Result Value Ref Range    WBC 15.9 (H) 4.6 - 13.2 K/uL    RBC 4.56 4.20 - 5.30 M/uL    HGB 13.0 12.0 - 16.0 g/dL    HCT 40.7 35.0 - 45.0 %    MCV 89.3 74.0 - 97.0 FL    MCH 28.5 24.0 - 34.0 PG    MCHC 31.9 31.0 - 37.0 g/dL    RDW 15.2 (H) 11.6 - 14.5 %    PLATELET 152 676 - 552 K/uL    MPV 11.4 9.2 - 11.8 FL    NEUTROPHILS 93 (H) 40 - 73 %    LYMPHOCYTES 3 (L) 21 - 52 %    MONOCYTES 4 3 - 10 %    EOSINOPHILS 0 0 - 5 %    BASOPHILS 0 0 - 2 %    ABS. NEUTROPHILS 14.7 (H) 1.8 - 8.0 K/UL    ABS. LYMPHOCYTES 0.5 (L) 0.9 - 3.6 K/UL    ABS. MONOCYTES 0.7 0.05 - 1.2 K/UL    ABS. EOSINOPHILS 0.0 0.0 - 0.4 K/UL    ABS. BASOPHILS 0.0 0.0 - 0.1 K/UL    DF AUTOMATED     METABOLIC PANEL, COMPREHENSIVE    Collection Time: 10/09/18 11:10 PM   Result Value Ref Range    Sodium 138 136 - 145 mmol/L    Potassium 4.1 3.5 - 5.5 mmol/L    Chloride 105 100 - 108 mmol/L    CO2 22 21 - 32 mmol/L    Anion gap 11 3.0 - 18 mmol/L    Glucose 115 (H) 74 - 99 mg/dL    BUN 23 (H) 7.0 - 18 MG/DL    Creatinine 1.31 (H) 0.6 - 1.3 MG/DL    BUN/Creatinine ratio 18 12 - 20      GFR est AA 47 (L) >60 ml/min/1.73m2    GFR est non-AA 39 (L) >60 ml/min/1.73m2    Calcium 8.9 8.5 - 10.1 MG/DL    Bilirubin, total 1.1 (H) 0.2 - 1.0 MG/DL    ALT (SGPT) 18 13 - 56 U/L    AST (SGOT) 14 (L) 15 - 37 U/L    Alk.  phosphatase 102 45 - 117 U/L    Protein, total 8.2 6.4 - 8.2 g/dL    Albumin 4.1 3.4 - 5.0 g/dL    Globulin 4.1 (H) 2.0 - 4.0 g/dL    A-G Ratio 1.0 0.8 - 1.7     MAGNESIUM    Collection Time: 10/09/18 11:10 PM   Result Value Ref Range    Magnesium 1.9 1.6 - 2.6 mg/dL   TSH 3RD GENERATION    Collection Time: 10/09/18 11:10 PM   Result Value Ref Range    TSH 0.62 0.36 - 3.74 uIU/mL   CARDIAC PANEL,(CK, CKMB & TROPONIN)    Collection Time: 10/09/18 11:10 PM   Result Value Ref Range    CK 79 26 - 192 U/L    CK - MB <1.0 <3.6 ng/ml    CK-MB Index  0.0 - 4.0 %     CALCULATION NOT PERFORMED WHEN RESULT IS BELOW LINEAR LIMIT    Troponin-I, Qt. <0.02 0.0 - 0.045 NG/ML   EKG, 12 LEAD, INITIAL    Collection Time: 10/09/18 11:47 PM   Result Value Ref Range    Ventricular Rate 77 BPM    Atrial Rate 77 BPM    P-R Interval 156 ms    QRS Duration 72 ms    Q-T Interval 318 ms    QTC Calculation (Bezet) 359 ms    Calculated P Axis 56 degrees    Calculated R Axis 10 degrees    Calculated T Axis 108 degrees    Diagnosis       Normal sinus rhythm  Low voltage QRS  Cannot rule out Anteroseptal infarct (cited on or before 21-JUN-2012)  T wave abnormality, consider lateral ischemia  Abnormal ECG  When compared with ECG of 12-FEB-2018 10:21,  premature atrial complexes are no longer present  Questionable change in QRS axis  Confirmed by Pritesh Khan ([id number]) on 10/10/2018 9:15:31 AM     URINALYSIS W/ RFLX MICROSCOPIC    Collection Time: 10/10/18 12:00 AM   Result Value Ref Range    Color YELLOW      Appearance CLEAR      Specific gravity 1.017 1.005 - 1.030      pH (UA) 5.5 5.0 - 8.0      Protein NEGATIVE  NEG mg/dL    Glucose NEGATIVE  NEG mg/dL    Ketone NEGATIVE  NEG mg/dL    Bilirubin NEGATIVE  NEG      Blood TRACE (A) NEG      Urobilinogen 1.0 0.2 - 1.0 EU/dL    Nitrites NEGATIVE  NEG      Leukocyte Esterase TRACE (A) NEG     URINE MICROSCOPIC ONLY    Collection Time: 10/10/18 12:00 AM   Result Value Ref Range    WBC 0 to 3 0 - 4 /hpf    RBC 0 to 3 0 - 5 /hpf    Epithelial cells FEW 0 - 5 /lpf    Bacteria NEGATIVE  NEG /hpf   CULTURE, BLOOD    Collection Time: 10/10/18 12:10 AM   Result Value Ref Range    Special Requests: NO SPECIAL REQUESTS      Culture result: NO GROWTH AFTER 5 HOURS     CULTURE, BLOOD    Collection Time: 10/10/18 12:25 AM   Result Value Ref Range    Special Requests: NO SPECIAL REQUESTS      Culture result: NO GROWTH AFTER 5 HOURS     POC LACTIC ACID    Collection Time: 10/10/18 12:28 AM   Result Value Ref Range    Lactic Acid (POC) 1.9 0.4 - 2.0 mmol/L       Imaging:   FINDINGS:     BONES: No evidence of acute fracture. Severe degenerative disease of the ankle  joint with prominent anterior osteophytosis. Bulky osteophytosis at the dorsum  of the midfoot. Small plantar spur.     SOFT TISSUES: Normal calcifications in the lower calf.     _______________     IMPRESSION  IMPRESSION:     No acute appearing abnormality. Severe ankle DJD.     Ryann Langston DPM, [facility]  [phone] [place] [address]  10/10/2018

## 2018-10-11 NOTE — WOUND CARE
Wound care orders placed in chart per Dr Jess Adams. Wound Care/Dressing change to Left Ankle: Cleanse wound(s) with normal saline only (do not use dermal wound cleanser due to it inactivates santyl), apply no sting skin prep then zinc oxide cream to periwound skin, apply santyl (nickel-thickness) to entire wound, cover with folded saline-moistened 4x4 and abd pad, secure with hypafix or paper tape.

## 2018-10-11 NOTE — PROGRESS NOTES
1038: PT orders and chart reviewed. Attempted PT evaluation, hospital staff at bedside. Will f/u with patient. Isidoro Almanza PT, DPT Office extension: [phone] Pager #: [phone]

## 2018-10-11 NOTE — ROUTINE PROCESS
0730 Bedside, Verbal and Written shift change report given to Brianna Bond (oncoming nurse) by Jennifer Rodriguez RN (offgoing nurse). Report included the following information SBAR and Kardex. Patient currently resting in bed, alert and oriented to all spheres, denies pain or shortness of breath, call bell in reach, 5 Ps and hourly rounding completed, bed locked in low position 1015 doppler being completed at bedside

## 2018-10-11 NOTE — PROGRESS NOTES
Problem: Self Care Deficits Care Plan (Adult) Goal: *Acute Goals and Plan of Care (Insert Text) Occupational Therapy Goals Initiated 10/11/2018 within 3 trials. 1.  Patient will perform grooming with independence standing at sink. 2.  Patient will perform upper body dressing with independence. 3.  Patient will perform lower body dressing with independence. 4.  Patient will perform toilet transfers with modified independence. 5.  Patient will perform all aspects of toileting with independence. 6.  Patient will participate in upper extremity therapeutic exercise/activities with independence for 10 minutes. 7.  Patient will utilize energy conservation techniques during functional activities with verbal, visual and tactile cues. Occupational Therapy EVALUATION Patient: Karlos Isbell (67 y.o. female) Date: 10/11/2018 Primary Diagnosis: Cellulitis Precautions:   Fall, Skin PLOF: modified independent for transfers and independent with ADLs. ASSESSMENT : 
Based on the objective data described below, the patient presents with decreased strength, balance and endurance for carryover of ADLs and transfers following above medical diagnosis. Pt participated with bed mobility, STS transfers and was left seated EOB at the end of session to eat lunch. Pt encouraged to increase OOB time to maintain strength, balance and endurance for safe return to home. Pt and family verbalized understanding for the same. Patient will benefit from skilled intervention to address the above impairments. Patients rehabilitation potential is considered to be Good Factors which may influence rehabilitation potential include:  
[x]             None noted []             Mental ability/status []             Medical condition []             Home/family situation and support systems []             Safety awareness []             Pain tolerance/management 
[]             Other:  
 
Recommendations for nursing: Written on communication board:  
Verbally communicated to: PLAN : 
Recommendations and Planned Interventions: 
[x]               Self Care Training                  [x]        Therapeutic Activities [x]               Functional Mobility Training    []        Cognitive Retraining 
[x]               Therapeutic Exercises           []        Endurance Activities [x]               Balance Training                   []        Neuromuscular Re-Education []               Visual/Perceptual Training     [x]   Home Safety Training 
[x]               Patient Education                 [x]        Family Training/Education []               Other (comment): Frequency/Duration: Patient will be followed by occupational therapy 3 days trial to address goals. Discharge Recommendations: Home Health and None Further Equipment Recommendations for Discharge: N/A  
 
SUBJECTIVE:  
Patient stated I am doing better today.  OBJECTIVE DATA SUMMARY:  
 
Past Medical History:  
Diagnosis Date  CAD (coronary artery disease)  [facility]  CKD (chronic kidney disease) stage 3, GFR 30-59 ml/min ([facility]) 06/24/2013 Worst noted (08/06/13) BUN/sCr: 54/2.49, GFR 20.   
 DM [facility]diabetes mellitus) [facility]  Elevated TSH 06/25/2012  
 5.08   
 Hypertension  Leukocytosis  Lumbar spinal stenosis  Menopause  Normocytic anemia  Rhabdomyolysis  Splenomegaly  Stroke ([facility]  Vitamin D deficiency Past Surgical History:  
Procedure Laterality Date  COLONOSCOPY N/A 3/1/2017 COLONOSCOPY performed by Kim Escalona MD at [facility]  HX FREE SKIN GRAFT    
 HX HYSTERECTOMY  HX OOPHORECTOMY  HX OPEN REDUCTION INTERNAL FIXATION Left Tx LLE Ankle Trimalleolar Fx by Dr. Donte Marino [facility])  HX ORTHOPAEDIC Left 06/11/2013 [address] - LLE Ankle Removal of Hardware  HX VEIN STRIPPING Bilateral 09/18/2013 Dr. Uzma Cole  MS DEBRIDEMENT OPEN WOUND 20 SQ CM<    
 
Barriers to Learning/Limitations: None Compensate with: visual, verbal, tactile, kinesthetic cues/model GCODES:  Self Care  Current  CI= 1-19%  Goal  CH= 0%. The severity rating is based on the Other participation with ADLs and transfers. Eval Complexity: History: LOW Complexity : Brief history review ; Examination: LOW Complexity : 1-3 performance deficits relating to physical, cognitive , or psychosocial skils that result in activity limitations and / or participation restrictions ; Decision Making:LOW Complexity : No comorbidities that affect functional and no verbal or physical assistance needed to complete eval tasks Prior Level of Function/Home Situation:  
Home Situation Home Environment: Private residence # Steps to Enter: 4 Rails to Enter: Yes Hand Rails : Bilateral 
One/Two Story Residence: Two story # of Interior Steps: 13 Height of Each Step (in): 8 inches Interior Rails: Left Lift Chair Available: No 
Living Alone: No 
Support Systems: Child(krysta), Spouse/Significant Other/Partner Patient Expects to be Discharged to[de-identified] Private residence Current DME Used/Available at Home: Cane, straight, Walker, rolling Tub or Shower Type: Tub/Shower combination 
[x]  Right hand dominant   []  Left hand dominant Cognitive/Behavioral Status: 
Neurologic State: Alert Orientation Level: Oriented X4 Cognition: Appropriate for age attention/concentration; Follows commands Safety/Judgement: Fall prevention Skin: intact Edema: none Vision/Perceptual:   
Tracking: Able to track stimulus in all quadrants w/o difficulty Coordination: 
Coordination: Within functional limits Fine Motor Skills-Upper: Left Intact; Right Intact Gross Motor Skills-Upper: Left Intact; Right Intact Balance: 
Sitting: Intact Standing: Impaired Standing - Static: Good Standing - Dynamic : Fair Strength: 
Strength: Generally decreased, functional 
Tone & Sensation: 
Sensation: Intact Range of Motion: 
AROM: Generally decreased, functional 
Functional Mobility and Transfers for ADLs: 
Bed Mobility: 
Rolling: Contact guard assistance Supine to Sit: Contact guard assistance Transfers: 
Sit to Stand: Contact guard assistance ADL Assessment: 
Feeding: Independent Oral Facial Hygiene/Grooming: Setup Upper Body Dressing: Setup Lower Body Dressing: Setup Toileting: Setup ADL Intervention: 
Cognitive Retraining Safety/Judgement: Fall prevention Therapeutic Exercise: 
 
Pain: 
Pre treatment pain level:   
Post treatment pain level:  
Pain Scale 1: Numeric (0 - 10) Pain Intensity 1: 0 Activity Tolerance:  
Good Please refer to the flowsheet for vital signs taken during this treatment. After treatment:  
[x] Patient left in no apparent distress sitting up at EOB [] Patient left in no apparent distress in bed 
[x] Call bell left within reach 
[] Nursing notified 
[x] Caregiver present 
[] Bed alarm activated COMMUNICATION/EDUCATION:  
[x] Home safety education was provided and the patient/caregiver indicated understanding. [x] Patient/family have participated as able in goal setting and plan of care. [x] Patient/family agree to work toward stated goals and plan of care. [] Patient understands intent and goals of therapy, but is neutral about his/her participation. [] Patient is unable to participate in goal setting and plan of care. Thank you for this referral. 
Julia Alejandre OTR/L Time Calculation: 15 mins

## 2018-10-11 NOTE — ROUTINE PROCESS
1940 Bedside and Verbal shift change  Received from Christi Bhatti RN (outgoing nurse), to CARMENZA Belcher (oncoming)  Pt. Is AOX 4. IV SL, Pt. denies  pain at this time. Report included the following information SBAR, Kardex, Procedure Summary, Intake/Output, MAR, Recent Lab Results, and  Cardiac Rhythm @ NSR3. Will resume care and monitor Pt. Condition. Pt. Educated on call bell when in need of help and assistance. Pt. Verbalized understanding. Pt. Head to toe Assessment Done and documented. 2030  Pt. meds and IV started. No complaints made. 2130  Pt. Resting comfortably, no sign of discomfort. 2300  Pt. Made no complaints. 0100  Pt. Denies pain. 0300  Pt. Resting comfortably in bed, no sign of distress. 0530  Pt. Able to rest well throughout the shift. 6504  On Bedpan, able to turn and reposition independently. Verbal and bedside Shift changed report given to Wayne Alonso RN (oncoming RN) on Pt. Condition. Report consisted of patients Situation, History, Activities, intake/output,  Background, Assessment and Recommendations(SBAR). Information from the following report(s) Kardex, order Summary, Lab results and MAR was reviewed with the receiving nurse. Opportunity for questions and clarification was provided.

## 2018-10-11 NOTE — PROGRESS NOTES
Problem: Mobility Impaired (Adult and Pediatric) Goal: *Acute Goals and Plan of Care (Insert Text) Physical Therapy Goals Initiated 10/11/2018 and to be accomplished within 7 days. 1.  Patient will complete all bed mobility with modified independence in order to prepare for EOB/OOB activity. 2.  Patient will perform sit <> stand with modified independence in order to prepare for OOB/gait activity. 3.  Patient will perform bed to chair transfers with modified independence in order to promote mobility and encourage seated activity to progress towards their prior level of function. 4.  Patient will ambulate 100 feet with modified independence using LRAD in order to prepare for safe negotiation of their environment. 5.  Patient will ascend/descend 4 steps with S handrail use with modified independence in order to prepare for safe exit/entry into their home environment. Outcome: Progressing Towards Goal 
PHYSICAL THERAPY: Initial Assessment INPATIENT: Medicare: Hospital Day: 3 Patient: Luisito Ramos (89 y.o. female)    Date: 10/11/2018 Primary Diagnosis: Cellulitis  
 ,    
Precautions: Skin PLOF:Indepedent, ambulation with RW 
 
ASSESSMENT : 
Patient supine in bed, agreeable to participation with PT. Family present. Patient reports that she lives with family in a 2 story home with 5 RENZO and 15 stairs to 2nd floor; bed and bath on second floor. MOD A x 1 for supine <> sit. Sits with fair balance. B LE strength 4-/5. CGA for sit <> stand; fair standing balance. Ambulates 20 ft with spc and CGA; gait unsteady with short strides. Patient fatigued with gait, returned supine to bed and left with all needs within reach. No c/o pain. Bleeding from wound on L ankle noted upon return to bed, PATIENCE Torres notified. Recommend d/c to rehab for short stay to maximize safety with mobility. Patient presents with deficits in: 
Bed Mobility, Transfers, Gait, Strength, Balance, and Stairs Patient will benefit from skilled intervention to address the above impairments. Patients rehabilitation potential is considered to be Good Factors which may influence rehabilitation potential include:  
[]         None noted 
[]         Mental ability/status [x]         Medical condition 
[]         Home/family situation and support systems 
[]         Safety awareness 
[]         Pain tolerance/management 
[]         Other: PLAN : 
Recommendations and Planned Interventions: 
[x]           Bed Mobility Training             [x]    Neuromuscular Re-Education 
[x]           Transfer Training                   []    Orthotic/Prosthetic Training 
[x]           Gait Training                          []    Modalities [x]           Therapeutic Exercises          []    Edema Management/Control 
[x]           Therapeutic Activities            [x]    Patient and Family Training/Education 
[]           Other (comment): EDUCATION:  
Education:  Patient was educated on the following topics: purpose of PT, PT POC, safety with gait, appropriateness of AD, strategy for mobility. Verbalizes understanding. Barriers to Learning/Limitations: None Compensate with: visual, verbal, tactile, kinesthetic cues/model Recommendations for the next treatment session: Gait Frequency/Duration: Patient will be followed by physical therapy 3 -  5  times a week to address goals. Discharge Recommendations: Rehab Further Equipment Recommendations for Discharge: quad cane, straight cane and rolling walker (patienthas) Factors which may impact discharge planning: N/A  
 
SUBJECTIVE:  
Patient stated I have antique furniture in here.  OBJECTIVE DATA SUMMARY:  
 
Past Medical History:  
Diagnosis Date  CAD (coronary artery disease)  Chronic osteomyelitis [facility] ankle ([facility]  CKD (chronic kidney disease) stage 3, GFR 30-59 ml/min (formerly [facility]) 06/24/2013 Worst noted (08/06/13) BUN/sCr: 54/2.49, GFR 20.  DM (diabetes mellitus) ([facility]  Elevated TSH 06/25/2012  
 5.08   
 Hypertension  Leukocytosis  Lumbar spinal stenosis  Menopause  Normocytic anemia  Rhabdomyolysis  Splenomegaly  Stroke ([facility]  Vitamin D deficiency Past Surgical History:  
Procedure Laterality Date  COLONOSCOPY N/A 3/1/2017 COLONOSCOPY performed by Daisha Abarca MD at [address]  HX FREE SKIN GRAFT    
 HX HYSTERECTOMY  HX OOPHORECTOMY  HX OPEN REDUCTION INTERNAL FIXATION Left Tx LLE Ankle Trimalleolar Fx by Dr. Laya [facility])  HX ORTHOPAEDIC Left 06/11/2013 [address] - LLE Ankle Removal of Hardware  HX VEIN STRIPPING Bilateral 09/18/2013 Dr. Jemima Fermin  MI DEBRIDEMENT OPEN WOUND 20 SQ CM<    
 
 
 
Eval Complexity: History: MEDIUM  Complexity : 1-2 comorbidities / personal factors will impact the outcome/ POC Exam:MEDIUM Complexity : 3 Standardized tests and measures addressing body structure, function, activity limitation and / or participation in recreation  Presentation: MEDIUM Complexity : Evolving with changing characteristics  Clinical Decision Making:Medium Complexity Assist for mobility Overall Complexity:MEDIUM 
 
G CODES:Mobility  Current  CJ= 20-39%   Goal  CI= 1-19%. The severity rating is based on the Other B LE weakness, unsteady gait, assist for mobility Prior Level of Function/Home Situation:  
Home Situation Home Environment: Private residence # Steps to Enter: 5 Rails to Enter: Yes One/Two Story Residence: Two story # of Interior Steps: 13 Height of Each Step (in): 8 inches Interior Rails: Left Lift Chair Available: No 
Living Alone: No 
Support Systems: Family member(s) Patient Expects to be Discharged to[de-identified] Private residence Current DME Used/Available at Home: [address], quad, [address], straight, Walker, rolling Critical Behavior: 
Neurologic State: Alert Orientation Level: Oriented X4 Cognition: Follows commands Psychosocial 
Patient Behaviors: Calm; Cooperative Family  Behaviors: Calm;Supportive Skin Condition/Temp: Warm Family  Behaviors: Calm;Supportive Manual Muscle Testing (LE) 
       R     L Hip Flexion:   4-/5  4-/5 Knee EXT:   4-/5  4-/5 Knee FLEX:   4-/5  4-/5 Ankle DF:   4-/5  4-/5 
_________________________________________________ Tone : normal 
Sensation: NT 
Range Of Motion: [facility] Functional Mobility: 
 
 
Functional Status Indep (I) Mod I Super-vision Min A Mod A Max A Total A Assist x2 Verbal cues Additional time Not tested Comments Rolling []  []  [] []    []    []  []  [] [] [] [x] Supine to sit []  []  [] []  [x]  []  []  [] [] [] [] Sit to supine []  []  [] [x]  []  []  []  [] [] [] [] Sit to stand []  []  [x] []  []  []  []  [] [] [] [] CGA Stand to sit []  []  [x] []  []  []  []  [] [] [] [] CGA Bed to chair transfers []  []  [] []  []  []  []  [] [] [] [x] Balance Good [address] Poor Unable Not tested Comments Sitting static []  [x]  []  []  [] Sitting dynamic []  [x]  []  []  []   
Standing static []  [x]  []  []  []   
Standing dynamic []  [x]  []  []  [] Mobility/Gait:  
Level of Assistance: Supervision and Contact guard assistance Assistive Device: straight cane Distance Ambulated: 20 feet Left Lower Extremity: FWB Right Lower Extremity: FWB Base of Support: center of gravity altered Speed/Hailey: pace decreased (<100 feet/min) Step Length: left shortened and right shortened Swing Pattern: [facility] Stance: [facility] Gait Abnormalities: decreased step clearance and step to gait Pain: 
None Vital Signs Temp: 97.7 °F (36.5 °C) Pulse (Heart Rate): (!) 56 (nurse notify) BP: 122/70 Resp Rate: 20    
O2 Sat (%): 96 % Activity Tolerance:  
Fair, limited by fatigue Please refer to the flowsheet for vital signs taken during this treatment. After treatment:  
[]         Patient left in no apparent distress sitting up in chair 
[x]         Patient left in no apparent distress in bed 
[x]         Call bell left within reach [x]         Nursing notified 
[]         Caregiver present 
[]         Bed alarm activated COMMUNICATION/EDUCATION:  
[x]         Fall prevention education was provided and the patient/caregiver indicated understanding. [x]         Patient/family have participated as able in goal setting and plan of care. [x]         Patient/family agree to work toward stated goals and plan of care. []         Patient understands intent and goals of therapy, but is neutral about his/her participation. []         Patient is unable to participate in goal setting and plan of care. Recommendations for nursing: 
Written on communication board: up with assist x 1 Thank you for this referral. 
Darryn Elizalde Time Calculation: 25 mins

## 2018-10-11 NOTE — CONSULTS
Consultation Note    Assessment:      Improving cellulitis with ischemic wound in DM female with PAD  Patient Active Problem List   Diagnosis Code    CVA, old, hemiparesis ([facility] [address] I69.359    Type 2 diabetes mellitus ([facility] [address] [facility] E11.9    Abnormal finding on thyroid function test R94.6    Acute on chronic renal insufficiency N28.9, N18.9    Anemia D64.9    Chronic osteomyelitis of ankle ([facility]) M86.679    Injury of foot S99.929A    Leukocytosis D72.829    Spinal stenosis of lumbar region M48.061    Microscopic hematuria R31.29    Rhabdomyolysis M62.82    Vitamin D deficiency E55.9    Stroke ([facility]) I63.9    Hypertension I10    DM (diabetes mellitus) ([facility]) E11.9    Normocytic anemia D64.9    CKD (chronic kidney disease) stage 3, GFR 30-59 ml/min ([facility]) N18.3    Elevated TSH R79.89    Iron (Fe) deficiency anemia D50.9    UTI (urinary tract infection) N39.0    Encephalopathy G93.40    LAURA (acute kidney injury) ([facility]) N17.9    Skin ulcer of left ankle, with fat layer exposed ([facility] [address] [facility] L97.322    Venous ulcer of ankle, left ([facility]) I83.023, L97.329    Weak pulse R09.89    Syncope R55    Vascular disease, peripheral ([facility]) I73.9    Non-pressure chronic ulcer of right ankle with [facility] layer exposed [facility] L97.312    Type 2 diabetes mellitus with nephropathy ([facility]) E11.21    Type 2 diabetes mellitus with diabetic neuropathy ([facility]) E11.40    Hypertensive left ventricular hypertrophy, without heart failure I11.9    Atrial arrhythmia I49.8    Midfoot ulcer, left, with fat layer exposed ([facility] [address] L97.422    Personal history of noncompliance with medical treatment, presenting hazards to health Z91.19    Contact dermatitis due to adhesive bandage L25.8    Cellulitis L03.90       Plan:     No acute surgical intervention indicated    Continue local wound care by wound care team as she is our patient at the outpatient clinic    Continue IV abx    PVL study results pending    Subjective:     I was asked by Dr. Nuria Churchill to evaluate Anibal Lara for left foot infection and wound. She  is a 81 y.o. female admitted on 10/9/2018 for Cellulitis. She is familiar to our group and was seen recently by us at the [address] [place] [address], [place] [address] clinic. She feels much better today as her chills have stopped. She states her leg swelling is decreased.   Denies N/F/V    No Known Allergies    Current Facility-Administered Medications   Medication Dose Route Frequency    influenza vaccine 2018-19 (6 mos+)(PF) (FLUARIX QUAD/FLULAVAL QUAD) injection 0.5 mL  0.5 mL IntraMUSCular PRIOR TO DISCHARGE    sodium chloride (NS) flush 5-10 mL  5-10 mL IntraVENous PRN    gabapentin (NEURONTIN) capsule 300 mg  300 mg Oral TID    dextrose 5% - 0.45% NaCl with KCl 20 mEq/L infusion  100 mL/hr IntraVENous CONTINUOUS    VANCOMYCIN INFORMATION NOTE   Other Rx Dosing/Monitoring    vancomycin (VANCOCIN) 1,000 mg in 0.9% sodium chloride (MBP/ADV) 250 mL adv  1,000 mg IntraVENous Q24H    piperacillin-tazobactam (ZOSYN) 3.375 g in  ml ## EXTENDED 4 HRS INFUSION  3.375 g IntraVENous Q8H    [START ON 10/12/2018] VANCOMYCIN INFORMATION NOTE   Other ONCE       Past Medical History:   Diagnosis Date    CAD (coronary artery disease)     Chronic osteomyelitis of ankle (HCC)     CKD (chronic kidney disease) stage 3, GFR 30-59 ml/min ([facility]) 06/24/2013    Worst noted (08/06/13) BUN/sCr: 54/2.49, GFR 20.     DM (diabetes mellitus) ([facility])     Elevated TSH 06/25/2012    5.08     Hypertension     Leukocytosis     Lumbar spinal stenosis     Menopause     Normocytic anemia     Rhabdomyolysis     Splenomegaly     Stroke ([facility] [address]     Vitamin D deficiency      Past Surgical History:   Procedure Laterality Date    COLONOSCOPY N/A 3/1/2017    COLONOSCOPY performed by Jemma Mcneil [facility] at [facility] ENDOSCOPY    HX CHOLECYSTECTOMY      HX FREE SKIN GRAFT      HX HYSTERECTOMY      HX OOPHORECTOMY      HX OPEN REDUCTION INTERNAL FIXATION Left     Tx LLE Ankle Trimalleolar Fx by Dr. Velma Amezquita Jb TIP[facility])    HX ORTHOPAEDIC Left 06/11/2013    [address] Ankle Removal of Hardware     HX VEIN STRIPPING Bilateral 09/18/2013    Dr. Linda Hoang 20 SQ CM<       Family History   Problem Relation Age of Onset    Diabetes Other     Hypertension Other      Social History     Social History    Marital status:      Spouse name: N/A    Number of children: N/A    Years of education: N/A     Occupational History    Not on file. Social History Main Topics    Smoking status: Never Smoker    Smokeless tobacco: Never Used    Alcohol use No    Drug use: No    Sexual activity: No     Other Topics Concern    Not on file     Social History Narrative       Physical Exam:    Vitals:    10/11/18 0020 10/11/18 0450 10/11/18 0831 10/11/18 1241   BP: 114/51 118/42 140/77 122/70   Pulse: 73 67 84 (!) 56   Resp: 20 20 20 20   Temp: 99.6 °F (37.6 °C) 99.2 °F (37.3 °C) 98.3 °F (36.8 °C) 97.7 °F (36.5 °C)   SpO2: 98% 92% 95% 96%   Weight:       Height:           OBJECTIVE:    Patient is alert, pleasant and in no apparent distress.       VASCULAR EXAM:. Pedal pulses are tpalpable 1/4 DP and trace PT right and nonpalpable left foot. Skin temperature is warm to warm right and very warm left foot. Digital capillary fill time is 5 seconds right and left foot. There is decreased edema day over day to the left foot and ankle and lower leg. Pedal hair is not noted bilateral       NEUROLOGICAL EXAM:. Sensation Intact with 5.07g monofilament wire right and left foot.  Deep tendon reflexes intact and symmetrical on the right and left foot.      MUSCULOSKELETAL EXAM:. Muscle tone is normal.  Muscle strength of the flexor and extensor group inversion and eversion Bilateral. 4/5.         DERMATOLOGICAL EXAM:. Skin is of abnormal texture and turgor with atrophic skin changes noting decrease hair growth, nail changes (thickening), Bilateral. There is  slough with minimal granulation tissue noted over the medial left ankle. Moderate decrease in warmth and rescinding erythma to the medial lower left leg. No flocculence, purulence, or sign of abscess. See measurements in wound care below. Wound Ankle Left;Medial;Distal (Active)   Number of days:329       Wound Ankle Left;Lateral (Active)   Number of days:329       Wound Leg Lower Anterior;Left;Medial (Active)   Number of days:311       Wound Ankle Lateral (Active)   Wound Length (cm) 1 cm 10/11/2018  1:07 AM   Wound Width (cm) 1 cm 10/11/2018  1:07 AM   Wound Surface area (cm^2) 1 cm^2 10/11/2018  1:07 AM   Condition of Base Other (comment) 10/11/2018  1:07 AM   Tissue Type Pink 10/11/2018  1:07 AM   Drainage Amount  None 10/11/2018  1:07 AM   Wound Odor None 10/11/2018  1:07 AM   Periwound Skin Condition Increased warmth; Other (comment) 10/11/2018  1:07 AM   Number of days:1       Wound Arm Left;Right (Active)   DRESSING STATUS Clean, dry, and intact 10/11/2018  1:07 AM   DRESSING TYPE Other (Comment) 10/11/2018  1:07 AM   Condition of Base Other (comment) 10/11/2018  1:07 AM   Tissue Type Other (comment) 10/11/2018  1:07 AM   Drainage Amount  None 10/11/2018  1:07 AM   Wound Odor None 10/11/2018  1:07 AM   Number of days:1       [REMOVED] Wound Ankle Left (Removed)   Removed 06/11/18    Number of IOAO:0455       [REMOVED] Wound Ankle Left (Removed)   Removed 06/11/18    Number of EDOB:9208       [REMOVED] Wound Ankle Left (Removed)   Removed 06/11/18    Number of FXLR:1183       [REMOVED] Wound Ankle Left;Lateral (Removed)   Removed 06/11/18    Number of days:470       [REMOVED] Wound Ankle Right (Removed)   Removed 06/11/18    Number of days:470       [REMOVED] Wound Ankle Lateral;Right (Removed)   Removed 06/11/18    Number of days:215       [REMOVED] Wound Ankle Left;Lateral (Removed)   Removed 06/11/18    Number of days:215       [REMOVED] Wound Ankle Left;Medial (Removed)   Removed 06/25/18    Number of days:229 [REMOVED] Wound Ankle Left;Medial;Proximal (Removed)   Removed 08/13/18    Number of days:270       [REMOVED] Wound Toe Left (Removed)   Removed 06/11/18    Number of days:207       [REMOVED] Wound Toe Left (Removed)   Removed 06/11/18    Number of days:207       [REMOVED] Wound Foot Left;Medial;Distal (Removed)   Removed 09/17/18    Number of days:287       [REMOVED] Wound Leg Lower Right; Anterior; Lateral (Removed)   Removed 02/15/18 1138   Number of days:73       [REMOVED] Wound Foot Left;Dorsal (Removed)   Removed 08/27/18    Number of days:102               Recent Results (from the past 24 hour(s))   GLUCOSE, POC    Collection Time: 10/10/18  9:57 PM   Result Value Ref Range    Glucose (POC) 105 70 - 110 mg/dL   CBC WITH AUTOMATED DIFF    Collection Time: 10/11/18  4:20 AM   Result Value Ref Range    WBC 6.3 4.6 - 13.2 K/uL    RBC 3.48 (L) 4.20 - 5.30 M/uL    HGB 9.7 (L) 12.0 - 16.0 g/dL    HCT 30.7 (L) 35.0 - 45.0 %    MCV 88.2 74.0 - 97.0 FL    MCH 27.9 24.0 - 34.0 PG    MCHC 31.6 31.0 - 37.0 g/dL    RDW 15.5 (H) 11.6 - 14.5 %    PLATELET 285 (L) 881 - 420 K/uL    MPV 11.3 9.2 - 11.8 FL    NEUTROPHILS 81 (H) 40 - 73 %    LYMPHOCYTES 14 (L) 21 - 52 %    MONOCYTES 5 3 - 10 %    EOSINOPHILS 0 0 - 5 %    BASOPHILS 0 0 - 2 %    ABS. NEUTROPHILS 5.1 1.8 - 8.0 K/UL    ABS. LYMPHOCYTES 0.9 0.9 - 3.6 K/UL    ABS. MONOCYTES 0.3 0.05 - 1.2 K/UL    ABS. EOSINOPHILS 0.0 0.0 - 0.4 K/UL    ABS.  BASOPHILS 0.0 0.0 - 0.1 K/UL    DF AUTOMATED     METABOLIC PANEL, BASIC    Collection Time: 10/11/18  4:20 AM   Result Value Ref Range    Sodium 139 136 - 145 mmol/L    Potassium 3.9 3.5 - 5.5 mmol/L    Chloride 107 100 - 108 mmol/L    CO2 21 21 - 32 mmol/L    Anion gap 11 3.0 - 18 mmol/L    Glucose 107 (H) 74 - 99 mg/dL    BUN 19 (H) 7.0 - 18 MG/DL    Creatinine 1.18 0.6 - 1.3 MG/DL    BUN/Creatinine ratio 16 12 - 20      GFR est AA 53 (L) >60 ml/min/1.73m2    GFR est non-AA 44 (L) >60 ml/min/1.73m2    Calcium 7.7 (L) 8.5 - 10.1 MG/DL   ANKLE BRACHIAL INDEX    Collection Time: 10/11/18 11:12 AM   Result Value Ref Range    Left arm  mmHg    Right arm  mmHg    Left posterior tibial 92 mmHg    Right posterior tibial 141 mmHg    Left anterior tibial 97 mmHg    Right anterior tibial 142 mmHg    Left JOLANTA 0.71     Right JOLANTA 1.04     Left toe pressure 37 mmHg    Right toe pressure 54 mmHg    Left TBI 0.27     Right TBI 0.39     Right MAXIME level foot        Imaging:   FINDINGS:     BONES: No evidence of acute fracture. Severe degenerative disease of the ankle  joint with prominent anterior osteophytosis. Bulky osteophytosis at the dorsum  of the midfoot. Small plantar spur.     SOFT TISSUES: Normal calcifications in the lower calf.     _______________     IMPRESSION  IMPRESSION:     No acute appearing abnormality. Severe ankle DJD.     Yeimy Ivory DPM, [facility]  [address]   10/11/2018

## 2018-10-11 NOTE — MANAGEMENT PLAN
Discharge/Transition Planning Interviewed patient. Verified demographics listed on face sheet with patient; all information correct. Pt has Medicare A&B and [employer]. Patient stated their PCP is Dr Vance Plascencia and last appt Feb or March; states next appt in Nov . Patient lives in 2 story home with spouse and daughter; states can get up stairs slowly without assist . Patient's NOK is spouse and daughter may share info too. Patient mostly independent with ADLs prior to admission. Spouse or daughter drive her. DME prior to admission: cane. Discharge plan: uses [facility] for wound care and would like to continue Home and [address] Patient has designated __spouse; daughter______________________ to participate in his/her discharge plan and to receive any needed information. Name: Christiano Alcala: [id number]: [phone] Care Management Interventions PCP Verified by CM: Yes (Dr Vance Plascencia) Mode of Transport at Discharge: Other (see comment) Transition of Care Consult (CM Consult): Discharge Planning Physical Therapy Consult: Yes Occupational Therapy Consult: Yes Current Support Network: Own Home, Lives with Spouse Confirm Follow Up Transport: Family Plan discussed with Pt/Family/Caregiver: Yes Discharge Location Discharge Placement: Home with home health Reason for Admission:   CELLULITIS  
            
RRAT Score:     51 Resources/supports as identified by patient/family:   Supportive spouse and daughter. Pt states spouse \"Fit as a Fiddle\" Top Challenges facing patient (as identified by patient/family and CM): \"unending wounds\" Finances/Medication cost?      Has good insurance coverage Transportation? Souse or daughter Support system or lack thereof? Family Living arrangements? See above Self-care/ADLs/Cognition? Needs some assistance, states can do most on own Current Advanced Directive/Advance Care Plan:  Not on File Plan for utilizing home health:    [facility] Likelihood of readmission:  High/Red Transition of Care Plan:     [facility] Colman Goldberg RN BSN Outcomes Manager Pager # [phone]

## 2018-10-11 NOTE — PROGRESS NOTES
[facility] Hospitalist Division Inpatient Daily Progress Note Daily progress Note Patient: Ariel Marcano MRN: [id number]  CSN: [id number] YOB: 1935  Age: 81 y.o. Sex: female DOA: 10/9/2018 LOS:  LOS: 1 day Chief Complaint:  Cellulitis left foot, chronic LLE ulcer Interval History: 82 y/o  female with hx of CVA, HTN, T2DM, chronic ulcer LLE presented to the ED on 10/10 with fever, chills, erythema and pain in left foot. She is followed by podiatry Dr. Rafael Hurley and wound care clinic. Xray left ankle in ED negative. UA negative, blood cultures collected 10/10 NGTD x 2. She was started on IV abx and admitted. Podiatry and wound care consulted. Recommend continuing local wound care, vascualr surgery to decide if additional inpatient vs outpatient intervention needed to improve poor flow to LLE. JOLANTA LLE pending. Assessment/Plan:  
 
Patient Active Problem List  
Diagnosis Code  CVA, old, hemiparesis ([facility] [address] P00.515  Type 2 diabetes mellitus (HCC) E11.9  Abnormal finding on thyroid function test R94.6  Acute on chronic renal insufficiency N28.9, N18.9  Anemia D64.9  Chronic osteomyelitis of ankle ([address] M86.679  Injury of foot T58.678Y  Leukocytosis D72.829  
 Spinal stenosis of lumbar region M48.061  
 Microscopic hematuria R31.29  
 Rhabdomyolysis M62.82  Vitamin D deficiency E55.9  Stroke ([facility] [address] I63.9  Hypertension I10  
 DM (diabetes mellitus) ([facility] [address] E11.9  Normocytic anemia D64.9  CKD (chronic kidney disease) stage 3, GFR 30-59 ml/min ([facility]) N18.3  Elevated TSH R79.89  
 Iron (Fe) deficiency anemia D50.9  UTI (urinary tract infection) N39.0  Encephalopathy G93.40  LAURA (acute kidney injury) ([address] N17.9  Skin ulcer of left ankle, with fat layer exposed ([facility] [address] B88.891  Venous ulcer of ankle, left ([facility]) I83.023, L97.329  
 Weak pulse R09.89  
  Syncope R55  Vascular disease, peripheral (HCC) I73.9  Non-pressure chronic ulcer of right ankle with fat layer exposed ([facility] L97.312  Type 2 diabetes mellitus with nephropathy ([facility]) E11.21  
 Type 2 diabetes mellitus with diabetic neuropathy ([facility]) E11.40  Hypertensive left ventricular hypertrophy, without heart failure I11.9  Atrial arrhythmia I49.8  Midfoot ulcer, left, with fat layer exposed ([facility] [address] I08.532  Personal history of noncompliance with medical treatment, presenting hazards to health Z91.19  
 Contact dermatitis due to adhesive bandage L25.8  Cellulitis L03.90 A/P: 
Cellulitis left foot 
- hx of chronic ulcer LLE 
- podiatry and wound care following- appreciate assistance 
- xray left foot negative - blood cultures NGTD x 2 
- afebrile w/o leukocytosis  
- continue IV Vancomycin and Zosyn 
- pain control - JOLANTA LLE pending- t/c vascular consult based on findings Hypertension 
- stable, continue to monitor DVT Prophylaxis - SCD's BLE MYCHAL [facility] Pager:  [phone] Office:  [phone] Subjective: Interval notes reviewed. No acute events overnight. JOLANTA LLE pending. Objective:  
  
Visit Vitals  /42  Pulse 67  Temp 99.2 °F (37.3 °C)  Resp 20  
 Ht 5' 7\" (1.702 m)  Wt 75.3 kg (166 lb)  SpO2 92%  Breastfeeding No  
 BMI 26 kg/m2 Physical Exam: 
General appearance: alert, cooperative, no distress, appears stated age Head: Normocephalic, without obvious abnormality, atraumatic Lungs: clear to auscultation bilaterally Heart: regular rate and rhythm, S1, S2 normal, no murmur, click, rub or gallop Abdomen: soft, non tender, non distended. Normoactive bowel sounds. Extremities: wound inner left foot intact- no oozing or drainage at present, erythema noted to LLE Skin: chronic vascular changes BLE Neurologic: Grossly normal 
 PSY: Mood and affect normal, appropriately behaved Intake and Output: 
Current Shift:    
Last three shifts:  10/09 1901 - 10/11 0700 In: 3286 [P.O.:240; I.V.:1300] Out: 900 [Urine:900] Recent Results (from the past 24 hour(s)) GLUCOSE, POC Collection Time: 10/10/18  9:57 PM  
Result Value Ref Range Glucose (POC) 105 70 - 110 mg/dL CBC WITH AUTOMATED DIFF Collection Time: 10/11/18  4:20 AM  
Result Value Ref Range WBC 6.3 4.6 - 13.2 K/uL  
 RBC 3.48 (L) 4.20 - 5.30 M/uL HGB 9.7 (L) 12.0 - 16.0 g/dL HCT 30.7 (L) 35.0 - 45.0 % MCV 88.2 74.0 - 97.0 FL  
 MCH 27.9 24.0 - 34.0 PG  
 MCHC 31.6 31.0 - 37.0 g/dL  
 RDW 15.5 (H) 11.6 - 14.5 % PLATELET 493 (L) 835 - 420 K/uL MPV 11.3 9.2 - 11.8 FL  
 NEUTROPHILS 81 (H) 40 - 73 % LYMPHOCYTES 14 (L) 21 - 52 % MONOCYTES 5 3 - 10 % EOSINOPHILS 0 0 - 5 % BASOPHILS 0 0 - 2 %  
 ABS. NEUTROPHILS 5.1 1.8 - 8.0 K/UL  
 ABS. LYMPHOCYTES 0.9 0.9 - 3.6 K/UL  
 ABS. MONOCYTES 0.3 0.05 - 1.2 K/UL  
 ABS. EOSINOPHILS 0.0 0.0 - 0.4 K/UL  
 ABS. BASOPHILS 0.0 0.0 - 0.1 K/UL  
 DF AUTOMATED METABOLIC PANEL, BASIC Collection Time: 10/11/18  4:20 AM  
Result Value Ref Range Sodium 139 136 - 145 mmol/L Potassium 3.9 3.5 - 5.5 mmol/L Chloride 107 100 - 108 mmol/L  
 CO2 21 21 - 32 mmol/L Anion gap 11 3.0 - 18 mmol/L Glucose 107 (H) 74 - 99 mg/dL BUN 19 (H) 7.0 - 18 MG/DL Creatinine 1.18 0.6 - 1.3 MG/DL  
 BUN/Creatinine ratio 16 12 - 20 GFR est AA 53 (L) >60 ml/min/1.73m2 GFR est non-AA 44 (L) >60 ml/min/1.73m2 Calcium 7.7 (L) 8.5 - 10.1 MG/DL Lab Results Component Value Date/Time Glucose 107 (H) 10/11/2018 04:20 AM  
 Glucose 115 (H) 10/09/2018 11:10 PM  
 Glucose 137 (H) 12/26/2017 05:14 AM  
 Glucose 85 12/04/2017 04:35 AM  
 Glucose 114 (H) 12/03/2017 03:41 AM  
  
 
Imaging: No results found. Medication List Reviewed: 
Current Facility-Administered Medications Medication Dose Route Frequency  influenza vaccine 2018-19 (6 mos+)(PF) (FLUARIX QUAD/FLULAVAL QUAD) injection 0.5 mL  0.5 mL IntraMUSCular PRIOR TO DISCHARGE  sodium chloride (NS) flush 5-10 mL  5-10 mL IntraVENous PRN  
 gabapentin (NEURONTIN) capsule 300 mg  300 mg Oral TID  dextrose 5% - 0.45% NaCl with KCl 20 mEq/L infusion  100 mL/hr IntraVENous CONTINUOUS  
 VANCOMYCIN INFORMATION NOTE   Other Rx Dosing/Monitoring  vancomycin (VANCOCIN) 1,000 mg in 0.9% sodium chloride (MBP/ADV) 250 mL adv  1,000 mg IntraVENous Q24H  piperacillin-tazobactam (ZOSYN) 3.375 g in  ml ## EXTENDED 4 HRS INFUSION  3.375 g IntraVENous Q8H  
 [START ON 10/12/2018] VANCOMYCIN INFORMATION NOTE   Other ONCE

## 2018-10-11 NOTE — PROGRESS NOTES
2003- Bedside and Verbal shift [facility] by Alison Infante RN (offgoing nurse). Report included the following information SBAR, Kardex, Intake/Output, MAR and Recent Results.

## 2018-10-12 VITALS
BODY MASS INDEX: 26.06 KG/M2 | SYSTOLIC BLOOD PRESSURE: 144 MMHG | TEMPERATURE: 97.6 F | RESPIRATION RATE: 20 BRPM | OXYGEN SATURATION: 95 % | HEIGHT: 67 IN | DIASTOLIC BLOOD PRESSURE: 58 MMHG | WEIGHT: 166 LBS | HEART RATE: 62 BPM

## 2018-10-12 LAB
ANION GAP SERPL CALC-SCNC: 10 MMOL/L (ref 3–18)
BACTERIA SPEC CULT: NORMAL
BUN SERPL-MCNC: 19 MG/DL (ref 7–18)
BUN/CREAT SERPL: 18 (ref 12–20)
CALCIUM SERPL-MCNC: 8 MG/DL (ref 8.5–10.1)
CHLORIDE SERPL-SCNC: 110 MMOL/L (ref 100–108)
CO2 SERPL-SCNC: 22 MMOL/L (ref 21–32)
CREAT SERPL-MCNC: 1.04 MG/DL (ref 0.6–1.3)
GLUCOSE BLD STRIP.AUTO-MCNC: 123 MG/DL (ref 70–110)
GLUCOSE SERPL-MCNC: 119 MG/DL (ref 74–99)
POTASSIUM SERPL-SCNC: 4 MMOL/L (ref 3.5–5.5)
SERVICE CMNT-IMP: NORMAL
SODIUM SERPL-SCNC: 142 MMOL/L (ref 136–145)

## 2018-10-12 PROCEDURE — 74011250636 HC RX REV CODE- 250/636: Performed by: HOSPITALIST

## 2018-10-12 PROCEDURE — 97535 SELF CARE MNGMENT TRAINING: CPT

## 2018-10-12 PROCEDURE — 36415 COLL VENOUS BLD VENIPUNCTURE: CPT | Performed by: HOSPITALIST

## 2018-10-12 PROCEDURE — 74011250637 HC RX REV CODE- 250/637: Performed by: PODIATRIST

## 2018-10-12 PROCEDURE — 97116 GAIT TRAINING THERAPY: CPT

## 2018-10-12 PROCEDURE — 3331090002 HH PPS REVENUE DEBIT

## 2018-10-12 PROCEDURE — 3331090001 HH PPS REVENUE CREDIT

## 2018-10-12 PROCEDURE — 82962 GLUCOSE BLOOD TEST: CPT

## 2018-10-12 PROCEDURE — 74011000258 HC RX REV CODE- 258: Performed by: HOSPITALIST

## 2018-10-12 PROCEDURE — 74011250637 HC RX REV CODE- 250/637: Performed by: HOSPITALIST

## 2018-10-12 PROCEDURE — 80048 BASIC METABOLIC PNL TOTAL CA: CPT | Performed by: HOSPITALIST

## 2018-10-12 RX ORDER — METOPROLOL TARTRATE 50 MG/1
50 TABLET ORAL DAILY
Qty: 30 TAB | Refills: 0 | Status: SHIPPED | OUTPATIENT
Start: 2018-10-12 | End: 2021-10-14

## 2018-10-12 RX ORDER — DOXYCYCLINE 100 MG/1
100 TABLET ORAL 2 TIMES DAILY
Qty: 14 TAB | Refills: 0 | Status: SHIPPED | OUTPATIENT
Start: 2018-10-12 | End: 2018-11-01 | Stop reason: ALTCHOICE

## 2018-10-12 RX ADMIN — GABAPENTIN 300 MG: 100 CAPSULE ORAL at 09:13

## 2018-10-12 RX ADMIN — PIPERACILLIN SODIUM,TAZOBACTAM SODIUM 3.38 G: 3; .375 INJECTION, POWDER, FOR SOLUTION INTRAVENOUS at 03:37

## 2018-10-12 RX ADMIN — COLLAGENASE SANTYL: 250 OINTMENT TOPICAL at 09:00

## 2018-10-12 NOTE — PROGRESS NOTES
Assumed care; Pt resting in bed; no distress noted; Pt denies pain; bed in low position; Side rails up x 3; Call light and personal belonging within reach. Will continue to monitor.   
1945: Pt ambulate to bathroom,and returned to bed x 1 person assist.

## 2018-10-12 NOTE — DISCHARGE INSTRUCTIONS
Patient armband removed and shredded      DISCHARGE SUMMARY from Nurse    PATIENT INSTRUCTIONS:    After general anesthesia or intravenous sedation, for 24 hours or while taking prescription Narcotics:  · Limit your activities  · Do not drive and operate hazardous machinery  · Do not make important personal or business decisions  · Do  not drink alcoholic beverages  · If you have not urinated within 8 hours after discharge, please contact your surgeon on call. Report the following to your surgeon:  · Excessive pain, swelling, redness or odor of or around the surgical area  · Temperature over 100.5  · Nausea and vomiting lasting longer than 4 hours or if unable to take medications  · Any signs of decreased circulation or nerve impairment to extremity: change in color, persistent  numbness, tingling, coldness or increase pain  · Any questions    What to do at Home:  Recommended activity: Activity as tolerated    If you experience any of the following symptoms chest pain or shortness of breath, please call 911 or go to the emergency room. *  Please give a list of your current medications to your Primary Care Provider. *  Please update this list whenever your medications are discontinued, doses are      changed, or new medications (including over-the-counter products) are added. *  Please carry medication information at all times in case of emergency situations. These are general instructions for a healthy lifestyle:    No smoking/ No tobacco products/ Avoid exposure to second hand smoke  Surgeon General's Warning:  Quitting smoking now greatly reduces serious risk to your health.     Obesity, smoking, and sedentary lifestyle greatly increases your risk for illness    A healthy diet, regular physical exercise & weight monitoring are important for maintaining a healthy lifestyle    You may be retaining fluid if you have a history of heart failure or if you experience any of the following symptoms:  Weight gain of 3 pounds or more overnight or 5 pounds in a week, increased swelling in our hands or feet or shortness of breath while lying flat in bed. Please call your doctor as soon as you notice any of these symptoms; do not wait until your next office visit. Recognize signs and symptoms of STROKE:    F-face looks uneven    A-arms unable to move or move unevenly    S-speech slurred or non-existent    T-time-call 911 as soon as signs and symptoms begin-DO NOT go       Back to bed or wait to see if you get better-TIME IS BRAIN. Warning Signs of HEART ATTACK     Call 911 if you have these symptoms:   Chest discomfort. Most heart attacks involve discomfort in the center of the chest that lasts more than a few minutes, or that goes away and comes back. It can feel like uncomfortable pressure, squeezing, fullness, or pain.  Discomfort in other areas of the upper body. Symptoms can include pain or discomfort in one or both arms, the back, neck, jaw, or stomach.  Shortness of breath with or without chest discomfort.  Other signs may include breaking out in a cold sweat, nausea, or lightheadedness. Don't wait more than five minutes to call 911 - MINUTES MATTER! Fast action can save your life. Calling 911 is almost always the fastest way to get lifesaving treatment. Emergency Medical Services staff can begin treatment when they arrive -- up to an hour sooner than if someone gets to the hospital by car. The discharge information has been reviewed with the patient. The patient verbalized understanding. Discharge medications reviewed with the patient and appropriate educational materials and side effects teaching were provided.   ___________________________________________________________________________________________________________________________________

## 2018-10-12 NOTE — PROGRESS NOTES
Problem: Falls - Risk of 
Goal: *Absence of Falls Document Zoe Fisher Fall Risk and appropriate interventions in the flowsheet. Outcome: Progressing Towards Goal 
Fall Risk Interventions: 
Mobility Interventions: Patient to call before getting OOB, Utilize walker, cane, or other assistive device, Communicate number of staff needed for ambulation/transfer Medication Interventions: Teach patient to arise slowly, Patient to call before getting OOB Elimination Interventions: Toileting schedule/hourly rounds, Patient to call for help with toileting needs, Call light in reach History of Falls Interventions: Door open when patient unattended, Room close to nurse's station Problem: Pressure Injury - Risk of 
Goal: *Prevention of pressure injury Document Gilbert Scale and appropriate interventions in the flowsheet. Outcome: Progressing Towards Goal 
Pressure Injury Interventions: 
  
 
Moisture Interventions: Offer toileting Q_hr Activity Interventions: Pressure redistribution bed/mattress(bed type), Increase time out of bed Mobility Interventions: HOB 30 degrees or less, Pressure redistribution bed/mattress (bed type)

## 2018-10-12 NOTE — ROUTINE PROCESS
0745 Bedside, Verbal and Written shift change report given to [facility] (oncoming nurse) by Lynne RN (offgoing nurse). Report included the following information SBAR and Kardex. Pt resting in bed, denies complaints, call bell in reach 
 
1200 I have reviewed discharge instructions with the patient. The patient verbalized understanding.

## 2018-10-12 NOTE — PROGRESS NOTES
0730 -- Assumed care of pt. Pt resting in bed. 
 
0805 -- Bedside and Verbal shift change report given to [facility] (oncoming nurse) by Mar Sumner (offgoing nurse). Report included the following information SBAR.

## 2018-10-12 NOTE — PROGRESS NOTES
Problem: Self Care Deficits Care Plan (Adult) Goal: *Acute Goals and Plan of Care (Insert Text) Occupational Therapy Goals Initiated 10/11/2018 within 3 trials. 1.  Patient will perform grooming with independence standing at sink. 2.  Patient will perform upper body dressing with independence. 3.  Patient will perform lower body dressing with independence. 4.  Patient will perform toilet transfers with modified independence. 5.  Patient will perform all aspects of toileting with independence. 6.  Patient will participate in upper extremity therapeutic exercise/activities with independence for 10 minutes. 7.  Patient will utilize energy conservation techniques during functional activities with verbal, visual and tactile cues. Outcome: Progressing Towards Goal 
Occupational Therapy TREATMENT/discharge Patient: Andrey Persons (51 y.o. female) Date: 10/12/2018 Diagnosis: Cellulitis Cellulitis Precautions: Fall, Skin Chart, occupational therapy assessment, plan of care, and goals were reviewed. PLOF: Modified Independent with transfers and IADL's 
 
ASSESSMENT: 
Patient in bathroom upon arrival and NSG aware of therapy entry. Pt was Supervision for toileting and clothing management. CGA using SPC to sink to perform hand hygiene. CGA to chair to perform dressing ADL. Pt able to su bra, shirt, underwear and pants all with set-up; pt able to stand to pull underwear and pants over hips. Pt able to self feed independently and manage all containers and utensils without assistance. Patient declined oral care at this time. Pt reports during session \"I can do everything I need to on my own. I don't need any help\". EDUCATION Energy conservation techniques Progression toward goals: 
[x]          Improving appropriately and progressing toward goals 
[]          Improving slowly and progressing toward goals 
[]          Not making progress toward goals and plan of care will be adjusted PLAN: 
Patient will be discharged from occupational therapy at this time. Rationale for discharge: 
[] Goals Achieved 
[] [address] 
[] Patient not participating in therapy 
[] Other: 
[facility] Recommendations[facility]  [facility] Recommendations for Discharge:  N/A  
 
SUBJECTIVE:  
Patient stated I can do it all by myself.  OBJECTIVE DATA SUMMARY:  
G CODES: Self Care  Current  CI= 1-19%  Goal  CI= 1-19%  D/C  CI= 1-19%. The severity rating is based on the Other FIM Cognitive/Behavioral Status: 
Neurologic State: Alert Orientation Level: Oriented X4 Cognition: Appropriate for age attention/concentration, Follows commands Safety/Judgement: Fall prevention Functional Mobility and Transfers for ADLs: 
 Transfers: 
Bed to Chair: Contact guard assistance Toilet Transfer : Contact guard assistance Bathroom Mobility: Contact guard assistance Balance: 
Sitting: Intact Standing: Impaired; With support Standing - Static: Good Standing - Dynamic : Fair ADL Intervention: 
Feeding Feeding Assistance: Independent Container Management: Independent Cutting Food: Independent Utensil Management: Independent Food to Mouth: Independent Grooming Washing Hands: Modified independent Upper Body Dressing Assistance Dressing Assistance: Supervision/set-up Bra: Supervision/set-up Pullover Shirt: Supervision/set-up Lower Body Dressing Assistance Dressing Assistance: Supervision/set up Underpants: Supervision/set-up Pants With Elastic Waist: Supervision/set-up Toileting Toileting Assistance: Contact guard assistance Bladder Hygiene: Independent Adaptive Equipment:  (cane) Pain Pre treatment pain:  0 Post treatment pain: 0 Pain Scale 1: Numeric (0 - 10) Pain Intensity 1: 0 Activity Tolerance:   
Fair Please refer to the flowsheet for vital signs taken during this treatment. After treatment: [x]  Patient left in no apparent distress sitting up in chair 
[]  Patient left in no apparent distress in bed 
[x]  Call bell left within reach 
[]  Nursing notified 
[]  Caregiver present 
[]  Bed alarm activated WALT Correa/L Time Calculation: 23 mins

## 2018-10-12 NOTE — DISCHARGE SUMMARY
[facility]    [facility] Summary    Patient: Marvin Multani MRN: [id number]  CSN: [id number]    YOB: 1935  Age: 81 y.o.   Sex: female    DOA: 10/9/2018 LOS:  LOS: 2 days   Discharge Date:10/12/2018      Admission Diagnoses: Cellulitis    Discharge Diagnoses:    Problem List as of 10/12/2018  Date Reviewed: 10/12/2018          Codes Class Noted - Resolved    * (Principal)Cellulitis ICD-10-CM: L03.90  ICD-9-CM: 682.9  10/10/2018 - Present        Personal history of noncompliance with medical treatment, presenting hazards to health ICD-10-CM: Z91.19  ICD-9-CM: V15.81  5/31/2018 - Present        Contact dermatitis due to adhesive bandage ICD-10-CM: L25.8  ICD-9-CM: 692.89  5/31/2018 - Present        Midfoot ulcer, left, with fat layer exposed ([facility] ICD-10-CM: B66.320  ICD-9-CM: 707.14  5/17/2018 - Present        Hypertensive left ventricular hypertrophy, without heart failure ICD-10-CM: I11.9  ICD-9-CM: 402.90  1/15/2018 - Present        Atrial arrhythmia ICD-10-CM: I49.8  ICD-9-CM: 427.9  1/15/2018 - Present    Overview Signed 1/15/2018  4:08 PM by Byron Concepcion MD     Noted on 12 lead ECG 12/3/2017             Type 2 diabetes mellitus with nephropathy ([facility] ICD-10-CM: E11.21  ICD-9-CM: 250.40, 583.81  1/3/2018 - Present        Type 2 diabetes mellitus with diabetic neuropathy ([facility] ICD-10-CM: E11.40  ICD-9-CM: 250.60, 357.2  1/3/2018 - Present        Vascular disease, peripheral ([facility] ICD-10-CM: I73.9  ICD-9-CM: 443.9  12/7/2017 - Present        Non-pressure chronic ulcer of right ankle with fat layer exposed ([facility] ICD-10-CM: U79.401  ICD-9-CM: 707.13  12/7/2017 - Present        Syncope ICD-10-CM: R55  ICD-9-CM: 780.2  12/3/2017 - Present        Skin ulcer of left ankle, with fat layer exposed ([address] ICD-10-CM: D87.493  ICD-9-CM: 707.13  11/16/2017 - Present        Venous ulcer of ankle, left (HCC) ICD-10-CM: S13.309, L97.329  ICD-9-CM: 454.0  11/16/2017 - Present        Weak pulse ICD-10-CM: R09.89  ICD-9-CM: 785.9  11/16/2017 - Present        UTI (urinary tract infection) ICD-10-CM: N39.0  ICD-9-CM: 599.0  11/6/2017 - Present        Encephalopathy ICD-10-CM: G93.40  ICD-9-CM: 348.30  11/6/2017 - Present        LAURA (acute kidney injury) ([facility] ICD-10-CM: N17.9  ICD-9-CM: 584.9  11/6/2017 - Present        Iron (Fe) deficiency anemia ICD-10-CM: D50.9  ICD-9-CM: 280.9  2/28/2017 - Present        Normocytic anemia ICD-10-CM: D64.9  ICD-9-CM: 285. 9  Unknown - Present        Stroke ([facility] ICD-10-CM: I63.9  ICD-9-CM: 434.91  Unknown - Present        Hypertension ICD-10-CM: I10  ICD-9-CM: 401.9  Unknown - Present        DM (diabetes mellitus) ([facility] ICD-10-CM: E11.9  ICD-9-CM: 250.00  Unknown - Present        Type 2 diabetes mellitus ([facility] ICD-10-CM: E11.9  ICD-9-CM: 250.00  8/8/2013 - Present        CVA, old, hemiparesis ([facility] ICD-10-CM: R25.992  ICD-9-CM: 438.20  8/7/2013 - Present        Acute on chronic renal insufficiency ICD-10-CM: N28.9, N18.9  ICD-9-CM: 593.9, 585.9  7/11/2013 - Present    Overview Signed 2/2/2017 11:39 PM by Jeevan Carroll     Overview:    7/8/13 7/10/13  Cr  1.2 1.5             Leukocytosis ICD-10-CM: C58.655  ICD-9-CM: 288.60  7/11/2013 - Present    Overview Signed 2/2/2017 11:39 PM by Jeevan Carroll     Overview:   WBC 11.4  P 86             Microscopic hematuria ICD-10-CM: R31.29  ICD-9-CM: 599.72  7/11/2013 - Present        Rhabdomyolysis ICD-10-CM: M62.82  ICD-9-CM: 728.88  7/11/2013 - Present    Overview Signed 2/2/2017 11:39 PM by Jeevan Carroll     Overview:   Ck  930, 9648             Injury of foot ICD-10-CM: T95.496D  ICD-9-CM: 959.7  7/10/2013 - Present        CKD (chronic kidney disease) stage 3, GFR 30-59 ml/min ([facility]) ICD-10-CM: N18.3  ICD-9-CM: 585.3  6/24/2013 - Present    Overview Signed 2/3/2017 12:25 AM by Karis Sumner noted (08/06/13) BUN/sCr: 54/2.49, GFR 20.               Abnormal finding on thyroid function test ICD-10-CM: R94.6  ICD-9-CM: 794.5  6/11/2013 - Present        Anemia ICD-10-CM: D64.9  ICD-9-CM: 285.9  6/11/2013 - Present        Chronic osteomyelitis of ankle ([facility] ICD-10-CM: V45.186  ICD-9-CM: 730.17  6/11/2013 - Present        Spinal stenosis of lumbar region ICD-10-CM: M48.061  ICD-9-CM: 724.02  6/11/2013 - Present        Vitamin D deficiency ICD-10-CM: E55.9  ICD-9-CM: 268.9  6/11/2013 - Present        Elevated TSH ICD-10-CM: R79.89  ICD-9-CM: 794.5  6/25/2012 - Present    Overview Signed 2/3/2017 12:29 AM by Carina [email or web address]              RESOLVED: ARF (acute renal failure) ([facility] ICD-10-CM: N17.9  ICD-9-CM: 584.9  8/7/2013 - 8/8/2013        RESOLVED: Dehydration ICD-10-CM: E86.0  ICD-9-CM: 276.51  8/7/2013 - 8/8/2013        RESOLVED: Altered mental status ICD-10-CM: R41.82  ICD-9-CM: 780.97  8/7/2013 - 8/8/2013        RESOLVED: Confusion ICD-10-CM: R41.0  ICD-9-CM: 298.9  8/7/2013 - 8/8/2013        RESOLVED: Metabolic encephalopathy ATD-02-MX: G93.41  ICD-9-CM: 348.31  8/7/2013 - 8/8/2013              Discharge Condition: Stable    Discharge To: Home with Home Health    Consults: Hospitalist and [facility] Course: 82 y/o  female with hx of CVA, HTN, T2DM, chronic ulcer LLE presented to the ED on 10/10 with fever, chills, erythema and pain in left foot. She is followed by podiatry Dr. Aura Steve and wound care clinic. Xray left ankle in ED negative. UA negative, blood cultures collected 10/10 NGTD x 2. She was started on IV abx and admitted. Podiatry and wound care consulted. Recommend continuing local wound care, vascualr surgery to decide if additional inpatient vs outpatient intervention needed to improve poor flow to E. JOLANTA 10/12 moderate arterial disease in LLE at level of calf vessels, no evidence of significant arterial disease RLE. Labs and vital signs stable. PT/OT followed during hospitalization. She is appropriate for d/c home with home health PT/OT, skilled nursing and wound care.   She will complete seven more days of oral abx. Pt is to follow up with PCP within one week and with podiatry Dr. Narendra Kruger in wound care clinic on Thursday 10/18.        Physical Exam:  General appearance: alert, cooperative, no distress, appears stated age  Head: Normocephalic, without obvious abnormality, atraumatic  Lungs: clear to auscultation bilaterally  Heart: regular rate and rhythm, S1, S2 normal, no murmur, click, rub or gallop  Abdomen: soft, non tender, non distended. Normoactive bowel sounds. Extremities: kerlix dressing LLE intact, erythema noted to LLE, improving  Skin: chronic vascular changes BLE  Neurologic: Grossly normal  PSY: Mood and affect normal, appropriately behaved       Significant Diagnostic Studies: labs:   Recent Results (from the past 24 hour(s))   METABOLIC PANEL, BASIC    Collection Time: 10/12/18  4:40 AM   Result Value Ref Range    Sodium 142 136 - 145 mmol/L    Potassium 4.0 3.5 - 5.5 mmol/L    Chloride 110 (H) 100 - 108 mmol/L    CO2 22 21 - 32 mmol/L    Anion gap 10 3.0 - 18 mmol/L    Glucose 119 (H) 74 - 99 mg/dL    BUN 19 (H) 7.0 - 18 MG/DL    Creatinine 1.04 0.6 - 1.3 MG/DL    BUN/Creatinine ratio 18 12 - 20      GFR est AA >60 >60 ml/min/1.73m2    GFR est non-AA 51 (L) >60 ml/min/1.73m2    Calcium 8.0 (L) 8.5 - 10.1 MG/DL   GLUCOSE, POC    Collection Time: 10/12/18 12:09 PM   Result Value Ref Range    Glucose (POC) 123 (H) 70 - 110 mg/dL         Discharge Medications:     Current Discharge Medication List      START taking these medications    Details   collagenase (SANTYL) 250 unit/gram ointment Apply  to affected area daily. Qty: 15 g, Refills: 0      doxycycline (ADOXA) 100 mg tablet Take 1 Tab by mouth two (2) times a day. Qty: 14 Tab, Refills: 0         CONTINUE these medications which have CHANGED    Details   metoprolol tartrate (LOPRESSOR) 50 mg tablet Take 1 Tab by mouth daily.   Qty: 30 Tab, Refills: 0         CONTINUE these medications which have NOT CHANGED    Details   acetaminophen (TYLENOL) 500 mg tablet Take 500 mg by mouth every six (6) hours as needed for Pain or Fever. IBUPROFEN PO Take 200 mg by mouth as needed (pain). 2 tabs as needed for pain      gabapentin (NEURONTIN) 300 mg capsule Take 1 Cap by mouth three (3) times daily. 12/22/16, QTY#90, 30 Days, 2 Refills. Dr. Sue Ulloa: 2      cholecalciferol (VITAMIN D3) 1,000 unit tablet Take 3,000 Units by mouth daily. lisinopril-hydrochlorothiazide (PRINZIDE, ZESTORETIC) 20-25 mg per tablet Take 1 Tab by mouth daily. Activity: Activity as tolerated, PT eval and treat    Diet: Cardiac Diet    Wound Care: Wound Care/Dressing change to Left Ankle: Cleanse wound(s) with normal saline only (do not use dermal wound cleanser due to it inactivates santyl), apply no sting skin prep then zinc oxide cream to periwound skin, apply santyl (nickel-thickness) to entire wound, cover with folded saline-moistened 4x4 and abd pad, secure with hypafix or paper tape. Follow-up: Follow up with PCP within one week. Follow up with podiatry Dr. Maria Eugenia Melvin in wound care clinic on Thursday 10/18.     Discharge time: > 35 mins    Ronal Bearden NP  10/12/2018, 12:43 PM

## 2018-10-12 NOTE — PROGRESS NOTES
Pt is active with [facility] [facility] [place] [facility] [place] and chooses to continue with them. FOC on the chart. Referral sent to intake via [facility] and called to [facility]. Care Management Interventions PCP Verified by CM: Yes (Dr Trina Hogan) Mode of Transport at Discharge: Other (see comment) Transition of Care Consult (CM Consult): Home Health [address] [place] [address] Road: Yes Physical Therapy Consult: Yes Occupational Therapy Consult: Yes Current Support Network: Own Home, Lives with Spouse Confirm Follow Up Transport: Family Plan discussed with Pt/Family/Caregiver: Yes Freedom of Choice Offered: Yes Discharge Location Discharge Placement: Home with home health

## 2018-10-12 NOTE — PROGRESS NOTES
Problem: Mobility Impaired (Adult and Pediatric) Goal: *Acute Goals and Plan of Care (Insert Text) Physical Therapy Goals Initiated 10/11/2018 and to be accomplished within 7 days. 1.  Patient will complete all bed mobility with modified independence in order to prepare for EOB/OOB activity. 2.  Patient will perform sit <> stand with modified independence in order to prepare for OOB/gait activity. 3.  Patient will perform bed to chair transfers with modified independence in order to promote mobility and encourage seated activity to progress towards their prior level of function. 4.  Patient will ambulate 100 feet with modified independence using LRAD in order to prepare for safe negotiation of their environment. 5.  Patient will ascend/descend 4 steps with S handrail use with modified independence in order to prepare for safe exit/entry into their home environment. Outcome: Progressing Towards Goal 
 
PHYSICAL THERAPY: Daily TREATMENT Note INPATIENT: Medicare: Hospital Day: 4 Patient: Bhupendra Saini (22 y.o. female)    Date: 10/12/2018 Primary Diagnosis: Cellulitis  
 ,  ,  
Precautions: Fall, Skin Chart, physical therapy assessment, plan of care and goals were reviewed. PLOF: Independent ASSESSMENT: 
Patient supine in bed, agreeable to participate with PT. Family present. Supervision for supine <> sit and sit <> stand with SPC. Able to ambulate 75 ft with SPC; gait antalgic. Returned to bed and left supine with all needs within reach. Progression toward goals: 
      Improving appropriately and progressing toward goals PLAN: 
Patient continues to benefit from skilled intervention to address the above impairments. Continue treatment per established plan of care. EDUCATION:  
Education:  Patient was educated on the following topics: purpose of PT, PT POC, safety with mobility. Verbalizes understanding. Barriers to Learning/Limitations: None Compensate with: visual, verbal, tactile, kinesthetic cues/model Discharge Recommendations:  Home Health Further Equipment Recommendations for Discharge:  straight cane Factors which may impact discharge planning: N/A  
 
SUBJECTIVE:  
Patient stated They put the bandage on last night.  OBJECTIVE DATA SUMMARY:  
Critical Behavior: 
Neurologic State: Alert Orientation Level: Oriented X4 Cognition: Appropriate for age attention/concentration, Follows commands Safety/Judgement: Fall prevention G CODE:Mobility [id number] Current  CI= 1-19%   Goal  CI= 1-19%. The severity rating is based on the Other Improved activity tolerance, ambulation at supervision level Functional Mobility: 
 
 
Functional Status Indep (I) Mod I Super-vision Min A Mod A Max A Total A Assist x2 Verbal cues Additional time Not tested Comments Rolling []  []  [] []    []    []  []  [] [] [] [x] Supine to sit []  []  [x] []  []  []  []  [] [] [] [] Sit to supine []  []  [x] []  []  []  []  [] [] [] [] Sit to stand []  []  [x] []  []  []  []  [] [] [] []   
Stand to sit []  []  [x] []  []  []  []  [] [] [] [] Bed to chair transfers []  []  [] []  []  []  []  [] [] [] [x] Balance Good [place] Poor Unable Not tested Comments Sitting static []  []  []  []  [] Sitting dynamic [x]  []  []  []  []   
Standing static [x]  []  []  []  []   
Standing dynamic []  [x]  []  []  [] Mobility/Gait:  
Level of Assistance: SupervisionFWBAssistive Device: straight cane Distance Ambulated: 75 feet Left Lower Extremity: FWB Right Lower Extremity: FWB Base of Support: center of gravity altered Speed/Hailey: pace decreased (<100 feet/min) Step Length: right shortened Swing Pattern: left asymmetrical 
Stance: left decreased Gait Abnormalities: antalgic and altered arm swing Vital Signs Temp: 97.6 °F (36.4 °C) Pulse (Heart Rate): 62    
BP: 144/58 Resp Rate: 20    
 O2 Sat (%): 95 % Pain: Pain in R foot with mobility Activity Tolerance:  
Good After treatment:  
Patient left in no apparent distress in bed Call bell left within reach Consuelo Yoder Time Calculation: 17 mins

## 2018-10-13 PROCEDURE — 3331090001 HH PPS REVENUE CREDIT

## 2018-10-13 PROCEDURE — 3331090002 HH PPS REVENUE DEBIT

## 2018-10-14 ENCOUNTER — HOME CARE VISIT (OUTPATIENT)
Dept: SCHEDULING | Facility: HOME HEALTH | Age: 83
End: 2018-10-14
Payer: MEDICARE

## 2018-10-14 VITALS
TEMPERATURE: 97.9 F | RESPIRATION RATE: 16 BRPM | HEART RATE: 60 BPM | OXYGEN SATURATION: 97 % | DIASTOLIC BLOOD PRESSURE: 70 MMHG | SYSTOLIC BLOOD PRESSURE: 150 MMHG

## 2018-10-14 PROCEDURE — 3331090002 HH PPS REVENUE DEBIT

## 2018-10-14 PROCEDURE — G0299 HHS/HOSPICE OF RN EA 15 MIN: HCPCS

## 2018-10-14 PROCEDURE — 3331090001 HH PPS REVENUE CREDIT

## 2018-10-15 ENCOUNTER — HOME CARE VISIT (OUTPATIENT)
Dept: HOME HEALTH SERVICES | Facility: HOME HEALTH | Age: 83
End: 2018-10-15
Payer: MEDICARE

## 2018-10-15 PROCEDURE — 3331090001 HH PPS REVENUE CREDIT

## 2018-10-15 PROCEDURE — 3331090002 HH PPS REVENUE DEBIT

## 2018-10-16 ENCOUNTER — HOME CARE VISIT (OUTPATIENT)
Dept: HOME HEALTH SERVICES | Facility: HOME HEALTH | Age: 83
End: 2018-10-16
Payer: MEDICARE

## 2018-10-16 LAB
BACTERIA SPEC CULT: ABNORMAL
BACTERIA SPEC CULT: NORMAL
GRAM STN SPEC: ABNORMAL
GRAM STN SPEC: ABNORMAL
SERVICE CMNT-IMP: ABNORMAL
SERVICE CMNT-IMP: NORMAL

## 2018-10-16 PROCEDURE — 3331090002 HH PPS REVENUE DEBIT

## 2018-10-16 PROCEDURE — 3331090001 HH PPS REVENUE CREDIT

## 2018-10-17 ENCOUNTER — HOME CARE VISIT (OUTPATIENT)
Dept: SCHEDULING | Facility: HOME HEALTH | Age: 83
End: 2018-10-17
Payer: MEDICARE

## 2018-10-17 ENCOUNTER — HOME CARE VISIT (OUTPATIENT)
Dept: HOME HEALTH SERVICES | Facility: HOME HEALTH | Age: 83
End: 2018-10-17
Payer: MEDICARE

## 2018-10-17 VITALS
OXYGEN SATURATION: 98 % | RESPIRATION RATE: 18 BRPM | DIASTOLIC BLOOD PRESSURE: 62 MMHG | TEMPERATURE: 98.5 F | SYSTOLIC BLOOD PRESSURE: 142 MMHG | HEART RATE: 72 BPM

## 2018-10-17 PROCEDURE — 3331090001 HH PPS REVENUE CREDIT

## 2018-10-17 PROCEDURE — G0300 HHS/HOSPICE OF LPN EA 15 MIN: HCPCS

## 2018-10-17 PROCEDURE — 3331090002 HH PPS REVENUE DEBIT

## 2018-10-17 NOTE — ANCILLARY DISCHARGE INSTRUCTIONS
[facility] Discharge Phone Call After-Care Discharge Phone Call Questions: no answer Were you able to get your prescriptions filled? Comment:   
  [] Yes  []No 
 
Comment if answer is \"No\" Are you taking your medication(s) as your doctor ordered? Do you understand the purpose of your medications? Comment: 
  [] Yes  []No 
 
Comment if answer is \"No\" Are you taking any other medications that are not on the list? 
Comment:   
  [] Yes  []No 
 
Comment if answer is \"Yes\" Do you have any questions about your medications? Are you aware of potential side effects? Comment: 
  [] Yes  []No 
 
Comment if answer is \"Yes\" Did you make your follow-up appointments (if the hospital did not do this before 
discharge)? Comment: 
  [] Yes  []No 
 
Comment if answer is \"No\" Is there any reason you might not be able to keep your follow-up appointments? Comment:  
  [] Yes  []No 
 
Comment if answer is \"Yes\" Do you have any questions about your care plan? Are you aware of what health problems to be alert for? Comment: 
  [] Yes  []No 
 
Comment if answer is \"Yes\" Do you have a good understanding of how you should manage your health? Comment: 
  [] Yes  []No 
 
Comment if answer is \"Yes\" Do you know which symptoms to watch for that would mean you would need to call your doctor right away? Comment:   
  [] Yes  []No 
 
Comment if answer is \"No\" Do you have any questions about the follow up process or any instructions that we have provided? Comment: 
  [] Yes  []No 
 
Comment if answer is \"Yes\" Did staff take your preferences into account? [] Yes  []No 
 
Comment if answer is \"Yes\"

## 2018-10-18 ENCOUNTER — HOSPITAL ENCOUNTER (OUTPATIENT)
Dept: WOUND CARE | Age: 83
Discharge: HOME | End: 2018-10-18
Payer: MEDICARE

## 2018-10-18 VITALS
SYSTOLIC BLOOD PRESSURE: 165 MMHG | DIASTOLIC BLOOD PRESSURE: 72 MMHG | TEMPERATURE: 97.2 F | HEART RATE: 58 BPM | OXYGEN SATURATION: 96 %

## 2018-10-18 PROCEDURE — 77030011256 HC DRSG MEPILEX <16IN NO BORD MOLN -A: Performed by: PODIATRIST

## 2018-10-18 PROCEDURE — 3331090001 HH PPS REVENUE CREDIT

## 2018-10-18 PROCEDURE — 3331090002 HH PPS REVENUE DEBIT

## 2018-10-18 PROCEDURE — 74011250637 HC RX REV CODE- 250/637

## 2018-10-18 PROCEDURE — 97602 WOUND(S) CARE NON-SELECTIVE: CPT

## 2018-10-18 RX ADMIN — COLLAGENASE SANTYL: 250 OINTMENT TOPICAL at 09:36

## 2018-10-18 NOTE — DISCHARGE INSTRUCTIONS
Wound Care Instructions    Patient: Leonardo Abel MRN: [id number]  SSN: [id number]     YOB: 1935  Age:83 y.o. Sex: female       Ms. Martínezrio Short, Dr. Don Sever recommends the following discharge instruction:    Offloading    []   Felt/Foam Offloading   [] Removable Cast Dianah Coke       []   Wedge Shoe   []  Wheelchair     []   Total Contact Cast   []  Surgical Shoe     []   Crutches        Other   Mattress:   Other:     Edema Control    []   Elevated legs as much as possible. Recommend above level of heart.     []   Layered Wraps             []   Left      []   Right      []  BILATERAL          - Type:            []   Unna Boot       []  Multi-Layer                                   []   Two Layers     []  Three Layers   []  Four Layers     []   Tubular Bandages        []   Left      []   Right     SIZE:      []   Stockings                      []   Left      []   Right     []   Compression Pump     []   Left      []   Right    ___ millimeters of mercury  ___ millimeters of mercury for ___ minutes for ___ day(s)        Other:       Nutritional Supplements    [x]  Multi-Vitamin     []  Other:       [employer]     [x]  Home Health: [employer]     []  Long Term Care:      []  DME:     Consult    []   Nutrition     []   Vascular     []   Orthotist/Pedorthotist     []   Infectious Disease     []   Lymphedema Therapist   Other:     Dressings:  Another brand of generically equivalent product may be dispensed unless specifically ordered otherwise.     Home Ulcer/wound Hygiene (cleanse with)      [x]   Distilled Water     []   Normal Saline     []   Wound Cleanser     []   Mild antimicrobial soap and water     []   Chlorhexidine liquid soap and water     []   Other:     Apply    []   Hydrogel   []  Hypergel   []   Aquacel Ag     []   Cadexomer Iodine                     (Iodosorb)   []  Silver Alginate    []   Medihoney:     [x]   Collagenase:Santyl   []  Calcium Alginate   []   Collagen:     []   Plain Foam []  Non-Adherent Contact                Layer   []   Xeroform     []   Silver foam   []   Hydrocolloid        []   Acticoat   []   Adaptic:      Other/Specific Instructions:    Cover With    [x]   Dry Gauze and Roll Gauze or Bordered foam dressing                 [x]   Secure with Tape     []   Other       Ligia-Ulcer Care    []   Cream     []   Lotion     []   Ointment     []   Barrier     []   Other:     Change Dressing    []   Once Daily     []   Every Other Day     []   Every 3 Days       []   Every 5 Days     []   Every 7 Days     []   Do Not Change       []   2 x per week (Mon and Thurs. )     [x]   3 x per week (Mon, Wed, Fri. )     []   Other          Follow-Up:   [x]  Follow up in two weeks                            []  As Needed (PRN)     []   Joon Min MD    []  CONSTANZA DonaldM    [x]  Heather Leon DPM   []   Magdy Clark DPM      []   Nurse Visit            Please call the wound clinic ([phone]) regarding any questions or concerns.

## 2018-10-18 NOTE — WOUND CARE
Patient will follow up with vascular this week. She had PVL testing done on 10/17/18. She is expecting a call from her vascular surgeon. In the meantime she will continue using Santyl every other day. Theraskin graft will be ordered for her next appointment. Orders to be sent to home health for dressing changes. Follow up in two weeks. 10/18/18 1160 Wound Ankle Left;Medial;Proximal  
Date First Assessed: 11/16/17   POA: Yes  Wound Type: Vascular  Location: Ankle  Orientation: Left;Medial;Proximal  
DRESSING STATUS Removed Non-Pressure Injury Full thickness (subcut/muscle) Wound Length (cm) 2 cm Wound Width (cm) 2.2 cm Wound Depth (cm) 0.2 Wound Surface area (cm^2) 4.4 cm^2 Change in Wound Size % -405.75 Tissue Type Pink;Yellow Tissue Type Percent Pink 80 Tissue Type Percent Yellow 20 Drainage Amount  Moderate Drainage Color Serous Wound Odor None Cleansing and Cleansing Agents  Dermal wound cleanser Dressing Type Applied Other (Comment) (Santyl, saline moistened gauze, Mepilex foam border)

## 2018-10-18 NOTE — PROGRESS NOTES
Podiatry Surgery Progress Note Patient: Audrey Elam MRN: [id number]  SSN: [id number] YOB: 1935  Age: 81 y.o. Sex: female Assessment:  
 
Patient Active Problem List  
Diagnosis Code  CVA, old, hemiparesis ([facility] N48.344  Type 2 diabetes mellitus (HCC) E11.9  Abnormal finding on thyroid function test R94.6  Acute on chronic renal insufficiency N28.9, N18.9  Anemia D64.9  Chronic osteomyelitis of ankle ([facility] M86.679  Injury of foot K11.938O  Leukocytosis D72.829  
 Spinal stenosis of lumbar region M48.061  
 Microscopic hematuria R31.29  
 Rhabdomyolysis M62.82  Vitamin D deficiency E55.9  Stroke ([facility] [address] [facility] I63.9  Hypertension I10  
 DM (diabetes mellitus) ([facility] [address] E11.9  Normocytic anemia D64.9  CKD (chronic kidney disease) stage 3, GFR 30-59 ml/min ([facility]) N18.3  Elevated TSH R79.89  
 Iron (Fe) deficiency anemia D50.9  UTI (urinary tract infection) N39.0  Encephalopathy G93.40  LAURA (acute kidney injury) ([facility] [facility] N17.9  Skin ulcer of left ankle, with fat layer exposed ([facility] [address] G62.773  Venous ulcer of ankle, left ([facility]) I83.023, L97.329  
 Weak pulse R09.89  Syncope R55  Vascular disease, peripheral ([facility]) I73.9  Non-pressure chronic ulcer of right ankle with fat layer exposed ([facility] [address] L97.312  Type 2 diabetes mellitus with nephropathy ([facility]) E11.21  
 Type 2 diabetes mellitus with diabetic neuropathy ([facility]) E11.40  Hypertensive left ventricular hypertrophy, without heart failure I11.9  Atrial arrhythmia I49.8  Midfoot ulcer, left, with fat layer exposed ([address] G38.030  Personal history of noncompliance with medical treatment, presenting hazards to health Z91.19  
 Contact dermatitis due to adhesive bandage L25.8  Cellulitis L03.90 Plan: Will change LWC to Santyl with DSD Q 24-48 hrs left ankle. Patient is to complete po antibiotics and follow up appointment with vascular.  Will benefit form application of the Theraskin at the next visit pending vascular progress Total time spent with patient: 30 Minutes Care Plan discussed with: Patient, Family and Nursing Staff Discussed:  Care Plan and home health Disposition:  Stable Ms. Teodora Calixto is a 81 y.o. female who was admitted for chronic ulcer care with a vascular componenet. Patient has been hospitalized for celluliltis. She is currently taking antibx Subjective:  
Past Medical History Past Medical History:  
Diagnosis Date  CAD (coronary artery disease)  [facility]  CKD (chronic kidney disease) stage 3, GFR 30-59 ml/min ([facility]) 06/24/2013 Worst noted (08/06/13) BUN/sCr: 54/2.49, GFR 20.   
 DM (diabetes mellitus) ([facility]  Elevated TSH 06/25/2012  
 5.08   
 Hypertension  Leukocytosis  Lumbar spinal stenosis  Menopause  Normocytic anemia  Rhabdomyolysis  Splenomegaly  Stroke ([facility]  Vitamin D deficiency Social History Socioeconomic History  Marital status:  Spouse name: Not on file  Number of children: Not on file  Years of education: Not on file  Highest education level: Not on file Social Needs  Financial resource strain: Not on file  Food insecurity - worry: Not on file  Food insecurity - inability: Not on file  Transportation needs - medical: Not on file  Transportation needs - non-medical: Not on file Occupational History  Not on file Tobacco Use  Smoking status: Never Smoker  Smokeless tobacco: Never Used Substance and Sexual Activity  Alcohol use: No  
 Drug use: No  
 Sexual activity: No  
Other Topics Concern  Not on file Social History Narrative  Not on file Current Medications Current Outpatient Medications Medication Sig Dispense Refill  collagenase (SANTYL) 250 unit/gram ointment Apply  to affected area daily.  15 g 0  
  metoprolol tartrate (LOPRESSOR) 50 mg tablet Take 1 Tab by mouth daily. 30 Tab 0  
 doxycycline (ADOXA) 100 mg tablet Take 1 Tab by mouth two (2) times a day. 14 Tab 0  
 acetaminophen (TYLENOL) 500 mg tablet Take 500 mg by mouth every six (6) hours as needed for Pain or Fever.  IBUPROFEN PO Take 200 mg by mouth as needed (pain). 2 tabs as needed for pain    
 gabapentin (NEURONTIN) 300 mg capsule Take 1 Cap by mouth three (3) times daily. 12/22/16, QTY#90, 30 Days, 2 Refills. Dr. Rose Ibrahim  2  cholecalciferol (VITAMIN D3) 1,000 unit tablet Take 3,000 Units by mouth daily.  lisinopril-hydrochlorothiazide (PRINZIDE, ZESTORETIC) 20-25 mg per tablet Take 1 Tab by mouth daily. Patient Allergies No Known Allergies Objective:  
General Exam 
alert, cooperative, no distress, appears stated age Vitals There were no vitals taken for this visit. REVIEW OF SYSTEMS: 
General: denies chronic fatigue, weight loss, fever, anemia, bruising, depression, nervousness, panic attacks HEENT: denies ringing in ears, ear infections, dizzy spells, poor vision, glaucoma, sinus trouble, hoarseness, eye infections GI: denies diarrhea, gas, bloating, heartburn, regurgitation, difficulty swallowing, painful swallowing, nausea, vomiting, constipation, abdominal pain, decreased appetite, blood in stools, black stools, jaundice, dark urine Lungs: denies pneumonia, asthma, cough, SOB, hemoptysis Heart: denies chest pain, irregular heart beat, ankle swelling, HTN Skin: denies rashes, hives, allergic reaction Urinary: denies UTI, kidney stones, decreased urine force and flow, urination at night, blood in urine, painful urination Bones and Joints: denies arthritis, rheumatism, back pain, gout, osteoporosis Neurologic: denies stroke, seizures, headaches, numbness, tingling Foot Exam 
VASCULAR EXAM:. Pedal pulses are palpable 1/4 DP and trace PT right and left foot. Skin temperature is warm to warm right and left foot. Digital capillary fill time is 5 seconds right and left foot. There is decrease edema of the right and left foot and ankle. Pedal hair is not noted bilateral  
   
NEUROLOGICAL EXAM:. Sensation Intact with 5.07g monofilament wire right and left foot. Deep tendon reflexes intact and symmetrical on the right and left foot. 
   
MUSCULOSKELETAL EXAM:. Muscle tone is normal.  Muscle strength of the flexor and extensor group inversion and eversion Bilateral. 4/5.    
   
DERMATOLOGICAL EXAM:. Skin is of abnormal texture and turgor with atrophic skin changes noting decrease hair growth, nail changes (thickening), Bilateral. There is epithelialized lesions dorsal and medial forefoot left foot. Subhyperkeratotic lesion noted over the medial aspect of the left ankle with slough noted, just superior to the malleolus. See measurements below: 
 
 
 
Wound Ankle Left;Medial;Proximal (Active) DRESSING STATUS Removed 10/18/2018  9:26 AM  
DRESSING TYPE Other (Comment) 7/5/2018 10:27 AM  
Non-Pressure Injury Full thickness (subcut/muscle) 10/18/2018  9:26 AM  
Wound Length (cm) 2 cm 10/18/2018  9:26 AM  
Wound Width (cm) 2.2 cm 10/18/2018  9:26 AM  
Wound Depth (cm) 0.2 10/18/2018  9:26 AM  
Wound Surface area (cm^2) 4.4 cm^2 10/18/2018  9:26 AM  
Change in Wound Size % -405.75 10/18/2018  9:26 AM  
Condition of Base Pink;Slough 7/5/2018 10:27 AM  
Condition of Edges Open 7/5/2018 10:27 AM  
Tissue Type Pink;Yellow 7/5/2018 10:27 AM  
Tissue Type Percent Pink 20 9/20/2018  8:13 AM  
Tissue Type Percent Red 5 11/16/2017 10:08 AM  
Tissue Type Percent Yellow 80 9/20/2018  8:13 AM  
Drainage Amount  Large 7/5/2018 10:27 AM  
Drainage Color Serosanguinous 7/5/2018 10:27 AM  
Wound Odor None 7/5/2018 10:27 AM  
Periwound Skin Condition Erythema, blanchable;Rash 7/5/2018 10:27 AM  
Cleansing and Cleansing Agents  Normal saline 7/5/2018 10:27 AM  
 Dressing Type Applied Other (Comment) 6/28/2018  8:53 AM  
Procedure Tolerated Well 7/5/2018 10:27 AM  
Number of days: 336 Wound Ankle Left;Medial (Active) Number of days: 4 [REMOVED] Wound Ankle Left (Removed) Number of days: 9405 [REMOVED] Wound Ankle Left (Removed) Number of days: 1798 [REMOVED] Wound Ankle Left (Removed) Number of days: 5524 [REMOVED] Wound Ankle Left;Lateral (Removed) Number of days: 470 [REMOVED] Wound Ankle Right (Removed) DRESSING STATUS Removed 12/21/2017  9:42 AM  
DRESSING TYPE Other (Comment) 12/21/2017  9:42 AM  
Non-Pressure Injury Partial thickness (epider/derm) 12/21/2017  9:42 AM  
Wound Length (cm) 0.7 cm 12/21/2017  9:42 AM  
Wound Width (cm) 0.8 cm 12/21/2017  9:42 AM  
Wound Depth (cm) 0.1 12/21/2017  9:42 AM  
Wound Surface area (cm^2) 0.06 cm^2 12/21/2017  9:42 AM  
Condition of Base [address] 12/21/2017  9:42 AM  
Condition of Edges Open 12/21/2017  9:42 AM  
Tissue Type Yellow 12/21/2017  9:42 AM  
Tissue Type Percent Red 50 11/16/2017 10:08 AM  
Tissue Type Percent Yellow 100 12/21/2017  9:42 AM  
Drainage Amount  Moderate 12/21/2017  9:42 AM  
Drainage Color Serous 12/21/2017  9:42 AM  
Wound Odor None 12/21/2017  9:42 AM  
Periwound Skin Condition Erythema, blanchable 12/21/2017  9:42 AM  
Cleansing and Cleansing Agents  Dermal wound cleanser; Soap and water 12/21/2017  9:42 AM  
Dressing Type Applied Other (Comment) 12/21/2017  9:42 AM  
Procedure Tolerated Well 12/21/2017  9:42 AM  
Number of days: 470 [REMOVED] Wound Ankle Lateral;Right (Removed) Number of days: 215 [REMOVED] Wound Ankle Left;Lateral (Removed) Number of days: 215 [REMOVED] Wound Ankle Left;Medial (Removed) DRESSING STATUS Removed 6/21/2018  8:46 AM  
DRESSING TYPE Other (Comment) 6/21/2018  8:46 AM  
Non-Pressure Injury Full thickness (subcut/muscle) 6/21/2018  8:46 AM  
Wound Length (cm) 1.4 cm 6/21/2018  8:46 AM  
 Wound Width (cm) 0.6 cm 6/21/2018  8:46 AM  
Wound Depth (cm) 0.2 6/21/2018  8:46 AM  
Wound Surface area (cm^2) 0.84 cm^2 6/21/2018  8:46 AM  
Condition of Base [place];Slough 6/21/2018  8:46 AM  
Condition of Edges Open 6/21/2018  8:46 AM  
Tissue Type Pink;Yellow 6/21/2018  8:46 AM  
Tissue Type Percent Pink 20 6/21/2018  8:46 AM  
Tissue Type Percent Red 80 5/31/2018  8:29 AM  
Tissue Type Percent Yellow 80 6/21/2018  8:46 AM  
Drainage Amount  Small  6/21/2018  8:46 AM  
Drainage Color Serosanguinous 6/21/2018  8:46 AM  
Wound Odor None 6/21/2018  8:46 AM  
Periwound Skin Condition Edematous; Erythema, blanchable 6/21/2018  8:46 AM  
Cleansing and Cleansing Agents  Dermal wound cleanser 6/21/2018  8:46 AM  
Dressing Type Applied Other (Comment) 6/21/2018  8:46 AM  
Procedure Tolerated Well 6/21/2018  8:46 AM  
Number of days: 229 [REMOVED] Wound Ankle Left;Medial;Distal (Removed) DRESSING STATUS Removed 6/21/2018  8:46 AM  
DRESSING TYPE Other (Comment) 6/21/2018  8:46 AM  
Non-Pressure Injury Full thickness (subcut/muscle) 6/21/2018  8:46 AM  
Wound Length (cm) 1.1 cm 6/21/2018  8:46 AM  
Wound Width (cm) 0.6 cm 6/21/2018  8:46 AM  
Wound Depth (cm) 0.2 6/21/2018  8:46 AM  
Wound Surface area (cm^2) 0.66 cm^2 6/21/2018  8:46 AM  
Condition of Base [place];Slough 6/21/2018  8:46 AM  
Condition of Edges Open 6/21/2018  8:46 AM  
Epithelialization (%) 100 5/17/2018  8:44 AM  
Tissue Type Pink;Yellow 6/21/2018  8:46 AM  
Tissue Type Percent Pink 50 6/21/2018  8:46 AM  
Tissue Type Percent Yellow 50 6/21/2018  8:46 AM  
Tissue Type Percent Other (comment) 40 2/15/2018 10:52 AM  
Drainage Amount  Scant 6/21/2018  8:46 AM  
Drainage Color Serosanguinous 6/21/2018  8:46 AM  
Wound Odor None 6/21/2018  8:46 AM  
Periwound Skin Condition Edematous; Erythema, blanchable;Petechiae 6/21/2018  8:46 AM  
Cleansing and Cleansing Agents  Dermal wound cleanser 6/21/2018  8:46 AM  
 Dressing Type Applied Other (Comment) 6/21/2018  8:46 AM  
Procedure Tolerated Well 6/21/2018  8:46 AM  
Number of days: 332 [REMOVED] Wound Toe Left (Removed) DRESSING TYPE Open to air 12/7/2017  9:56 AM  
Non-Pressure Injury Partial thickness (epider/derm) 11/16/2017 10:08 AM  
Wound Length (cm) 0.6 cm 11/16/2017 10:08 AM  
Wound Width (cm) 1.4 cm 11/16/2017 10:08 AM  
Wound Depth (cm) 0.1 11/16/2017 10:08 AM  
Wound Surface area (cm^2) 0.08 cm^2 11/16/2017 10:08 AM  
Condition of Base Other (comment) 11/16/2017 10:08 AM  
Tissue Type Yellow 11/16/2017 10:08 AM  
Tissue Type Percent Yellow 100 11/16/2017 10:08 AM  
Drainage Amount  None 11/16/2017 10:08 AM  
Wound Odor None 11/16/2017 10:08 AM  
Cleansing and Cleansing Agents  Dermal wound cleanser 11/16/2017 10:08 AM  
Dressing Type Applied Open to air 11/16/2017 10:08 AM  
Number of days: 207 [REMOVED] Wound Toe Left (Removed) DRESSING STATUS Removed 11/16/2017 10:08 AM  
DRESSING TYPE Open to air 12/7/2017  9:56 AM  
Non-Pressure Injury Partial thickness (epider/derm) 11/16/2017 10:08 AM  
Wound Length (cm) 0.3 cm 11/16/2017 10:08 AM  
Wound Width (cm) 0.4 cm 11/16/2017 10:08 AM  
Wound Depth (cm) 0.1 11/16/2017 10:08 AM  
Wound Surface area (cm^2) 0.01 cm^2 11/16/2017 10:08 AM  
Condition of Base Other (comment) 11/16/2017 10:08 AM  
Tissue Type Yellow 11/16/2017 10:08 AM  
Tissue Type Percent Yellow 100 11/16/2017 10:08 AM  
Drainage Amount  None 11/16/2017 10:08 AM  
Wound Odor None 11/16/2017 10:08 AM  
Periwound Skin Condition Intact 11/16/2017 10:08 AM  
Dressing Type Applied Open to air 11/16/2017 10:08 AM  
Number of days: 207 [REMOVED] Wound Ankle Left;Lateral (Removed) DRESSING STATUS Removed 12/21/2017  9:42 AM  
DRESSING TYPE Other (Comment) 12/21/2017  9:42 AM  
Non-Pressure Injury Partial thickness (epider/derm) 12/21/2017  9:42 AM  
Wound Length (cm) 1.1 cm 12/21/2017  9:42 AM  
 Wound Width (cm) 0.9 cm 12/21/2017  9:42 AM  
Wound Depth (cm) 0.1 12/21/2017  9:42 AM  
Wound Surface area (cm^2) 0.1 cm^2 12/21/2017  9:42 AM  
Condition of Base [place];Slough 12/21/2017  9:42 AM  
Condition of Edges Open 12/21/2017  9:42 AM  
Tissue Type Pink;Yellow 12/21/2017  9:42 AM  
Tissue Type Percent Pink 10 11/16/2017 10:08 AM  
Tissue Type Percent Red 50 12/21/2017  9:42 AM  
Tissue Type Percent Yellow 50 12/21/2017  9:42 AM  
Drainage Amount  Moderate 12/21/2017  9:42 AM  
Drainage Color Serous 12/21/2017  9:42 AM  
Wound Odor None 12/21/2017  9:42 AM  
Periwound Skin Condition Erythema, blanchable;Edematous 12/21/2017  9:42 AM  
Cleansing and Cleansing Agents  Dermal wound cleanser; Soap and water 12/21/2017  9:42 AM  
Dressing Type Applied Other (Comment) 12/21/2017  9:42 AM  
Procedure Tolerated Well 12/21/2017  9:42 AM  
Number of days: 332 [REMOVED] Wound Foot Left;Medial;Distal (Removed) DRESSING STATUS Removed 10/18/2018  9:26 AM  
DRESSING TYPE Other (Comment) 7/5/2018 10:27 AM  
Non-Pressure Injury Full thickness (subcut/muscle) 7/5/2018 10:27 AM  
Wound Length (cm) 0.8 cm 9/20/2018  8:13 AM  
Wound Width (cm) 0.2 cm 9/20/2018  8:13 AM  
Wound Depth (cm) 0.3 7/5/2018 10:27 AM  
Wound Surface area (cm^2) 0.16 cm^2 9/20/2018  8:13 AM  
Change in Wound Size % 20 9/20/2018  8:13 AM  
Condition of Base [facility] 7/5/2018 10:27 AM  
Condition of Edges Open 7/5/2018 10:27 AM  
Tissue Type Pink;Yellow 7/5/2018 10:27 AM  
Tissue Type Percent Pink 10 7/5/2018 10:27 AM  
Tissue Type Percent Red 100 9/20/2018  8:13 AM  
Tissue Type Percent Yellow 90 7/5/2018 10:27 AM  
Drainage Amount  Large 7/5/2018 10:27 AM  
Drainage Color Serosanguinous 7/5/2018 10:27 AM  
Wound Odor None 7/5/2018 10:27 AM  
Periwound Skin Condition Erythema, blanchable 7/5/2018 10:27 AM  
Cleansing and Cleansing Agents  Normal saline 7/5/2018 10:27 AM  
Dressing Type Applied Other (Comment) 7/5/2018 10:27 AM  
 Procedure Tolerated Well 7/5/2018 10:27 AM  
Number of days: 967 [REMOVED] Wound Leg Lower Right; Anterior; Lateral (Removed) DRESSING TYPE Open to air 2/15/2018 11:36 AM  
Non-Pressure Injury Partial thickness (epider/derm) 2/1/2018  1:05 PM  
Wound Length (cm) 0.9 cm 2/1/2018  1:05 PM  
Wound Width (cm) 0.6 cm 2/1/2018  1:05 PM  
Wound Depth (cm) 0.1 2/1/2018  1:05 PM  
Wound Surface area (cm^2) 0.54 cm^2 2/1/2018  1:05 PM  
Condition of Base [place] 2/1/2018  1:05 PM  
Condition of Edges Open 2/1/2018  1:05 PM  
Tissue Type Red 2/1/2018  1:05 PM  
Tissue Type Percent Red 100 2/1/2018  1:05 PM  
Drainage Amount  Scant 2/1/2018  1:05 PM  
Drainage Color Serosanguinous 2/1/2018  1:05 PM  
Wound Odor None 2/1/2018  1:05 PM  
Periwound Skin Condition Edematous; Erythema, blanchable 2/1/2018  1:05 PM  
Cleansing and Cleansing Agents  Dermal wound cleanser 2/1/2018  1:05 PM  
Dressing Type Applied Other (Comment) 2/1/2018  1:05 PM  
Procedure Tolerated Well 2/1/2018  1:05 PM  
Number of days: 73  
   
[REMOVED] Wound Leg Lower Anterior;Left;Medial (Removed) Number of days: 314 [REMOVED] Wound Foot Left;Dorsal (Removed) DRESSING STATUS Removed 7/5/2018 10:27 AM  
DRESSING TYPE Gauze 7/5/2018 10:27 AM  
Non-Pressure Injury Full thickness (subcut/muscle) 7/5/2018 10:27 AM  
Wound Length (cm) 0 cm 9/20/2018  8:13 AM  
Wound Width (cm) 0 cm 9/20/2018  8:13 AM  
Wound Depth (cm) 0.3 7/5/2018 10:27 AM  
Wound Surface area (cm^2) 0 cm^2 9/20/2018  8:13 AM  
Change in Wound Size % 100 9/20/2018  8:13 AM  
Condition of Base [place] 7/5/2018 10:27 AM  
Condition of Edges Open 6/28/2018  8:53 AM  
Tissue Type Pink;Yellow 7/5/2018 10:27 AM  
Tissue Type Percent Pink 75 7/5/2018 10:27 AM  
Tissue Type Percent Red 25 7/5/2018 10:27 AM  
Tissue Type Percent Yellow 50 6/28/2018  8:53 AM  
Drainage Amount  Small  7/5/2018 10:27 AM  
Drainage Color Serous 7/5/2018 10:27 AM  
Wound Odor None 6/28/2018  8:53 AM  
 Periwound Skin Condition Erythema, blanchable 7/5/2018 10:27 AM  
Cleansing and Cleansing Agents  Normal saline 7/5/2018 10:27 AM  
Dressing Type Applied Other (Comment) 7/5/2018 10:27 AM  
Procedure Tolerated Well 7/5/2018 10:27 AM  
Number of days: 102 [REMOVED] Wound Ankle Lateral (Removed) Number of days: 4 [REMOVED] Wound Arm Left;Right (Removed) Number of days: 4 Interpretation Summary · No evidence of significant arterial disease in the right lower extremity. · Moderate arterial disease in the left lower extremity at the level of the calf vessels. · Digital disease bilaterally. Lower Extremity Arterial Findings JOLANTA The right resting JOLANTA is normal. The left resting JOLANTA is moderately decreased. Arterial disease noted at the level of the tibial artery on the left side. The right anterior tibial artery has biphasic waveforms. The right common femoral artery, popliteal artery and posterior tibial artery has triphasic waveforms. Right toe PPG is dampened. The left anterior tibial artery and posterior tibial artery has monophasic waveforms. The left common femoral artery and popliteal artery has triphasic waveforms. Left toe PPG is dampened. Arterial Pressure Measurements Right Left Brachial  mmHg 132 mmHg Post Tibial  mmHg 92 mmHg Ant Tibial  mmHg 97 mmHg Ant Tibial Location foot JOLANTA 1.04   
  
 0.71 Toe Pressure 54 mmHg 37 mmHg TBI 0.39   
  
 0.27 Labs No results found for this or any previous visit (from the past 24 hour(s)). X-Ray:  deferred Procedures:   S/p Theraskin application 86/17/01 Dorys Hough DPM 
October 18, 2018

## 2018-10-19 ENCOUNTER — HOME CARE VISIT (OUTPATIENT)
Dept: SCHEDULING | Facility: HOME HEALTH | Age: 83
End: 2018-10-19
Payer: MEDICARE

## 2018-10-19 VITALS
TEMPERATURE: 98.4 F | HEART RATE: 65 BPM | RESPIRATION RATE: 20 BRPM | DIASTOLIC BLOOD PRESSURE: 66 MMHG | SYSTOLIC BLOOD PRESSURE: 142 MMHG | OXYGEN SATURATION: 98 %

## 2018-10-19 PROCEDURE — G0299 HHS/HOSPICE OF RN EA 15 MIN: HCPCS

## 2018-10-19 PROCEDURE — 3331090001 HH PPS REVENUE CREDIT

## 2018-10-19 PROCEDURE — 3331090002 HH PPS REVENUE DEBIT

## 2018-10-20 PROCEDURE — 3331090002 HH PPS REVENUE DEBIT

## 2018-10-20 PROCEDURE — 3331090001 HH PPS REVENUE CREDIT

## 2018-10-21 PROCEDURE — 3331090002 HH PPS REVENUE DEBIT

## 2018-10-21 PROCEDURE — 3331090001 HH PPS REVENUE CREDIT

## 2018-10-22 ENCOUNTER — HOME CARE VISIT (OUTPATIENT)
Dept: HOME HEALTH SERVICES | Facility: HOME HEALTH | Age: 83
End: 2018-10-22
Payer: MEDICARE

## 2018-10-22 PROCEDURE — 3331090002 HH PPS REVENUE DEBIT

## 2018-10-22 PROCEDURE — 3331090001 HH PPS REVENUE CREDIT

## 2018-10-23 ENCOUNTER — HOME CARE VISIT (OUTPATIENT)
Dept: SCHEDULING | Facility: HOME HEALTH | Age: 83
End: 2018-10-23
Payer: MEDICARE

## 2018-10-23 PROCEDURE — G0299 HHS/HOSPICE OF RN EA 15 MIN: HCPCS

## 2018-10-23 PROCEDURE — 3331090002 HH PPS REVENUE DEBIT

## 2018-10-23 PROCEDURE — 3331090001 HH PPS REVENUE CREDIT

## 2018-10-24 ENCOUNTER — HOME CARE VISIT (OUTPATIENT)
Dept: HOME HEALTH SERVICES | Facility: HOME HEALTH | Age: 83
End: 2018-10-24
Payer: MEDICARE

## 2018-10-24 VITALS
SYSTOLIC BLOOD PRESSURE: 138 MMHG | HEART RATE: 64 BPM | OXYGEN SATURATION: 98 % | RESPIRATION RATE: 16 BRPM | TEMPERATURE: 99.5 F | DIASTOLIC BLOOD PRESSURE: 60 MMHG

## 2018-10-24 PROCEDURE — 3331090002 HH PPS REVENUE DEBIT

## 2018-10-24 PROCEDURE — 3331090001 HH PPS REVENUE CREDIT

## 2018-10-25 ENCOUNTER — HOME CARE VISIT (OUTPATIENT)
Dept: SCHEDULING | Facility: HOME HEALTH | Age: 83
End: 2018-10-25
Payer: MEDICARE

## 2018-10-25 VITALS
OXYGEN SATURATION: 96 % | HEART RATE: 56 BPM | TEMPERATURE: 97.9 F | RESPIRATION RATE: 16 BRPM | SYSTOLIC BLOOD PRESSURE: 110 MMHG | DIASTOLIC BLOOD PRESSURE: 60 MMHG

## 2018-10-25 PROCEDURE — 3331090001 HH PPS REVENUE CREDIT

## 2018-10-25 PROCEDURE — G0299 HHS/HOSPICE OF RN EA 15 MIN: HCPCS

## 2018-10-25 PROCEDURE — 3331090002 HH PPS REVENUE DEBIT

## 2018-10-26 PROCEDURE — 3331090001 HH PPS REVENUE CREDIT

## 2018-10-26 PROCEDURE — 3331090002 HH PPS REVENUE DEBIT

## 2018-10-27 ENCOUNTER — HOME CARE VISIT (OUTPATIENT)
Dept: SCHEDULING | Facility: HOME HEALTH | Age: 83
End: 2018-10-27
Payer: MEDICARE

## 2018-10-27 PROCEDURE — 3331090002 HH PPS REVENUE DEBIT

## 2018-10-27 PROCEDURE — 3331090001 HH PPS REVENUE CREDIT

## 2018-10-27 PROCEDURE — G0299 HHS/HOSPICE OF RN EA 15 MIN: HCPCS

## 2018-10-28 PROCEDURE — 3331090002 HH PPS REVENUE DEBIT

## 2018-10-28 PROCEDURE — 3331090001 HH PPS REVENUE CREDIT

## 2018-10-29 ENCOUNTER — HOME CARE VISIT (OUTPATIENT)
Dept: SCHEDULING | Facility: HOME HEALTH | Age: 83
End: 2018-10-29
Payer: MEDICARE

## 2018-10-29 VITALS
HEART RATE: 81 BPM | DIASTOLIC BLOOD PRESSURE: 65 MMHG | RESPIRATION RATE: 17 BRPM | TEMPERATURE: 98.2 F | SYSTOLIC BLOOD PRESSURE: 133 MMHG | OXYGEN SATURATION: 97 %

## 2018-10-29 PROCEDURE — 3331090002 HH PPS REVENUE DEBIT

## 2018-10-29 PROCEDURE — 3331090001 HH PPS REVENUE CREDIT

## 2018-10-29 PROCEDURE — G0299 HHS/HOSPICE OF RN EA 15 MIN: HCPCS

## 2018-10-30 VITALS
SYSTOLIC BLOOD PRESSURE: 115 MMHG | HEART RATE: 55 BPM | OXYGEN SATURATION: 98 % | RESPIRATION RATE: 20 BRPM | DIASTOLIC BLOOD PRESSURE: 56 MMHG | TEMPERATURE: 98.6 F

## 2018-10-30 PROCEDURE — 3331090002 HH PPS REVENUE DEBIT

## 2018-10-30 PROCEDURE — 3331090001 HH PPS REVENUE CREDIT

## 2018-10-31 ENCOUNTER — HOME CARE VISIT (OUTPATIENT)
Dept: SCHEDULING | Facility: HOME HEALTH | Age: 83
End: 2018-10-31
Payer: MEDICARE

## 2018-10-31 VITALS
HEART RATE: 68 BPM | SYSTOLIC BLOOD PRESSURE: 140 MMHG | OXYGEN SATURATION: 98 % | DIASTOLIC BLOOD PRESSURE: 63 MMHG | TEMPERATURE: 98.1 F | RESPIRATION RATE: 14 BRPM

## 2018-10-31 PROCEDURE — 3331090001 HH PPS REVENUE CREDIT

## 2018-10-31 PROCEDURE — G0299 HHS/HOSPICE OF RN EA 15 MIN: HCPCS

## 2018-10-31 PROCEDURE — 3331090002 HH PPS REVENUE DEBIT

## 2018-11-01 ENCOUNTER — HOSPITAL ENCOUNTER (OUTPATIENT)
Dept: WOUND CARE | Age: 83
Discharge: HOME | End: 2018-11-01
Payer: MEDICARE

## 2018-11-01 VITALS
SYSTOLIC BLOOD PRESSURE: 116 MMHG | OXYGEN SATURATION: 97 % | RESPIRATION RATE: 18 BRPM | DIASTOLIC BLOOD PRESSURE: 68 MMHG | HEART RATE: 50 BPM | TEMPERATURE: 97.2 F

## 2018-11-01 PROCEDURE — 3331090002 HH PPS REVENUE DEBIT

## 2018-11-01 PROCEDURE — 3331090001 HH PPS REVENUE CREDIT

## 2018-11-01 PROCEDURE — 74011000250 HC RX REV CODE- 250

## 2018-11-01 PROCEDURE — 77030011256 HC DRSG MEPILEX <16IN NO BORD MOLN -A: Performed by: PODIATRIST

## 2018-11-01 PROCEDURE — 15275 SKIN SUB GRAFT FACE/NK/HF/G: CPT

## 2018-11-01 RX ORDER — LIDOCAINE AND PRILOCAINE 25; 25 MG/G; MG/G
CREAM TOPICAL
Status: COMPLETED
Start: 2018-11-01 | End: 2018-11-01

## 2018-11-01 RX ADMIN — LIDOCAINE AND PRILOCAINE 5 G: 25; 25 CREAM TOPICAL at 09:32

## 2018-11-01 NOTE — PROGRESS NOTES
Theraskin and Debridement Procedure Note Pre-Operative Diagnosis: Non-Healing Wound Post-Operative Diagnosis: Same Site: medial left ankle Procedure: 1. Selective sharp instrument debridement of slough and devitilized tissue 2. Application of Skin Biograft - Theraskin Indications: 1. Removal of devitalized and necrotic tissue to promote healing and evaluate the extent of healing. 2. Stage 2 of a planned staged series of  10                        applications of Theraskin in wound not responding to conventional therapy After the benefits/risks/SE were discussed, the patient agreed to proceed. Time out was done: * Patient was identified by name and date of birth * Agreement on procedure being performed was verified * Procedure site verified and marked as necessary * Patient was positioned for comfort * Consent was signed and verified. Instruments used:  
 [x]  Dermal curette  Blade 
      [] #15  [] #10  [] Forceps  [] Tissue nippers  [] sterile scissors  [] Other Anesthesia used:  
 [x]  EMLA 2.5% cream: applied to wound beds for approximately 15minutes. []   Lidocaine 2% Topical Gel    
 []  Lidocaine injectable 1% with epinephrine 1:100,000; Amount used: mL  
 []  Lidocaine injectable 1% without epinephrine; Amount used: mL  
 []  Other:   
 []  None  
      [] patient is insensate due to neuropathy  
      [] patient declines 
      [] allergy to anesthetic 
      [] tissue for debridement is either superficial, loosely adherent and/or necrotic and denervated Description of Procedure: After usual preparation, sharp debridement of slough and devitalized tissue was performed utilizing the instruments as above. Tissue was removed from the sub Q layer. The wound was debrided to remove non-viable tissue and to clearly reveal the wound margins. The wound was debrided to an acute state with some bleeding from the capillary bed. Pre-debridement measurement: see accompanying progress note Post debridement measurement:  2.5_x_2.1_x_0.2_cm . Bleeding: <5mL Resolved with light focal pressure. Wounds were cleaned and irrigated with saline. After usual preparation, Theraskin applied to the wound and affixed to the skin with steri strips and covered with non-adherent contact layer. The patient tolerated the procedure without complication. Theraskin 13 sq cm sheet Theraskin used: 12 sq cm Theraskin discarded: 1 sq cm Wound care applied: 
 []   Hydrogell   []  Hypergel   []   Hydrofiber/Aquacel    
 []   Cadexomer Iodine (Iodosorb)   []  Silver Alginate    []   Medihoney:  
 []   Collagenase:Santyl   []  Calcium Alginate   []   Collagen:  
 []   Foam   []  Non-Adherent Contact Layer   []   Xeroform  
 []   Adaptec:   []   Hydrocolloid   []   Transparent Film  
 []  Antibiotic ointment/cream    []  hyderofera blue   []   Other (see below) [] [place] Other:cutimed sorbact [x]   Saline Gauze and Roll Gauze  
 []   Foam and Roll Gauze  
 []   Dry Gauze  
 []    Bordered gauze:   
 []   Secure with Tape  
 [x]   Other  mepilex [x]   Compression Wrap: 
        []   [place]    []Multi-Layer    [x]Tubular Bandages Ligia-Ulcer Care 
  []   Cream  
  []   Lotion []   Ointment  
  []   Barrier  
  []   Other:  
 
 
The patient tolerated the procedure well with no complications. The patient left the exam room in satisfactory and stable condition.   
 
Iker Esparza DPM

## 2018-11-01 NOTE — WOUND CARE
Patient Name: Job Avila : 1935 MRN: [id number] Graft Type: Theraskin 1x2 Graft ID#: [id number] [employer] IF#:[id number] Retrieved from: [employer] Retrieved and Reconstituted by: Mara Gan RN Reconstitution Method: 10 mL NS Lot [id number] exp. 2020 Amount of graft used (sq cm): 12 Amount discarded (sq cm): 1 Home care orders faxed to [facility] for dressing changes on  and . Home care to leave graft and steri strips in place, gently cleanse/irrigate with NS, replace Cutimed Sorbact, NS moist gauze, and border foam. She will return in 2 weeks for follow up with Dr. Markell Decker. 18 0915 18 1005 Wound Ankle Left;Medial;Proximal  
Date First Assessed: 17   POA: Yes  Wound Type: Vascular  Location: Ankle  Orientation: Left;Medial;Proximal  
DRESSING STATUS Removed --   
DRESSING TYPE Other (Comment) (Tape, Gauze) -- Non-Pressure Injury Full thickness (subcut/muscle) -- Wound Length (cm) 2.3 cm -- Wound Width (cm) 2 cm -- Wound Depth (cm) 0.2 -- Wound Surface area (cm^2) 4.6 cm^2 -- Change in Wound Size % -428.74 --   
Condition of Base Pink;Slough --   
Tissue Type Pink;Yellow --   
Tissue Type Percent Pink 40 -- Tissue Type Percent Yellow 60 --   
Drainage Amount  Large --   
Drainage Color Serous -- Wound Odor None --   
Cleansing and Cleansing Agents  Dermal wound cleanser --   
Dressing Type Applied Other (Comment) (Theraskin 1x2,Cutimed,NS moist gauze, Border Foam,hypafix.) -- Wound Procedure Type Skin Substitue --   
[REMOVED] Wound Foot Left;Medial;Distal  
Final Assessment Date: 18  Date First Assessed/Time First Assessed: 17 1100   POA: Yes  Wound Type: Vascular  Location: Foot  Orientation: Left;Medial;Distal  
DRESSING STATUS Removed (Tape, gauze) -- Wound Length (cm) 0.3 cm -- Wound Width (cm) 0.5 cm -- Wound Surface area (cm^2) 0.15 cm^2 --   
 Change in Wound Size % 25 -- Tissue Type Percent Yellow 100 -- Wound Procedure Type --  Skin Substitue Procedure Time Out --  1000 Consent Obtained  --  Yes Skin Substitute --  Other (Theraskin) Amount Applied  --  12 sq cm Amount Wasted  --  1 sq cm

## 2018-11-01 NOTE — DISCHARGE INSTRUCTIONS
Wound Care Instructions    Patient: Tayo Reed MRN: [id number]  SSN: [id number]     YOB: 1935  Age:83 y.o. Sex: female       Ms. Maite Joe MD recommends the following discharge instruction:    Offloading    []   Felt/Foam Offloading   [] Removable Cast Waker       []   Wedge Shoe   []  Wheelchair     []   Total Contact Cast   []  Surgical Shoe     []   Crutches        Other   Mattress:   Other:     Edema Control    []   Elevated legs as much as possible. Recommend above level of heart.     []   Layered Wraps             []   Left      []   Right      []  BILATERAL          - Type:            []   Unna Boot       []  Multi-Layer                                   []   Two Layers     []  Three Layers   []  Four Layers     []   Tubular Bandages        []   Left      []   Right     SIZE:      []   Stockings                      []   Left      []   Right     []   Compression Pump     []   Left      []   Right    ___ millimeters of mercury  ___ millimeters of mercury for ___ minutes for ___ day(s)        Other:       Nutritional Supplements    []  Multi-Vitamin     []  Other:       [employer]     [x]  Home Health: [facility]     []  Long Term Care:      []  DME:     Consult    []   Nutrition     []   Vascular     []   Orthotist/Pedorthotist     []   Infectious Disease     []   Lymphedema Therapist   Other:     Dressings:  Another brand of generically equivalent product may be dispensed unless specifically ordered otherwise.     Home Ulcer/wound Hygiene (cleanse with)      []   Distilled Water     [x]   Normal Saline to gently irrigate the Theraskin graft.      []   Wound Cleanser     []   Mild antimicrobial soap and water     []   Chlorhexidine liquid soap and water     []   Other:     Apply    []   Hydrogel   []  Hypergel   []   Aquacel Ag     []   Cadexomer Iodine                     (Iodosorb)   []  Silver Alginate    []   Medihoney:     []   Collagenase:Santyl   []  Calcium Alginate   [] Collagen:     []   Plain Foam   []  Non-Adherent Contact                Layer   []   Xeroform     []   Silver foam   []   Hydrocolloid        []   Acticoat   []   Adaptic:      Other/Specific Instructions: Theraskin graft placed on left medial ankle ulcer today, secured with Steri-Strips. Do not disturb the steri strips or Theraskin graft. On Monday and Thursday, gently remove Hypafix tape, bordered foam, gauze, and green Cutimed Sorbact. After gently irrigating with normal saline, place new piece of green Cutimed Sorbact over graft, place a small piece of NS moist gauze over Cutimed, cover with bordered foam. Secure with tape if necessary. A piece of Cutimed Sorbact was sent home with patient today. Cover With    []   Dry Gauze and Roll Gauze     []   Foam and Roll Gauze     []   Dry Gauze     []   Bordered gauze:     [x]   Foam      [x]    Bordered         []   Nonbordered     []   Secure with Tape     []   Other       Ligia-Ulcer Care    []   Cream     []   Lotion     []   Ointment     []   Barrier     []   Other:     Change Dressing    []   Once Daily     []   Every Other Day     []   Every 3 Days       []   Every 5 Days     []   Every 7 Days     []   Do Not Change       []   2 x per week (Mon and Thurs. )     []   3 x per week (Mon, Wed, Fri. )     [x]   Other Monday and Thursday until patient returns to clinic on 11/15/18. No dressing change on Friday 11/2/18. .          Follow-Up:   [x]  Follow up 2 weeks 11/15/18                              []  As Needed (PRN)     []   Sophia Wei MD    []  Cheyenne Crandall DPM    [x]  Don Sever DPM   []   Anton Hebert DPM      []   Nurse Visit            Please call the wound clinic ([phone]) regarding any questions or concerns.

## 2018-11-02 PROCEDURE — 3331090001 HH PPS REVENUE CREDIT

## 2018-11-02 PROCEDURE — 3331090002 HH PPS REVENUE DEBIT

## 2018-11-03 PROCEDURE — 3331090001 HH PPS REVENUE CREDIT

## 2018-11-03 PROCEDURE — 3331090002 HH PPS REVENUE DEBIT

## 2018-11-04 PROCEDURE — 3331090002 HH PPS REVENUE DEBIT

## 2018-11-04 PROCEDURE — 3331090001 HH PPS REVENUE CREDIT

## 2018-11-05 ENCOUNTER — HOME CARE VISIT (OUTPATIENT)
Dept: SCHEDULING | Facility: HOME HEALTH | Age: 83
End: 2018-11-05
Payer: MEDICARE

## 2018-11-05 PROCEDURE — 3331090002 HH PPS REVENUE DEBIT

## 2018-11-05 PROCEDURE — G0299 HHS/HOSPICE OF RN EA 15 MIN: HCPCS

## 2018-11-05 PROCEDURE — 3331090001 HH PPS REVENUE CREDIT

## 2018-11-06 VITALS
HEART RATE: 58 BPM | DIASTOLIC BLOOD PRESSURE: 61 MMHG | RESPIRATION RATE: 20 BRPM | SYSTOLIC BLOOD PRESSURE: 125 MMHG | OXYGEN SATURATION: 98 % | TEMPERATURE: 97.3 F

## 2018-11-06 PROCEDURE — 3331090002 HH PPS REVENUE DEBIT

## 2018-11-06 PROCEDURE — 3331090001 HH PPS REVENUE CREDIT

## 2018-11-07 PROCEDURE — 3331090002 HH PPS REVENUE DEBIT

## 2018-11-07 PROCEDURE — 3331090001 HH PPS REVENUE CREDIT

## 2018-11-08 PROCEDURE — 3331090001 HH PPS REVENUE CREDIT

## 2018-11-08 PROCEDURE — 3331090002 HH PPS REVENUE DEBIT

## 2018-11-09 ENCOUNTER — HOME CARE VISIT (OUTPATIENT)
Dept: HOME HEALTH SERVICES | Facility: HOME HEALTH | Age: 83
End: 2018-11-09
Payer: MEDICARE

## 2018-11-09 PROCEDURE — 3331090002 HH PPS REVENUE DEBIT

## 2018-11-09 PROCEDURE — 3331090001 HH PPS REVENUE CREDIT

## 2018-11-10 ENCOUNTER — HOME CARE VISIT (OUTPATIENT)
Dept: SCHEDULING | Facility: HOME HEALTH | Age: 83
End: 2018-11-10
Payer: MEDICARE

## 2018-11-10 PROCEDURE — 3331090001 HH PPS REVENUE CREDIT

## 2018-11-10 PROCEDURE — 3331090002 HH PPS REVENUE DEBIT

## 2018-11-10 PROCEDURE — G0299 HHS/HOSPICE OF RN EA 15 MIN: HCPCS

## 2018-11-11 VITALS
DIASTOLIC BLOOD PRESSURE: 50 MMHG | TEMPERATURE: 97.3 F | SYSTOLIC BLOOD PRESSURE: 139 MMHG | RESPIRATION RATE: 20 BRPM | OXYGEN SATURATION: 98 % | HEART RATE: 56 BPM

## 2018-11-11 PROCEDURE — 3331090002 HH PPS REVENUE DEBIT

## 2018-11-11 PROCEDURE — 3331090001 HH PPS REVENUE CREDIT

## 2018-11-12 ENCOUNTER — HOME CARE VISIT (OUTPATIENT)
Dept: SCHEDULING | Facility: HOME HEALTH | Age: 83
End: 2018-11-12
Payer: MEDICARE

## 2018-11-12 PROCEDURE — G0299 HHS/HOSPICE OF RN EA 15 MIN: HCPCS

## 2018-11-12 PROCEDURE — 3331090001 HH PPS REVENUE CREDIT

## 2018-11-12 PROCEDURE — 3331090002 HH PPS REVENUE DEBIT

## 2018-11-13 VITALS
OXYGEN SATURATION: 98 % | TEMPERATURE: 97.4 F | HEART RATE: 57 BPM | DIASTOLIC BLOOD PRESSURE: 60 MMHG | RESPIRATION RATE: 20 BRPM | SYSTOLIC BLOOD PRESSURE: 115 MMHG

## 2018-11-13 PROCEDURE — 3331090002 HH PPS REVENUE DEBIT

## 2018-11-13 PROCEDURE — 3331090001 HH PPS REVENUE CREDIT

## 2018-11-14 PROCEDURE — 3331090001 HH PPS REVENUE CREDIT

## 2018-11-14 PROCEDURE — 3331090002 HH PPS REVENUE DEBIT

## 2018-11-15 ENCOUNTER — HOSPITAL ENCOUNTER (OUTPATIENT)
Dept: WOUND CARE | Age: 83
Discharge: HOME | End: 2018-11-15
Payer: MEDICARE

## 2018-11-15 VITALS
HEART RATE: 71 BPM | DIASTOLIC BLOOD PRESSURE: 72 MMHG | TEMPERATURE: 97 F | SYSTOLIC BLOOD PRESSURE: 174 MMHG | OXYGEN SATURATION: 97 % | RESPIRATION RATE: 18 BRPM

## 2018-11-15 PROCEDURE — 74011250637 HC RX REV CODE- 250/637

## 2018-11-15 PROCEDURE — 11042 DBRDMT SUBQ TIS 1ST 20SQCM/<: CPT | Performed by: PODIATRIST

## 2018-11-15 PROCEDURE — 3331090001 HH PPS REVENUE CREDIT

## 2018-11-15 PROCEDURE — 3331090002 HH PPS REVENUE DEBIT

## 2018-11-15 RX ORDER — TRIAMCINOLONE ACETONIDE 5 MG/G
CREAM TOPICAL
Status: COMPLETED
Start: 2018-11-15 | End: 2018-11-15

## 2018-11-15 RX ADMIN — TRIAMCINOLONE ACETONIDE: 5 CREAM TOPICAL at 10:51

## 2018-11-15 NOTE — PROGRESS NOTES
Theraskin and Debridement Procedure Note Pre-Operative Diagnosis: Non-Healing Wound Post-Operative Diagnosis: Same Site: medial left ankle Procedure: 1. Selective sharp instrument debridement of slough and devitilized tissue 2. Application of Skin Biograft - Theraskin Indications: 1. Removal of devitalized and necrotic tissue to promote healing and evaluate the extent of healing. 2. Stage 3 of a planned staged series of           10               applications of Theraskin in wound not responding to conventional therapy After the benefits/risks/SE were discussed, the patient agreed to proceed. Time out was done: * Patient was identified by name and date of birth * Agreement on procedure being performed was verified * Procedure site verified and marked as necessary * Patient was positioned for comfort * Consent was signed and verified. Instruments used:  
 []  Dermal curette  Blade 
      [] #15  [] #10  [] Forceps  [] Tissue nippers  [] sterile scissors  [x] Other Anesthesia used:  
 []  EMLA 2.5% cream: applied to wound beds for approximately 15minutes. []   Lidocaine 2% Topical Gel    
 []  Lidocaine injectable 1% with epinephrine 1:100,000; Amount used: mL  
 []  Lidocaine injectable 1% without epinephrine; Amount used: mL  
 []  Other:   
 [x]  None  
      [] patient is insensate due to neuropathy  
      [] patient declines 
      [] allergy to anesthetic [x] tissue for debridement is either superficial, loosely adherent and/or necrotic and denervated Description of Procedure: After usual preparation, sharp debridement of slough and devitalized tissue was performed utilizing the instruments as above. Tissue was removed from the sub Q layer. The wound was debrided to remove non-viable tissue and to clearly reveal the wound margins. The wound was debrided to an acute state with some bleeding from the capillary bed. Pre-debridement measurement: see accompanying progress note Post debridement measurement: 2.2_x_2.0_x0.1___cm . Bleeding: <5mL Resolved with light focal pressure. Wounds were cleaned and irrigated with saline. After usual preparation, Theraskin applied to the wound and affixed to the skin with steri strips and covered with non-adherent contact layer. The patient tolerated the procedure without complication. Theraskin 13 sq cm sheet Theraskin used: 13 sq cm Theraskin discarded: 0 sq cm Wound care applied: 
 []   Hydrogell   []  Hypergel   []   Hydrofiber/Aquacel    
 []   Cadexomer Iodine (Iodosorb)   []  Silver Alginate    []   Medihoney:  
 []   Collagenase:Santyl   []  Calcium Alginate   []   Collagen:  
 []   Foam   []  Non-Adherent Contact Layer   []   Xeroform  
 []   Adaptec:   []   Hydrocolloid   []   Transparent Film  
 []  Antibiotic ointment/cream    []  hyderofera blue   []   Other (see below) [] Mesalt Other:cutimed sorbact with wound gel 
 
 []   Dry Gauze and Roll Gauze  
 []   Foam and Roll Gauze  
 []   Dry Gauze  
 []    Bordered gauze:   
 []   Secure with Tape  
 [x]   Other  Wound gel [x]   Compression Wrap: 
        []   Nancey Los    []Multi-Layer    [x]Tubular Bandages Ligia-Ulcer Care 
  []   Cream  
  []   Lotion []   Ointment  
  []   Barrier  
  [x]   Other:Triamcinolone cream 0.25% The patient tolerated the procedure well with no complications. The patient left the exam room in satisfactory and stable condition.   
 
Priya Daniel DPM

## 2018-11-15 NOTE — WOUND CARE
Patient had Theraskin placed on her left ankle today. She will follow up in the wound center on 11/20/18 for a nurse visit and then on 11/29/18 with Dr. Eladia Villeda. She will used Triamcinolone ointment if needed to the fazal wound irritation. Hy-Tape was used today instead of paper tape in order to prevent further irritation to the area.

## 2018-11-16 ENCOUNTER — HOME CARE VISIT (OUTPATIENT)
Dept: SCHEDULING | Facility: HOME HEALTH | Age: 83
End: 2018-11-16
Payer: MEDICARE

## 2018-11-16 VITALS
TEMPERATURE: 97.7 F | OXYGEN SATURATION: 98 % | HEART RATE: 72 BPM | SYSTOLIC BLOOD PRESSURE: 134 MMHG | RESPIRATION RATE: 20 BRPM | DIASTOLIC BLOOD PRESSURE: 69 MMHG

## 2018-11-16 PROCEDURE — 3331090001 HH PPS REVENUE CREDIT

## 2018-11-16 PROCEDURE — 3331090002 HH PPS REVENUE DEBIT

## 2018-11-16 PROCEDURE — G0299 HHS/HOSPICE OF RN EA 15 MIN: HCPCS

## 2018-11-17 PROCEDURE — 3331090001 HH PPS REVENUE CREDIT

## 2018-11-17 PROCEDURE — 3331090002 HH PPS REVENUE DEBIT

## 2018-11-18 PROCEDURE — 3331090002 HH PPS REVENUE DEBIT

## 2018-11-18 PROCEDURE — 3331090001 HH PPS REVENUE CREDIT

## 2018-11-20 ENCOUNTER — HOSPITAL ENCOUNTER (OUTPATIENT)
Dept: WOUND CARE | Age: 83
Discharge: HOME | End: 2018-11-20
Payer: MEDICARE

## 2018-11-20 PROCEDURE — 97602 WOUND(S) CARE NON-SELECTIVE: CPT

## 2018-11-20 NOTE — WOUND CARE
Wound/Ostomy Nurse Progress Note Patient: Audrey Elam HARJEET:[birth year] MRN: [id number] Situation: Nurse visit for graft check and dressing change. Background: Theraskin graft placed on 11/15/18 to left medial ankle. Assessment: Her dressing was in place but saturated. She had reinforced the dressing with extra tape. The outer dressing was removed along with the primary dressing of Cutimed and Steri-strips, all of which were wet and falling off. The graft was gently rinsed with NS. The graft has attached firmly to the wound bed. A new dressing of Cutimed, Steristrips, Polymem, and Hy-Tape was applied. Recommendation: Follow up in one week with Dr. Jennifer Flores. She may call sooner if the dressing becomes saturated and she needs a dressing change.

## 2018-11-29 ENCOUNTER — HOME HEALTH ADMISSION (OUTPATIENT)
Dept: HOME HEALTH SERVICES | Facility: HOME HEALTH | Age: 83
End: 2018-11-29
Payer: MEDICARE

## 2018-11-29 ENCOUNTER — HOSPITAL ENCOUNTER (OUTPATIENT)
Dept: WOUND CARE | Age: 83
Discharge: HOME | End: 2018-11-29
Payer: MEDICARE

## 2018-11-29 VITALS
RESPIRATION RATE: 14 BRPM | HEART RATE: 66 BPM | TEMPERATURE: 97.1 F | DIASTOLIC BLOOD PRESSURE: 70 MMHG | OXYGEN SATURATION: 97 % | SYSTOLIC BLOOD PRESSURE: 168 MMHG

## 2018-11-29 PROCEDURE — A6021 COLLAGEN DRESSING <=16 SQ IN: HCPCS | Performed by: PODIATRIST

## 2018-11-29 PROCEDURE — 77030013575 HC DRSG HYDROFERA HOLL -B: Performed by: PODIATRIST

## 2018-11-29 PROCEDURE — 74011000250 HC RX REV CODE- 250

## 2018-11-29 PROCEDURE — 11042 DBRDMT SUBQ TIS 1ST 20SQCM/<: CPT

## 2018-11-29 RX ORDER — LIDOCAINE AND PRILOCAINE 25; 25 MG/G; MG/G
CREAM TOPICAL
Status: COMPLETED
Start: 2018-11-29 | End: 2018-11-29

## 2018-11-29 RX ORDER — DOXYCYCLINE 100 MG/1
100 CAPSULE ORAL 2 TIMES DAILY
Qty: 20 CAP | Refills: 0 | Status: SHIPPED | OUTPATIENT
Start: 2018-11-29 | End: 2018-12-20

## 2018-11-29 RX ADMIN — LIDOCAINE AND PRILOCAINE: 25; 25 CREAM TOPICAL at 11:35

## 2018-11-29 NOTE — WOUND CARE
11/29/18 1045 Wound Ankle Left;Medial;Proximal  
Date First Assessed: 11/16/17   POA: Yes  Wound Type: Vascular  Location: Ankle  Orientation: Left;Medial;Proximal  
DRESSING STATUS Removed DRESSING TYPE Other (Comment) (Hy-Tape, Polymem Foam, Cutimed) Non-Pressure Injury Full thickness (subcut/muscle) Wound Length (cm) 2.4 cm Wound Width (cm) 1.5 cm Wound Depth (cm) 0.2 Wound Surface area (cm^2) 3.6 cm^2 Change in Wound Size % -313.79 Condition of Base Pink;Slough Condition of Edges Open Tissue Type Pink;Yellow Tissue Type Percent Pink 50 Tissue Type Percent Yellow 50 Drainage Amount  Large Drainage Color Serous; Tani Batch Wound Odor Foul Periwound Skin Condition Erythema, blanchable Cleansing and Cleansing Agents  Dermal wound cleanser Dressing Type Applied Other (Comment) 
(Endoform, Hydrofera Blue, Hypafix) Procedure Tolerated Well  
 
Debridement completed. Endoform and Hydrofera applied. ABX ordered by Dr Markell Decker

## 2018-11-29 NOTE — PROGRESS NOTES
Progress and Debridement Note Patient: Bhupendra Saini MRN: [id number]  SSN: [id number] YOB: 1935  Age: 81 y.o. Sex: female Assessment:  
 
Active Problems: 
  Skin ulcer of left ankle, with fat layer exposed ([facility] (11/16/2017) Vascular disease, peripheral ([facility] (12/7/2017) Type 2 diabetes mellitus with diabetic neuropathy ([facility] (1/3/2018) Plan:  
  
Removed graft and selective excisional debridement left ankle. Will not apply another skin sub at this time. eRx Doxycycline BID x 10 days. Monitor. Keep all appointments with vascular. Subjective:  
 
80 y/o female presents for s/p theraskin 3rd application. Patient states she has noticed increase pain and redness after the last graft was applied left ankle Objective:  
 
Patient Vitals for the past 24 hrs: 
 Temp Pulse Resp BP SpO2  
11/29/18 1046 97.1 °F (36.2 °C) 66 14 168/70 97 % Physical Exam:  
 
General Exam 
alert, cooperative, no distress, appears stated age REVIEW OF SYSTEMS: 
General: denies chronic fatigue, weight loss, fever, anemia, bruising, depression, nervousness, panic attacks HEENT: denies ringing in ears, ear infections, dizzy spells, poor vision, glaucoma, sinus trouble, hoarseness, eye infections GI: denies diarrhea, gas, bloating, heartburn, regurgitation, difficulty swallowing, painful swallowing, nausea, vomiting, constipation, abdominal pain, decreased appetite, blood in stools, black stools, jaundice, dark urine Lungs: denies pneumonia, asthma, cough, SOB, hemoptysis Heart: denies chest pain, irregular heart beat, ankle swelling, HTN Skin: denies rashes, hives, allergic reaction Urinary: denies UTI, kidney stones, decreased urine force and flow, urination at night, blood in urine, painful urination Bones and Joints: denies arthritis, rheumatism, ankle pain, gout, osteoporosis Neurologic: denies stroke, seizures, headaches, numbness, tingling VASCULAR EXAM:. Pedal pulses are  0/4 DP and trace PT right and left foot. Skin temperature is warm to warm right and left foot. Digital capillary fill time is 5 seconds right and left foot. There is decrease edema of the right and left foot and ankle. Pedal hair is not noted bilateral  
   
NEUROLOGICAL EXAM:. Sensation Intact with 5.07g monofilament wire right and left foot. Deep tendon reflexes intact and symmetrical on the right and left foot. 
   
MUSCULOSKELETAL EXAM:. Muscle tone is normal.  Muscle strength of the flexor and extensor group inversion and eversion Bilateral. 4/5.    
   
DERMATOLOGICAL EXAM:. Skin is of abnormal texture and turgor with atrophic skin changes noting decrease hair growth, nail changes (thickening), Bilateral. There is epithelialized lesions dorsal and medial forefoot left foot. Subhyperkeratotic lesion noted over the medial aspect of the left ankle with graft is place and slough noted, There is erythema and warmth with palpation, no asc lymphangitis Wound Ankle Left;Medial;Proximal (Active) DRESSING STATUS Removed 11/29/2018 10:45 AM  
DRESSING TYPE Other (Comment) 11/29/2018 10:45 AM  
Non-Pressure Injury Full thickness (subcut/muscle) 11/29/2018 10:45 AM  
Wound Length (cm) 2.4 cm 11/29/2018 10:45 AM  
Wound Width (cm) 1.5 cm 11/29/2018 10:45 AM  
Wound Depth (cm) 0.2 11/29/2018 10:45 AM  
Wound Surface area (cm^2) 3.6 cm^2 11/29/2018 10:45 AM  
Change in Wound Size % -313.79 11/29/2018 10:45 AM  
Condition of Base [place] 11/15/2018 11:36 AM  
Condition of Edges Open 7/5/2018 10:27 AM  
Tissue Type Pink;Red 11/15/2018 11:36 AM  
Tissue Type Percent Pink 60 11/15/2018 11:36 AM  
Tissue Type Percent Red 5 11/16/2017 10:08 AM  
Tissue Type Percent Yellow 40 11/15/2018 11:36 AM  
Drainage Amount  Large 11/29/2018 10:45 AM  
Drainage Color Serous; Watson Anuj 11/29/2018 10:45 AM  
Wound Odor None 11/15/2018 11:36 AM  
 Periwound Skin Condition Erythema, blanchable;Rash 7/5/2018 10:27 AM  
Cleansing and Cleansing Agents  Dermal wound cleanser 11/29/2018 10:45 AM  
Dressing Type Applied Other (Comment) 11/15/2018 11:36 AM  
Wound Procedure Type Skin Substitue 11/15/2018 11:36 AM  
Procedure Time Out 1010 11/15/2018 11:36 AM  
Consent Obtained  Yes 11/15/2018 11:36 AM  
Procedure Bleeding No 11/15/2018 11:36 AM  
Skin Substitute Other 11/15/2018 11:36 AM  
Amount Wasted  0 sq cm 11/15/2018 11:36 AM  
Post Procedure Procedure Pain Scale Numeric 3/10 11/15/2018 11:36 AM  
Procedure Tolerated Well 11/15/2018 11:36 AM  
Number of days: 516 [REMOVED] Wound Ankle Left (Removed) Number of days: 0523 [REMOVED] Wound Ankle Left (Removed) Number of days: 7536 [REMOVED] Wound Ankle Left (Removed) Number of days: 9298 [REMOVED] Wound Ankle Left;Lateral (Removed) Number of days: 470 [REMOVED] Wound Ankle Right (Removed) DRESSING STATUS Removed 12/21/2017  9:42 AM  
DRESSING TYPE Other (Comment) 12/21/2017  9:42 AM  
Non-Pressure Injury Partial thickness (epider/derm) 12/21/2017  9:42 AM  
Wound Length (cm) 0.7 cm 12/21/2017  9:42 AM  
Wound Width (cm) 0.8 cm 12/21/2017  9:42 AM  
Wound Depth (cm) 0.1 12/21/2017  9:42 AM  
Wound Surface area (cm^2) 0.06 cm^2 12/21/2017  9:42 AM  
Condition of Base [facility] 12/21/2017  9:42 AM  
Condition of Edges Open 12/21/2017  9:42 AM  
Tissue Type Yellow 12/21/2017  9:42 AM  
Tissue Type Percent Red 50 11/16/2017 10:08 AM  
Tissue Type Percent Yellow 100 12/21/2017  9:42 AM  
Drainage Amount  Moderate 12/21/2017  9:42 AM  
Drainage Color Serous 12/21/2017  9:42 AM  
Wound Odor None 12/21/2017  9:42 AM  
Periwound Skin Condition Erythema, blanchable 12/21/2017  9:42 AM  
Cleansing and Cleansing Agents  Dermal wound cleanser; Soap and water 12/21/2017  9:42 AM  
Dressing Type Applied Other (Comment) 12/21/2017  9:42 AM  
Procedure Tolerated Well 12/21/2017  9:42 AM  
 Number of days: 470 [REMOVED] Wound Ankle Lateral;Right (Removed) Number of days: 215 [REMOVED] Wound Ankle Left;Lateral (Removed) Number of days: 215 [REMOVED] Wound Ankle Left;Medial (Removed) DRESSING STATUS Removed 6/21/2018  8:46 AM  
DRESSING TYPE Other (Comment) 6/21/2018  8:46 AM  
Non-Pressure Injury Full thickness (subcut/muscle) 6/21/2018  8:46 AM  
Wound Length (cm) 1.4 cm 6/21/2018  8:46 AM  
Wound Width (cm) 0.6 cm 6/21/2018  8:46 AM  
Wound Depth (cm) 0.2 6/21/2018  8:46 AM  
Wound Surface area (cm^2) 0.84 cm^2 6/21/2018  8:46 AM  
Condition of Base [place];Slough 6/21/2018  8:46 AM  
Condition of Edges Open 6/21/2018  8:46 AM  
Tissue Type Pink;Yellow 6/21/2018  8:46 AM  
Tissue Type Percent Pink 20 6/21/2018  8:46 AM  
Tissue Type Percent Red 80 5/31/2018  8:29 AM  
Tissue Type Percent Yellow 80 6/21/2018  8:46 AM  
Drainage Amount  Small  6/21/2018  8:46 AM  
Drainage Color Serosanguinous 6/21/2018  8:46 AM  
Wound Odor None 6/21/2018  8:46 AM  
Periwound Skin Condition Edematous; Erythema, blanchable 6/21/2018  8:46 AM  
Cleansing and Cleansing Agents  Dermal wound cleanser 6/21/2018  8:46 AM  
Dressing Type Applied Other (Comment) 6/21/2018  8:46 AM  
Procedure Tolerated Well 6/21/2018  8:46 AM  
Number of days: 229 [REMOVED] Wound Ankle Left;Medial;Distal (Removed) DRESSING STATUS Removed 6/21/2018  8:46 AM  
DRESSING TYPE Other (Comment) 6/21/2018  8:46 AM  
Non-Pressure Injury Full thickness (subcut/muscle) 6/21/2018  8:46 AM  
Wound Length (cm) 1.1 cm 6/21/2018  8:46 AM  
Wound Width (cm) 0.6 cm 6/21/2018  8:46 AM  
Wound Depth (cm) 0.2 6/21/2018  8:46 AM  
Wound Surface area (cm^2) 0.66 cm^2 6/21/2018  8:46 AM  
Condition of Base [place];Slough 6/21/2018  8:46 AM  
Condition of Edges Open 6/21/2018  8:46 AM  
Epithelialization (%) 100 5/17/2018  8:44 AM  
Tissue Type Pink;Yellow 6/21/2018  8:46 AM  
Tissue Type Percent Pink 50 6/21/2018  8:46 AM  
 Tissue Type Percent Yellow 50 6/21/2018  8:46 AM  
Tissue Type Percent Other (comment) 40 2/15/2018 10:52 AM  
Drainage Amount  Scant 6/21/2018  8:46 AM  
Drainage Color Serosanguinous 6/21/2018  8:46 AM  
Wound Odor None 6/21/2018  8:46 AM  
Periwound Skin Condition Edematous; Erythema, blanchable;Petechiae 6/21/2018  8:46 AM  
Cleansing and Cleansing Agents  Dermal wound cleanser 6/21/2018  8:46 AM  
Dressing Type Applied Other (Comment) 6/21/2018  8:46 AM  
Procedure Tolerated Well 6/21/2018  8:46 AM  
Number of days: 332 [REMOVED] Wound Toe Left (Removed) DRESSING TYPE Open to air 12/7/2017  9:56 AM  
Non-Pressure Injury Partial thickness (epider/derm) 11/16/2017 10:08 AM  
Wound Length (cm) 0.6 cm 11/16/2017 10:08 AM  
Wound Width (cm) 1.4 cm 11/16/2017 10:08 AM  
Wound Depth (cm) 0.1 11/16/2017 10:08 AM  
Wound Surface area (cm^2) 0.08 cm^2 11/16/2017 10:08 AM  
Condition of Base Other (comment) 11/16/2017 10:08 AM  
Tissue Type Yellow 11/16/2017 10:08 AM  
Tissue Type Percent Yellow 100 11/16/2017 10:08 AM  
Drainage Amount  None 11/16/2017 10:08 AM  
Wound Odor None 11/16/2017 10:08 AM  
Cleansing and Cleansing Agents  Dermal wound cleanser 11/16/2017 10:08 AM  
Dressing Type Applied Open to air 11/16/2017 10:08 AM  
Number of days: 207 [REMOVED] Wound Toe Left (Removed) DRESSING STATUS Removed 11/16/2017 10:08 AM  
DRESSING TYPE Open to air 12/7/2017  9:56 AM  
Non-Pressure Injury Partial thickness (epider/derm) 11/16/2017 10:08 AM  
Wound Length (cm) 0.3 cm 11/16/2017 10:08 AM  
Wound Width (cm) 0.4 cm 11/16/2017 10:08 AM  
Wound Depth (cm) 0.1 11/16/2017 10:08 AM  
Wound Surface area (cm^2) 0.01 cm^2 11/16/2017 10:08 AM  
Condition of Base Other (comment) 11/16/2017 10:08 AM  
Tissue Type Yellow 11/16/2017 10:08 AM  
Tissue Type Percent Yellow 100 11/16/2017 10:08 AM  
Drainage Amount  None 11/16/2017 10:08 AM  
Wound Odor None 11/16/2017 10:08 AM  
 Periwound Skin Condition Intact 11/16/2017 10:08 AM  
Dressing Type Applied Open to air 11/16/2017 10:08 AM  
Number of days: 207 [REMOVED] Wound Ankle Left;Lateral (Removed) DRESSING STATUS Removed 12/21/2017  9:42 AM  
DRESSING TYPE Other (Comment) 12/21/2017  9:42 AM  
Non-Pressure Injury Partial thickness (epider/derm) 12/21/2017  9:42 AM  
Wound Length (cm) 1.1 cm 12/21/2017  9:42 AM  
Wound Width (cm) 0.9 cm 12/21/2017  9:42 AM  
Wound Depth (cm) 0.1 12/21/2017  9:42 AM  
Wound Surface area (cm^2) 0.1 cm^2 12/21/2017  9:42 AM  
Condition of Base [place];Slough 12/21/2017  9:42 AM  
Condition of Edges Open 12/21/2017  9:42 AM  
Tissue Type Pink;Yellow 12/21/2017  9:42 AM  
Tissue Type Percent Pink 10 11/16/2017 10:08 AM  
Tissue Type Percent Red 50 12/21/2017  9:42 AM  
Tissue Type Percent Yellow 50 12/21/2017  9:42 AM  
Drainage Amount  Moderate 12/21/2017  9:42 AM  
Drainage Color Serous 12/21/2017  9:42 AM  
Wound Odor None 12/21/2017  9:42 AM  
Periwound Skin Condition Erythema, blanchable;Edematous 12/21/2017  9:42 AM  
Cleansing and Cleansing Agents  Dermal wound cleanser; Soap and water 12/21/2017  9:42 AM  
Dressing Type Applied Other (Comment) 12/21/2017  9:42 AM  
Procedure Tolerated Well 12/21/2017  9:42 AM  
Number of days: 332 [REMOVED] Wound Foot Left;Medial;Distal (Removed) DRESSING STATUS Removed 11/1/2018  9:15 AM  
DRESSING TYPE Other (Comment) 7/5/2018 10:27 AM  
Non-Pressure Injury Full thickness (subcut/muscle) 7/5/2018 10:27 AM  
Wound Length (cm) 0.3 cm 11/1/2018  9:15 AM  
Wound Width (cm) 0.5 cm 11/1/2018  9:15 AM  
Wound Depth (cm) 0.3 7/5/2018 10:27 AM  
Wound Surface area (cm^2) 0.15 cm^2 11/1/2018  9:15 AM  
Change in Wound Size % 25 11/1/2018  9:15 AM  
Condition of Base [facility] 7/5/2018 10:27 AM  
Condition of Edges Open 7/5/2018 10:27 AM  
Tissue Type Pink;Yellow 7/5/2018 10:27 AM  
Tissue Type Percent Pink 10 7/5/2018 10:27 AM  
 Tissue Type Percent Red 100 9/20/2018  8:13 AM  
Tissue Type Percent Yellow 100 11/1/2018  9:15 AM  
Drainage Amount  Large 7/5/2018 10:27 AM  
Drainage Color Serosanguinous 7/5/2018 10:27 AM  
Wound Odor None 7/5/2018 10:27 AM  
Periwound Skin Condition Erythema, blanchable 7/5/2018 10:27 AM  
Cleansing and Cleansing Agents  Normal saline 7/5/2018 10:27 AM  
Dressing Type Applied Other (Comment) 7/5/2018 10:27 AM  
Wound Procedure Type Skin Substitue 11/1/2018 10:05 AM  
Procedure Time Out 1000 11/1/2018 10:05 AM  
Consent Obtained  Yes 11/1/2018 10:05 AM  
Skin Substitute Other 11/1/2018 10:05 AM  
Amount Applied  12 sq cm 11/1/2018 10:05 AM  
Amount Wasted  1 sq cm 11/1/2018 10:05 AM  
Procedure Tolerated Well 7/5/2018 10:27 AM  
Number of days: 287 [REMOVED] Wound Leg Lower Right; Anterior; Lateral (Removed) DRESSING TYPE Open to air 2/15/2018 11:36 AM  
Non-Pressure Injury Partial thickness (epider/derm) 2/1/2018  1:05 PM  
Wound Length (cm) 0.9 cm 2/1/2018  1:05 PM  
Wound Width (cm) 0.6 cm 2/1/2018  1:05 PM  
Wound Depth (cm) 0.1 2/1/2018  1:05 PM  
Wound Surface area (cm^2) 0.54 cm^2 2/1/2018  1:05 PM  
Condition of Base [place] 2/1/2018  1:05 PM  
Condition of Edges Open 2/1/2018  1:05 PM  
Tissue Type Red 2/1/2018  1:05 PM  
Tissue Type Percent Red 100 2/1/2018  1:05 PM  
Drainage Amount  Scant 2/1/2018  1:05 PM  
Drainage Color Serosanguinous 2/1/2018  1:05 PM  
Wound Odor None 2/1/2018  1:05 PM  
Periwound Skin Condition Edematous; Erythema, blanchable 2/1/2018  1:05 PM  
Cleansing and Cleansing Agents  Dermal wound cleanser 2/1/2018  1:05 PM  
Dressing Type Applied Other (Comment) 2/1/2018  1:05 PM  
Procedure Tolerated Well 2/1/2018  1:05 PM  
Number of days: 73  
   
[REMOVED] Wound Leg Lower Anterior;Left;Medial (Removed) Number of days: 314 [REMOVED] Wound Foot Left;Dorsal (Removed) DRESSING STATUS Removed 7/5/2018 10:27 AM  
DRESSING TYPE Gauze 7/5/2018 10:27 AM  
 Non-Pressure Injury Full thickness (subcut/muscle) 7/5/2018 10:27 AM  
Wound Length (cm) 0 cm 9/20/2018  8:13 AM  
Wound Width (cm) 0 cm 9/20/2018  8:13 AM  
Wound Depth (cm) 0.3 7/5/2018 10:27 AM  
Wound Surface area (cm^2) 0 cm^2 9/20/2018  8:13 AM  
Change in Wound Size % 100 9/20/2018  8:13 AM  
Condition of Base [place] 7/5/2018 10:27 AM  
Condition of Edges Open 6/28/2018  8:53 AM  
Tissue Type Pink;Yellow 7/5/2018 10:27 AM  
Tissue Type Percent Pink 75 7/5/2018 10:27 AM  
Tissue Type Percent Red 25 7/5/2018 10:27 AM  
Tissue Type Percent Yellow 50 6/28/2018  8:53 AM  
Drainage Amount  Small  7/5/2018 10:27 AM  
Drainage Color Serous 7/5/2018 10:27 AM  
Wound Odor None 6/28/2018  8:53 AM  
Periwound Skin Condition Erythema, blanchable 7/5/2018 10:27 AM  
Cleansing and Cleansing Agents  Normal saline 7/5/2018 10:27 AM  
Dressing Type Applied Other (Comment) 7/5/2018 10:27 AM  
Procedure Tolerated Well 7/5/2018 10:27 AM  
Number of days: 102 [REMOVED] Wound Ankle Lateral (Removed) Number of days: 4 [REMOVED] Wound Arm Left;Right (Removed) Number of days: 4 [REMOVED] Wound Ankle Left;Medial (Removed) Number of days: 27 Lab/Data Review: No results found for this or any previous visit (from the past 24 hour(s)). none PROCEDURE Selective sharp instrument excisional debridement of slough and devitilized tissue After the benefits/risks/SE were discussed, the patient agreed to proceed. Time out was done: * Patient was identified by name and date of birth * Agreement on procedure being performed was verified * Procedure site verified and marked as necessary * Patient was positioned for comfort * Consent was signed and verified. Site: medial left ankle Instruments used:  
 [x]  Dermal curette  [] Blade 
      [] #15  [] #10  [] Forceps  [] Tissue nippers  [] sterile scissors  [] Other Anesthesia:  
 [x]  EMLA 2.5% cream: applied to wound beds for approximately 15minutes. []   Lidocaine 2% Topical Gel    
 []  Lidocaine injectable 1% with epinephrine 1:100,000 []  Lidocaine injectable 1% without epinephrine  
 []  Other:   
 []  None  
      [] patient is insensate due to neuropathy  
      [] patient declines 
      [] allergy to anesthetic 
      [] tissue for debridement is either superficial, loosely adherent and/or necrotic and denervated After satisfactory anesthesia achieved, the wound/s was/were sharply debrided necrotic, devitalized and granulation tissue down to the sub Q layer, revealing a clean and viable wound bed. Bleeding: <5mL Resolved with light focal pressure. Wounds were cleaned and irrigated with saline. Wound care applied: 
 []   Hydrogell   []  Hypergel   []   Hydrofiber/Aquacel    
 []   Cadexomer Iodine (Iodosorb)   []  Silver Alginate    []   Medihoney:  
 []   Collagenase:Santyl   []  Calcium Alginate   [x]   Collagen:  
 []   Foam   []  Non-Adherent Contact Layer   []   Xeroform  
 []   Adaptec:   []   Hydrocolloid   []   Transparent Film  
 []  Antibiotic ointment/cream     []   Other (see below) Other: 
 
 []   Dry Gauze and Roll Gauze  
 []   Foam and Roll Gauze  
 []   Dry Gauze  
 []   Bordered gauze:   
 []   Secure with Tape  
 []   Bordered foam  
  
 []   Other    
 [x]   Compression Wrap: 
        []   [place]    []Multi-Layer    [x]Tubular Bandages Ligia-Ulcer Care 
  []   Cream  
  []   Lotion []   Ointment  
  []   Barrier  
  []   Other:  
 
 
The patient tolerated the procedure well with no complications. The patient left the exam room in satisfactory and stable condition.   
 
Signed By: Amanda Calderón DPM   
 November 29, 2018 3:45 PM

## 2018-11-29 NOTE — DISCHARGE INSTRUCTIONS
Wound Care Instructions    Patient: Esa Tang MRN: [id number]  SSN: [id number]     YOB: 1935  Age:83 y.o. Sex: female       Ms. Adrian Causey, recommends the following discharge instruction:    Offloading    []   Felt/Foam Offloading   [] Removable Cast Waker       []   Wedge Shoe   []  Wheelchair     []   Total Contact Cast   []  Surgical Shoe     []   Crutches        Other   Mattress:   Other:     Edema Control    [x]   Elevated legs as much as possible. Recommend above level of heart.     []   Layered Wraps             []   Left      []   Right      []  BILATERAL          - Type:            []   Unna Boot       []  Multi-Layer                                   []   Two Layers     []  Three Layers   []  Four Layers     []   Tubular Bandages        []   Left      []   Right     SIZE:      []   Stockings                      []   Left      []   Right     []   Compression Pump     []   Left      []   Right    ___ millimeters of mercury  ___ millimeters of mercury for ___ minutes for ___ day(s)        Other:       Nutritional Supplements    [x]  Multi-Vitamin     []  Other:       [employer]     [x]  Home Health: [employer]     []  Long Term Care:      []  DME:     Consult    []   Nutrition     []   Vascular     []   Orthotist/Pedorthotist     []   Infectious Disease     []   Lymphedema Therapist   Other:     Dressings:  Another brand of generically equivalent product may be dispensed unless specifically ordered otherwise.     Home Ulcer/wound Hygiene (cleanse with)      []   Distilled Water     []   Normal Saline     [x]   Wound Cleanser     []   Mild antimicrobial soap and water     []   Chlorhexidine liquid soap and water     []   Other:     Apply    []   Hydrogel   []  Hypergel   []   Aquacel Ag     []   Cadexomer Iodine                     (Iodosorb)   []  Silver Alginate    []   Medihoney:     []   Collagenase:Santyl   []  Calcium Alginate   [x]   Collagen:Endoform     [] Plain Foam   []  Non-Adherent Contact                Layer   [x]   Hydrofera Blue     []   Silver foam   []   Hydrocolloid        []   Acticoat   []   Adaptic:      Other/Specific Instructions:    Cover With    []   Dry Gauze and Roll Gauze     []   Foam and Roll Gauze     []   Dry Gauze     []   Bordered gauze:     [x]   Foam           [x]   Secure with Hypafix     []   Other       Ligia-Ulcer Care    []   Cream     []   Lotion     []   Ointment     []   Barrier     []   Other:     Change Dressing    []   Once Daily     []   Every Other Day     []   Every 3 Days       []   Every 5 Days     []   Every 7 Days     []   Do Not Change       []   2 x per week (Mon and Thurs. )     [x]   3 x per week (Mon, Wed, Fri. )     []   Other          Follow-Up:   [x]  Follow up                              []  As Needed (PRN)     []   Bao Jimenez MD    []  CONSTANZA BlackM    [x]  Christen Kendall DPM   []   Rosi APODACAM      []   Nurse Visit            Please call the wound clinic ([phone]) regarding any questions or concerns.

## 2018-11-30 ENCOUNTER — HOME CARE VISIT (OUTPATIENT)
Dept: HOME HEALTH SERVICES | Facility: HOME HEALTH | Age: 83
End: 2018-11-30

## 2018-12-01 ENCOUNTER — HOME CARE VISIT (OUTPATIENT)
Dept: HOME HEALTH SERVICES | Facility: HOME HEALTH | Age: 83
End: 2018-12-01

## 2018-12-02 ENCOUNTER — HOME CARE VISIT (OUTPATIENT)
Dept: SCHEDULING | Facility: HOME HEALTH | Age: 83
End: 2018-12-02
Payer: MEDICARE

## 2018-12-02 PROCEDURE — G0299 HHS/HOSPICE OF RN EA 15 MIN: HCPCS

## 2018-12-02 PROCEDURE — 3331090001 HH PPS REVENUE CREDIT

## 2018-12-02 PROCEDURE — 400013 HH SOC

## 2018-12-02 PROCEDURE — 3331090002 HH PPS REVENUE DEBIT

## 2018-12-03 ENCOUNTER — HOME CARE VISIT (OUTPATIENT)
Dept: SCHEDULING | Facility: HOME HEALTH | Age: 83
End: 2018-12-03
Payer: MEDICARE

## 2018-12-03 ENCOUNTER — HOME CARE VISIT (OUTPATIENT)
Dept: HOME HEALTH SERVICES | Facility: HOME HEALTH | Age: 83
End: 2018-12-03
Payer: MEDICARE

## 2018-12-03 VITALS
OXYGEN SATURATION: 97 % | HEART RATE: 68 BPM | RESPIRATION RATE: 20 BRPM | SYSTOLIC BLOOD PRESSURE: 136 MMHG | TEMPERATURE: 98.2 F | DIASTOLIC BLOOD PRESSURE: 58 MMHG

## 2018-12-03 VITALS
OXYGEN SATURATION: 96 % | TEMPERATURE: 97.7 F | HEART RATE: 70 BPM | DIASTOLIC BLOOD PRESSURE: 70 MMHG | SYSTOLIC BLOOD PRESSURE: 120 MMHG | RESPIRATION RATE: 14 BRPM

## 2018-12-03 PROCEDURE — G0300 HHS/HOSPICE OF LPN EA 15 MIN: HCPCS

## 2018-12-03 PROCEDURE — A6209 FOAM DRSG <=16 SQ IN W/O BDR: HCPCS

## 2018-12-03 PROCEDURE — A6022 COLLAGEN DRSG>16<=48 SQ IN: HCPCS

## 2018-12-03 PROCEDURE — 3331090002 HH PPS REVENUE DEBIT

## 2018-12-03 PROCEDURE — 3331090001 HH PPS REVENUE CREDIT

## 2018-12-04 ENCOUNTER — HOME CARE VISIT (OUTPATIENT)
Dept: SCHEDULING | Facility: HOME HEALTH | Age: 83
End: 2018-12-04
Payer: MEDICARE

## 2018-12-04 PROCEDURE — 3331090002 HH PPS REVENUE DEBIT

## 2018-12-04 PROCEDURE — 3331090001 HH PPS REVENUE CREDIT

## 2018-12-05 ENCOUNTER — HOME CARE VISIT (OUTPATIENT)
Dept: SCHEDULING | Facility: HOME HEALTH | Age: 83
End: 2018-12-05
Payer: MEDICARE

## 2018-12-05 PROCEDURE — 3331090002 HH PPS REVENUE DEBIT

## 2018-12-05 PROCEDURE — G0300 HHS/HOSPICE OF LPN EA 15 MIN: HCPCS

## 2018-12-05 PROCEDURE — 3331090001 HH PPS REVENUE CREDIT

## 2018-12-06 PROCEDURE — 3331090002 HH PPS REVENUE DEBIT

## 2018-12-06 PROCEDURE — 3331090001 HH PPS REVENUE CREDIT

## 2018-12-07 ENCOUNTER — HOME CARE VISIT (OUTPATIENT)
Dept: SCHEDULING | Facility: HOME HEALTH | Age: 83
End: 2018-12-07
Payer: MEDICARE

## 2018-12-07 VITALS
OXYGEN SATURATION: 96 % | HEART RATE: 53 BPM | TEMPERATURE: 97.7 F | DIASTOLIC BLOOD PRESSURE: 61 MMHG | RESPIRATION RATE: 20 BRPM | SYSTOLIC BLOOD PRESSURE: 119 MMHG

## 2018-12-07 PROCEDURE — 3331090001 HH PPS REVENUE CREDIT

## 2018-12-07 PROCEDURE — 3331090002 HH PPS REVENUE DEBIT

## 2018-12-07 PROCEDURE — G0300 HHS/HOSPICE OF LPN EA 15 MIN: HCPCS

## 2018-12-08 PROCEDURE — 3331090001 HH PPS REVENUE CREDIT

## 2018-12-08 PROCEDURE — 3331090002 HH PPS REVENUE DEBIT

## 2018-12-09 PROCEDURE — 3331090001 HH PPS REVENUE CREDIT

## 2018-12-09 PROCEDURE — 3331090002 HH PPS REVENUE DEBIT

## 2018-12-10 ENCOUNTER — HOME CARE VISIT (OUTPATIENT)
Dept: SCHEDULING | Facility: HOME HEALTH | Age: 83
End: 2018-12-10
Payer: MEDICARE

## 2018-12-10 VITALS
DIASTOLIC BLOOD PRESSURE: 71 MMHG | RESPIRATION RATE: 20 BRPM | SYSTOLIC BLOOD PRESSURE: 141 MMHG | OXYGEN SATURATION: 98 % | HEART RATE: 67 BPM | TEMPERATURE: 97.8 F

## 2018-12-10 PROCEDURE — G0300 HHS/HOSPICE OF LPN EA 15 MIN: HCPCS

## 2018-12-10 PROCEDURE — 3331090001 HH PPS REVENUE CREDIT

## 2018-12-10 PROCEDURE — 3331090002 HH PPS REVENUE DEBIT

## 2018-12-11 VITALS
OXYGEN SATURATION: 98 % | SYSTOLIC BLOOD PRESSURE: 132 MMHG | DIASTOLIC BLOOD PRESSURE: 62 MMHG | RESPIRATION RATE: 18 BRPM | TEMPERATURE: 97 F | HEART RATE: 60 BPM

## 2018-12-11 PROCEDURE — 3331090001 HH PPS REVENUE CREDIT

## 2018-12-11 PROCEDURE — 3331090002 HH PPS REVENUE DEBIT

## 2018-12-12 ENCOUNTER — HOME CARE VISIT (OUTPATIENT)
Dept: SCHEDULING | Facility: HOME HEALTH | Age: 83
End: 2018-12-12
Payer: MEDICARE

## 2018-12-12 VITALS
SYSTOLIC BLOOD PRESSURE: 177 MMHG | RESPIRATION RATE: 20 BRPM | DIASTOLIC BLOOD PRESSURE: 66 MMHG | HEART RATE: 62 BPM | TEMPERATURE: 98.3 F | OXYGEN SATURATION: 98 %

## 2018-12-12 PROCEDURE — G0300 HHS/HOSPICE OF LPN EA 15 MIN: HCPCS

## 2018-12-12 PROCEDURE — 3331090002 HH PPS REVENUE DEBIT

## 2018-12-12 PROCEDURE — 3331090001 HH PPS REVENUE CREDIT

## 2018-12-13 PROCEDURE — 3331090002 HH PPS REVENUE DEBIT

## 2018-12-13 PROCEDURE — 3331090001 HH PPS REVENUE CREDIT

## 2018-12-14 ENCOUNTER — HOME CARE VISIT (OUTPATIENT)
Dept: SCHEDULING | Facility: HOME HEALTH | Age: 83
End: 2018-12-14
Payer: MEDICARE

## 2018-12-14 VITALS
TEMPERATURE: 98.7 F | RESPIRATION RATE: 18 BRPM | HEART RATE: 60 BPM | DIASTOLIC BLOOD PRESSURE: 65 MMHG | SYSTOLIC BLOOD PRESSURE: 150 MMHG | OXYGEN SATURATION: 98 %

## 2018-12-14 PROCEDURE — G0300 HHS/HOSPICE OF LPN EA 15 MIN: HCPCS

## 2018-12-14 PROCEDURE — 3331090002 HH PPS REVENUE DEBIT

## 2018-12-14 PROCEDURE — 3331090001 HH PPS REVENUE CREDIT

## 2018-12-15 PROCEDURE — 3331090001 HH PPS REVENUE CREDIT

## 2018-12-15 PROCEDURE — 3331090002 HH PPS REVENUE DEBIT

## 2018-12-16 PROCEDURE — 3331090002 HH PPS REVENUE DEBIT

## 2018-12-16 PROCEDURE — 3331090001 HH PPS REVENUE CREDIT

## 2018-12-17 ENCOUNTER — HOME CARE VISIT (OUTPATIENT)
Dept: SCHEDULING | Facility: HOME HEALTH | Age: 83
End: 2018-12-17
Payer: MEDICARE

## 2018-12-17 VITALS
HEART RATE: 58 BPM | TEMPERATURE: 98.7 F | SYSTOLIC BLOOD PRESSURE: 141 MMHG | DIASTOLIC BLOOD PRESSURE: 67 MMHG | OXYGEN SATURATION: 97 % | RESPIRATION RATE: 18 BRPM

## 2018-12-17 PROCEDURE — 3331090002 HH PPS REVENUE DEBIT

## 2018-12-17 PROCEDURE — 3331090001 HH PPS REVENUE CREDIT

## 2018-12-17 PROCEDURE — G0300 HHS/HOSPICE OF LPN EA 15 MIN: HCPCS

## 2018-12-18 PROCEDURE — A4216 STERILE WATER/SALINE, 10 ML: HCPCS

## 2018-12-18 PROCEDURE — A4452 WATERPROOF TAPE: HCPCS

## 2018-12-18 PROCEDURE — A6216 NON-STERILE GAUZE<=16 SQ IN: HCPCS

## 2018-12-18 PROCEDURE — 3331090002 HH PPS REVENUE DEBIT

## 2018-12-18 PROCEDURE — 3331090001 HH PPS REVENUE CREDIT

## 2018-12-18 PROCEDURE — A9270 NON-COVERED ITEM OR SERVICE: HCPCS

## 2018-12-19 ENCOUNTER — HOME CARE VISIT (OUTPATIENT)
Dept: SCHEDULING | Facility: HOME HEALTH | Age: 83
End: 2018-12-19
Payer: MEDICARE

## 2018-12-19 VITALS
OXYGEN SATURATION: 98 % | HEART RATE: 64 BPM | RESPIRATION RATE: 18 BRPM | TEMPERATURE: 98.4 F | DIASTOLIC BLOOD PRESSURE: 58 MMHG | SYSTOLIC BLOOD PRESSURE: 142 MMHG

## 2018-12-19 PROCEDURE — 3331090002 HH PPS REVENUE DEBIT

## 2018-12-19 PROCEDURE — G0300 HHS/HOSPICE OF LPN EA 15 MIN: HCPCS

## 2018-12-19 PROCEDURE — 3331090001 HH PPS REVENUE CREDIT

## 2018-12-20 ENCOUNTER — HOSPITAL ENCOUNTER (OUTPATIENT)
Dept: WOUND CARE | Age: 83
Discharge: HOME | End: 2018-12-20
Payer: MEDICARE

## 2018-12-20 VITALS
DIASTOLIC BLOOD PRESSURE: 85 MMHG | TEMPERATURE: 96.8 F | HEART RATE: 70 BPM | SYSTOLIC BLOOD PRESSURE: 180 MMHG | OXYGEN SATURATION: 97 % | RESPIRATION RATE: 14 BRPM

## 2018-12-20 PROCEDURE — 3331090002 HH PPS REVENUE DEBIT

## 2018-12-20 PROCEDURE — 3331090001 HH PPS REVENUE CREDIT

## 2018-12-20 PROCEDURE — 74011000250 HC RX REV CODE- 250

## 2018-12-20 PROCEDURE — 15271 SKIN SUB GRAFT TRNK/ARM/LEG: CPT

## 2018-12-20 RX ORDER — LIDOCAINE AND PRILOCAINE 25; 25 MG/G; MG/G
CREAM TOPICAL
Status: COMPLETED
Start: 2018-12-20 | End: 2018-12-20

## 2018-12-20 RX ADMIN — LIDOCAINE AND PRILOCAINE: 25; 25 CREAM TOPICAL at 11:03

## 2018-12-20 NOTE — PROGRESS NOTES
Theraskin and Debridement Procedure Note    Pre-Operative Diagnosis: Non-Healing Wound  Post-Operative Diagnosis: Same    Site: medial left ankle     Procedure:   1. Selective sharp instrument debridement of slough and devitilized tissue  2. Application of Skin Biograft - Theraskin    Indications: 1. Removal of devitalized and necrotic tissue to promote healing and evaluate the extent of healing. 2. Stage 4 of a planned staged series of              10            applications of Theraskin in wound not responding to conventional therapy     After the benefits/risks/SE were discussed, the patient agreed to proceed. Time out was done:   * Patient was identified by name and date of birth   * Agreement on procedure being performed was verified   * Procedure site verified and marked as necessary   * Patient was positioned for comfort   * Consent was signed and verified. Instruments used:    [x]  Dermal curette  Blade        [] #15  [] #10  [] Forceps  [] Tissue nippers  [] sterile scissors  [] Other     Anesthesia used:    [x]  EMLA 2.5% cream: applied to wound beds for approximately 15minutes. []   Lidocaine 2% Topical Gel      []  Lidocaine injectable 1% with epinephrine 1:100,000; Amount used: mL    []  Lidocaine injectable 1% without epinephrine; Amount used: mL    []  Other:     []  None         [] patient is insensate due to neuropathy         [] patient declines        [] allergy to anesthetic        [] tissue for debridement is either superficial, loosely adherent and/or necrotic and denervated     Description of Procedure: After usual preparation, sharp debridement of slough and devitalized tissue was performed utilizing the instruments as above. Tissue was removed from the capsular layer. The wound was debrided to remove non-viable tissue and to clearly reveal the wound margins. The wound was debrided to an acute state with some bleeding from the capillary bed.      Pre-debridement measurement: see accompanying progress note  Post debridement measurement:  2.1_x_2.0_x_0.2_cm . Bleeding: <5mL Resolved with light focal pressure. Wounds were cleaned and irrigated with saline. After usual preparation, Theraskin applied to the wound and affixed to the skin with steri strips and covered with non-adherent contact layer. The patient tolerated the procedure without complication. Theraskin 13 sq cm sheet  Theraskin used: 13 sq cm  Theraskin discarded: 0 sq cm    Wound care applied:   [x]   Hydrogell   []  Hypergel   []   Hydrofiber/Aquacel      []   Cadexomer Iodine (Iodosorb)   []  Silver Alginate    []   Medihoney:    []   Collagenase:Santyl   []  Calcium Alginate   []   Collagen:    []   Foam   []  Non-Adherent Contact Layer   []   Xeroform    []   Adaptec:   []   Hydrocolloid   []   Transparent Film    []  Antibiotic ointment/cream    []  hyderofera blue   []   Other (see below)    [] [place]       Other:cutimed sorbact     [x]   Dry Gauze and Roll Gauze    []   Foam and Roll Gauze    []   Dry Gauze    []    Bordered gauze:     []   Secure with Tape    []   Other      []   Compression Wrap:          []   Unna Boot    []Multi-Layer    []Tubular Bandages       Ligia-Ulcer Care    []   Cream     []   Lotion     []   Ointment     []   Barrier     []   Other:       The patient tolerated the procedure well with no complications. The patient left the exam room in satisfactory and stable condition.      Jaida Sosa DPM

## 2018-12-20 NOTE — WOUND CARE
Patient presented to wound clinic for follow up with Dr Rafael Hurley. She underwent vascular intervention recently (aproximently one week ago). Bedside debridement with application of Theraskin performed by Dr Rafael Hurley. Theraskin, cutimed, hydrogel, gauze and hypafix placed. Dressing to remain in place x1 week and patient to follow up on 12/27/18 for nurse visit dressing change. Follow up with Dr Rafael Hurley scheduled for 1/3/18 with possible Theraskin application.

## 2018-12-21 PROCEDURE — 3331090001 HH PPS REVENUE CREDIT

## 2018-12-21 PROCEDURE — 3331090002 HH PPS REVENUE DEBIT

## 2018-12-22 PROCEDURE — 3331090002 HH PPS REVENUE DEBIT

## 2018-12-22 PROCEDURE — 3331090001 HH PPS REVENUE CREDIT

## 2018-12-23 PROCEDURE — 3331090002 HH PPS REVENUE DEBIT

## 2018-12-23 PROCEDURE — 3331090001 HH PPS REVENUE CREDIT

## 2018-12-24 PROCEDURE — 3331090002 HH PPS REVENUE DEBIT

## 2018-12-24 PROCEDURE — 3331090001 HH PPS REVENUE CREDIT

## 2018-12-25 PROCEDURE — 3331090001 HH PPS REVENUE CREDIT

## 2018-12-25 PROCEDURE — 3331090002 HH PPS REVENUE DEBIT

## 2018-12-26 PROCEDURE — 3331090001 HH PPS REVENUE CREDIT

## 2018-12-26 PROCEDURE — 3331090002 HH PPS REVENUE DEBIT

## 2018-12-27 ENCOUNTER — HOSPITAL ENCOUNTER (OUTPATIENT)
Dept: WOUND CARE | Age: 83
Discharge: HOME | End: 2018-12-27
Payer: MEDICARE

## 2018-12-27 PROCEDURE — 3331090001 HH PPS REVENUE CREDIT

## 2018-12-27 PROCEDURE — 3331090002 HH PPS REVENUE DEBIT

## 2018-12-27 PROCEDURE — 97602 WOUND(S) CARE NON-SELECTIVE: CPT

## 2018-12-27 RX ORDER — AMOXICILLIN AND CLAVULANATE POTASSIUM 500; 125 MG/1; MG/1
TABLET, FILM COATED ORAL 2 TIMES DAILY
COMMUNITY
End: 2019-02-07

## 2018-12-27 NOTE — WOUND CARE
Wound/Ostomy Nurse Progress Note          Patient: Marvin Multani                                                                                      :1935    MRN: [id number]      Situation: Patient arrived today for dressing change and graft check. Background: Slow healing ulcer of the left lateral ankle. Assessment: Her left foot is red and inflamed. She is c/o pain and odor from the wound. She is also c/o pain in her upper thigh after a recent vascular procedure performed by Dr. Zoe Krishnamutrhy. The graft dressing was removed and the graft was assessed. The graft is yellow and wet, with a strong odor. Dr. Dianelys Ledesma was consulted. Her orders were as follows: If the graft is no longer viable, remove it and use Endoform, Hydrofera Blue instead. Call Augmentin 500 mg bid x 10 days to pharmacy. Call patient's vascular surgeon for sooner appointment. As the graft was found to be attached to the wound bed in one small area in the center, it was left in place for another week. A dressing of Cutimed was applied. The Cutimed was covered with a charcoal dressing (Actisorb silver) to absorb the odor. The entire area was then covered with plain foam and secured with Hypafix tape. Recommendation: Return in one week to see Dr. Dianelys Ledesma. Begin Augmentin today. Patient states she will call Dr. Zoe Krishnamurthy herself to try to get in to see her sooner. She will also call the wound clinic if there are any further concerns or problems in the meantime.

## 2018-12-28 ENCOUNTER — HOME CARE VISIT (OUTPATIENT)
Dept: HOME HEALTH SERVICES | Facility: HOME HEALTH | Age: 83
End: 2018-12-28
Payer: MEDICARE

## 2018-12-28 PROCEDURE — 3331090001 HH PPS REVENUE CREDIT

## 2018-12-28 PROCEDURE — 3331090002 HH PPS REVENUE DEBIT

## 2018-12-29 PROCEDURE — 3331090001 HH PPS REVENUE CREDIT

## 2018-12-29 PROCEDURE — 3331090002 HH PPS REVENUE DEBIT

## 2018-12-30 PROCEDURE — 3331090002 HH PPS REVENUE DEBIT

## 2018-12-30 PROCEDURE — 3331090001 HH PPS REVENUE CREDIT

## 2018-12-31 ENCOUNTER — HOME CARE VISIT (OUTPATIENT)
Dept: HOME HEALTH SERVICES | Facility: HOME HEALTH | Age: 83
End: 2018-12-31
Payer: MEDICARE

## 2018-12-31 PROCEDURE — 3331090002 HH PPS REVENUE DEBIT

## 2018-12-31 PROCEDURE — 3331090001 HH PPS REVENUE CREDIT

## 2018-12-31 PROCEDURE — G0300 HHS/HOSPICE OF LPN EA 15 MIN: HCPCS

## 2019-01-01 VITALS
HEART RATE: 57 BPM | DIASTOLIC BLOOD PRESSURE: 65 MMHG | RESPIRATION RATE: 18 BRPM | SYSTOLIC BLOOD PRESSURE: 145 MMHG | OXYGEN SATURATION: 98 % | TEMPERATURE: 97.8 F

## 2019-01-01 PROCEDURE — 3331090001 HH PPS REVENUE CREDIT

## 2019-01-01 PROCEDURE — 3331090002 HH PPS REVENUE DEBIT

## 2019-01-02 PROCEDURE — 3331090001 HH PPS REVENUE CREDIT

## 2019-01-02 PROCEDURE — 3331090002 HH PPS REVENUE DEBIT

## 2019-01-03 ENCOUNTER — HOSPITAL ENCOUNTER (OUTPATIENT)
Dept: WOUND CARE | Age: 84
Discharge: HOME | End: 2019-01-03
Payer: MEDICARE

## 2019-01-03 VITALS — RESPIRATION RATE: 14 BRPM | OXYGEN SATURATION: 99 % | TEMPERATURE: 97.2 F | HEART RATE: 60 BPM

## 2019-01-03 DIAGNOSIS — I73.9 VASCULAR DISEASE, PERIPHERAL (HCC): Primary | ICD-10-CM

## 2019-01-03 DIAGNOSIS — E11.40 TYPE 2 DIABETES MELLITUS WITH DIABETIC NEUROPATHY, UNSPECIFIED WHETHER LONG TERM INSULIN USE (HCC): ICD-10-CM

## 2019-01-03 DIAGNOSIS — L97.323 NON-PRESSURE CHRONIC ULCER OF LEFT ANKLE WITH NECROSIS OF MUSCLE (HCC): ICD-10-CM

## 2019-01-03 PROBLEM — L97.223 NON-PRESSURE CHRONIC ULCER OF LEFT CALF WITH NECROSIS OF MUSCLE (HCC): Status: ACTIVE | Noted: 2019-01-03

## 2019-01-03 PROCEDURE — 97602 WOUND(S) CARE NON-SELECTIVE: CPT

## 2019-01-03 PROCEDURE — 3331090001 HH PPS REVENUE CREDIT

## 2019-01-03 PROCEDURE — A6021 COLLAGEN DRESSING <=16 SQ IN: HCPCS | Performed by: PODIATRIST

## 2019-01-03 PROCEDURE — 3331090002 HH PPS REVENUE DEBIT

## 2019-01-03 NOTE — WOUND CARE
Wound/Ostomy Nurse Progress Note          Patient: Ravi Trammell                                                                                      :1935    MRN: [id number]          Situation: Wound care follow up with Dr. Angel Moralez. Background: Slow healing ulcer of the left medial heel. Theraskin placed two weeks ago. Assessment: Her wound is still open and painful. The base is red with about 50% slough. The fazal wound is red but has improved slightly. She is still taking antibiotic. Change dressing to Endoform and HF Blue. Dressing applied today. Recommendation: Follow up with vascular on 1/15/19. Wound orders sent to [facility]. Follow up with Dr. Angel Moralez in one month.

## 2019-01-03 NOTE — DISCHARGE INSTRUCTIONS
Wound Care Instructions    Patient: Joel Woodard MRN: [id number]  SSN: [id number]     YOB: 1935  Age:83 y.o. Sex: female       Ms. AdaliDr. Ajay recommends the following discharge instruction:    Offloading    []   Felt/Foam Offloading   [] Removable Cast Waker       []   Wedge Shoe   []  Wheelchair     []   Total Contact Cast   []  Surgical Shoe     []   Crutches        Other   Mattress:   Other:     Edema Control    []   Elevated legs as much as possible. Recommend above level of heart.     []   Layered Wraps             []   Left      []   Right      []  BILATERAL          - Type:            []   Unna Boot       []  Multi-Layer                                   []   Two Layers     []  Three Layers   []  Four Layers     []   Tubular Bandages        []   Left      []   Right     SIZE:      []   Stockings                      []   Left      []   Right     []   Compression Pump     []   Left      []   Right    ___ millimeters of mercury  ___ millimeters of mercury for ___ minutes for ___ day(s)        Other:       Nutritional Supplements    []  Multi-Vitamin     []  Other:       [employer]     [x]  Home Health: [facility]     []  Long Term Care:      []  DME:     Consult    []   Nutrition     []   Vascular     []   Orthotist/Pedorthotist     []   Infectious Disease     []   Lymphedema Therapist   Other:     Dressings:  Another brand of generically equivalent product may be dispensed unless specifically ordered otherwise.     Home Ulcer/wound Hygiene (cleanse with)      []   Distilled Water     []   Normal Saline     [x]   Wound Cleanser     []   Mild antimicrobial soap and water     []   Chlorhexidine liquid soap and water     []   Other:     Apply    []   Hydrogel   []  Hypergel   []   Aquacel Ag     []   Cadexomer Iodine                     (Iodosorb)   []  Silver Alginate    []   Medihoney:     []   Collagenase:Santyl   []  Calcium Alginate   [x]   Collagen:ENDOFORM to base of left medial ankle ulcer     []   Plain Foam   []  Non-Adherent Contact                Layer   []   Xeroform     []   Silver foam   []   Hydrocolloid  Hydrofera Blue to left medial ankle ulcer     []   Acticoat   []   Adaptic:      Other/Specific Instructions:    Cover With    []   Dry Gauze and Roll Gauze     []   Foam and Roll Gauze     [x]   Dry Gauze     []   Bordered gauze:     []   Foam      []    Bordered         []   Nonbordered     [x]   Secure with HypafixTape     []   Other       Ligia-Ulcer Care    []   Cream     []   Lotion     []   Ointment     []   Barrier     []   Other:     Change Dressing    []   Once Daily     []   Every Other Day     []   Every 3 Days       []   Every 5 Days     []   Every 7 Days     []   Do Not Change       []   2 x per week (Mon and Thurs. )     [x]   3 x per week (Mon, Wed, Fri. )     []   Other          Follow-Up:   [x]  Follow up                              []  As Needed (PRN)     []   Helen Moody MD    []  Kenton Armijo DPM    [x]  Jesu Trejo DPM   []   Theresa Moran DPM      []   Nurse Visit            Please call the wound clinic ([phone]) regarding any questions or concerns.

## 2019-01-03 NOTE — PROGRESS NOTES
Podiatry Surgery Progress Note      Patient: Caio Gibbons MRN: [id number]  SSN: [id number]    YOB: 1935  Age: 81 y.o. Sex: female      Assessment:     Patient Active Problem List   Diagnosis Code    CVA, old, hemiparesis ([address] I69.359    Type 2 diabetes mellitus ([address] E11.9    Abnormal finding on thyroid function test R94.6    Acute on chronic renal insufficiency N28.9, N18.9    Anemia D64.9    Chronic osteomyelitis of ankle ([facility]) M86.679    Injury of foot S99.929A    Leukocytosis D72.829    Spinal stenosis of lumbar region M48.061    Microscopic hematuria R31.29    Rhabdomyolysis M62.82    Vitamin D deficiency E55.9    Stroke ([facility]) I63.9    Hypertension I10    DM (diabetes mellitus) ([facility]) E11.9    Normocytic anemia D64.9    CKD (chronic kidney disease) stage 3, GFR 30-59 ml/min ([facility]) N18.3    Elevated TSH R79.89    Iron (Fe) deficiency anemia D50.9    UTI (urinary tract infection) N39.0    Encephalopathy G93.40    LAURA (acute kidney injury) ([facility]) N17.9    Skin ulcer of left ankle, with fat layer exposed ([address] L97.322    Venous ulcer of ankle, left ([facility]) I83.023, L97.329    Weak pulse R09.89    Syncope R55    Vascular disease, peripheral ([facility]) I73.9    Non-pressure chronic ulcer of right ankle with fat layer exposed ([address] L97.312    Type 2 diabetes mellitus with nephropathy ([facility]) E11.21    Type 2 diabetes mellitus with diabetic neuropathy ([facility]) E11.40    Hypertensive left ventricular hypertrophy, without heart failure I11.9    Atrial arrhythmia I49.8    Midfoot ulcer, left, with fat layer exposed ([address] L97.422    Personal history of noncompliance with medical treatment, presenting hazards to health Z91.19    Contact dermatitis due to adhesive bandage L25.8    Cellulitis L03.90    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   Will start [address] with Endoform and Hydrofera blue Q 48 hrs left ankle and foot. Will order xary left ankle along with CBC c diff, Sed rate  RTC in 4 weeks after vascular appointment    Total time spent with patient: [phone] Rosana Skaggs discussed with: Patient, Family and Nursing Staff    Discussed:  Care Plan and home health    Disposition:  Stable      Ms. Kristie Kwok is a 81 y.o. female who was admitted for chronic ulcer left ankle. S/p Theraskin application 62/16 and 12/98/88 Patient states she has a vascular appointment 01/15/19. .        Subjective:   Past Medical History  Past Medical History:   Diagnosis Date    CAD (coronary artery disease)     Chronic osteomyelitis of ankle ([facility])     CKD (chronic kidney disease) stage 3, GFR 30-59 ml/min ([facility]) 06/24/2013    Worst noted (08/06/13) BUN/sCr: 54/2.49, GFR 20.     DM (diabetes mellitus) ([facility])     Elevated TSH 06/25/2012    5.08     Hypertension     Leukocytosis     Lumbar spinal stenosis     Menopause     Normocytic anemia     Rhabdomyolysis     Splenomegaly     St[facility]     Vitamin D deficiency      Social History     Socioeconomic History    Marital status:      Spouse name: Not on file    Number of children: Not on file    Years of education: Not on file    Highest education level: Not on file   Social Needs    Financial resource strain: Not on file    Food insecurity - worry: Not on file    Food insecurity - inability: Not on file   [employer] needs - medical: Not on file   [employer] needs - non-medical: Not on file   Occupational History    Not on file   Tobacco Use    Smoking status: Never Smoker    Smokeless tobacco: Never Used   Substance and Sexual Activity    Alcohol use: No    Drug use: No    Sexual activity: No   Other Topics Concern    Not on file   Social History Narrative    Not on file       Current Medications  Current Outpatient Medications   Medication Sig Dispense Refill    amoxicillin-clavulanate (AUGMENTIN) 500-125 mg per tablet Take  by mouth two (2) times a day.  collagenase (SANTYL) 250 unit/gram ointment Apply  to affected area daily. (Patient not taking: Reported on 11/16/2018) 15 g 0    metoprolol tartrate (LOPRESSOR) 50 mg tablet Take 1 Tab by mouth daily. 30 Tab 0    acetaminophen (TYLENOL) 500 mg tablet Take 500 mg by mouth every six (6) hours as needed for Pain or Fever.  gabapentin (NEURONTIN) 300 mg capsule Take 1 Cap by mouth three (3) times daily. 12/22/16, QTY#90, 30 Days, 2 Refills. Dr. Tamy Macario  2    cholecalciferol (VITAMIN D3) 1,000 unit tablet Take 3,000 Units by mouth daily.  lisinopril-hydrochlorothiazide (PRINZIDE, ZESTORETIC) 20-25 mg per tablet Take 1 Tab by mouth daily. Patient Allergies  No Known Allergies       Objective:   General Exam  alert, cooperative, no distress, appears stated age    [de-identified]  Visit Vitals  Pulse 60   Temp 97.2 °F (36.2 °C)   Resp 14   SpO2 99%       REVIEW OF SYSTEMS:  General: denies chronic fatigue, weight loss, fever, anemia, bruising, depression, nervousness, panic attacks  HEENT: denies ringing in ears, ear infections, dizzy spells, poor vision, glaucoma, sinus trouble, hoarseness, eye infections  GI: denies diarrhea, gas, bloating, heartburn, regurgitation, difficulty swallowing, painful swallowing, nausea, vomiting, constipation, abdominal pain, decreased appetite, blood in stools, black stools, jaundice, dark urine  Lungs: denies pneumonia, asthma, cough, SOB, hemoptysis  Heart: denies chest pain, irregular heart beat, ankle swelling, HTN  Skin: denies rashes, hives, allergic reaction  Urinary: denies UTI, kidney stones, decreased urine force and flow, urination at night, blood in urine, painful urination  Bones and Joints: denies arthritis, rheumatism, back pain, gout, osteoporosis  Neurologic: denies stroke, seizures, headaches, numbness, tingling    Foot Exam  VASCULAR EXAM:. Pedal pulses are  0/4 DP and trace PT right and left foot.  Skin temperature is warm to warm right and left foot. Digital capillary fill time is 5 seconds right and left foot. There is decrease edema of the right and left foot and ankle. Pedal hair is not noted bilateral       NEUROLOGICAL EXAM:. Sensation Intact with 5.07g monofilament wire right and left foot.  Deep tendon reflexes intact and symmetrical on the right and left foot.      MUSCULOSKELETAL EXAM:. Muscle tone is normal.  Muscle strength of the flexor and extensor group inversion and eversion Bilateral. 4/5.         DERMATOLOGICAL EXAM:. Skin is of abnormal texture and turgor with atrophic skin changes noting decrease hair growth, nail changes (thickening), Bilateral. There is epithelialized lesions dorsal and medial forefoot left foot.      Subhyperkeratotic lesion noted over the medial aspect of the left ankle with graft is place and slough noted, There is erythema and warmth with palpation, no asc         Ulcer  Ulcer Location: L ankle medial, Ulcer measurements: see below and Ulcer base: Mixed Granular/Fibrotic  Harris Scale: Stage 2    Wound Ankle Left;Medial;Proximal (Active)   DRESSING STATUS Removed 1/3/2019  9:15 AM   DRESSING TYPE Other (Comment) 1/3/2019  9:15 AM   Non-Pressure Injury Full thickness (subcut/muscle) 1/3/2019  9:15 AM   Wound Length (cm) 1.4 cm 1/3/2019  9:15 AM   Wound Width (cm) 2.3 cm 1/3/2019  9:15 AM   Wound Depth (cm) 0.2 1/3/2019  9:15 AM   Wound Surface area (cm^2) 3.22 cm^2 1/3/2019  9:15 AM   Change in Wound Size % -270.11 1/3/2019  9:15 AM   Condition of Base [place] 1/3/2019  9:15 AM   Condition of Edges Open 1/3/2019  9:15 AM   Tissue Type Red;Yellow 1/3/2019  9:15 AM   Tissue Type Percent Pink 100 12/20/2018 10:12 AM   Tissue Type Percent Red 50 1/3/2019  9:15 AM   Tissue Type Percent Yellow 50 1/3/2019  9:15 AM   Drainage Amount  Small  1/3/2019  9:15 AM   Drainage Color Serosanguinous 1/3/2019  9:15 AM   Wound Odor None 12/20/2018 10:12 AM   Periwound Skin Condition Erythema, blanchable; Other (comment) 1/3/2019  9:15 AM   Cleansing and Cleansing Agents  Dermal wound cleanser 12/20/2018 10:12 AM   Dressing Type Applied Other (Comment) 12/20/2018 11:20 AM   Wound Procedure Type Skin Substitue 12/20/2018 11:16 AM   Procedure Time Out 1116 12/20/2018 11:16 AM   Consent Obtained  Yes 12/20/2018 11:16 AM   Procedure Instrument Currette 12/20/2018 11:16 AM   Procedure Bleeding No 11/15/2018 11:36 AM   Procedure Pain Scale Numeric 3/10 12/20/2018 11:16 AM   Skin Substitute Other 12/20/2018 11:16 AM   Amount Wasted  0 sq cm 11/15/2018 11:36 AM   Post Procedure Procedure Pain Scale Numeric 3/10 11/15/2018 11:36 AM   Procedure Tolerated Well 11/29/2018 10:45 AM   Number of days: 028       [REMOVED] Wound Ankle Left (Removed)   Number of days: 0661       [REMOVED] Wound Ankle Left (Removed)   Number of days: 8085       [REMOVED] Wound Ankle Left (Removed)   Number of days: 9605       [REMOVED] Wound Ankle Left;Lateral (Removed)   Number of days: 470       [REMOVED] Wound Ankle Right (Removed)   DRESSING STATUS Removed 12/21/2017  9:42 AM   DRESSING TYPE Other (Comment) 12/21/2017  9:42 AM   Non-Pressure Injury Partial thickness (epider/derm) 12/21/2017  9:42 AM   Wound Length (cm) 0.7 cm 12/21/2017  9:42 AM   Wound Width (cm) 0.8 cm 12/21/2017  9:42 AM   Wound Depth (cm) 0.1 12/21/2017  9:42 AM   Wound Surface area (cm^2) 0.06 cm^2 12/21/2017  9:42 AM   Condition of Base [facility] 12/21/2017  9:42 AM   Condition of Edges Open 12/21/2017  9:42 AM   Tissue Type Yellow 12/21/2017  9:42 AM   Tissue Type Percent Red 50 11/16/2017 10:08 AM   Tissue Type Percent Yellow 100 12/21/2017  9:42 AM   Drainage Amount  Moderate 12/21/2017  9:42 AM   Drainage Color Serous 12/21/2017  9:42 AM   Wound Odor None 12/21/2017  9:42 AM   Periwound Skin Condition Erythema, blanchable 12/21/2017  9:42 AM   Cleansing and Cleansing Agents  Dermal wound cleanser; Soap and water 12/21/2017  9:42 AM   Dressing Type Applied Other (Comment) 12/21/2017  9:42 AM   Procedure Tolerated Well 12/21/2017  9:42 AM   Number of days: 470       [REMOVED] Wound Ankle Lateral;Right (Removed)   Number of days: 215       [REMOVED] Wound Ankle Left;Lateral (Removed)   Number of days: 215       [REMOVED] Wound Ankle Left;Medial (Removed)   DRESSING STATUS Removed 6/21/2018  8:46 AM   DRESSING TYPE Other (Comment) 6/21/2018  8:46 AM   Non-Pressure Injury Full thickness (subcut/muscle) 6/21/2018  8:46 AM   Wound Length (cm) 1.4 cm 6/21/2018  8:46 AM   Wound Width (cm) 0.6 cm 6/21/2018  8:46 AM   Wound Depth (cm) 0.2 6/21/2018  8:46 AM   Wound Surface area (cm^2) 0.84 cm^2 6/21/2018  8:46 AM   Condition of Base [place];Slough 6/21/2018  8:46 AM   Condition of Edges Open 6/21/2018  8:46 AM   Tissue Type Pink;Yellow 6/21/2018  8:46 AM   Tissue Type Percent Pink 20 6/21/2018  8:46 AM   Tissue Type Percent Red 80 5/31/2018  8:29 AM   Tissue Type Percent Yellow 80 6/21/2018  8:46 AM   Drainage Amount  Small  6/21/2018  8:46 AM   Drainage Color Serosanguinous 6/21/2018  8:46 AM   Wound Odor None 6/21/2018  8:46 AM   Periwound Skin Condition Edematous; Erythema, blanchable 6/21/2018  8:46 AM   Cleansing and Cleansing Agents  Dermal wound cleanser 6/21/2018  8:46 AM   Dressing Type Applied Other (Comment) 6/21/2018  8:46 AM   Procedure Tolerated Well 6/21/2018  8:46 AM   Number of days: 229       [REMOVED] Wound Ankle Left;Medial;Distal (Removed)   DRESSING STATUS Removed 6/21/2018  8:46 AM   DRESSING TYPE Other (Comment) 6/21/2018  8:46 AM   Non-Pressure Injury Full thickness (subcut/muscle) 6/21/2018  8:46 AM   Wound Length (cm) 1.1 cm 6/21/2018  8:46 AM   Wound Width (cm) 0.6 cm 6/21/2018  8:46 AM   Wound Depth (cm) 0.2 6/21/2018  8:46 AM   Wound Surface area (cm^2) 0.66 cm^2 6/21/2018  8:46 AM   Condition of Base [place];Slough 6/21/2018  8:46 AM   Condition of Edges Open 6/21/2018  8:46 AM   Epithelialization (%) 100 5/17/2018  8:44 AM   Tissue Type Pink;Yellow 6/21/2018 8:46 AM   Tissue Type Percent Pink 50 6/21/2018  8:46 AM   Tissue Type Percent Yellow 50 6/21/2018  8:46 AM   Tissue Type Percent Other (comment) 40 2/15/2018 10:52 AM   Drainage Amount  Scant 6/21/2018  8:46 AM   Drainage Color Serosanguinous 6/21/2018  8:46 AM   Wound Odor None 6/21/2018  8:46 AM   Periwound Skin Condition Edematous; Erythema, blanchable;Petechiae 6/21/2018  8:46 AM   Cleansing and Cleansing Agents  Dermal wound cleanser 6/21/2018  8:46 AM   Dressing Type Applied Other (Comment) 6/21/2018  8:46 AM   Procedure Tolerated Well 6/21/2018  8:46 AM   Number of days: 332       [REMOVED] Wound Toe Left (Removed)   DRESSING TYPE Open to air 12/7/2017  9:56 AM   Non-Pressure Injury Partial thickness (epider/derm) 11/16/2017 10:08 AM   Wound Length (cm) 0.6 cm 11/16/2017 10:08 AM   Wound Width (cm) 1.4 cm 11/16/2017 10:08 AM   Wound Depth (cm) 0.1 11/16/2017 10:08 AM   Wound Surface area (cm^2) 0.08 cm^2 11/16/2017 10:08 AM   Condition of Base Other (comment) 11/16/2017 10:08 AM   Tissue Type Yellow 11/16/2017 10:08 AM   Tissue Type Percent Yellow 100 11/16/2017 10:08 AM   Drainage Amount  None 11/16/2017 10:08 AM   Wound Odor None 11/16/2017 10:08 AM   Cleansing and Cleansing Agents  Dermal wound cleanser 11/16/2017 10:08 AM   Dressing Type Applied Open to air 11/16/2017 10:08 AM   Number of days: 207       [REMOVED] Wound Toe Left (Removed)   DRESSING STATUS Removed 11/16/2017 10:08 AM   DRESSING TYPE Open to air 12/7/2017  9:56 AM   Non-Pressure Injury Partial thickness (epider/derm) 11/16/2017 10:08 AM   Wound Length (cm) 0.3 cm 11/16/2017 10:08 AM   Wound Width (cm) 0.4 cm 11/16/2017 10:08 AM   Wound Depth (cm) 0.1 11/16/2017 10:08 AM   Wound Surface area (cm^2) 0.01 cm^2 11/16/2017 10:08 AM   Condition of Base Other (comment) 11/16/2017 10:08 AM   Tissue Type Yellow 11/16/2017 10:08 AM   Tissue Type Percent Yellow 100 11/16/2017 10:08 AM   Drainage Amount  None 11/16/2017 10:08 AM   Wound Odor None 11/16/2017 10:08 AM   Periwound Skin Condition Intact 11/16/2017 10:08 AM   Dressing Type Applied Open to air 11/16/2017 10:08 AM   Number of days: 207       [REMOVED] Wound Ankle Left;Lateral (Removed)   DRESSING STATUS Removed 12/21/2017  9:42 AM   DRESSING TYPE Other (Comment) 12/21/2017  9:42 AM   Non-Pressure Injury Partial thickness (epider/derm) 12/21/2017  9:42 AM   Wound Length (cm) 1.1 cm 12/21/2017  9:42 AM   Wound Width (cm) 0.9 cm 12/21/2017  9:42 AM   Wound Depth (cm) 0.1 12/21/2017  9:42 AM   Wound Surface area (cm^2) 0.1 cm^2 12/21/2017  9:42 AM   Condition of Base [place];Slough 12/21/2017  9:42 AM   Condition of Edges Open 12/21/2017  9:42 AM   Tissue Type Pink;Yellow 12/21/2017  9:42 AM   Tissue Type Percent Pink 10 11/16/2017 10:08 AM   Tissue Type Percent Red 50 12/21/2017  9:42 AM   Tissue Type Percent Yellow 50 12/21/2017  9:42 AM   Drainage Amount  Moderate 12/21/2017  9:42 AM   Drainage Color Serous 12/21/2017  9:42 AM   Wound Odor None 12/21/2017  9:42 AM   Periwound Skin Condition Erythema, blanchable;Edematous 12/21/2017  9:42 AM   Cleansing and Cleansing Agents  Dermal wound cleanser; Soap and water 12/21/2017  9:42 AM   Dressing Type Applied Other (Comment) 12/21/2017  9:42 AM   Procedure Tolerated Well 12/21/2017  9:42 AM   Number of days: 332       [REMOVED] Wound Foot Left;Medial;Distal (Removed)   DRESSING STATUS Removed 11/1/2018  9:15 AM   DRESSING TYPE Other (Comment) 7/5/2018 10:27 AM   Non-Pressure Injury Full thickness (subcut/muscle) 7/5/2018 10:27 AM   Wound Length (cm) 0.3 cm 11/1/2018  9:15 AM   Wound Width (cm) 0.5 cm 11/1/2018  9:15 AM   Wound Depth (cm) 0.3 7/5/2018 10:27 AM   Wound Surface area (cm^2) 0.15 cm^2 11/1/2018  9:15 AM   Change in Wound Size % 25 11/1/2018  9:15 AM   Condition of Base [facility] 7/5/2018 10:27 AM   Condition of Edges Open 7/5/2018 10:27 AM   Tissue Type Pink;Yellow 7/5/2018 10:27 AM   Tissue Type Percent Pink 10 7/5/2018 10:27 AM   Tissue Type Percent Red 100 9/20/2018  8:13 AM   Tissue Type Percent Yellow 100 11/1/2018  9:15 AM   Drainage Amount  Large 7/5/2018 10:27 AM   Drainage Color Serosanguinous 7/5/2018 10:27 AM   Wound Odor None 7/5/2018 10:27 AM   Periwound Skin Condition Erythema, blanchable 7/5/2018 10:27 AM   Cleansing and Cleansing Agents  Normal saline 7/5/2018 10:27 AM   Dressing Type Applied Other (Comment) 7/5/2018 10:27 AM   Wound Procedure Type Skin Substitue 11/1/2018 10:05 AM   Procedure Time Out 1000 11/1/2018 10:05 AM   Consent Obtained  Yes 11/1/2018 10:05 AM   Skin Substitute Other 11/1/2018 10:05 AM   Amount Applied  12 sq cm 11/1/2018 10:05 AM   Amount Wasted  1 sq cm 11/1/2018 10:05 AM   Procedure Tolerated Well 7/5/2018 10:27 AM   Number of days: 287       [REMOVED] Wound Leg Lower Right; Anterior; Lateral (Removed)   DRESSING TYPE Open to air 2/15/2018 11:36 AM   Non-Pressure Injury Partial thickness (epider/derm) 2/1/2018  1:05 PM   Wound Length (cm) 0.9 cm 2/1/2018  1:05 PM   Wound Width (cm) 0.6 cm 2/1/2018  1:05 PM   Wound Depth (cm) 0.1 2/1/2018  1:05 PM   Wound Surface area (cm^2) 0.54 cm^2 2/1/2018  1:05 PM   Condition of Base [place] 2/1/2018  1:05 PM   Condition of Edges Open 2/1/2018  1:05 PM   Tissue Type Red 2/1/2018  1:05 PM   Tissue Type Percent Red 100 2/1/2018  1:05 PM   Drainage Amount  Scant 2/1/2018  1:05 PM   Drainage Color Serosanguinous 2/1/2018  1:05 PM   Wound Odor None 2/1/2018  1:05 PM   Periwound Skin Condition Edematous; Erythema, blanchable 2/1/2018  1:05 PM   Cleansing and Cleansing Agents  Dermal wound cleanser 2/1/2018  1:05 PM   Dressing Type Applied Other (Comment) 2/1/2018  1:05 PM   Procedure Tolerated Well 2/1/2018  1:05 PM   Number of days: 73       [REMOVED] Wound Leg Lower Anterior;Left;Medial (Removed)   Number of days: 708       [REMOVED] Wound Foot Left;Dorsal (Removed)   DRESSING STATUS Removed 7/5/2018 10:27 AM   DRESSING TYPE Gauze 7/5/2018 10:27 AM   Non-Pressure Injury Full thickness (subcut/muscle) 7/5/2018 10:27 AM   Wound Length (cm) 0 cm 9/20/2018  8:13 AM   Wound Width (cm) 0 cm 9/20/2018  8:13 AM   Wound Depth (cm) 0.3 7/5/2018 10:27 AM   Wound Surface area (cm^2) 0 cm^2 9/20/2018  8:13 AM   Change in Wound Size % 100 9/20/2018  8:13 AM   Condition of Base [place] 7/5/2018 10:27 AM   Condition of Edges Open 6/28/2018  8:53 AM   Tissue Type Pink;Yellow 7/5/2018 10:27 AM   Tissue Type Percent Pink 75 7/5/2018 10:27 AM   Tissue Type Percent Red 25 7/5/2018 10:27 AM   Tissue Type Percent Yellow 50 6/28/2018  8:53 AM   Drainage Amount  Small  7/5/2018 10:27 AM   Drainage Color Serous 7/5/2018 10:27 AM   Wound Odor None 6/28/2018  8:53 AM   Periwound Skin Condition Erythema, blanchable 7/5/2018 10:27 AM   Cleansing and Cleansing Agents  Normal saline 7/5/2018 10:27 AM   Dressing Type Applied Other (Comment) 7/5/2018 10:27 AM   Procedure Tolerated Well 7/5/2018 10:27 AM   Number of days: 102       [REMOVED] Wound Ankle Lateral (Removed)   Number of days: 4       [REMOVED] Wound Arm Left;Right (Removed)   Number of days: 4       [REMOVED] Wound Ankle Left;Medial (Removed)   Number of days: 27        Labs  No results found for this or any previous visit (from the past 24 hour(s)). X-Ray:  deferred    Procedures:   S/p AGRAM LLE SFA/POP 12/13/18, 07/19/18, 02/22/18.  Theraskin applications 22/27/99 and 11/01/18               Adelia Germain DPM  January 3, 2019

## 2019-01-04 ENCOUNTER — HOSPITAL ENCOUNTER (OUTPATIENT)
Dept: GENERAL RADIOLOGY | Age: 84
Discharge: HOME | End: 2019-01-04
Payer: MEDICARE

## 2019-01-04 ENCOUNTER — HOSPITAL ENCOUNTER (OUTPATIENT)
Dept: LAB | Age: 84
Discharge: HOME | End: 2019-01-04
Payer: MEDICARE

## 2019-01-04 DIAGNOSIS — E11.40 TYPE 2 DIABETES MELLITUS WITH DIABETIC NEUROPATHY, UNSPECIFIED WHETHER LONG TERM INSULIN USE (HCC): ICD-10-CM

## 2019-01-04 DIAGNOSIS — I73.9 VASCULAR DISEASE, PERIPHERAL (HCC): ICD-10-CM

## 2019-01-04 DIAGNOSIS — L97.323 NON-PRESSURE CHRONIC ULCER OF LEFT ANKLE WITH NECROSIS OF MUSCLE (HCC): ICD-10-CM

## 2019-01-04 LAB
BASOPHILS # BLD: 0 K/UL (ref 0–0.1)
BASOPHILS NFR BLD: 1 % (ref 0–2)
DIFFERENTIAL METHOD BLD: ABNORMAL
EOSINOPHIL # BLD: 0.1 K/UL (ref 0–0.4)
EOSINOPHIL NFR BLD: 1 % (ref 0–5)
ERYTHROCYTE [DISTWIDTH] IN BLOOD BY AUTOMATED COUNT: 13.8 % (ref 11.6–14.5)
ERYTHROCYTE [SEDIMENTATION RATE] IN BLOOD: 28 MM/HR (ref 0–30)
HCT VFR BLD AUTO: 37.2 % (ref 35–45)
HGB BLD-MCNC: 11.7 G/DL (ref 12–16)
LYMPHOCYTES # BLD: 1.5 K/UL (ref 0.9–3.6)
LYMPHOCYTES NFR BLD: 25 % (ref 21–52)
MCH RBC QN AUTO: 28.7 PG (ref 24–34)
MCHC RBC AUTO-ENTMCNC: 31.5 G/DL (ref 31–37)
MCV RBC AUTO: 91.2 FL (ref 74–97)
MONOCYTES # BLD: 0.5 K/UL (ref 0.05–1.2)
MONOCYTES NFR BLD: 8 % (ref 3–10)
NEUTS SEG # BLD: 4 K/UL (ref 1.8–8)
NEUTS SEG NFR BLD: 65 % (ref 40–73)
PLATELET # BLD AUTO: 182 K/UL (ref 135–420)
PMV BLD AUTO: 12.6 FL (ref 9.2–11.8)
RBC # BLD AUTO: 4.08 M/UL (ref 4.2–5.3)
WBC # BLD AUTO: 6.1 K/UL (ref 4.6–13.2)

## 2019-01-04 PROCEDURE — 85652 RBC SED RATE AUTOMATED: CPT

## 2019-01-04 PROCEDURE — 3331090001 HH PPS REVENUE CREDIT

## 2019-01-04 PROCEDURE — 3331090002 HH PPS REVENUE DEBIT

## 2019-01-04 PROCEDURE — 85025 COMPLETE CBC W/AUTO DIFF WBC: CPT

## 2019-01-04 PROCEDURE — 36415 COLL VENOUS BLD VENIPUNCTURE: CPT

## 2019-01-04 PROCEDURE — 73610 X-RAY EXAM OF ANKLE: CPT

## 2019-01-05 PROCEDURE — 3331090002 HH PPS REVENUE DEBIT

## 2019-01-05 PROCEDURE — 3331090001 HH PPS REVENUE CREDIT

## 2019-01-06 PROCEDURE — 3331090001 HH PPS REVENUE CREDIT

## 2019-01-06 PROCEDURE — 3331090002 HH PPS REVENUE DEBIT

## 2019-01-07 ENCOUNTER — HOME CARE VISIT (OUTPATIENT)
Dept: SCHEDULING | Facility: HOME HEALTH | Age: 84
End: 2019-01-07
Payer: MEDICARE

## 2019-01-07 VITALS
SYSTOLIC BLOOD PRESSURE: 126 MMHG | OXYGEN SATURATION: 97 % | DIASTOLIC BLOOD PRESSURE: 67 MMHG | HEART RATE: 63 BPM | TEMPERATURE: 98.7 F | RESPIRATION RATE: 18 BRPM

## 2019-01-07 PROCEDURE — 3331090001 HH PPS REVENUE CREDIT

## 2019-01-07 PROCEDURE — G0300 HHS/HOSPICE OF LPN EA 15 MIN: HCPCS

## 2019-01-07 PROCEDURE — 3331090002 HH PPS REVENUE DEBIT

## 2019-01-08 PROCEDURE — 3331090002 HH PPS REVENUE DEBIT

## 2019-01-08 PROCEDURE — 3331090001 HH PPS REVENUE CREDIT

## 2019-01-09 ENCOUNTER — HOME CARE VISIT (OUTPATIENT)
Dept: SCHEDULING | Facility: HOME HEALTH | Age: 84
End: 2019-01-09
Payer: MEDICARE

## 2019-01-09 PROCEDURE — 3331090001 HH PPS REVENUE CREDIT

## 2019-01-09 PROCEDURE — 3331090002 HH PPS REVENUE DEBIT

## 2019-01-09 PROCEDURE — G0300 HHS/HOSPICE OF LPN EA 15 MIN: HCPCS

## 2019-01-10 VITALS
OXYGEN SATURATION: 98 % | TEMPERATURE: 98.3 F | RESPIRATION RATE: 20 BRPM | SYSTOLIC BLOOD PRESSURE: 163 MMHG | DIASTOLIC BLOOD PRESSURE: 89 MMHG | HEART RATE: 59 BPM

## 2019-01-10 PROCEDURE — 3331090001 HH PPS REVENUE CREDIT

## 2019-01-10 PROCEDURE — 3331090002 HH PPS REVENUE DEBIT

## 2019-01-11 ENCOUNTER — HOME CARE VISIT (OUTPATIENT)
Dept: SCHEDULING | Facility: HOME HEALTH | Age: 84
End: 2019-01-11
Payer: MEDICARE

## 2019-01-11 PROCEDURE — G0300 HHS/HOSPICE OF LPN EA 15 MIN: HCPCS

## 2019-01-11 PROCEDURE — 3331090001 HH PPS REVENUE CREDIT

## 2019-01-11 PROCEDURE — 3331090002 HH PPS REVENUE DEBIT

## 2019-01-12 VITALS
HEART RATE: 65 BPM | SYSTOLIC BLOOD PRESSURE: 122 MMHG | DIASTOLIC BLOOD PRESSURE: 63 MMHG | TEMPERATURE: 98.3 F | OXYGEN SATURATION: 98 % | RESPIRATION RATE: 18 BRPM

## 2019-01-12 PROCEDURE — 3331090002 HH PPS REVENUE DEBIT

## 2019-01-12 PROCEDURE — 3331090001 HH PPS REVENUE CREDIT

## 2019-01-13 PROCEDURE — 3331090002 HH PPS REVENUE DEBIT

## 2019-01-13 PROCEDURE — 3331090001 HH PPS REVENUE CREDIT

## 2019-01-14 ENCOUNTER — HOME CARE VISIT (OUTPATIENT)
Dept: SCHEDULING | Facility: HOME HEALTH | Age: 84
End: 2019-01-14
Payer: MEDICARE

## 2019-01-14 PROCEDURE — G0300 HHS/HOSPICE OF LPN EA 15 MIN: HCPCS

## 2019-01-14 PROCEDURE — 3331090001 HH PPS REVENUE CREDIT

## 2019-01-14 PROCEDURE — 3331090002 HH PPS REVENUE DEBIT

## 2019-01-15 VITALS
HEART RATE: 69 BPM | SYSTOLIC BLOOD PRESSURE: 135 MMHG | DIASTOLIC BLOOD PRESSURE: 60 MMHG | TEMPERATURE: 97.8 F | OXYGEN SATURATION: 98 % | RESPIRATION RATE: 20 BRPM

## 2019-01-15 PROCEDURE — 3331090002 HH PPS REVENUE DEBIT

## 2019-01-15 PROCEDURE — 3331090001 HH PPS REVENUE CREDIT

## 2019-01-16 ENCOUNTER — HOME CARE VISIT (OUTPATIENT)
Dept: SCHEDULING | Facility: HOME HEALTH | Age: 84
End: 2019-01-16
Payer: MEDICARE

## 2019-01-16 VITALS
TEMPERATURE: 97.7 F | OXYGEN SATURATION: 98 % | HEART RATE: 64 BPM | RESPIRATION RATE: 20 BRPM | DIASTOLIC BLOOD PRESSURE: 54 MMHG | SYSTOLIC BLOOD PRESSURE: 126 MMHG

## 2019-01-16 PROCEDURE — 3331090002 HH PPS REVENUE DEBIT

## 2019-01-16 PROCEDURE — G0300 HHS/HOSPICE OF LPN EA 15 MIN: HCPCS

## 2019-01-16 PROCEDURE — 3331090001 HH PPS REVENUE CREDIT

## 2019-01-17 PROCEDURE — 3331090002 HH PPS REVENUE DEBIT

## 2019-01-17 PROCEDURE — 3331090001 HH PPS REVENUE CREDIT

## 2019-01-18 ENCOUNTER — TELEPHONE (OUTPATIENT)
Dept: WOUND CARE | Age: 84
End: 2019-01-18

## 2019-01-18 ENCOUNTER — HOME CARE VISIT (OUTPATIENT)
Dept: SCHEDULING | Facility: HOME HEALTH | Age: 84
End: 2019-01-18
Payer: MEDICARE

## 2019-01-18 PROCEDURE — G0299 HHS/HOSPICE OF RN EA 15 MIN: HCPCS

## 2019-01-18 PROCEDURE — 3331090001 HH PPS REVENUE CREDIT

## 2019-01-18 PROCEDURE — 3331090002 HH PPS REVENUE DEBIT

## 2019-01-19 VITALS
TEMPERATURE: 99.8 F | HEART RATE: 74 BPM | OXYGEN SATURATION: 98 % | SYSTOLIC BLOOD PRESSURE: 130 MMHG | DIASTOLIC BLOOD PRESSURE: 70 MMHG | RESPIRATION RATE: 18 BRPM

## 2019-01-19 PROCEDURE — 3331090002 HH PPS REVENUE DEBIT

## 2019-01-19 PROCEDURE — 3331090001 HH PPS REVENUE CREDIT

## 2019-01-20 PROCEDURE — 3331090002 HH PPS REVENUE DEBIT

## 2019-01-20 PROCEDURE — 3331090001 HH PPS REVENUE CREDIT

## 2019-01-21 PROCEDURE — 3331090002 HH PPS REVENUE DEBIT

## 2019-01-21 PROCEDURE — 3331090001 HH PPS REVENUE CREDIT

## 2019-01-22 ENCOUNTER — HOSPITAL ENCOUNTER (OUTPATIENT)
Dept: WOUND CARE | Age: 84
Discharge: HOME | End: 2019-01-22
Payer: MEDICARE

## 2019-01-22 PROCEDURE — 97602 WOUND(S) CARE NON-SELECTIVE: CPT

## 2019-01-22 PROCEDURE — 3331090001 HH PPS REVENUE CREDIT

## 2019-01-22 PROCEDURE — 3331090002 HH PPS REVENUE DEBIT

## 2019-01-22 NOTE — WOUND CARE
Friday 1/18/19 patient seen by home health who was concerned about redness. Home care nurse spoke to Dr Krish Garcia. Dr Krish Garcia ordered ABX  x10 days. Patient seen today in wound care following days of ABX. Periwound remains slightly reddened and warm however, she is still on ABX. Wound dressed with endoform and hydrofera blue per Dr Krish Garcia orders. Patient to continue ABX and follow up with wound clinic upon completion for wound evaluation. She recently underwent vascular intervention however, per patient at vascular follow up was told she will need intervention again as they are \"clogged again. \"

## 2019-01-23 PROCEDURE — 3331090002 HH PPS REVENUE DEBIT

## 2019-01-23 PROCEDURE — 3331090001 HH PPS REVENUE CREDIT

## 2019-01-24 ENCOUNTER — HOME CARE VISIT (OUTPATIENT)
Dept: SCHEDULING | Facility: HOME HEALTH | Age: 84
End: 2019-01-24
Payer: MEDICARE

## 2019-01-24 PROCEDURE — 3331090002 HH PPS REVENUE DEBIT

## 2019-01-24 PROCEDURE — 3331090001 HH PPS REVENUE CREDIT

## 2019-01-25 ENCOUNTER — HOME CARE VISIT (OUTPATIENT)
Dept: SCHEDULING | Facility: HOME HEALTH | Age: 84
End: 2019-01-25
Payer: MEDICARE

## 2019-01-25 PROCEDURE — G0300 HHS/HOSPICE OF LPN EA 15 MIN: HCPCS

## 2019-01-25 PROCEDURE — 3331090002 HH PPS REVENUE DEBIT

## 2019-01-25 PROCEDURE — 3331090001 HH PPS REVENUE CREDIT

## 2019-01-26 VITALS
SYSTOLIC BLOOD PRESSURE: 149 MMHG | DIASTOLIC BLOOD PRESSURE: 67 MMHG | RESPIRATION RATE: 20 BRPM | TEMPERATURE: 98 F | OXYGEN SATURATION: 98 % | HEART RATE: 64 BPM

## 2019-01-26 PROCEDURE — 3331090002 HH PPS REVENUE DEBIT

## 2019-01-26 PROCEDURE — 3331090001 HH PPS REVENUE CREDIT

## 2019-01-27 PROCEDURE — 3331090001 HH PPS REVENUE CREDIT

## 2019-01-27 PROCEDURE — 3331090002 HH PPS REVENUE DEBIT

## 2019-01-28 ENCOUNTER — HOME CARE VISIT (OUTPATIENT)
Dept: SCHEDULING | Facility: HOME HEALTH | Age: 84
End: 2019-01-28
Payer: MEDICARE

## 2019-01-28 VITALS
OXYGEN SATURATION: 98 % | HEART RATE: 53 BPM | DIASTOLIC BLOOD PRESSURE: 67 MMHG | RESPIRATION RATE: 18 BRPM | TEMPERATURE: 98.6 F | SYSTOLIC BLOOD PRESSURE: 149 MMHG

## 2019-01-28 PROCEDURE — 3331090001 HH PPS REVENUE CREDIT

## 2019-01-28 PROCEDURE — 3331090002 HH PPS REVENUE DEBIT

## 2019-01-28 PROCEDURE — G0300 HHS/HOSPICE OF LPN EA 15 MIN: HCPCS

## 2019-01-29 PROCEDURE — 3331090002 HH PPS REVENUE DEBIT

## 2019-01-29 PROCEDURE — 3331090001 HH PPS REVENUE CREDIT

## 2019-01-30 PROCEDURE — 3331090002 HH PPS REVENUE DEBIT

## 2019-01-30 PROCEDURE — 3331090001 HH PPS REVENUE CREDIT

## 2019-01-30 PROCEDURE — 3331090003 HH PPS REVENUE ADJ

## 2019-01-31 ENCOUNTER — HOME CARE VISIT (OUTPATIENT)
Dept: SCHEDULING | Facility: HOME HEALTH | Age: 84
End: 2019-01-31
Payer: MEDICARE

## 2019-01-31 PROCEDURE — G0299 HHS/HOSPICE OF RN EA 15 MIN: HCPCS

## 2019-01-31 PROCEDURE — 3331090001 HH PPS REVENUE CREDIT

## 2019-01-31 PROCEDURE — 3331090002 HH PPS REVENUE DEBIT

## 2019-02-01 PROCEDURE — 3331090002 HH PPS REVENUE DEBIT

## 2019-02-01 PROCEDURE — 3331090001 HH PPS REVENUE CREDIT

## 2019-02-02 ENCOUNTER — HOME CARE VISIT (OUTPATIENT)
Dept: SCHEDULING | Facility: HOME HEALTH | Age: 84
End: 2019-02-02
Payer: MEDICARE

## 2019-02-02 VITALS
DIASTOLIC BLOOD PRESSURE: 72 MMHG | TEMPERATURE: 97.7 F | RESPIRATION RATE: 18 BRPM | OXYGEN SATURATION: 99 % | SYSTOLIC BLOOD PRESSURE: 130 MMHG | HEART RATE: 64 BPM

## 2019-02-02 PROCEDURE — 3331090002 HH PPS REVENUE DEBIT

## 2019-02-02 PROCEDURE — 3331090001 HH PPS REVENUE CREDIT

## 2019-02-03 PROCEDURE — 3331090002 HH PPS REVENUE DEBIT

## 2019-02-03 PROCEDURE — 3331090001 HH PPS REVENUE CREDIT

## 2019-02-04 ENCOUNTER — HOME CARE VISIT (OUTPATIENT)
Dept: SCHEDULING | Facility: HOME HEALTH | Age: 84
End: 2019-02-04
Payer: MEDICARE

## 2019-02-04 PROCEDURE — G0300 HHS/HOSPICE OF LPN EA 15 MIN: HCPCS

## 2019-02-04 PROCEDURE — 3331090001 HH PPS REVENUE CREDIT

## 2019-02-04 PROCEDURE — 3331090002 HH PPS REVENUE DEBIT

## 2019-02-04 PROCEDURE — 400014 HH F/U

## 2019-02-05 VITALS
RESPIRATION RATE: 18 BRPM | OXYGEN SATURATION: 98 % | TEMPERATURE: 98.5 F | DIASTOLIC BLOOD PRESSURE: 64 MMHG | HEART RATE: 55 BPM | SYSTOLIC BLOOD PRESSURE: 125 MMHG

## 2019-02-05 PROCEDURE — 3331090001 HH PPS REVENUE CREDIT

## 2019-02-05 PROCEDURE — A6216 NON-STERILE GAUZE<=16 SQ IN: HCPCS

## 2019-02-05 PROCEDURE — A6215 FOAM DRESSING WOUND FILLER: HCPCS

## 2019-02-05 PROCEDURE — A6260 WOUND CLEANSER ANY TYPE/SIZE: HCPCS

## 2019-02-05 PROCEDURE — 3331090002 HH PPS REVENUE DEBIT

## 2019-02-05 PROCEDURE — A9270 NON-COVERED ITEM OR SERVICE: HCPCS

## 2019-02-06 ENCOUNTER — HOME CARE VISIT (OUTPATIENT)
Dept: SCHEDULING | Facility: HOME HEALTH | Age: 84
End: 2019-02-06
Payer: MEDICARE

## 2019-02-06 VITALS
HEART RATE: 68 BPM | SYSTOLIC BLOOD PRESSURE: 169 MMHG | TEMPERATURE: 98 F | RESPIRATION RATE: 20 BRPM | DIASTOLIC BLOOD PRESSURE: 69 MMHG | OXYGEN SATURATION: 98 %

## 2019-02-06 PROCEDURE — 3331090001 HH PPS REVENUE CREDIT

## 2019-02-06 PROCEDURE — G0300 HHS/HOSPICE OF LPN EA 15 MIN: HCPCS

## 2019-02-06 PROCEDURE — 3331090002 HH PPS REVENUE DEBIT

## 2019-02-07 ENCOUNTER — HOSPITAL ENCOUNTER (OUTPATIENT)
Dept: WOUND CARE | Age: 84
Discharge: HOME | End: 2019-02-07
Payer: MEDICARE

## 2019-02-07 VITALS
RESPIRATION RATE: 16 BRPM | HEART RATE: 58 BPM | SYSTOLIC BLOOD PRESSURE: 157 MMHG | DIASTOLIC BLOOD PRESSURE: 71 MMHG | OXYGEN SATURATION: 98 % | TEMPERATURE: 97.4 F

## 2019-02-07 PROCEDURE — 3331090001 HH PPS REVENUE CREDIT

## 2019-02-07 PROCEDURE — 74011000250 HC RX REV CODE- 250

## 2019-02-07 PROCEDURE — 77030011256 HC DRSG MEPILEX <16IN NO BORD MOLN -A: Performed by: PODIATRIST

## 2019-02-07 PROCEDURE — 17250 CHEM CAUT OF GRANLTJ TISSUE: CPT

## 2019-02-07 PROCEDURE — 74011250637 HC RX REV CODE- 250/637: Performed by: PODIATRIST

## 2019-02-07 PROCEDURE — 3331090002 HH PPS REVENUE DEBIT

## 2019-02-07 RX ORDER — LIDOCAINE HYDROCHLORIDE 40 MG/ML
SOLUTION TOPICAL AS NEEDED
Status: DISCONTINUED | OUTPATIENT
Start: 2019-02-07 | End: 2019-02-11 | Stop reason: HOSPADM

## 2019-02-07 RX ORDER — SILVER NITRATE 38.21; 12.74 MG/1; MG/1
STICK TOPICAL
Status: DISPENSED
Start: 2019-02-07 | End: 2019-02-07

## 2019-02-07 RX ORDER — EPINEPHRINE 0.1 MG/ML
1 INJECTION INTRACARDIAC; INTRAVENOUS
Status: ACTIVE | OUTPATIENT
Start: 2019-02-07 | End: 2019-02-08

## 2019-02-07 RX ORDER — MAGNESIUM SULFATE 100 %
16 CRYSTALS MISCELLANEOUS AS NEEDED
Status: DISCONTINUED | OUTPATIENT
Start: 2019-02-07 | End: 2019-02-11 | Stop reason: HOSPADM

## 2019-02-07 RX ORDER — TRIAMCINOLONE ACETONIDE 5 MG/G
CREAM TOPICAL DAILY PRN
Status: DISCONTINUED | OUTPATIENT
Start: 2019-02-07 | End: 2019-02-11 | Stop reason: HOSPADM

## 2019-02-07 RX ORDER — OXYMETAZOLINE HCL 0.05 %
2 SPRAY, NON-AEROSOL (ML) NASAL
Status: DISCONTINUED | OUTPATIENT
Start: 2019-02-07 | End: 2019-02-11 | Stop reason: HOSPADM

## 2019-02-07 RX ORDER — LIDOCAINE AND PRILOCAINE 25; 25 MG/G; MG/G
CREAM TOPICAL AS NEEDED
Status: DISCONTINUED | OUTPATIENT
Start: 2019-02-07 | End: 2019-02-11 | Stop reason: HOSPADM

## 2019-02-07 RX ORDER — BACITRACIN 500 UNIT/G
1 PACKET (EA) TOPICAL DAILY
Status: DISCONTINUED | OUTPATIENT
Start: 2019-02-07 | End: 2019-02-11 | Stop reason: HOSPADM

## 2019-02-07 RX ORDER — MUPIROCIN CALCIUM 20 MG/G
CREAM TOPICAL DAILY PRN
Status: DISCONTINUED | OUTPATIENT
Start: 2019-02-07 | End: 2019-02-11 | Stop reason: HOSPADM

## 2019-02-07 RX ORDER — LIDOCAINE HYDROCHLORIDE 10 MG/ML
1-10 INJECTION INFILTRATION; PERINEURAL
Status: ACTIVE | OUTPATIENT
Start: 2019-02-07 | End: 2019-02-08

## 2019-02-07 RX ORDER — NITROGLYCERIN 0.4 MG/1
0.4 TABLET SUBLINGUAL AS NEEDED
Status: DISCONTINUED | OUTPATIENT
Start: 2019-02-07 | End: 2019-02-11 | Stop reason: HOSPADM

## 2019-02-07 RX ORDER — SILVER NITRATE 38.21; 12.74 MG/1; MG/1
1 STICK TOPICAL
Status: COMPLETED | OUTPATIENT
Start: 2019-02-07 | End: 2019-02-07

## 2019-02-07 RX ORDER — SILVER SULFADIAZINE 10 G/1000G
CREAM TOPICAL DAILY PRN
Status: DISCONTINUED | OUTPATIENT
Start: 2019-02-07 | End: 2019-02-11 | Stop reason: HOSPADM

## 2019-02-07 RX ORDER — ACETAMINOPHEN 325 MG/1
650 TABLET ORAL
Status: DISCONTINUED | OUTPATIENT
Start: 2019-02-07 | End: 2019-02-11 | Stop reason: HOSPADM

## 2019-02-07 RX ORDER — DIPHENHYDRAMINE HYDROCHLORIDE 50 MG/ML
25 INJECTION, SOLUTION INTRAMUSCULAR; INTRAVENOUS
Status: ACTIVE | OUTPATIENT
Start: 2019-02-07 | End: 2019-02-08

## 2019-02-07 RX ORDER — EPINEPHRINE 1 MG/ML
1 INJECTION, SOLUTION, CONCENTRATE INTRAVENOUS
Status: ACTIVE | OUTPATIENT
Start: 2019-02-07 | End: 2019-02-08

## 2019-02-07 RX ORDER — LIDOCAINE HYDROCHLORIDE AND EPINEPHRINE 10; 10 MG/ML; UG/ML
1-10 INJECTION, SOLUTION INFILTRATION; PERINEURAL
Status: ACTIVE | OUTPATIENT
Start: 2019-02-07 | End: 2019-02-08

## 2019-02-07 RX ORDER — MICONAZOLE NITRATE 20 MG/G
CREAM TOPICAL DAILY PRN
Status: DISCONTINUED | OUTPATIENT
Start: 2019-02-07 | End: 2019-02-11 | Stop reason: HOSPADM

## 2019-02-07 RX ORDER — LIDOCAINE HYDROCHLORIDE 20 MG/ML
JELLY TOPICAL AS NEEDED
Status: DISCONTINUED | OUTPATIENT
Start: 2019-02-07 | End: 2019-02-11 | Stop reason: HOSPADM

## 2019-02-07 RX ADMIN — SILVER NITRATE 2 APPLICATOR: 38.21; 12.74 STICK TOPICAL at 10:01

## 2019-02-07 RX ADMIN — TRIAMCINOLONE ACETONIDE: 5 CREAM TOPICAL at 10:17

## 2019-02-07 NOTE — DISCHARGE INSTRUCTIONS
Wound Care Instructions    Patient: Maryam Cain MRN: [id number]  SSN: [id number]     YOB: 1935  Age:83 y.o. Sex: female       MsMaryanne Tom PAKO Draper recommends the following discharge instruction:    Offloading    []   Felt/Foam Offloading   [] Removable Cast Waker       []   Wedge Shoe   []  Wheelchair     []   Total Contact Cast   []  Surgical Shoe     []   Crutches        Other   Mattress:   Other:     Edema Control    []   Elevated legs as much as possible. Recommend above level of heart.     []   Layered Wraps             []   Left      []   Right      []  BILATERAL          - Type:            []   Unna Boot       []  Multi-Layer                                   []   Two Layers     []  Three Layers   []  Four Layers     [x]   Tubular Bandages        [x]   Left      []   Right     SIZE: F (single layer)      []   Stockings                      []   Left      []   Right     []   Compression Pump     []   Left      []   Right    ___ millimeters of mercury  ___ millimeters of mercury for ___ minutes for ___ day(s)        Other: FYI : Juxta Lite Compression stockings ordered through [facility].       Nutritional Supplements    []  Multi-Vitamin     []  Other:       Select One     [x]  Home Health: [facility]     []  Long Term Care:      []  DME:     Consult    []   Nutrition     []   Vascular     []   Orthotist/Pedorthotist     []   Infectious Disease     []   Lymphedema Therapist   Other:     Dressings:  Another brand of generically equivalent product may be dispensed unless specifically ordered otherwise.     Home Ulcer/wound Hygiene (cleanse with)      []   Distilled Water     []   Normal Saline     [x]   Wound Cleanser     []   Mild antimicrobial soap and water     []   Chlorhexidine liquid soap and water     []   Other:     Apply Hydrofera Blue to left medial ankle ulcer    []   Hydrogel   []     []   Aquacel Ag     []   Cadexomer Iodine                     (Iodosorb)   []  Silver Alginate []   Medihoney:     []   Collagenase:Santyl   []  Calcium Alginate   []   Collagen:     []   Plain Foam   []  Non-Adherent Contact                Layer   []   Xeroform     []   Silver foam   []   Hydrocolloid        []   Acticoat   []   Adaptic:      Other/Specific Instructions:    Cover With    []   Dry Gauze and Roll Gauze     []   Foam and Roll Gauze     []   Dry Gauze     []   Bordered gauze:     [x]   Foam      [x]    Bordered         []   Nonbordered     []   Secure with Tape     []   Other       Ligia-Ulcer Care    [x]   Cream Triamcinolone     []   Lotion     []   Ointment     []   Barrier     []   Other:     Change Dressing    []   Once Daily     []   Every Other Day     []   Every 3 Days       []   Every 5 Days     []   Every 7 Days     []   Do Not Change       []   2 x per week (Mon and Thurs. )     [x]   3 x per week (Mon, Wed, Fri. )     []   Other          Follow-Up: 3/7/19 9:00 am   [x]  Follow up                              []  As Needed (PRN)     []   Ronaldo Mar MD    []  Ruth Asif DPM    [x]  Tonya Boothe DPM   []   Chantelle Gross DPM      []   Nurse Visit            Please call the wound clinic ([phone]) regarding any questions or concerns. FYI:  Silver nitrate sticks were used on hypergranulated tissue today so wound base may appear gray. This will eventually slough off.

## 2019-02-07 NOTE — WOUND CARE
Patient here for follow up appointment for lower leg ulcer. Home care has been applying Hydrofera Blue three times weekly. She complains of pruritis on dorsal foot and fazal wound. Hypergranulation is noted in wound. Silver Nitrate applied by Dr. Tonie Almaguer to hypergranulation. She has not been wearing compression stating she was unable to tolerate it. She is willing to try again with compression. Tubigrip F applied in single layer and Juxta Lite compression garment ordered through [facility]. Triamcinolone to dorsal foot and fazal wound with each dressing change. Orders faxed to home care. 02/07/19 0945 02/07/19 1222   Wound Ankle Left;Medial;Proximal   Date First Assessed: 11/16/17   POA: Yes  Wound Type: Vascular  Location: Ankle  Orientation: Left;Medial;Proximal   Dressing Status  Removed --    Non-Pressure Injury Full thickness (subcut/muscle) --    Wound Length (cm) 1.2 cm --    Wound Width (cm) 1.5 cm --    Wound Depth (cm) 0.2 --    Wound Surface area (cm^2) 1.8 cm^2 --    Change in Wound Size % -106.9 --    Condition of Base Pink;Slough --    Condition of Edges Open --    Tissue Type Red;Yellow --    Tissue Type Percent Red 50 --    Tissue Type Percent Yellow 50 --    Drainage Amount  Moderate --    Drainage Color Serosanguinous --    Wound Odor None --    Periwound Skin Condition Edematous; Erythema, blanchable --    Cleansing and Cleansing Agents  Dermal wound cleanser --    Dressing Type Applied --  Other (Comment)  (Hydrofera blue,Border foam,Triamcinolone to fazal,tubigrip F)   Procedure Tolerated --  Well

## 2019-02-07 NOTE — PROGRESS NOTES
Progress and Debridement Note    Patient: Alberto Valle MRN: [id number]  SSN: [id number]    YOB: 1935  Age: 81 y.o. Sex: female      Assessment:     Active Problems:    Skin ulcer of left ankle, with fat layer exposed ([facility] [address] (11/16/2017)      Vascular disease, peripheral ([facility] [address] (12/7/2017)      Type 2 diabetes mellitus with diabetic neuropathy ([facility] [address] (1/3/2018)        Plan:      Selective excisional debridement with silver nitrate. Will continue [address] with hydrofera blue Q 48 Left ankle and Rx juxta lite for compression. Rx Triamcinolone cream to the periwound area with every dressing change.          Subjective:     Ms Ronen Quiles is a 80 y/o female who present with family s/p [employer] application 40/59, 16/96/12, Patient states she vascular intervention on 01/30/19      Objective:     Patient Vitals for the past 24 hrs:   Temp Pulse Resp BP SpO2   02/07/19 0938 97.4 °F (36.3 °C) (!) 58 16 157/71 98 %       Physical Exam:     General Exam  alert, cooperative, no distress, appears stated age    REVIEW OF SYSTEMS:  General: denies chronic fatigue, weight loss, fever, anemia, bruising, depression, nervousness, panic attacks  HEENT: denies ringing in ears, ear infections, dizzy spells, poor vision, glaucoma, sinus trouble, hoarseness, eye infections  GI: denies diarrhea, gas, bloating, heartburn, regurgitation, difficulty swallowing, painful swallowing, nausea, vomiting, constipation, abdominal pain, decreased appetite, blood in stools, black stools, jaundice, dark urine  Lungs: denies pneumonia, asthma, cough, SOB, hemoptysis  Heart: denies chest pain, irregular heart beat, ankle swelling, HTN  Skin: denies rashes, hives, allergic reaction  Urinary: denies UTI, kidney stones, decreased urine force and flow, urination at night, blood in urine, painful urination  Bones and Joints: denies arthritis, rheumatism, back pain, gout, osteoporosis  Neurologic: denies stroke, seizures, headaches, numbness, tingling    VASCULAR EXAM:. Pedal pulses are  0/4 DP and trace PT right and left foot. Skin temperature is warm to warm right and left foot. Digital capillary fill time is 5 seconds right and left foot. There is decrease edema of the right and left foot and ankle. Pedal hair is not noted bilateral       NEUROLOGICAL EXAM:. Sensation Intact with 5.07g monofilament wire right and left foot. Deep tendon reflexes intact and symmetrical on the right and left foot.      MUSCULOSKELETAL EXAM:. Muscle tone is normal.  Muscle strength of the flexor and extensor group inversion and eversion Bilateral. 4/5.         DERMATOLOGICAL EXAM:. Skin is of abnormal texture and turgor with atrophic skin changes noting decrease hair growth, nail changes (thickening), Bilateral. There is epithelialized lesions dorsal and medial forefoot left foot.      Subhyperkeratotic lesion noted over the medial aspect of the left ankle with decrease in measurements as stated below There is erythema and warmth with palpation, no asc lymphagitis        Wound Ankle Left;Medial;Proximal (Active)   Dressing Status  Removed 1/22/2019 11:55 AM   Dressing Type  Other (Comment) 1/22/2019 11:55 AM   Non-Pressure Injury Full thickness (subcut/muscle) 1/22/2019 11:55 AM   Wound Length (cm) 1.2 cm 2/7/2019  9:45 AM   Wound Width (cm) 1.5 cm 2/7/2019  9:45 AM   Wound Depth (cm) 0.2 2/7/2019  9:45 AM   Wound Surface area (cm^2) 1.8 cm^2 2/7/2019  9:45 AM   Change in Wound Size % -106.9 2/7/2019  9:45 AM   Condition of Base Pink;Slough 2/7/2019  9:45 AM   Condition of Edges Open 2/7/2019  9:45 AM   Tissue Type Red;Yellow 2/7/2019  9:45 AM   Tissue Type Percent Pink 100 12/20/2018 10:12 AM   Tissue Type Percent Red 50 2/7/2019  9:45 AM   Tissue Type Percent Yellow 50 2/7/2019  9:45 AM   Drainage Amount  Moderate 2/7/2019  9:45 AM   Drainage Color Serosanguinous 2/7/2019  9:45 AM   Wound Odor None 2/7/2019  9:45 AM   Periwound Skin Condition Edematous; Erythema, blanchable 2/7/2019  9:45 AM   Cleansing and Cleansing Agents  Dermal wound cleanser 2/7/2019  9:45 AM   Dressing Type Applied Other (Comment) 1/22/2019 11:55 AM   Wound Procedure Type Skin Substitue 12/20/2018 11:16 AM   Procedure Time Out 1116 12/20/2018 11:16 AM   Consent Obtained  Yes 12/20/2018 11:16 AM   Procedure Bleeding No 11/15/2018 11:36 AM   Procedure Pain Scale Numeric 3/10 12/20/2018 11:16 AM   Skin Substitute Applied  Other 12/20/2018 11:16 AM   Post Procedure Procedure Pain Scale Numeric 3/10 11/15/2018 11:36 AM   Procedure Tolerated Well 1/22/2019 11:55 AM   Number of days: 448       [REMOVED] Wound Ankle Left (Removed)   Number of days: 6472       [REMOVED] Wound Ankle Left (Removed)   Number of days: 9872       [REMOVED] Wound Ankle Left (Removed)   Number of days: 7630       [REMOVED] Wound Ankle Left;Lateral (Removed)   Number of days: 781       [REMOVED] Wound Ankle Right (Removed)   Dressing Status  Removed 12/21/2017  9:42 AM   Dressing Type  Other (Comment) 12/21/2017  9:42 AM   Non-Pressure Injury Partial thickness (epider/derm) 12/21/2017  9:42 AM   Wound Length (cm) 0.7 cm 12/21/2017  9:42 AM   Wound Width (cm) 0.8 cm 12/21/2017  9:42 AM   Wound Depth (cm) 0.1 12/21/2017  9:42 AM   Wound Surface area (cm^2) 0.06 cm^2 12/21/2017  9:42 AM   Condition of Base [facility] 12/21/2017  9:42 AM   Condition of Edges Open 12/21/2017  9:42 AM   Tissue Type Yellow 12/21/2017  9:42 AM   Tissue Type Percent Red 50 11/16/2017 10:08 AM   Tissue Type Percent Yellow 100 12/21/2017  9:42 AM   Drainage Amount  Moderate 12/21/2017  9:42 AM   Drainage Color Serous 12/21/2017  9:42 AM   Wound Odor None 12/21/2017  9:42 AM   Periwound Skin Condition Erythema, blanchable 12/21/2017  9:42 AM   Cleansing and Cleansing Agents  Dermal wound cleanser; Soap and water 12/21/2017  9:42 AM   Dressing Type Applied Other (Comment) 12/21/2017  9:42 AM   Procedure Tolerated Well 12/21/2017  9:42 AM   Number of days: 470 [REMOVED] Wound Ankle Lateral;Right (Removed)   Number of days: 215       [REMOVED] Wound Ankle Left;Lateral (Removed)   Number of days: 215       [REMOVED] Wound Ankle Left;Medial (Removed)   Dressing Status  Removed 6/21/2018  8:46 AM   Dressing Type  Other (Comment) 6/21/2018  8:46 AM   Non-Pressure Injury Full thickness (subcut/muscle) 6/21/2018  8:46 AM   Wound Length (cm) 1.4 cm 6/21/2018  8:46 AM   Wound Width (cm) 0.6 cm 6/21/2018  8:46 AM   Wound Depth (cm) 0.2 6/21/2018  8:46 AM   Wound Surface area (cm^2) 0.84 cm^2 6/21/2018  8:46 AM   Condition of Base [place];Slough 6/21/2018  8:46 AM   Condition of Edges Open 6/21/2018  8:46 AM   Tissue Type Pink;Yellow 6/21/2018  8:46 AM   Tissue Type Percent Pink 20 6/21/2018  8:46 AM   Tissue Type Percent Red 80 5/31/2018  8:29 AM   Tissue Type Percent Yellow 80 6/21/2018  8:46 AM   Drainage Amount  Small  6/21/2018  8:46 AM   Drainage Color Serosanguinous 6/21/2018  8:46 AM   Wound Odor None 6/21/2018  8:46 AM   Periwound Skin Condition Edematous; Erythema, blanchable 6/21/2018  8:46 AM   Cleansing and Cleansing Agents  Dermal wound cleanser 6/21/2018  8:46 AM   Dressing Type Applied Other (Comment) 6/21/2018  8:46 AM   Procedure Tolerated Well 6/21/2018  8:46 AM   Number of days: 229       [REMOVED] Wound Ankle Left;Medial;Distal (Removed)   Dressing Status  Removed 6/21/2018  8:46 AM   Dressing Type  Other (Comment) 6/21/2018  8:46 AM   Non-Pressure Injury Full thickness (subcut/muscle) 6/21/2018  8:46 AM   Wound Length (cm) 1.1 cm 6/21/2018  8:46 AM   Wound Width (cm) 0.6 cm 6/21/2018  8:46 AM   Wound Depth (cm) 0.2 6/21/2018  8:46 AM   Wound Surface area (cm^2) 0.66 cm^2 6/21/2018  8:46 AM   Condition of Base [place];Slough 6/21/2018  8:46 AM   Condition of Edges Open 6/21/2018  8:46 AM   Epithelialization (%) 100 5/17/2018  8:44 AM   Tissue Type Pink;Yellow 6/21/2018  8:46 AM   Tissue Type Percent Pink 50 6/21/2018  8:46 AM   Tissue Type Percent Yellow 50 6/21/2018 8:46 AM   Tissue Type Percent Other (comment) 40 2/15/2018 10:52 AM   Drainage Amount  Scant 6/21/2018  8:46 AM   Drainage Color Serosanguinous 6/21/2018  8:46 AM   Wound Odor None 6/21/2018  8:46 AM   Periwound Skin Condition Edematous; Erythema, blanchable;Petechiae 6/21/2018  8:46 AM   Cleansing and Cleansing Agents  Dermal wound cleanser 6/21/2018  8:46 AM   Dressing Type Applied Other (Comment) 6/21/2018  8:46 AM   Procedure Tolerated Well 6/21/2018  8:46 AM   Number of days: 332       [REMOVED] Wound Toe Left (Removed)   Dressing Type  Open to air 12/7/2017  9:56 AM   Non-Pressure Injury Partial thickness (epider/derm) 11/16/2017 10:08 AM   Wound Length (cm) 0.6 cm 11/16/2017 10:08 AM   Wound Width (cm) 1.4 cm 11/16/2017 10:08 AM   Wound Depth (cm) 0.1 11/16/2017 10:08 AM   Wound Surface area (cm^2) 0.08 cm^2 11/16/2017 10:08 AM   Condition of Base Other (comment) 11/16/2017 10:08 AM   Tissue Type Yellow 11/16/2017 10:08 AM   Tissue Type Percent Yellow 100 11/16/2017 10:08 AM   Drainage Amount  None 11/16/2017 10:08 AM   Wound Odor None 11/16/2017 10:08 AM   Cleansing and Cleansing Agents  Dermal wound cleanser 11/16/2017 10:08 AM   Dressing Type Applied Open to air 11/16/2017 10:08 AM   Number of days: 207       [REMOVED] Wound Toe Left (Removed)   Dressing Status  Removed 11/16/2017 10:08 AM   Dressing Type  Open to air 12/7/2017  9:56 AM   Non-Pressure Injury Partial thickness (epider/derm) 11/16/2017 10:08 AM   Wound Length (cm) 0.3 cm 11/16/2017 10:08 AM   Wound Width (cm) 0.4 cm 11/16/2017 10:08 AM   Wound Depth (cm) 0.1 11/16/2017 10:08 AM   Wound Surface area (cm^2) 0.01 cm^2 11/16/2017 10:08 AM   Condition of Base Other (comment) 11/16/2017 10:08 AM   Tissue Type Yellow 11/16/2017 10:08 AM   Tissue Type Percent Yellow 100 11/16/2017 10:08 AM   Drainage Amount  None 11/16/2017 10:08 AM   Wound Odor None 11/16/2017 10:08 AM   Periwound Skin Condition Intact 11/16/2017 10:08 AM   Dressing Type Applied Open to air 11/16/2017 10:08 AM   Number of days: 207       [REMOVED] Wound Ankle Left;Lateral (Removed)   Dressing Status  Removed 12/21/2017  9:42 AM   Dressing Type  Other (Comment) 12/21/2017  9:42 AM   Non-Pressure Injury Partial thickness (epider/derm) 12/21/2017  9:42 AM   Wound Length (cm) 1.1 cm 12/21/2017  9:42 AM   Wound Width (cm) 0.9 cm 12/21/2017  9:42 AM   Wound Depth (cm) 0.1 12/21/2017  9:42 AM   Wound Surface area (cm^2) 0.1 cm^2 12/21/2017  9:42 AM   Condition of Base [place];Slough 12/21/2017  9:42 AM   Condition of Edges Open 12/21/2017  9:42 AM   Tissue Type Pink;Yellow 12/21/2017  9:42 AM   Tissue Type Percent Pink 10 11/16/2017 10:08 AM   Tissue Type Percent Red 50 12/21/2017  9:42 AM   Tissue Type Percent Yellow 50 12/21/2017  9:42 AM   Drainage Amount  Moderate 12/21/2017  9:42 AM   Drainage Color Serous 12/21/2017  9:42 AM   Wound Odor None 12/21/2017  9:42 AM   Periwound Skin Condition Erythema, blanchable;Edematous 12/21/2017  9:42 AM   Cleansing and Cleansing Agents  Dermal wound cleanser; Soap and water 12/21/2017  9:42 AM   Dressing Type Applied Other (Comment) 12/21/2017  9:42 AM   Procedure Tolerated Well 12/21/2017  9:42 AM   Number of days: 332       [REMOVED] Wound Foot Left;Medial;Distal (Removed)   Dressing Status  Removed 11/1/2018  9:15 AM   Dressing Type  Other (Comment) 7/5/2018 10:27 AM   Non-Pressure Injury Full thickness (subcut/muscle) 7/5/2018 10:27 AM   Wound Length (cm) 0.3 cm 11/1/2018  9:15 AM   Wound Width (cm) 0.5 cm 11/1/2018  9:15 AM   Wound Depth (cm) 0.3 7/5/2018 10:27 AM   Wound Surface area (cm^2) 0.15 cm^2 11/1/2018  9:15 AM   Change in Wound Size % 25 11/1/2018  9:15 AM   Condition of Base [facility] 7/5/2018 10:27 AM   Condition of Edges Open 7/5/2018 10:27 AM   Tissue Type Pink;Yellow 7/5/2018 10:27 AM   Tissue Type Percent Pink 10 7/5/2018 10:27 AM   Tissue Type Percent Red 100 9/20/2018  8:13 AM   Tissue Type Percent Yellow 100 11/1/2018  9:15 AM Drainage Amount  Large 7/5/2018 10:27 AM   Drainage Color Serosanguinous 7/5/2018 10:27 AM   Wound Odor None 7/5/2018 10:27 AM   Periwound Skin Condition Erythema, blanchable 7/5/2018 10:27 AM   Cleansing and Cleansing Agents  Normal saline 7/5/2018 10:27 AM   Dressing Type Applied Other (Comment) 7/5/2018 10:27 AM   Wound Procedure Type Skin Substitue 11/1/2018 10:05 AM   Procedure Time Out 1000 11/1/2018 10:05 AM   Consent Obtained  Yes 11/1/2018 10:05 AM   Skin Substitute Applied  Other 11/1/2018 10:05 AM   Procedure Tolerated Well 7/5/2018 10:27 AM   Number of days: 287       [REMOVED] Wound Leg Lower Right; Anterior; Lateral (Removed)   Dressing Type  Open to air 2/15/2018 11:36 AM   Non-Pressure Injury Partial thickness (epider/derm) 2/1/2018  1:05 PM   Wound Length (cm) 0.9 cm 2/1/2018  1:05 PM   Wound Width (cm) 0.6 cm 2/1/2018  1:05 PM   Wound Depth (cm) 0.1 2/1/2018  1:05 PM   Wound Surface area (cm^2) 0.54 cm^2 2/1/2018  1:05 PM   Condition of Base [place] 2/1/2018  1:05 PM   Condition of Edges Open 2/1/2018  1:05 PM   Tissue Type Red 2/1/2018  1:05 PM   Tissue Type Percent Red 100 2/1/2018  1:05 PM   Drainage Amount  Scant 2/1/2018  1:05 PM   Drainage Color Serosanguinous 2/1/2018  1:05 PM   Wound Odor None 2/1/2018  1:05 PM   Periwound Skin Condition Edematous; Erythema, blanchable 2/1/2018  1:05 PM   Cleansing and Cleansing Agents  Dermal wound cleanser 2/1/2018  1:05 PM   Dressing Type Applied Other (Comment) 2/1/2018  1:05 PM   Procedure Tolerated Well 2/1/2018  1:05 PM   Number of days: 73       [REMOVED] Wound Leg Lower Anterior;Left;Medial (Removed)   Number of days: 915       [REMOVED] Wound Foot Left;Dorsal (Removed)   Dressing Status  Removed 7/5/2018 10:27 AM   Dressing Type  Gauze 7/5/2018 10:27 AM   Non-Pressure Injury Full thickness (subcut/muscle) 7/5/2018 10:27 AM   Wound Length (cm) 0 cm 9/20/2018  8:13 AM   Wound Width (cm) 0 cm 9/20/2018  8:13 AM   Wound Depth (cm) 0.3 7/5/2018 10:27 AM Wound Surface area (cm^2) 0 cm^2 9/20/2018  8:13 AM   Change in Wound Size % 100 9/20/2018  8:13 AM   Condition of Base [place] 7/5/2018 10:27 AM   Condition of Edges Open 6/28/2018  8:53 AM   Tissue Type Pink;Yellow 7/5/2018 10:27 AM   Tissue Type Percent Pink 75 7/5/2018 10:27 AM   Tissue Type Percent Red 25 7/5/2018 10:27 AM   Tissue Type Percent Yellow 50 6/28/2018  8:53 AM   Drainage Amount  Small  7/5/2018 10:27 AM   Drainage Color Serous 7/5/2018 10:27 AM   Wound Odor None 6/28/2018  8:53 AM   Periwound Skin Condition Erythema, blanchable 7/5/2018 10:27 AM   Cleansing and Cleansing Agents  Normal saline 7/5/2018 10:27 AM   Dressing Type Applied Other (Comment) 7/5/2018 10:27 AM   Procedure Tolerated Well 7/5/2018 10:27 AM   Number of days: 102       [REMOVED] Wound Ankle Lateral (Removed)   Number of days: 4       [REMOVED] Wound Arm Left;Right (Removed)   Number of days: 4       [REMOVED] Wound Ankle Left;Medial (Removed)   Number of days: 27          Lab/Data Review:  No results found for this or any previous visit (from the past 24 hour(s)).       Date: 1/4/2019 Department: [facility] Released By: Kathryn Haque Authorizing: Rome Berman DPM   Component Value Flag Ref Range Units Status   Sed rate, automated 28   0 - 30 mm/hr Final     Date: 1/4/2019 Department: [facility] Released By: Jahaira Ruizat: Rome Berman DPM   Component Value Flag Ref Range Units Status   WBC 6.1   4.6 - 13.2 K/uL Final   RBC 4.08 Abnormally low   L  4.20 - 5.30 M/uL Final   HGB 11.7 Abnormally low   L  12.0 - 16.0 g/dL Final   HCT 37.2   35.0 - 45.0 % Final   MCV 91.2   74.0 - 97.0 FL Final   MCH 28.7   24.0 - 34.0 PG Final   MCHC 31.5   31.0 - 37.0 g/dL Final   RDW 13.8   11.6 - 14.5 % Final   PLATELET 440   510 - 420 K/uL Final   MPV 12.6 Abnormally high   H  9.2 - 11.8 FL Final   NEUTROPHILS 65   40 - 73 % Final   LYMPHOCYTES 25   21 - 52 % Final   MONOCYTES 8   3 - 10 % Final EOSINOPHILS 1   0 - 5 % Final   BASOPHILS 1   0 - 2 % Final   ABS. NEUTROPHILS 4.0   1.8 - 8.0 K/UL Final   ABS. LYMPHOCYTES 1.5   0.9 - 3.6 K/UL Final   ABS. MONOCYTES 0.5   0.05 - 1.2 K/UL Final   ABS. EOSINOPHILS 0.1   0.0 - 0.4 K/UL Final   ABS. BASOPHILS 0.0   0.0 - 0.1 K/UL Final   DF AUTOMATED      Final   Lab and Collection     CBC WITH AUTOMATED DIFF (Order: [id number]) - 1/4/2019     FINDINGS:        FRACTURE:  None detected.        MALALIGNMENT:  None significant detected.        DEGENERATIVE CHANGES:  As before, there is marked ankle joint arthrosis with  bone on bone apposition at the tibiotalar joint and flattening of the dome of  the talus, as well as advanced hindfoot arthrosis. There is a small Achilles  and moderate calcaneal enthesophyte.        SOFT TISSUES:  Unremarkable.        MISCELLANEOUS:  Non-applicable.     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 PROCEDURE    Selective sharp instrument excisional debridement of slough and devitilized tissue    After the benefits/risks/SE were discussed, the patient agreed to proceed. Time out was done:   * Patient was identified by name and date of birth   * Agreement on procedure being performed was verified   * Procedure site verified and marked as necessary   * Patient was positioned for comfort   * Consent was signed and verified. Site: medial left ankle    Instruments used:    []  Dermal curette  [] Blade        [] #15  [] #10  [] Forceps  [] Tissue nippers  [] sterile scissors  [x] Other silver nitrate     Anesthesia:    []  EMLA 2.5% cream: applied to wound beds for approximately 15minutes.      []   Lidocaine 2% Topical Gel      []  Lidocaine injectable 1% with epinephrine 1:100,000    []  Lidocaine injectable 1% without epinephrine    []  Other:     [x]  None         [] patient is insensate due to neuropathy         [] patient declines        [] allergy to anesthetic        [x] tissue for debridement is either superficial, loosely adherent and/or necrotic and denervated     After satisfactory anesthesia achieved, the wound/s was/were sharply debrided necrotic, devitalized and granulation tissue down to the sub Q layer, revealing a clean and viable wound bed. Post debridement measurement was 1.2_x1.5_x0.2_cm . Bleeding: <5mL Resolved with light focal pressure. Wounds were cleaned and irrigated with saline. Wound care applied:   []   Hydrogell   []  Hypergel   []   Hydrofiber/Aquacel      []   Cadexomer Iodine (Iodosorb)   []  Silver Alginate    []   Medihoney:    []   Collagenase:Santyl   []  Calcium Alginate   []   Collagen:    [x]   FoamHydrofera blue   []  Non-Adherent Contact Layer   []   Xeroform    []   Adaptec:   []   Hydrocolloid   []   Transparent Film    []  Antibiotic ointment/cream     []   Other (see below)     Other:     []   Dry Gauze and Roll Gauze    []   Foam and Roll Gauze    []   Dry Gauze    []   Bordered gauze:     []   Secure with Tape    []   Bordered foam       []   Other      [x]   Compression Wrap:          []   Unna Boot    []Multi-Layer    [x]Tubular Bandages       Ligia-Ulcer Care    []   Cream     []   Lotion     []   Ointment     []   Barrier     []   Other:       The patient tolerated the procedure well with no complications. The patient left the exam room in satisfactory and stable condition.      Signed By: Marlen Fernandez DPM     February 7, 2019 10:10 AM

## 2019-02-08 ENCOUNTER — HOME CARE VISIT (OUTPATIENT)
Dept: SCHEDULING | Facility: HOME HEALTH | Age: 84
End: 2019-02-08
Payer: MEDICARE

## 2019-02-08 PROCEDURE — 3331090001 HH PPS REVENUE CREDIT

## 2019-02-08 PROCEDURE — 3331090002 HH PPS REVENUE DEBIT

## 2019-02-09 PROCEDURE — 3331090001 HH PPS REVENUE CREDIT

## 2019-02-09 PROCEDURE — 3331090002 HH PPS REVENUE DEBIT

## 2019-02-10 PROCEDURE — 3331090001 HH PPS REVENUE CREDIT

## 2019-02-10 PROCEDURE — 3331090002 HH PPS REVENUE DEBIT

## 2019-02-11 ENCOUNTER — HOME CARE VISIT (OUTPATIENT)
Dept: SCHEDULING | Facility: HOME HEALTH | Age: 84
End: 2019-02-11
Payer: MEDICARE

## 2019-02-11 PROCEDURE — 3331090002 HH PPS REVENUE DEBIT

## 2019-02-11 PROCEDURE — 3331090001 HH PPS REVENUE CREDIT

## 2019-02-11 PROCEDURE — G0300 HHS/HOSPICE OF LPN EA 15 MIN: HCPCS

## 2019-02-12 VITALS
SYSTOLIC BLOOD PRESSURE: 133 MMHG | RESPIRATION RATE: 20 BRPM | TEMPERATURE: 97.5 F | DIASTOLIC BLOOD PRESSURE: 67 MMHG | HEART RATE: 69 BPM | OXYGEN SATURATION: 97 %

## 2019-02-12 PROCEDURE — 3331090001 HH PPS REVENUE CREDIT

## 2019-02-12 PROCEDURE — 3331090002 HH PPS REVENUE DEBIT

## 2019-02-13 ENCOUNTER — HOME CARE VISIT (OUTPATIENT)
Dept: SCHEDULING | Facility: HOME HEALTH | Age: 84
End: 2019-02-13
Payer: MEDICARE

## 2019-02-13 PROCEDURE — G0300 HHS/HOSPICE OF LPN EA 15 MIN: HCPCS

## 2019-02-13 PROCEDURE — 3331090001 HH PPS REVENUE CREDIT

## 2019-02-13 PROCEDURE — 3331090002 HH PPS REVENUE DEBIT

## 2019-02-14 VITALS
OXYGEN SATURATION: 98 % | TEMPERATURE: 98.4 F | DIASTOLIC BLOOD PRESSURE: 63 MMHG | RESPIRATION RATE: 20 BRPM | HEART RATE: 52 BPM | SYSTOLIC BLOOD PRESSURE: 139 MMHG

## 2019-02-14 PROCEDURE — 3331090002 HH PPS REVENUE DEBIT

## 2019-02-14 PROCEDURE — 3331090001 HH PPS REVENUE CREDIT

## 2019-02-15 ENCOUNTER — HOME CARE VISIT (OUTPATIENT)
Dept: SCHEDULING | Facility: HOME HEALTH | Age: 84
End: 2019-02-15
Payer: MEDICARE

## 2019-02-15 PROCEDURE — G0300 HHS/HOSPICE OF LPN EA 15 MIN: HCPCS

## 2019-02-15 PROCEDURE — 3331090001 HH PPS REVENUE CREDIT

## 2019-02-15 PROCEDURE — 3331090002 HH PPS REVENUE DEBIT

## 2019-02-16 PROCEDURE — 3331090001 HH PPS REVENUE CREDIT

## 2019-02-16 PROCEDURE — 3331090002 HH PPS REVENUE DEBIT

## 2019-02-17 VITALS
OXYGEN SATURATION: 98 % | SYSTOLIC BLOOD PRESSURE: 151 MMHG | HEART RATE: 85 BPM | TEMPERATURE: 97.9 F | RESPIRATION RATE: 20 BRPM | DIASTOLIC BLOOD PRESSURE: 68 MMHG

## 2019-02-17 PROCEDURE — 3331090002 HH PPS REVENUE DEBIT

## 2019-02-17 PROCEDURE — 3331090001 HH PPS REVENUE CREDIT

## 2019-02-18 ENCOUNTER — HOME CARE VISIT (OUTPATIENT)
Dept: SCHEDULING | Facility: HOME HEALTH | Age: 84
End: 2019-02-18
Payer: MEDICARE

## 2019-02-18 VITALS
HEART RATE: 56 BPM | TEMPERATURE: 98.1 F | RESPIRATION RATE: 20 BRPM | OXYGEN SATURATION: 98 % | SYSTOLIC BLOOD PRESSURE: 129 MMHG | DIASTOLIC BLOOD PRESSURE: 65 MMHG

## 2019-02-18 PROCEDURE — 3331090002 HH PPS REVENUE DEBIT

## 2019-02-18 PROCEDURE — 3331090001 HH PPS REVENUE CREDIT

## 2019-02-18 PROCEDURE — G0300 HHS/HOSPICE OF LPN EA 15 MIN: HCPCS

## 2019-02-19 PROCEDURE — 3331090002 HH PPS REVENUE DEBIT

## 2019-02-19 PROCEDURE — 3331090001 HH PPS REVENUE CREDIT

## 2019-02-20 ENCOUNTER — HOME CARE VISIT (OUTPATIENT)
Dept: SCHEDULING | Facility: HOME HEALTH | Age: 84
End: 2019-02-20
Payer: MEDICARE

## 2019-02-20 PROCEDURE — G0300 HHS/HOSPICE OF LPN EA 15 MIN: HCPCS

## 2019-02-20 PROCEDURE — 3331090001 HH PPS REVENUE CREDIT

## 2019-02-20 PROCEDURE — 3331090002 HH PPS REVENUE DEBIT

## 2019-02-21 VITALS
RESPIRATION RATE: 20 BRPM | TEMPERATURE: 97.6 F | OXYGEN SATURATION: 98 % | HEART RATE: 69 BPM | SYSTOLIC BLOOD PRESSURE: 121 MMHG | DIASTOLIC BLOOD PRESSURE: 62 MMHG

## 2019-02-21 PROCEDURE — 3331090001 HH PPS REVENUE CREDIT

## 2019-02-21 PROCEDURE — 3331090002 HH PPS REVENUE DEBIT

## 2019-02-22 ENCOUNTER — HOME CARE VISIT (OUTPATIENT)
Dept: SCHEDULING | Facility: HOME HEALTH | Age: 84
End: 2019-02-22
Payer: MEDICARE

## 2019-02-22 PROCEDURE — G0300 HHS/HOSPICE OF LPN EA 15 MIN: HCPCS

## 2019-02-22 PROCEDURE — 3331090001 HH PPS REVENUE CREDIT

## 2019-02-22 PROCEDURE — 3331090002 HH PPS REVENUE DEBIT

## 2019-02-23 PROCEDURE — 3331090002 HH PPS REVENUE DEBIT

## 2019-02-23 PROCEDURE — 3331090001 HH PPS REVENUE CREDIT

## 2019-02-24 VITALS
TEMPERATURE: 98.7 F | SYSTOLIC BLOOD PRESSURE: 157 MMHG | RESPIRATION RATE: 18 BRPM | HEART RATE: 59 BPM | OXYGEN SATURATION: 99 % | DIASTOLIC BLOOD PRESSURE: 72 MMHG

## 2019-02-24 PROCEDURE — 3331090002 HH PPS REVENUE DEBIT

## 2019-02-24 PROCEDURE — 3331090001 HH PPS REVENUE CREDIT

## 2019-02-25 ENCOUNTER — HOME CARE VISIT (OUTPATIENT)
Dept: SCHEDULING | Facility: HOME HEALTH | Age: 84
End: 2019-02-25
Payer: MEDICARE

## 2019-02-25 PROCEDURE — 3331090002 HH PPS REVENUE DEBIT

## 2019-02-25 PROCEDURE — A6209 FOAM DRSG <=16 SQ IN W/O BDR: HCPCS

## 2019-02-25 PROCEDURE — G0300 HHS/HOSPICE OF LPN EA 15 MIN: HCPCS

## 2019-02-25 PROCEDURE — 3331090001 HH PPS REVENUE CREDIT

## 2019-02-26 VITALS
OXYGEN SATURATION: 96 % | DIASTOLIC BLOOD PRESSURE: 63 MMHG | TEMPERATURE: 98.1 F | SYSTOLIC BLOOD PRESSURE: 121 MMHG | RESPIRATION RATE: 18 BRPM | HEART RATE: 70 BPM

## 2019-02-26 PROCEDURE — 3331090001 HH PPS REVENUE CREDIT

## 2019-02-26 PROCEDURE — 3331090002 HH PPS REVENUE DEBIT

## 2019-02-27 ENCOUNTER — HOME CARE VISIT (OUTPATIENT)
Dept: SCHEDULING | Facility: HOME HEALTH | Age: 84
End: 2019-02-27
Payer: MEDICARE

## 2019-02-27 PROCEDURE — 3331090001 HH PPS REVENUE CREDIT

## 2019-02-27 PROCEDURE — 3331090002 HH PPS REVENUE DEBIT

## 2019-02-28 PROCEDURE — 3331090002 HH PPS REVENUE DEBIT

## 2019-02-28 PROCEDURE — 3331090001 HH PPS REVENUE CREDIT

## 2019-03-01 ENCOUNTER — HOME CARE VISIT (OUTPATIENT)
Dept: SCHEDULING | Facility: HOME HEALTH | Age: 84
End: 2019-03-01
Payer: MEDICARE

## 2019-03-01 PROCEDURE — 3331090002 HH PPS REVENUE DEBIT

## 2019-03-01 PROCEDURE — 3331090001 HH PPS REVENUE CREDIT

## 2019-03-01 PROCEDURE — G0300 HHS/HOSPICE OF LPN EA 15 MIN: HCPCS

## 2019-03-02 VITALS
SYSTOLIC BLOOD PRESSURE: 119 MMHG | HEART RATE: 73 BPM | OXYGEN SATURATION: 98 % | RESPIRATION RATE: 20 BRPM | DIASTOLIC BLOOD PRESSURE: 63 MMHG | TEMPERATURE: 96.5 F

## 2019-03-02 PROCEDURE — 3331090002 HH PPS REVENUE DEBIT

## 2019-03-02 PROCEDURE — 3331090001 HH PPS REVENUE CREDIT

## 2019-03-03 PROCEDURE — 3331090001 HH PPS REVENUE CREDIT

## 2019-03-03 PROCEDURE — 3331090002 HH PPS REVENUE DEBIT

## 2019-03-04 ENCOUNTER — HOME CARE VISIT (OUTPATIENT)
Dept: SCHEDULING | Facility: HOME HEALTH | Age: 84
End: 2019-03-04
Payer: MEDICARE

## 2019-03-04 PROCEDURE — G0300 HHS/HOSPICE OF LPN EA 15 MIN: HCPCS

## 2019-03-04 PROCEDURE — 3331090001 HH PPS REVENUE CREDIT

## 2019-03-04 PROCEDURE — 3331090002 HH PPS REVENUE DEBIT

## 2019-03-05 VITALS
RESPIRATION RATE: 18 BRPM | SYSTOLIC BLOOD PRESSURE: 127 MMHG | OXYGEN SATURATION: 98 % | HEART RATE: 67 BPM | TEMPERATURE: 97.6 F | DIASTOLIC BLOOD PRESSURE: 62 MMHG

## 2019-03-05 PROCEDURE — 3331090001 HH PPS REVENUE CREDIT

## 2019-03-05 PROCEDURE — 3331090002 HH PPS REVENUE DEBIT

## 2019-03-06 ENCOUNTER — HOME CARE VISIT (OUTPATIENT)
Dept: SCHEDULING | Facility: HOME HEALTH | Age: 84
End: 2019-03-06
Payer: MEDICARE

## 2019-03-06 PROCEDURE — 3331090002 HH PPS REVENUE DEBIT

## 2019-03-06 PROCEDURE — 3331090001 HH PPS REVENUE CREDIT

## 2019-03-07 ENCOUNTER — HOSPITAL ENCOUNTER (OUTPATIENT)
Dept: WOUND CARE | Age: 84
Discharge: HOME | End: 2019-03-07
Payer: MEDICARE

## 2019-03-07 VITALS
DIASTOLIC BLOOD PRESSURE: 59 MMHG | OXYGEN SATURATION: 95 % | RESPIRATION RATE: 18 BRPM | SYSTOLIC BLOOD PRESSURE: 143 MMHG | HEART RATE: 55 BPM | TEMPERATURE: 96.9 F

## 2019-03-07 PROCEDURE — 3331090001 HH PPS REVENUE CREDIT

## 2019-03-07 PROCEDURE — 77030011256 HC DRSG MEPILEX <16IN NO BORD MOLN -A: Performed by: PODIATRIST

## 2019-03-07 PROCEDURE — 77030028895 HC GEL MEDIH TU INLC -A: Performed by: PODIATRIST

## 2019-03-07 PROCEDURE — 74011000250 HC RX REV CODE- 250: Performed by: PODIATRIST

## 2019-03-07 PROCEDURE — 3331090002 HH PPS REVENUE DEBIT

## 2019-03-07 PROCEDURE — 11042 DBRDMT SUBQ TIS 1ST 20SQCM/<: CPT

## 2019-03-07 PROCEDURE — 77030019604 HC DRSG WND CA ALG S&N -A: Performed by: PODIATRIST

## 2019-03-07 RX ORDER — LIDOCAINE HYDROCHLORIDE AND EPINEPHRINE 10; 10 MG/ML; UG/ML
1-10 INJECTION, SOLUTION INFILTRATION; PERINEURAL
Status: ACTIVE | OUTPATIENT
Start: 2019-03-07 | End: 2019-03-08

## 2019-03-07 RX ORDER — EPINEPHRINE 1 MG/ML
1 INJECTION, SOLUTION, CONCENTRATE INTRAVENOUS
Status: ACTIVE | OUTPATIENT
Start: 2019-03-07 | End: 2019-03-08

## 2019-03-07 RX ORDER — TRIAMCINOLONE ACETONIDE 5 MG/G
CREAM TOPICAL DAILY PRN
Status: DISCONTINUED | OUTPATIENT
Start: 2019-03-07 | End: 2019-03-11 | Stop reason: HOSPADM

## 2019-03-07 RX ORDER — MAGNESIUM SULFATE 100 %
16 CRYSTALS MISCELLANEOUS AS NEEDED
Status: DISCONTINUED | OUTPATIENT
Start: 2019-03-07 | End: 2019-03-11 | Stop reason: HOSPADM

## 2019-03-07 RX ORDER — DIPHENHYDRAMINE HYDROCHLORIDE 50 MG/ML
25 INJECTION, SOLUTION INTRAMUSCULAR; INTRAVENOUS
Status: ACTIVE | OUTPATIENT
Start: 2019-03-07 | End: 2019-03-08

## 2019-03-07 RX ORDER — MICONAZOLE NITRATE 20 MG/G
CREAM TOPICAL DAILY PRN
Status: DISCONTINUED | OUTPATIENT
Start: 2019-03-07 | End: 2019-03-11 | Stop reason: HOSPADM

## 2019-03-07 RX ORDER — LIDOCAINE AND PRILOCAINE 25; 25 MG/G; MG/G
CREAM TOPICAL AS NEEDED
Status: DISCONTINUED | OUTPATIENT
Start: 2019-03-07 | End: 2019-03-11 | Stop reason: HOSPADM

## 2019-03-07 RX ORDER — SILVER SULFADIAZINE 10 G/1000G
CREAM TOPICAL DAILY PRN
Status: DISCONTINUED | OUTPATIENT
Start: 2019-03-07 | End: 2019-03-11 | Stop reason: HOSPADM

## 2019-03-07 RX ORDER — LIDOCAINE HYDROCHLORIDE 20 MG/ML
JELLY TOPICAL AS NEEDED
Status: DISCONTINUED | OUTPATIENT
Start: 2019-03-07 | End: 2019-03-11 | Stop reason: HOSPADM

## 2019-03-07 RX ORDER — MUPIROCIN CALCIUM 20 MG/G
CREAM TOPICAL DAILY PRN
Status: DISCONTINUED | OUTPATIENT
Start: 2019-03-07 | End: 2019-03-11 | Stop reason: HOSPADM

## 2019-03-07 RX ORDER — LIDOCAINE HYDROCHLORIDE 40 MG/ML
SOLUTION TOPICAL AS NEEDED
Status: DISCONTINUED | OUTPATIENT
Start: 2019-03-07 | End: 2019-03-11 | Stop reason: HOSPADM

## 2019-03-07 RX ORDER — SILVER NITRATE 38.21; 12.74 MG/1; MG/1
1 STICK TOPICAL
Status: ACTIVE | OUTPATIENT
Start: 2019-03-07 | End: 2019-03-08

## 2019-03-07 RX ORDER — LIDOCAINE HYDROCHLORIDE 10 MG/ML
1-10 INJECTION INFILTRATION; PERINEURAL
Status: ACTIVE | OUTPATIENT
Start: 2019-03-07 | End: 2019-03-08

## 2019-03-07 RX ORDER — BACITRACIN 500 UNIT/G
1 PACKET (EA) TOPICAL DAILY
Status: DISCONTINUED | OUTPATIENT
Start: 2019-03-07 | End: 2019-03-11 | Stop reason: HOSPADM

## 2019-03-07 RX ORDER — ACETAMINOPHEN 325 MG/1
650 TABLET ORAL
Status: DISCONTINUED | OUTPATIENT
Start: 2019-03-07 | End: 2019-03-11 | Stop reason: HOSPADM

## 2019-03-07 RX ORDER — OXYMETAZOLINE HCL 0.05 %
2 SPRAY, NON-AEROSOL (ML) NASAL
Status: DISCONTINUED | OUTPATIENT
Start: 2019-03-07 | End: 2019-03-11 | Stop reason: HOSPADM

## 2019-03-07 RX ORDER — NITROGLYCERIN 0.4 MG/1
0.4 TABLET SUBLINGUAL AS NEEDED
Status: DISCONTINUED | OUTPATIENT
Start: 2019-03-07 | End: 2019-03-11 | Stop reason: HOSPADM

## 2019-03-07 RX ADMIN — LIDOCAINE AND PRILOCAINE 5 G: 25; 25 CREAM TOPICAL at 09:36

## 2019-03-07 NOTE — WOUND CARE
Patient here for follow up appointment with Dr. Mora Schmidt for left medial ankle ulcer. After debridement by Dr. Mora Schmidt, wound dressing changed to Medihoney, alginate, and foam.  She will return in 4 weeks for follow up. Home care orders faxed.            03/07/19 0919   Wound Ankle Left;Medial;Proximal   Date First Assessed: 11/16/17   POA: Yes  Wound Type: Vascular  Location: Ankle  Orientation: Left;Medial;Proximal   Dressing Status  Removed   Dressing Type  Other (Comment)  (hypfix,foam,Hydrofera Blue)   Non-Pressure Injury Full thickness (subcut/muscle)   Wound Length (cm) 0.8 cm   Wound Width (cm) 0.6 cm   Wound Depth (cm) 0.2   Wound Surface area (cm^2) 0.48 cm^2   Change in Wound Size % 44.83   Condition of Base Slough   Condition of Edges Open   Tissue Type Pink;Yellow   Tissue Type Percent Pink 50   Tissue Type Percent Yellow 50   Drainage Amount  Small    Drainage Color Serosanguinous   Wound Odor None   Periwound Skin Condition Erythema, blanchable   Cleansing and Cleansing Agents  Dermal wound cleanser   Dressing Type Applied Other (Comment)  (Medihoney, alginate, border foam.Triamsinalone at home)   Procedure Tolerated Well

## 2019-03-07 NOTE — DISCHARGE INSTRUCTIONS
Wound Care Instructions    Patient: Alberto Valle MRN: [id number]  SSN: [id number]     YOB: 1935  Age:83 y.o. Sex: female       Ms. Ely Higgins. DPM recommends the following discharge instruction:    Offloading    []   Felt/Foam Offloading   [] Removable Cast Waker       []   Wedge Shoe   []  Wheelchair     []   Total Contact Cast   []  Surgical Shoe     []   Crutches        Other   Mattress:   Other:     Edema Control    []   Elevated legs as much as possible. Recommend above level of heart.     []   Layered Wraps             []   Left      []   Right      []  BILATERAL          - Type:            []   Unna Boot       []  Multi-Layer                                   []   Two Layers     []  Three Layers   []  Four Layers     []   Tubular Bandages        []   Left      []   Right     SIZE:      []   Stockings                      []   Left      []   Right     []   Compression Pump     []   Left      []   Right    ___ millimeters of mercury  ___ millimeters of mercury for ___ minutes for ___ day(s)        Other:       Nutritional Supplements    [x]  Multi-Vitamin     []  Other:       Select One     [x]  Home Health: [facility]     []  Long Term Care:      []  DME:     Consult    []   Nutrition     []   Vascular     []   Orthotist/Pedorthotist     []   Infectious Disease     []   Lymphedema Therapist   Other:     Dressings:  Another brand of generically equivalent product may be dispensed unless specifically ordered otherwise.     Home Ulcer/wound Hygiene (cleanse with)      []   Distilled Water     []   Normal Saline     [x]   Wound Cleanser     []   Mild antimicrobial soap and water     []   Chlorhexidine liquid soap and water     []   Other:     Apply    []   Hydrogel   []  Hypergel   []   Aquacel Ag     []   Cadexomer Iodine                     (Iodosorb)   []  Silver Alginate    [x]   Medihoney:     []   Collagenase:Santyl   [x]  Calcium Alginate   []   Collagen:     []   Plain Foam   [] Non-Adherent Contact                Layer   []   Xeroform     []   Silver foam   []   Hydrocolloid        []   Acticoat   []   Adaptic:      Other/Specific Instructions:    Cover With    []   Dry Gauze and Roll Gauze     []   Foam and Roll Gauze     []   Dry Gauze     []   Bordered gauze:     [x]   Foam      [x]    Bordered         []   Nonbordered     []   Secure with Tape     []   Other       Ligia-Ulcer Care    []   Cream     []   Lotion     []   Ointment     []   Barrier     [x]   Other:Triamcinolone cream with each dressing change and prn     Change Dressing    []   Once Daily     []   Every Other Day     []   Every 3 Days       []   Every 5 Days     []   Every 7 Days     []   Do Not Change       []   2 x per week (Mon and Thurs. )     [x]   3 x per week (Mon, Wed, Fri. )     []   Other          Follow-Up:   [x]  Follow up 4 weeks                             []  As Needed (PRN)     [x]   Douglas Abarca MD    []  Thu Alex DPM    []  Pedro Hickman DPM   []   Patito Chang DPM      []   Nurse Visit            Please call the wound clinic ([phone]) regarding any questions or concerns.

## 2019-03-07 NOTE — PROGRESS NOTES
Progress and Debridement Note    Patient: Linus Brand MRN: [id number]  SSN: [id number]    YOB: 1935  Age: 81 y.o. 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 Will change to Medihoney alginate and mepilex Q 48 hrs. Continue Triamicinolone cream to the periwound with Q48 hrs. Subjective:     Ms Lenard Diaz is a 82 y/o female who present with family  For chronic ulcer left ankle.  s/p Theraskin application 92/00, 84/57/23, Patient states she vascular intervention on 01/30/19        Objective:     Patient Vitals for the past 24 hrs:   Temp Pulse Resp BP SpO2   03/07/19 0918 96.9 °F (36.1 °C) (!) 55 18 143/59 95 %       Physical Exam:     General Exam  alert, cooperative, no distress, appears stated age    REVIEW OF SYSTEMS:  General: denies chronic fatigue, weight loss, fever, anemia, bruising, depression, nervousness, panic attacks  HEENT: denies ringing in ears, ear infections, dizzy spells, poor vision, glaucoma, sinus trouble, hoarseness, eye infections  GI: denies diarrhea, gas, bloating, heartburn, regurgitation, difficulty swallowing, painful swallowing, nausea, vomiting, constipation, abdominal pain, decreased appetite, blood in stools, black stools, jaundice, dark urine  Lungs: denies pneumonia, asthma, cough, SOB, hemoptysis  Heart: denies chest pain, irregular heart beat, ankle swelling, HTN  Skin: denies rashes, hives, allergic reaction  Urinary: denies UTI, kidney stones, decreased urine force and flow, urination at night, blood in urine, painful urination  Bones and Joints: denies arthritis, rheumatism, back pain, gout, osteoporosis  Neurologic: denies stroke, seizures, headaches, numbness, tingling       VASCULAR EXAM:. Pedal pulses are  0/4 DP and 0/4 PT right and left foot. Skin temperature is warm to warm right and left foot. Digital capillary fill time is 4 seconds right and left foot. There is mild edema of the right and left foot and ankle. Pedal hair is not noted bilateral       NEUROLOGICAL EXAM:. Sensation Intact with 5.07g monofilament wire right and left foot.  Deep tendon reflexes intact and symmetrical on the right and left foot.      MUSCULOSKELETAL EXAM:. Muscle tone is normal.  Muscle strength of the flexor and extensor group inversion and eversion Bilateral. 4/5.         DERMATOLOGICAL EXAM:. Skin is of abnormal texture and turgor with atrophic skin changes noting decrease hair growth, nail changes (thickening), Bilateral. There is epithelialized lesions dorsal and medial forefoot left foot.      Subhyperkeratotic lesion noted over the medial aspect of the left ankle with decrease in measurements as stated below There is decrease erythema and warmth with palpation, no asc lymphagitis        Wound Ankle Left;Medial;Proximal (Active)   Dressing Status  Removed 2/7/2019  9:45 AM   Dressing Type  Other (Comment) 1/22/2019 11:55 AM   Non-Pressure Injury Full thickness (subcut/muscle) 2/7/2019  9:45 AM   Wound Length (cm) 0.8 cm 3/7/2019  9:19 AM   Wound Width (cm) 0.6 cm 3/7/2019  9:19 AM   Wound Depth (cm) 0.2 3/7/2019  9:19 AM   Wound Surface area (cm^2) 0.48 cm^2 3/7/2019  9:19 AM   Change in Wound Size % 44.83 3/7/2019  9:19 AM   Condition of Base [facility] 3/7/2019  9:19 AM   Condition of Edges Open 3/7/2019  9:19 AM   Tissue Type Pink;Yellow 3/7/2019  9:19 AM   Tissue Type Percent Pink 50 3/7/2019  9:19 AM   Tissue Type Percent Red 50 2/7/2019  9:45 AM   Tissue Type Percent Yellow 50 3/7/2019  9:19 AM   Drainage Amount  Small 3/7/2019  9:19 AM   Drainage Color Serosanguinous 3/7/2019  9:19 AM   Wound Odor None 3/7/2019  9:19 AM   Periwound Skin Condition Erythema, blanchable 3/7/2019  9:19 AM   Cleansing and Cleansing Agents  Dermal wound cleanser 3/7/2019  9:19 AM   Dressing Type Applied Other (Comment) 2/7/2019 12:22 PM   Wound Procedure Type Skin Substitue 12/20/2018 11:16 AM   Procedure Time Out 1116 12/20/2018 11:16 AM   Consent Obtained  Yes 12/20/2018 11:16 AM   Procedure Bleeding No 11/15/2018 11:36 AM   Procedure Pain Scale Numeric 3/10 12/20/2018 11:16 AM   Skin Substitute Applied  Other 12/20/2018 11:16 AM   Post Procedure Procedure Pain Scale Numeric 3/10 11/15/2018 11:36 AM   Procedure Tolerated Well 2/7/2019 12:22 PM   Number of days: 336       [REMOVED] Wound Ankle Left (Removed)   Number of days: 6221       [REMOVED] Wound Ankle Left (Removed)   Number of days: 5677       [REMOVED] Wound Ankle Left (Removed)   Number of days: 0665       [REMOVED] Wound Ankle Left;Lateral (Removed)   Number of days: 470       [REMOVED] Wound Ankle Right (Removed)   Dressing Status  Removed 12/21/2017  9:42 AM   Dressing Type  Other (Comment) 12/21/2017  9:42 AM   Non-Pressure Injury Partial thickness (epider/derm) 12/21/2017  9:42 AM   Wound Length (cm) 0.7 cm 12/21/2017  9:42 AM   Wound Width (cm) 0.8 cm 12/21/2017  9:42 AM   Wound Depth (cm) 0.1 12/21/2017  9:42 AM   Wound Surface area (cm^2) 0.06 cm^2 12/21/2017  9:42 AM   Condition of Base [facility] 12/21/2017  9:42 AM   Condition of Edges Open 12/21/2017  9:42 AM   Tissue Type Yellow 12/21/2017  9:42 AM   Tissue Type Percent Red 50 11/16/2017 10:08 AM   Tissue Type Percent Yellow 100 12/21/2017  9:42 AM   Drainage Amount  Moderate 12/21/2017  9:42 AM   Drainage Color Serous 12/21/2017  9:42 AM   Wound Odor None 12/21/2017  9:42 AM   Periwound Skin Condition Erythema, blanchable 12/21/2017  9:42 AM   Cleansing and Cleansing Agents  Dermal wound cleanser; Soap and water 12/21/2017  9:42 AM Dressing Type Applied Other (Comment) 12/21/2017  9:42 AM   Procedure Tolerated Well 12/21/2017  9:42 AM   Number of days: 470       [REMOVED] Wound Ankle Lateral;Right (Removed)   Number of days: 215       [REMOVED] Wound Ankle Left;Lateral (Removed)   Number of days: 215       [REMOVED] Wound Ankle Left;Medial (Removed)   Dressing Status  Removed 6/21/2018  8:46 AM   Dressing Type  Other (Comment) 6/21/2018  8:46 AM   Non-Pressure Injury Full thickness (subcut/muscle) 6/21/2018  8:46 AM   Wound Length (cm) 1.4 cm 6/21/2018  8:46 AM   Wound Width (cm) 0.6 cm 6/21/2018  8:46 AM   Wound Depth (cm) 0.2 6/21/2018  8:46 AM   Wound Surface area (cm^2) 0.84 cm^2 6/21/2018  8:46 AM   Condition of Base [place];Slough 6/21/2018  8:46 AM   Condition of Edges Open 6/21/2018  8:46 AM   Tissue Type Pink;Yellow 6/21/2018  8:46 AM   Tissue Type Percent Pink 20 6/21/2018  8:46 AM   Tissue Type Percent Red 80 5/31/2018  8:29 AM   Tissue Type Percent Yellow 80 6/21/2018  8:46 AM   Drainage Amount  Small  6/21/2018  8:46 AM   Drainage Color Serosanguinous 6/21/2018  8:46 AM   Wound Odor None 6/21/2018  8:46 AM   Periwound Skin Condition Edematous; Erythema, blanchable 6/21/2018  8:46 AM   Cleansing and Cleansing Agents  Dermal wound cleanser 6/21/2018  8:46 AM   Dressing Type Applied Other (Comment) 6/21/2018  8:46 AM   Procedure Tolerated Well 6/21/2018  8:46 AM   Number of days: 229       [REMOVED] Wound Ankle Left;Medial;Distal (Removed)   Dressing Status  Removed 6/21/2018  8:46 AM   Dressing Type  Other (Comment) 6/21/2018  8:46 AM   Non-Pressure Injury Full thickness (subcut/muscle) 6/21/2018  8:46 AM   Wound Length (cm) 1.1 cm 6/21/2018  8:46 AM   Wound Width (cm) 0.6 cm 6/21/2018  8:46 AM   Wound Depth (cm) 0.2 6/21/2018  8:46 AM   Wound Surface area (cm^2) 0.66 cm^2 6/21/2018  8:46 AM   Condition of Base [place];Slough 6/21/2018  8:46 AM   Condition of Edges Open 6/21/2018  8:46 AM   Epithelialization (%) 100 5/17/2018  8:44 AM Tissue Type Pink;Yellow 6/21/2018  8:46 AM   Tissue Type Percent Pink 50 6/21/2018  8:46 AM   Tissue Type Percent Yellow 50 6/21/2018  8:46 AM   Tissue Type Percent Other (comment) 40 2/15/2018 10:52 AM   Drainage Amount  Scant 6/21/2018  8:46 AM   Drainage Color Serosanguinous 6/21/2018  8:46 AM   Wound Odor None 6/21/2018  8:46 AM   Periwound Skin Condition Edematous; Erythema, blanchable;Petechiae 6/21/2018  8:46 AM   Cleansing and Cleansing Agents  Dermal wound cleanser 6/21/2018  8:46 AM   Dressing Type Applied Other (Comment) 6/21/2018  8:46 AM   Procedure Tolerated Well 6/21/2018  8:46 AM   Number of days: 332       [REMOVED] Wound Toe Left (Removed)   Dressing Type  Open to air 12/7/2017  9:56 AM   Non-Pressure Injury Partial thickness (epider/derm) 11/16/2017 10:08 AM   Wound Length (cm) 0.6 cm 11/16/2017 10:08 AM   Wound Width (cm) 1.4 cm 11/16/2017 10:08 AM   Wound Depth (cm) 0.1 11/16/2017 10:08 AM   Wound Surface area (cm^2) 0.08 cm^2 11/16/2017 10:08 AM   Condition of Base Other (comment) 11/16/2017 10:08 AM   Tissue Type Yellow 11/16/2017 10:08 AM   Tissue Type Percent Yellow 100 11/16/2017 10:08 AM   Drainage Amount  None 11/16/2017 10:08 AM   Wound Odor None 11/16/2017 10:08 AM   Cleansing and Cleansing Agents  Dermal wound cleanser 11/16/2017 10:08 AM   Dressing Type Applied Open to air 11/16/2017 10:08 AM   Number of days: 207       [REMOVED] Wound Toe Left (Removed)   Dressing Status  Removed 11/16/2017 10:08 AM   Dressing Type  Open to air 12/7/2017  9:56 AM   Non-Pressure Injury Partial thickness (epider/derm) 11/16/2017 10:08 AM   Wound Length (cm) 0.3 cm 11/16/2017 10:08 AM   Wound Width (cm) 0.4 cm 11/16/2017 10:08 AM   Wound Depth (cm) 0.1 11/16/2017 10:08 AM   Wound Surface area (cm^2) 0.01 cm^2 11/16/2017 10:08 AM   Condition of Base Other (comment) 11/16/2017 10:08 AM   Tissue Type Yellow 11/16/2017 10:08 AM   Tissue Type Percent Yellow 100 11/16/2017 10:08 AM   Drainage Amount  None 11/16/2017 10:08 AM   Wound Odor None 11/16/2017 10:08 AM   Periwound Skin Condition Intact 11/16/2017 10:08 AM   Dressing Type Applied Open to air 11/16/2017 10:08 AM   Number of days: 207       [REMOVED] Wound Ankle Left;Lateral (Removed)   Dressing Status  Removed 12/21/2017  9:42 AM   Dressing Type  Other (Comment) 12/21/2017  9:42 AM   Non-Pressure Injury Partial thickness (epider/derm) 12/21/2017  9:42 AM   Wound Length (cm) 1.1 cm 12/21/2017  9:42 AM   Wound Width (cm) 0.9 cm 12/21/2017  9:42 AM   Wound Depth (cm) 0.1 12/21/2017  9:42 AM   Wound Surface area (cm^2) 0.1 cm^2 12/21/2017  9:42 AM   Condition of Base [place];Slough 12/21/2017  9:42 AM   Condition of Edges Open 12/21/2017  9:42 AM   Tissue Type Pink;Yellow 12/21/2017  9:42 AM   Tissue Type Percent Pink 10 11/16/2017 10:08 AM   Tissue Type Percent Red 50 12/21/2017  9:42 AM   Tissue Type Percent Yellow 50 12/21/2017  9:42 AM   Drainage Amount  Moderate 12/21/2017  9:42 AM   Drainage Color Serous 12/21/2017  9:42 AM   Wound Odor None 12/21/2017  9:42 AM   Periwound Skin Condition Erythema, blanchable;Edematous 12/21/2017  9:42 AM   Cleansing and Cleansing Agents  Dermal wound cleanser; Soap and water 12/21/2017  9:42 AM   Dressing Type Applied Other (Comment) 12/21/2017  9:42 AM   Procedure Tolerated Well 12/21/2017  9:42 AM   Number of days: 332       [REMOVED] Wound Foot Left;Medial;Distal (Removed)   Dressing Status  Removed 11/1/2018  9:15 AM   Dressing Type  Other (Comment) 7/5/2018 10:27 AM   Non-Pressure Injury Full thickness (subcut/muscle) 7/5/2018 10:27 AM   Wound Length (cm) 0.3 cm 11/1/2018  9:15 AM   Wound Width (cm) 0.5 cm 11/1/2018  9:15 AM   Wound Depth (cm) 0.3 7/5/2018 10:27 AM   Wound Surface area (cm^2) 0.15 cm^2 11/1/2018  9:15 AM   Change in Wound Size % 25 11/1/2018  9:15 AM   Condition of Base [facility] 7/5/2018 10:27 AM   Condition of Edges Open 7/5/2018 10:27 AM   Tissue Type Pink;Yellow 7/5/2018 10:27 AM   Tissue Type Percent Pink 10 7/5/2018 10:27 AM   Tissue Type Percent Red 100 9/20/2018  8:13 AM   Tissue Type Percent Yellow 100 11/1/2018  9:15 AM   Drainage Amount  Large 7/5/2018 10:27 AM   Drainage Color Serosanguinous 7/5/2018 10:27 AM   Wound Odor None 7/5/2018 10:27 AM   Periwound Skin Condition Erythema, blanchable 7/5/2018 10:27 AM   Cleansing and Cleansing Agents  Normal saline 7/5/2018 10:27 AM   Dressing Type Applied Other (Comment) 7/5/2018 10:27 AM   Wound Procedure Type Skin Substitue 11/1/2018 10:05 AM   Procedure Time Out 1000 11/1/2018 10:05 AM   Consent Obtained  Yes 11/1/2018 10:05 AM   Skin Substitute Applied  Other 11/1/2018 10:05 AM   Procedure Tolerated Well 7/5/2018 10:27 AM   Number of days: 287       [REMOVED] Wound Leg Lower Right; Anterior; Lateral (Removed)   Dressing Type  Open to air 2/15/2018 11:36 AM   Non-Pressure Injury Partial thickness (epider/derm) 2/1/2018  1:05 PM   Wound Length (cm) 0.9 cm 2/1/2018  1:05 PM   Wound Width (cm) 0.6 cm 2/1/2018  1:05 PM   Wound Depth (cm) 0.1 2/1/2018  1:05 PM   Wound Surface area (cm^2) 0.54 cm^2 2/1/2018  1:05 PM   Condition of Base [place] 2/1/2018  1:05 PM   Condition of Edges Open 2/1/2018  1:05 PM   Tissue Type Red 2/1/2018  1:05 PM   Tissue Type Percent Red 100 2/1/2018  1:05 PM   Drainage Amount  Scant 2/1/2018  1:05 PM   Drainage Color Serosanguinous 2/1/2018  1:05 PM   Wound Odor None 2/1/2018  1:05 PM   Periwound Skin Condition Edematous; Erythema, blanchable 2/1/2018  1:05 PM   Cleansing and Cleansing Agents  Dermal wound cleanser 2/1/2018  1:05 PM   Dressing Type Applied Other (Comment) 2/1/2018  1:05 PM   Procedure Tolerated Well 2/1/2018  1:05 PM   Number of days: 73       [REMOVED] Wound Leg Lower Anterior;Left;Medial (Removed)   Number of days: 328       [REMOVED] Wound Foot Left;Dorsal (Removed)   Dressing Status  Removed 7/5/2018 10:27 AM   Dressing Type  Gauze 7/5/2018 10:27 AM   Non-Pressure Injury Full thickness (subcut/muscle) 7/5/2018 10:27 AM   Wound Length (cm) 0 cm 9/20/2018  8:13 AM   Wound Width (cm) 0 cm 9/20/2018  8:13 AM   Wound Depth (cm) 0.3 7/5/2018 10:27 AM   Wound Surface area (cm^2) 0 cm^2 9/20/2018  8:13 AM   Change in Wound Size % 100 9/20/2018  8:13 AM   Condition of Base [place] 7/5/2018 10:27 AM   Condition of Edges Open 6/28/2018  8:53 AM   Tissue Type Pink;Yellow 7/5/2018 10:27 AM   Tissue Type Percent Pink 75 7/5/2018 10:27 AM   Tissue Type Percent Red 25 7/5/2018 10:27 AM   Tissue Type Percent Yellow 50 6/28/2018  8:53 AM   Drainage Amount  Small  7/5/2018 10:27 AM   Drainage Color Serous 7/5/2018 10:27 AM   Wound Odor None 6/28/2018  8:53 AM   Periwound Skin Condition Erythema, blanchable 7/5/2018 10:27 AM   Cleansing and Cleansing Agents  Normal saline 7/5/2018 10:27 AM   Dressing Type Applied Other (Comment) 7/5/2018 10:27 AM   Procedure Tolerated Well 7/5/2018 10:27 AM   Number of days: 102       [REMOVED] Wound Ankle Lateral (Removed)   Number of days: 4       [REMOVED] Wound Arm Left;Right (Removed)   Number of days: 4       [REMOVED] Wound Ankle Left;Medial (Removed)   Number of days: 27          Lab/Data Review:  No results found for this or any previous visit (from the past 24 hour(s)). none      PROCEDURE    Selective sharp instrument excisional debridement of slough and devitilized tissue    After the benefits/risks/SE were discussed, the patient agreed to proceed. Time out was done:   * Patient was identified by name and date of birth   * Agreement on procedure being performed was verified   * Procedure site verified and marked as necessary   * Patient was positioned for comfort   * Consent was signed and verified. Site: medial left ankle    Instruments used:    [x]  Dermal curette  [] Blade        [] #15  [] #10  [] Forceps  [] Tissue nippers  [] sterile scissors  [] Other     Anesthesia:    []  EMLA 2.5% cream: applied to wound beds for approximately 15minutes.      []   Lidocaine 2% Topical Gel []  Lidocaine injectable 1% with epinephrine 1:100,000    []  Lidocaine injectable 1% without epinephrine    []  Other:     []  None         [] patient is insensate due to neuropathy         [] patient declines        [] allergy to anesthetic        [] tissue for debridement is either superficial, loosely adherent and/or necrotic and denervated     After satisfactory anesthesia achieved, the wound/s was/were sharply debrided necrotic, devitalized and granulation tissue down to the sub Q layer, revealing a clean and viable wound bed. Post debridement measurement was 1.0 x_0.8x_0.2__cm . Bleeding: <5mL Resolved with light focal pressure. Wounds were cleaned and irrigated with saline. Wound care applied:   []   Hydrogell   []  Hypergel   []   Hydrofiber/Aquacel      []   Cadexomer Iodine (Iodosorb)   []  Silver Alginate    [x]   Medihoney:    []   Collagenase:Santyl   [x]  Calcium Alginate   []   Collagen:    []   Foam   []  Non-Adherent Contact Layer   []   Xeroform    []   Adaptec:   []   Hydrocolloid   []   Transparent Film    []  Antibiotic ointment/cream     []   Other (see below)     Other:     []   Dry Gauze and Roll Gauze    []   Foam and Roll Gauze    []   Dry Gauze    []   Bordered gauze:     []   Secure with Tape    [x]   Bordered foam       []   Other      []   Compression Wrap:          []   Unna Boot    []Multi-Layer    []Tubular Bandages       Ligia-Ulcer Care    [x]   Cream triamcinolone cream     []   Lotion     []   Ointment     []   Barrier     []   Other:       The patient tolerated the procedure well with no complications. The patient left the exam room in satisfactory and stable condition.      Signed By: Jordy Graves DPM     March 7, 2019 9:43 AM

## 2019-03-08 ENCOUNTER — HOME CARE VISIT (OUTPATIENT)
Dept: SCHEDULING | Facility: HOME HEALTH | Age: 84
End: 2019-03-08
Payer: MEDICARE

## 2019-03-08 PROCEDURE — 3331090002 HH PPS REVENUE DEBIT

## 2019-03-08 PROCEDURE — 3331090001 HH PPS REVENUE CREDIT

## 2019-03-09 PROCEDURE — 3331090002 HH PPS REVENUE DEBIT

## 2019-03-09 PROCEDURE — 3331090001 HH PPS REVENUE CREDIT

## 2019-03-10 PROCEDURE — 3331090002 HH PPS REVENUE DEBIT

## 2019-03-10 PROCEDURE — 3331090001 HH PPS REVENUE CREDIT

## 2019-03-11 ENCOUNTER — HOME CARE VISIT (OUTPATIENT)
Dept: SCHEDULING | Facility: HOME HEALTH | Age: 84
End: 2019-03-11
Payer: MEDICARE

## 2019-03-11 VITALS
OXYGEN SATURATION: 96 % | DIASTOLIC BLOOD PRESSURE: 71 MMHG | TEMPERATURE: 98 F | RESPIRATION RATE: 20 BRPM | HEART RATE: 64 BPM | SYSTOLIC BLOOD PRESSURE: 144 MMHG

## 2019-03-11 PROCEDURE — 3331090001 HH PPS REVENUE CREDIT

## 2019-03-11 PROCEDURE — G0300 HHS/HOSPICE OF LPN EA 15 MIN: HCPCS

## 2019-03-11 PROCEDURE — 3331090002 HH PPS REVENUE DEBIT

## 2019-03-12 PROCEDURE — 3331090001 HH PPS REVENUE CREDIT

## 2019-03-12 PROCEDURE — 3331090002 HH PPS REVENUE DEBIT

## 2019-03-13 ENCOUNTER — HOME CARE VISIT (OUTPATIENT)
Dept: SCHEDULING | Facility: HOME HEALTH | Age: 84
End: 2019-03-13
Payer: MEDICARE

## 2019-03-13 VITALS
HEART RATE: 66 BPM | TEMPERATURE: 97.7 F | OXYGEN SATURATION: 98 % | RESPIRATION RATE: 20 BRPM | SYSTOLIC BLOOD PRESSURE: 107 MMHG | DIASTOLIC BLOOD PRESSURE: 62 MMHG

## 2019-03-13 PROCEDURE — 3331090002 HH PPS REVENUE DEBIT

## 2019-03-13 PROCEDURE — 3331090001 HH PPS REVENUE CREDIT

## 2019-03-13 PROCEDURE — G0300 HHS/HOSPICE OF LPN EA 15 MIN: HCPCS

## 2019-03-14 PROCEDURE — 3331090001 HH PPS REVENUE CREDIT

## 2019-03-14 PROCEDURE — 3331090002 HH PPS REVENUE DEBIT

## 2019-03-15 ENCOUNTER — HOME CARE VISIT (OUTPATIENT)
Dept: SCHEDULING | Facility: HOME HEALTH | Age: 84
End: 2019-03-15
Payer: MEDICARE

## 2019-03-15 PROCEDURE — 3331090001 HH PPS REVENUE CREDIT

## 2019-03-15 PROCEDURE — 3331090002 HH PPS REVENUE DEBIT

## 2019-03-16 PROCEDURE — 3331090002 HH PPS REVENUE DEBIT

## 2019-03-16 PROCEDURE — 3331090001 HH PPS REVENUE CREDIT

## 2019-03-17 PROCEDURE — 3331090001 HH PPS REVENUE CREDIT

## 2019-03-17 PROCEDURE — 3331090002 HH PPS REVENUE DEBIT

## 2019-03-18 ENCOUNTER — HOME CARE VISIT (OUTPATIENT)
Dept: SCHEDULING | Facility: HOME HEALTH | Age: 84
End: 2019-03-18
Payer: MEDICARE

## 2019-03-18 PROCEDURE — 3331090001 HH PPS REVENUE CREDIT

## 2019-03-18 PROCEDURE — 3331090002 HH PPS REVENUE DEBIT

## 2019-03-18 PROCEDURE — G0300 HHS/HOSPICE OF LPN EA 15 MIN: HCPCS

## 2019-03-19 VITALS
TEMPERATURE: 98.2 F | RESPIRATION RATE: 20 BRPM | DIASTOLIC BLOOD PRESSURE: 55 MMHG | SYSTOLIC BLOOD PRESSURE: 148 MMHG | HEART RATE: 72 BPM | OXYGEN SATURATION: 97 %

## 2019-03-19 PROCEDURE — 3331090002 HH PPS REVENUE DEBIT

## 2019-03-19 PROCEDURE — 3331090001 HH PPS REVENUE CREDIT

## 2019-03-20 ENCOUNTER — HOME CARE VISIT (OUTPATIENT)
Dept: SCHEDULING | Facility: HOME HEALTH | Age: 84
End: 2019-03-20
Payer: MEDICARE

## 2019-03-20 PROCEDURE — 3331090001 HH PPS REVENUE CREDIT

## 2019-03-20 PROCEDURE — 3331090002 HH PPS REVENUE DEBIT

## 2019-03-21 PROCEDURE — 3331090002 HH PPS REVENUE DEBIT

## 2019-03-21 PROCEDURE — 3331090001 HH PPS REVENUE CREDIT

## 2019-03-22 ENCOUNTER — HOME CARE VISIT (OUTPATIENT)
Dept: SCHEDULING | Facility: HOME HEALTH | Age: 84
End: 2019-03-22
Payer: MEDICARE

## 2019-03-22 VITALS
HEART RATE: 68 BPM | SYSTOLIC BLOOD PRESSURE: 141 MMHG | TEMPERATURE: 97.7 F | OXYGEN SATURATION: 98 % | DIASTOLIC BLOOD PRESSURE: 61 MMHG | RESPIRATION RATE: 20 BRPM

## 2019-03-22 PROCEDURE — 3331090001 HH PPS REVENUE CREDIT

## 2019-03-22 PROCEDURE — G0300 HHS/HOSPICE OF LPN EA 15 MIN: HCPCS

## 2019-03-22 PROCEDURE — 3331090002 HH PPS REVENUE DEBIT

## 2019-03-23 PROCEDURE — 3331090001 HH PPS REVENUE CREDIT

## 2019-03-23 PROCEDURE — 3331090002 HH PPS REVENUE DEBIT

## 2019-03-24 PROCEDURE — 3331090001 HH PPS REVENUE CREDIT

## 2019-03-24 PROCEDURE — 3331090002 HH PPS REVENUE DEBIT

## 2019-03-25 ENCOUNTER — HOME CARE VISIT (OUTPATIENT)
Dept: SCHEDULING | Facility: HOME HEALTH | Age: 84
End: 2019-03-25
Payer: MEDICARE

## 2019-03-25 VITALS
TEMPERATURE: 97.7 F | HEART RATE: 67 BPM | DIASTOLIC BLOOD PRESSURE: 71 MMHG | SYSTOLIC BLOOD PRESSURE: 149 MMHG | RESPIRATION RATE: 20 BRPM | OXYGEN SATURATION: 97 %

## 2019-03-25 PROCEDURE — 3331090002 HH PPS REVENUE DEBIT

## 2019-03-25 PROCEDURE — G0300 HHS/HOSPICE OF LPN EA 15 MIN: HCPCS

## 2019-03-25 PROCEDURE — 3331090001 HH PPS REVENUE CREDIT

## 2019-03-26 PROCEDURE — 3331090001 HH PPS REVENUE CREDIT

## 2019-03-26 PROCEDURE — 3331090002 HH PPS REVENUE DEBIT

## 2019-03-27 ENCOUNTER — HOME CARE VISIT (OUTPATIENT)
Dept: SCHEDULING | Facility: HOME HEALTH | Age: 84
End: 2019-03-27
Payer: MEDICARE

## 2019-03-27 PROCEDURE — G0300 HHS/HOSPICE OF LPN EA 15 MIN: HCPCS

## 2019-03-27 PROCEDURE — 3331090001 HH PPS REVENUE CREDIT

## 2019-03-27 PROCEDURE — 3331090002 HH PPS REVENUE DEBIT

## 2019-03-28 VITALS
OXYGEN SATURATION: 98 % | SYSTOLIC BLOOD PRESSURE: 141 MMHG | DIASTOLIC BLOOD PRESSURE: 66 MMHG | RESPIRATION RATE: 20 BRPM | HEART RATE: 58 BPM | TEMPERATURE: 97.7 F

## 2019-03-28 PROCEDURE — 3331090002 HH PPS REVENUE DEBIT

## 2019-03-28 PROCEDURE — 3331090001 HH PPS REVENUE CREDIT

## 2019-03-29 PROCEDURE — 3331090002 HH PPS REVENUE DEBIT

## 2019-03-29 PROCEDURE — 3331090001 HH PPS REVENUE CREDIT

## 2019-03-30 ENCOUNTER — HOME CARE VISIT (OUTPATIENT)
Dept: SCHEDULING | Facility: HOME HEALTH | Age: 84
End: 2019-03-30
Payer: MEDICARE

## 2019-03-30 PROCEDURE — 3331090001 HH PPS REVENUE CREDIT

## 2019-03-30 PROCEDURE — 3331090002 HH PPS REVENUE DEBIT

## 2019-03-31 PROCEDURE — 3331090003 HH PPS REVENUE ADJ

## 2019-03-31 PROCEDURE — 3331090001 HH PPS REVENUE CREDIT

## 2019-03-31 PROCEDURE — 3331090002 HH PPS REVENUE DEBIT

## 2019-04-01 PROCEDURE — 3331090002 HH PPS REVENUE DEBIT

## 2019-04-01 PROCEDURE — 3331090001 HH PPS REVENUE CREDIT

## 2019-04-02 PROCEDURE — 3331090001 HH PPS REVENUE CREDIT

## 2019-04-02 PROCEDURE — 3331090002 HH PPS REVENUE DEBIT

## 2019-04-03 PROCEDURE — 3331090001 HH PPS REVENUE CREDIT

## 2019-04-03 PROCEDURE — 3331090002 HH PPS REVENUE DEBIT

## 2019-04-04 ENCOUNTER — HOSPITAL ENCOUNTER (OUTPATIENT)
Dept: WOUND CARE | Age: 84
Discharge: HOME | End: 2019-04-04
Payer: MEDICARE

## 2019-04-04 ENCOUNTER — HOME CARE VISIT (OUTPATIENT)
Dept: HOME HEALTH SERVICES | Facility: HOME HEALTH | Age: 84
End: 2019-04-04
Payer: MEDICARE

## 2019-04-04 VITALS
OXYGEN SATURATION: 96 % | DIASTOLIC BLOOD PRESSURE: 69 MMHG | SYSTOLIC BLOOD PRESSURE: 160 MMHG | HEART RATE: 71 BPM | TEMPERATURE: 96.6 F | RESPIRATION RATE: 18 BRPM

## 2019-04-04 PROCEDURE — 87077 CULTURE AEROBIC IDENTIFY: CPT

## 2019-04-04 PROCEDURE — 3331090002 HH PPS REVENUE DEBIT

## 2019-04-04 PROCEDURE — 3331090001 HH PPS REVENUE CREDIT

## 2019-04-04 PROCEDURE — 74011250637 HC RX REV CODE- 250/637: Performed by: PODIATRIST

## 2019-04-04 PROCEDURE — 77030028895 HC GEL MEDIH TU INLC -A: Performed by: PODIATRIST

## 2019-04-04 PROCEDURE — 87070 CULTURE OTHR SPECIMN AEROBIC: CPT

## 2019-04-04 PROCEDURE — 77030019604 HC DRSG WND CA ALG S&N -A: Performed by: PODIATRIST

## 2019-04-04 PROCEDURE — 11042 DBRDMT SUBQ TIS 1ST 20SQCM/<: CPT

## 2019-04-04 PROCEDURE — 74011250636 HC RX REV CODE- 250/636: Performed by: PODIATRIST

## 2019-04-04 PROCEDURE — 74011000250 HC RX REV CODE- 250: Performed by: PODIATRIST

## 2019-04-04 PROCEDURE — 87186 SC STD MICRODIL/AGAR DIL: CPT

## 2019-04-04 RX ORDER — DIPHENHYDRAMINE HYDROCHLORIDE 50 MG/ML
25 INJECTION, SOLUTION INTRAMUSCULAR; INTRAVENOUS
Status: DISCONTINUED | OUTPATIENT
Start: 2019-04-04 | End: 2019-04-05 | Stop reason: HOSPADM

## 2019-04-04 RX ORDER — ACETAMINOPHEN 325 MG/1
650 TABLET ORAL
Status: DISCONTINUED | OUTPATIENT
Start: 2019-04-04 | End: 2019-04-07 | Stop reason: HOSPADM

## 2019-04-04 RX ORDER — SILVER SULFADIAZINE 10 G/1000G
CREAM TOPICAL DAILY PRN
Status: DISCONTINUED | OUTPATIENT
Start: 2019-04-04 | End: 2019-04-07 | Stop reason: HOSPADM

## 2019-04-04 RX ORDER — MICONAZOLE NITRATE 20 MG/G
CREAM TOPICAL DAILY PRN
Status: DISCONTINUED | OUTPATIENT
Start: 2019-04-04 | End: 2019-04-07 | Stop reason: HOSPADM

## 2019-04-04 RX ORDER — LIDOCAINE HYDROCHLORIDE AND EPINEPHRINE 10; 10 MG/ML; UG/ML
1-10 INJECTION, SOLUTION INFILTRATION; PERINEURAL
Status: DISCONTINUED | OUTPATIENT
Start: 2019-04-04 | End: 2019-04-05 | Stop reason: HOSPADM

## 2019-04-04 RX ORDER — NITROGLYCERIN 0.4 MG/1
0.4 TABLET SUBLINGUAL AS NEEDED
Status: DISCONTINUED | OUTPATIENT
Start: 2019-04-04 | End: 2019-04-07 | Stop reason: HOSPADM

## 2019-04-04 RX ORDER — MUPIROCIN CALCIUM 20 MG/G
CREAM TOPICAL DAILY PRN
Status: DISCONTINUED | OUTPATIENT
Start: 2019-04-04 | End: 2019-04-07 | Stop reason: HOSPADM

## 2019-04-04 RX ORDER — LIDOCAINE HYDROCHLORIDE 40 MG/ML
SOLUTION TOPICAL AS NEEDED
Status: DISCONTINUED | OUTPATIENT
Start: 2019-04-04 | End: 2019-04-07 | Stop reason: HOSPADM

## 2019-04-04 RX ORDER — TRIAMCINOLONE ACETONIDE 5 MG/G
CREAM TOPICAL DAILY PRN
Status: DISCONTINUED | OUTPATIENT
Start: 2019-04-04 | End: 2019-04-07 | Stop reason: HOSPADM

## 2019-04-04 RX ORDER — MAGNESIUM SULFATE 100 %
16 CRYSTALS MISCELLANEOUS AS NEEDED
Status: DISCONTINUED | OUTPATIENT
Start: 2019-04-04 | End: 2019-04-07 | Stop reason: HOSPADM

## 2019-04-04 RX ORDER — LIDOCAINE AND PRILOCAINE 25; 25 MG/G; MG/G
CREAM TOPICAL AS NEEDED
Status: DISCONTINUED | OUTPATIENT
Start: 2019-04-04 | End: 2019-04-07 | Stop reason: HOSPADM

## 2019-04-04 RX ORDER — OXYMETAZOLINE HCL 0.05 %
2 SPRAY, NON-AEROSOL (ML) NASAL
Status: DISCONTINUED | OUTPATIENT
Start: 2019-04-04 | End: 2019-04-07 | Stop reason: HOSPADM

## 2019-04-04 RX ORDER — SILVER NITRATE 38.21; 12.74 MG/1; MG/1
1 STICK TOPICAL
Status: DISCONTINUED | OUTPATIENT
Start: 2019-04-04 | End: 2019-04-05 | Stop reason: HOSPADM

## 2019-04-04 RX ORDER — EPINEPHRINE 0.1 MG/ML
1 INJECTION INTRACARDIAC; INTRAVENOUS
Status: DISCONTINUED | OUTPATIENT
Start: 2019-04-04 | End: 2019-04-05 | Stop reason: HOSPADM

## 2019-04-04 RX ORDER — LIDOCAINE HYDROCHLORIDE 10 MG/ML
1-10 INJECTION INFILTRATION; PERINEURAL
Status: DISCONTINUED | OUTPATIENT
Start: 2019-04-04 | End: 2019-04-05 | Stop reason: HOSPADM

## 2019-04-04 RX ORDER — LIDOCAINE HYDROCHLORIDE 20 MG/ML
JELLY TOPICAL AS NEEDED
Status: DISCONTINUED | OUTPATIENT
Start: 2019-04-04 | End: 2019-04-07 | Stop reason: HOSPADM

## 2019-04-04 RX ORDER — BACITRACIN 500 UNIT/G
1 PACKET (EA) TOPICAL DAILY
Status: DISCONTINUED | OUTPATIENT
Start: 2019-04-04 | End: 2019-04-07 | Stop reason: HOSPADM

## 2019-04-04 RX ORDER — EPINEPHRINE 1 MG/ML
1 INJECTION, SOLUTION, CONCENTRATE INTRAVENOUS
Status: DISCONTINUED | OUTPATIENT
Start: 2019-04-04 | End: 2019-04-05 | Stop reason: HOSPADM

## 2019-04-04 RX ADMIN — LIDOCAINE AND PRILOCAINE 5 G: 25; 25 CREAM TOPICAL at 10:06

## 2019-04-04 RX ADMIN — TRIAMCINOLONE ACETONIDE: 5 CREAM TOPICAL at 10:32

## 2019-04-04 NOTE — DISCHARGE INSTRUCTIONS
Wound Care Instructions    Patient: Kala Cisse MRN: [id number]  SSN: [id number]     YOB: 1935  Age:83 y.o. Sex: female       Ms. Bronson PAKO Taylor recommends the following discharge instruction:    Offloading    []   Felt/Foam Offloading   [] Removable Cast Waker       []   Wedge Shoe   []  Wheelchair     []   Total Contact Cast   []  Surgical Shoe     []   Crutches        Other   Mattress:   Other:     Edema Control    []   Elevated legs as much as possible. Recommend above level of heart.     []   Layered Wraps             []   Left      []   Right      []  BILATERAL          - Type:            []   Unna Boot       []  Multi-Layer                                   []   Two Layers     []  Three Layers   []  Four Layers     []   Tubular Bandages        []   Left      []   Right     SIZE:      []   Stockings                      []   Left      []   Right     []   Compression Pump     []   Left      []   Right    ___ millimeters of mercury  ___ millimeters of mercury for ___ minutes for ___ day(s)        Other:       Nutritional Supplements    []  Multi-Vitamin     []  Other:       Select One     [x]  Home Health: [facility]     []  Long Term Care:      []  DME:     Consult    []   Nutrition     []   Vascular     []   Orthotist/Pedorthotist     []   Infectious Disease     []   Lymphedema Therapist   Other:     Dressings:  Another brand of generically equivalent product may be dispensed unless specifically ordered otherwise.     Home Ulcer/wound Hygiene (cleanse with)      []   Distilled Water     []   Normal Saline     [x]   Wound Cleanser     []   Mild antimicrobial soap and water     []   Chlorhexidine liquid soap and water     []   Other:     Apply    []   Hydrogel   []  Hypergel   []   Aquacel Ag     []   Cadexomer Iodine                     (Iodosorb)   []  Silver Alginate    [x]   Medihoney: to left medial ankle, covered with Calcium Alginate, gauze, Hypafix tape     [] Collagenase:Santyl   []  Calcium Alginate   []   Collagen:     []   Plain Foam   []  Non-Adherent Contact                Layer   []   Xeroform     []   Silver foam   []   Hydrocolloid        []   Acticoat   []   Adaptic:      Other/Specific Instructions:    Cover With    []   Dry Gauze and Roll Gauze     []   Foam and Roll Gauze     [x]   Dry Gauze     []   Bordered gauze:     []   Foam      []    Bordered         []   Nonbordered     [x]   Secure with Tape     []   Other       Ligia-Ulcer Care    [x]   Cream Triamcinolone to ligia wound     []   Lotion     []   Ointment     []   Barrier     []   Other:     Change Dressing    []   Once Daily     []   Every Other Day     []   Every 3 Days       []   Every 5 Days     []   Every 7 Days     []   Do Not Change       []   2 x per week (Mon and Thurs. )     [x]   3 x per week (Mon, Wed, Fri. )     []   Other          Follow-Up:   [x]  Follow up  2 weeks                            []  As Needed (PRN)     []   Sabina Stanford MD    []  Jabier Douglas DPM    [x]  Joy Peres DPM   []   Ricardo Alexander DPM      []   Nurse Visit            Please call the wound clinic ([phone]) regarding any questions or concerns.

## 2019-04-04 NOTE — PROGRESS NOTES
Progress and Debridement Note    Patient: Keith Sky MRN: [id number]  SSN: [id number]    YOB: 1935  Age: 81 y.o. 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 She has had several applications of skin subs, serial debridement with [address] with several wound care products, several vascular procedures, compression, elevation and antibx therapy with minimal improvement, C&S taken today after NS flush, will Rx antibx pending results  Selective excisional debridement with continue [address] with medihoney and alginate with Triamcinolone cream to the periwound. Q 48 hrs. Subjective:     Ms Wiley Arriaga is a 80 y/o female who present with family  For chronic ulcer left ankle.  s/p Theraskin application 20/67, 66/81/63, 11/01/18, Patient states she had vascular intervention on 01/30/19 and has another procedure scheduled in April        Objective:     Patient Vitals for the past 24 hrs:   Temp Pulse Resp BP SpO2   04/04/19 0920 96.6 °F (35.9 °C) 71 18 160/69 96 %       Physical Exam:     General Exam  alert, cooperative, no distress, appears stated age    REVIEW OF SYSTEMS:  General: denies chronic fatigue, weight loss, fever, anemia, bruising, depression, nervousness, panic attacks  HEENT: denies ringing in ears, ear infections, dizzy spells, poor vision, glaucoma, sinus trouble, hoarseness, eye infections  GI: denies diarrhea, gas, bloating, heartburn, regurgitation, difficulty swallowing, painful swallowing, nausea, vomiting, constipation, abdominal pain, decreased appetite, blood in stools, black stools, jaundice, dark urine  Lungs: denies pneumonia, asthma, cough, SOB, hemoptysis  Heart: denies chest pain, irregular heart beat, ankle swelling, HTN  Skin: denies rashes, hives, allergic reaction  Urinary: denies UTI, kidney stones, decreased urine force and flow, urination at night, blood in urine, painful urination  Bones and Joints: denies arthritis, rheumatism, back pain, gout, osteoporosis  Neurologic: denies stroke, seizures, headaches, numbness, tingling      VASCULAR EXAM:. Pedal pulses are  0/4 DP and 0/4 PT right and left foot. Skin temperature is warm to warm right and left foot. Digital capillary fill time is 4 seconds right and left foot. There is mild edema of the right and left foot and ankle. Pedal hair is not noted bilateral       NEUROLOGICAL EXAM:. Sensation Intact with 5.07g monofilament wire right and left foot. Deep tendon reflexes intact and symmetrical on the right and left foot.      MUSCULOSKELETAL EXAM:. Muscle tone is normal.  Muscle strength of the flexor and extensor group inversion and eversion Bilateral. 4/5.         DERMATOLOGICAL EXAM:. Skin is of abnormal texture and turgor with atrophic skin changes noting decrease hair growth, nail changes (thickening), Bilateral. There is still epithelialized lesions dorsal and medial forefoot left foot.      Subhyperkeratotic lesion noted over the medial aspect of the left ankle with increase in measurements as stated below  from 0.7 x 0.8 x 0.1 cm  to 3.3 x 1.4 x 04 cm.   There is periwound erythema noted with  no asc lymphagitis      Wound Ankle Left;Medial;Proximal (Active)   Dressing Status  Removed 4/4/2019  9:29 AM   Dressing Type  Gauze 4/4/2019  9:29 AM   Non-Pressure Injury Full thickness (subcut/muscle) 4/4/2019  9:29 AM   Wound Length (cm) 3.3 cm 4/4/2019  9:29 AM   Wound Width (cm) 1.4 cm 4/4/2019  9:29 AM   Wound Depth (cm) 0.4 4/4/2019  9:29 AM   Wound Surface area (cm^2) 4.62 cm^2 4/4/2019  9:29 AM   Change in Wound Size % -431.03 4/4/2019  9:29 AM   Condition of Base Pink;Slough 4/4/2019  9:29 AM   Condition of Edges Open 4/4/2019  9:29 AM   Tissue Type Pink;Yellow 4/4/2019  9:29 AM   Tissue Type Percent Pink 50 4/4/2019  9:29 AM   Tissue Type Percent Red 50 2/7/2019  9:45 AM   Tissue Type Percent Yellow 50 4/4/2019  9:29 AM   Drainage Amount  Moderate 4/4/2019  9:29 AM   Drainage Color Serosanguinous 4/4/2019  9:29 AM   Wound Odor None 4/4/2019  9:29 AM   Periwound Skin Condition Erythema, blanchable 4/4/2019  9:29 AM   Cleansing and Cleansing Agents  Dermal wound cleanser 4/4/2019  9:29 AM   Dressing Type Applied Other (Comment) 4/4/2019  9:29 AM   Wound Procedure Type Skin Substitue 12/20/2018 11:16 AM   Procedure Time Out 1116 12/20/2018 11:16 AM   Consent Obtained  Yes 12/20/2018 11:16 AM   Procedure Bleeding No 11/15/2018 11:36 AM   Procedure Pain Scale Numeric 3/10 12/20/2018 11:16 AM   Skin Substitute Applied  Other 12/20/2018 11:16 AM   Post Procedure Pain Scale Numeric 3/10 11/15/2018 11:36 AM   Procedure Tolerated Well 4/4/2019 10:23 AM   Number of days: 504       [REMOVED] Wound Ankle Left (Removed)   Number of days: 4067       [REMOVED] Wound Ankle Left (Removed)   Number of days: 8553       [REMOVED] Wound Ankle Left (Removed)   Number of days: 1990       [REMOVED] Wound Ankle Left;Lateral (Removed)   Number of days: 470       [REMOVED] Wound Ankle Right (Removed)   Dressing Status  Removed 12/21/2017  9:42 AM   Dressing Type  Other (Comment) 12/21/2017  9:42 AM   Non-Pressure Injury Partial thickness (epider/derm) 12/21/2017  9:42 AM   Wound Length (cm) 0.7 cm 12/21/2017  9:42 AM   Wound Width (cm) 0.8 cm 12/21/2017  9:42 AM   Wound Depth (cm) 0.1 12/21/2017  9:42 AM   Wound Surface area (cm^2) 0.06 cm^2 12/21/2017  9:42 AM   Condition of Base [facility] 12/21/2017  9:42 AM   Condition of Edges Open 12/21/2017  9:42 AM   Tissue Type Yellow 12/21/2017  9:42 AM   Tissue Type Percent Red 50 11/16/2017 10:08 AM   Tissue Type Percent Yellow 100 12/21/2017  9:42 AM   Drainage Amount  Moderate 12/21/2017  9:42 AM   Drainage Color Serous 12/21/2017  9:42 AM   Wound Odor None 12/21/2017  9:42 AM   Periwound Skin Condition Erythema, blanchable 12/21/2017  9:42 AM   Cleansing and Cleansing Agents  Dermal wound cleanser; Soap and water 12/21/2017  9:42 AM   Dressing Type Applied Other (Comment) 12/21/2017  9:42 AM   Procedure Tolerated Well 12/21/2017  9:42 AM   Number of days: 470       [REMOVED] Wound Ankle Lateral;Right (Removed)   Number of days: 215       [REMOVED] Wound Ankle Left;Lateral (Removed)   Number of days: 215       [REMOVED] Wound Ankle Left;Medial (Removed)   Dressing Status  Removed 6/21/2018  8:46 AM   Dressing Type  Other (Comment) 6/21/2018  8:46 AM   Non-Pressure Injury Full thickness (subcut/muscle) 6/21/2018  8:46 AM   Wound Length (cm) 1.4 cm 6/21/2018  8:46 AM   Wound Width (cm) 0.6 cm 6/21/2018  8:46 AM   Wound Depth (cm) 0.2 6/21/2018  8:46 AM   Wound Surface area (cm^2) 0.84 cm^2 6/21/2018  8:46 AM   Condition of Base [place];Slough 6/21/2018  8:46 AM   Condition of Edges Open 6/21/2018  8:46 AM   Tissue Type Pink;Yellow 6/21/2018  8:46 AM   Tissue Type Percent Pink 20 6/21/2018  8:46 AM   Tissue Type Percent Red 80 5/31/2018  8:29 AM   Tissue Type Percent Yellow 80 6/21/2018  8:46 AM   Drainage Amount  Small  6/21/2018  8:46 AM   Drainage Color Serosanguinous 6/21/2018  8:46 AM   Wound Odor None 6/21/2018  8:46 AM   Periwound Skin Condition Edematous; Erythema, blanchable 6/21/2018  8:46 AM   Cleansing and Cleansing Agents  Dermal wound cleanser 6/21/2018  8:46 AM   Dressing Type Applied Other (Comment) 6/21/2018  8:46 AM   Procedure Tolerated Well 6/21/2018  8:46 AM   Number of days: 229       [REMOVED] Wound Ankle Left;Medial;Distal (Removed)   Dressing Status  Removed 6/21/2018  8:46 AM   Dressing Type  Other (Comment) 6/21/2018  8:46 AM   Non-Pressure Injury Full thickness (subcut/muscle) 6/21/2018  8:46 AM   Wound Length (cm) 1.1 cm 6/21/2018  8:46 AM   Wound Width (cm) 0.6 cm 6/21/2018  8:46 AM   Wound Depth (cm) 0.2 6/21/2018  8:46 AM   Wound Surface area (cm^2) 0.66 cm^2 6/21/2018  8:46 AM   Condition of Base [place];Slough 6/21/2018  8:46 AM   Condition of Edges Open 6/21/2018  8:46 AM   Epithelialization (%) 100 5/17/2018  8:44 AM   Tissue Type Pink;Yellow 6/21/2018  8:46 AM   Tissue Type Percent Pink 50 6/21/2018  8:46 AM   Tissue Type Percent Yellow 50 6/21/2018  8:46 AM   Tissue Type Percent Other (comment) 40 2/15/2018 10:52 AM   Drainage Amount  Scant 6/21/2018  8:46 AM   Drainage Color Serosanguinous 6/21/2018  8:46 AM   Wound Odor None 6/21/2018  8:46 AM   Periwound Skin Condition Edematous; Erythema, blanchable;Petechiae 6/21/2018  8:46 AM   Cleansing and Cleansing Agents  Dermal wound cleanser 6/21/2018  8:46 AM   Dressing Type Applied Other (Comment) 6/21/2018  8:46 AM   Procedure Tolerated Well 6/21/2018  8:46 AM   Number of days: 332       [REMOVED] Wound Toe Left (Removed)   Dressing Type  Open to air 12/7/2017  9:56 AM   Non-Pressure Injury Partial thickness (epider/derm) 11/16/2017 10:08 AM   Wound Length (cm) 0.6 cm 11/16/2017 10:08 AM   Wound Width (cm) 1.4 cm 11/16/2017 10:08 AM   Wound Depth (cm) 0.1 11/16/2017 10:08 AM   Wound Surface area (cm^2) 0.08 cm^2 11/16/2017 10:08 AM   Condition of Base Other (comment) 11/16/2017 10:08 AM   Tissue Type Yellow 11/16/2017 10:08 AM   Tissue Type Percent Yellow 100 11/16/2017 10:08 AM   Drainage Amount  None 11/16/2017 10:08 AM   Wound Odor None 11/16/2017 10:08 AM   Cleansing and Cleansing Agents  Dermal wound cleanser 11/16/2017 10:08 AM   Dressing Type Applied Open to air 11/16/2017 10:08 AM   Number of days: 207       [REMOVED] Wound Toe Left (Removed)   Dressing Status  Removed 11/16/2017 10:08 AM   Dressing Type  Open to air 12/7/2017  9:56 AM   Non-Pressure Injury Partial thickness (epider/derm) 11/16/2017 10:08 AM   Wound Length (cm) 0.3 cm 11/16/2017 10:08 AM   Wound Width (cm) 0.4 cm 11/16/2017 10:08 AM   Wound Depth (cm) 0.1 11/16/2017 10:08 AM   Wound Surface area (cm^2) 0.01 cm^2 11/16/2017 10:08 AM   Condition of Base Other (comment) 11/16/2017 10:08 AM   Tissue Type Yellow 11/16/2017 10:08 AM   Tissue Type Percent Yellow 100 11/16/2017 10:08 AM   Drainage Amount  None 11/16/2017 10:08 AM   Wound Odor None 11/16/2017 10:08 AM   Periwound Skin Condition Intact 11/16/2017 10:08 AM   Dressing Type Applied Open to air 11/16/2017 10:08 AM   Number of days: 207       [REMOVED] Wound Ankle Left;Lateral (Removed)   Dressing Status  Removed 12/21/2017  9:42 AM   Dressing Type  Other (Comment) 12/21/2017  9:42 AM   Non-Pressure Injury Partial thickness (epider/derm) 12/21/2017  9:42 AM   Wound Length (cm) 1.1 cm 12/21/2017  9:42 AM   Wound Width (cm) 0.9 cm 12/21/2017  9:42 AM   Wound Depth (cm) 0.1 12/21/2017  9:42 AM   Wound Surface area (cm^2) 0.1 cm^2 12/21/2017  9:42 AM   Condition of Base [place];Slough 12/21/2017  9:42 AM   Condition of Edges Open 12/21/2017  9:42 AM   Tissue Type Pink;Yellow 12/21/2017  9:42 AM   Tissue Type Percent Pink 10 11/16/2017 10:08 AM   Tissue Type Percent Red 50 12/21/2017  9:42 AM   Tissue Type Percent Yellow 50 12/21/2017  9:42 AM   Drainage Amount  Moderate 12/21/2017  9:42 AM   Drainage Color Serous 12/21/2017  9:42 AM   Wound Odor None 12/21/2017  9:42 AM   Periwound Skin Condition Erythema, blanchable;Edematous 12/21/2017  9:42 AM   Cleansing and Cleansing Agents  Dermal wound cleanser; Soap and water 12/21/2017  9:42 AM   Dressing Type Applied Other (Comment) 12/21/2017  9:42 AM   Procedure Tolerated Well 12/21/2017  9:42 AM   Number of days: 332       [REMOVED] Wound Foot Left;Medial;Distal (Removed)   Dressing Status  Removed 11/1/2018  9:15 AM   Dressing Type  Other (Comment) 7/5/2018 10:27 AM   Non-Pressure Injury Full thickness (subcut/muscle) 7/5/2018 10:27 AM   Wound Length (cm) 0.3 cm 11/1/2018  9:15 AM   Wound Width (cm) 0.5 cm 11/1/2018  9:15 AM   Wound Depth (cm) 0.3 7/5/2018 10:27 AM   Wound Surface area (cm^2) 0.15 cm^2 11/1/2018  9:15 AM   Change in Wound Size % 25 11/1/2018  9:15 AM   Condition of Base [facility] 7/5/2018 10:27 AM   Condition of Edges Open 7/5/2018 10:27 AM   Tissue Type Pink;Yellow 7/5/2018 10:27 AM   Tissue Type Percent Pink 10 7/5/2018 10:27 AM   Tissue Type Percent Red 100 9/20/2018  8:13 AM   Tissue Type Percent Yellow 100 11/1/2018  9:15 AM   Drainage Amount  Large 7/5/2018 10:27 AM   Drainage Color Serosanguinous 7/5/2018 10:27 AM   Wound Odor None 7/5/2018 10:27 AM   Periwound Skin Condition Erythema, blanchable 7/5/2018 10:27 AM   Cleansing and Cleansing Agents  Normal saline 7/5/2018 10:27 AM   Dressing Type Applied Other (Comment) 7/5/2018 10:27 AM   Wound Procedure Type Skin Substitue 11/1/2018 10:05 AM   Procedure Time Out 1000 11/1/2018 10:05 AM   Consent Obtained  Yes 11/1/2018 10:05 AM   Skin Substitute Applied  Other 11/1/2018 10:05 AM   Procedure Tolerated Well 7/5/2018 10:27 AM   Number of days: 287       [REMOVED] Wound Leg Lower Right; Anterior; Lateral (Removed)   Dressing Type  Open to air 2/15/2018 11:36 AM   Non-Pressure Injury Partial thickness (epider/derm) 2/1/2018  1:05 PM   Wound Length (cm) 0.9 cm 2/1/2018  1:05 PM   Wound Width (cm) 0.6 cm 2/1/2018  1:05 PM   Wound Depth (cm) 0.1 2/1/2018  1:05 PM   Wound Surface area (cm^2) 0.54 cm^2 2/1/2018  1:05 PM   Condition of Base [place] 2/1/2018  1:05 PM   Condition of Edges Open 2/1/2018  1:05 PM   Tissue Type Red 2/1/2018  1:05 PM   Tissue Type Percent Red 100 2/1/2018  1:05 PM   Drainage Amount  Scant 2/1/2018  1:05 PM   Drainage Color Serosanguinous 2/1/2018  1:05 PM   Wound Odor None 2/1/2018  1:05 PM   Periwound Skin Condition Edematous; Erythema, blanchable 2/1/2018  1:05 PM   Cleansing and Cleansing Agents  Dermal wound cleanser 2/1/2018  1:05 PM   Dressing Type Applied Other (Comment) 2/1/2018  1:05 PM   Procedure Tolerated Well 2/1/2018  1:05 PM   Number of days: 73       [REMOVED] Wound Leg Lower Anterior;Left;Medial (Removed)   Number of days: 314       [REMOVED] Wound Foot Left;Dorsal (Removed)   Dressing Status  Removed 7/5/2018 10:27 AM   Dressing Type  Gauze 7/5/2018 10:27 AM   Non-Pressure Injury Full thickness (subcut/muscle) 7/5/2018 10:27 AM   Wound Length (cm) 0 cm 9/20/2018  8:13 AM   Wound Width (cm) 0 cm 9/20/2018  8:13 AM   Wound Depth (cm) 0.3 7/5/2018 10:27 AM   Wound Surface area (cm^2) 0 cm^2 9/20/2018  8:13 AM   Change in Wound Size % 100 9/20/2018  8:13 AM   Condition of Base [place] 7/5/2018 10:27 AM   Condition of Edges Open 6/28/2018  8:53 AM   Tissue Type Pink;Yellow 7/5/2018 10:27 AM   Tissue Type Percent Pink 75 7/5/2018 10:27 AM   Tissue Type Percent Red 25 7/5/2018 10:27 AM   Tissue Type Percent Yellow 50 6/28/2018  8:53 AM   Drainage Amount  Small  7/5/2018 10:27 AM   Drainage Color Serous 7/5/2018 10:27 AM   Wound Odor None 6/28/2018  8:53 AM   Periwound Skin Condition Erythema, blanchable 7/5/2018 10:27 AM   Cleansing and Cleansing Agents  Normal saline 7/5/2018 10:27 AM   Dressing Type Applied Other (Comment) 7/5/2018 10:27 AM   Procedure Tolerated Well 7/5/2018 10:27 AM   Number of days: 102       [REMOVED] Wound Ankle Lateral (Removed)   Number of days: 4       [REMOVED] Wound Arm Left;Right (Removed)   Number of days: 4       [REMOVED] Wound Ankle Left;Medial (Removed)   Number of days: 27          Lab/Data Review:  No results found for this or any previous visit (from the past 24 hour(s)). none      PROCEDURE    Selective sharp instrument excisional debridement of slough and devitilized tissue    After the benefits/risks/SE were discussed, the patient agreed to proceed.      Time out was done:   * Patient was identified by name and date of birth   * Agreement on procedure being performed was verified   * Procedure site verified and marked as necessary   * Patient was positioned for comfort   * Consent was signed and verified. Site:   Medial left ankle/foot    Instruments used:    [x]  Dermal curette  [] Blade        [] #15  [] #10  [] Forceps  [] Tissue nippers  [] sterile scissors  [] Other     Anesthesia:    [x]  EMLA 2.5% cream: applied to wound beds for approximately 15minutes. []   Lidocaine 2% Topical Gel      []  Lidocaine injectable 1% with epinephrine 1:100,000    []  Lidocaine injectable 1% without epinephrine    []  Other:     []  None         [] patient is insensate due to neuropathy         [] patient declines        [] allergy to anesthetic        [] tissue for debridement is either superficial, loosely adherent and/or necrotic and denervated     After satisfactory anesthesia achieved, the wound/s was/were sharply debrided necrotic, devitalized and granulation tissue down to the sub Q layer, revealing a clean and viable wound bed. Post debridement measurement was 3.4_x_1.6_x_0.5_cm . Bleeding: <5mL Resolved with light focal pressure. Wounds were cleaned and irrigated with saline. Wound care applied:   []   Hydrogell   []  Hypergel   []   Hydrofiber/Aquacel      []   Cadexomer Iodine (Iodosorb)   []  Silver Alginate    [x]   Medihoney:    []   Collagenase:Santyl   [x]  Calcium Alginate   []   Collagen:    []   Foam   []  Non-Adherent Contact Layer   []   Xeroform    []   Adaptec:   []   Hydrocolloid   []   Transparent Film    []  Antibiotic ointment/cream     []   Other (see below)     Other:     []   Dry Gauze and Roll Gauze    []   Foam and Roll Gauze    []   Dry Gauze    []   Bordered gauze:     []   Secure with Tape    []   Bordered foam       [x]   Other  omniflex    []   Compression Wrap:          []   Unna Boot    []Multi-Layer    []Tubular Bandages       Ligia-Ulcer Care    []   Cream     []   Lotion     []   Ointment     []   Barrier     [x]   Other:triamcinolone cream       The patient tolerated the procedure well with no complications.  The patient left the exam room in satisfactory and stable condition.      Signed By: Kimberlee Nguyen DPM     April 4, 2019 10:50 AM

## 2019-04-04 NOTE — WOUND CARE
Patient here for  follow up appointment for left medial ankle ulcer. Wound is measuring larger today. Patient states she has had some missed visits from home care and the current dressing has been on over a week. Call placed to [facility] who will resume wound visits three times weekly with Medihoney, alginate, gauze, hypafix tape. Orders faxed to [facility]. Culture taken today. Referral to Dr. Hai Goodman for evaluation of plastic surgery to close wound. Appointment made for Friday 5/31/19 3:00 pm (first available). Information given to patient of address, phone, time of appointment. She will return in 2 weeks for follow up with Dr. Argelia Wiggins.         04/04/19 0929 04/04/19 1023   Wound Ankle Left;Medial;Proximal   Date First Assessed: 11/16/17   POA: Yes  Wound Type: Vascular  Location: Ankle  Orientation: Left;Medial;Proximal   Dressing Status  Removed  --    Dressing Type  Gauze  --    Non-Pressure Injury Full thickness (subcut/muscle)  --    Wound Length (cm) 3.3 cm  --    Wound Width (cm) 1.4 cm  --    Wound Depth (cm) 0.4  --    Wound Surface area (cm^2) 4.62 cm^2  --    Change in Wound Size % -431.03  --    Condition of Base Pink;Slough  --    Condition of Edges Open  --    Tissue Type Pink;Yellow  --    Tissue Type Percent Pink 50  --    Tissue Type Percent Yellow 50  --    Drainage Amount  Moderate  --    Drainage Color Serosanguinous  --    Wound Odor None  --    Periwound Skin Condition Erythema, blanchable  --    Cleansing and Cleansing Agents  Dermal wound cleanser  --    Dressing Type Applied Other (Comment) Other (Comment)  (Medihoney,Alginate,Triamcinalone,gauze, Hypafix tape)   Procedure Tolerated  --  Well

## 2019-04-05 ENCOUNTER — APPOINTMENT (OUTPATIENT)
Dept: GENERAL RADIOLOGY | Age: 84
End: 2019-04-05
Attending: EMERGENCY MEDICINE
Payer: MEDICARE

## 2019-04-05 ENCOUNTER — HOME CARE VISIT (OUTPATIENT)
Dept: SCHEDULING | Facility: HOME HEALTH | Age: 84
End: 2019-04-05
Payer: MEDICARE

## 2019-04-05 ENCOUNTER — HOSPITAL ENCOUNTER (EMERGENCY)
Age: 84
Discharge: HOME | End: 2019-04-05
Attending: EMERGENCY MEDICINE
Payer: MEDICARE

## 2019-04-05 ENCOUNTER — TELEPHONE (OUTPATIENT)
Dept: WOUND CARE | Age: 84
End: 2019-04-05

## 2019-04-05 VITALS
BODY MASS INDEX: 28.51 KG/M2 | OXYGEN SATURATION: 100 % | TEMPERATURE: 97.7 F | HEART RATE: 71 BPM | WEIGHT: 167 LBS | RESPIRATION RATE: 18 BRPM | HEIGHT: 64 IN | DIASTOLIC BLOOD PRESSURE: 74 MMHG | SYSTOLIC BLOOD PRESSURE: 145 MMHG

## 2019-04-05 VITALS
SYSTOLIC BLOOD PRESSURE: 128 MMHG | RESPIRATION RATE: 16 BRPM | OXYGEN SATURATION: 98 % | DIASTOLIC BLOOD PRESSURE: 64 MMHG | TEMPERATURE: 99.1 F | HEART RATE: 60 BPM

## 2019-04-05 DIAGNOSIS — L03.116 CELLULITIS OF LEFT LOWER EXTREMITY: Primary | ICD-10-CM

## 2019-04-05 DIAGNOSIS — L97.322 CHRONIC ULCER OF LEFT ANKLE WITH FAT LAYER EXPOSED (HCC): ICD-10-CM

## 2019-04-05 LAB
ANION GAP SERPL CALC-SCNC: 6 MMOL/L (ref 3–18)
BASOPHILS # BLD: 0 K/UL (ref 0–0.1)
BASOPHILS NFR BLD: 0 % (ref 0–2)
BUN SERPL-MCNC: 29 MG/DL (ref 7–18)
BUN/CREAT SERPL: 24 (ref 12–20)
CALCIUM SERPL-MCNC: 8.8 MG/DL (ref 8.5–10.1)
CHLORIDE SERPL-SCNC: 109 MMOL/L (ref 100–108)
CO2 SERPL-SCNC: 25 MMOL/L (ref 21–32)
CREAT SERPL-MCNC: 1.22 MG/DL (ref 0.6–1.3)
DIFFERENTIAL METHOD BLD: ABNORMAL
EOSINOPHIL # BLD: 0.1 K/UL (ref 0–0.4)
EOSINOPHIL NFR BLD: 1 % (ref 0–5)
ERYTHROCYTE [DISTWIDTH] IN BLOOD BY AUTOMATED COUNT: 14.4 % (ref 11.6–14.5)
GLUCOSE SERPL-MCNC: 102 MG/DL (ref 74–99)
HCT VFR BLD AUTO: 36.5 % (ref 35–45)
HGB BLD-MCNC: 11.2 G/DL (ref 12–16)
LYMPHOCYTES # BLD: 1.7 K/UL (ref 0.9–3.6)
LYMPHOCYTES NFR BLD: 21 % (ref 21–52)
MCH RBC QN AUTO: 27.5 PG (ref 24–34)
MCHC RBC AUTO-ENTMCNC: 30.7 G/DL (ref 31–37)
MCV RBC AUTO: 89.7 FL (ref 74–97)
MONOCYTES # BLD: 0.5 K/UL (ref 0.05–1.2)
MONOCYTES NFR BLD: 6 % (ref 3–10)
NEUTS SEG # BLD: 5.9 K/UL (ref 1.8–8)
NEUTS SEG NFR BLD: 72 % (ref 40–73)
PLATELET # BLD AUTO: 179 K/UL (ref 135–420)
PMV BLD AUTO: 11.7 FL (ref 9.2–11.8)
POTASSIUM SERPL-SCNC: 3.9 MMOL/L (ref 3.5–5.5)
RBC # BLD AUTO: 4.07 M/UL (ref 4.2–5.3)
SODIUM SERPL-SCNC: 140 MMOL/L (ref 136–145)
WBC # BLD AUTO: 8.3 K/UL (ref 4.6–13.2)

## 2019-04-05 PROCEDURE — 74011250636 HC RX REV CODE- 250/636: Performed by: EMERGENCY MEDICINE

## 2019-04-05 PROCEDURE — 99282 EMERGENCY DEPT VISIT SF MDM: CPT

## 2019-04-05 PROCEDURE — 3331090002 HH PPS REVENUE DEBIT

## 2019-04-05 PROCEDURE — 73610 X-RAY EXAM OF ANKLE: CPT

## 2019-04-05 PROCEDURE — 3331090001 HH PPS REVENUE CREDIT

## 2019-04-05 PROCEDURE — 85025 COMPLETE CBC W/AUTO DIFF WBC: CPT

## 2019-04-05 PROCEDURE — 96365 THER/PROPH/DIAG IV INF INIT: CPT

## 2019-04-05 PROCEDURE — G0299 HHS/HOSPICE OF RN EA 15 MIN: HCPCS

## 2019-04-05 PROCEDURE — 80048 BASIC METABOLIC PNL TOTAL CA: CPT

## 2019-04-05 PROCEDURE — 74011000258 HC RX REV CODE- 258: Performed by: EMERGENCY MEDICINE

## 2019-04-05 RX ORDER — DOXYCYCLINE 100 MG/1
100 CAPSULE ORAL 2 TIMES DAILY
Qty: 20 CAP | Refills: 0 | Status: SHIPPED | OUTPATIENT
Start: 2019-04-05 | End: 2019-04-15

## 2019-04-05 RX ORDER — GUAIFENESIN 100 MG/5ML
81 LIQUID (ML) ORAL DAILY
COMMUNITY
End: 2019-08-13

## 2019-04-05 RX ADMIN — DOXYCYCLINE 100 MG: 100 INJECTION, POWDER, LYOPHILIZED, FOR SOLUTION INTRAVENOUS at 15:43

## 2019-04-05 NOTE — ED TRIAGE NOTES
Wound on side of L foot, patient states it does not look good, home health nurse told the patient to go to the ED after taking her temperature, 99.1. Patient was seen at wound care yesterday

## 2019-04-05 NOTE — TELEPHONE ENCOUNTER
Received call from the [address] [place] [address] that the patient has a temp of 99.1 with no other symptoms. I called the patient and advised her to go to the nearest ED for evaluation and treatment of symptoms. Patient states that she will go the [facility] as this is the closest ED to her.

## 2019-04-05 NOTE — ED NOTES
I have reviewed discharge instructions with the patient. The patient verbalized understanding. Patient armband removed and given to patient to take home. Patient was informed of the privacy risks if armband lost or stolen. Pt alert, oriented x4 and ambulatory out of ER in [facility] at this time. VSS.

## 2019-04-05 NOTE — ED NOTES
Pt brought back to room 11. Pt denies further needs. A&O x4, complaining of chronic foot ulcer being followed by wound care, states had \"fever\" there and was told to come here for evaluation.

## 2019-04-05 NOTE — DISCHARGE INSTRUCTIONS

## 2019-04-06 PROCEDURE — 3331090001 HH PPS REVENUE CREDIT

## 2019-04-06 PROCEDURE — 3331090002 HH PPS REVENUE DEBIT

## 2019-04-07 ENCOUNTER — HOSPITAL ENCOUNTER (EMERGENCY)
Age: 84
Discharge: HOME | End: 2019-04-07
Attending: EMERGENCY MEDICINE
Payer: MEDICARE

## 2019-04-07 VITALS
DIASTOLIC BLOOD PRESSURE: 72 MMHG | RESPIRATION RATE: 18 BRPM | OXYGEN SATURATION: 100 % | SYSTOLIC BLOOD PRESSURE: 191 MMHG | TEMPERATURE: 98 F | HEART RATE: 65 BPM

## 2019-04-07 DIAGNOSIS — Z51.89 ENCOUNTER FOR WOUND RE-CHECK: Primary | ICD-10-CM

## 2019-04-07 LAB
BACTERIA SPEC CULT: ABNORMAL
GRAM STN SPEC: ABNORMAL
GRAM STN SPEC: ABNORMAL
SERVICE CMNT-IMP: ABNORMAL

## 2019-04-07 PROCEDURE — 3331090002 HH PPS REVENUE DEBIT

## 2019-04-07 PROCEDURE — 3331090001 HH PPS REVENUE CREDIT

## 2019-04-07 PROCEDURE — 99281 EMR DPT VST MAYX REQ PHY/QHP: CPT

## 2019-04-07 NOTE — ED PROVIDER NOTES
EMERGENCY DEPARTMENT HISTORY AND PHYSICAL EXAM 
 
12:21 PM 
 
 
Date: 4/7/2019 Patient Name: Jaycee Siemens History of Presenting Illness Chief Complaint Patient presents with  Wound Check History Provided By: Patient Chief Complaint: left ankle wound re-check Duration: 2 Days Timing:  Acute Location:  
Quality: Aching Severity: Moderate Modifying Factors: none Associated Symptoms: denies any other associated signs or symptoms Additional History (Context):Anastasiia Cardona is a 81 y.o. female with a history of diabetes, hypertension, hyperlipidemia who presents to the emergency department for wound recheck of left medial ankle cellulitis. Patient states she is taking her antibiotics as prescribed. She had fever, chills, nausea prior to her initial presentation here 2 days ago. She states that the nausea and fever have resolved. Redness around the wound has gotten significantly better. Patient has home health nurse scheduled to come tomorrow and check on her. She denies any other complaints at this time. PCP:  Katia Flynn MD 
 
 
Current Outpatient Medications Medication Sig Dispense Refill  aspirin 81 mg chewable tablet Take 81 mg by mouth daily.  omega 3-dha-epa-fish oil (FISH OIL) 100-160-1,000 mg cap Take  by mouth.  OTHER     
 doxycycline (MONODOX) 100 mg capsule Take 1 Cap by mouth two (2) times a day for 10 days. 20 Cap 0  
 cyanocobalamin/folic ac/vit B6 (FOLIC ACID-VIT P4-JCV J95 PO) Take 1 Tab by mouth daily.  aspirin 81 mg chewable tablet Take 81 mg by mouth daily.  metoprolol tartrate (LOPRESSOR) 50 mg tablet Take 1 Tab by mouth daily. 30 Tab 0  
 acetaminophen (TYLENOL) 500 mg tablet Take 500 mg by mouth every six (6) hours as needed for Pain or Fever.  gabapentin (NEURONTIN) 300 mg capsule Take 1 Cap by mouth three (3) times daily. 12/22/16, QTY#90, 30 Days, 2 Refills.  Dr. Kelton Rodarte  2  
  cholecalciferol (VITAMIN D3) 1,000 unit tablet Take 5,000 Units by mouth daily.  lisinopril-hydrochlorothiazide (PRINZIDE, ZESTORETIC) 20-25 mg per tablet Take 1 Tab by mouth daily. Past History Past Medical History: 
Past Medical History:  
Diagnosis Date  CAD (coronary artery disease)  Chronic osteomyelitis of ankle ([facility]  CKD (chronic kidney disease) stage 3, GFR 30-59 ml/min ([facility]) 06/24/2013 Worst noted (08/06/13) BUN/sCr: 54/2.49, GFR 20.   
 DM (diabetes mellitus) ([facility]  Elevated TSH 06/25/2012  
 5.08   
 Hypertension  Leukocytosis  Lumbar spinal stenosis  Menopause  Normocytic anemia  Rhabdomyolysis  Splenomegaly  Stroke ([facility]  Vitamin D deficiency Past Surgical History: 
Past Surgical History:  
Procedure Laterality Date  COLONOSCOPY N/A 3/1/2017 COLONOSCOPY performed by Gino Henry MD at [address]  HX FREE SKIN GRAFT    
 HX HYSTERECTOMY  HX OOPHORECTOMY  HX OPEN REDUCTION INTERNAL FIXATION Left Tx LLE Ankle Trimalleolar Fx by Dr. Anahi Peck [facility])  HX ORTHOPAEDIC Left 06/11/2013 [address] - LLE Ankle Removal of Hardware  HX VEIN STRIPPING Bilateral 09/18/2013 Dr. Shara Viera  GA DEBRIDEMENT OPEN WOUND 20 SQ CM<    
 
 
Family History: 
Family History Problem Relation Age of Onset  Diabetes Other  Hypertension Other Social History: 
Social History Tobacco Use  Smoking status: Never Smoker  Smokeless tobacco: Never Used Substance Use Topics  Alcohol use: No  
 Drug use: No  
 
 
Allergies: 
No Known Allergies Review of Systems Review of Systems Constitutional: Negative for chills and fever. HENT: Negative for congestion, rhinorrhea and sore throat. Respiratory: Negative for cough and shortness of breath. Cardiovascular: Negative for chest pain. Gastrointestinal: Negative for abdominal pain, constipation, diarrhea, nausea and vomiting. Genitourinary: Negative for dysuria, frequency and hematuria. Musculoskeletal: Negative for back pain and myalgias. Skin: Positive for wound. Negative for rash. Neurological: Negative for dizziness and headaches. All other systems reviewed and are negative. Physical Exam  
 
Visit Vitals /72 Pulse 65 Temp 98 °F (36.7 °C) Resp 18 SpO2 100% Physical Exam  
Constitutional: She is oriented to person, place, and time. She appears well-developed and well-nourished. No distress. HENT:  
Head: Normocephalic and atraumatic. Eyes: Conjunctivae are normal.  
Neck: Normal range of motion. Neck supple. No thyromegaly present. Cardiovascular: Normal rate, regular rhythm and normal heart sounds. Pulmonary/Chest: Effort normal and breath sounds normal. No respiratory distress. Abdominal: Soft. Bowel sounds are normal.  
Musculoskeletal: She exhibits no edema or deformity. 2 x 4 cm open wound noted to left medial ankle with small amount of purulent discharge in approximately 4 cm of surrounding erythema. Mild to moderate edema. Intact distal sensation and cap refill less than 2 seconds. Lymphadenopathy:  
  She has no cervical adenopathy. Neurological: She is alert and oriented to person, place, and time. She has normal reflexes. Skin: Skin is warm and dry. She is not diaphoretic. Psychiatric: She has a normal mood and affect. Nursing note and vitals reviewed. Diagnostic Study Results Labs - No results found for this or any previous visit (from the past 12 hour(s)). Radiologic Studies - No results found. Medical Decision Making I am the first provider for this patient. I reviewed the vital signs, available nursing notes, past medical history, past surgical history, family history and social history. Vital Signs-Reviewed the patient's vital signs. Pulse Oximetry Analysis -  100% on room air (Interpretation) Records Reviewed: Nursing Notes and Old Medical Records (Time of Review: 12:21 PM) 
 
ED Course: Progress Notes, Reevaluation, and Consults: 
 
Provider Notes (Medical Decision Making):  
differential diagnosis: Cellulitis, abscess, unlikely osteo-, unlikely sepsis Plan: Patient presents ambulatory no acute distress with the assistance of a cane. Examination of wound along with patient's story of wound improvement is consistent with successful treatment with antibiotics. She is no longer experiencing nausea and fevers. She is advised to continue antibiotics, follow-up with PCP in 1 week for reevaluation. At this time, patient is stable and appropriate for discharge home. Patient demonstrates understanding of current diagnoses and is in agreement with the treatment plan. They are advised that while the likelihood of serious underlying condition is low at this point given the evaluation performed today, we cannot fully rule it out. They are advised to immediately return with any new symptoms or worsening of current condition. All questions have been answered. Patient is given educational material regarding their diagnoses, including danger symptoms and when to return to the ED. Diagnosis Clinical Impression: 1. Encounter for wound re-check Disposition: [address] [facility] Fredis Joel Follow-up Information Follow up With Specialties Details Why Contact Info Lui Hanna MD Internal Medicine Schedule an appointment as soon as possible for a visit in 1 week  [address] [facility] [address] 
[phone] [facility] EMERGENCY DEPT Emergency Medicine Go to As needed, If symptoms worsen [address] 
[phone] Patient's Medications Start Taking No medications on file Continue Taking  ACETAMINOPHEN (TYLENOL) 500 MG TABLET    Take 500 mg by mouth every six (6) hours as needed for Pain or Fever. ASPIRIN 81 MG CHEWABLE TABLET    Take 81 mg by mouth daily. ASPIRIN 81 MG CHEWABLE TABLET    Take 81 mg by mouth daily. CHOLECALCIFEROL (VITAMIN D3) 1,000 UNIT TABLET    Take 5,000 Units by mouth daily. CYANOCOBALAMIN/FOLIC AC/VIT B6 (FOLIC ACID-VIT Q7-ZCW M72 PO)    Take 1 Tab by mouth daily. DOXYCYCLINE (MONODOX) 100 MG CAPSULE    Take 1 Cap by mouth two (2) times a day for 10 days. GABAPENTIN (NEURONTIN) 300 MG CAPSULE    Take 1 Cap by mouth three (3) times daily. 12/22/16, QTY#90, 30 Days, 2 Refills. Dr. Christophe Orta LISINOPRIL-HYDROCHLOROTHIAZIDE (PRINZIDE, ZESTORETIC) 20-25 MG PER TABLET    Take 1 Tab by mouth daily. METOPROLOL TARTRATE (LOPRESSOR) 50 MG TABLET    Take 1 Tab by mouth daily. OMEGA 3-DHA-EPA-FISH OIL (FISH OIL) 100-160-1,000 MG CAP    Take  by mouth. OTHER These Medications have changed No medications on file Stop Taking No medications on file  
 
_______________________________

## 2019-04-08 PROCEDURE — 3331090001 HH PPS REVENUE CREDIT

## 2019-04-08 PROCEDURE — 3331090002 HH PPS REVENUE DEBIT

## 2019-04-09 ENCOUNTER — HOME CARE VISIT (OUTPATIENT)
Dept: SCHEDULING | Facility: HOME HEALTH | Age: 84
End: 2019-04-09
Payer: MEDICARE

## 2019-04-09 VITALS
HEART RATE: 68 BPM | OXYGEN SATURATION: 98 % | SYSTOLIC BLOOD PRESSURE: 132 MMHG | DIASTOLIC BLOOD PRESSURE: 60 MMHG | RESPIRATION RATE: 20 BRPM | TEMPERATURE: 99 F

## 2019-04-09 PROCEDURE — 400014 HH F/U

## 2019-04-09 PROCEDURE — 3331090002 HH PPS REVENUE DEBIT

## 2019-04-09 PROCEDURE — G0300 HHS/HOSPICE OF LPN EA 15 MIN: HCPCS

## 2019-04-09 PROCEDURE — 3331090001 HH PPS REVENUE CREDIT

## 2019-04-10 PROCEDURE — 3331090002 HH PPS REVENUE DEBIT

## 2019-04-10 PROCEDURE — 3331090001 HH PPS REVENUE CREDIT

## 2019-04-11 ENCOUNTER — HOME CARE VISIT (OUTPATIENT)
Dept: SCHEDULING | Facility: HOME HEALTH | Age: 84
End: 2019-04-11
Payer: MEDICARE

## 2019-04-11 VITALS
OXYGEN SATURATION: 99 % | SYSTOLIC BLOOD PRESSURE: 136 MMHG | TEMPERATURE: 98.2 F | HEART RATE: 68 BPM | RESPIRATION RATE: 18 BRPM | DIASTOLIC BLOOD PRESSURE: 62 MMHG

## 2019-04-11 PROCEDURE — G0300 HHS/HOSPICE OF LPN EA 15 MIN: HCPCS

## 2019-04-11 PROCEDURE — 3331090001 HH PPS REVENUE CREDIT

## 2019-04-11 PROCEDURE — 3331090002 HH PPS REVENUE DEBIT

## 2019-04-12 PROCEDURE — 3331090002 HH PPS REVENUE DEBIT

## 2019-04-12 PROCEDURE — 3331090001 HH PPS REVENUE CREDIT

## 2019-04-12 NOTE — ED PROVIDER NOTES
[facility] H[facility] AND PHYSICAL EXAM  
 
 
Date: 4/5/2019 Patient Name: Alecia Matson YOB: 1935 Medical Record Number: [id number] HISTORY OF PRESENTING ILLNESS:  
 
Alecia Matson is a 81 y.o. female presenting with the noted PMH to the ED c/o mild redness surrounding her chronic left ankle wound times 1 day with no reported associated symptoms. Primary Care Provider: Jazmine Ceballos MD  
Specialist: 
 
Past Medical History:  
Past Medical History:  
Diagnosis Date  CAD (coronary artery disease)  Chronic osteomyelitis of ankle ([facility]  CKD (chronic kidney disease) stage 3, GFR 30-59 ml/min ([facility]) 06/24/2013 Worst noted (08/06/13) BUN/sCr: 54/2.49, GFR 20.   
 DM (diabetes mellitus) ([facility]  Elevated TSH 06/25/2012  
 5.08   
 Hypertension  Leukocytosis  Lumbar spinal stenosis  Menopause  Normocytic anemia  Rhabdomyolysis  Splenomegaly  Stroke ([facility]  Vitamin D deficiency Past Surgical History:  
Past Surgical History:  
Procedure Laterality Date  COLONOSCOPY N/A 3/1/2017 COLONOSCOPY performed by Karley Hurley MD at [address]   HX FREE SKIN GRAFT    
 HX HYSTERECTOMY  HX OOPHORECTOMY  HX OPEN REDUCTION INTERNAL FIXATION Left Tx LLE Ankle Trimalleolar Fx by Dr. Brett Murrell [facility])  HX ORTHOPAEDIC Left 06/11/2013 [address] - LLE Ankle Removal of Hardware  HX VEIN STRIPPING Bilateral 09/18/2013 Dr. Smith Alfonso  NH DEBRIDEMENT OPEN WOUND 20 SQ CM<    
  
 
Social History:  
Social History Tobacco Use  Smoking status: Never Smoker  Smokeless tobacco: Never Used Substance Use Topics  Alcohol use: No  
 Drug use: No  
  
 
Allergies:  
No Known Allergies REVIEW OF SYSTEMS: 
Review of Systems Constitutional: Negative for chills and fever. HENT: Negative for ear pain and sore throat. Eyes: Negative for pain and visual disturbance. Respiratory: Negative for cough and shortness of breath. Cardiovascular: Negative for chest pain and palpitations. Gastrointestinal: Negative for abdominal pain, diarrhea, nausea and vomiting. Genitourinary: Negative for flank pain. Musculoskeletal: Negative for back pain and neck pain. Skin: Positive for color change and wound. Neurological: Negative for syncope and headaches. Psychiatric/Behavioral: Negative for agitation. The patient is not nervous/anxious. PHYSICAL EXAM: 
Vitals:  
 04/05/19 1244 04/05/19 1245 04/05/19 1653 BP:  161/66 145/74 Pulse: 71  71 Resp: 16  18 Temp: 97.7 °F (36.5 °C) SpO2: 98%  100% Weight: 75.8 kg (167 lb) Height: 5' 3.5\" (1.613 m) Physical Exam  
Constitutional: She is oriented to person, place, and time. She appears well-developed and well-nourished. No distress. HENT:  
Head: Normocephalic and atraumatic. Mouth/Throat: Oropharynx is clear and moist.  
Eyes: Pupils are equal, round, and reactive to light. Conjunctivae and EOM are normal. No scleral icterus. Neck: Normal range of motion. Neck supple. No tracheal deviation present. Cardiovascular: Normal rate and regular rhythm. No murmur heard. Pulmonary/Chest: Effort normal and breath sounds normal. No respiratory distress. Abdominal: Soft. There is no tenderness. Musculoskeletal: Normal range of motion. She exhibits no deformity. Neurological: She is alert and oriented to person, place, and time. No gross neuro deficit Skin: Skin is warm and dry. No rash noted. She is not diaphoretic. There is erythema (Slight erythema surrounding her chronic left ankle wound which is ulcerated with exposed fat, metihoney in place. No crepitus or purulent drainage. ). Psychiatric: She has a normal mood and affect. Nursing note and vitals reviewed. Medications - No data to display RESULTS: 
 
Labs - Labs Reviewed CBC WITH AUTOMATED DIFF - Abnormal; Notable for the following components:  
    Result Value RBC 4.07 (*) HGB 11.2 (*) MCHC 30.7 (*) All other components within normal limits METABOLIC PANEL, BASIC - Abnormal; Notable for the following components:  
 Chloride 109 (*) Glucose 102 (*) BUN 29 (*)   
 BUN/Creatinine ratio 24 (*)   
 GFR est AA 51 (*)   
 GFR est non-AA 42 (*) All other components within normal limits Radiologic Studies - 
IMPRESSION: 
  
  
1. Advanced ankle, hindfoot, and midfoot osteoarthrosis without significant 
change from prior. 2. Mild and diffuse soft tissue swelling of the lower extremity without retained 
radiopaque foreign object. MEDICAL DECISION MAKING 
 
81 y.o. female with noted past medical history who presented with a chronic ulcer of the left ankle with some mild surrounding erythema consistent with early cellulitis. Her symptoms were mild and she had just seen her podiatrist yesterday. She has been treated successfully for cellulitis with doxycycline in the past.  Patient has no evidence of systemic illness, and is afebrile here with a normal white count. X-ray shows no evidence of osteomyelitis. Patient has no new complaints, changes, or physical findings. Results were reviewed with the patient. Pt's questions and concerns were addressed. Care plan was outlined, including follow-up with PCP/specialist and return precautions were discussed. Patient is felt to be stable for discharge at this time. Diagnosis Clinical Impression: 1. Cellulitis of left lower extremity 2. 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 
[phone] Arnold Mattson MD Internal Medicine   [address] 
[address] [place] [address] 
[phone] Please follow-up with Dr. Carol Campbell on Monday Discharge Medication List as of 4/5/2019  4:36 PM  
  
START taking these medications Details  
doxycycline (MONODOX) 100 mg capsule Take 1 Cap by mouth two (2) times a day for 10 days. , Print, Disp-20 Cap, R-0  
  
  
CONTINUE these medications which have NOT CHANGED Details  
aspirin 81 mg chewable tablet Take 81 mg by mouth daily. , Historical Med  
  
omega 3-dha-epa-fish oil (FISH OIL) 100-160-1,000 mg cap Take  by mouth., Historical Med OTHER Historical Med  
  
metoprolol tartrate (LOPRESSOR) 50 mg tablet Take 1 Tab by mouth daily. , Print, Disp-30 Tab, R-0  
  
gabapentin (NEURONTIN) 300 mg capsule Take 1 Cap by mouth three (3) times daily. 12/22/16, QTY#90, 30 Days, 2 Refills. Dr. Chip Dominique, Historical Med, R-2  
  
cholecalciferol (VITAMIN D3) 1,000 unit tablet Take 3,000 Units by mouth daily. , Historical Med  
  
lisinopril-hydrochlorothiazide (PRINZIDE, ZESTORETIC) 20-25 mg per tablet Take 1 Tab by mouth daily. Historical Med, 1 Tab  
  
acetaminophen (TYLENOL) 500 mg tablet Take 500 mg by mouth every six (6) hours as needed for Pain or Fever., Historical Med  
  
  
 
 
_______________________________ Attestations:

## 2019-04-13 ENCOUNTER — HOME CARE VISIT (OUTPATIENT)
Dept: SCHEDULING | Facility: HOME HEALTH | Age: 84
End: 2019-04-13
Payer: MEDICARE

## 2019-04-13 VITALS
HEART RATE: 68 BPM | SYSTOLIC BLOOD PRESSURE: 134 MMHG | OXYGEN SATURATION: 98 % | RESPIRATION RATE: 18 BRPM | DIASTOLIC BLOOD PRESSURE: 66 MMHG | TEMPERATURE: 98.7 F

## 2019-04-13 PROCEDURE — G0300 HHS/HOSPICE OF LPN EA 15 MIN: HCPCS

## 2019-04-13 PROCEDURE — 3331090002 HH PPS REVENUE DEBIT

## 2019-04-13 PROCEDURE — 3331090001 HH PPS REVENUE CREDIT

## 2019-04-14 PROCEDURE — 3331090002 HH PPS REVENUE DEBIT

## 2019-04-14 PROCEDURE — 3331090001 HH PPS REVENUE CREDIT

## 2019-04-15 PROCEDURE — 3331090002 HH PPS REVENUE DEBIT

## 2019-04-15 PROCEDURE — 3331090001 HH PPS REVENUE CREDIT

## 2019-04-16 ENCOUNTER — HOME CARE VISIT (OUTPATIENT)
Dept: HOME HEALTH SERVICES | Facility: HOME HEALTH | Age: 84
End: 2019-04-16
Payer: MEDICARE

## 2019-04-16 PROCEDURE — 3331090001 HH PPS REVENUE CREDIT

## 2019-04-16 PROCEDURE — 3331090002 HH PPS REVENUE DEBIT

## 2019-04-17 ENCOUNTER — HOME CARE VISIT (OUTPATIENT)
Dept: SCHEDULING | Facility: HOME HEALTH | Age: 84
End: 2019-04-17
Payer: MEDICARE

## 2019-04-17 PROCEDURE — 3331090002 HH PPS REVENUE DEBIT

## 2019-04-17 PROCEDURE — 3331090001 HH PPS REVENUE CREDIT

## 2019-04-18 ENCOUNTER — HOSPITAL ENCOUNTER (OUTPATIENT)
Dept: WOUND CARE | Age: 84
Discharge: HOME | End: 2019-04-18
Payer: MEDICARE

## 2019-04-18 VITALS
OXYGEN SATURATION: 98 % | HEART RATE: 62 BPM | TEMPERATURE: 97.5 F | DIASTOLIC BLOOD PRESSURE: 49 MMHG | SYSTOLIC BLOOD PRESSURE: 115 MMHG | RESPIRATION RATE: 18 BRPM

## 2019-04-18 PROCEDURE — 11042 DBRDMT SUBQ TIS 1ST 20SQCM/<: CPT

## 2019-04-18 PROCEDURE — A6021 COLLAGEN DRESSING <=16 SQ IN: HCPCS | Performed by: PODIATRIST

## 2019-04-18 PROCEDURE — 3331090001 HH PPS REVENUE CREDIT

## 2019-04-18 PROCEDURE — 77030013575 HC DRSG HYDROFERA HOLL -B: Performed by: PODIATRIST

## 2019-04-18 PROCEDURE — 74011000250 HC RX REV CODE- 250: Performed by: PODIATRIST

## 2019-04-18 PROCEDURE — 3331090002 HH PPS REVENUE DEBIT

## 2019-04-18 PROCEDURE — 77030011256 HC DRSG MEPILEX <16IN NO BORD MOLN -A: Performed by: PODIATRIST

## 2019-04-18 RX ORDER — LIDOCAINE AND PRILOCAINE 25; 25 MG/G; MG/G
CREAM TOPICAL
Status: COMPLETED | OUTPATIENT
Start: 2019-04-18 | End: 2019-04-18

## 2019-04-18 RX ORDER — DOXYCYCLINE 100 MG/1
100 CAPSULE ORAL DAILY
Qty: 12 CAP | Refills: 0 | Status: SHIPPED | OUTPATIENT
Start: 2019-04-18 | End: 2019-05-06

## 2019-04-18 RX ADMIN — LIDOCAINE AND PRILOCAINE 5 G: 25; 25 CREAM TOPICAL at 09:03

## 2019-04-18 NOTE — WOUND CARE
Patient here for non healing left medial ankle ulcer. Patient states the dressing has been on since Saturday as her home health nurses have not been able to connect with her for changes. She has been to the ED since her last visit with diagnosis of cellulitis and was placed on doxycycline. She has finished this prescription. This nurse requested that patient discuss with home care company directly regarding day and time availability. The wound is measuring bigger. Dressing changed to Endoform and Hydrofera Blue. She has an appointment with Dr. Olu Garcia in May for evaluation of possible skin graft surgery. She had ultrasound last week at [address] Vascular. Dr. Marquita Lilly prescribed antibiotic and changed dressing. She will return in 2 weeks for follow up with Dr. Marquita Lilly.           04/18/19 0857 04/18/19 0929   Wound Ankle Left;Medial;Proximal   Date First Assessed: 11/16/17   POA: Yes  Wound Type: Vascular  Location: Ankle  Orientation: Left;Medial;Proximal   Dressing Status  Removed  --    Dressing Type  Other (Comment)  (Hypafix, gauze, Alginate, Medihoney)  --    Non-Pressure Injury Full thickness (subcut/muscle)  --    Wound Length (cm) 4.1 cm  --    Wound Width (cm) 2.1 cm  --    Wound Depth (cm) 0.6  --    Wound Surface area (cm^2) 8.61 cm^2  --    Change in Wound Size % -889.66  --    Condition of Base Pink;Slough  --    Condition of Edges Open  --    Tissue Type Pink;Yellow  --    Tissue Type Percent Pink 40  --    Tissue Type Percent Yellow 60  --    Drainage Amount  Moderate  --    Drainage Color Serosanguinous  --    Wound Odor None  --    Periwound Skin Condition Erythema, blanchable  --    Cleansing and Cleansing Agents  Dermal wound cleanser  --    Dressing Type Applied  --  Other (Comment)  (Endoform, Hydrofera Blue, foam, Hypafix tape)   Procedure Tolerated  --  Well

## 2019-04-18 NOTE — DISCHARGE INSTRUCTIONS
Wound Care Instructions    Patient: Tosha Moore MRN: [id number]  SSN: [id number]     YOB: 1935  Age:83 y.o. Sex: female       Ms. Buenrostrodayanara Rodriguez DPM recommends the following discharge instruction:    Offloading    []   Felt/Foam Offloading   [] Removable Cast Waker       []   Wedge Shoe   []  Wheelchair     []   Total Contact Cast   []  Surgical Shoe     []   Crutches        Other   Mattress:   Other:     Edema Control    []   Elevated legs as much as possible. Recommend above level of heart.     []   Layered Wraps             []   Left      []   Right      []  BILATERAL          - Type:            []   Unna Boot       []  Multi-Layer                                   []   Two Layers     []  Three Layers   []  Four Layers     []   Tubular Bandages        []   Left      []   Right     SIZE:      []   Stockings                      []   Left      []   Right     []   Compression Pump     []   Left      []   Right    ___ millimeters of mercury  ___ millimeters of mercury for ___ minutes for ___ day(s)        Other:       Nutritional Supplements    []  Multi-Vitamin     []  Other:       [employer]     [x]  Home Health: [facility]     []  Long Term Care:      []  DME:     Consult    []   Nutrition     [x]   Vascular: Has follow up appointment 4/30/19 at [facility]     []   Orthotist/Pedorthotist     []   Infectious Disease     []   Lymphedema Therapist   Other:     Dressings:  Another brand of generically equivalent product may be dispensed unless specifically ordered otherwise.     Home Ulcer/wound Hygiene (cleanse with)      []   Distilled Water     []   Normal Saline     [x]   Wound Cleanser     []   Mild antimicrobial soap and water     []   Chlorhexidine liquid soap and water     []   Other:     Apply    []   Hydrogel   []  Hypergel   []   Aquacel Ag     []   Cadexomer Iodine                     (Iodosorb)   []  Silver Alginate    []   Medihoney:     []   Collagenase:Santyl   [] Calcium Alginate   [x]   Collagen: ENDOFORM followed with HYDROFERA BLUE     []   Plain Foam   []  Non-Adherent Contact                Layer   []   Xeroform     []   Silver foam   []   Hydrocolloid        []   Acticoat   []   Adaptic:      Other/Specific Instructions:    Cover With    []   Dry Gauze and Roll Gauze     []   Foam and Roll Gauze     []   Dry Gauze     []   Bordered gauze:     [x]   Foam      []    Bordered         []   Nonbordered     [x]   Secure with Hypafix Tape     []   Other       Ligia-Ulcer Care    []   Cream     []   Lotion     []   Ointment     []   Barrier     []   Other:     Change Dressing    []   Once Daily     []   Every Other Day     []   Every 3 Days       []   Every 5 Days     []   Every 7 Days     []   Do Not Change       []   2 x per week (Mon and Thurs. )     [x]   3 x per week (Mon, Wed, Fri. )     []   Other          Follow-Up:   [x]  Follow up 2 weeks 5/2/19                             []  As Needed (PRN)     []   Sabina Stanford MD    []  Jabier Douglas DPM    [x]  Joy Peres DPM   []   Ricardo Alexander DPM      []   Nurse Visit            Please call the wound clinic ([phone]) regarding any questions or concerns.

## 2019-04-18 NOTE — PROGRESS NOTES
Progress and Debridement Note    Patient: Aparna Davis MRN: [id number]  SSN: [id number]    YOB: 1935  Age: 81 y.o. 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 Will change LWC to Endoform and Hydrofera Blue Q 48 hrs. Patient will be placed on suppressing Doxycycline 1 po Q day x another 14 days. Subjective:       Ms Sriram Jo is a 82 y/o female who present with family  For chronic ulcer left ankle. s/p Theraskin application 22/56, 48/47/41, 11/01/18, Patient states she had vascular intervention on 01/30/19 and had a vascular appointment on April 8 with an appointment with the vascular provider on April 30th.  Patient was seen in the ER 04/05/19 and diagnosed with Cellulitis and placed on Doxycycline        Objective:     Patient Vitals for the past 24 hrs:   Temp Pulse Resp BP SpO2   04/18/19 0854 97.5 °F (36.4 °C) 62 18 115/49 98 %       Physical Exam:     General Exam  alert, cooperative, no distress, appears stated age    REVIEW OF SYSTEMS:  General: denies chronic fatigue, weight loss, fever, anemia, bruising, depression, nervousness, panic attacks  HEENT: denies ringing in ears, ear infections, dizzy spells, poor vision, glaucoma, sinus trouble, hoarseness, eye infections  GI: denies diarrhea, gas, bloating, heartburn, regurgitation, difficulty swallowing, painful swallowing, nausea, vomiting, constipation, abdominal pain, decreased appetite, blood in stools, black stools, jaundice, dark urine  Lungs: denies pneumonia, asthma, cough, SOB, hemoptysis  Heart: denies chest pain, irregular heart beat, ankle swelling, HTN  Skin: denies rashes, hives, allergic reaction  Urinary: denies UTI, kidney stones, decreased urine force and flow, urination at night, blood in urine, painful urination  Bones and Joints: denies arthritis, rheumatism, ankle pain, gout, osteoporosis  Neurologic: denies stroke, seizures, headaches, numbness, tingling      VASCULAR EXAM:. Pedal pulses are  0/4 DP and 0/4 PT right and left foot. Skin temperature is warm to warm right and left foot. Digital capillary fill time is 4 seconds right and left foot. There is mild edema of the right and left foot and ankle. Pedal hair is not noted bilateral       NEUROLOGICAL EXAM:. Sensation Intact with 5.07g monofilament wire right and left foot. Deep tendon reflexes intact and symmetrical on the right and left foot.      MUSCULOSKELETAL EXAM:. Muscle tone is normal.  Muscle strength of the flexor and extensor group inversion and eversion Bilateral. 4/5.         DERMATOLOGICAL EXAM:. Skin is of abnormal texture and turgor with atrophic skin changes noting decrease hair growth, nail changes (thickening), Bilateral. There is still epithelialized lesions dorsal and medial forefoot left foot.      Subhyperkeratotic lesion noted over the medial aspect of the left ankle with increase in measurements as stated below  from as stated below.   There is still periwound erythema noted with  no asc lymphagitis      Wound Ankle Left;Medial;Proximal (Active)   Dressing Status  Removed 4/18/2019  8:57 AM   Dressing Type  Other (Comment) 4/18/2019  8:57 AM   Non-Pressure Injury Full thickness (subcut/muscle) 4/18/2019  8:57 AM   Wound Length (cm) 4.1 cm 4/18/2019  8:57 AM   Wound Width (cm) 2.1 cm 4/18/2019  8:57 AM   Wound Depth (cm) 0.6 4/18/2019  8:57 AM   Wound Surface area (cm^2) 8.61 cm^2 4/18/2019  8:57 AM   Change in Wound Size % -889.66 4/18/2019  8:57 AM   Condition of Base [place];Slough 4/18/2019  8:57 AM   Condition of Edges Open 4/18/2019  8:57 AM   Tissue Type Pink;Yellow 4/18/2019  8:57 AM   Tissue Type Percent Pink 40 4/18/2019  8:57 AM   Tissue Type Percent Red 50 2/7/2019  9:45 AM   Tissue Type Percent Yellow 60 4/18/2019  8:57 AM   Drainage Amount  Moderate 4/18/2019  8:57 AM   Drainage Color Serosanguinous 4/18/2019  8:57 AM   Wound Odor None 4/18/2019  8:57 AM   Periwound Skin Condition Erythema, blanchable 4/18/2019  8:57 AM   Cleansing and Cleansing Agents  Dermal wound cleanser 4/18/2019  8:57 AM   Dressing Type Applied Other (Comment) 4/18/2019  9:29 AM   Wound Procedure Type Skin Substitue 12/20/2018 11:16 AM   Procedure Time Out 1116 12/20/2018 11:16 AM   Consent Obtained  Yes 12/20/2018 11:16 AM   Procedure Bleeding No 11/15/2018 11:36 AM   Procedure Pain Scale Numeric 3/10 12/20/2018 11:16 AM   Skin Substitute Applied  Other 12/20/2018 11:16 AM   Post Procedure Pain Scale Numeric 3/10 11/15/2018 11:36 AM   Procedure Tolerated Well 4/18/2019  9:29 AM   Number of days: 518       [REMOVED] Wound Ankle Left (Removed)   Number of days: 5356       [REMOVED] Wound Ankle Left (Removed)   Number of days: 2900       [REMOVED] Wound Ankle Left (Removed)   Number of days: 8348       [REMOVED] Wound Ankle Left;Lateral (Removed)   Number of days: 470       [REMOVED] Wound Ankle Right (Removed)   Dressing Status  Removed 12/21/2017  9:42 AM   Dressing Type  Other (Comment) 12/21/2017  9:42 AM   Non-Pressure Injury Partial thickness (epider/derm) 12/21/2017  9:42 AM   Wound Length (cm) 0.7 cm 12/21/2017  9:42 AM   Wound Width (cm) 0.8 cm 12/21/2017  9:42 AM   Wound Depth (cm) 0.1 12/21/2017  9:42 AM   Wound Surface area (cm^2) 0.06 cm^2 12/21/2017  9:42 AM   Condition of Base [facility] 12/21/2017  9:42 AM   Condition of Edges Open 12/21/2017  9:42 AM   Tissue Type Yellow 12/21/2017  9:42 AM   Tissue Type Percent Red 50 11/16/2017 10:08 AM   Tissue Type Percent Yellow 100 12/21/2017  9:42 AM   Drainage Amount  Moderate 12/21/2017  9:42 AM   Drainage Color Serous 12/21/2017  9:42 AM   Wound Odor None 12/21/2017  9:42 AM   Periwound Skin Condition Erythema, blanchable 12/21/2017  9:42 AM   Cleansing and Cleansing Agents  Dermal wound cleanser; Soap and water 12/21/2017  9:42 AM   Dressing Type Applied Other (Comment) 12/21/2017  9:42 AM   Procedure Tolerated Well 12/21/2017  9:42 AM   Number of days: 470       [REMOVED] Wound Ankle Lateral;Right (Removed)   Number of days: 215       [REMOVED] Wound Ankle Left;Lateral (Removed)   Number of days: 215       [REMOVED] Wound Ankle Left;Medial (Removed)   Dressing Status  Removed 6/21/2018  8:46 AM   Dressing Type  Other (Comment) 6/21/2018  8:46 AM   Non-Pressure Injury Full thickness (subcut/muscle) 6/21/2018  8:46 AM   Wound Length (cm) 1.4 cm 6/21/2018  8:46 AM   Wound Width (cm) 0.6 cm 6/21/2018  8:46 AM   Wound Depth (cm) 0.2 6/21/2018  8:46 AM   Wound Surface area (cm^2) 0.84 cm^2 6/21/2018  8:46 AM   Condition of Base [place];Slough 6/21/2018  8:46 AM   Condition of Edges Open 6/21/2018  8:46 AM   Tissue Type Pink;Yellow 6/21/2018  8:46 AM   Tissue Type Percent Pink 20 6/21/2018  8:46 AM   Tissue Type Percent Red 80 5/31/2018  8:29 AM   Tissue Type Percent Yellow 80 6/21/2018  8:46 AM   Drainage Amount  Small  6/21/2018  8:46 AM   Drainage Color Serosanguinous 6/21/2018  8:46 AM   Wound Odor None 6/21/2018  8:46 AM   Periwound Skin Condition Edematous; Erythema, blanchable 6/21/2018  8:46 AM   Cleansing and Cleansing Agents  Dermal wound cleanser 6/21/2018  8:46 AM   Dressing Type Applied Other (Comment) 6/21/2018  8:46 AM   Procedure Tolerated Well 6/21/2018  8:46 AM   Number of days: 229       [REMOVED] Wound Ankle Left;Medial;Distal (Removed)   Dressing Status  Removed 6/21/2018  8:46 AM   Dressing Type  Other (Comment) 6/21/2018  8:46 AM   Non-Pressure Injury Full thickness (subcut/muscle) 6/21/2018  8:46 AM   Wound Length (cm) 1.1 cm 6/21/2018  8:46 AM   Wound Width (cm) 0.6 cm 6/21/2018  8:46 AM   Wound Depth (cm) 0.2 6/21/2018  8:46 AM   Wound Surface area (cm^2) 0.66 cm^2 6/21/2018  8:46 AM   Condition of Base [place];Slough 6/21/2018  8:46 AM   Condition of Edges Open 6/21/2018  8:46 AM   Epithelialization (%) 100 5/17/2018  8:44 AM   Tissue Type Pink;Yellow 6/21/2018  8:46 AM   Tissue Type Percent Pink 50 6/21/2018  8:46 AM   Tissue Type Percent Yellow 50 6/21/2018  8:46 AM   Tissue Type Percent Other (comment) 40 2/15/2018 10:52 AM   Drainage Amount  Scant 6/21/2018  8:46 AM   Drainage Color Serosanguinous 6/21/2018  8:46 AM   Wound Odor None 6/21/2018  8:46 AM   Periwound Skin Condition Edematous; Erythema, blanchable;Petechiae 6/21/2018  8:46 AM   Cleansing and Cleansing Agents  Dermal wound cleanser 6/21/2018  8:46 AM   Dressing Type Applied Other (Comment) 6/21/2018  8:46 AM   Procedure Tolerated Well 6/21/2018  8:46 AM   Number of days: 332       [REMOVED] Wound Toe Left (Removed)   Dressing Type  Open to air 12/7/2017  9:56 AM   Non-Pressure Injury Partial thickness (epider/derm) 11/16/2017 10:08 AM   Wound Length (cm) 0.6 cm 11/16/2017 10:08 AM   Wound Width (cm) 1.4 cm 11/16/2017 10:08 AM   Wound Depth (cm) 0.1 11/16/2017 10:08 AM   Wound Surface area (cm^2) 0.08 cm^2 11/16/2017 10:08 AM   Condition of Base Other (comment) 11/16/2017 10:08 AM   Tissue Type Yellow 11/16/2017 10:08 AM   Tissue Type Percent Yellow 100 11/16/2017 10:08 AM   Drainage Amount  None 11/16/2017 10:08 AM   Wound Odor None 11/16/2017 10:08 AM   Cleansing and Cleansing Agents  Dermal wound cleanser 11/16/2017 10:08 AM   Dressing Type Applied Open to air 11/16/2017 10:08 AM   Number of days: 207       [REMOVED] Wound Toe Left (Removed)   Dressing Status  Removed 11/16/2017 10:08 AM   Dressing Type  Open to air 12/7/2017  9:56 AM   Non-Pressure Injury Partial thickness (epider/derm) 11/16/2017 10:08 AM   Wound Length (cm) 0.3 cm 11/16/2017 10:08 AM   Wound Width (cm) 0.4 cm 11/16/2017 10:08 AM   Wound Depth (cm) 0.1 11/16/2017 10:08 AM   Wound Surface area (cm^2) 0.01 cm^2 11/16/2017 10:08 AM   Condition of Base Other (comment) 11/16/2017 10:08 AM   Tissue Type Yellow 11/16/2017 10:08 AM   Tissue Type Percent Yellow 100 11/16/2017 10:08 AM   Drainage Amount  None 11/16/2017 10:08 AM   Wound Odor None 11/16/2017 10:08 AM   Periwound Skin Condition Intact 11/16/2017 10:08 AM   Dressing Type Applied Open to air 11/16/2017 10:08 AM   Number of days: 207       [REMOVED] Wound Ankle Left;Lateral (Removed)   Dressing Status  Removed 12/21/2017  9:42 AM   Dressing Type  Other (Comment) 12/21/2017  9:42 AM   Non-Pressure Injury Partial thickness (epider/derm) 12/21/2017  9:42 AM   Wound Length (cm) 1.1 cm 12/21/2017  9:42 AM   Wound Width (cm) 0.9 cm 12/21/2017  9:42 AM   Wound Depth (cm) 0.1 12/21/2017  9:42 AM   Wound Surface area (cm^2) 0.1 cm^2 12/21/2017  9:42 AM   Condition of Base [place];Slough 12/21/2017  9:42 AM   Condition of Edges Open 12/21/2017  9:42 AM   Tissue Type Pink;Yellow 12/21/2017  9:42 AM   Tissue Type Percent Pink 10 11/16/2017 10:08 AM   Tissue Type Percent Red 50 12/21/2017  9:42 AM   Tissue Type Percent Yellow 50 12/21/2017  9:42 AM   Drainage Amount  Moderate 12/21/2017  9:42 AM   Drainage Color Serous 12/21/2017  9:42 AM   Wound Odor None 12/21/2017  9:42 AM   Periwound Skin Condition Erythema, blanchable;Edematous 12/21/2017  9:42 AM   Cleansing and Cleansing Agents  Dermal wound cleanser; Soap and water 12/21/2017  9:42 AM   Dressing Type Applied Other (Comment) 12/21/2017  9:42 AM   Procedure Tolerated Well 12/21/2017  9:42 AM   Number of days: 332       [REMOVED] Wound Foot Left;Medial;Distal (Removed)   Dressing Status  Removed 11/1/2018  9:15 AM   Dressing Type  Other (Comment) 7/5/2018 10:27 AM   Non-Pressure Injury Full thickness (subcut/muscle) 7/5/2018 10:27 AM   Wound Length (cm) 0.3 cm 11/1/2018  9:15 AM   Wound Width (cm) 0.5 cm 11/1/2018  9:15 AM   Wound Depth (cm) 0.3 7/5/2018 10:27 AM   Wound Surface area (cm^2) 0.15 cm^2 11/1/2018  9:15 AM   Change in Wound Size % 25 11/1/2018  9:15 AM Condition of [facility] 7/5/2018 10:27 AM   Condition of Edges Open 7/5/2018 10:27 AM   Tissue Type Pink;Yellow 7/5/2018 10:27 AM   Tissue Type Percent Pink 10 7/5/2018 10:27 AM   Tissue Type Percent Red 100 9/20/2018  8:13 AM   Tissue Type Percent Yellow 100 11/1/2018  9:15 AM   Drainage Amount  Large 7/5/2018 10:27 AM   Drainage Color Serosanguinous 7/5/2018 10:27 AM   Wound Odor None 7/5/2018 10:27 AM   Periwound Skin Condition Erythema, blanchable 7/5/2018 10:27 AM   Cleansing and Cleansing Agents  Normal saline 7/5/2018 10:27 AM   Dressing Type Applied Other (Comment) 7/5/2018 10:27 AM   Wound Procedure Type Skin Substitue 11/1/2018 10:05 AM   Procedure Time Out 1000 11/1/2018 10:05 AM   Consent Obtained  Yes 11/1/2018 10:05 AM   Skin Substitute Applied  Other 11/1/2018 10:05 AM   Procedure Tolerated Well 7/5/2018 10:27 AM   Number of days: 287       [REMOVED] Wound Leg Lower Right; Anterior; Lateral (Removed)   Dressing Type  Open to air 2/15/2018 11:36 AM   Non-Pressure Injury Partial thickness (epider/derm) 2/1/2018  1:05 PM   Wound Length (cm) 0.9 cm 2/1/2018  1:05 PM   Wound Width (cm) 0.6 cm 2/1/2018  1:05 PM   Wound Depth (cm) 0.1 2/1/2018  1:05 PM   Wound Surface area (cm^2) 0.54 cm^2 2/1/2018  1:05 PM   Condition of Base [place] 2/1/2018  1:05 PM   Condition of Edges Open 2/1/2018  1:05 PM   Tissue Type Red 2/1/2018  1:05 PM   Tissue Type Percent Red 100 2/1/2018  1:05 PM   Drainage Amount  Scant 2/1/2018  1:05 PM   Drainage Color Serosanguinous 2/1/2018  1:05 PM   Wound Odor None 2/1/2018  1:05 PM   Periwound Skin Condition Edematous; Erythema, blanchable 2/1/2018  1:05 PM   Cleansing and Cleansing Agents  Dermal wound cleanser 2/1/2018  1:05 PM   Dressing Type Applied Other (Comment) 2/1/2018  1:05 PM   Procedure Tolerated Well 2/1/2018  1:05 PM   Number of days: 73       [REMOVED] Wound Leg Lower Anterior;Left;Medial (Removed)   Number of days: 669       [REMOVED] Wound Foot Left;Dorsal (Removed) Dressing Status  Removed 7/5/2018 10:27 AM   Dressing Type  Gauze 7/5/2018 10:27 AM   Non-Pressure Injury Full thickness (subcut/muscle) 7/5/2018 10:27 AM   Wound Length (cm) 0 cm 9/20/2018  8:13 AM   Wound Width (cm) 0 cm 9/20/2018  8:13 AM   Wound Depth (cm) 0.3 7/5/2018 10:27 AM   Wound Surface area (cm^2) 0 cm^2 9/20/2018  8:13 AM   Change in Wound Size % 100 9/20/2018  8:13 AM   Condition of Base [place] 7/5/2018 10:27 AM   Condition of Edges Open 6/28/2018  8:53 AM   Tissue Type Pink;Yellow 7/5/2018 10:27 AM   Tissue Type Percent Pink 75 7/5/2018 10:27 AM   Tissue Type Percent Red 25 7/5/2018 10:27 AM   Tissue Type Percent Yellow 50 6/28/2018  8:53 AM   Drainage Amount  Small  7/5/2018 10:27 AM   Drainage Color Serous 7/5/2018 10:27 AM   Wound Odor None 6/28/2018  8:53 AM   Periwound Skin Condition Erythema, blanchable 7/5/2018 10:27 AM   Cleansing and Cleansing Agents  Normal saline 7/5/2018 10:27 AM   Dressing Type Applied Other (Comment) 7/5/2018 10:27 AM   Procedure Tolerated Well 7/5/2018 10:27 AM   Number of days: 102       [REMOVED] Wound Ankle Lateral (Removed)   Number of days: 4       [REMOVED] Wound Arm Left;Right (Removed)   Number of days: 4       [REMOVED] Wound Ankle Left;Medial (Removed)   Number of days: 27          Lab/Data Review:  No results found for this or any previous visit (from the past 24 hour(s)).       Component Value Flag Ref Range Units Status   Special Requests:      Final   NO SPECIAL REQUESTS    GRAM STAIN RARE WBC'S      Final   GRAM STAIN NO ORGANISMS SEEN      Final   Culture result: Abnormal       Final   FEW PROTEUS PENNERI    Culture result: Abnormal       Final   FEW STAPHYLOCOCCUS AUREUS    Culture result: FEW DIPHTHEROIDS  Abnormal      Final   Susceptibility     Proteus penneri     Antibiotic Interpretation Value Method Comment   Ampicillin ($) Resistant >=32 ug/mL NACHO    Ampicillin/sulbactam ($) Susceptible 8 ug/mL NACHO    Cefazolin ($) Resistant >=64 ug/mL NACHO Cefepime ($$) Susceptible <=1 ug/mL NACHO    Ceftazidime ($) Susceptible <=1 ug/mL NACHO    Ceftriaxone ($) Susceptible <=1 ug/mL NACHO    Ciprofloxacin ($) Susceptible <=0.25 ug/mL NACHO    Gentamicin ($) Susceptible <=1 ug/mL NACHO    Imipenem Intermediate 8 ug/mL NACHO    Levofloxacin ($) Susceptible <=0.12 ug/mL NACHO    Piperacillin/Tazobac ($) Susceptible <=4 ug/mL NACHO    Tobramycin ($) Susceptible <=1 ug/mL NACHO    Trimeth-Sulfamethoxa Susceptible <=20 ug/mL NACHO    Staphylococcus aureus     Antibiotic Interpretation Value Method Comment   Ampicillin/sulbactam ($) Susceptible DEDUCED SENSITIVE ug/mL NACHO    Penicillin G ($$) Resistant >=0.5 ug/mL NACHO    Cefazolin ($) Susceptible DEDUCED SENSITIVE ug/mL NACHO    Clindamycin ($) Susceptible <=0.25 ug/mL NACHO    Erythromycin ($$$$) Susceptible <=0.25 ug/mL NACHO    Gentamicin ($) Susceptible <=0.5 ug/mL NACHO    Levofloxacin ($) Susceptible 0.25 ug/mL NACHO    Oxacillin Susceptible <=0.25 ug/mL NACHO    Rifampin ($$$$) Susceptible <=0.5 ug/mL NACHO    Tetracycline Susceptible <=1 ug/mL NACHO    Vancomycin ($) Susceptible <=0.5 ug/mL NACHO    Trimeth-Sulfamethoxa Susceptible <=10 ug/mL NACHO    Tigecycline ($$$$) Susceptible <=0.12 ug/mL NACHO     Condensed View   Susceptibility     Proteus penneri (1)     Antibiotic Interpretation Method Status    Ampicillin ($) Resistant NACHO Final    Ampicillin/sulbactam ($) Susceptible NACHO Final    Cefazolin ($) Resistant NACHO Final    Cefepime ($$) Susceptible NACHO Final    Ceftazidime ($) Susceptible NACHO Final    Ceftriaxone ($) Susceptible NACHO Final    Ciprofloxacin ($) Susceptible NACHO Final    Gentamicin ($) Susceptible NACHO Final    Imipenem Intermediate NACHO Final    Levofloxacin ($) Susceptible NACHO Final    Piperacillin/Tazobac ($) Susceptible NACHO Final    Tobramycin ($) Susceptible NACHO Final    Trimeth-Sulfamethoxa Susceptible NACHO Final    Staphylococcus aureus (2)     Antibiotic Interpretation Method Status    Ampicillin/sulbactam ($) Susceptible NACHO Final Penicillin G ($$) Resistant NAHCO Final    Cefazolin ($) Susceptible NACHO Final    Clindamycin ($) Susceptible NACHO Final    Erythromycin ($$$$) Susceptible NACHO Final    Gentamicin ($) Susceptible NACHO Final    Levofloxacin ($) Susceptible NACHO Final    Oxacillin Susceptible NACHO Final    Rifampin ($$$$) Susceptible NACHO Final    Tetracycline Susceptible NACHO Final    Vancomycin ($) Susceptible NACHO Final    Trimeth-Sulfamethoxa Susceptible NACHO Final    Tigecycline ($$$$) Susceptible NACHO Final     Condensed View   Lab and Collection     CULTURE, WOUND Jewell Kennedy (Order: [id number]) - 4/4/2019         PROCEDURE    Selective sharp instrument excisional debridement of slough and devitilized tissue    After the benefits/risks/SE were discussed, the patient agreed to proceed. Time out was done:   * Patient was identified by name and date of birth   * Agreement on procedure being performed was verified   * Procedure site verified and marked as necessary   * Patient was positioned for comfort   * Consent was signed and verified. Site: medial left ankle    Instruments used:    [x]  Dermal curette  [] Blade        [] #15  [] #10  [] Forceps  [] Tissue nippers  [] sterile scissors  [] Other     Anesthesia:    [x]  EMLA 2.5% cream: applied to wound beds for approximately 15minutes. []   Lidocaine 2% Topical Gel      []  Lidocaine injectable 1% with epinephrine 1:100,000    []  Lidocaine injectable 1% without epinephrine    []  Other:     []  None         [] patient is insensate due to neuropathy         [] patient declines        [] allergy to anesthetic        [] tissue for debridement is either superficial, loosely adherent and/or necrotic and denervated     After satisfactory anesthesia achieved, the wound/s was/were sharply debrided necrotic, devitalized and granulation tissue down to the sub Q layer, revealing a clean and viable wound bed. Post debridement measurement was 4.2_x_2.2_x0.5_cm .      Bleeding: <5mL Resolved with light focal pressure. Wounds were cleaned and irrigated with saline. Wound care applied:   []   Hydrogell   []  Hypergel   []   Hydrofiber/Aquacel      []   Cadexomer Iodine (Iodosorb)   []  Silver Alginate    []   Medihoney:    []   Collagenase:Santyl   []  Calcium Alginate   [x]   Collagen:Endoform    [x]   Foam Hydrofera Blue ready   []  Non-Adherent Contact Layer   []   Xeroform    []   Adaptec:   []   Hydrocolloid   []   Transparent Film    []  Antibiotic ointment/cream     []   Other (see below)     Other:     []   Dry Gauze and Roll Gauze    []   Foam and Roll Gauze    []   Dry Gauze    []   Bordered gauze:     []   Secure with Tape    []   Bordered foam       [x]   Other  omniflex    []   Compression Wrap:          []   Unna Boot    []Multi-Layer    []Tubular Bandages       Ligia-Ulcer Care    []   Cream     []   Lotion     []   Ointment     []   Barrier     []   Other:       The patient tolerated the procedure well with no complications. The patient left the exam room in satisfactory and stable condition.      Signed By: Anthony Tompkins DPM     April 18, 2019 9:38 AM

## 2019-04-19 ENCOUNTER — HOME CARE VISIT (OUTPATIENT)
Dept: SCHEDULING | Facility: HOME HEALTH | Age: 84
End: 2019-04-19
Payer: MEDICARE

## 2019-04-19 PROCEDURE — 3331090002 HH PPS REVENUE DEBIT

## 2019-04-19 PROCEDURE — 3331090001 HH PPS REVENUE CREDIT

## 2019-04-20 PROCEDURE — 3331090002 HH PPS REVENUE DEBIT

## 2019-04-20 PROCEDURE — 3331090001 HH PPS REVENUE CREDIT

## 2019-04-21 ENCOUNTER — APPOINTMENT (OUTPATIENT)
Dept: CT IMAGING | Age: 84
End: 2019-04-21
Attending: EMERGENCY MEDICINE
Payer: MEDICARE

## 2019-04-21 ENCOUNTER — HOSPITAL ENCOUNTER (EMERGENCY)
Age: 84
Discharge: HOME | End: 2019-04-21
Attending: EMERGENCY MEDICINE | Admitting: EMERGENCY MEDICINE
Payer: MEDICARE

## 2019-04-21 VITALS
TEMPERATURE: 97.6 F | SYSTOLIC BLOOD PRESSURE: 171 MMHG | OXYGEN SATURATION: 100 % | DIASTOLIC BLOOD PRESSURE: 54 MMHG | WEIGHT: 169 LBS | BODY MASS INDEX: 28.85 KG/M2 | RESPIRATION RATE: 16 BRPM | HEIGHT: 64 IN | HEART RATE: 61 BPM

## 2019-04-21 DIAGNOSIS — R11.2 NON-INTRACTABLE VOMITING WITH NAUSEA, UNSPECIFIED VOMITING TYPE: ICD-10-CM

## 2019-04-21 DIAGNOSIS — R10.84 ACUTE GENERALIZED ABDOMINAL PAIN: Primary | ICD-10-CM

## 2019-04-21 DIAGNOSIS — S91.105S: ICD-10-CM

## 2019-04-21 DIAGNOSIS — T50.905A ADVERSE EFFECT OF DRUG, INITIAL ENCOUNTER: ICD-10-CM

## 2019-04-21 LAB
ALBUMIN SERPL-MCNC: 3.6 G/DL (ref 3.4–5)
ALBUMIN/GLOB SERPL: 0.9 {RATIO} (ref 0.8–1.7)
ALP SERPL-CCNC: 108 U/L (ref 45–117)
ALT SERPL-CCNC: 26 U/L (ref 13–56)
AMORPH CRY URNS QL MICRO: ABNORMAL
ANION GAP SERPL CALC-SCNC: 9 MMOL/L (ref 3–18)
APPEARANCE UR: ABNORMAL
AST SERPL-CCNC: 33 U/L (ref 15–37)
BACTERIA URNS QL MICRO: ABNORMAL /HPF
BASOPHILS # BLD: 0 K/UL (ref 0–0.1)
BASOPHILS NFR BLD: 0 % (ref 0–2)
BILIRUB SERPL-MCNC: 0.8 MG/DL (ref 0.2–1)
BILIRUB UR QL: NEGATIVE
BUN SERPL-MCNC: 38 MG/DL (ref 7–18)
BUN/CREAT SERPL: 25 (ref 12–20)
CALCIUM SERPL-MCNC: 9 MG/DL (ref 8.5–10.1)
CHLORIDE SERPL-SCNC: 110 MMOL/L (ref 100–108)
CO2 SERPL-SCNC: 21 MMOL/L (ref 21–32)
COLOR UR: YELLOW
CREAT SERPL-MCNC: 1.5 MG/DL (ref 0.6–1.3)
DIFFERENTIAL METHOD BLD: ABNORMAL
EOSINOPHIL # BLD: 0 K/UL (ref 0–0.4)
EOSINOPHIL NFR BLD: 0 % (ref 0–5)
EPITH CASTS URNS QL MICRO: ABNORMAL /LPF (ref 0–5)
ERYTHROCYTE [DISTWIDTH] IN BLOOD BY AUTOMATED COUNT: 14.7 % (ref 11.6–14.5)
GLOBULIN SER CALC-MCNC: 4.1 G/DL (ref 2–4)
GLUCOSE SERPL-MCNC: 111 MG/DL (ref 74–99)
GLUCOSE UR STRIP.AUTO-MCNC: NEGATIVE MG/DL
HCT VFR BLD AUTO: 36.1 % (ref 35–45)
HGB BLD-MCNC: 11.4 G/DL (ref 12–16)
HGB UR QL STRIP: NEGATIVE
KETONES UR QL STRIP.AUTO: ABNORMAL MG/DL
LEUKOCYTE ESTERASE UR QL STRIP.AUTO: ABNORMAL
LIPASE SERPL-CCNC: 179 U/L (ref 73–393)
LYMPHOCYTES # BLD: 1.1 K/UL (ref 0.9–3.6)
LYMPHOCYTES NFR BLD: 15 % (ref 21–52)
MCH RBC QN AUTO: 28.4 PG (ref 24–34)
MCHC RBC AUTO-ENTMCNC: 31.6 G/DL (ref 31–37)
MCV RBC AUTO: 90 FL (ref 74–97)
MONOCYTES # BLD: 0.5 K/UL (ref 0.05–1.2)
MONOCYTES NFR BLD: 7 % (ref 3–10)
NEUTS SEG # BLD: 6 K/UL (ref 1.8–8)
NEUTS SEG NFR BLD: 78 % (ref 40–73)
NITRITE UR QL STRIP.AUTO: NEGATIVE
PH UR STRIP: 5 [PH] (ref 5–8)
PLATELET # BLD AUTO: 159 K/UL (ref 135–420)
PMV BLD AUTO: 12.2 FL (ref 9.2–11.8)
POTASSIUM SERPL-SCNC: 3.8 MMOL/L (ref 3.5–5.5)
PROT SERPL-MCNC: 7.7 G/DL (ref 6.4–8.2)
PROT UR STRIP-MCNC: ABNORMAL MG/DL
RBC # BLD AUTO: 4.01 M/UL (ref 4.2–5.3)
RBC #/AREA URNS HPF: 0 /HPF (ref 0–5)
SODIUM SERPL-SCNC: 140 MMOL/L (ref 136–145)
SP GR UR REFRACTOMETRY: 1.02 (ref 1–1.03)
TROPONIN I SERPL-MCNC: <0.02 NG/ML (ref 0–0.04)
UROBILINOGEN UR QL STRIP.AUTO: 1 EU/DL (ref 0.2–1)
WBC # BLD AUTO: 7.7 K/UL (ref 4.6–13.2)
WBC URNS QL MICRO: ABNORMAL /HPF (ref 0–4)

## 2019-04-21 PROCEDURE — 74011250636 HC RX REV CODE- 250/636: Performed by: EMERGENCY MEDICINE

## 2019-04-21 PROCEDURE — 96361 HYDRATE IV INFUSION ADD-ON: CPT

## 2019-04-21 PROCEDURE — 87086 URINE CULTURE/COLONY COUNT: CPT

## 2019-04-21 PROCEDURE — 94762 N-INVAS EAR/PLS OXIMTRY CONT: CPT

## 2019-04-21 PROCEDURE — 3331090002 HH PPS REVENUE DEBIT

## 2019-04-21 PROCEDURE — 83690 ASSAY OF LIPASE: CPT

## 2019-04-21 PROCEDURE — 84484 ASSAY OF TROPONIN QUANT: CPT

## 2019-04-21 PROCEDURE — 3331090001 HH PPS REVENUE CREDIT

## 2019-04-21 PROCEDURE — 74011636320 HC RX REV CODE- 636/320: Performed by: EMERGENCY MEDICINE

## 2019-04-21 PROCEDURE — 96374 THER/PROPH/DIAG INJ IV PUSH: CPT

## 2019-04-21 PROCEDURE — 81001 URINALYSIS AUTO W/SCOPE: CPT

## 2019-04-21 PROCEDURE — 85025 COMPLETE CBC W/AUTO DIFF WBC: CPT

## 2019-04-21 PROCEDURE — 80053 COMPREHEN METABOLIC PANEL: CPT

## 2019-04-21 PROCEDURE — 74177 CT ABD & PELVIS W/CONTRAST: CPT

## 2019-04-21 PROCEDURE — 99283 EMERGENCY DEPT VISIT LOW MDM: CPT

## 2019-04-21 RX ORDER — DICYCLOMINE HYDROCHLORIDE 10 MG/1
10 CAPSULE ORAL
Qty: 20 CAP | Refills: 0 | Status: SHIPPED | OUTPATIENT
Start: 2019-04-21

## 2019-04-21 RX ORDER — ACETAMINOPHEN 500 MG
500 TABLET ORAL
Qty: 40 TAB | Refills: 0 | Status: SHIPPED | OUTPATIENT
Start: 2019-04-21

## 2019-04-21 RX ORDER — ONDANSETRON 4 MG/1
4 TABLET, ORALLY DISINTEGRATING ORAL
Qty: 12 TAB | Refills: 0 | Status: SHIPPED | OUTPATIENT
Start: 2019-04-21 | End: 2021-10-14

## 2019-04-21 RX ORDER — ONDANSETRON 2 MG/ML
4 INJECTION INTRAMUSCULAR; INTRAVENOUS
Status: COMPLETED | OUTPATIENT
Start: 2019-04-21 | End: 2019-04-21

## 2019-04-21 RX ADMIN — ONDANSETRON 4 MG: 2 INJECTION INTRAMUSCULAR; INTRAVENOUS at 20:13

## 2019-04-21 RX ADMIN — SODIUM CHLORIDE 1000 ML: 900 INJECTION, SOLUTION INTRAVENOUS at 20:13

## 2019-04-21 RX ADMIN — IOPAMIDOL 80 ML: 612 INJECTION, SOLUTION INTRAVENOUS at 21:13

## 2019-04-21 NOTE — ED TRIAGE NOTES
Pt c/o nausea with intermittent vomiting X2-3 weeks. Patient reports also vomiting episode was 1 week ago. Also reports abdominal pain and diarrhea. Patient on antibiotics for wound on left leg.

## 2019-04-21 NOTE — ED PROVIDER NOTES
Peace Butt is a 81 y.o. Female with complaints of intermittent abdominal pain nausea diarrhea for the last couple of weeks. Not necessarily food related. Patient denies any current pain at this time. She is she also complains of loss of appetite as well. Patient has been on antibiotics for a left foot infection for the last few weeks as well. She denies any fever, sweats, chest pain, shortness of breath. Patient has had remote abdominal surgeries. She does have a history of diverticular disease as well. Patient does not take anything for her symptoms. The history is provided by the patient and the spouse (daughter). Past Medical History:  
Diagnosis Date  CAD (coronary artery disease)  Chronic osteomyelitis of ankle ([facility]  CKD (chronic kidney disease) stage 3, GFR 30-59 ml/min ([facility]) 06/24/2013 Worst noted (08/06/13) BUN/sCr: 54/2.49, GFR 20.   
 DM (diabetes mellitus) ([facility] [address] [facility]  Elevated TSH 06/25/2012  
 5.08   
 Hypertension  Leukocytosis  Lumbar spinal stenosis  Menopause  Normocytic anemia  Rhabdomyolysis  Splenomegaly  [facility]  Vitamin D deficiency Past Surgical History:  
Procedure Laterality Date  COLONOSCOPY N/A 3/1/2017 COLONOSCOPY performed by Phoebe Painting MD at [address]  HX FREE SKIN GRAFT    
 HX HYSTERECTOMY  HX OOPHORECTOMY  HX OPEN REDUCTION INTERNAL FIXATION Left Tx LLE Ankle Trimalleolar Fx by Dr. Vadim Correa [facility])  HX ORTHOPAEDIC Left 06/11/2013 [address] - LLE Ankle Removal of Hardware  HX VEIN STRIPPING Bilateral 09/18/2013 Dr. Irma Tam  CO DEBRIDEMENT OPEN WOUND 20 SQ CM<    
 
   
Family History:  
Problem Relation Age of Onset  Diabetes Other  Hypertension Other Social History Socioeconomic History  Marital status:  Spouse name: Not on file  Number of children: Not on file  Years of education: Not on file  Highest education level: Not on file Occupational History  Not on file Social Needs  Financial resource strain: Not on file  Food insecurity:  
  Worry: Not on file Inability: Not on file  Transportation needs:  
  Medical: Not on file Non-medical: Not on file Tobacco Use  Smoking status: Never Smoker  Smokeless tobacco: Never Used Substance and Sexual Activity  Alcohol use: No  
 Drug use: No  
 Sexual activity: Never Lifestyle  Physical activity:  
  Days per week: Not on file Minutes per session: Not on file  Stress: Not on file Relationships  Social connections:  
  Talks on phone: Not on file Gets together: Not on file Attends [employer] service: Not on file Active member of club or organization: Not on file Attends meetings of clubs or organizations: Not on file Relationship status: Not on file  Intimate partner violence:  
  Fear of current or ex partner: Not on file Emotionally abused: Not on file Physically abused: Not on file Forced sexual activity: Not on file Other Topics Concern  Not on file Social History Narrative  Not on file ALLERGIES: Patient has no known allergies. Review of Systems Constitutional: Positive for appetite change. Negative for fever. HENT: Negative for sore throat and trouble swallowing. Eyes: Negative for visual disturbance. Respiratory: Negative for shortness of breath. Cardiovascular: Negative for chest pain. Gastrointestinal: Positive for abdominal pain. Endocrine: Negative for polyuria. Genitourinary: Negative for difficulty urinating and dysuria. Musculoskeletal: Negative for gait problem. Skin: Negative for rash. Allergic/Immunologic: Positive for immunocompromised state. Neurological: Negative for syncope. Psychiatric/Behavioral: Positive for sleep disturbance. Vitals: 04/21/19 1920 04/21/19 2030 04/21/19 2142 BP: 183/69 167/49 165/59 Pulse: 61 Resp: 16 Temp: 97.6 °F (36.4 °C) SpO2: 98% 100% 100% Weight: 76.7 kg (169 lb) Height: 5' 4\" (1.626 m) Physical Exam  
Constitutional: She is oriented to person, place, and time. She appears well-developed and well-nourished. Non-toxic appearance. She does not appear ill. No distress. HENT:  
Head: Normocephalic and atraumatic. Right Ear: External ear normal.  
Left Ear: External ear normal.  
Nose: Nose normal.  
Mouth/Throat: Uvula is midline, oropharynx is clear and moist and mucous membranes are normal.  
Eyes: Conjunctivae are normal. No scleral icterus. Neck: Neck supple. Cardiovascular: Normal rate, regular rhythm, normal heart sounds and intact distal pulses. Pulmonary/Chest: Effort normal and breath sounds normal.  
Abdominal: Soft. She exhibits no distension and no mass. There is no tenderness. There is no guarding. Musculoskeletal: She exhibits no edema. Open wound to the left ankle there with good granulation tissue. Neurological: She is alert and oriented to person, place, and time. Gait normal.  
Skin: Skin is warm and dry. Capillary refill takes less than 2 seconds. She is not diaphoretic. Psychiatric: Her behavior is normal.  
Nursing note and vitals reviewed. MDM Procedures Vitals: 
Patient Vitals for the past 12 hrs: 
 Temp Pulse Resp BP SpO2  
04/21/19 2142    165/59 100 % 04/21/19 2030    167/49 100 % 04/21/19 1920 97.6 °F (36.4 °C) 61 16 183/69 98 % Medications ordered:  
Medications  
sodium chloride 0.9 % bolus infusion 1,000 mL (0 mL IntraVENous IV Completed 4/21/19 2112) ondansetron [facility]) injection 4 mg (4 mg IntraVENous Given 4/21/19 2013) iopamidol (ISOVUE 300) 61 % contrast injection 100 mL (80 mL IntraVENous Given 4/21/19 2113) Lab findings: 
Recent Results (from the past 12 hour(s)) URINALYSIS W/ RFLX MICROSCOPIC Collection Time: 04/21/19  7:36 PM  
Result Value Ref Range Color YELLOW Appearance CLOUDY Specific gravity 1.024 1.005 - 1.030    
 pH (UA) 5.0 5.0 - 8.0 Protein TRACE (A) NEG mg/dL Glucose NEGATIVE  NEG mg/dL Ketone TRACE (A) NEG mg/dL Bilirubin NEGATIVE  NEG Blood NEGATIVE  NEG Urobilinogen 1.0 0.2 - 1.0 EU/dL Nitrites NEGATIVE  NEG Leukocyte Esterase SMALL (A) NEG URINE MICROSCOPIC ONLY Collection Time: 04/21/19  7:36 PM  
Result Value Ref Range WBC 2 to 3 0 - 4 /hpf  
 RBC 0 0 - 5 /hpf Epithelial cells 1+ 0 - 5 /lpf Bacteria 1+ (A) NEG /hpf Amorphous Crystals 2+ (A) NEG  
CBC WITH AUTOMATED DIFF Collection Time: 04/21/19  8:10 PM  
Result Value Ref Range WBC 7.7 4.6 - 13.2 K/uL  
 RBC 4.01 (L) 4.20 - 5.30 M/uL  
 HGB 11.4 (L) 12.0 - 16.0 g/dL HCT 36.1 35.0 - 45.0 % MCV 90.0 74.0 - 97.0 FL  
 MCH 28.4 24.0 - 34.0 PG  
 MCHC 31.6 31.0 - 37.0 g/dL  
 RDW 14.7 (H) 11.6 - 14.5 % PLATELET 743 120 - 958 K/uL MPV 12.2 (H) 9.2 - 11.8 FL  
 NEUTROPHILS 78 (H) 40 - 73 % LYMPHOCYTES 15 (L) 21 - 52 % MONOCYTES 7 3 - 10 % EOSINOPHILS 0 0 - 5 % BASOPHILS 0 0 - 2 %  
 ABS. NEUTROPHILS 6.0 1.8 - 8.0 K/UL  
 ABS. LYMPHOCYTES 1.1 0.9 - 3.6 K/UL  
 ABS. MONOCYTES 0.5 0.05 - 1.2 K/UL  
 ABS. EOSINOPHILS 0.0 0.0 - 0.4 K/UL  
 ABS. BASOPHILS 0.0 0.0 - 0.1 K/UL  
 DF AUTOMATED METABOLIC PANEL, COMPREHENSIVE Collection Time: 04/21/19  8:10 PM  
Result Value Ref Range Sodium 140 136 - 145 mmol/L Potassium 3.8 3.5 - 5.5 mmol/L Chloride 110 (H) 100 - 108 mmol/L  
 CO2 21 21 - 32 mmol/L Anion gap 9 3.0 - 18 mmol/L Glucose 111 (H) 74 - 99 mg/dL BUN 38 (H) 7.0 - 18 MG/DL Creatinine 1.50 (H) 0.6 - 1.3 MG/DL  
 BUN/Creatinine ratio 25 (H) 12 - 20 GFR est AA 40 (L) >60 ml/min/1.73m2 GFR est non-AA 33 (L) >60 ml/min/1.73m2  Calcium 9.0 8.5 - 10.1 MG/DL  
 Bilirubin, total 0.8 0.2 - 1.0 MG/DL  
 ALT (SGPT) 26 13 - 56 U/L  
 AST (SGOT) 33 15 - 37 U/L Alk. phosphatase 108 45 - 117 U/L Protein, total 7.7 6.4 - 8.2 g/dL Albumin 3.6 3.4 - 5.0 g/dL Globulin 4.1 (H) 2.0 - 4.0 g/dL A-G Ratio 0.9 0.8 - 1.7 LIPASE Collection Time: 04/21/19  8:10 PM  
Result Value Ref Range Lipase 179 73 - 393 U/L  
TROPONIN I Collection Time: 04/21/19  8:10 PM  
Result Value Ref Range Troponin-I, QT <0.02 0.0 - 0.045 NG/ML  
 
 
EKG interpretation by ED Physician: X-Ray, CT or other radiology findings or impressions: 
CT ABD PELV W CONT    (Results Pending) Nothing acute per preliminary report Progress notes, Consult notes or additional Procedure notes:  
Patient abdomen soft. She has no current pain. I suspect all the symptoms are related probably to the doxycycline which was discussed with her. I have discussed with patient and/or family/sig other the results, interpretation of any imaging if performed, suspected diagnosis and treatment plan to include instructions regarding the diagnoses listed to which understanding was expressed with all questions answered Reevaluation of patient:  
stable Disposition: 
Diagnosis: 1. Acute generalized abdominal pain 2. Non-intractable vomiting with nausea, unspecified vomiting type 3. Adverse effect of drug, initial encounter 4. Open wound of fifth toe of left foot, sequela Disposition: home Follow-up Information Follow up With Specialties Details Why Contact Info Yue Muniz MD Internal Medicine Schedule an appointment as soon as possible for a visit this week for recheck in office [address] [facility] [address] [phone] [facility] EMERGENCY DEPT Emergency Medicine  If symptoms worsen [address] 
[phone] Patient's Medications Start Taking DICYCLOMINE (BENTYL) 10 MG CAPSULE    Take 1 Cap by mouth four (4) times daily as needed (abd cramps). ONDANSETRON (ZOFRAN ODT) 4 MG DISINTEGRATING TABLET    Take 1 Tab by mouth every eight (8) hours as needed for Nausea. Continue Taking ASPIRIN 81 MG CHEWABLE TABLET    Take 81 mg by mouth daily. CHOLECALCIFEROL (VITAMIN D3) 1,000 UNIT TABLET    Take 5,000 Units by mouth daily. CYANOCOBALAMIN/FOLIC AC/VIT B6 (FOLIC ACID-VIT X1-YQH W04 PO)    Take 1 Tab by mouth daily. DOXYCYCLINE (VIBRAMYCIN) 100 MG CAPSULE    Take 1 Cap by mouth daily. Indications: skin infection GABAPENTIN (NEURONTIN) 300 MG CAPSULE    Take 1 Cap by mouth three (3) times daily. 12/22/16, QTY#90, 30 Days, 2 Refills. Dr. Jessica Cade LISINOPRIL-HYDROCHLOROTHIAZIDE (PRINZIDE, ZESTORETIC) 20-25 MG PER TABLET    Take 1 Tab by mouth daily. METOPROLOL TARTRATE (LOPRESSOR) 50 MG TABLET    Take 1 Tab by mouth daily. OMEGA 3-DHA-EPA-FISH OIL (FISH OIL) 100-160-1,000 MG CAP    Take  by mouth. OTHER These Medications have changed Modified Medication Previous Medication ACETAMINOPHEN (TYLENOL) 500 MG TABLET acetaminophen (TYLENOL) 500 mg tablet Take 1 Tab by mouth every six (6) hours as needed for Pain or Fever. Take 500 mg by mouth every six (6) hours as needed for Pain or Fever. Stop Taking No medications on file

## 2019-04-22 ENCOUNTER — HOME CARE VISIT (OUTPATIENT)
Dept: SCHEDULING | Facility: HOME HEALTH | Age: 84
End: 2019-04-22
Payer: MEDICARE

## 2019-04-22 VITALS
RESPIRATION RATE: 14 BRPM | DIASTOLIC BLOOD PRESSURE: 70 MMHG | HEART RATE: 77 BPM | SYSTOLIC BLOOD PRESSURE: 130 MMHG | OXYGEN SATURATION: 99 % | TEMPERATURE: 97.7 F

## 2019-04-22 PROCEDURE — 3331090001 HH PPS REVENUE CREDIT

## 2019-04-22 PROCEDURE — 3331090002 HH PPS REVENUE DEBIT

## 2019-04-22 PROCEDURE — G0299 HHS/HOSPICE OF RN EA 15 MIN: HCPCS

## 2019-04-22 NOTE — DISCHARGE INSTRUCTIONS
Patient Education        Abdominal Pain: Care Instructions  Your Care Instructions    Abdominal pain has many possible causes. Some aren't serious and get better on their own in a few days. Others need more testing and treatment. If your pain continues or gets worse, you need to be rechecked and may need more tests to find out what is wrong. You may need surgery to correct the problem. Don't ignore new symptoms, such as fever, nausea and vomiting, urination problems, pain that gets worse, and dizziness. These may be signs of a more serious problem. Your doctor may have recommended a follow-up visit in the next 8 to 12 hours. If you are not getting better, you may need more tests or treatment. The doctor has checked you carefully, but problems can develop later. If you notice any problems or new symptoms, get medical treatment right away. Follow-up care is a key part of your treatment and safety. Be sure to make and go to all appointments, and call your doctor if you are having problems. It's also a good idea to know your test results and keep a list of the medicines you take. How can you care for yourself at home? · Rest until you feel better. · To prevent dehydration, drink plenty of fluids, enough so that your urine is light yellow or clear like water. Choose water and other caffeine-free clear liquids until you feel better. If you have kidney, heart, or liver disease and have to limit fluids, talk with your doctor before you increase the amount of fluids you drink. · If your stomach is upset, eat mild foods, such as rice, dry toast or crackers, bananas, and applesauce. Try eating several small meals instead of two or three large ones. · Wait until 48 hours after all symptoms have gone away before you have spicy foods, alcohol, and drinks that contain caffeine. · Do not eat foods that are high in fat. · Avoid anti-inflammatory medicines such as aspirin, ibuprofen (Advil, Motrin), and naproxen (Aleve). These can cause stomach upset. Talk to your doctor if you take daily aspirin for another health problem. When should you call for help? Call 911 anytime you think you may need emergency care. For example, call if:    · You passed out (lost consciousness).     · You pass maroon or very bloody stools.     · You vomit blood or what looks like coffee grounds.     · You have new, severe belly pain.    Call your doctor now or seek immediate medical care if:    · Your pain gets worse, especially if it becomes focused in one area of your belly.     · You have a new or higher fever.     · Your stools are black and look like tar, or they have streaks of blood.     · You have unexpected vaginal bleeding.     · You have symptoms of a urinary tract infection. These may include:  ? Pain when you urinate. ? Urinating more often than usual.  ? Blood in your urine.     · You are dizzy or lightheaded, or you feel like you may faint.    Watch closely for changes in your health, and be sure to contact your doctor if:    · You are not getting better after 1 day (24 hours). Where can you learn more? Go to http://dorindaCoupmonjn.info/. Enter S991 in the search box to learn more about \"Abdominal Pain: Care Instructions. \"  Current as of: September 23, 2018  Content Version: 11.9  © 9198-1329 [employer]. Care instructions adapted under license by [employer] (which disclaims liability or warranty for this information). If you have questions about a medical condition or this instruction, always ask your healthcare professional. [place] [address] any warranty or liability for your use of this information. Patient Education        Side Effects of Medicine: Care Instructions  Your Care Instructions    Medicines are a big part of treatment for many health problems. But all medicines have side effects. Often these are mild problems.  They might include a dry mouth or upset stomach. But sometimes medicines can cause dangerous side effects. One example is a bad allergic reaction. The best treatment will depend on what side effects you have. If you have a serious side effect, you may need to stop taking the medicine. You may also need to take another medicine to treat the side effect. If you have a mild side effect, it may go away after you take the medicine for a while. The doctor has checked you carefully, but problems can develop later. If you notice any problems or new symptoms, get medical treatment right away. Follow-up care is a key part of your treatment and safety. Be sure to make and go to all appointments, and call your doctor if you are having problems. It's also a good idea to know your test results and keep a list of the medicines you take. How can you care for yourself at home? · Be safe with medicines. Take your medicines exactly as prescribed. Call your doctor if you think you are having a problem with your medicine. · Call your doctor if side effects bother you and you wonder if you should keep taking a medicine. Your doctor may be able to lower your dose or change your medicine. Do not suddenly quit taking your medicine unless a doctor tells you to. · Make sure your doctor has a list of all the medicines, vitamins, supplements, and herbal remedies you take. Ask about side effects. When should you call for help? Watch closely for changes in your health, and be sure to contact your doctor if:    · You think you are having a new problem with your medicine.     · You do not get better as expected. Where can you learn more? Go to http://dorinda-jn.info/. Enter D152 in the search box to learn more about \"Side Effects of Medicine: Care Instructions. \"  Current as of: March 28, 2018  Content Version: 11.9  © 8251-7943 [employer].  Care instructions adapted under license by [employer] (which disclaims liability or warranty for this information). If you have questions about a medical condition or this instruction, always ask your healthcare professional. [place] [address] any warranty or liability for your use of this information.

## 2019-04-22 NOTE — ED NOTES
Patient resting quietly in stretcher. Updated on plan of care. Provided patient with a warm blanket.

## 2019-04-22 NOTE — ED NOTES
Assuming care of patient. C/o intermittent nausea, vomiting, and diarrhea for \"several weeks. \"

## 2019-04-23 LAB
BACTERIA SPEC CULT: NORMAL
SERVICE CMNT-IMP: NORMAL

## 2019-04-23 PROCEDURE — 3331090002 HH PPS REVENUE DEBIT

## 2019-04-23 PROCEDURE — 3331090001 HH PPS REVENUE CREDIT

## 2019-04-24 ENCOUNTER — HOME CARE VISIT (OUTPATIENT)
Dept: SCHEDULING | Facility: HOME HEALTH | Age: 84
End: 2019-04-24
Payer: MEDICARE

## 2019-04-24 PROCEDURE — 3331090002 HH PPS REVENUE DEBIT

## 2019-04-24 PROCEDURE — 3331090001 HH PPS REVENUE CREDIT

## 2019-04-24 PROCEDURE — G0300 HHS/HOSPICE OF LPN EA 15 MIN: HCPCS

## 2019-04-25 VITALS
RESPIRATION RATE: 20 BRPM | HEART RATE: 64 BPM | DIASTOLIC BLOOD PRESSURE: 59 MMHG | TEMPERATURE: 98.1 F | OXYGEN SATURATION: 97 % | SYSTOLIC BLOOD PRESSURE: 127 MMHG

## 2019-04-25 PROCEDURE — 3331090002 HH PPS REVENUE DEBIT

## 2019-04-25 PROCEDURE — 3331090001 HH PPS REVENUE CREDIT

## 2019-04-26 ENCOUNTER — HOME CARE VISIT (OUTPATIENT)
Dept: SCHEDULING | Facility: HOME HEALTH | Age: 84
End: 2019-04-26
Payer: MEDICARE

## 2019-04-26 VITALS
HEART RATE: 64 BPM | RESPIRATION RATE: 18 BRPM | SYSTOLIC BLOOD PRESSURE: 142 MMHG | TEMPERATURE: 98.5 F | OXYGEN SATURATION: 98 % | DIASTOLIC BLOOD PRESSURE: 58 MMHG

## 2019-04-26 PROCEDURE — 3331090002 HH PPS REVENUE DEBIT

## 2019-04-26 PROCEDURE — G0300 HHS/HOSPICE OF LPN EA 15 MIN: HCPCS

## 2019-04-26 PROCEDURE — 3331090001 HH PPS REVENUE CREDIT

## 2019-04-27 PROCEDURE — 3331090001 HH PPS REVENUE CREDIT

## 2019-04-27 PROCEDURE — 3331090002 HH PPS REVENUE DEBIT

## 2019-04-28 PROCEDURE — 3331090002 HH PPS REVENUE DEBIT

## 2019-04-28 PROCEDURE — 3331090001 HH PPS REVENUE CREDIT

## 2019-04-29 ENCOUNTER — HOME CARE VISIT (OUTPATIENT)
Dept: SCHEDULING | Facility: HOME HEALTH | Age: 84
End: 2019-04-29
Payer: MEDICARE

## 2019-04-29 PROCEDURE — 3331090001 HH PPS REVENUE CREDIT

## 2019-04-29 PROCEDURE — 3331090002 HH PPS REVENUE DEBIT

## 2019-04-30 PROCEDURE — 3331090002 HH PPS REVENUE DEBIT

## 2019-04-30 PROCEDURE — 3331090001 HH PPS REVENUE CREDIT

## 2019-05-01 ENCOUNTER — HOME CARE VISIT (OUTPATIENT)
Dept: SCHEDULING | Facility: HOME HEALTH | Age: 84
End: 2019-05-01
Payer: MEDICARE

## 2019-05-01 PROCEDURE — 3331090001 HH PPS REVENUE CREDIT

## 2019-05-01 PROCEDURE — G0300 HHS/HOSPICE OF LPN EA 15 MIN: HCPCS

## 2019-05-01 PROCEDURE — 3331090002 HH PPS REVENUE DEBIT

## 2019-05-02 ENCOUNTER — HOSPITAL ENCOUNTER (OUTPATIENT)
Dept: WOUND CARE | Age: 84
Discharge: HOME | End: 2019-05-02
Payer: MEDICARE

## 2019-05-02 VITALS
HEART RATE: 57 BPM | SYSTOLIC BLOOD PRESSURE: 142 MMHG | DIASTOLIC BLOOD PRESSURE: 62 MMHG | OXYGEN SATURATION: 97 % | TEMPERATURE: 97.8 F

## 2019-05-02 VITALS
HEART RATE: 78 BPM | OXYGEN SATURATION: 98 % | DIASTOLIC BLOOD PRESSURE: 74 MMHG | TEMPERATURE: 98.2 F | SYSTOLIC BLOOD PRESSURE: 138 MMHG | RESPIRATION RATE: 20 BRPM

## 2019-05-02 PROCEDURE — 3331090002 HH PPS REVENUE DEBIT

## 2019-05-02 PROCEDURE — 74011000250 HC RX REV CODE- 250: Performed by: PODIATRIST

## 2019-05-02 PROCEDURE — 15271 SKIN SUB GRAFT TRNK/ARM/LEG: CPT

## 2019-05-02 PROCEDURE — 97602 WOUND(S) CARE NON-SELECTIVE: CPT

## 2019-05-02 PROCEDURE — 3331090001 HH PPS REVENUE CREDIT

## 2019-05-02 RX ORDER — LIDOCAINE AND PRILOCAINE 25; 25 MG/G; MG/G
CREAM TOPICAL
Status: DISPENSED | OUTPATIENT
Start: 2019-05-02 | End: 2019-05-02

## 2019-05-02 NOTE — WOUND CARE
Wound/Ostomy Nurse Progress Note          Patient: Isra Flores                                                                                      :1935    MRN: [id number]          Situation: Non healing left medial ankle ulcer. Pt presents to wound clinic for reoccurring Theraskin application. Assessment: Wound bed is 80% pink tissue and 20% slough. Ligia wound ins intact and pink, small amount of serosanguinous derange noted      Intervention: Dressing removed ( prism, hydrofera blue, gauze, hypafix). Wound cleansed with NS, wound measured, phot taken. Lidocaine applied to wound bed and left on for 20 minutes. Wound debrided by Dr Ernesto Hernandez applied and attached with steri strips. Wound covered with cutimed, wound gel, and 4x4 gauze/ hyperfix tape. Recommendation: return in one week for dressing change/ nurse visit.           19 0840   Wound Ankle Left;Medial;Proximal   Date First Assessed: 17   POA: Yes  Wound Type: Vascular  Location: Ankle  Orientation: Left;Medial;Proximal   Dressing Status  Old drainage;Removed   Dressing Type  Other (Comment)  (collegen, hydrophera blue, 4x4 gauze, hypa fix)   Wound Length (cm) 3.4 cm   Wound Width (cm) 1.9 cm   Wound Depth (cm) 0.3   Wound Surface area (cm^2) 6.46 cm^2   Change in Wound Size % -642.53   Condition of Base Pink;Slough   Condition of Edges Open   Tissue Type Percent Pink 80   Tissue Type Percent Yellow 20   Drainage Amount  Small    Drainage Color Serosanguinous   Wound Odor None   Periwound Skin Condition Intact   Cleansing and Cleansing Agents  Normal saline   Dressing Type Applied Other (Comment)  (Theraskin/ steri strip, cutimed, hygro gel, 4x4, hypafix)   Wound Procedure Type Selective Debridement   Procedure Time Out 9707   Consent Obtained  Yes   Procedure Bleeding Minimal   Procedure Instrument  Curette   Procedure Pain Scale Numeric 3/10   Debridement Procedure Performed by   (Dr Kizzy Jorge)   Assisted Physician in Procedure  Yes  (ALLY Alvarado RN)   Procedure Tolerated Well

## 2019-05-03 PROCEDURE — 3331090001 HH PPS REVENUE CREDIT

## 2019-05-03 PROCEDURE — 3331090002 HH PPS REVENUE DEBIT

## 2019-05-04 PROCEDURE — 3331090002 HH PPS REVENUE DEBIT

## 2019-05-04 PROCEDURE — 3331090001 HH PPS REVENUE CREDIT

## 2019-05-05 ENCOUNTER — HOSPITAL ENCOUNTER (OUTPATIENT)
Age: 84
Setting detail: OBSERVATION
Discharge: HOME | End: 2019-05-06
Attending: EMERGENCY MEDICINE | Admitting: INTERNAL MEDICINE
Payer: MEDICARE

## 2019-05-05 ENCOUNTER — APPOINTMENT (OUTPATIENT)
Dept: GENERAL RADIOLOGY | Age: 84
End: 2019-05-05
Attending: NURSE PRACTITIONER
Payer: MEDICARE

## 2019-05-05 DIAGNOSIS — I24.9 ACS (ACUTE CORONARY SYNDROME) (HCC): Primary | ICD-10-CM

## 2019-05-05 PROBLEM — R07.9 CHEST PAIN AT REST: Status: ACTIVE | Noted: 2019-05-05

## 2019-05-05 LAB
ALBUMIN SERPL-MCNC: 3.2 G/DL (ref 3.4–5)
ALBUMIN/GLOB SERPL: 0.9 {RATIO} (ref 0.8–1.7)
ALP SERPL-CCNC: 92 U/L (ref 45–117)
ALT SERPL-CCNC: 19 U/L (ref 13–56)
ANION GAP SERPL CALC-SCNC: 10 MMOL/L (ref 3–18)
AST SERPL-CCNC: 18 U/L (ref 15–37)
BASOPHILS # BLD: 0 K/UL (ref 0–0.1)
BASOPHILS NFR BLD: 0 % (ref 0–2)
BILIRUB SERPL-MCNC: 0.7 MG/DL (ref 0.2–1)
BNP SERPL-MCNC: 349 PG/ML (ref 0–1800)
BUN SERPL-MCNC: 29 MG/DL (ref 7–18)
BUN SERPL-MCNC: 29 MG/DL (ref 7–18)
BUN/CREAT SERPL: 20 (ref 12–20)
CALCIUM SERPL-MCNC: 8.4 MG/DL (ref 8.5–10.1)
CHLORIDE SERPL-SCNC: 109 MMOL/L (ref 100–108)
CK MB CFR SERPL CALC: NORMAL % (ref 0–4)
CK MB CFR SERPL CALC: NORMAL % (ref 0–4)
CK MB SERPL-MCNC: <1 NG/ML (ref 5–25)
CK MB SERPL-MCNC: <1 NG/ML (ref 5–25)
CK SERPL-CCNC: 45 U/L (ref 26–192)
CK SERPL-CCNC: 47 U/L (ref 26–192)
CO2 SERPL-SCNC: 21 MMOL/L (ref 21–32)
CREAT SERPL-MCNC: 1.46 MG/DL (ref 0.6–1.3)
DIFFERENTIAL METHOD BLD: ABNORMAL
EOSINOPHIL # BLD: 0.1 K/UL (ref 0–0.4)
EOSINOPHIL NFR BLD: 1 % (ref 0–5)
ERYTHROCYTE [DISTWIDTH] IN BLOOD BY AUTOMATED COUNT: 14.5 % (ref 11.6–14.5)
ERYTHROCYTE [DISTWIDTH] IN BLOOD BY AUTOMATED COUNT: 14.7 % (ref 11.6–14.5)
EST. AVERAGE GLUCOSE BLD GHB EST-MCNC: 137 MG/DL
GLOBULIN SER CALC-MCNC: 3.5 G/DL (ref 2–4)
GLUCOSE BLD STRIP.AUTO-MCNC: 109 MG/DL (ref 70–110)
GLUCOSE SERPL-MCNC: 133 MG/DL (ref 74–99)
HBA1C MFR BLD: 6.4 % (ref 4.2–5.6)
HCT VFR BLD AUTO: 33.7 % (ref 35–45)
HCT VFR BLD AUTO: 34.5 % (ref 35–45)
HGB BLD-MCNC: 10.6 G/DL (ref 12–16)
HGB BLD-MCNC: 10.7 G/DL (ref 12–16)
LYMPHOCYTES # BLD: 1.5 K/UL (ref 0.9–3.6)
LYMPHOCYTES NFR BLD: 14 % (ref 21–52)
MCH RBC QN AUTO: 28.3 PG (ref 24–34)
MCH RBC QN AUTO: 28.4 PG (ref 24–34)
MCHC RBC AUTO-ENTMCNC: 31 G/DL (ref 31–37)
MCHC RBC AUTO-ENTMCNC: 31.5 G/DL (ref 31–37)
MCV RBC AUTO: 90.3 FL (ref 74–97)
MCV RBC AUTO: 91.3 FL (ref 74–97)
MONOCYTES # BLD: 0.8 K/UL (ref 0.05–1.2)
MONOCYTES NFR BLD: 7 % (ref 3–10)
NEUTS SEG # BLD: 8.4 K/UL (ref 1.8–8)
NEUTS SEG NFR BLD: 78 % (ref 40–73)
PLATELET # BLD AUTO: 175 K/UL (ref 135–420)
PLATELET # BLD AUTO: 176 K/UL (ref 135–420)
PMV BLD AUTO: 10.9 FL (ref 9.2–11.8)
PMV BLD AUTO: 11.4 FL (ref 9.2–11.8)
POTASSIUM SERPL-SCNC: 3.6 MMOL/L (ref 3.5–5.5)
PROT SERPL-MCNC: 6.7 G/DL (ref 6.4–8.2)
RBC # BLD AUTO: 3.73 M/UL (ref 4.2–5.3)
RBC # BLD AUTO: 3.78 M/UL (ref 4.2–5.3)
SODIUM SERPL-SCNC: 140 MMOL/L (ref 136–145)
TROPONIN I SERPL-MCNC: <0.02 NG/ML (ref 0–0.04)
TROPONIN I SERPL-MCNC: <0.02 NG/ML (ref 0–0.04)
WBC # BLD AUTO: 10.8 K/UL (ref 4.6–13.2)
WBC # BLD AUTO: 9.9 K/UL (ref 4.6–13.2)

## 2019-05-05 PROCEDURE — 80053 COMPREHEN METABOLIC PANEL: CPT

## 2019-05-05 PROCEDURE — 96372 THER/PROPH/DIAG INJ SC/IM: CPT

## 2019-05-05 PROCEDURE — 85025 COMPLETE CBC W/AUTO DIFF WBC: CPT

## 2019-05-05 PROCEDURE — 71045 X-RAY EXAM CHEST 1 VIEW: CPT

## 2019-05-05 PROCEDURE — 74011250637 HC RX REV CODE- 250/637: Performed by: NURSE PRACTITIONER

## 2019-05-05 PROCEDURE — 74011250637 HC RX REV CODE- 250/637: Performed by: PHYSICIAN ASSISTANT

## 2019-05-05 PROCEDURE — 84520 ASSAY OF UREA NITROGEN: CPT

## 2019-05-05 PROCEDURE — 82550 ASSAY OF CK (CPK): CPT

## 2019-05-05 PROCEDURE — 93005 ELECTROCARDIOGRAM TRACING: CPT

## 2019-05-05 PROCEDURE — 82962 GLUCOSE BLOOD TEST: CPT

## 2019-05-05 PROCEDURE — 83880 ASSAY OF NATRIURETIC PEPTIDE: CPT

## 2019-05-05 PROCEDURE — 99218 HC RM OBSERVATION: CPT

## 2019-05-05 PROCEDURE — 74011250636 HC RX REV CODE- 250/636: Performed by: PHYSICIAN ASSISTANT

## 2019-05-05 PROCEDURE — 3331090002 HH PPS REVENUE DEBIT

## 2019-05-05 PROCEDURE — 3331090001 HH PPS REVENUE CREDIT

## 2019-05-05 PROCEDURE — 83036 HEMOGLOBIN GLYCOSYLATED A1C: CPT

## 2019-05-05 PROCEDURE — 36415 COLL VENOUS BLD VENIPUNCTURE: CPT

## 2019-05-05 PROCEDURE — 77030021352 HC CBL LD SYS DISP COVD -B

## 2019-05-05 PROCEDURE — 85027 COMPLETE CBC AUTOMATED: CPT

## 2019-05-05 PROCEDURE — 99285 EMERGENCY DEPT VISIT HI MDM: CPT

## 2019-05-05 RX ORDER — NITROGLYCERIN 0.4 MG/1
0.4 TABLET SUBLINGUAL AS NEEDED
Status: DISCONTINUED | OUTPATIENT
Start: 2019-05-05 | End: 2019-05-06 | Stop reason: HOSPADM

## 2019-05-05 RX ORDER — MAGNESIUM SULFATE 100 %
4 CRYSTALS MISCELLANEOUS AS NEEDED
Status: DISCONTINUED | OUTPATIENT
Start: 2019-05-05 | End: 2019-05-06 | Stop reason: HOSPADM

## 2019-05-05 RX ORDER — ONDANSETRON 2 MG/ML
4 INJECTION INTRAMUSCULAR; INTRAVENOUS
Status: DISCONTINUED | OUTPATIENT
Start: 2019-05-05 | End: 2019-05-06 | Stop reason: HOSPADM

## 2019-05-05 RX ORDER — METOPROLOL TARTRATE 50 MG/1
50 TABLET ORAL DAILY
Status: DISCONTINUED | OUTPATIENT
Start: 2019-05-06 | End: 2019-05-06 | Stop reason: HOSPADM

## 2019-05-05 RX ORDER — ACETAMINOPHEN 325 MG/1
650 TABLET ORAL
Status: DISCONTINUED | OUTPATIENT
Start: 2019-05-05 | End: 2019-05-06 | Stop reason: HOSPADM

## 2019-05-05 RX ORDER — ENOXAPARIN SODIUM 100 MG/ML
30 INJECTION SUBCUTANEOUS EVERY 24 HOURS
Status: DISCONTINUED | OUTPATIENT
Start: 2019-05-05 | End: 2019-05-06 | Stop reason: HOSPADM

## 2019-05-05 RX ORDER — ENOXAPARIN SODIUM 100 MG/ML
40 INJECTION SUBCUTANEOUS EVERY 24 HOURS
Status: DISCONTINUED | OUTPATIENT
Start: 2019-05-05 | End: 2019-05-05

## 2019-05-05 RX ORDER — GUAIFENESIN 100 MG/5ML
81 LIQUID (ML) ORAL DAILY
Status: DISCONTINUED | OUTPATIENT
Start: 2019-05-06 | End: 2019-05-06 | Stop reason: HOSPADM

## 2019-05-05 RX ORDER — NITROGLYCERIN 0.4 MG/1
0.4 TABLET SUBLINGUAL
Status: COMPLETED | OUTPATIENT
Start: 2019-05-05 | End: 2019-05-05

## 2019-05-05 RX ORDER — DICYCLOMINE HYDROCHLORIDE 10 MG/1
10 CAPSULE ORAL
Status: DISCONTINUED | OUTPATIENT
Start: 2019-05-05 | End: 2019-05-06 | Stop reason: HOSPADM

## 2019-05-05 RX ORDER — LISINOPRIL 20 MG/1
20 TABLET ORAL DAILY
Status: DISCONTINUED | OUTPATIENT
Start: 2019-05-05 | End: 2019-05-06 | Stop reason: HOSPADM

## 2019-05-05 RX ORDER — INSULIN LISPRO 100 [IU]/ML
INJECTION, SOLUTION INTRAVENOUS; SUBCUTANEOUS
Status: DISCONTINUED | OUTPATIENT
Start: 2019-05-05 | End: 2019-05-06 | Stop reason: HOSPADM

## 2019-05-05 RX ORDER — HYDROCHLOROTHIAZIDE 25 MG/1
25 TABLET ORAL DAILY
Status: DISCONTINUED | OUTPATIENT
Start: 2019-05-05 | End: 2019-05-06 | Stop reason: HOSPADM

## 2019-05-05 RX ORDER — MORPHINE SULFATE 2 MG/ML
1 INJECTION, SOLUTION INTRAMUSCULAR; INTRAVENOUS
Status: DISCONTINUED | OUTPATIENT
Start: 2019-05-05 | End: 2019-05-06 | Stop reason: HOSPADM

## 2019-05-05 RX ORDER — DEXTROSE MONOHYDRATE 25 G/50ML
25-50 INJECTION, SOLUTION INTRAVENOUS AS NEEDED
Status: DISCONTINUED | OUTPATIENT
Start: 2019-05-05 | End: 2019-05-06 | Stop reason: HOSPADM

## 2019-05-05 RX ADMIN — ENOXAPARIN SODIUM 30 MG: 100 INJECTION SUBCUTANEOUS at 22:30

## 2019-05-05 RX ADMIN — HYDROCHLOROTHIAZIDE 25 MG: 25 TABLET ORAL at 22:29

## 2019-05-05 RX ADMIN — NITROGLYCERIN 1 INCH: 20 OINTMENT TOPICAL at 17:59

## 2019-05-05 RX ADMIN — LISINOPRIL 20 MG: 20 TABLET ORAL at 22:29

## 2019-05-05 RX ADMIN — NITROGLYCERIN 0.4 MG: 0.4 TABLET SUBLINGUAL at 17:57

## 2019-05-05 NOTE — ED TRIAGE NOTES
Pt arrives via EMS from home. Was sitting in chair and felt some chest pain. Pt was given ASA and nitro x 2 en route. Reports feeling better.

## 2019-05-05 NOTE — H&P
Internal Medicine History and Physical 
   
 
 
Subjective HPI: Peace Butt is a 81 y.o. female with a PMHx of TIA, CKD, DM, HTN who presented to the ED with c/o substernal chest pain that started this afternoon while at rest. She states the pain was initially a 10/10 and sharp. After nitro x3, the pain is 6/10 and feels more like \"something sitting on her chest\". She reports associated nausea. Denies SOB, abd pain, vomiting. FHx - father passed away after MI in his 46s. In the ED, trop negative. Will admit for further evaluation. PMHx: 
Past Medical History:  
Diagnosis Date  CAD (coronary artery disease)  Chronic osteomyelitis of ankle ([facility]  CKD (chronic kidney disease) stage 3, GFR 30-59 ml/min ([facility]) 06/24/2013 Worst noted (08/06/13) BUN/sCr: 54/2.49, GFR 20.   
 DM (diabetes mellitus) ([facility] [address]  Elevated TSH 06/25/2012  
 5.08   
 Hypertension  Leukocytosis  Lumbar spinal stenosis  Menopause  Normocytic anemia  Rhabdomyolysis  Splenomegaly  Stroke ([facility] [address]  Vitamin D deficiency PSurgHx: 
Past Surgical History:  
Procedure Laterality Date  COLONOSCOPY N/A 3/1/2017 COLONOSCOPY performed by Phoebe Painting MD at [address] [place] [address],[address]  HX FREE SKIN GRAFT    
 HX HYSTERECTOMY  HX OOPHORECTOMY  HX OPEN REDUCTION INTERNAL FIXATION Left Tx LLE Ankle Trimalleolar Fx by Dr. Vadim Correa [facility])  HX ORTHOPAEDIC Left 06/11/2013 [facility] Dr. Vadim Correa - LLE Ankle Removal of Hardware  HX VEIN STRIPPING Bilateral 09/18/2013 Dr. Irma Tam  MS DEBRIDEMENT OPEN WOUND 20 SQ CM<    
 
 
SocialHx: 
Social History Socioeconomic History  Marital status:  Spouse name: Not on file  Number of children: Not on file  Years of education: Not on file  Highest education level: Not on file Occupational History  Not on file Social Needs  Financial resource strain: Not on file  Food insecurity:  
  Worry: Not on file Inability: Not on file  Transportation needs:  
  Medical: Not on file Non-medical: Not on file Tobacco Use  Smoking status: Never Smoker  Smokeless tobacco: Never Used Substance and Sexual Activity  Alcohol use: No  
 Drug use: No  
 Sexual activity: Never Lifestyle  Physical activity:  
  Days per week: Not on file Minutes per session: Not on file  Stress: Not on file Relationships  Social connections:  
  Talks on phone: Not on file Gets together: Not on file Attends Jew service: Not on file Active member of club or organization: Not on file Attends meetings of clubs or organizations: Not on file Relationship status: Not on file  Intimate partner violence:  
  Fear of current or ex partner: Not on file Emotionally abused: Not on file Physically abused: Not on file Forced sexual activity: Not on file Other Topics Concern  Not on file Social History Narrative  Not on file FamilyHx: 
Family History Problem Relation Age of Onset  Diabetes Other  Hypertension Other Home Medications: 
Prior to Admission Medications Prescriptions Last Dose Informant Patient Reported? Taking? OTHER   Yes No  
acetaminophen (TYLENOL PO)   Yes No  
Si mg by Buccal route every six (6) hours. Indications: fever, Pain  
acetaminophen (TYLENOL) 500 mg tablet   No No  
Sig: Take 1 Tab by mouth every six (6) hours as needed for Pain or Fever. aspirin 81 mg chewable tablet   Yes No  
Sig: Take 81 mg by mouth daily. cholecalciferol (VITAMIN D3) 1,000 unit tablet   Yes No  
Sig: Take 5,000 Units by mouth daily. cyanocobalamin/folic ac/vit B6 (FOLIC ACID-VIT V1-OZD W92 PO)   Yes No  
Sig: Take 1 Tab by mouth daily. dicyclomine (BENTYL) 10 mg capsule   No No  
Sig: Take 1 Cap by mouth four (4) times daily as needed (abd cramps). dicyclomine (BENTYL) 10 mg capsule   Yes No  
Sig: Take 10 mg by mouth as needed for Other (cramping ). doxycycline (VIBRAMYCIN) 100 mg capsule   No No  
Sig: Take 1 Cap by mouth daily. Indications: skin infection  
gabapentin (NEURONTIN) 300 mg capsule   Yes No  
Sig: Take 1 Cap by mouth three (3) times daily. 12/22/16, QTY#90, 30 Days, 2 Refills. Dr. Mauricio Richey  
lisinopril-hydrochlorothiazide (PRINZIDE, ZESTORETIC) 20-25 mg per tablet   Yes No  
Sig: Take 1 Tab by mouth daily. metoprolol tartrate (LOPRESSOR) 50 mg tablet   No No  
Sig: Take 1 Tab by mouth daily. omega 3-dha-epa-fish oil (FISH OIL) 100-160-1,000 mg cap   Yes No  
Sig: Take  by mouth. ondansetron (ZOFRAN ODT) 4 mg disintegrating tablet   No No  
Sig: Take 1 Tab by mouth every eight (8) hours as needed for Nausea. ondansetron hcl (ZOFRAN) 4 mg tablet   Yes No  
Sig: Take 4 mg by mouth every eight (8) hours as needed for Nausea. Facility-Administered Medications: None Allergies: 
No Known Allergies Review of Systems: 
CONST: Fever, weight loss, fatigue or chills HEENT: Recent changes in vision, vertigo, epistaxis, dysphagia and hoarseness CV: Chest pain, palpitations, HTN, edema and varicosities RESP: Cough, shortness of breath, wheezing, hemoptysis, snoring and reactive airway disease GI: Nausea, vomiting, abdominal pain, change in bowel habits, hematochezia, melena, and GERD  
: Hematuria, dysuria, frequency, urgency, nocturia and stress urinary incontinence MS: Weakness, joint pain and arthritis ENDO: Diabetes, thyroid disease, polyuria, polydipsia, polyphagia, poor wound healing, heat intolerance, cold intolerance LYMPH/HEME: Anemia, bruising and history of blood transfusions INTEG: Dermatitis, abnormal moles NEURO: Dizziness, headache, fainting, seizures and stroke PSYCH: Anxiety and depression Objective Visit Vitals /57 Pulse 63 Temp 98.3 °F (36.8 °C) Resp 21 SpO2 99% Physical Exam: 
General Appearance: NAD, conversant HENT: normocephalic/atraumatic, moist mucus membranes Lungs: CTA with normal respiratory effort Cardiovascular: RRR, 2/6 ADDY, capillary refill < 2 sec Abdomen: soft, non-tender, normal bowel sounds Extremities: no cyanosis, no peripheral edema, B/L venous stasis changes, left foot/ankle in gauze dressing Neuro: moves all extremities, no focal deficits Psych: appropriate affect, alert and oriented to person, place and time Laboratory Studies: 
BMP:  
Lab Results Component Value Date/Time  05/05/2019 05:17 PM  
 K 3.6 05/05/2019 05:17 PM  
  (H) 05/05/2019 05:17 PM  
 CO2 21 05/05/2019 05:17 PM  
 AGAP 10 05/05/2019 05:17 PM  
  (H) 05/05/2019 05:17 PM  
 BUN 29 (H) 05/05/2019 05:17 PM  
 CREA 1.46 (H) 05/05/2019 05:17 PM  
 GFRAA 42 (L) 05/05/2019 05:17 PM  
 GFRNA 34 (L) 05/05/2019 05:17 PM  
 
CBC:  
Lab Results Component Value Date/Time WBC 10.8 05/05/2019 05:17 PM  
 HGB 10.7 (L) 05/05/2019 05:17 PM  
 HCT 34.5 (L) 05/05/2019 05:17 PM  
  05/05/2019 05:17 PM  
 
All Cardiac Markers in the last 24 hours:  
Lab Results Component Value Date/Time CPK 47 05/05/2019 05:17 PM  
 CKMB <1.0 05/05/2019 05:17 PM  
 CKND1  05/05/2019 05:17 PM  
  CALCULATION NOT PERFORMED WHEN RESULT IS BELOW LINEAR LIMIT  
 TROIQ <0.02 05/05/2019 05:17 PM  
 
 
Imaging Reviewed: 
No results found. EKG:  
Normal sinus rhythm with sinus arrhythmia Low voltage QRS Cannot rule out Anteroseptal infarct (cited on or before 21-JUN-2012) Abnormal ECG When compared with ECG of 05-MAY-2019 17:12,  
premature atrial complexes are no longer present Assessment/Plan Principal Problem: 
  Chest pain at rest (5/5/2019) Active Problems: 
  Type 2 diabetes mellitus ([facility] [address] (8/8/2013) Hypertension () 
 
  CKD (chronic kidney disease) stage 3, GFR 30-59 ml/min ([facility]) (6/24/2013) Overview: Worst noted (08/06/13) BUN/sCr: 54/2.49, GFR 20. Chest pain 
- serial Cristino  
- echo in AM 
- nuclear stress test in AM 
- PRN pain control, SL nitro - cardiac monitoring DM 
- check A1c 
- SSI 
- monitor HTN 
- cont home meds CKD 
- monitor BMP 
 
- Cont acceptable home medications for chronic conditions  
- DVT protocol I have personally reviewed all pertinent labs, films and EKGs that have officially resulted. I reviewed available electronic documentation outlining the initial presentation as well as the emergency room physician's encounter. Gail Esposito PA-C [address] [place], [place] [address] Office:  [phone] Pager: [phone]

## 2019-05-05 NOTE — PROGRESS NOTES
I, Dr. Justin Francisco, have personally seen and examined this patient; I have fully participate in the care of this patient with the PA. I have reviewed and agree with all pertinent clinical information including history, physical exam, labs, radiographic studies and plan amended with following addendum :    
 
Very pleasant lady admitted with CP started today while sitting in the chair, pressure like . Admitted  to R/O MI. Deny any cardiac history. Nausea for one week , no GERD. H/o TIAV twice , she takes 2 baby ASA daily. NPO mid night. Cardiac stress test. Serial trop . SL nitro . EKG. Tele bed. Morphine or equivalent for sever chest pain. Visit Vitals /57 Pulse 63 Temp 98.3 °F (36.8 °C) Resp 21 SpO2 99%

## 2019-05-05 NOTE — ED NOTES
TRANSFER - ED to INPATIENT REPORT: 
 
SBAR report made available to receiving floor on this patient being transferred to [address] [place] [address] ([facility])  for routine progression of care Admitting diagnosis Chest pain at rest [R07.9] Information from the following report(s) SBAR was made available to receiving floor. Lines:  
Peripheral IV 05/05/19 Left Hand (Active) Site Assessment Clean, dry, & intact 5/5/2019  5:24 PM  
Phlebitis Assessment 0 5/5/2019  5:24 PM  
Infiltration Assessment 0 5/5/2019  5:24 PM  
Dressing Status Clean, dry, & intact 5/5/2019  5:24 PM  
Hub Color/Line Status Pink 5/5/2019  5:24 PM  
  
 
Medication list confirmed with patient Opportunity for questions and clarification was provided. Patient is oriented to time, place, person and situation Patient is  continent and ambulatory with assist 
  
Valuables given to family at patients request  
 
Patient transported with: 
 Monitor Tech 
 
MAP (Monitor): 80 =Monitored (most recent) Vitals w/ MEWS Score (last day) Date/Time MEWS Score Pulse Resp Temp BP Level of Consciousness SpO2  
 05/05/19 1845  1  60  20  98.4 °F (36.9 °C)  150/58  Alert  100 % 05/05/19 1745    63  21        99 % 05/05/19 1730    63  20    146/57    99 % 05/05/19 1723        98.3 °F (36.8 °C)        
 05/05/19 1715    62  15    132/44    99 % 05/05/19 1700          143/57    

## 2019-05-05 NOTE — ED PROVIDER NOTES
Giorgio Simpson is a 81year old female who presetns to [facility] ED with a c/o sudden onset of chest pain. States the pain started approx one hour prior to her arrival in the ED. She described the pain as sharp and bialteraly on the chest wall. She denies a cardiac Hx but states her father dies from a heart attack. Pt was given 324 ASA - one NTG en route. Past Medical History:  
Diagnosis Date  CAD (coronary artery disease)  Chronic osteomyelitis of ankle ([facility] [address]  CKD (chronic kidney disease) stage 3, GFR 30-59 ml/min ([facility]) 06/24/2013 Worst noted (08/06/13) BUN/sCr: 54/2.49, GFR 20.   
 DM (diabetes mellitus) ([facility] [address]  Elevated TSH 06/25/2012  
 5.08   
 Hypertension  Leukocytosis  Lumbar spinal stenosis  Menopause  Normocytic anemia  Rhabdomyolysis  Splenomegaly  [facility]  Vitamin D deficiency Past Surgical History:  
Procedure Laterality Date  COLONOSCOPY N/A 3/1/2017 COLONOSCOPY performed by Yannick Robertson MD at [address]  HX FREE SKIN GRAFT    
 HX HYSTERECTOMY  HX OOPHORECTOMY  HX OPEN REDUCTION INTERNAL FIXATION Left Tx LLE Ankle Trimalleolar Fx by Dr. Magdaleno Goodpasture [facility])  HX ORTHOPAEDIC Left 06/11/2013 [address] Magdaleno Goodpasture - LLE Ankle Removal of Hardware  HX VEIN STRIPPING Bilateral 09/18/2013 Dr. Deepa Jasmine  IL DEBRIDEMENT OPEN WOUND 20 SQ CM<    
 
   
Family History:  
Problem Relation Age of Onset  Diabetes Other  Hypertension Other Social History Socioeconomic History  Marital status:  Spouse name: Not on file  Number of children: Not on file  Years of education: Not on file  Highest education level: Not on file Occupational History  Not on file Social Needs  Financial resource strain: Not on file  Food insecurity:  
  Worry: Not on file Inability: Not on file  Transportation needs: Medical: Not on file Non-medical: Not on file Tobacco Use  Smoking status: Never Smoker  Smokeless tobacco: Never Used Substance and Sexual Activity  Alcohol use: No  
 Drug use: No  
 Sexual activity: Never Lifestyle  Physical activity:  
  Days per week: Not on file Minutes per session: Not on file  Stress: Not on file Relationships  Social connections:  
  Talks on phone: Not on file Gets together: Not on file Attends Evangel[employer] service: Not on file Active member of club or organization: Not on file Attends meetings of clubs or organizations: Not on file Relationship status: Not on file  Intimate partner violence:  
  Fear of current or ex partner: Not on file Emotionally abused: Not on file Physically abused: Not on file Forced sexual activity: Not on file Other Topics Concern  Not on file Social History Narrative  Not on file ALLERGIES: Patient has no known allergies. Review of Systems Constitutional: Negative. HENT: Negative. Eyes: Negative. Respiratory: Negative. Cardiovascular: Positive for chest pain. Gastrointestinal: Negative. Endocrine: Negative. Genitourinary: Negative. Musculoskeletal: Negative. Skin: Negative. Allergic/Immunologic: Negative. Neurological: Negative. Hematological: Negative. Psychiatric/Behavioral: Negative. There were no vitals filed for this visit. Physical Exam  
Constitutional: She appears well-developed and well-nourished. No distress. HENT:  
Head: Normocephalic and atraumatic. Nose: Nose normal.  
Eyes: Pupils are equal, round, and reactive to light. EOM are normal. Right eye exhibits no discharge. Left eye exhibits no discharge. No scleral icterus. Neck: Normal range of motion. Neck supple. No tracheal deviation present. Cardiovascular: Normal rate, regular rhythm, normal heart sounds and intact distal pulses. Chest pain is non reproducible by palpation. Pulmonary/Chest: Effort normal and breath sounds normal. No stridor. She has no wheezes. Abdominal: Soft. She exhibits no distension. There is no tenderness. There is no guarding. Genitourinary:  
Genitourinary Comments: NE 
  
Musculoskeletal: She exhibits no deformity. Neurological: She is alert. Coordination normal.  
Skin: Skin is warm and dry. NE. Psychiatric: She has a normal mood and affect. Her behavior is normal.  
Nursing note and vitals reviewed. MDM Number of Diagnoses or Management Options ACS (acute coronary syndrome) [facility]):  
Diagnosis management comments: MDM:  The Pt had releif from the NTG given en route by the medics. Her pain has returned. An inch of NTG paste was applied, and admissio sought through the hospital service. First round of labs were negative for acute findings. Terry Gomez NP  6:25 PM 
 
CONSULT:  Spoke to Dr Karyle Nordmann who has accepted the Pt for admission. Terry Gomez NP  6:27 PM 
 
PROGRESS NOTE:  The CXR was reviewed. There is a widened mediastinum noted. Terry Gomez NP    6:28 PM 
 
 
 
  
Amount and/or Complexity of Data Reviewed Clinical lab tests: ordered and reviewed Tests in the radiology section of CPT®: ordered and reviewed Discuss the patient with other providers: yes (Dr Reba Tran ,  Dr Mena Evangelista) Independent visualization of images, tracings, or specimens: yes (Plain film Xr 
) Risk of Complications, Morbidity, and/or Mortality Presenting problems: moderate Diagnostic procedures: moderate Patient Progress Patient progress: stable Procedures Diagnosis: 1. ACS (acute coronary syndrome) ([facility] [address] Disposition:   ADMITTED Follow-up Information None Patient's Medications Start Taking No medications on file Continue Taking ACETAMINOPHEN (TYLENOL PO)    500 mg by Buccal route every six (6) hours. Indications: fever, Pain ACETAMINOPHEN (TYLENOL) 500 MG TABLET    Take 1 Tab by mouth every six (6) hours as needed for Pain or Fever. ASPIRIN 81 MG CHEWABLE TABLET    Take 81 mg by mouth daily. CHOLECALCIFEROL (VITAMIN D3) 1,000 UNIT TABLET    Take 5,000 Units by mouth daily. CYANOCOBALAMIN/FOLIC AC/VIT B6 (FOLIC ACID-VIT F1-ZDK G47 PO)    Take 1 Tab by mouth daily. DICYCLOMINE (BENTYL) 10 MG CAPSULE    Take 1 Cap by mouth four (4) times daily as needed (abd cramps). DICYCLOMINE (BENTYL) 10 MG CAPSULE    Take 10 mg by mouth as needed for Other (cramping ). DOXYCYCLINE (VIBRAMYCIN) 100 MG CAPSULE    Take 1 Cap by mouth daily. Indications: skin infection GABAPENTIN (NEURONTIN) 300 MG CAPSULE    Take 1 Cap by mouth three (3) times daily. 12/22/16, QTY#90, 30 Days, 2 Refills. Dr. Teagan Figueroa LISINOPRIL-HYDROCHLOROTHIAZIDE (PRINZIDE, ZESTORETIC) 20-25 MG PER TABLET    Take 1 Tab by mouth daily. METOPROLOL TARTRATE (LOPRESSOR) 50 MG TABLET    Take 1 Tab by mouth daily. OMEGA 3-DHA-EPA-FISH OIL (FISH OIL) 100-160-1,000 MG CAP    Take  by mouth. ONDANSETRON (ZOFRAN ODT) 4 MG DISINTEGRATING TABLET    Take 1 Tab by mouth every eight (8) hours as needed for Nausea. ONDANSETRON HCL (ZOFRAN) 4 MG TABLET    Take 4 mg by mouth every eight (8) hours as needed for Nausea. OTHER These Medications have changed No medications on file Stop Taking No medications on file

## 2019-05-06 ENCOUNTER — APPOINTMENT (OUTPATIENT)
Dept: NON INVASIVE DIAGNOSTICS | Age: 84
End: 2019-05-06
Attending: PHYSICIAN ASSISTANT
Payer: MEDICARE

## 2019-05-06 ENCOUNTER — APPOINTMENT (OUTPATIENT)
Dept: NUCLEAR MEDICINE | Age: 84
End: 2019-05-06
Attending: PHYSICIAN ASSISTANT
Payer: MEDICARE

## 2019-05-06 VITALS
TEMPERATURE: 97.4 F | OXYGEN SATURATION: 97 % | HEIGHT: 64 IN | BODY MASS INDEX: 27.49 KG/M2 | WEIGHT: 161 LBS | RESPIRATION RATE: 18 BRPM | SYSTOLIC BLOOD PRESSURE: 190 MMHG | HEART RATE: 71 BPM | DIASTOLIC BLOOD PRESSURE: 67 MMHG

## 2019-05-06 LAB
ANION GAP SERPL CALC-SCNC: 8 MMOL/L (ref 3–18)
ATRIAL RATE: 60 BPM
ATRIAL RATE: 62 BPM
BUN SERPL-MCNC: 29 MG/DL (ref 7–18)
BUN/CREAT SERPL: 25 (ref 12–20)
CALCIUM SERPL-MCNC: 8.8 MG/DL (ref 8.5–10.1)
CALCULATED P AXIS, ECG09: 64 DEGREES
CALCULATED P AXIS, ECG09: 89 DEGREES
CALCULATED R AXIS, ECG10: 18 DEGREES
CALCULATED R AXIS, ECG10: 19 DEGREES
CALCULATED T AXIS, ECG11: 127 DEGREES
CALCULATED T AXIS, ECG11: 96 DEGREES
CHLORIDE SERPL-SCNC: 108 MMOL/L (ref 100–108)
CHOLEST SERPL-MCNC: 162 MG/DL
CK MB CFR SERPL CALC: NORMAL % (ref 0–4)
CK MB CFR SERPL CALC: NORMAL % (ref 0–4)
CK MB SERPL-MCNC: <1 NG/ML (ref 5–25)
CK MB SERPL-MCNC: <1 NG/ML (ref 5–25)
CK SERPL-CCNC: 39 U/L (ref 26–192)
CK SERPL-CCNC: 41 U/L (ref 26–192)
CO2 SERPL-SCNC: 26 MMOL/L (ref 21–32)
CREAT SERPL-MCNC: 1.17 MG/DL (ref 0.6–1.3)
DIAGNOSIS, 93000: NORMAL
DIAGNOSIS, 93000: NORMAL
GLUCOSE BLD STRIP.AUTO-MCNC: 104 MG/DL (ref 70–110)
GLUCOSE BLD STRIP.AUTO-MCNC: 92 MG/DL (ref 70–110)
GLUCOSE SERPL-MCNC: 102 MG/DL (ref 74–99)
HDLC SERPL-MCNC: 45 MG/DL (ref 40–60)
HDLC SERPL: 3.6 {RATIO} (ref 0–5)
LDLC SERPL CALC-MCNC: 85.4 MG/DL (ref 0–100)
LIPID PROFILE,FLP: ABNORMAL
P-R INTERVAL, ECG05: 158 MS
P-R INTERVAL, ECG05: 176 MS
POTASSIUM SERPL-SCNC: 4.1 MMOL/L (ref 3.5–5.5)
Q-T INTERVAL, ECG07: 382 MS
Q-T INTERVAL, ECG07: 388 MS
QRS DURATION, ECG06: 72 MS
QRS DURATION, ECG06: 80 MS
QTC CALCULATION (BEZET), ECG08: 382 MS
QTC CALCULATION (BEZET), ECG08: 393 MS
SODIUM SERPL-SCNC: 142 MMOL/L (ref 136–145)
STRESS BASELINE HR: 57 BPM
STRESS ESTIMATED WORKLOAD: 1 METS
STRESS EXERCISE DUR MIN: NORMAL
STRESS PEAK DIAS BP: 75 MMHG
STRESS PEAK SYS BP: 161 MMHG
STRESS PERCENT HR ACHIEVED: 67 %
STRESS POST PEAK HR: 93 BPM
STRESS RATE PRESSURE PRODUCT: NORMAL BPM*MMHG
STRESS ST DEPRESSION: 0 MM
STRESS ST ELEVATION: 0 MM
STRESS TARGET HR: 138 BPM
TRIGL SERPL-MCNC: 158 MG/DL (ref ?–150)
TROPONIN I SERPL-MCNC: <0.02 NG/ML (ref 0–0.04)
TROPONIN I SERPL-MCNC: <0.02 NG/ML (ref 0–0.04)
VENTRICULAR RATE, ECG03: 60 BPM
VENTRICULAR RATE, ECG03: 62 BPM
VLDLC SERPL CALC-MCNC: 31.6 MG/DL

## 2019-05-06 PROCEDURE — 74011250636 HC RX REV CODE- 250/636: Performed by: HOSPITALIST

## 2019-05-06 PROCEDURE — 80061 LIPID PANEL: CPT

## 2019-05-06 PROCEDURE — 3331090002 HH PPS REVENUE DEBIT

## 2019-05-06 PROCEDURE — 3331090001 HH PPS REVENUE CREDIT

## 2019-05-06 PROCEDURE — 93017 CV STRESS TEST TRACING ONLY: CPT

## 2019-05-06 PROCEDURE — 36415 COLL VENOUS BLD VENIPUNCTURE: CPT

## 2019-05-06 PROCEDURE — 80048 BASIC METABOLIC PNL TOTAL CA: CPT

## 2019-05-06 PROCEDURE — A9500 TC99M SESTAMIBI: HCPCS

## 2019-05-06 PROCEDURE — 82550 ASSAY OF CK (CPK): CPT

## 2019-05-06 PROCEDURE — 93306 TTE W/DOPPLER COMPLETE: CPT

## 2019-05-06 PROCEDURE — 99218 HC RM OBSERVATION: CPT

## 2019-05-06 PROCEDURE — 74011250637 HC RX REV CODE- 250/637: Performed by: PHYSICIAN ASSISTANT

## 2019-05-06 PROCEDURE — 82962 GLUCOSE BLOOD TEST: CPT

## 2019-05-06 RX ADMIN — LISINOPRIL 20 MG: 20 TABLET ORAL at 14:03

## 2019-05-06 RX ADMIN — METOPROLOL TARTRATE 50 MG: 50 TABLET ORAL at 14:03

## 2019-05-06 RX ADMIN — REGADENOSON 0.4 MG: 0.08 INJECTION, SOLUTION INTRAVENOUS at 11:24

## 2019-05-06 RX ADMIN — ASPIRIN 81 MG 81 MG: 81 TABLET ORAL at 14:02

## 2019-05-06 NOTE — DISCHARGE SUMMARY
Discharge Summary    Patient: Jasvir Branch               Sex: female          DOA: 5/5/2019         YOB: 1935      Age:  81 y.o.        LOS:  LOS: 0 days                Admit Date: 5/5/2019    Discharge Date: 5/6/2019    Discharge Medications:     Discharge Medication List as of 5/6/2019  3:31 PM      CONTINUE these medications which have NOT CHANGED    Details   ondansetron hcl (ZOFRAN) 4 mg tablet Take 4 mg by mouth every eight (8) hours as needed for Nausea., Historical Med      acetaminophen (TYLENOL) 500 mg tablet Take 1 Tab by mouth every six (6) hours as needed for Pain or Fever., Print, Disp-40 Tab, R-0      dicyclomine (BENTYL) 10 mg capsule Take 1 Cap by mouth four (4) times daily as needed (abd cramps). , Print, Disp-20 Cap, R-0      ondansetron (ZOFRAN ODT) 4 mg disintegrating tablet Take 1 Tab by mouth every eight (8) hours as needed for Nausea. , Print, Disp-12 Tab, R-0      aspirin 81 mg chewable tablet Take 81 mg by mouth daily. , Historical Med      omega 3-dha-epa-fish oil (FISH OIL) 100-160-1,000 mg cap Take  by mouth., Historical Med      OTHER Historical Med      cyanocobalamin/folic ac/vit B6 (FOLIC ACID-VIT E2-LXS D10 PO) Take 1 Tab by mouth daily. , Historical Med      metoprolol tartrate (LOPRESSOR) 50 mg tablet Take 1 Tab by mouth daily. , Print, Disp-30 Tab, R-0      gabapentin (NEURONTIN) 300 mg capsule Take 1 Cap by mouth three (3) times daily. 12/22/16, QTY#90, 30 Days, 2 Refills. Dr. Jenelle Olvera, Historical Med, R-2      cholecalciferol (VITAMIN D3) 1,000 unit tablet Take 5,000 Units by mouth daily. , Historical Med      lisinopril-hydrochlorothiazide (PRINZIDE, ZESTORETIC) 20-25 mg per tablet Take 1 Tab by mouth daily. Historical Med, 1 Tab         STOP taking these medications       doxycycline (VIBRAMYCIN) 100 mg capsule Comments:   Reason for Stopping:                Follow-up: PCP    Discharge Condition: Stable    Activity: Activity as tolerated    Diet: Cardiac Diet    Labs:  Labs: Results:       Chemistry Recent Labs     05/06/19  0625 05/05/19 2130 05/05/19  1717   *  --  133*     --  140   K 4.1  --  3.6     --  109*   CO2 26  --  21   BUN 29* 29* 29*   CREA 1.17  --  1.46*   CA 8.8  --  8.4*   AGAP 8  --  10   BUCR 25*  --  20   AP  --   --  92   TP  --   --  6.7   ALB  --   --  3.2*   GLOB  --   --  3.5   AGRAT  --   --  0.9      CBC w/Diff Recent Labs     05/05/19 2130 05/05/19  1717   WBC 9.9 10.8   RBC 3.73* 3.78*   HGB 10.6* 10.7*   HCT 33.7* 34.5*    176   GRANS  --  78*   LYMPH  --  14*   EOS  --  1      Cardiac Enzymes Recent Labs     05/06/19  1325 05/06/19  0625   CPK 41 39   CKND1 CALCULATION NOT PERFORMED WHEN RESULT IS BELOW LINEAR LIMIT CALCULATION NOT PERFORMED WHEN RESULT IS BELOW LINEAR LIMIT      Coagulation No results for input(s): PTP, INR, APTT in the last 72 hours. No lab exists for component: INREXT    Lipid Panel Lab Results   Component Value Date/Time    Cholesterol, total 162 05/06/2019 06:25 AM    HDL Cholesterol 45 05/06/2019 06:25 AM    LDL, calculated 85.4 05/06/2019 06:25 AM    VLDL, calculated 31.6 05/06/2019 06:25 AM    Triglyceride 158 (H) 05/06/2019 06:25 AM    CHOL/HDL Ratio 3.6 05/06/2019 06:25 AM      BNP No results for input(s): BNPP in the last 72 hours. Liver Enzymes Recent Labs     05/05/19  1717   TP 6.7   ALB 3.2*   AP 92   SGOT 18      Thyroid Studies Lab Results   Component Value Date/Time    T4, Total 8.2 06/21/2012 07:50 AM    TSH 0.62 10/09/2018 11:10 PM          Imaging:    Ct Abd Pelv W Cont    Result Date: 4/22/2019  EXAM: CT ABD PELV W CONT CLINICAL INDICATION/HISTORY: Abdominal pain; diarrhea COMPARISON: 2/25/2017 TECHNIQUE: Axial CT imaging of the abdomen and pelvis was performed with intravenous contrast. Multiplanar reformats were generated.  One or more dose reduction techniques were used on this CT: automated exposure control, adjustment of the mAs and/or kVp according to patient size, and iterative reconstruction techniques. The specific techniques used on this CT exam have been documented in the patient's electronic medical record. Digital Imaging and Communications in Medicine (DICOM) format image data are available to nonaffiliated external healthcare facilities or entities on a secure, media free, reciprocally searchable basis with patient authorization for at least a 12-month period after this study. _______________ FINDINGS: LUNG BASES: Mild dependent atelectasis and scarring. Multifocal coronary artery calcifications are noted. Cardiomegaly. HEPATOBILIARY: No suspicious mass. No significant biliary dilatation. Prior cholecystectomy. SPLEEN: Unremarkable. PANCREAS: Unremarkable. ADRENALS: Stable heterogeneous low-density left adrenal nodule, compatible with an adenoma. KIDNEYS: 7 mm peripherally calcified lesion in the left kidney is unchanged. VASCULATURE: Dense scattered aortic atherosclerosis. LYMPH NODES, RETROPERITONEUM: Unremarkable. GASTROINTESTINAL TRACT: Small hiatal hernia. Scattered diverticulosis. No bowel wall thickening or dilatation. Normal appendix. PELVIC ORGANS: Prior hysterectomy. MISCELLANEOUS FINDINGS: None. MUSCULOSKELETAL: Asymmetric atrophy of the left iliopsoas muscle belly. Degenerative changes are noted in the spine and pelvis including advanced multilevel degenerative disc disease, facet arthropathy, and degenerative changes in the SI joints. Levoconvex scoliosis of the lumbar spine. _______________     IMPRESSION: 1. No acute inflammatory process in the abdomen or pelvis. 2.  Diverticulosis. 3.   Cholecystectomy and hysterectomy. 4.   Stable left adrenal gland nodule, compatible with an adenoma. 5.   Unchanged subcentimeter peripherally calcified lesion upper pole left kidney, nonspecific but presumably benign given the greater than two-year stability. Initial preliminary report was provided to the ED by the on-call radiology resident.      Xr Chest Port    Result Date: 5/6/2019  EXAM: XR CHEST PORT CLINICAL INDICATION/HISTORY: Chest pain -Additional: None COMPARISON: Several prior exams, most recently 10/10/2018 TECHNIQUE: Frontal view of the chest _______________ FINDINGS: HEART AND MEDIASTINUM: Normal cardiac size and mediastinal contours. LUNGS AND PLEURAL SPACES: No focal pneumonic consolidation, pneumothorax, or pleural effusion. BONY THORAX AND SOFT TISSUES: Mild degenerative changes across the shoulder girdles noted bilaterally.  _______________     IMPRESSION: No acute radiographic cardiopulmonary abnormality       Consults: None    Treatment Team: Treatment Team: Consulting Provider: Raina Lucero MD; Utilization Review: Arley Kurtz RN    Significant Diagnostic Studies: labs:   Recent Results (from the past 24 hour(s))   GLUCOSE, POC    Collection Time: 05/05/19  8:47 PM   Result Value Ref Range    Glucose (POC) 109 70 - 110 mg/dL   CARDIAC PANEL,(CK, CKMB & TROPONIN)    Collection Time: 05/05/19  9:30 PM   Result Value Ref Range    CK 45 26 - 192 U/L    CK - MB <1.0 <3.6 ng/ml    CK-MB Index  0.0 - 4.0 %     CALCULATION NOT PERFORMED WHEN RESULT IS BELOW LINEAR LIMIT    Troponin-I, QT <0.02 0.0 - 0.045 NG/ML   BUN    Collection Time: 05/05/19  9:30 PM   Result Value Ref Range    BUN 29 (H) 7.0 - 18 MG/DL   CBC W/O DIFF    Collection Time: 05/05/19  9:30 PM   Result Value Ref Range    WBC 9.9 4.6 - 13.2 K/uL    RBC 3.73 (L) 4.20 - 5.30 M/uL    HGB 10.6 (L) 12.0 - 16.0 g/dL    HCT 33.7 (L) 35.0 - 45.0 %    MCV 90.3 74.0 - 97.0 FL    MCH 28.4 24.0 - 34.0 PG    MCHC 31.5 31.0 - 37.0 g/dL    RDW 14.5 11.6 - 14.5 %    PLATELET 988 712 - 253 K/uL    MPV 11.4 9.2 - 11.8 FL   CARDIAC PANEL,(CK, CKMB & TROPONIN)    Collection Time: 05/06/19  6:25 AM   Result Value Ref Range    CK 39 26 - 192 U/L    CK - MB <1.0 <3.6 ng/ml    CK-MB Index  0.0 - 4.0 %     CALCULATION NOT PERFORMED WHEN RESULT IS BELOW LINEAR LIMIT    Troponin-I, QT <0.02 0.0 - 0.045 NG/ML   METABOLIC PANEL, BASIC    Collection Time: 05/06/19  6:25 AM   Result Value Ref Range    Sodium 142 136 - 145 mmol/L    Potassium 4.1 3.5 - 5.5 mmol/L    Chloride 108 100 - 108 mmol/L    CO2 26 21 - 32 mmol/L    Anion gap 8 3.0 - 18 mmol/L    Glucose 102 (H) 74 - 99 mg/dL    BUN 29 (H) 7.0 - 18 MG/DL    Creatinine 1.17 0.6 - 1.3 MG/DL    BUN/Creatinine ratio 25 (H) 12 - 20      GFR est AA 54 (L) >60 ml/min/1.73m2    GFR est non-AA 44 (L) >60 ml/min/1.73m2    Calcium 8.8 8.5 - 10.1 MG/DL   LIPID PANEL    Collection Time: 05/06/19  6:25 AM   Result Value Ref Range    LIPID PROFILE          Cholesterol, total 162 <200 MG/DL    Triglyceride 158 (H) <150 MG/DL    HDL Cholesterol 45 40 - 60 MG/DL    LDL, calculated 85.4 0 - 100 MG/DL    VLDL, calculated 31.6 MG/DL    CHOL/HDL Ratio 3.6 0 - 5.0     GLUCOSE, POC    Collection Time: 05/06/19  8:26 AM   Result Value Ref Range    Glucose (POC) 104 70 - 110 mg/dL   NUCLEAR CARDIAC STRESS TEST    Collection Time: 05/06/19 12:06 PM   Result Value Ref Range    Target  bpm    ST Elevation (mm) 0 mm    ST Depression (mm) 0 mm    Exercise duration time 00:02:00     Stress Base Systolic  mmHg    Stress Base Diastolic BP 75 mmHg    Post peak HR 93 BPM    Baseline HR 57 BPM    Estimated workload 1.0 METS    Percent HR 67 %    Stress Rate Pressure Product 14,973 BPM*mmHg   GLUCOSE, POC    Collection Time: 05/06/19 12:57 PM   Result Value Ref Range    Glucose (POC) 92 70 - 110 mg/dL   CARDIAC PANEL,(CK, CKMB & TROPONIN)    Collection Time: 05/06/19  1:25 PM   Result Value Ref Range    CK 41 26 - 192 U/L    CK - MB <1.0 <3.6 ng/ml    CK-MB Index  0.0 - 4.0 %     CALCULATION NOT PERFORMED WHEN RESULT IS BELOW LINEAR LIMIT    Troponin-I, QT <0.02 0.0 - 0.045 NG/ML         Discharge diagnoses:    Problem List as of 5/6/2019 Date Reviewed: 5/2/2019          Codes Class Noted - Resolved    * (Principal) Chest pain at rest ICD-10-CM: R07.9  ICD-9-CM: 786.50  5/5/2019 - Present        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 ([facility] ICD-10-CM: V26.269  ICD-9-CM: 707.13  1/3/2019 - Present        Cellulitis ICD-10-CM: L03.90  ICD-9-CM: 682.9  10/10/2018 - Present        Personal history of noncompliance with medical treatment, presenting hazards to health ICD-10-CM: Z91.19  ICD-9-CM: V15.81  5/31/2018 - Present        Contact dermatitis due to adhesive bandage ICD-10-CM: L25.8  ICD-9-CM: 692.89  5/31/2018 - Present        Midfoot ulcer, left, with fat layer exposed ([facility] ICD-10-CM: K52.882  ICD-9-CM: 707.14  5/17/2018 - Present        Hypertensive left ventricular hypertrophy, without heart failure ICD-10-CM: I11.9  ICD-9-CM: 402.90  1/15/2018 - Present        Atrial arrhythmia ICD-10-CM: I49.8  ICD-9-CM: 427.9  1/15/2018 - Present    Overview Signed 1/15/2018  4:08 PM by Rama Guadalupe MD     Noted on 12 lead ECG 12/3/2017             Type 2 diabetes mellitus with nephropathy ([facility] ICD-10-CM: E11.21  ICD-9-CM: 250.40, 583.81  1/3/2018 - Present        Type 2 diabetes mellitus with diabetic neuropathy ([facility] ICD-10-CM: E11.40  ICD-9-CM: 250.60, 357.2  1/3/2018 - Present        Vascular disease, peripheral ([facility] ICD-10-CM: I73.9  ICD-9-CM: 443.9  12/7/2017 - Present        Non-pressure chronic ulcer of right ankle with fat layer exposed ([facility] ICD-10-CM: C48.234  ICD-9-CM: 707.13  12/7/2017 - Present        Syncope ICD-10-CM: R55  ICD-9-CM: 780.2  12/3/2017 - Present        Skin ulcer of left ankle, with fat layer exposed ([address] ICD-10-CM: L78.480  ICD-9-CM: 707.13  11/16/2017 - Present        Venous ulcer of ankle, left (HCC) ICD-10-CM: U29.326, L97.329  ICD-9-CM: 454.0  11/16/2017 - Present        Weak pulse ICD-10-CM: R09.89  ICD-9-CM: 785.9  11/16/2017 - Present        UTI (urinary tract infection) ICD-10-CM: N39.0  ICD-9-CM: 599.0  11/6/2017 - Present        Encephalopathy ICD-10-CM: G93.40  ICD-9-CM: 348.30  11/6/2017 - Present        LAURA (acute kidney injury) [facility]) ICD-10-CM: N17.9  ICD-9-CM: 584.9  11/6/2017 - Present        Iron (Fe) deficiency anemia ICD-10-CM: D50.9  ICD-9-CM: 280.9  2/28/2017 - Present        Normocytic anemia ICD-10-CM: D64.9  ICD-9-CM: 075. 9  Unknown - Present        Stroke ([facility] ICD-10-CM: I63.9  ICD-9-CM: 434.91  Unknown - Present        Hypertension ICD-10-CM: I10  ICD-9-CM: 401.9  Unknown - Present        DM (diabetes mellitus) ([facility] ICD-10-CM: E11.9  ICD-9-CM: 250.00  Unknown - Present        Type 2 diabetes mellitus ([facility] ICD-10-CM: E11.9  ICD-9-CM: 250.00  8/8/2013 - Present        CVA, old, hemiparesis ([facility] ICD-10-CM: H86.279  ICD-9-CM: 438.20  8/7/2013 - Present        Acute on chronic renal insufficiency ICD-10-CM: N28.9, N18.9  ICD-9-CM: 593.9, 585.9  7/11/2013 - Present    Overview Signed 2/2/2017 11:39 PM by Gustavo Diesel     Overview:    7/8/13 7/10/13  Cr  1.2 1.5             Leukocytosis ICD-10-CM: E56.987  ICD-9-CM: 288.60  7/11/2013 - Present    Overview Signed 2/2/2017 11:39 PM by Gustavo Diesel     Overview:   WBC 11.4  P 86             Microscopic hematuria ICD-10-CM: R31.29  ICD-9-CM: 599.72  7/11/2013 - Present        Rhabdomyolysis ICD-10-CM: M62.82  ICD-9-CM: 728.88  7/11/2013 - Present    Overview Signed 2/2/2017 11:39 PM by Gustavo Diesel     Overview:   Ck  930, 9047             Injury of foot ICD-10-CM: G36.889Q  ICD-9-CM: 959.7  7/10/2013 - Present        CKD (chronic kidney disease) stage 3, GFR 30-59 ml/min ([facility]) ICD-10-CM: N18.3  ICD-9-CM: 585.3  6/24/2013 - Present    Overview Signed 2/3/2017 12:25 AM by Samir Hankins noted (08/06/13) BUN/sCr: 54/2.49, GFR 20.               Abnormal finding on thyroid function test ICD-10-CM: R94.6  ICD-9-CM: 794.5  6/11/2013 - Present        Anemia ICD-10-CM: D64.9  ICD-9-CM: 285.9  6/11/2013 - Present        Chronic osteomyelitis of ankle ([facility] ICD-10-CM: U30.018  ICD-9-CM: 730.17  6/11/2013 - Present        Spinal stenosis of lumbar region ICD-10-CM: M48.061  ICD-9-CM: 724.02  6/11/2013 - Present        Vitamin D deficiency ICD-10-CM: E55.9  ICD-9-CM: 268.9  6/11/2013 - Present        Elevated TSH ICD-10-CM: R79.89  ICD-9-CM: 794.5  6/25/2012 - Present    Overview Signed 2/3/2017 12:29 AM by Reggie Le     5.08              RESOLVED: ARF (acute renal failure) ([facility] ICD-10-CM: N17.9  ICD-9-CM: 584.9  8/7/2013 - 8/8/2013        RESOLVED: Dehydration ICD-10-CM: E86.0  ICD-9-CM: 276.51  8/7/2013 - 8/8/2013        RESOLVED: Altered mental status ICD-10-CM: R41.82  ICD-9-CM: 780.97  8/7/2013 - 8/8/2013        RESOLVED: Confusion ICD-10-CM: R41.0  ICD-9-CM: 298.9  8/7/2013 - 8/8/2013        RESOLVED: Metabolic encephalopathy [id number]: G93.41  ICD-9-CM: 348.31  8/7/2013 - 8/8/2013            Hospital Course:   Major issues addressed during hospitalization outlined  below. Blair Bowles is a 81 y.o. female with a PMHx of TIA, CKD, DM, HTN who presented to the ED with c/o substernal chest pain that started this afternoon while at rest. She states the pain was initially a 10/10 and sharp. After nitro x3, the pain is 6/10 and feels more like \"something sitting on her chest\". She reports associated nausea. Denies SOB, abd pain, vomiting. FHx - father passed away after MI in his 46s. In the ED, trop negative. Will admit for further evaluation. Patient underwent stress test that was negative.   Patient discharged post final result home with pcp f/u    Effie Negro MD  May 6, 2019        Total time spent 35 minutes

## 2019-05-06 NOTE — PROGRESS NOTES
Discharge instructions provided to pt on behalf of primary nurse Mitzie Dance. Questions asked and answered. No new prescriptions ordered.

## 2019-05-06 NOTE — PROGRESS NOTES
Problem: Falls - Risk of 
Goal: *Absence of Falls Description Document Bishop Mathews Fall Risk and appropriate interventions in the flowsheet. Outcome: Progressing Towards Goal 
  
Problem: Patient Education: Go to Patient Education Activity Goal: Patient/Family Education Outcome: Progressing Towards Goal 
  
Problem: Pain Goal: *Control of Pain Outcome: Progressing Towards Goal 
  
Problem: Patient Education: Go to Patient Education Activity Goal: Patient/Family Education Outcome: Progressing Towards Goal 
  
Problem: Unstable angina/NSTEMI: Day of Admission/Day 1 Goal: Activity/Safety Outcome: Progressing Towards Goal 
Goal: Consults, if ordered Outcome: Progressing Towards Goal 
Goal: Diagnostic Test/Procedures Outcome: Progressing Towards Goal 
Goal: Nutrition/Diet Outcome: Progressing Towards Goal 
Goal: Discharge Planning Outcome: Progressing Towards Goal 
Goal: Medications Outcome: Progressing Towards Goal 
Goal: Treatments/Interventions/Procedures Outcome: Progressing Towards Goal 
Goal: Psychosocial 
Outcome: Progressing Towards Goal 
Goal: *Hemodynamically stable Outcome: Progressing Towards Goal 
Goal: *Optimal pain control at patient's stated goal 
Outcome: Progressing Towards Goal

## 2019-05-06 NOTE — PROGRESS NOTES
conducted an initial consultation and Spiritual Assessment for Shiva Sullivan, who is a 81 y.o.,female. Patients Primary Language is: [place]. According to the patients EMR Restoration Affiliation is: [facility]. The reason the Patient came to the hospital is:  
Patient Active Problem List  
 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 ([address] 01/03/2019  Cellulitis 10/10/2018  Personal history of noncompliance with medical treatment, presenting hazards to health 05/31/2018  Contact dermatitis due to adhesive bandage 05/31/2018  Midfoot ulcer, left, with fat layer exposed ([address] 05/17/2018  Hypertensive left ventricular hypertrophy, without heart failure 01/15/2018  Atrial arrhythmia 01/15/2018  Type 2 diabetes mellitus with nephropathy ([address] 01/03/2018  Type 2 diabetes mellitus with diabetic neuropathy ([address] 01/03/2018  Vascular disease, peripheral ([address] 12/07/2017  Non-pressure chronic ulcer of right ankle with fat layer exposed ([address] 12/07/2017  Syncope 12/03/2017  
 Skin ulcer of left ankle, with fat layer exposed ([address] 11/16/2017  Venous ulcer of ankle, left ([address] 11/16/2017  Weak pulse 11/16/2017  UTI (urinary tract infection) 11/06/2017  Encephalopathy 11/06/2017  LAURA (acute kidney injury) ([address] 11/06/2017  Iron (Fe) deficiency anemia 02/28/2017  Normocytic anemia  Stroke ([address]  Hypertension  DM (diabetes mellitus) ([address]  Type 2 diabetes mellitus ([address] 08/08/2013  CVA, old, hemiparesis ([address] 08/07/2013  Acute on chronic renal insufficiency 07/11/2013  Leukocytosis 07/11/2013  Microscopic hematuria 07/11/2013  Rhabdomyolysis 07/11/2013  Injury of foot 07/10/2013  CKD (chronic kidney disease) stage 3, GFR 30-59 ml/min ([facility]) 06/24/2013  Abnormal finding on thyroid function test 06/11/2013  Anemia 06/11/2013  Chronic osteomyelitis of ankle ([facility] 06/11/2013  Spinal stenosis of lumbar region 06/11/2013  Vitamin D deficiency 06/11/2013  Elevated TSH 06/25/2012 The  provided the following Interventions: 
Initiated a relationship of care and support. Explored issues of win, spirituality and/or Bahai needs while hospitalized. Listened empathically. Provided chaplaincy education. Provided information about Spiritual Care Services. Offered prayer and assurance of continued prayers on patient's behalf. Chart reviewed. The following outcomes were achieved: 
Patient shared some information about their medical narrative and spiritual journey/beliefs. Patient processed feeling about current hospitalization. Patient expressed gratitude for the 's visit. Assessment: 
Patient did not indicate any spiritual or Bahai issues which require Spiritual Care Services interventions at this time. Patient does not have any Bahai/cultural needs that will affect patients preferences in health care. Plan: 
Chaplains will continue to follow and will provide pastoral care on an as needed or requested basis.  recommends bedside caregivers page  on duty if patient shows signs of acute spiritual or emotional distress. [facility] Staff  Spiritual Care  
[phone]

## 2019-05-06 NOTE — DISCHARGE INSTRUCTIONS
DISCHARGE SUMMARY from Nurse    PATIENT INSTRUCTIONS:  What to do at Home:  Recommended activity: Activity as tolerated,     If you experience any of the following symptoms Chest pain, shortness of breath or any other concerns, please follow up with emergency room/primary care physician. *  Please give a list of your current medications to your Primary Care Provider. *  Please update this list whenever your medications are discontinued, doses are      changed, or new medications (including over-the-counter products) are added. *  Please carry medication information at all times in case of emergency situations. These are general instructions for a healthy lifestyle:    No smoking/ No tobacco products/ Avoid exposure to second hand smoke  Surgeon General's Warning:  Quitting smoking now greatly reduces serious risk to your health. Obesity, smoking, and sedentary lifestyle greatly increases your risk for illness    A healthy diet, regular physical exercise & weight monitoring are important for maintaining a healthy lifestyle    You may be retaining fluid if you have a history of heart failure or if you experience any of the following symptoms:  Weight gain of 3 pounds or more overnight or 5 pounds in a week, increased swelling in our hands or feet or shortness of breath while lying flat in bed. Please call your doctor as soon as you notice any of these symptoms; do not wait until your next office visit. Recognize signs and symptoms of STROKE:    F-face looks uneven    A-arms unable to move or move unevenly    S-speech slurred or non-existent    T-time-call 911 as soon as signs and symptoms begin-DO NOT go       Back to bed or wait to see if you get better-TIME IS BRAIN. Warning Signs of HEART ATTACK     Call 911 if you have these symptoms:   Chest discomfort. Most heart attacks involve discomfort in the center of the chest that lasts more than a few minutes, or that goes away and comes back.  It can feel like uncomfortable pressure, squeezing, fullness, or pain.  Discomfort in other areas of the upper body. Symptoms can include pain or discomfort in one or both arms, the back, neck, jaw, or stomach.  Shortness of breath with or without chest discomfort.  Other signs may include breaking out in a cold sweat, nausea, or lightheadedness. Don't wait more than five minutes to call 911 - MINUTES MATTER! Fast action can save your life. Calling 911 is almost always the fastest way to get lifesaving treatment. Emergency Medical Services staff can begin treatment when they arrive -- up to an hour sooner than if someone gets to the hospital by car. The discharge information has been reviewed with the patient. The patient verbalized understanding. Discharge medications reviewed with the patient and appropriate educational materials and side effects teaching were provided. ___________________________________________________________________________________________________________________________________MyChart Activation    Thank you for requesting access to [facility]. Please follow the instructions below to securely access and download your online medical record. [facility] allows you to send messages to your doctor, view your test results, renew your prescriptions, schedule appointments, and more. How Do I Sign Up? 1. In your internet browser, go to [email or web address]  2. Click on the First Time User? Click Here link in the Sign In box. You will be redirect to the New Member Sign Up page. 3. Enter your [facility] Access Code exactly as it appears below. You will not need to use this code after youve completed the sign-up process. If you do not sign up before the expiration date, you must request a new code. [facility] Access Code: [email or web address]  Expires: 2019  8:16 AM (This is the date your [facility] access code will )    4.  Enter the last four digits of your Social Security Number (xxxx) and Date of Birth (mm/dd/yyyy) as indicated and click Submit. You will be taken to the next sign-up page. 5. Create a [employer] ID. This will be your [employer] login ID and cannot be changed, so think of one that is secure and easy to remember. 6. Create a [employer] password. You can change your password at any time. 7. Enter your Password Reset Question and Answer. This can be used at a later time if you forget your password. 8. Enter your e-mail address. You will receive e-mail notification when new information is available in [address] 9. Click Sign Up. You can now view and download portions of your medical record. 10. Click the Download Summary menu link to download a portable copy of your medical information. Additional Information    If you have questions, please visit the Frequently Asked Questions section of the [employer] website at [email or web address] Remember, [employer] is NOT to be used for urgent needs. For medical emergencies, dial 911.

## 2019-05-06 NOTE — ROUTINE PROCESS
2000: Received patient from ER via stretcher. A&Ox4. No sob on RA. Denies any pain or discomfort at this time. Oriented patient to room & surroundings. V/s taken. Tele box #33 applied on. HR 56. SB as initial rhythm. Initial skin assessment done. Left ankle sore/wound noted. Dressing intact. 2030: TV dinner provided. Instructed patient nothing to eat or drink after MN for stress test & 2 D echo in AM. Verbalized understanding. 2230: Due meds given. 2315: Instructed patient for stool specimen collection. Assisted patient to bathroom. 8346: No change from previous assessment. 2633: Slept good thru night. Needs attended. 0740: Bedside and Verbal shift change report given to [facility].S[facility]. [address] (oncoming nurse) by me (offgoing nurse). Report included the following information SBAR, Kardex, Intake/Output, MAR and Recent Results.

## 2019-05-07 PROCEDURE — 3331090001 HH PPS REVENUE CREDIT

## 2019-05-07 PROCEDURE — 3331090002 HH PPS REVENUE DEBIT

## 2019-05-07 NOTE — PROGRESS NOTES
Problem: Discharge Planning Goal: *Discharge to safe environment Outcome: Resolved/Met Reason for Admission: RRAT Score:   53 Resources/supports as identified by patient/family:   Family Top Challenges facing patient (as identified by patient/family and CM): Finances/Medication cost?      Not a problem Transportation? Her  will drive her home Support system or lack thereof?  and daughter Living arrangements? Lives with  Self-care/ADLs/Cognition? She says she is independent with ADL. She has twice referred out to Water therapy over on [address] by her PCP Dr Edita Kendall Current Advanced Directive/Advance Care Plan:  Patient indicated she did not want to make advance directive. Plan for utilizing home health:   Not at this time Likelihood of readmission: mod/yellow Transition of Care Plan:    Home Patient has designated __husband______________________ to participate in his/her discharge plan and to receive any needed information. Name: Abeba Yousif Address:same as pt Phone number:   [phone] Also has daughter as contact Teresa Fowler [phone] Patient did not gurvinder anyone for me to contact. Care Management Interventions PCP Verified by CM: Yes(last seen last month) Mode of Transport at Discharge: Other (see comment)() Transition of Care Consult (CM Consult): Discharge Planning MyChart Signup: No 
Discharge Durable Medical Equipment: No 
Physical Therapy Consult: No 
Occupational Therapy Consult: No 
Speech Therapy Consult: No 
Current Support Network: Lives with Spouse Confirm Follow Up Transport: Family Plan discussed with Pt/Family/Caregiver: Yes Discharge Location Discharge Placement: Home Chart reviewed and pt verified demographics. SHe has [place] Medicare part a and b, and FP Group. She lives withher  and is independent with ADL. She uses a walking cane if needed. Her next PCP apt is May 13th. Patient and/or next of kin has been given and has signed the [facility] Outpatient Observation  Notification letter and all questions answered. Copy of this notice given to patient and copy placed on chart. Patient and/or next of kin has been given the Outpatient Observation Information and Notification letter and all questions answered.

## 2019-05-08 PROCEDURE — 3331090002 HH PPS REVENUE DEBIT

## 2019-05-08 PROCEDURE — 3331090001 HH PPS REVENUE CREDIT

## 2019-05-09 ENCOUNTER — HOSPITAL ENCOUNTER (OUTPATIENT)
Dept: WOUND CARE | Age: 84
Discharge: HOME | End: 2019-05-09
Payer: MEDICARE

## 2019-05-09 PROCEDURE — 97602 WOUND(S) CARE NON-SELECTIVE: CPT

## 2019-05-09 PROCEDURE — 3331090001 HH PPS REVENUE CREDIT

## 2019-05-09 PROCEDURE — 3331090002 HH PPS REVENUE DEBIT

## 2019-05-09 NOTE — WOUND CARE
Wound/Ostomy Nurse Progress Note          Patient: Suzie Maharaj                                                                                      :1935    MRN: [id number]              Situation: pt arrive to wound care clinic ambulating w/ cane. Pt here for wound care to left medial  Ankle. Last visit Theraskin skin substitute was applied to debrided wound bed and secured in place by steri strips and Cutimed. Dressing of gauze and kerlex was removed, steri strips gently removed and wound was cleansed w/ NS. Assessment: wound bed was 80 % granulation tissue and 20 yellow. Traced to Theraskin evident in wound bed. Fazal wound slightly macerated. Recommendation: Wound cleansed, zinc paste applied to fazal wound, woun bed covered w/ Cutimed, 4x4 gauze and wrapped w/ Kerlex. Dressing to be changed at next wound clinic visit in 1 week.             19 1015   [REMOVED] Wound Ankle Left;Medial;Proximal   Final Assessment Date: 19  Date First Assessed: 17   POA: Yes  Wound Type: Vascular  Location: Ankle  Orientation: Left;Medial;Proximal   Wound Length (cm) 3.2 cm   Wound Width (cm) 1.4 cm   Tissue Type Percent Pink 80   Tissue Type Percent Yellow 20

## 2019-05-10 PROCEDURE — 3331090002 HH PPS REVENUE DEBIT

## 2019-05-10 PROCEDURE — 3331090001 HH PPS REVENUE CREDIT

## 2019-05-11 ENCOUNTER — HOSPITAL ENCOUNTER (EMERGENCY)
Age: 84
Discharge: HOME | End: 2019-05-11
Attending: EMERGENCY MEDICINE
Payer: MEDICARE

## 2019-05-11 ENCOUNTER — APPOINTMENT (OUTPATIENT)
Dept: GENERAL RADIOLOGY | Age: 84
End: 2019-05-11
Attending: EMERGENCY MEDICINE
Payer: MEDICARE

## 2019-05-11 VITALS
OXYGEN SATURATION: 98 % | HEIGHT: 65 IN | RESPIRATION RATE: 16 BRPM | TEMPERATURE: 97.5 F | HEART RATE: 71 BPM | BODY MASS INDEX: 26.66 KG/M2 | DIASTOLIC BLOOD PRESSURE: 60 MMHG | WEIGHT: 160 LBS | SYSTOLIC BLOOD PRESSURE: 168 MMHG

## 2019-05-11 DIAGNOSIS — R11.2 NON-INTRACTABLE VOMITING WITH NAUSEA, UNSPECIFIED VOMITING TYPE: Primary | ICD-10-CM

## 2019-05-11 DIAGNOSIS — R10.84 ABDOMINAL PAIN, GENERALIZED: ICD-10-CM

## 2019-05-11 DIAGNOSIS — R19.7 DIARRHEA, UNSPECIFIED TYPE: ICD-10-CM

## 2019-05-11 LAB
ALBUMIN SERPL-MCNC: 4 G/DL (ref 3.4–5)
ALBUMIN/GLOB SERPL: 1 {RATIO} (ref 0.8–1.7)
ALP SERPL-CCNC: 105 U/L (ref 45–117)
ALT SERPL-CCNC: 40 U/L (ref 13–56)
ANION GAP SERPL CALC-SCNC: 10 MMOL/L (ref 3–18)
AST SERPL-CCNC: 44 U/L (ref 15–37)
BASOPHILS # BLD: 0 K/UL (ref 0–0.1)
BASOPHILS NFR BLD: 0 % (ref 0–2)
BILIRUB DIRECT SERPL-MCNC: 0.2 MG/DL (ref 0–0.2)
BILIRUB SERPL-MCNC: 0.7 MG/DL (ref 0.2–1)
BUN SERPL-MCNC: 41 MG/DL (ref 7–18)
BUN/CREAT SERPL: 22 (ref 12–20)
CALCIUM SERPL-MCNC: 9.3 MG/DL (ref 8.5–10.1)
CHLORIDE SERPL-SCNC: 106 MMOL/L (ref 100–108)
CK MB CFR SERPL CALC: 2 % (ref 0–4)
CK MB SERPL-MCNC: 1.2 NG/ML (ref 5–25)
CK SERPL-CCNC: 60 U/L (ref 26–192)
CO2 SERPL-SCNC: 23 MMOL/L (ref 21–32)
CREAT SERPL-MCNC: 1.88 MG/DL (ref 0.6–1.3)
DIFFERENTIAL METHOD BLD: ABNORMAL
EOSINOPHIL # BLD: 0 K/UL (ref 0–0.4)
EOSINOPHIL NFR BLD: 0 % (ref 0–5)
ERYTHROCYTE [DISTWIDTH] IN BLOOD BY AUTOMATED COUNT: 14.6 % (ref 11.6–14.5)
GLOBULIN SER CALC-MCNC: 3.9 G/DL (ref 2–4)
GLUCOSE SERPL-MCNC: 116 MG/DL (ref 74–99)
HCT VFR BLD AUTO: 37.2 % (ref 35–45)
HGB BLD-MCNC: 11.9 G/DL (ref 12–16)
LIPASE SERPL-CCNC: 179 U/L (ref 73–393)
LYMPHOCYTES # BLD: 1.1 K/UL (ref 0.9–3.6)
LYMPHOCYTES NFR BLD: 13 % (ref 21–52)
MCH RBC QN AUTO: 28.5 PG (ref 24–34)
MCHC RBC AUTO-ENTMCNC: 32 G/DL (ref 31–37)
MCV RBC AUTO: 89.2 FL (ref 74–97)
MONOCYTES # BLD: 0.4 K/UL (ref 0.05–1.2)
MONOCYTES NFR BLD: 5 % (ref 3–10)
NEUTS SEG # BLD: 7.1 K/UL (ref 1.8–8)
NEUTS SEG NFR BLD: 82 % (ref 40–73)
PLATELET # BLD AUTO: 264 K/UL (ref 135–420)
PMV BLD AUTO: 11.5 FL (ref 9.2–11.8)
POTASSIUM SERPL-SCNC: 3.6 MMOL/L (ref 3.5–5.5)
PROT SERPL-MCNC: 7.9 G/DL (ref 6.4–8.2)
RBC # BLD AUTO: 4.17 M/UL (ref 4.2–5.3)
SODIUM SERPL-SCNC: 139 MMOL/L (ref 136–145)
TROPONIN I SERPL-MCNC: <0.02 NG/ML (ref 0–0.04)
WBC # BLD AUTO: 8.6 K/UL (ref 4.6–13.2)

## 2019-05-11 PROCEDURE — 96375 TX/PRO/DX INJ NEW DRUG ADDON: CPT

## 2019-05-11 PROCEDURE — 93005 ELECTROCARDIOGRAM TRACING: CPT

## 2019-05-11 PROCEDURE — 3331090002 HH PPS REVENUE DEBIT

## 2019-05-11 PROCEDURE — 80076 HEPATIC FUNCTION PANEL: CPT

## 2019-05-11 PROCEDURE — 83690 ASSAY OF LIPASE: CPT

## 2019-05-11 PROCEDURE — 71045 X-RAY EXAM CHEST 1 VIEW: CPT

## 2019-05-11 PROCEDURE — 74011250636 HC RX REV CODE- 250/636: Performed by: EMERGENCY MEDICINE

## 2019-05-11 PROCEDURE — 3331090001 HH PPS REVENUE CREDIT

## 2019-05-11 PROCEDURE — 99285 EMERGENCY DEPT VISIT HI MDM: CPT

## 2019-05-11 PROCEDURE — 82550 ASSAY OF CK (CPK): CPT

## 2019-05-11 PROCEDURE — 96374 THER/PROPH/DIAG INJ IV PUSH: CPT

## 2019-05-11 PROCEDURE — 96361 HYDRATE IV INFUSION ADD-ON: CPT

## 2019-05-11 PROCEDURE — 80048 BASIC METABOLIC PNL TOTAL CA: CPT

## 2019-05-11 PROCEDURE — 85025 COMPLETE CBC W/AUTO DIFF WBC: CPT

## 2019-05-11 RX ORDER — MORPHINE SULFATE 2 MG/ML
2 INJECTION, SOLUTION INTRAMUSCULAR; INTRAVENOUS
Status: COMPLETED | OUTPATIENT
Start: 2019-05-11 | End: 2019-05-11

## 2019-05-11 RX ORDER — ONDANSETRON 4 MG/1
8 TABLET, FILM COATED ORAL
Qty: 12 TAB | Refills: 0 | Status: SHIPPED | OUTPATIENT
Start: 2019-05-11 | End: 2021-10-11 | Stop reason: SDUPTHER

## 2019-05-11 RX ORDER — DIPHENOXYLATE HYDROCHLORIDE AND ATROPINE SULFATE 2.5; .025 MG/1; MG/1
1 TABLET ORAL
Qty: 20 TAB | Refills: 0 | Status: SHIPPED | OUTPATIENT
Start: 2019-05-11

## 2019-05-11 RX ORDER — ONDANSETRON 2 MG/ML
4 INJECTION INTRAMUSCULAR; INTRAVENOUS
Status: COMPLETED | OUTPATIENT
Start: 2019-05-11 | End: 2019-05-11

## 2019-05-11 RX ADMIN — MORPHINE SULFATE 2 MG: 2 INJECTION, SOLUTION INTRAMUSCULAR; INTRAVENOUS at 18:53

## 2019-05-11 RX ADMIN — SODIUM CHLORIDE 1000 ML: 900 INJECTION, SOLUTION INTRAVENOUS at 18:52

## 2019-05-11 RX ADMIN — ONDANSETRON 4 MG: 2 INJECTION INTRAMUSCULAR; INTRAVENOUS at 18:53

## 2019-05-11 NOTE — ED PROVIDER NOTES
[facility] EMERGENCY DEPT 
 
 
6:42 PM 
 
Date: 5/11/2019 Patient Name: Victor M Camargo History of Presenting Illness Chief Complaint Patient presents with  Vomiting  Nausea  Chest Pain 81 y.o. female with noted past medical history who presents to the emergency department with persistent nausea vomiting abdominal pain and diarrhea. The patient states that this is her third visit in the last several weeks for similar complaint. However on review of the medical records I only see that she was here for similar presentation on 4/21/2019. A prior visit on 5/5/2019 resulted in the patient having an admission for acute coronary syndrome. Patient states that for the last 2 weeks she has been having nausea with intermittent vomiting to the present. Along with that she has been having some diarrhea watery as well as diffuse migratory abdominal pain. She states that she had just finished doxycycline before that for a skin infection and the symptoms began after the antibiotic. She denies any fever or chills. No UTI symptoms. Patient denies any other associated signs or symptoms. Patient denies any other complaints. Nursing notes regarding the HPI and triage nursing notes were reviewed. Prior medical records were reviewed. Current Outpatient Medications Medication Sig Dispense Refill  ondansetron hcl (ZOFRAN) 4 mg tablet Take 4 mg by mouth every eight (8) hours as needed for Nausea.  acetaminophen (TYLENOL) 500 mg tablet Take 1 Tab by mouth every six (6) hours as needed for Pain or Fever. 40 Tab 0  
 dicyclomine (BENTYL) 10 mg capsule Take 1 Cap by mouth four (4) times daily as needed (abd cramps). 20 Cap 0  
 ondansetron (ZOFRAN ODT) 4 mg disintegrating tablet Take 1 Tab by mouth every eight (8) hours as needed for Nausea. 12 Tab 0  
 aspirin 81 mg chewable tablet Take 81 mg by mouth daily.  omega 3-dha-epa-fish oil (FISH OIL) 100-160-1,000 mg cap Take  by mouth.  OTHER     
 cyanocobalamin/folic ac/vit B6 (FOLIC ACID-VIT E3-CPF Z51 PO) Take 1 Tab by mouth daily.  metoprolol tartrate (LOPRESSOR) 50 mg tablet Take 1 Tab by mouth daily. 30 Tab 0  
 gabapentin (NEURONTIN) 300 mg capsule Take 1 Cap by mouth three (3) times daily. 12/22/16, QTY#90, 30 Days, 2 Refills. Dr. Jimmie Serrato  2  cholecalciferol (VITAMIN D3) 1,000 unit tablet Take 5,000 Units by mouth daily.  lisinopril-hydrochlorothiazide (PRINZIDE, ZESTORETIC) 20-25 mg per tablet Take 1 Tab by mouth daily. Past History Past Medical History: 
Past Medical History:  
Diagnosis Date  CAD (coronary artery disease)  [facility] osteomyel[facility]  CKD (chronic kidney disease) stage 3, GFR 30-59 ml/min ([facility]) 06/24/2013 Worst noted (08/06/13) BUN/sCr: 54/2.49, GFR 20.   
 DM (diabetes mellitus) ([facility] [address] [facility]  Elevated TSH 06/25/2012  
 5.08   
 Hypertension  Leukocytosis  Lumbar spinal stenosis  Menopause  Normocytic anemia  Rhabdomyolysis  Splenomegaly  Stroke ([facility] [address]  Vitamin D deficiency Past Surgical History: 
Past Surgical History:  
Procedure Laterality Date  COLONOSCOPY N/A 3/1/2017 COLONOSCOPY performed by Nickolas Ordonez MD at [address]  HX FREE SKIN GRAFT    
 HX HYSTERECTOMY  HX OOPHORECTOMY  HX OPEN REDUCTION INTERNAL FIXATION Left Tx LLE Ankle Trimalleolar Fx by Dr. Jimy Hein [facility])  HX ORTHOPAEDIC Left 06/11/2013 [address] [facility] Ankle Removal of Hardware  HX VEIN STRIPPING Bilateral 09/18/2013 Dr. Hernandez Memory  AK DEBRIDEMENT OPEN WOUND 20 SQ CM<    
 
 
Family History: 
Family History Problem Relation Age of Onset  Diabetes Other  Hypertension Other Social History: 
Social History Tobacco Use  Smoking status: Never Smoker  Smokeless tobacco: Never Used Substance Use Topics  Alcohol use: No  
 Drug use: No  
 
 
Allergies: 
No Known Allergies Patient's primary care provider (as noted in EPIC):  Gen Meza MD 
 
Review of Systems Constitutional: Negative for diaphoresis. HENT: Negative for congestion. Eyes: Negative for discharge. Respiratory: Negative for stridor. Cardiovascular: Negative for palpitations. Gastrointestinal: Positive for abdominal pain, diarrhea, nausea and vomiting. Negative for abdominal distention, anal bleeding, blood in stool, constipation and rectal pain. Endocrine: Negative for heat intolerance. Genitourinary: Negative for dysuria, flank pain, frequency and urgency. Musculoskeletal: Negative for back pain. Neurological: Negative for weakness. Psychiatric/Behavioral: Negative for hallucinations. All other systems reviewed and are negative. Visit Vitals /56 Pulse 68 Temp 97.5 °F (36.4 °C) Resp 14 Ht 5' 5\" (1.651 m) Wt 72.6 kg (160 lb) SpO2 98% BMI 26.63 kg/m² PHYSICAL EXAM: 
 
CONSTITUTIONAL:  Alert, in no apparent distress;  well developed;  well nourished. HEAD:  Normocephalic, atraumatic. EYES:  EOMI. Non-icteric sclera. Normal conjunctiva. ENTM:  Nose:  no rhinorrhea. Throat:  no erythema or exudate, mucous membranes moist. 
NECK:  No JVD. Supple RESPIRATORY:  Chest clear, equal breath sounds, good air movement. CARDIOVASCULAR:  Regular rate and rhythm. No murmurs, rubs, or gallops. GI:  Normal bowel sounds, abdomen soft with minimal diffuse abdominal tenderness to palpation. No rebound or guarding. No palpable masses. BACK:  Non-tender. UPPER EXT:  Normal inspection. LOWER EXT:  No edema, no calf tenderness. Distal pulses intact. NEURO:  Moves all four extremities, and grossly normal motor exam. 
SKIN:  No rashes;  Normal for age. PSYCH:  Alert and normal affect. DIFFERENTIAL DIAGNOSES/ MEDICAL DECISION MAKING: 
Viral vomiting and diarrhea, food poisoning, bacterial vomiting and diarrhea. Lower on differential diagnosis in this patient is early small bowel obstruction (given diarrhea as well). , irritable bowel syndrome, crohn's disease, or ulcerative colitis. There is concern for possible C. difficile presentation even her recent antibiotic use. A C. difficile panel along with a stool sample and ova and parasites was ordered. Diagnostic Study Results Abnormal lab results from this emergency department encounter: 
Labs Reviewed CBC WITH AUTOMATED DIFF - Abnormal; Notable for the following components:  
    Result Value RBC 4.17 (*) HGB 11.9 (*)   
 RDW 14.6 (*) NEUTROPHILS 82 (*) LYMPHOCYTES 13 (*) All other components within normal limits METABOLIC PANEL, BASIC - Abnormal; Notable for the following components:  
 Glucose 116 (*) BUN 41 (*) Creatinine 1.88 (*)   
 BUN/Creatinine ratio 22 (*)   
 GFR est AA 31 (*)   
 GFR est non-AA 26 (*) All other components within normal limits HEPATIC FUNCTION PANEL - Abnormal; Notable for the following components:  
 AST (SGOT) 44 (*) All other components within normal limits CULTURE, STOOL  
OVA & PARASITES, STOOL C. DIFFICILE AG & TOXIN A/B  
CARDIAC PANEL,(CK, CKMB & TROPONIN) LIPASE Lab values for this patient within approximately the last 12 hours: 
Recent Results (from the past 12 hour(s)) EKG, 12 LEAD, INITIAL Collection Time: 05/11/19  5:51 PM  
Result Value Ref Range Ventricular Rate 66 BPM  
 Atrial Rate 86 BPM  
 P-R Interval 164 ms QRS Duration 80 ms  
 Q-T Interval 398 ms QTC Calculation (Bezet) 417 ms Calculated P Axis 73 degrees Calculated R Axis 31 degrees Calculated T Axis 133 degrees Diagnosis Sinus rhythm with premature atrial complexes Low voltage QRS Nonspecific T wave abnormality Abnormal ECG 
 When compared with ECG of 06-MAY-2019 10:52, Minimal criteria for Anteroseptal infarct are no longer present Nonspecific T wave abnormality now evident in Inferior leads Nonspecific T wave abnormality, worse in Anterolateral leads CBC WITH AUTOMATED DIFF Collection Time: 05/11/19  5:58 PM  
Result Value Ref Range WBC 8.6 4.6 - 13.2 K/uL  
 RBC 4.17 (L) 4.20 - 5.30 M/uL  
 HGB 11.9 (L) 12.0 - 16.0 g/dL HCT 37.2 35.0 - 45.0 % MCV 89.2 74.0 - 97.0 FL  
 MCH 28.5 24.0 - 34.0 PG  
 MCHC 32.0 31.0 - 37.0 g/dL  
 RDW 14.6 (H) 11.6 - 14.5 % PLATELET 017 174 - 007 K/uL MPV 11.5 9.2 - 11.8 FL  
 NEUTROPHILS 82 (H) 40 - 73 % LYMPHOCYTES 13 (L) 21 - 52 % MONOCYTES 5 3 - 10 % EOSINOPHILS 0 0 - 5 % BASOPHILS 0 0 - 2 %  
 ABS. NEUTROPHILS 7.1 1.8 - 8.0 K/UL  
 ABS. LYMPHOCYTES 1.1 0.9 - 3.6 K/UL  
 ABS. MONOCYTES 0.4 0.05 - 1.2 K/UL  
 ABS. EOSINOPHILS 0.0 0.0 - 0.4 K/UL  
 ABS. BASOPHILS 0.0 0.0 - 0.1 K/UL  
 DF AUTOMATED METABOLIC PANEL, BASIC Collection Time: 05/11/19  5:58 PM  
Result Value Ref Range Sodium 139 136 - 145 mmol/L Potassium 3.6 3.5 - 5.5 mmol/L Chloride 106 100 - 108 mmol/L  
 CO2 23 21 - 32 mmol/L Anion gap 10 3.0 - 18 mmol/L Glucose 116 (H) 74 - 99 mg/dL BUN 41 (H) 7.0 - 18 MG/DL Creatinine 1.88 (H) 0.6 - 1.3 MG/DL  
 BUN/Creatinine ratio 22 (H) 12 - 20 GFR est AA 31 (L) >60 ml/min/1.73m2 GFR est non-AA 26 (L) >60 ml/min/1.73m2 Calcium 9.3 8.5 - 10.1 MG/DL  
CARDIAC PANEL,(CK, CKMB & TROPONIN) Collection Time: 05/11/19  5:58 PM  
Result Value Ref Range CK 60 26 - 192 U/L  
 CK - MB 1.2 <3.6 ng/ml CK-MB Index 2.0 0.0 - 4.0 % Troponin-I, QT <0.02 0.0 - 0.045 NG/ML  
LIPASE Collection Time: 05/11/19  5:58 PM  
Result Value Ref Range Lipase 179 73 - 393 U/L  
HEPATIC FUNCTION PANEL Collection Time: 05/11/19  5:58 PM  
Result Value Ref Range Protein, total 7.9 6.4 - 8.2 g/dL Albumin 4.0 3.4 - 5.0 g/dL Globulin 3.9 2.0 - 4.0 g/dL A-G Ratio 1.0 0.8 - 1.7 Bilirubin, total 0.7 0.2 - 1.0 MG/DL Bilirubin, direct 0.2 0.0 - 0.2 MG/DL Alk. phosphatase 105 45 - 117 U/L  
 AST (SGOT) 44 (H) 15 - 37 U/L  
 ALT (SGPT) 40 13 - 56 U/L Radiologist and cardiologist interpretations if available at time of this note: No results found. Emergency physician interpretation of EKG: EKG #1: Normal sinus rhythm at 65 bpm with occasional PACs. Emergency physician interpretation of EKG: EKG #2: Normal sinus rhythm about 63 bpm with occasional PACs. Medication(s) ordered for patient during this emergency visit encounter: 
Medications  
sodium chloride 0.9 % bolus infusion 1,000 mL (1,000 mL IntraVENous New Bag 5/11/19 1852) ondansetron (ZOFRAN) injection 4 mg (4 mg IntraVENous Given 5/11/19 1853) morphine injection 2 mg (2 mg IntraVENous Given 5/11/19 1853) Medical Decision Making I am the first provider for this patient. I reviewed the vital signs, available nursing notes, past medical history, past surgical history, family history and social history. Vital Signs:  Reviewed the patient's vital signs. ED COURSE:   
 
8:14 PM 
Patient has not produced a stool sample yet for testing for stool culture, ova and parasites, and C. difficile testing. I will hold the patient in the ER for a while to see if we can get a stool sample for this test.  If not also in the patient home with a collection bottle to collect at home and a basin/in bed 12 that she put on top of her toilet at home to collect a stool sample. 8:47 PM 
Patient has still not produced a stool sample. Given this we will sent home with needed supplies to collect a stool sample to take her primary care doctor for stool cultures, ova and parasites, and C. difficile testing.  
 
IMPRESSION AND MEDICAL DECISION MAKING: 
Based upon the patient's presentation with noted HPI and PE, along with the work up done in the emergency department, I believe that the patient is having vomiting, diarrhea, and abdominal pain from gastroenteritis. I do believe though that the patient is well enough for discharge home. Will prescribe zofran for nausea and vomiting, and lomotil for diarrhea. If vicodin was prescribed, only a short course was prescribed for breakthrough pain. SPECIFIC PATIENT INSTRUCTIONS FROM THE PHYSICIAN WHO TREATED YOU IN THE ER TODAY: 
1. Zofran for nausea and/or vomiting. 2. Over the counter imodium for diarrhea. IF lomotil was prescribed, take it for diarrhea not controlled after using imodium for at least 24 hours. 3. FOLLOW UP APPOINTMENT:  Your primary doctor in 3-4 days. 4.  You are being sent home with supplies to collect a stool sample. Please collect the sample taken to your doctor for testing of the following: Stool cultures, ova and parasite testing, and C. difficile testing. Do this as soon as possible. Patient is improved, resting quietly and comfortably. The patient will be discharged home. The patient was reassured that these symptoms do not appear to represent a serious or life threatening condition at this time. Warning signs of worsening condition were discussed and understood by the patient. Based on patient's age, coexisting illness, exam, and the results of this ED evaluation, the decision to treat as an outpatient was made. Based on the information available at time of discharge, acute pathology requiring immediate intervention was deemed relative unlikely. While it is impossible to completely exclude the possibility of underlying serious disease or worsening of condition, I feel the relative likelihood is extremely low. I discussed this uncertainty with the patient, who understood ED evaluation and treatment and felt comfortable with the outpatient treatment plan. All questions regarding care, test results, and follow up were answered. The patient is stable and appropriate to discharge. They understand that they should return to the emergency department for any new or worsening symptoms. I stressed the importance of follow up for repeat assessment and possibly further evaluation/treatment. Dictation disclaimer:  Please note that this dictation was completed with [employer], the computer voice recognition software. Quite often unanticipated grammatical, syntax, homophones, and other interpretive errors are inadvertently transcribed by the computer software. Please disregard these errors. Please excuse any errors that have escaped final proofreading. Coding Diagnoses Clinical Impression: 1. Non-intractable vomiting with nausea, unspecified vomiting type 2. Diarrhea, unspecified type 3. Abdominal pain, generalized Disposition Disposition: Home. PATRICK Lee Board Certified Emergency Physician Provider Attestation: If a scribe was utilized in generation of this patient record, I personally performed the services described in the documentation, reviewed the documentation, as recorded by the scribe in my presence, and it accurately records the patient's history of presenting illness, review of systems, patient physical examination, and procedures performed by me as the attending physician. PATRICK Lee Board Certified Emergency Physician 5/11/2019. 
6:44 PM

## 2019-05-11 NOTE — ED TRIAGE NOTES
n/v and \"chest pressure\" 3x in weeks per ems pt called ems. Pt requesting to pee before getting in stretcher. Taken in wheelchair. Ambulates with cane well, \"ill be fine. \" monitoring closely.

## 2019-05-12 PROCEDURE — 3331090001 HH PPS REVENUE CREDIT

## 2019-05-12 PROCEDURE — 3331090002 HH PPS REVENUE DEBIT

## 2019-05-12 NOTE — DISCHARGE INSTRUCTIONS
SPECIFIC PATIENT INSTRUCTIONS FROM THE PHYSICIAN WHO TREATED YOU IN THE ER TODAY:  1. Zofran for nausea and/or vomiting. 2. Over the counter imodium for diarrhea. IF lomotil was prescribed, take it for diarrhea not controlled after using imodium for at least 24 hours. 3. FOLLOW UP APPOINTMENT:  Your primary doctor in 3-4 days. 4.  You are being sent home with supplies to collect a stool sample. Please collect the sample taken to your doctor for testing of the following: Stool cultures, ova and parasite testing, and C. difficile testing. Do this as soon as possible. Patient Education        Abdominal Pain: Care Instructions  Your Care Instructions    Abdominal pain has many possible causes. Some aren't serious and get better on their own in a few days. Others need more testing and treatment. If your pain continues or gets worse, you need to be rechecked and may need more tests to find out what is wrong. You may need surgery to correct the problem. Don't ignore new symptoms, such as fever, nausea and vomiting, urination problems, pain that gets worse, and dizziness. These may be signs of a more serious problem. Your doctor may have recommended a follow-up visit in the next 8 to 12 hours. If you are not getting better, you may need more tests or treatment. The doctor has checked you carefully, but problems can develop later. If you notice any problems or new symptoms, get medical treatment right away. Follow-up care is a key part of your treatment and safety. Be sure to make and go to all appointments, and call your doctor if you are having problems. It's also a good idea to know your test results and keep a list of the medicines you take. How can you care for yourself at home? · Rest until you feel better. · To prevent dehydration, drink plenty of fluids, enough so that your urine is light yellow or clear like water. Choose water and other caffeine-free clear liquids until you feel better.  If you have kidney, heart, or liver disease and have to limit fluids, talk with your doctor before you increase the amount of fluids you drink. · If your stomach is upset, eat mild foods, such as rice, dry toast or crackers, bananas, and applesauce. Try eating several small meals instead of two or three large ones. · Wait until 48 hours after all symptoms have gone away before you have spicy foods, alcohol, and drinks that contain caffeine. · Do not eat foods that are high in fat. · Avoid anti-inflammatory medicines such as aspirin, ibuprofen (Advil, Motrin), and naproxen (Aleve). These can cause stomach upset. Talk to your doctor if you take daily aspirin for another health problem. When should you call for help? Call 911 anytime you think you may need emergency care. For example, call if:    · You passed out (lost consciousness).     · You pass maroon or very bloody stools.     · You vomit blood or what looks like coffee grounds.     · You have new, severe belly pain.    Call your doctor now or seek immediate medical care if:    · Your pain gets worse, especially if it becomes focused in one area of your belly.     · You have a new or higher fever.     · Your stools are black and look like tar, or they have streaks of blood.     · You have unexpected vaginal bleeding.     · You have symptoms of a urinary tract infection. These may include:  ? Pain when you urinate. ? Urinating more often than usual.  ? Blood in your urine.     · You are dizzy or lightheaded, or you feel like you may faint.    Watch closely for changes in your health, and be sure to contact your doctor if:    · You are not getting better after 1 day (24 hours). Where can you learn more? Go to http://dorinda-jn.info/. Enter K613 in the search box to learn more about \"Abdominal Pain: Care Instructions. \"  Current as of: September 23, 2018  Content Version: 11.9  © 4510-3979 [employer].  Care instructions adapted under license by [employer] (which disclaims liability or warranty for this information). If you have questions about a medical condition or this instruction, always ask your healthcare professional. [address] any warranty or liability for your use of this information. Patient Education        Diarrhea: Care Instructions  Your Care Instructions    Diarrhea is loose, watery stools (bowel movements). The exact cause is often hard to find. Sometimes diarrhea is your body's way of getting rid of what caused an upset stomach. Viruses, food poisoning, and many medicines can cause diarrhea. Some people get diarrhea in response to emotional stress, anxiety, or certain foods. Almost everyone has diarrhea now and then. It usually isn't serious, and your stools will return to normal soon. The important thing to do is replace the fluids you have lost, so you can prevent dehydration. The doctor has checked you carefully, but problems can develop later. If you notice any problems or new symptoms, get medical treatment right away. Follow-up care is a key part of your treatment and safety. Be sure to make and go to all appointments, and call your doctor if you are having problems. It's also a good idea to know your test results and keep a list of the medicines you take. How can you care for yourself at home? · Watch for signs of dehydration, which means your body has lost too much water. Dehydration is a serious condition and should be treated right away. Signs of dehydration are:  ? Increasing thirst and dry eyes and mouth. ? Feeling faint or lightheaded. ? Darker urine, and a smaller amount of urine than normal.  · To prevent dehydration, drink plenty of fluids, enough so that your urine is light yellow or clear like water. Choose water and other caffeine-free clear liquids until you feel better.  If you have kidney, heart, or liver disease and have to limit fluids, talk with your doctor before you increase the amount of fluids you drink. · Begin eating small amounts of mild foods the next day, if you feel like it. ? Try yogurt that has live cultures of Lactobacillus. (Check the label.)  ? Avoid spicy foods, fruits, alcohol, and caffeine until 48 hours after all symptoms are gone. ? Avoid chewing gum that contains sorbitol. ? Avoid dairy products (except for yogurt with Lactobacillus) while you have diarrhea and for 3 days after symptoms are gone. · The doctor may recommend that you take over-the-counter medicine, such as loperamide (Imodium), if you still have diarrhea after 6 hours. Read and follow all instructions on the label. Do not use this medicine if you have bloody diarrhea, a high fever, or other signs of serious illness. Call your doctor if you think you are having a problem with your medicine. When should you call for help? Call 911 anytime you think you may need emergency care. For example, call if:    · You passed out (lost consciousness).     · Your stools are maroon or very bloody.    Call your doctor now or seek immediate medical care if:    · You are dizzy or lightheaded, or you feel like you may faint.     · Your stools are black and look like tar, or they have streaks of blood.     · You have new or worse belly pain.     · You have symptoms of dehydration, such as:  ? Dry eyes and a dry mouth. ? Passing only a little dark urine. ? Feeling thirstier than usual.     · You have a new or higher fever.    Watch closely for changes in your health, and be sure to contact your doctor if:    · Your diarrhea is getting worse.     · You see pus in the diarrhea.     · You are not getting better after 2 days (48 hours). Where can you learn more? Go to http://dorinda-jn.info/. Enter L697 in the search box to learn more about \"Diarrhea: Care Instructions. \"  Current as of: September 23, 2018  Content Version: 11.9  © 2297-4431 [employer]. Care instructions adapted under license by [employer] (which disclaims liability or warranty for this information). If you have questions about a medical condition or this instruction, always ask your healthcare professional. [address] any warranty or liability for your use of this information. Patient Education        Nausea and Vomiting: Care Instructions  Your Care Instructions    When you are nauseated, you may feel weak and sweaty and notice a lot of saliva in your mouth. Nausea often leads to vomiting. Most of the time you do not need to worry about nausea and vomiting, but they can be signs of other illnesses. Two common causes of nausea and vomiting are stomach flu and food poisoning. Nausea and vomiting from viral stomach flu will usually start to improve within 24 hours. Nausea and vomiting from food poisoning may last from 12 to 48 hours. The doctor has checked you carefully, but problems can develop later. If you notice any problems or new symptoms, get medical treatment right away. Follow-up care is a key part of your treatment and safety. Be sure to make and go to all appointments, and call your doctor if you are having problems. It's also a good idea to know your test results and keep a list of the medicines you take. How can you care for yourself at home? · To prevent dehydration, drink plenty of fluids, enough so that your urine is light yellow or clear like water. Choose water and other caffeine-free clear liquids until you feel better. If you have kidney, heart, or liver disease and have to limit fluids, talk with your doctor before you increase the amount of fluids you drink. · Rest in bed until you feel better. · When you are able to eat, try clear soups, mild foods, and liquids until all symptoms are gone for 12 to 48 hours. Other good choices include dry toast, crackers, cooked cereal, and gelatin dessert, such as Jell-O.   When should you call for help? Call 911 anytime you think you may need emergency care. For example, call if:    · You passed out (lost consciousness).    Call your doctor now or seek immediate medical care if:    · You have symptoms of dehydration, such as:  ? Dry eyes and a dry mouth. ? Passing only a little dark urine. ? Feeling thirstier than usual.     · You have new or worsening belly pain.     · You have a new or higher fever.     · You vomit blood or what looks like coffee grounds.    Watch closely for changes in your health, and be sure to contact your doctor if:    · You have ongoing nausea and vomiting.     · Your vomiting is getting worse.     · Your vomiting lasts longer than 2 days.     · You are not getting better as expected. Where can you learn more? Go to http://dorinda-jn.info/. Enter 25 311303 in the search box to learn more about \"Nausea and Vomiting: Care Instructions. \"  Current as of: September 23, 2018  Content Version: 11.9  © 4035-3246 [employer]. Care instructions adapted under license by [employer] (which disclaims liability or warranty for this information). If you have questions about a medical condition or this instruction, always ask your healthcare professional. [address] any warranty or liability for your use of this information. [employer] Activation    Thank you for requesting access to [employer]. Please follow the instructions below to securely access and download your online medical record. [employer] allows you to send messages to your doctor, view your test results, renew your prescriptions, schedule appointments, and more. How Do I Sign Up? In your internet browser, go to [email or web address]. Click on the First Time User? Click Here link in the Sign In box. You will see the New Member Sign Up page. Enter your [employer] Access Code exactly as it appears below.  You will not need to use this code after you´ve completed the sign-up process. If you do not sign up before the expiration date, you must request a new code. 5by Access Code: [email or web address]  Expires: 3/28/2019  2:27 PM (This is the date your 5by access code will )    Enter the last four digits of your Social Security Number (xxxx) and Date of Birth (mm/dd/yyyy) as indicated and click Submit. You will be taken to the next sign-up page. Create a 5by ID. This will be your 5by login ID and cannot be changed, so think of one that is secure and easy to remember. Create a 5by password. You can change your password at any time. Enter your Password Reset Question and Answer. This can be used at a later time if you forget your password. Enter your e-mail address. You will receive e-mail notification when new information is available in [address] Click Sign Up. You can now view and download portions of your medical record. Click the WILEX link to download a portable copy of your medical information. Additional Information    If you have questions, please visit the Frequently Asked Questions section of the 5by website at [email or web address] Remember, 5by is NOT to be used for urgent needs. For medical emergencies, dial 911.

## 2019-05-12 NOTE — ED NOTES
9:22 PM 
05/11/19 Discharge instructions given to patient (name) with verbalization of understanding. Patient accompanied by spouse. Patient discharged with the following prescriptions Zofran, Lomotil. Patient discharged to home (destination).   
  
Anat Braswell RN

## 2019-05-13 LAB
ATRIAL RATE: 63 BPM
ATRIAL RATE: 86 BPM
CALCULATED P AXIS, ECG09: 71 DEGREES
CALCULATED P AXIS, ECG09: 73 DEGREES
CALCULATED R AXIS, ECG10: 27 DEGREES
CALCULATED R AXIS, ECG10: 31 DEGREES
CALCULATED T AXIS, ECG11: 133 DEGREES
CALCULATED T AXIS, ECG11: 147 DEGREES
DIAGNOSIS, 93000: NORMAL
DIAGNOSIS, 93000: NORMAL
P-R INTERVAL, ECG05: 162 MS
P-R INTERVAL, ECG05: 164 MS
Q-T INTERVAL, ECG07: 398 MS
Q-T INTERVAL, ECG07: 410 MS
QRS DURATION, ECG06: 80 MS
QRS DURATION, ECG06: 82 MS
QTC CALCULATION (BEZET), ECG08: 417 MS
QTC CALCULATION (BEZET), ECG08: 419 MS
VENTRICULAR RATE, ECG03: 63 BPM
VENTRICULAR RATE, ECG03: 66 BPM

## 2019-05-13 PROCEDURE — 3331090001 HH PPS REVENUE CREDIT

## 2019-05-13 PROCEDURE — 3331090002 HH PPS REVENUE DEBIT

## 2019-05-14 PROCEDURE — 3331090002 HH PPS REVENUE DEBIT

## 2019-05-14 PROCEDURE — 3331090001 HH PPS REVENUE CREDIT

## 2019-05-15 PROCEDURE — 3331090002 HH PPS REVENUE DEBIT

## 2019-05-15 PROCEDURE — 3331090001 HH PPS REVENUE CREDIT

## 2019-05-16 ENCOUNTER — HOSPITAL ENCOUNTER (OUTPATIENT)
Dept: WOUND CARE | Age: 84
Discharge: HOME | End: 2019-05-16
Payer: MEDICARE

## 2019-05-16 VITALS
TEMPERATURE: 97.1 F | OXYGEN SATURATION: 96 % | RESPIRATION RATE: 16 BRPM | HEART RATE: 68 BPM | SYSTOLIC BLOOD PRESSURE: 145 MMHG | DIASTOLIC BLOOD PRESSURE: 68 MMHG

## 2019-05-16 PROCEDURE — 77030011256 HC DRSG MEPILEX <16IN NO BORD MOLN -A: Performed by: PODIATRIST

## 2019-05-16 PROCEDURE — 3331090002 HH PPS REVENUE DEBIT

## 2019-05-16 PROCEDURE — 15271 SKIN SUB GRAFT TRNK/ARM/LEG: CPT

## 2019-05-16 PROCEDURE — 3331090001 HH PPS REVENUE CREDIT

## 2019-05-16 NOTE — PROGRESS NOTES
Theraskin and Debridement Procedure Note    Pre-Operative Diagnosis: Non-Healing Wound  Post-Operative Diagnosis: Same    Site: Medial left ankle     Procedure:   1. Selective sharp instrument debridement of slough and devitilized tissue  2. Application of Skin Biograft - Theraskin    Indications: 1. Removal of devitalized and necrotic tissue to promote healing and evaluate the extent of healing. 2. Stage 6 of a planned staged series of                  10        applications of Theraskin in wound not responding to conventional therapy     After the benefits/risks/SE were discussed, the patient agreed to proceed. Time out was done:   * Patient was identified by name and date of birth   * Agreement on procedure being performed was verified   * Procedure site verified and marked as necessary   * Patient was positioned for comfort   * Consent was signed and verified. Instruments used:    []  Dermal curette  Blade        [] #15  [] #10  [] Forceps  [] Tissue nippers  [] sterile scissors  [x] Other pick ups     Anesthesia used:    []  EMLA 2.5% cream: applied to wound beds for approximately 15minutes. []   Lidocaine 2% Topical Gel      []  Lidocaine injectable 1% with epinephrine 1:100,000; Amount used: mL    []  Lidocaine injectable 1% without epinephrine; Amount used: mL    []  Other:     [x]  None         [] patient is insensate due to neuropathy         [] patient declines        [] allergy to anesthetic        [x] tissue for debridement is either superficial, loosely adherent and/or necrotic and denervated     Description of Procedure: After usual preparation, manual debridement of slough and devitalized tissue was performed utilizing the instruments as above. Tissue was removed from the sub Q layer. The wound was debrided to remove non-viable tissue and to clearly reveal the wound margins. The wound was debrided to an acute state with some bleeding from the capillary bed.      Pre-debridement measurement: see accompanying progress note  Post debridement measurement:  3.0_x_1.6_x_0.3_cm . Bleeding: <5mL Resolved with light focal pressure. Wounds were cleaned and irrigated with saline. After usual preparation, Theraskin applied to the wound and affixed to the skin with steri strips and covered with non-adherent contact layer. The patient tolerated the procedure without complication. Theraskin 13 sq cm sheet  Theraskin used: 13 sq cm  Theraskin discarded: 0 sq cm    Wound care applied:   [x]   Hydrogell   []  Hypergel   []   Hydrofiber/Aquacel      []   Cadexomer Iodine (Iodosorb)   []  Silver Alginate    []   Medihoney:    []   Collagenase:Santyl   []  Calcium Alginate   []   Collagen:    []   Foam   []  Non-Adherent Contact Layer   []   Xeroform    []   Adaptec:   []   Hydrocolloid   []   Transparent Film    []  Antibiotic ointment/cream    []  hyderofera blue   []   Other (see below)    [] Mesalt       Other:     []   Dry Gauze and Roll Gauze    []   Foam and Roll Gauze    [x]   Dry Gauze    []    Bordered gauze:     [x]   Secure with Tape    []   Other      []   Compression Wrap:          []   Unna Boot    []Multi-Layer    []Tubular Bandages       Ligia-Ulcer Care    []   Cream     []   Lotion     []   Ointment     []   Barrier     []   Other:       The patient tolerated the procedure well with no complications. The patient left the exam room in satisfactory and stable condition.      Rosario Arias DPM

## 2019-05-16 NOTE — WOUND CARE
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 Wound base pink and shows improvement. Debridement and application of Theraskin by Dr Mo Murry. Skin to ankle and lower leg dry and flaky, patient requesting to not use gauze at this time as it dries out her skin. Theraskin held in place with steri strips, cutimed and saline gauze placed covered with plain foam and secured with Hypafix tape. Recommendation: Dressing to remain in placed for one week. She will follow up in wound clinic on 19 for nurse visit dressing change and follow up with Dr Mo Murry on 19.

## 2019-05-17 PROCEDURE — 3331090001 HH PPS REVENUE CREDIT

## 2019-05-17 PROCEDURE — 3331090002 HH PPS REVENUE DEBIT

## 2019-05-18 PROCEDURE — 3331090002 HH PPS REVENUE DEBIT

## 2019-05-18 PROCEDURE — 3331090001 HH PPS REVENUE CREDIT

## 2019-05-19 PROCEDURE — 3331090001 HH PPS REVENUE CREDIT

## 2019-05-19 PROCEDURE — 3331090002 HH PPS REVENUE DEBIT

## 2019-05-20 ENCOUNTER — HOME CARE VISIT (OUTPATIENT)
Dept: HOME HEALTH SERVICES | Facility: HOME HEALTH | Age: 84
End: 2019-05-20
Payer: MEDICARE

## 2019-05-20 PROCEDURE — 3331090002 HH PPS REVENUE DEBIT

## 2019-05-20 PROCEDURE — 3331090001 HH PPS REVENUE CREDIT

## 2019-05-21 PROCEDURE — 3331090001 HH PPS REVENUE CREDIT

## 2019-05-21 PROCEDURE — 3331090002 HH PPS REVENUE DEBIT

## 2019-05-22 PROCEDURE — 3331090001 HH PPS REVENUE CREDIT

## 2019-05-22 PROCEDURE — 3331090002 HH PPS REVENUE DEBIT

## 2019-05-23 ENCOUNTER — HOSPITAL ENCOUNTER (OUTPATIENT)
Dept: WOUND CARE | Age: 84
Discharge: HOME | End: 2019-05-23
Payer: MEDICARE

## 2019-05-23 PROCEDURE — 3331090002 HH PPS REVENUE DEBIT

## 2019-05-23 PROCEDURE — 3331090001 HH PPS REVENUE CREDIT

## 2019-05-23 PROCEDURE — 77030012256

## 2019-05-23 PROCEDURE — 97602 WOUND(S) CARE NON-SELECTIVE: CPT

## 2019-05-23 PROCEDURE — 77030020057 HC DRSG MTRX CLLGN STGN -B

## 2019-05-23 NOTE — WOUND CARE
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 Theraskin remains in place. Wound rinsed with dermal wound cleanser. Lisa placed over remaining Theraskin, as well as cutimed, plain foam and secured with Hypafix. Ligia wound remain intact however was red but blanchable along with dry flaky skin present. Recommendation: Patient to follow up with Dr Tian Jay on 19. Wound care supplies given to patient along with instructions for Dr Tian Jay office to apply dressing following appointment. Patient will follow up in wound clinic on 19 with Dr Tasha Alfonso.

## 2019-05-24 PROCEDURE — 3331090002 HH PPS REVENUE DEBIT

## 2019-05-24 PROCEDURE — 3331090001 HH PPS REVENUE CREDIT

## 2019-05-25 PROCEDURE — 3331090002 HH PPS REVENUE DEBIT

## 2019-05-25 PROCEDURE — 3331090001 HH PPS REVENUE CREDIT

## 2019-05-26 PROCEDURE — 3331090001 HH PPS REVENUE CREDIT

## 2019-05-26 PROCEDURE — 3331090002 HH PPS REVENUE DEBIT

## 2019-05-27 PROCEDURE — 3331090002 HH PPS REVENUE DEBIT

## 2019-05-27 PROCEDURE — 3331090001 HH PPS REVENUE CREDIT

## 2019-05-28 PROCEDURE — 3331090002 HH PPS REVENUE DEBIT

## 2019-05-28 PROCEDURE — 3331090001 HH PPS REVENUE CREDIT

## 2019-05-29 PROCEDURE — 3331090002 HH PPS REVENUE DEBIT

## 2019-05-29 PROCEDURE — 3331090001 HH PPS REVENUE CREDIT

## 2019-05-30 PROCEDURE — 3331090001 HH PPS REVENUE CREDIT

## 2019-05-30 PROCEDURE — 3331090002 HH PPS REVENUE DEBIT

## 2019-08-13 ENCOUNTER — HOSPITAL ENCOUNTER (OUTPATIENT)
Age: 84
Setting detail: OUTPATIENT SURGERY
Discharge: HOME | End: 2019-08-13
Attending: INTERNAL MEDICINE | Admitting: INTERNAL MEDICINE
Payer: MEDICARE

## 2019-08-13 ENCOUNTER — ANESTHESIA EVENT (OUTPATIENT)
Dept: ENDOSCOPY | Age: 84
End: 2019-08-13
Payer: MEDICARE

## 2019-08-13 ENCOUNTER — ANESTHESIA (OUTPATIENT)
Dept: ENDOSCOPY | Age: 84
End: 2019-08-13
Payer: MEDICARE

## 2019-08-13 VITALS
TEMPERATURE: 97.8 F | BODY MASS INDEX: 23.39 KG/M2 | RESPIRATION RATE: 16 BRPM | SYSTOLIC BLOOD PRESSURE: 121 MMHG | WEIGHT: 137 LBS | HEIGHT: 64 IN | OXYGEN SATURATION: 100 % | HEART RATE: 57 BPM | DIASTOLIC BLOOD PRESSURE: 43 MMHG

## 2019-08-13 PROBLEM — R11.2 NAUSEA AND VOMITING: Status: ACTIVE | Noted: 2019-08-13

## 2019-08-13 LAB — GLUCOSE BLD STRIP.AUTO-MCNC: 107 MG/DL (ref 70–110)

## 2019-08-13 PROCEDURE — 76040000019: Performed by: INTERNAL MEDICINE

## 2019-08-13 PROCEDURE — 74011250636 HC RX REV CODE- 250/636

## 2019-08-13 PROCEDURE — 76060000031 HC ANESTHESIA FIRST 0.5 HR: Performed by: INTERNAL MEDICINE

## 2019-08-13 PROCEDURE — 82962 GLUCOSE BLOOD TEST: CPT

## 2019-08-13 PROCEDURE — 88305 TISSUE EXAM BY PATHOLOGIST: CPT

## 2019-08-13 RX ORDER — SODIUM CHLORIDE 0.9 % (FLUSH) 0.9 %
5-40 SYRINGE (ML) INJECTION AS NEEDED
Status: DISCONTINUED | OUTPATIENT
Start: 2019-08-13 | End: 2019-08-13 | Stop reason: HOSPADM

## 2019-08-13 RX ORDER — SODIUM CHLORIDE 9 MG/ML
25 INJECTION, SOLUTION INTRAVENOUS CONTINUOUS
Status: DISCONTINUED | OUTPATIENT
Start: 2019-08-13 | End: 2019-08-13 | Stop reason: HOSPADM

## 2019-08-13 RX ORDER — SODIUM CHLORIDE, SODIUM LACTATE, POTASSIUM CHLORIDE, CALCIUM CHLORIDE 600; 310; 30; 20 MG/100ML; MG/100ML; MG/100ML; MG/100ML
50 INJECTION, SOLUTION INTRAVENOUS CONTINUOUS
Status: DISCONTINUED | OUTPATIENT
Start: 2019-08-13 | End: 2019-08-13 | Stop reason: HOSPADM

## 2019-08-13 RX ORDER — SODIUM CHLORIDE 0.9 % (FLUSH) 0.9 %
5-40 SYRINGE (ML) INJECTION EVERY 8 HOURS
Status: DISCONTINUED | OUTPATIENT
Start: 2019-08-13 | End: 2019-08-13 | Stop reason: HOSPADM

## 2019-08-13 RX ORDER — DEXTROMETHORPHAN/PSEUDOEPHED 2.5-7.5/.8
1.2 DROPS ORAL
Status: DISCONTINUED | OUTPATIENT
Start: 2019-08-13 | End: 2019-08-13 | Stop reason: HOSPADM

## 2019-08-13 RX ORDER — SODIUM CHLORIDE, SODIUM LACTATE, POTASSIUM CHLORIDE, CALCIUM CHLORIDE 600; 310; 30; 20 MG/100ML; MG/100ML; MG/100ML; MG/100ML
INJECTION, SOLUTION INTRAVENOUS
Status: DISCONTINUED | OUTPATIENT
Start: 2019-08-13 | End: 2019-08-13 | Stop reason: HOSPADM

## 2019-08-13 RX ORDER — INSULIN LISPRO 100 [IU]/ML
INJECTION, SOLUTION INTRAVENOUS; SUBCUTANEOUS ONCE
Status: DISCONTINUED | OUTPATIENT
Start: 2019-08-13 | End: 2019-08-13 | Stop reason: HOSPADM

## 2019-08-13 RX ORDER — PROPOFOL 10 MG/ML
INJECTION, EMULSION INTRAVENOUS AS NEEDED
Status: DISCONTINUED | OUTPATIENT
Start: 2019-08-13 | End: 2019-08-13 | Stop reason: HOSPADM

## 2019-08-13 RX ADMIN — SODIUM CHLORIDE, SODIUM LACTATE, POTASSIUM CHLORIDE, CALCIUM CHLORIDE: 600; 310; 30; 20 INJECTION, SOLUTION INTRAVENOUS at 14:07

## 2019-08-13 RX ADMIN — PROPOFOL 60 MG: 10 INJECTION, EMULSION INTRAVENOUS at 14:11

## 2019-08-13 RX ADMIN — PROPOFOL 20 MG: 10 INJECTION, EMULSION INTRAVENOUS at 14:16

## 2019-08-13 RX ADMIN — PROPOFOL 20 MG: 10 INJECTION, EMULSION INTRAVENOUS at 14:13

## 2019-08-13 NOTE — PROCEDURES
Endoscopy Procedure Note    Patient: Yeimy Jackson MRN: [id number]  SSN: [id number]    YOB: 1935  Age: 81 y.o. Sex: female      Date/Time:  8/13/2019 2:23 PM    Esophagogastroduodenoscopy (EGD) Procedure Report    Procedure: Esophagogastroduodenoscopy with biopsy    IMPRESSION:   1. Normal esophagus, duodenal bulb, second and proximal third portion  2. 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 Stop all NSAIDs immediately---ibuprofen, advil, aleve, aspirin, naproxen, motrin, anacin, bufferin, excedrin, BC, Goody, etc. OK to take Tylenol/Acetominophen as needed  For aches and pains  2. Start taking Pantoprazole 40 mg each AM 30 mins before breakfast daily-----I have sent a prescription to your pharmacy electronically today  3. Proceed with Abdominal U/S as ordered and call me 2 days later for results and give me an update on UG symptoms after stopping Aspirin, ibuprofen and all other NSAIDS and taking Pantoprazole  4. If N/V persist and U/S shows no likely cause, then I will order Dedicated Small Bowel xray as the next diagnostic test    Indication: protracted N/V  :  Jennifer Barnes MD  Referring Provider:   Lalo Johnson MD  Assistants: Endoscopy Technician-1: [address]  Endoscopy RN-1: Jamair Cordova RN  History: The history and physical exam were reviewed and updated. Endoscope: [id number]  ASA: ASA 3 - Patient with moderate systemic disease with functional limitations  Mallampati: II (soft palate, uvula, fauces visible)  Sedation:  MAC anesthesia    Description of the procedure: The procedure was discussed with the patient including risks, benefits, alternatives including risks of iv sedation, bleeding, perforation and aspiration. A safety timeout was performed. The patient was placed in the left lateral decubitus position.   A bite block was placed. The patient was given incremental doses of intravenous medication until moderate sedation was achieved. The patients vital signs were monitored at all times including heart rate/rhythm, blood pressure and oxygen saturation. The endoscope was then passed under direct visualization into the esophagus, across the GE junction into the stomach and through the pylorus into the duodenal bulb and second portion. The endoscope was then slowly withdrawn while visualizing the mucosa. In the stomach a retroflexion was performed and gastric fundus and cardia visualized. .The endoscope was then slowly withdrawn. The patient was then transferred to recovery in stable condition. Findings: see impression above    Complications:   none    EBL: minimal    Discharge disposition:  Home in the company of  when able to ambulate    Mary Tran.  MD Nael, Russell Swain, [address] [employer]  August 13, 2019  2:23 PM

## 2019-08-13 NOTE — H&P
Chief Complaint(s):   F/U, Abdominal pain, Nausea, Vomiting, Iron deficiency anemia. HPI:   General  I saw the patient last in September 2017. The Time I was seeing her as a follow-up visit for iron deficiency anemia. This was thought to be secondary to regularly use of Aleve and aspirin. She had undergone colonoscopy in March 2017. Because of personal history of adenomatous polyps. A repeat colonoscopy in 5 years is advised which of the early 2022. Blood work September 2017 showed a ferritin of 160 with a hemoglobin 11.6, hematocrit 35.9, MCV 88.9. She was advised to take aspirin 81 mg once perhaps twice a week at most, avoid other NSAIDs and see me back in the office in 3 months. Not seen the patient now in almost 2 years. August 13, 2019: The patient reports being sick for the last 2 months or so. She has severe nausea. The point of not being able to eat properly. She has lost approximately 50 pounds since I saw HER-2 years ago. Most of this and last 6 months by report. She does not have any vomiting. She has slight upper abdominal discomfort at times which he has a hard time describing. On occasion she'll have an indigestion sensation in her chest. She was hospitalized at [facility] [place] several months ago with chest pain and cardiac evaluation was negative. Despite my recommendation several years ago. She still taking 81 mg of aspirin one to 3 times daily as well as ibuprofen several times a week. She has the urge to move her bowels but passes a lot of gas and not much bowel movement. She actually persisted very low residue diet. She's been able to tolerate. In the last several months. She has no difficulty swallowing. She did have some red blood per rectum several months ago but seemed to stop. She is up-to-date on colonoscopies. This been no change in medications in the last 6 months or year. She does not take medicine across the counter other than ibuprofen and aspirin.  She didn't realize that her kidney function. His significantly since I saw her in 2017. Her GFR in 2017 was 45 or so but a GFR just last month was down to 26. This alone might be responsible for her nausea, but in conjunction with other medications that she is taking standard doses perhaps medication toxicity is contributing as the medications are building up in her blood as to not painful to properly [place] with weakening kidneys. The patient expresses the opinion that she's not depressed, anxious, does not believe that stress is contributing to her symptoms. She is very active around the house and the yard on a daily basis. She sleeps soundly at night and feels rusted in the morning. All food tastes bad. However. She is trying to maintain her weight with Ensure, but the sweetness of this product patient difficult to drink more than 1 a day.   Current Medication:  Taking   Vitamin D3 2000 intl units tablet 1 tab orally once a day       gabapentin 300 mg capsule TK 1 C PO TID       Metoprolol Tartrate 50 mg tablet TK 1 T PO BID orally       Aspir-Low 81 mg delayed release tablet 1 tab(s) orally once a day       Fish Oil 1000 mg 1 cap BID       folic acid 1 mg tablet 1 tab(s) orally once a day       Vitamin B12 1000 mcg tablet 1 tab(s) orally once a day       lisinopril-HCTZ 20-25 1 tablet oral qd       ibuprofen 200 mg tablet 1 tab(s) orally every 6 hours       Calcium 600+D 600 mg-200 units tablet 1 tab orally 3 times a day    Not-Taking/PRN   Zofran 4 mg tablet 1 tab(s) orally every 8 hours    Discontinued   Tylenol 500 mg PRN       dicyclomine 10 mg capsule 2 cap(s) orally 4 times a day       ondansetron 4 mg tablet 1 tab(s) orally every 8 hours       Vitamin B6 25 mg tablet 1 tab(s) orally once a day       Aspir 81 81 mg enteric coated tablet 1 tab orally once a day       hydrochlorothiazide-lisinopril 25 mg-20 mg tablet TK 1 T PO ONCE D BLOOD PRESSURE       cyclobenzaprine 10 mg tablet 1 tab(s) orally 3 times a day       Aleve prn Medication List reviewed and reconciled with the patient    Medical History:  Hypertension, Transient ischemic attack x 2. last in , Non-alcoholic fatty liver, Hyperlipidemia, Vitamin D deficiency, Hemorrhoids, Constipation, Coronary Artery disease, Osteomyelitis, Chronic kidney disease - Stage, Elevated tsh, Leukocytosis, Lumbar Spinal Stenosis, Normocytic anemia , splenomegaly. Allergies/Intolerance:  Voltaren: Causes elevated liver enzymes  Surgical History:  Cholecystectomy (open) , Colonoscopy x 2 - last in  - normal but adenoma in  , Hysterectomy , Bladder suspension , Left ankle internal fixation , EGD , Skin graft , Oophorectomy , Orthopedic surgery , Vein stripping , MD debridement open wound   Hospitalization:  Family History:  Father: , diagnosed with [address] [facility]  Mother:   No family history of colon cancer, colon polyps or any other GI malignancies. Social History:  Occupation: homemaker. no Drugs (Illicit). Smoking   Tobacco use: never smoker  Patient uses other tobacco products: No  e-Cigarettes No  no Alcohol. no IV Drug use. Blood transfusions: yes, Year . Marital Status: . ROS:    Complete review of systems was taken and reviewed with the patient on and will be scanned into the medical record. Positives will be noted in HPI. Negatives will not be listed here. Objective: Vitals:  BMI 24.44, HR 69, /71, Wt 138, Ht 5'3\", RR 16  Past Results:  Examination:   General examination  Dated: 2019;WBC:9.3; HGB:10.0 LOW; HCT:32.9 LOW; MCV 90, PLT:233; Iron:33; Iron Saturation: 12 LOW  Lab work May 2019: Total protein is 7.9, albumin of 4, total bilirubin 0.7, alkaline phosphatase is 105, AST elevated at 44. Abnormal 37, ALT 40. Lipase 179. CK-MB 1.2, troponin less than 0.2. A BMP showed a glucose elevated 116, BUN elevated at 41, creatinine elevated 1.88. GFR estimate 26. Hemoglobin 11.9, hematocrit 37.2 with an MCV 89.  White count of 8.6, platelets 887. Triglycerides of 158, LDL 85.4. HbA1c elevated 6.4. CT abdomen and pelvis April 21, 2019: Indication abdominal pain, diarrhea. No acute inflammatory process in the abdomen or pelvis. Diverticulosis without diverticulitis. Cholecystectomy and hysterectomy. Unchanged subcentimeter peripherally calcified lesion I'm giving greater than 2 year stability. Small hiatal hernia. No bowel wall thickening or dilatation normal appendix normal liver, bile ducts, normal spleen, normal pancreas unremarkable. Lymph nodes. \" label=\"LABORATORY DATA:\" categoryPropId=\"[id number]\" examid=\"516\"LABORATORY DATA: Dated: 07/08/2019;WBC:9.3; HGB:10.0 LOW; HCT:32.9 LOW; MCV 90, PLT:233; Iron:33; Iron Saturation: 12 LOW  Lab work May 2019: Total protein is 7.9, albumin of 4, total bilirubin 0.7, alkaline phosphatase is 105, AST elevated at 44. Abnormal 37, ALT 40. Lipase 179. CK-MB 1.2, troponin less than 0.2. A BMP showed a glucose elevated 116, BUN elevated at 41, creatinine elevated 1.88. GFR estimate 26. Hemoglobin 11.9, hematocrit 37.2 with an MCV 89. White count of 8.6, platelets 953. Triglycerides of 158, LDL 85.4. HbA1c elevated 6.4. CT abdomen and pelvis April 21, 2019: Indication abdominal pain, diarrhea. No acute inflammatory process in the abdomen or pelvis. Diverticulosis without diverticulitis. Cholecystectomy and hysterectomy. Unchanged subcentimeter peripherally calcified lesion I'm giving greater than 2 year stability. Small hiatal hernia. No bowel wall thickening or dilatation normal appendix normal liver, bile ducts, normal spleen, normal pancreas unremarkable. Lymph nodes. .   Physical Examination:    General: Frail appearing, elderly female in no acute distress. Neck: Supple. No thyromegaly or mass palpable. Nodes: No supraclavicular, axillary, cervical nodes palpable. Chest: Clear to auscultation symmetric breath sounds. Good air movement.  Heart: First and second heart sounds are normal. No murmurs, rubs, gallops. Abdomen: Soft, tender. No organomegaly or mass palpable. Bowel sounds are normal there no bruits. Extremities: No pedal edema. Assessment: Assessment:   Nausea - R11.0 (Primary), Differential diagnosis includes progressive azotemia, chronic NSAID use causing gastritis/ulceration of the upper GI or small bowel mucosa, buildup of medications and her blood because of poor filtering of her kidneys, less likely occult neoplasm. She did have a CT scan several months ago that showed no evidence of neoplastic process. She is very active, does not believe distress, anxiety, depression are playing much of a role in her nausea or weight loss although sometimes this is difficult to be certain about. Personal history of colonic polyps - Z86.010, Adenomatous polyps in the past. Last colonoscopy was performed in March 2017. No new polyps identified. Next colonoscopy will be considered of the first part of 2022 on a five year surveillance interval, depending on her overall health at that time. Iron deficiency anemia, unspecified - D50.9, I deficiency anemia back in 2017 thought to be secondary to combination of bleeding from varicose veins in her lower extremities in combination with occult GI blood was related to regular use of aspirin and NSAIDs. She had a colonoscopy, upper endoscopy performed at that time showing no serious pathology other than possible vascular ectasia in the stomach. She did not have push enteroscopy capsule endoscopy or small bowel x-ray back then. Unfortumately, she is taking aspirin 81 mg 1-3 daily in addition to ibuprofen several times a week. She has developed anemia again with low percent saturation, although I do not have recent ferritin. Azotemia - R79.89, Renal functions has deteriorated gradually since 2017 based on blood work available to me today. In March 2017 GFR estimate was 45. November 2003.  The Andrea Rubins were completely normal. November 2017 GFR estimate was 44. In July of this year. GFR estimated is down to 26. The patient is taking NSAIDs on a regular basis despite progressive renal failure. The extent to which progressive azotemia is contributing to her nausea, inability to weight loss is unclear. Continued NSAID use. However is to be avoided in order to protect her kidneys from further damage from NSAID use. She also should undergo formal nephrology evaluation. Abnormal weight loss - R63.4, See discussion above. Under nausea. She should begin nutritional supplements to prevent further weight loss while undergoing diagnostic testing    Plan: Treatment:  Nausea  Lab:TSH  Lab:Hepatic Function Profile (HFP)  Lab:BMP with Estimated GFR  Imaging:Ultrasound Abdomen (Complete)    Basnight Peggy Nazario DAXA 08/13/2019 10:19:06 AM > Please call pt to schedule. She can be reached @ [phone]. Schedule @ [address]. TY    Imaging:EGD with MAC (HOSPITAL ONLY) (Ordered for 08/13/2019)  Notes: 1. The patient should discontinue the use of aspirin, ibuprofen and all other NSAIDs completely. 2. She should call Dr. Judith Hatch today or tomorrow and request for referral to a nephrologist because of progressive azotemia. GFR in July was 26. I will repeat BMP today. 3. [facility] will be arranged at [facility] later this afternoon if the patient can continue BMP oh and get a right back and forth. This will exclude upper GI neoplasm, ulcers, partial gastric outlet, duodenal stricture as a cause of her nausea and weight loss  4. She should continue taking Zofran as needed to control nausea. Iron deficiency anemia, unspecified  Lab:ferritin  Notes: 1. Discontinue NSAIDs as above  2. Serum ferritin today  3. I'll most likely have the patient start oral iron after upper endoscopy today and continued until serum ferritin is greater than 100. Azotemia  Notes: 1.  I've asked the patient to call her primary care physician, Dr. Judith Hatch today or tomorrow and request referral to nephrology regarding acid azotemia  2. BMP to be repeated today. As above, stop NSAIDs. Abnormal weight loss  Notes: 1. Patient should begin a nutritional supplement several times a day in order to prevent further weight loss. 2. If EGD is nondiagnostic, I will order ultrasound of abdomen. 2. Again, rule out neoplastic process.

## 2019-08-13 NOTE — PERIOP NOTES
Phase 2 Recovery Summary  Patient arrived to Phase 2 at 1430  Report received from [address]:    08/13/19 1338 08/13/19 1351 08/13/19 1430 08/13/19 1431   BP: 148/54  121/43 121/43   Pulse: 64  (!) 51 (!) 57   Resp:  18 16 16   Temp: 97.8 °F (36.6 °C)      SpO2: 100%  100% 100%   Weight: 62.1 kg (137 lb)      Height: 5' 4\" (1.626 m)          oriented to time, place, person and situation    Lines and Drains  Peripheral Intravenous Line:   Peripheral IV 08/13/19 Left Hand (Active)   Site Assessment Clean, dry, & intact 8/13/2019  2:31 PM   Phlebitis Assessment 0 8/13/2019  2:31 PM   Infiltration Assessment 0 8/13/2019  2:31 PM   Dressing Status Clean, dry, & intact 8/13/2019  2:31 PM   Dressing Type Tape;Transparent 8/13/2019  2:31 PM   Hub Color/Line Status Pink 8/13/2019  2:31 PM   Action Taken Open ports on tubing capped 8/13/2019  1:51 PM     A&Ox4. No c/o of pain, nausea, or dizziness. Family brought back to recovery room. Patient tolerated PO fluids. Patient ambulated from bed to chair with minimal assistance. IV removed and patient allowed to get dressed. Reviewed d/c instructions with patient and family. Family, signed for d/c. Patient transported to vehicle via wheelchair.       Patient discharged to home with family         Christopher Duron RN

## 2019-08-13 NOTE — DISCHARGE INSTRUCTIONS
RECOMMENDATIONS:  1. Stop all NSAIDs immediately---ibuprofen, advil, aleve, aspirin, naproxen, motrin, anacin, bufferin, excedrin, BC, Goody, etc. OK to take Tylenol/Acetominophen as needed  For aches and pains  2. Start taking Pantoprazole 40 mg each AM 30 mins before breakfast daily-----I have sent a prescription to your pharmacy electronically today  3. Proceed with Abdominal U/S as ordered and call me 2 days later for results and give me an update on UG symptoms after stopping Aspirin, ibuprofen and all other NSAIDS and taking Pantoprazole  4. If N/V persist and U/S shows no likely cause, then I will order Dedicated Small Bowel xray as the next diagnostic test    Patient Education        Upper GI Endoscopy: What to Expect at [address] [place] [address]  After you have an endoscopy, you will stay at the hospital or clinic for 1 to 2 hours. This will allow the medicine to wear off. You will be able to go home after your doctor or nurse checks to make sure you are not having any problems. You may have to stay overnight if you had treatment during the test. You may have a sore throat for a day or two after the test.  This care sheet gives you a general idea about what to expect after the test.  How can you care for yourself at home? Activity  · Rest as much as you need to after you go home. · You should be able to go back to your usual activities the day after the test.  Diet  · Follow your doctor's directions for eating after the test.  · Drink plenty of fluids (unless your doctor has told you not to). Medications  · If you have a sore throat the day after the test, use an over-the-counter spray to numb your throat. Follow-up care is a key part of your treatment and safety. Be sure to make and go to all appointments, and call your doctor if you are having problems. It's also a good idea to know your test results and keep a list of the medicines you take. When should you call for help?   Call 911 anytime you think you may need emergency care. For example, call if:    · You passed out (loses consciousness).     · You have trouble breathing.     · You pass maroon or bloody stools.    Call your doctor now or seek immediate medical care if:    · You have pain that does not get better after your take pain medicine.     · You have new or worse belly pain.     · You have blood in your stools.     · You are sick to your stomach and cannot keep fluids down.     · You have a fever.     · You cannot pass stools or gas.    Watch closely for changes in your health, and be sure to contact your doctor if:    · Your throat still hurts after a day or two.     · You do not get better as expected. Where can you learn more? Go to http://dorinda-jn.info/. Enter ([phone] in the search box to learn more about \"Upper GI Endoscopy: What to Expect at Home. \"  Current as of: November 7, 2018  Content Version: 12.1  © 3475-7441 [employer]. Care instructions adapted under license by [employer] (which disclaims liability or warranty for this information). If you have questions about a medical condition or this instruction, always ask your healthcare professional. [place] [address] any warranty or liability for your use of this information. DISCHARGE SUMMARY from Nurse    PATIENT INSTRUCTIONS:    After general anesthesia or intravenous sedation, for 24 hours or while taking prescription Narcotics:  · Limit your activities  · Do not drive and operate hazardous machinery  · Do not make important personal or business decisions  · Do  not drink alcoholic beverages  · If you have not urinated within 8 hours after discharge, please contact your surgeon on call.     Report the following to your surgeon:  · Excessive pain, swelling, redness or odor of or around the surgical area  · Temperature over 100.5  · Nausea and vomiting lasting longer than 4 hours or if unable to take medications  · Any signs of decreased circulation or nerve impairment to extremity: change in color, persistent  numbness, tingling, coldness or increase pain  · Any questions      These are general instructions for a healthy lifestyle:    No smoking/ No tobacco products/ Avoid exposure to second hand smoke  Surgeon General's Warning:  Quitting smoking now greatly reduces serious risk to your health. Obesity, smoking, and sedentary lifestyle greatly increases your risk for illness    A healthy diet, regular physical exercise & weight monitoring are important for maintaining a healthy lifestyle    You may be retaining fluid if you have a history of heart failure or if you experience any of the following symptoms:  Weight gain of 3 pounds or more overnight or 5 pounds in a week, increased swelling in our hands or feet or shortness of breath while lying flat in bed. Please call your doctor as soon as you notice any of these symptoms; do not wait until your next office visit. Patient armband removed and given to patient to take home. Patient was informed of the privacy risks if armband lost or stolen    The discharge information has been reviewed with the patient. The patient verbalized understanding. Discharge medications reviewed with the patient and appropriate educational materials and side effects teaching were provided.   ___________________________________________________________________________________________________________________________________

## 2019-08-13 NOTE — ADDENDUM NOTE
Addendum  created 08/13/19 1437 by Bridger Holguin MD    Attestation recorded in [address] [place] [address], [facility] 97 filed

## 2019-08-13 NOTE — PERIOP NOTES
Pre-Op Summary    Pt arrived via car with family/friend and is oriented to time, place, person and situation. Patient with steady gait with cane assistive devices. Visit Vitals  /54 (BP 1 Location: Left arm, BP Patient Position: Sitting)   Pulse 64   Temp 97.8 °F (36.6 °C)   Resp 18   Ht 5' 4\" (1.626 m)   Wt 62.1 kg (137 lb)   SpO2 100%   Breastfeeding? No   BMI 23.52 kg/m²       Peripheral IV located on Left hand . Patients belongings are located with pt. Patient's point of contact is  Carina Medrano and their contact number is: [phone]. They will be in the waiting room. They are able to receive medication information. They will be their ride home.

## 2019-08-13 NOTE — ANESTHESIA POSTPROCEDURE EVALUATION
Procedure(s):  ESOPHAGOGASTRODUODENOSCOPY (EGD).     MAC    Anesthesia Post Evaluation        Patient location during evaluation: PACU  Patient participation: complete - patient participated  Level of consciousness: awake and alert  Pain score: 0  Airway patency: patent  Anesthetic complications: no  Cardiovascular status: stable  Respiratory status: room air  Hydration status: stable  Post anesthesia nausea and vomiting:  none      Vitals Value Taken Time   /43 8/13/2019  2:31 PM   Temp     Pulse 57 8/13/2019  2:31 PM   Resp 16 8/13/2019  2:31 PM   SpO2 100 % 8/13/2019  2:31 PM

## 2019-08-13 NOTE — ANESTHESIA PREPROCEDURE EVALUATION
Relevant Problems   No relevant active problems       Anesthetic History   No history of anesthetic complications            Review of Systems / Medical History  Patient summary reviewed and pertinent labs reviewed    Pulmonary                   Neuro/Psych       CVA  TIA     Cardiovascular    Hypertension        Dysrhythmias   CAD         GI/Hepatic/Renal         Renal disease: CRI       Endo/Other    Diabetes: type 2         Other Findings            Physical Exam    Airway  Mallampati: II  TM Distance: 4 - 6 cm  Neck ROM: decreased range of motion   Mouth opening: Diminished (comment)     Cardiovascular    Rhythm: regular  Rate: normal         Dental    Dentition: Full lower dentures and Full upper dentures     Pulmonary  Breath sounds clear to auscultation               Abdominal  GI exam deferred       Other Findings            Anesthetic Plan    ASA: 3  Anesthesia type: MAC            Anesthetic plan and risks discussed with: Patient

## 2019-08-14 ENCOUNTER — HOSPITAL ENCOUNTER (OUTPATIENT)
Age: 84
Setting detail: OBSERVATION
Discharge: HOME | End: 2019-08-15
Attending: EMERGENCY MEDICINE | Admitting: HOSPITALIST
Payer: MEDICARE

## 2019-08-14 ENCOUNTER — APPOINTMENT (OUTPATIENT)
Dept: GENERAL RADIOLOGY | Age: 84
End: 2019-08-14
Attending: EMERGENCY MEDICINE
Payer: MEDICARE

## 2019-08-14 DIAGNOSIS — R07.9 CHEST PAIN, UNSPECIFIED TYPE: Primary | ICD-10-CM

## 2019-08-14 LAB
ALBUMIN SERPL-MCNC: 3.8 G/DL (ref 3.4–5)
ALBUMIN/GLOB SERPL: 1.1 {RATIO} (ref 0.8–1.7)
ALP SERPL-CCNC: 80 U/L (ref 45–117)
ALT SERPL-CCNC: 18 U/L (ref 13–56)
ANION GAP SERPL CALC-SCNC: 10 MMOL/L (ref 3–18)
AST SERPL-CCNC: 22 U/L (ref 10–38)
ATRIAL RATE: 75 BPM
BASOPHILS # BLD: 0.1 K/UL (ref 0–0.1)
BASOPHILS NFR BLD: 1 % (ref 0–2)
BILIRUB SERPL-MCNC: 1 MG/DL (ref 0.2–1)
BUN SERPL-MCNC: 31 MG/DL (ref 7–18)
BUN/CREAT SERPL: 23 (ref 12–20)
CALCIUM SERPL-MCNC: 9.1 MG/DL (ref 8.5–10.1)
CALCULATED P AXIS, ECG09: 75 DEGREES
CALCULATED R AXIS, ECG10: 15 DEGREES
CALCULATED T AXIS, ECG11: 42 DEGREES
CHLORIDE SERPL-SCNC: 107 MMOL/L (ref 100–111)
CO2 SERPL-SCNC: 23 MMOL/L (ref 21–32)
CREAT SERPL-MCNC: 1.32 MG/DL (ref 0.6–1.3)
DIAGNOSIS, 93000: NORMAL
DIFFERENTIAL METHOD BLD: ABNORMAL
EOSINOPHIL # BLD: 0.1 K/UL (ref 0–0.4)
EOSINOPHIL NFR BLD: 1 % (ref 0–5)
ERYTHROCYTE [DISTWIDTH] IN BLOOD BY AUTOMATED COUNT: 15.1 % (ref 11.6–14.5)
GLOBULIN SER CALC-MCNC: 3.5 G/DL (ref 2–4)
GLUCOSE BLD STRIP.AUTO-MCNC: 112 MG/DL (ref 70–110)
GLUCOSE BLD STRIP.AUTO-MCNC: 97 MG/DL (ref 70–110)
GLUCOSE SERPL-MCNC: 90 MG/DL (ref 74–99)
HCT VFR BLD AUTO: 30.3 % (ref 35–45)
HGB BLD-MCNC: 9.3 G/DL (ref 12–16)
LIPASE SERPL-CCNC: 292 U/L (ref 73–393)
LYMPHOCYTES # BLD: 2.5 K/UL (ref 0.9–3.6)
LYMPHOCYTES NFR BLD: 28 % (ref 21–52)
MAGNESIUM SERPL-MCNC: 2.2 MG/DL (ref 1.6–2.6)
MCH RBC QN AUTO: 27.4 PG (ref 24–34)
MCHC RBC AUTO-ENTMCNC: 30.7 G/DL (ref 31–37)
MCV RBC AUTO: 89.1 FL (ref 74–97)
MONOCYTES # BLD: 0.8 K/UL (ref 0.05–1.2)
MONOCYTES NFR BLD: 9 % (ref 3–10)
NEUTS SEG # BLD: 5.4 K/UL (ref 1.8–8)
NEUTS SEG NFR BLD: 61 % (ref 40–73)
P-R INTERVAL, ECG05: 160 MS
PLATELET # BLD AUTO: 245 K/UL (ref 135–420)
PMV BLD AUTO: 11.6 FL (ref 9.2–11.8)
POTASSIUM SERPL-SCNC: 3.5 MMOL/L (ref 3.5–5.5)
PROT SERPL-MCNC: 7.3 G/DL (ref 6.4–8.2)
Q-T INTERVAL, ECG07: 404 MS
QRS DURATION, ECG06: 72 MS
QTC CALCULATION (BEZET), ECG08: 451 MS
RBC # BLD AUTO: 3.4 M/UL (ref 4.2–5.3)
SODIUM SERPL-SCNC: 140 MMOL/L (ref 136–145)
TROPONIN I SERPL-MCNC: <0.02 NG/ML (ref 0–0.04)
VENTRICULAR RATE, ECG03: 75 BPM
WBC # BLD AUTO: 8.7 K/UL (ref 4.6–13.2)

## 2019-08-14 PROCEDURE — 80053 COMPREHEN METABOLIC PANEL: CPT

## 2019-08-14 PROCEDURE — 93005 ELECTROCARDIOGRAM TRACING: CPT

## 2019-08-14 PROCEDURE — 74011250637 HC RX REV CODE- 250/637: Performed by: EMERGENCY MEDICINE

## 2019-08-14 PROCEDURE — 36415 COLL VENOUS BLD VENIPUNCTURE: CPT

## 2019-08-14 PROCEDURE — 83735 ASSAY OF MAGNESIUM: CPT

## 2019-08-14 PROCEDURE — 84484 ASSAY OF TROPONIN QUANT: CPT

## 2019-08-14 PROCEDURE — 96372 THER/PROPH/DIAG INJ SC/IM: CPT

## 2019-08-14 PROCEDURE — 82962 GLUCOSE BLOOD TEST: CPT

## 2019-08-14 PROCEDURE — 83690 ASSAY OF LIPASE: CPT

## 2019-08-14 PROCEDURE — 74011250637 HC RX REV CODE- 250/637: Performed by: HOSPITALIST

## 2019-08-14 PROCEDURE — 99285 EMERGENCY DEPT VISIT HI MDM: CPT

## 2019-08-14 PROCEDURE — 99218 HC RM OBSERVATION: CPT

## 2019-08-14 PROCEDURE — 85025 COMPLETE CBC W/AUTO DIFF WBC: CPT

## 2019-08-14 PROCEDURE — 74011250636 HC RX REV CODE- 250/636: Performed by: INTERNAL MEDICINE

## 2019-08-14 PROCEDURE — 71045 X-RAY EXAM CHEST 1 VIEW: CPT

## 2019-08-14 RX ORDER — ONDANSETRON 4 MG/1
4 TABLET, ORALLY DISINTEGRATING ORAL
Status: DISCONTINUED | OUTPATIENT
Start: 2019-08-14 | End: 2019-08-15 | Stop reason: HOSPADM

## 2019-08-14 RX ORDER — GABAPENTIN 100 MG/1
300 CAPSULE ORAL 2 TIMES DAILY
Status: DISCONTINUED | OUTPATIENT
Start: 2019-08-14 | End: 2019-08-15 | Stop reason: HOSPADM

## 2019-08-14 RX ORDER — LISINOPRIL 10 MG/1
20 TABLET ORAL DAILY
Status: DISCONTINUED | OUTPATIENT
Start: 2019-08-15 | End: 2019-08-15 | Stop reason: HOSPADM

## 2019-08-14 RX ORDER — CLOPIDOGREL BISULFATE 75 MG/1
75 TABLET ORAL
Status: DISCONTINUED | OUTPATIENT
Start: 2019-08-14 | End: 2019-08-14

## 2019-08-14 RX ORDER — HYDROCHLOROTHIAZIDE 25 MG/1
25 TABLET ORAL DAILY
Status: DISCONTINUED | OUTPATIENT
Start: 2019-08-15 | End: 2019-08-15 | Stop reason: HOSPADM

## 2019-08-14 RX ORDER — ENOXAPARIN SODIUM 100 MG/ML
1 INJECTION SUBCUTANEOUS ONCE
Status: COMPLETED | OUTPATIENT
Start: 2019-08-14 | End: 2019-08-14

## 2019-08-14 RX ORDER — ACETAMINOPHEN 325 MG/1
650 TABLET ORAL
Status: DISCONTINUED | OUTPATIENT
Start: 2019-08-14 | End: 2019-08-15 | Stop reason: HOSPADM

## 2019-08-14 RX ORDER — METOPROLOL TARTRATE 50 MG/1
50 TABLET ORAL DAILY
Status: DISCONTINUED | OUTPATIENT
Start: 2019-08-15 | End: 2019-08-15 | Stop reason: HOSPADM

## 2019-08-14 RX ORDER — ENOXAPARIN SODIUM 100 MG/ML
30 INJECTION SUBCUTANEOUS EVERY 24 HOURS
Status: DISCONTINUED | OUTPATIENT
Start: 2019-08-15 | End: 2019-08-15 | Stop reason: HOSPADM

## 2019-08-14 RX ADMIN — GABAPENTIN 300 MG: 100 CAPSULE ORAL at 20:24

## 2019-08-14 RX ADMIN — ENOXAPARIN SODIUM 60 MG: 80 INJECTION SUBCUTANEOUS at 16:36

## 2019-08-14 RX ADMIN — NITROGLYCERIN 1 INCH: 20 OINTMENT TOPICAL at 16:35

## 2019-08-14 NOTE — CONSULTS
[facility] - Consult Note    Date of  Admission: 8/14/2019  1:12 PM   Primary Care Physician:  Sheela Mackey MD    I saw, evaluated, interviewed and examined the patient personally. I agree with the findings and plan of care as documented below with PATIMMY note  Admitted with some chest pain and dyspnea, mixed feature. EKG without dynamic ST changes of ischemia. First cardiac enzyme normal.  Trend 2 more cardiac enzymes 8 hours apart. Nuclear stress test in May 2019, low risk. Depending on hospital course, will recommend further work-up. Received aspirin by EMT  Hemodynamically stable  We will give 1 dose of Lovenox 1 mg/kg and trend cardiac biomarker  Nitroglycerin patch on  We will continue to follow along    Del Conway MD         Assessment:     - Chest pain, cardiac enzymes negative x2, no acute EKG changes  - Hypertension  - History of TIA's  - Diabetes  - CKD     Plan:     - Agree with observation overnight with trending one more set of cardiac enzymes  - She had a nuclear stress test 05/06/19 with no ischemia, no indication so far to repeat invasive or non-invasive testing but will follow enzyme trend. Further recommendations per hospital findings. History of Present Illness: This is a 81 y.o. female admitted for No admission diagnoses are documented for this encounter. .    Patient complains of:  Chest pain     This is an 81year old female with a history of hypertension, dyslipidemia, diabetes and TIA's that cardiology is seeing in consult due to chest pain. She explains that the pain began this morning around 8 AM, was located in the center of her chest, was intermittent and not associated with nausea, vomiting or diaphoresis. She has had this pain on occasion in the past several months.  Denies history of CAD or cardiac stenting but reports TIA's in the past.       Cardiac risk factors: dyslipidemia, diabetes mellitus, hypertension      Review of Symptoms:  Except as stated above include:  Constitutional:  negative  Respiratory:  negative  Cardiovascular:  Per HPI  Gastrointestinal: negative  Genitourinary:  negative  Musculoskeletal:  Negative  Neurological:  Negative  Dermatological:  Negative  Endocrinological: Negative  Psychological:  Negative    A comprehensive review of systems was negative except for that written in the HPI. Past Medical History:     Past Medical History:   Diagnosis Date    Chronic kidney disease     [facility]     DM (diabetes mellitus) ([facility]     Hypertension     Lumbar spinal stenosis     Normocytic anemia     Rhabdomyolysis     Splenomegaly     Stroke ([facility]     TIA x2    Vitamin D deficiency          Social History:     Social History     Socioeconomic History    Marital status:      Spouse name: Not on file    Number of children: Not on file    Years of education: Not on file    Highest education level: Not on file   Tobacco Use    Smoking status: Never Smoker    Smokeless tobacco: Never Used   Substance and Sexual Activity    Alcohol use: No    Drug use: No    Sexual activity: Never        Family History:     Family History   Problem Relation Age of Onset    Diabetes Other     Hypertension Other         Medications:   No Known Allergies     Current Facility-Administered Medications   Medication Dose Route Frequency    nitroglycerin (NITROBID) 2 % ointment 1 Inch  1 Inch Topical NOW     Current Outpatient Medications   Medication Sig    ondansetron hcl (ZOFRAN) 4 mg tablet Take 2 Tabs by mouth every eight (8) hours as needed for Nausea.  diphenoxylate-atropine (LOMOTIL) 2.5-0.025 mg per tablet Take 1 Tab by mouth four (4) times daily as needed for Diarrhea. Max Daily Amount: 4 Tabs.  acetaminophen (TYLENOL) 500 mg tablet Take 1 Tab by mouth every six (6) hours as needed for Pain or Fever.  dicyclomine (BENTYL) 10 mg capsule Take 1 Cap by mouth four (4) times daily as needed (abd cramps).  ondansetron (ZOFRAN ODT) 4 mg disintegrating tablet Take 1 Tab by mouth every eight (8) hours as needed for Nausea.  omega 3-dha-epa-fish oil (FISH OIL) 100-160-1,000 mg cap Take  by mouth.  OTHER     cyanocobalamin/folic ac/vit B6 (FOLIC ACID-VIT I2-SPG I11 PO) Take 1 Tab by mouth daily.  metoprolol tartrate (LOPRESSOR) 50 mg tablet Take 1 Tab by mouth daily.  gabapentin (NEURONTIN) 300 mg capsule Take 1 Cap by mouth three (3) times daily. 12/22/16, QTY#90, 30 Days, 2 Refills. Dr. Tiffanie Kern cholecalciferol (VITAMIN D3) 1,000 unit tablet Take 5,000 Units by mouth daily.  lisinopril-hydrochlorothiazide (PRINZIDE, ZESTORETIC) 20-25 mg per tablet Take 1 Tab by mouth daily. Physical Exam:     Visit Vitals  /65   Pulse 60   Temp 97.8 °F (36.6 °C)   Resp 19   SpO2 99%     BP Readings from Last 3 Encounters:   08/14/19 163/65   08/13/19 121/43   05/16/19 145/68     Pulse Readings from Last 3 Encounters:   08/14/19 60   08/13/19 (!) 57   05/16/19 68     Wt Readings from Last 3 Encounters:   08/13/19 137 lb (62.1 kg)   05/11/19 160 lb (72.6 kg)   05/06/19 161 lb (73 kg)       General:  alert, cooperative, no distress  Neck:  nontender, no JVD  Lungs:  clear to auscultation bilaterally  Heart:  regular rate and rhythm, S1, S2 normal, no murmur, click, rub or gallop  Abdomen:  abdomen is soft without significant tenderness, masses, organomegaly or guarding  Extremities:  extremities normal, atraumatic, no cyanosis or edema  Skin: Warm and dry.  no hyperpigmentation, vitiligo, or suspicious lesions  Neuro: alert, oriented x3, affect appropriate  Psych: non focal     Data Review:     Recent Labs     08/14/19  1315   WBC 8.7   HGB 9.3*   HCT 30.3*        Recent Labs     08/14/19  1315      K 3.5      CO2 23   GLU 90   BUN 31*   CREA 1.32*   CA 9.1   ALB 3.8   SGOT 22   ALT 18       Results for orders placed or performed during the hospital encounter of 08/14/19 EKG, 12 LEAD, INITIAL   Result Value Ref Range    Ventricular Rate 75 BPM    Atrial Rate 75 BPM    P-R Interval 160 ms    QRS Duration 72 ms    Q-T Interval 404 ms    QTC Calculation (Bezet) 451 ms    Calculated P Axis 75 degrees    Calculated R Axis 15 degrees    Calculated T Axis 42 degrees    Diagnosis       Normal sinus rhythm with sinus arrhythmia  Low voltage QRS  Cannot rule out Anteroseptal infarct , age undetermined  Abnormal ECG  When compared with ECG of 11-MAY-2019 20:25,  premature atrial complexes are no longer present  Minimal criteria for Anteroseptal infarct are now present  T wave inversion no longer evident in Lateral leads     Results for orders placed or performed during the hospital encounter of 18   ECG HOLTER MONITOR, UP TO 59 [facility]                                         Test Date:    2018  Pat Name:     Curtis Sue             Department:     Patient ID:   [id number]                Room:           Gender:       Female                   Technician:     :          99-               Requested By: Dr. Mikie Shone Number:                          Reading MD:   Juan Walton MD                    Interpretive Statements  Christophe Agrawal was in Sinus. The average heart rate, excluding ectopy, was 64 BPM with a minimum of 43 BPM   at 22:51 D1 and a maximum of 121 BPM at  10:51 D3. Heart beats, including ectopy, totaled 025743 beats. VENTRICULAR ECTOPICS totaled 3392  averaging  70.7 per hour  with 2971   single, 266 paired, 61 trigeminy and 0 R on T.  BIGEMINY runs occurred 20   times and totaled 64 beats. VENTRICULAR TACHYCARDIA occurred 9 times.    The fastest run was at 156 BPM and occurred at 10:55 D3 with 4 beats   The longest run was 5 beats and occurred at  05:19 D3 at a rate of 60 BPM.    SUPRAVENTRICULAR ECTOPICS totaled 45124  averaging  497.7 per hour  ,with   92744 single and 3268 paired beats. SUPRAVENTRICULAR TACHYCARDIA occurred 337 times. The fastest run was at 146 BPM and occurred at 15:00 D2 with 3 beats. The longest run was 14 beats at 23:41 D2 at a rate of 120 BPM.    Basic rhythm is sinus with an average heart rate of 64 bpm. Range was 43 to   121 bpm.  occasional PVCs ( 3392 per 48 hour recording ) with 133 couplets and 9   ventricular salvos, the longest of which was 5 beats. Frequent PACs and episodes of SVT. Fastest run was 146 bpm with only 3 beats. Longest run was 14 beats. Electronically signed by Sabrina Macedo MD on Feb 1 2018  8:07AM CDT       All Cardiac Markers in the last 24 hours:    Lab Results   Component Value Date/Time    TROIQ <0.02 08/14/2019 01:15 PM       Last Lipid:    Lab Results   Component Value Date/Time    Cholesterol, total 162 05/06/2019 06:25 AM    HDL Cholesterol 45 05/06/2019 06:25 AM    LDL, calculated 85.4 05/06/2019 06:25 AM    Triglyceride 158 (H) 05/06/2019 06:25 AM    CHOL/HDL Ratio 3.6 05/06/2019 06:25 AM       Signed By: Michaela Sol.  Michaela Bamberger     August 14, 2019

## 2019-08-14 NOTE — H&P
Medicine History and Physical    Patient: Monisha Baig   Age:  81 y.o. Assessment   Active Problems:    Chest pain (8/14/2019)          Plan     1)  Chest pain   - one more troponin tonight   - tele monitor   - dc tomorrow if biomarker negative    DISPO  -Pt to be admitted  at this time for reasons addressed above, continued hospitalization for ongoing assessment and treatment indicated     Anticipated Date of Discharge: tomorrow  Anticipated Disposition (home, SNF) : home    Chief Complaint:   Chief Complaint   Patient presents with    Chest Pain         HPI:   Monisha Baig is a 81y.o. year old female who presents with  Chest pain. Started this am, intermittent , not associated with radiation N/V/diaphoresis. She has had this pain before. No CAD but history of risk factors. Cards consulted in ED    Review of Systems - positive responses in bold type   Constitutional: Negative for fever, chills, diaphoresis and unexpected weight change. HENT: Negative for ear pain, congestion, sore throat, rhinorrhea, drooling, trouble swallowing, neck pain and tinnitus. Eyes: Negative for photophobia, pain, redness and visual disturbance. Respiratory: negative for shortness of breath, cough, choking, chest tightness, wheezing or stridor. Cardiovascular: Negative for chest pain, palpitations and leg swelling. Gastrointestinal: Negative for nausea, vomiting, abdominal pain, diarrhea, constipation, blood in stool, abdominal distention and anal bleeding. Genitourinary: Negative for dysuria, urgency, frequency, hematuria, flank pain and difficulty urinating. Musculoskeletal: Negative for back pain and arthralgias. Skin: Negative for color change, rash and wound. Neurological: Negative for dizziness, seizures, syncope, speech difficulty, light-headedness or headaches. Hematological: Does not bruise/bleed easily.    Psychiatric/Behavioral: Negative for suicidal ideas, hallucinations, behavioral problems, self-injury or agitation       Past Medical History:  Past Medical History:   Diagnosis Date    Chronic kidney disease     [facility]     DM [facility]diabetes mellitus) [facility]     Hypertension     Lumbar spinal stenosis     Normocytic anemia     Rhabdomyolysis     Splenomegaly     Stroke ([facility]     TIA x2    Vitamin D deficiency        Past Surgical History:  Past Surgical History:   Procedure Laterality Date    COLONOSCOPY N/A 3/1/2017    COLONOS[facility] Daphne [facility] ENDOSCOPY    HX CHOLECYSTECTOMY      HX FREE SKIN GRAFT      HX HYSTERECTOMY      HX OOPHORECTOMY      HX OPEN REDUCTION INTERNAL FIXATION Left     Tx LLE Ankle Trimalleolar Fx by Dr. [facility])    HX ORTHOPAEDIC Left 06/11/2013    [address] [place] - LLE Ankle Removal of Hardware     HX VEIN STRIPPING Bilateral 09/18/2013    Dr. Mathew Falk 20 SQ CM<         Family History:  Family History   Problem Relation Age of Onset    Diabetes Other     Hypertension Other        Social History:  Social History     Socioeconomic History    Marital status:      Spouse name: Not on file    Number of children: Not on file    Years of education: Not on file    Highest education level: Not on file   Tobacco Use    Smoking status: Never Smoker    Smokeless tobacco: Never Used   Substance and Sexual Activity    Alcohol use: No    Drug use: No    Sexual activity: Never       Home Medications:  Prior to Admission medications    Medication Sig Start Date End Date Taking? Authorizing Provider   ondansetron hcl (ZOFRAN) 4 mg tablet Take 2 Tabs by mouth every eight (8) hours as needed for Nausea. 5/11/19   An Duran MD   diphenoxylate-atropine (LOMOTIL) 2.5-0.025 mg per tablet Take 1 Tab by mouth four (4) times daily as needed for Diarrhea. Max Daily Amount: 4 Tabs.  5/11/19   An Duran MD   acetaminophen (TYLENOL) 500 mg tablet Take 1 Tab by mouth every six (6) hours as needed for Pain or Fever. 4/21/19   Collette Hew, MD   dicyclomine (BENTYL) 10 mg capsule Take 1 Cap by mouth four (4) times daily as needed (abd cramps). 4/21/19   Collette Hew, MD   ondansetron (ZOFRAN ODT) 4 mg disintegrating tablet Take 1 Tab by mouth every eight (8) hours as needed for Nausea. 4/21/19   Collette Hew, MD   omega 7-hyu-krc-fish oil (FISH OIL) 100-160-1,000 mg cap Take  by mouth. Akash Yu MD OTHER Other, Phys, MD   cyanocobalamin/folic ac/vit B6 (FOLIC ACID-VIT M4-YLW Q36 PO) Take 1 Tab by mouth daily. Provider, Historical   metoprolol tartrate (LOPRESSOR) 50 mg tablet Take 1 Tab by mouth daily. 10/12/18   Vickie Meth, NP   gabapentin (NEURONTIN) 300 mg capsule Take 1 Cap by mouth three (3) times daily. 12/22/16, QTY#90, 30 Days, 2 Refills. Dr. Rayray Calero 12/22/16   kAash Yu MD   cholecalciferol (VITAMIN D3) 1,000 unit tablet Take 5,000 Units by mouth daily. Akash Yu MD   lisinopril-hydrochlorothiazide (PRINZIDE, ZESTORETIC) 20-25 mg per tablet Take 1 Tab by mouth daily. Akash Yu MD       Allergies:  No Known Allergies        Physical Exam:     Visit Vitals  /63   Pulse 74   Temp 97.8 °F (36.6 °C)   Resp 15   SpO2 100%       Physical Exam:  General appearance: alert, cooperative, no distress, appears stated age  Head: Normocephalic, without obvious abnormality, atraumatic  Neck: supple, trachea midline  Lungs: clear to auscultation bilaterally  Heart: regular rate and rhythm, S1, S2 normal, no murmur, click, rub or gallop  Abdomen: soft, non-tender. Bowel sounds normal. No masses,  no organomegaly  Extremities: extremities normal, atraumatic, no cyanosis or edema  Skin: Skin color, texture, turgor normal. No rashes or lesions    Intake and Output:  Current Shift:  No intake/output data recorded. Last three shifts:  No intake/output data recorded.     Lab/Data Reviewed:  CMP:   Lab Results   Component Value Date/Time     08/14/2019 01:15 PM    K 3.5 08/14/2019 01:15 PM     08/14/2019 01:15 PM    CO2 23 08/14/2019 01:15 PM    AGAP 10 08/14/2019 01:15 PM    GLU 90 08/14/2019 01:15 PM    BUN 31 (H) 08/14/2019 01:15 PM    CREA 1.32 (H) 08/14/2019 01:15 PM    GFRAA 47 (L) 08/14/2019 01:15 PM    GFRNA 38 (L) 08/14/2019 01:15 PM    CA 9.1 08/14/2019 01:15 PM    ALB 3.8 08/14/2019 01:15 PM    TP 7.3 08/14/2019 01:15 PM    GLOB 3.5 08/14/2019 01:15 PM    AGRAT 1.1 08/14/2019 01:15 PM    SGOT 22 08/14/2019 01:15 PM    ALT 18 08/14/2019 01:15 PM     CBC:   Lab Results   Component Value Date/Time    WBC 8.7 08/14/2019 01:15 PM    HGB 9.3 (L) 08/14/2019 01:15 PM    HCT 30.3 (L) 08/14/2019 01:15 PM     08/14/2019 01:15 PM     All Cardiac Markers in the last 24 hours:   Lab Results   Component Value Date/Time    TROIQ <0.02 08/14/2019 01:15 PM           Ortencia Hammans, MD    August 14, 2019

## 2019-08-14 NOTE — ED NOTES
TRANSFER - ED to INPATIENT REPORT:    SBAR report made available to receiving floor on this patient being transferred to [address] [place] [address] ([facility])  for routine progression of care       Admitting diagnosis Chest pain [R07.9]    Information from the following report(s) SBAR was made available to receiving floor. Lines:   Peripheral IV 08/14/19 Left Wrist (Active)   Site Assessment Clean, dry, & intact 8/14/2019  1:20 PM   Phlebitis Assessment 0 8/14/2019  1:20 PM   Infiltration Assessment 0 8/14/2019  1:20 PM   Dressing Status Clean, dry, & intact 8/14/2019  1:20 PM   Dressing Type Transparent 8/14/2019  1:20 PM   Hub Color/Line Status Patent; Flushed 8/14/2019  1:20 PM        Medication list confirmed with patient    Opportunity for questions and clarification was provided.       Patient is oriented to time, place, person and situation   Patient is  continent and ambulatory with assist     Valuables transported with patient     Patient transported with:   Monitor  Tech    MAP (Monitor): 97 =Monitored (most recent)  Vitals w/ MEWS Score (last day)     Date/Time MEWS Score Pulse Resp Temp BP Level of Consciousness SpO2    08/14/19 1635  1  74  16  97.4 °F (36.3 °C)  145/63  Alert  100 %    08/14/19 1630    (!) 56  16    138/84    100 %    08/14/19 1615    (!) 58  15    145/63    100 %    08/14/19 1600    (!) 53  14    143/49    98 %    08/14/19 1545    (!) 54  15    141/52    100 %    08/14/19 1530    (!) 56  15    149/49    100 %    08/14/19 1515    (!) 59  16    126/71    97 %    08/14/19 1500    (!) 54  16    153/48    99 %    08/14/19 1445    (!) 55  13    153/49    99 %    08/14/19 1430    (!) 58  17    154/63    99 %    08/14/19 1415    60  19    163/65    99 %    08/14/19 1400    60  15    145/79    95 %    08/14/19 1345    60  23    99/65    98 %    08/14/19 1330    64  17        100 %    08/14/19 1315  2  74  24  97.8 °F (36.6 °C)  (!) 120/91  Alert  100 %              Septic Patients:     Lactic Acid  Lab Results   Component Value Date    [facility] 1.9 10/10/2018    [facility] 1.3 02/25/2017

## 2019-08-14 NOTE — ED PROVIDER NOTES
EMERGENCY DEPARTMENT HISTORY AND PHYSICAL EXAM    2:07 PM      Date: 8/14/2019  Patient Name: Sophia Galeana    History of Presenting Illness     Chief Complaint   Patient presents with    Chest Pain         HISTORY: Sophia Galeana is a 81 y.o. female with medical history as listed below who presents with pain that started approximately 1 hour ago was in the middle of her chest.  She reports it felt like a pressure. She denies any radiation to the pain. There is no associated nausea or vomiting. The pain has resolved. Patient had an upper endoscopy yesterday findings of diffuse ulcerative gastritis recommendations to stop all NSAIDs including aspirin and to start taking pantoprazole. Patient reports she has been taking this medication. Patient denies any exacerbating or alleviating factors to her pain. She was reportedly given full dose aspirin by EMS. PCP: Sheela Mackey MD    Current Outpatient Medications   Medication Sig Dispense Refill    ondansetron hcl (ZOFRAN) 4 mg tablet Take 2 Tabs by mouth every eight (8) hours as needed for Nausea. 12 Tab 0    diphenoxylate-atropine (LOMOTIL) 2.5-0.025 mg per tablet Take 1 Tab by mouth four (4) times daily as needed for Diarrhea. Max Daily Amount: 4 Tabs. 20 Tab 0    acetaminophen (TYLENOL) 500 mg tablet Take 1 Tab by mouth every six (6) hours as needed for Pain or Fever. 40 Tab 0    dicyclomine (BENTYL) 10 mg capsule Take 1 Cap by mouth four (4) times daily as needed (abd cramps). 20 Cap 0    ondansetron (ZOFRAN ODT) 4 mg disintegrating tablet Take 1 Tab by mouth every eight (8) hours as needed for Nausea. 12 Tab 0    omega 3-dha-epa-fish oil (FISH OIL) 100-160-1,000 mg cap Take  by mouth.  OTHER       cyanocobalamin/folic ac/vit B6 (FOLIC ACID-VIT W6-BJF A63 PO) Take 1 Tab by mouth daily.  metoprolol tartrate (LOPRESSOR) 50 mg tablet Take 1 Tab by mouth daily.  30 Tab 0    gabapentin (NEURONTIN) 300 mg capsule Take 1 Cap by mouth three (3) times daily. 12/22/16, QTY#90, 30 Days, 2 Refills. Dr. Vesna Marquez  2    cholecalciferol (VITAMIN D3) 1,000 unit tablet Take 5,000 Units by mouth daily.  lisinopril-hydrochlorothiazide (PRINZIDE, ZESTORETIC) 20-25 mg per tablet Take 1 Tab by mouth daily. Past History     Past Medical History:  Past Medical History:   Diagnosis Date    CAD (coronary artery disease)     Chronic kidney disease     Chronic osteomyelitis of ankle (HCC)     CKD (chronic kidney disease) stage 3, GFR 30-59 ml/min ([facility] [address] 06/24/2013    Worst noted (08/06/13) BUN/sCr: 54/2.49, GFR 20.     DM [facility]diabetes mellitus[facility]     Elevated TSH 06/25/2012    5.08     Hypertension     Leukocytosis     Lumbar spinal stenosis     Menopause     Normocytic anemia     Rhabdomyolysis     Splenomegaly     Stroke ([facility] [address]     TIA x2    Vitamin D deficiency        Past Surgical History:  Past Surgical History:   Procedure Laterality Date    COLONOSCOPY N/A 3/1/2017    COLONOSCOPY performed by Daphne Savage MD at [facility] ENDOSCOPY    HX CHOLECYSTECTOMY      HX FREE SKIN GRAFT      HX HYSTERECTOMY      HX OOPHORECTOMY      HX OPEN REDUCTION INTERNAL FIXATION Left     Tx LLE Ankle Trimalleolar Fx by Dr. [facility])    HX ORTHOPAEDIC Left 06/11/2013    [address] - LLE Ankle Removal of Hardware     HX VEIN STRIPPING Bilateral 09/18/2013    Dr. Mathew Falk 20 SQ CM<         Family History:  Family History   Problem Relation Age of Onset    Diabetes Other     Hypertension Other        Social History:  Social History     Tobacco Use    Smoking status: Never Smoker    Smokeless tobacco: Never Used   Substance Use Topics    Alcohol use: No    Drug use: No       Allergies:  No Known Allergies      Review of Systems       Review of Systems   Constitutional: Negative for chills. HENT: Negative for congestion and sore throat.     Respiratory: Negative for cough and shortness of breath. Cardiovascular: Positive for chest pain. Gastrointestinal: Negative for abdominal pain, diarrhea, nausea and vomiting. Genitourinary: Negative for dysuria. Musculoskeletal: Negative for back pain. Skin: Negative for rash. Neurological: Negative for dizziness and headaches. All other systems reviewed and are negative. Physical Exam     Visit Vitals  /65   Pulse 60   Temp 97.8 °F (36.6 °C)   Resp 19   SpO2 99%         Physical Exam  General Exam: Patient is a well developed and well nourished in no distress. Patient does not appear acutely ill or toxic. Eye Exam: Lids and conjunctiva are normal  ENT Exam: The general head and facial exam is normal.  The neck is supple without meningeal signs. No significant adenopathy. Pulmonary Exam: No respiratory distress. The respiratory rate is normal.  No stridor. The breath sounds are equal bilaterally. There are no wheezes, rales, or rhonchi noted. Cardiac Exam: The cardiac rate and rhythm are normal.  No significant murmurs, rubs, or gallops. The peripheral pulses of the upper extremities are normal.  Abdominal Exam: Abdomen is soft and non-distended. No pulsatile masses. There is no local tenderness. There is no rebound or guarding noted. Skin and Soft Tissue: The skin is warm and dry, without significant abnormality. Good color. Musculoskeletal Exam: No peripheral edema. The musculoskeletal exam of the lower extremities is normal without significant local tenderness. Psychiatric: Normal adult with appropriate demeanor and interpersonal interaction. Is oriented to person, place, and time.       Diagnostic Study Results     Labs -  Recent Results (from the past 12 hour(s))   CBC WITH AUTOMATED DIFF    Collection Time: 08/14/19  1:15 PM   Result Value Ref Range    WBC 8.7 4.6 - 13.2 K/uL    RBC 3.40 (L) 4.20 - 5.30 M/uL    HGB 9.3 (L) 12.0 - 16.0 g/dL    HCT 30.3 (L) 35.0 - 45.0 %    MCV 89.1 74.0 - 97.0 FL MCH 27.4 24.0 - 34.0 PG    MCHC 30.7 (L) 31.0 - 37.0 g/dL    RDW 15.1 (H) 11.6 - 14.5 %    PLATELET 475 282 - 264 K/uL    MPV 11.6 9.2 - 11.8 FL    NEUTROPHILS 61 40 - 73 %    LYMPHOCYTES 28 21 - 52 %    MONOCYTES 9 3 - 10 %    EOSINOPHILS 1 0 - 5 %    BASOPHILS 1 0 - 2 %    ABS. NEUTROPHILS 5.4 1.8 - 8.0 K/UL    ABS. LYMPHOCYTES 2.5 0.9 - 3.6 K/UL    ABS. MONOCYTES 0.8 0.05 - 1.2 K/UL    ABS. EOSINOPHILS 0.1 0.0 - 0.4 K/UL    ABS. BASOPHILS 0.1 0.0 - 0.1 K/UL    DF AUTOMATED     METABOLIC PANEL, COMPREHENSIVE    Collection Time: 08/14/19  1:15 PM   Result Value Ref Range    Sodium 140 136 - 145 mmol/L    Potassium 3.5 3.5 - 5.5 mmol/L    Chloride 107 100 - 111 mmol/L    CO2 23 21 - 32 mmol/L    Anion gap 10 3.0 - 18 mmol/L    Glucose 90 74 - 99 mg/dL    BUN 31 (H) 7.0 - 18 MG/DL    Creatinine 1.32 (H) 0.6 - 1.3 MG/DL    BUN/Creatinine ratio 23 (H) 12 - 20      GFR est AA 47 (L) >60 ml/min/1.73m2    GFR est non-AA 38 (L) >60 ml/min/1.73m2    Calcium 9.1 8.5 - 10.1 MG/DL    Bilirubin, total 1.0 0.2 - 1.0 MG/DL    ALT (SGPT) 18 13 - 56 U/L    AST (SGOT) 22 10 - 38 U/L    Alk.  phosphatase 80 45 - 117 U/L    Protein, total 7.3 6.4 - 8.2 g/dL    Albumin 3.8 3.4 - 5.0 g/dL    Globulin 3.5 2.0 - 4.0 g/dL    A-G Ratio 1.1 0.8 - 1.7     LIPASE    Collection Time: 08/14/19  1:15 PM   Result Value Ref Range    Lipase 292 73 - 393 U/L   TROPONIN I    Collection Time: 08/14/19  1:15 PM   Result Value Ref Range    Troponin-I, QT <0.02 0.0 - 0.045 NG/ML   EKG, 12 LEAD, INITIAL    Collection Time: 08/14/19  1:15 PM   Result Value Ref Range    Ventricular Rate 75 BPM    Atrial Rate 75 BPM    P-R Interval 160 ms    QRS Duration 72 ms    Q-T Interval 404 ms    QTC Calculation (Bezet) 451 ms    Calculated P Axis 75 degrees    Calculated R Axis 15 degrees    Calculated T Axis 42 degrees    Diagnosis       Normal sinus rhythm with sinus arrhythmia  Low voltage QRS  Cannot rule out Anteroseptal infarct , age undetermined  Abnormal ECG  When compared with ECG of 11-MAY-2019 20:25,  premature atrial complexes are no longer present  Minimal criteria for Anteroseptal infarct are now present  T wave inversion no longer evident in Lateral leads         Radiologic Studies -   XR CHEST PORT    (Results Pending)         Medical Decision Making   I am the first provider for this patient. I reviewed the vital signs, available nursing notes, past medical history, past surgical history, family history and social history. Vital Signs-Reviewed the patient's vital signs. EKG: Interpreted by the EP. EKG performed at 1315. Interpretation rate 75, normal sinus rhythm, no STEMI, no ST depressions    Records Reviewed: Nursing Notes (Time of Review: 2:07 PM)    ED Course: Progress Notes, Reevaluation, and Consults:      Provider Notes (Medical Decision Making):   2:50 PM  Still reports having mild chest pain. Her heart score is 4. Earlier this year she did have a stress test which was negative. Her patient denies ever having a cardiac catheterization but does have a diagnosis of coronary artery disease on her chart. She is still in a moderate risk category for MACE (12-16%) per the HEART score based on her age and risk factors. Consult:  Discussed care with Gloria GHOTRA Standard discussion; including history of patients chief complaint, available diagnostic results, and treatment course. Will evaluate the patient. 2:55 PM, 8/14/2019     Consult:  Discussed care with Dr. Reji Oreilly discussion; including history of patients chief complaint, available diagnostic results, and treatment course. Will admit the patient. 2:57 PM, 8/14/2019       Diagnosis     Clinical Impression:   1.  Chest pain, unspecified type        Disposition: ADMIT    Follow-up Information    None          Patient's Medications   Start Taking    No medications on file   Continue Taking    ACETAMINOPHEN (TYLENOL) 500 MG TABLET    Take 1 Tab by mouth every six (6) hours as needed for Pain or Fever. CHOLECALCIFEROL (VITAMIN D3) 1,000 UNIT TABLET    Take 5,000 Units by mouth daily. CYANOCOBALAMIN/FOLIC AC/VIT B6 (FOLIC ACID-VIT M6-RUO A91 PO)    Take 1 Tab by mouth daily. DICYCLOMINE (BENTYL) 10 MG CAPSULE    Take 1 Cap by mouth four (4) times daily as needed (abd cramps). DIPHENOXYLATE-ATROPINE (LOMOTIL) 2.5-0.025 MG PER TABLET    Take 1 Tab by mouth four (4) times daily as needed for Diarrhea. Max Daily Amount: 4 Tabs. GABAPENTIN (NEURONTIN) 300 MG CAPSULE    Take 1 Cap by mouth three (3) times daily. 12/22/16, QTY#90, 30 Days, 2 Refills. Dr. Yanira Odonnell    LISINOPRIL-HYDROCHLOROTHIAZIDE (PRINZIDE, ZESTORETIC) 20-25 MG PER TABLET    Take 1 Tab by mouth daily. METOPROLOL TARTRATE (LOPRESSOR) 50 MG TABLET    Take 1 Tab by mouth daily. OMEGA 3-DHA-EPA-FISH OIL (FISH OIL) 100-160-1,000 MG CAP    Take  by mouth. ONDANSETRON (ZOFRAN ODT) 4 MG DISINTEGRATING TABLET    Take 1 Tab by mouth every eight (8) hours as needed for Nausea. ONDANSETRON HCL (ZOFRAN) 4 MG TABLET    Take 2 Tabs by mouth every eight (8) hours as needed for Nausea. OTHER       These Medications have changed    No medications on file   Stop Taking    No medications on file     _______________________________    Please note that this dictation was completed with [employer], the [employer] voice recognition software. Quite often unanticipated grammatical, syntax, homophones, and other interpretive errors are inadvertently transcribed by the computer software. Please disregard these errors. Please excuse any errors that have escaped final proofreading.

## 2019-08-14 NOTE — ED NOTES
Bedside and Verbal shift change report given to Ree Hassan RN (oncoming nurse) by Luis Solorio RN   (offgoing nurse). Report included the following information SBAR, ED Summary, MAR and Recent Results.

## 2019-08-15 VITALS
SYSTOLIC BLOOD PRESSURE: 159 MMHG | TEMPERATURE: 97.5 F | HEART RATE: 75 BPM | DIASTOLIC BLOOD PRESSURE: 67 MMHG | OXYGEN SATURATION: 95 % | RESPIRATION RATE: 16 BRPM

## 2019-08-15 LAB
ATRIAL RATE: 52 BPM
CALCULATED P AXIS, ECG09: 60 DEGREES
CALCULATED R AXIS, ECG10: 2 DEGREES
CALCULATED T AXIS, ECG11: 82 DEGREES
CK MB CFR SERPL CALC: 2.2 % (ref 0–4)
CK MB SERPL-MCNC: 1.1 NG/ML (ref 5–25)
CK SERPL-CCNC: 51 U/L (ref 26–192)
DIAGNOSIS, 93000: NORMAL
GLUCOSE BLD STRIP.AUTO-MCNC: 96 MG/DL (ref 70–110)
P-R INTERVAL, ECG05: 162 MS
Q-T INTERVAL, ECG07: 456 MS
QRS DURATION, ECG06: 74 MS
QTC CALCULATION (BEZET), ECG08: 424 MS
TROPONIN I SERPL-MCNC: <0.02 NG/ML (ref 0–0.04)
VENTRICULAR RATE, ECG03: 52 BPM

## 2019-08-15 PROCEDURE — 82962 GLUCOSE BLOOD TEST: CPT

## 2019-08-15 PROCEDURE — 99218 HC RM OBSERVATION: CPT

## 2019-08-15 PROCEDURE — 74011250637 HC RX REV CODE- 250/637: Performed by: HOSPITALIST

## 2019-08-15 PROCEDURE — 36415 COLL VENOUS BLD VENIPUNCTURE: CPT

## 2019-08-15 PROCEDURE — 82550 ASSAY OF CK (CPK): CPT

## 2019-08-15 RX ORDER — NITROGLYCERIN 0.4 MG/1
0.4 TABLET SUBLINGUAL AS NEEDED
Status: DISCONTINUED | OUTPATIENT
Start: 2019-08-15 | End: 2019-08-15 | Stop reason: HOSPADM

## 2019-08-15 RX ORDER — NITROGLYCERIN 0.4 MG/1
0.4 TABLET SUBLINGUAL AS NEEDED
Qty: 20 TAB | Refills: 0 | Status: SHIPPED | OUTPATIENT
Start: 2019-08-15

## 2019-08-15 RX ADMIN — HYDROCHLOROTHIAZIDE 25 MG: 25 TABLET ORAL at 09:39

## 2019-08-15 RX ADMIN — METOPROLOL TARTRATE 50 MG: 50 TABLET ORAL at 09:39

## 2019-08-15 RX ADMIN — GABAPENTIN 300 MG: 100 CAPSULE ORAL at 09:39

## 2019-08-15 RX ADMIN — LISINOPRIL 20 MG: 10 TABLET ORAL at 09:39

## 2019-08-15 NOTE — PROGRESS NOTES
2320 - rec'd report and assumed care of pt from Xavi Renae, [address] - pt resting quietly in bed with eyes closed. NAD noted. Will follow. 0200 - pt continues to rest quietly in bed. WCTM.    0400 - NCP at  to obtain routine vitals. Pt sleeping b/n care but is pleasant at this time and denies chest pain. Will monitor. 0500 - per tele RN, pt heart rate noted negro down to 33 not sustained and returned almost immediately to a rate of 49. A check on pt reveals that she is resting quietly with even unlabored respirations NAD.   WCTM.     0630 - up to ambulate to bathroom with SBA

## 2019-08-15 NOTE — PROGRESS NOTES
Discharge instructions provided to pt on behalf for primary nurse Luis De La Vega. Prescription for nitro tablets e-signed to pt preferred [facility]. Follow up appointments reviewed. Pt spouse here to take her home.

## 2019-08-15 NOTE — PROGRESS NOTES
Problem: Discharge Planning  Goal: *Discharge to safe environment  Outcome: Resolved/Met     Home    Reason for Admission:   Chest pain               RRAT Score:  53                Resources/supports as identified by patient/family:   Spouse, daughter, ins., PCP                Top Challenges facing patient (as identified by patient/family and CM): Finances/Medication cost?   MCR/AARP ins                 Transportation? Family              Support system or lack thereof? Spouse, daughter                     Living arrangements? Spouse, daughter lives with them              Self-care/ADLs/Cognition? Independent          Current Advanced Directive/Advance Care Plan:  None, does not want to complete one @ this time                          Plan for utilizing home health:   Not indicated                  Transition of Care Plan:   Home with family support & out-pt follow up. Chart reviewed. Met with pt, verified all demographics. States has MCR/AARP ins. States Dr. Ines Dillard is her PCP, seen last month, has appt next week. NOK: Ramakrishna Seals, spouse, with whom she lives with & he will pick her up @ discharge. Dtr, Anila Densoncolleenrigo lives with pt/spouse. Has cane, no other DME. Independent with ADL's prior to admit. Will cont to follow for any needs. Tayler Riley RN,ext [phone]. Patient has designated her spouse & daughter to participate in his/her discharge plan and to receive any needed information. Name: Catalinohemal Seals / Anila Moore  Address:  Phone number:  [phone]    Patient and/or next of kin has been given and has signed the [facility] Outpatient Observation  Notification letter and all questions answered. Copy of this notice given to patient and copy placed on chart. Patient and/or next of kin has been given the Outpatient Observation Information and Notification letter and all questions answered.      Care Management Interventions  PCP Verified by CM: Yes( Judith Hatch, seen last month, has appt next week)  Palliative Care Criteria Met (RRAT>21 & CHF Dx)?: No  Mode of Transport at Discharge:  Other (see comment)(family)  Transition of Care Consult (CM Consult): Discharge Planning  Discharge Durable Medical Equipment: No  Physical Therapy Consult: No  Occupational Therapy Consult: No  Speech Therapy Consult: No  Current Support Network: Lives with Spouse  Confirm Follow Up Transport: Family  Plan discussed with Pt/Family/Caregiver: Yes  Discharge Location  Discharge Placement: Home

## 2019-08-15 NOTE — DISCHARGE SUMMARY
Discharge Summary    Patient: Sanjuana Quaker               Sex: female          DOA: 8/14/2019         YOB: 1935      Age:  81 y.o.        LOS:  LOS: 0 days                Admit Date: 8/14/2019    Discharge Date: 8/15/2019    Discharge Medications:     Discharge Medication List as of 8/15/2019  9:30 AM      START taking these medications    Details   nitroglycerin (NITROSTAT) 0.4 mg SL tablet 1 Tab by SubLINGual route as needed for Chest Pain. Up to 3 doses. , Normal, Disp-20 Tab, R-0         CONTINUE these medications which have NOT CHANGED    Details   ondansetron hcl (ZOFRAN) 4 mg tablet Take 2 Tabs by mouth every eight (8) hours as needed for Nausea. , Print, Disp-12 Tab, R-0      diphenoxylate-atropine (LOMOTIL) 2.5-0.025 mg per tablet Take 1 Tab by mouth four (4) times daily as needed for Diarrhea. Max Daily Amount: 4 Tabs., Print, Disp-20 Tab, R-0      acetaminophen (TYLENOL) 500 mg tablet Take 1 Tab by mouth every six (6) hours as needed for Pain or Fever., Print, Disp-40 Tab, R-0      dicyclomine (BENTYL) 10 mg capsule Take 1 Cap by mouth four (4) times daily as needed (abd cramps). , Print, Disp-20 Cap, R-0      ondansetron (ZOFRAN ODT) 4 mg disintegrating tablet Take 1 Tab by mouth every eight (8) hours as needed for Nausea. , Print, Disp-12 Tab, R-0      omega 3-dha-epa-fish oil (FISH OIL) 100-160-1,000 mg cap Take  by mouth., Historical Med      OTHER Historical Med      cyanocobalamin/folic ac/vit B6 (FOLIC ACID-VIT I3-KGD Y61 PO) Take 1 Tab by mouth daily. , Historical Med      metoprolol tartrate (LOPRESSOR) 50 mg tablet Take 1 Tab by mouth daily. , Print, Disp-30 Tab, R-0      gabapentin (NEURONTIN) 300 mg capsule Take 1 Cap by mouth three (3) times daily. 12/22/16, QTY#90, 30 Days, 2 Refills. Dr. Fabian Schwab, Historical Med, R-2      cholecalciferol (VITAMIN D3) 1,000 unit tablet Take 5,000 Units by mouth daily. , Historical Med      lisinopril-hydrochlorothiazide (PRINZIDE, ZESTORETIC) 20-25 mg per tablet Take 1 Tab by mouth daily. Historical Med, 1 Tab             Follow-up: PCP    Discharge Condition: Stable    Activity: Activity as tolerated    Diet: Cardiac Diet    Labs:  Labs: Results:       Chemistry Recent Labs     08/14/19  1315   GLU 90      K 3.5      CO2 23   BUN 31*   CREA 1.32*   CA 9.1   AGAP 10   BUCR 23*   AP 80   TP 7.3   ALB 3.8   GLOB 3.5   AGRAT 1.1      CBC w/Diff Recent Labs     08/14/19  1315   WBC 8.7   RBC 3.40*   HGB 9.3*   HCT 30.3*      GRANS 61   LYMPH 28   EOS 1      Cardiac Enzymes Recent Labs     08/15/19  0645   CPK 51   CKND1 2.2      Coagulation No results for input(s): PTP, INR, APTT in the last 72 hours. No lab exists for component: INREXT    Lipid Panel Lab Results   Component Value Date/Time    Cholesterol, total 162 05/06/2019 06:25 AM    HDL Cholesterol 45 05/06/2019 06:25 AM    LDL, calculated 85.4 05/06/2019 06:25 AM    VLDL, calculated 31.6 05/06/2019 06:25 AM    Triglyceride 158 (H) 05/06/2019 06:25 AM    CHOL/HDL Ratio 3.6 05/06/2019 06:25 AM      BNP No results for input(s): BNPP in the last 72 hours. Liver Enzymes Recent Labs     08/14/19  1315   TP 7.3   ALB 3.8   AP 80   SGOT 22      Thyroid Studies Lab Results   Component Value Date/Time    T4, Total 8.2 06/21/2012 07:50 AM    TSH 0.62 10/09/2018 11:10 PM          Imaging:    Xr Chest Port    Result Date: 8/14/2019  EXAM: CHEST RADIOGRAPH CLINICAL INDICATION/HISTORY: Chest pain   > Additional: None COMPARISON: 5/11/2019 TECHNIQUE: Portable frontal view of the chest _______________ FINDINGS: SUPPORT DEVICES: None. HEART AND MEDIASTINUM: Heart size normal. Atherosclerotic calcification seen in the aorta. LUNGS AND PLEURAL SPACES: No focal consolidation, effusion, or pneumothorax. BONES AND SOFT TISSUES: Degenerative changes in the shoulders and spine. _______________     IMPRESSION: No active cardiopulmonary disease.       Consults: Cardiology    Treatment Team: Treatment Team: Consulting Provider:  Umang Owusu PA-C; Consulting Provider: Chucky Gibbons MD; Utilization Review: Belkis Edwards, PATIENCE; Primary Nurse: Talia Stuart RN; Care Manager: Bhavana Graham    Significant Diagnostic Studies: labs:   Recent Results (from the past 24 hour(s))   TROPONIN I    Collection Time: 08/14/19  4:30 PM   Result Value Ref Range    Troponin-I, QT <0.02 0.0 - 0.045 NG/ML   EKG, 12 LEAD, SUBSEQUENT    Collection Time: 08/14/19  4:41 PM   Result Value Ref Range    Ventricular Rate 52 BPM    Atrial Rate 52 BPM    P-R Interval 162 ms    QRS Duration 74 ms    Q-T Interval 456 ms    QTC Calculation (Bezet) 424 ms    Calculated P Axis 60 degrees    Calculated R Axis 2 degrees    Calculated T Axis 82 degrees    Diagnosis       Sinus bradycardia with premature atrial complexes with aberrant conduction  Cannot rule out Anteroseptal infarct (cited on or before 14-AUG-2019)  Abnormal ECG  When compared with ECG of 14-AUG-2019 13:15,  aberrant conduction is now present  Nonspecific T wave abnormality, worse in Lateral leads  Confirmed by Sridevi Baxter ([id number]) on 8/15/2019 10:08:51 AM     GLUCOSE, POC    Collection Time: 08/14/19  6:13 PM   Result Value Ref Range    Glucose (POC) 97 70 - 110 mg/dL   TROPONIN I    Collection Time: 08/14/19  9:20 PM   Result Value Ref Range    Troponin-I, QT <0.02 0.0 - 0.045 NG/ML   GLUCOSE, POC    Collection Time: 08/14/19 10:41 PM   Result Value Ref Range    Glucose (POC) 112 (H) 70 - 110 mg/dL   CARDIAC PANEL,(CK, CKMB & TROPONIN)    Collection Time: 08/15/19  6:45 AM   Result Value Ref Range    CK 51 26 - 192 U/L    CK - MB 1.1 <3.6 ng/ml    CK-MB Index 2.2 0.0 - 4.0 %    Troponin-I, QT <0.02 0.0 - 0.045 NG/ML   GLUCOSE, POC    Collection Time: 08/15/19  8:18 AM   Result Value Ref Range    Glucose (POC) 96 70 - 110 mg/dL         Discharge diagnoses:    Problem List as of 8/15/2019 Date Reviewed: 8/13/2019          Codes Class Noted - Resolved Chest pain ICD-10-CM: R07.9  ICD-9-CM: 786.50  8/14/2019 - Present        Nausea and vomiting ICD-10-CM: R11.2  ICD-9-CM: 787.01  8/13/2019 - Present        Chest pain at rest ICD-10-CM: R07.9  ICD-9-CM: 786.50  5/5/2019 - Present        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) ICD-10-CM: N11.226  ICD-9-CM: 707.13  1/3/2019 - Present        Cellulitis ICD-10-CM: L03.90  ICD-9-CM: 682.9  10/10/2018 - Present        Personal history of noncompliance with medical treatment, presenting hazards to health ICD-10-CM: Z91.19  ICD-9-CM: V15.81  5/31/2018 - Present        Contact dermatitis due to adhesive bandage ICD-10-CM: L25.8  ICD-9-CM: 692.89  5/31/2018 - Present        Midfoot ulcer, left, with fat layer exposed ([facility] ICD-10-CM: Q12.650  ICD-9-CM: 707.14  5/17/2018 - Present        Hypertensive left ventricular hypertrophy, without heart failure ICD-10-CM: I11.9  ICD-9-CM: 402.90  1/15/2018 - Present        Atrial arrhythmia ICD-10-CM: I49.8  ICD-9-CM: 427.9  1/15/2018 - Present    Overview Signed 1/15/2018  4:08 PM by Luca Mohamud MD     Noted on 12 lead ECG 12/3/2017             Type 2 diabetes mellitus with nephropathy ([facility] ICD-10-CM: E11.21  ICD-9-CM: 250.40, 583.81  1/3/2018 - Present        Type 2 diabetes mellitus with diabetic neuropathy ([facility] ICD-10-CM: E11.40  ICD-9-CM: 250.60, 357.2  1/3/2018 - Present        Vascular disease, peripheral ([facility] ICD-10-CM: I73.9  ICD-9-CM: 443.9  12/7/2017 - Present        Non-pressure chronic ulcer of right ankle with fat layer exposed ([facility] ICD-10-CM: J31.960  ICD-9-CM: 707.13  12/7/2017 - Present        Syncope ICD-10-CM: R55  ICD-9-CM: 780.2  12/3/2017 - Present        Skin ulcer of left ankle, with fat layer exposed ([facility] ICD-10-CM: D01.019  ICD-9-CM: 707.13  11/16/2017 - Present        Venous ulcer of ankle, left (HCC) ICD-10-CM: D05.643, L97.329  ICD-9-CM: 454.0  11/16/2017 - Present        Weak pulse ICD-10-CM: R09.89  ICD-9-CM: 785.9  11/16/2017 - Present        UTI (urinary tract infection) ICD-10-CM: N39.0  ICD-9-CM: 599.0  11/6/2017 - Present        Encephalopathy ICD-10-CM: G93.40  ICD-9-CM: 348.30  11/6/2017 - Present        LAURA (acute kidney injury) ([facility] ICD-10-CM: N17.9  ICD-9-CM: 584.9  11/6/2017 - Present        Iron (Fe) deficiency anemia ICD-10-CM: D50.9  ICD-9-CM: 280.9  2/28/2017 - Present        Normocytic anemia ICD-10-CM: D64.9  ICD-9-CM: 285. 9  Unknown - Present        Stroke ([facility] ICD-10-CM: I63.9  ICD-9-CM: 434.91  Unknown - Present        Hypertension ICD-10-CM: I10  ICD-9-CM: 401.9  Unknown - Present        DM (diabetes mellitus) ([facility] ICD-10-CM: E11.9  ICD-9-CM: 250.00  Unknown - Present        Type 2 diabetes mellitus ([facility] ICD-10-CM: E11.9  ICD-9-CM: 250.00  8/8/2013 - Present        CVA, old, hemiparesis ([facility] ICD-10-CM: T49.878  ICD-9-CM: 438.20  8/7/2013 - Present        Acute on chronic renal insufficiency ICD-10-CM: N28.9, N18.9  ICD-9-CM: 593.9, 585.9  7/11/2013 - Present    Overview Signed 2/2/2017 11:39 PM by [employer]     Overview:    7/8/13 7/10/13  Cr  1.2 1.5             Leukocytosis ICD-10-CM: S52.580  ICD-9-CM: 288.60  7/11/2013 - Present    Overview Signed 2/2/2017 11:39 PM by [employer]     Overview:   WBC 11.4  P 86             Microscopic hematuria ICD-10-CM: R31.29  ICD-9-CM: 599.72  7/11/2013 - Present        Rhabdomyolysis ICD-10-CM: M62.82  ICD-9-CM: 728.88  7/11/2013 - Present    Overview Signed 2/2/2017 11:39 PM by Mark Ceballos     Overview:   Ck  930, 5096             Injury of foot ICD-10-CM: H41.291T  ICD-9-CM: 959.7  7/10/2013 - Present        CKD (chronic kidney disease) stage 3, GFR 30-59 ml/min ([facility]) ICD-10-CM: N18.3  ICD-9-CM: 585.3  6/24/2013 - Present    Overview Signed 2/3/2017 12:25 AM by Damir Monzon noted (08/06/13) BUN/sCr: 54/2.49, GFR 20.               Abnormal finding on thyroid function test ICD-10-CM: R94.6  ICD-9-CM: 794.5  6/11/2013 - Present Anemia ICD-10-CM: D64.9  ICD-9-CM: 285.9  6/11/2013 - Present        Chronic osteomyelitis of ankle ([facility] ICD-10-CM: T71.907  ICD-9-CM: 730.17  6/11/2013 - Present        Spinal stenosis of lumbar region ICD-10-CM: M48.061  ICD-9-CM: 724.02  6/11/2013 - Present        Vitamin D deficiency ICD-10-CM: E55.9  ICD-9-CM: 268.9  6/11/2013 - Present        Elevated TSH ICD-10-CM: R79.89  ICD-9-CM: 794.5  6/25/2012 - Present    Overview Signed 2/3/2017 12:29 AM by Mark Ceballos     5.08              RESOLVED: ARF (acute renal failure) ([facility] ICD-10-CM: N17.9  ICD-9-CM: 584.9  8/7/2013 - 8/8/2013        RESOLVED: Dehydration ICD-10-CM: E86.0  ICD-9-CM: 276.51  8/7/2013 - 8/8/2013        RESOLVED: Altered mental status ICD-10-CM: R41.82  ICD-9-CM: 780.97  8/7/2013 - 8/8/2013        RESOLVED: Confusion ICD-10-CM: R41.0  ICD-9-CM: 298.9  8/7/2013 - 8/8/2013        RESOLVED: Metabolic encephalopathy OUW-32-AB: G93.41  ICD-9-CM: 348.31  8/7/2013 - 8/8/2013            Hospital Course:   Major issues addressed during hospitalization outlined  below. Garima Bullard is a 81y.o. year old female who presents with  Chest pain. Started this am, intermittent , not associated with radiation N/V/diaphoresis. She has had this pain before. No CAD but history of risk factors. Cards consulted in ED    Patient's trops remained normal.  Patient did not complain of CP on day of discharge. She should f/u with pcp in 1 week.     Marilynn Menon MD  August 15, 2019        Total time spent 35 minutes

## 2019-08-15 NOTE — PROGRESS NOTES
Problem: Falls - Risk of  Goal: *Absence of Falls  Description  Document Araceli Tamayo Fall Risk and appropriate interventions in the flowsheet.   Outcome: Progressing Towards Goal  Note:   Fall Risk Interventions:  Mobility Interventions: Bed/chair exit alarm         Medication Interventions: Evaluate medications/consider consulting pharmacy    Elimination Interventions: Call light in reach              Problem: Patient Education: Go to Patient Education Activity  Goal: Patient/Family Education  Outcome: Progressing Towards Goal     Problem: General Medical Care Plan  Goal: *Vital signs within specified parameters  Outcome: Progressing Towards Goal  Goal: *Labs within defined limits  Outcome: Progressing Towards Goal  Goal: *Absence of infection signs and symptoms  Outcome: Progressing Towards Goal  Goal: *Optimal pain control at patient's stated goal  Outcome: Progressing Towards Goal  Goal: *Skin integrity maintained  Outcome: Progressing Towards Goal  Goal: *Fluid volume balance  Outcome: Progressing Towards Goal  Goal: *Optimize nutritional status  Outcome: Progressing Towards Goal  Goal: *Anxiety reduced or absent  Outcome: Progressing Towards Goal  Goal: *Progressive mobility and function (eg: ADL's)  Outcome: Progressing Towards Goal     Problem: Patient Education: Go to Patient Education Activity  Goal: Patient/Family Education  Outcome: Progressing Towards Goal

## 2019-08-15 NOTE — PROGRESS NOTES
[facility] - Progress Note  Admit Date: 8/14/2019  Patient seen and examined independently. Admitted with atypical chest pain and recent normal nuclear study. Cardiac biomarkers are negative. No further cardiac evaluation is recommended at this time. Agree with assessment and plan as noted below. Kiya Scott MD  Assessment:     Hospital Problems  Date Reviewed: 8/13/2019          Codes Class Noted POA    Chest pain ICD-10-CM: R07.9  ICD-9-CM: 786.50  8/14/2019 Unknown               - Chest pain, cardiac enzymes negative x2, no acute EKG changes  - Hypertension  - History of TIA's  - Diabetes  - CKD      Plan:     - Patient with negative cardiac biomarkers x4, improvement in chest pain. No further cardiac workup. - Will prescribe . 4 mg SL NTG for CP PRN, instructions given to patient. Would appreciate if primary team could please include at discharge. - Will arrange for office follow up    Subjective:     No new complaints. No further chest pain overnight. Objective:      Patient Vitals for the past 8 hrs:   Temp Pulse Resp BP SpO2   08/15/19 0356 97.5 °F (36.4 °C) 65 16 118/46 97 %         No data found.                  Intake/Output Summary (Last 24 hours) at 8/15/2019 0825  Last data filed at 8/15/2019 0138  Gross per 24 hour   Intake 240 ml   Output 500 ml   Net -260 ml       Physical Exam:  General:  alert, cooperative, no distress  Neck:  nontender, no JVD  Lungs:  clear to auscultation bilaterally  Heart:  regular rate and rhythm, S1, S2 normal, no murmur, click, rub or gallop  Abdomen:  abdomen is soft without significant tenderness, masses, organomegaly or guarding  Extremities:  extremities normal, atraumatic, no cyanosis or edema    Data Review:     Labs: Results:       Chemistry Recent Labs     08/14/19  1315   GLU 90      K 3.5      CO2 23   BUN 31*   CREA 1.32*   CA 9.1   MG 2.2   AGAP 10   BUCR 23*   AP 80   TP 7.3   ALB 3.8   GLOB 3.5   AGRAT 1.1      CBC w/Diff Recent Labs     08/14/19  1315   WBC 8.7   RBC 3.40*   HGB 9.3*   HCT 30.3*      GRANS 61   LYMPH 28   EOS 1      Cardiac Enzymes Lab Results   Component Value Date/Time    CPK 51 08/15/2019 06:45 AM    CKMB 1.1 08/15/2019 06:45 AM    CKND1 2.2 08/15/2019 06:45 AM    TROIQ <0.02 08/15/2019 06:45 AM    TROIQ <0.02 08/14/2019 09:20 PM    TROIQ <0.02 08/14/2019 04:30 PM    TROIQ <0.02 08/14/2019 01:15 PM      Coagulation No results for input(s): PTP, INR, APTT in the last 72 hours. No lab exists for component: INREXT    Lipid Panel Lab Results   Component Value Date/Time    Cholesterol, total 162 05/06/2019 06:25 AM    HDL Cholesterol 45 05/06/2019 06:25 AM    LDL, calculated 85.4 05/06/2019 06:25 AM    VLDL, calculated 31.6 05/06/2019 06:25 AM    Triglyceride 158 (H) 05/06/2019 06:25 AM    CHOL/HDL Ratio 3.6 05/06/2019 06:25 AM      BNP No results found for: BNP, BNPP, XBNPT   Liver Enzymes Recent Labs     08/14/19  1315   TP 7.3   ALB 3.8   AP 80   SGOT 22      Digoxin    Thyroid Studies Lab Results   Component Value Date/Time    T4, Total 8.2 06/21/2012 07:50 AM    TSH 0.62 10/09/2018 11:10 PM          Signed By: Tony Cabrales     August 15, 2019

## 2019-08-15 NOTE — PROGRESS NOTES
conducted an initial consultation and Spiritual Assessment for Attila Lezama, who is a 81 y.o.,female. Patients Primary Language is: [place]. According to the patients EMR Evangelical Affiliation is: [place].      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 ([address] 01/03/2019    Cellulitis 10/10/2018    Personal history of noncompliance with medical treatment, presenting hazards to health 05/31/2018    Contact dermatitis due to adhesive bandage 05/31/2018    Midfoot ulcer, left, with fat layer exposed ([address] 05/17/2018    Hypertensive left ventricular hypertrophy, without heart failure 01/15/2018    Atrial arrhythmia 01/15/2018    Type 2 diabetes mellitus with nephropathy ([address] 01/03/2018    Type 2 diabetes mellitus with diabetic neuropathy ([address] 01/03/2018    Vascular disease, peripheral ([address] 12/07/2017    Non-pressure chronic ulcer of right ankle with fat layer exposed ([address] 12/07/2017    Syncope 12/03/2017    Skin ulcer of left ankle, with fat layer exposed ([address] 11/16/2017    Venous ulcer of ankle, left ([facility]) 11/16/2017    Weak pulse 11/16/2017    UTI (urinary tract infection) 11/06/2017    Encephalopathy 11/06/2017    LAURA (acute kidney injury) ([address] 11/06/2017    Iron (Fe) deficiency anemia 02/28/2017    Normocytic anemia     Stroke ([address]     Hypertension     DM (diabetes mellitus) ([address]     Type 2 diabetes mellitus ([address] 08/08/2013    CVA, old, hemiparesis ([address] 08/07/2013    Acute on chronic renal insufficiency 07/11/2013    Leukocytosis 07/11/2013    Microscopic hematuria 07/11/2013    Rhabdomyolysis 07/11/2013    Injury of foot 07/10/2013    CKD (chronic kidney disease) stage 3, GFR 30-59 ml/min ([facility]) 06/24/2013    Abnormal finding on thyroid function test 06/11/2013    Anemia 06/11/2013    Chronic osteomyelitis of ankle ([address] 06/11/2013    Spinal stenosis of lumbar region 06/11/2013    Vitamin D deficiency 06/11/2013    Elevated TSH 06/25/2012        The  provided the following Interventions:  Initiated a relationship of care and support with patient in room 3003 this morning. Listened empathically as patient  Talked about her being here once again with many types of issues. Today being chest pains but she said that all appears well and she may go home this afternoon. Provided information about Spiritual Care Services. Offered prayer and assurance of continued prayers on patients behalf. The following outcomes were achieved:  Patient shared limited information about her medical narrative and spiritual journey/beliefs. Patient processed feeling about current hospitalization. Patient expressed gratitude for pastoral care visit. Assessment:  Patient does not have any Scientology/cultural needs that will affect patients preferences in health care. There are no further spiritual or Scientology issues which require Spiritual Care Services interventions at this time. Plan:  Chaplains will continue to follow and will provide pastoral care on an as needed/requested basis    . Talon Bender   Spiritual Care   [phone]

## 2019-08-15 NOTE — DISCHARGE INSTRUCTIONS
DISCHARGE SUMMARY from Nurse    PATIENT INSTRUCTIONS:    After general anesthesia or intravenous sedation, for 24 hours or while taking prescription Narcotics:  · Limit your activities  · Do not drive and operate hazardous machinery  · Do not make important personal or business decisions  · Do  not drink alcoholic beverages  · If you have not urinated within 8 hours after discharge, please contact your surgeon on call. Report the following to your surgeon:  · Excessive pain, swelling, redness or odor of or around the surgical area  · Temperature over 100.5  · Nausea and vomiting lasting longer than 4 hours or if unable to take medications  · Any signs of decreased circulation or nerve impairment to extremity: change in color, persistent  numbness, tingling, coldness or increase pain  · Any questions    What to do at Home:  Recommended activity: Activity as tolerated    If you experience any of the following symptoms chest pain, palpitations, or worsening shortness of breath, please follow up with emergency room/cardiologist/primary care physician. *  Please give a list of your current medications to your Primary Care Provider. *  Please update this list whenever your medications are discontinued, doses are      changed, or new medications (including over-the-counter products) are added. *  Please carry medication information at all times in case of emergency situations. These are general instructions for a healthy lifestyle:    No smoking/ No tobacco products/ Avoid exposure to second hand smoke  Surgeon General's Warning:  Quitting smoking now greatly reduces serious risk to your health.     Obesity, smoking, and sedentary lifestyle greatly increases your risk for illness    A healthy diet, regular physical exercise & weight monitoring are important for maintaining a healthy lifestyle    You may be retaining fluid if you have a history of heart failure or if you experience any of the following symptoms:  Weight gain of 3 pounds or more overnight or 5 pounds in a week, increased swelling in our hands or feet or shortness of breath while lying flat in bed. Please call your doctor as soon as you notice any of these symptoms; do not wait until your next office visit. The discharge information has been reviewed with the patient. The patient verbalized understanding. Discharge medications reviewed with the patient and appropriate educational materials and side effects teaching were provided. Patient armband removed and shredded.

## 2019-08-15 NOTE — ROUTINE PROCESS
0700 Bedside, Verbal and Written shift change report given to [address] [place] [address],[address] (oncoming nurse) by Mingo Servin RN (offgoing nurse). Report included the following information SBAR and Kardex. Patient currently resting in bed, alert and oriented to all spheres, denies pain or shortness of breath, call bell in reach, 5 Ps and hourly rounding completed, bed locked in low position 0935 due meds given

## 2019-08-15 NOTE — PROGRESS NOTES
Informed by tele tech that pt's HR in the 40's-50's but mostly maintaining in the 40's with frequent PVC's and PAC's in the past hour. Per ED nurse who gave SBAR, pt had been sinus engro there as well and as low as 43 but mostly in 50's. Assessed pt and found pt resting quietly in bed without complaints of discomfort.  paged and received call back from his physician assistant, Ty Carvajal. Received order for magnesium level now.

## 2019-08-15 NOTE — ROUTINE PROCESS
Bedside and Verbal shift change report given to [address] (oncoming nurse) by Ewelina godfrey. Report included the following information SBAR, Kardex and MAR.

## 2019-08-22 ENCOUNTER — HOSPITAL ENCOUNTER (OUTPATIENT)
Dept: ULTRASOUND IMAGING | Age: 84
Discharge: HOME | End: 2019-08-22
Attending: INTERNAL MEDICINE
Payer: MEDICARE

## 2019-08-22 DIAGNOSIS — R11.0 NAUSEA: ICD-10-CM

## 2019-08-22 PROCEDURE — 76700 US EXAM ABDOM COMPLETE: CPT

## 2019-09-09 ENCOUNTER — HOSPITAL ENCOUNTER (OUTPATIENT)
Dept: GENERAL RADIOLOGY | Age: 84
Discharge: HOME | End: 2019-09-09
Attending: INTERNAL MEDICINE
Payer: MEDICARE

## 2019-09-09 DIAGNOSIS — R11.0 NAUSEA: ICD-10-CM

## 2019-09-09 PROCEDURE — 74011000255 HC RX REV CODE- 255: Performed by: INTERNAL MEDICINE

## 2019-09-09 PROCEDURE — 74250 X-RAY XM SM INT 1CNTRST STD: CPT

## 2019-09-09 RX ADMIN — BARIUM SULFATE 600 ML: 240 SUSPENSION ORAL at 08:58

## 2019-09-09 RX ADMIN — BARIUM SULFATE 600 ML: 240 SUSPENSION ORAL at 09:00

## 2019-09-09 RX ADMIN — BARIUM SULFATE 600 ML: 240 SUSPENSION ORAL at 10:10

## 2019-10-14 ENCOUNTER — HOSPITAL ENCOUNTER (OUTPATIENT)
Dept: MAMMOGRAPHY | Age: 84
Discharge: HOME | End: 2019-10-14
Attending: INTERNAL MEDICINE
Payer: MEDICARE

## 2019-10-14 DIAGNOSIS — Z12.39 SCREENING FOR BREAST CANCER: ICD-10-CM

## 2019-10-14 PROCEDURE — 77063 BREAST TOMOSYNTHESIS BI: CPT

## 2019-10-22 ENCOUNTER — APPOINTMENT (OUTPATIENT)
Dept: GENERAL RADIOLOGY | Age: 84
End: 2019-10-22
Attending: EMERGENCY MEDICINE
Payer: MEDICARE

## 2019-10-22 ENCOUNTER — HOSPITAL ENCOUNTER (EMERGENCY)
Age: 84
Discharge: HOME | End: 2019-10-23
Attending: EMERGENCY MEDICINE
Payer: MEDICARE

## 2019-10-22 DIAGNOSIS — R07.9 CHEST PAIN, UNSPECIFIED TYPE: ICD-10-CM

## 2019-10-22 DIAGNOSIS — R00.2 PALPITATIONS: Primary | ICD-10-CM

## 2019-10-22 LAB
ALBUMIN SERPL-MCNC: 3.7 G/DL (ref 3.4–5)
ALBUMIN/GLOB SERPL: 1.1 {RATIO} (ref 0.8–1.7)
ALP SERPL-CCNC: 86 U/L (ref 45–117)
ALT SERPL-CCNC: 12 U/L (ref 13–56)
ANION GAP SERPL CALC-SCNC: 11 MMOL/L (ref 3–18)
AST SERPL-CCNC: 12 U/L (ref 10–38)
BASOPHILS # BLD: 0 K/UL (ref 0–0.1)
BASOPHILS NFR BLD: 0 % (ref 0–2)
BILIRUB SERPL-MCNC: 0.5 MG/DL (ref 0.2–1)
BUN SERPL-MCNC: 26 MG/DL (ref 7–18)
BUN/CREAT SERPL: 20 (ref 12–20)
CALCIUM SERPL-MCNC: 9.2 MG/DL (ref 8.5–10.1)
CHLORIDE SERPL-SCNC: 108 MMOL/L (ref 100–111)
CO2 SERPL-SCNC: 23 MMOL/L (ref 21–32)
CREAT SERPL-MCNC: 1.3 MG/DL (ref 0.6–1.3)
DIFFERENTIAL METHOD BLD: ABNORMAL
EOSINOPHIL # BLD: 0.1 K/UL (ref 0–0.4)
EOSINOPHIL NFR BLD: 1 % (ref 0–5)
ERYTHROCYTE [DISTWIDTH] IN BLOOD BY AUTOMATED COUNT: 14.9 % (ref 11.6–14.5)
GLOBULIN SER CALC-MCNC: 3.3 G/DL (ref 2–4)
GLUCOSE SERPL-MCNC: 105 MG/DL (ref 74–99)
HCT VFR BLD AUTO: 33.1 % (ref 35–45)
HGB BLD-MCNC: 10.6 G/DL (ref 12–16)
LIPASE SERPL-CCNC: 218 U/L (ref 73–393)
LYMPHOCYTES # BLD: 2.6 K/UL (ref 0.9–3.6)
LYMPHOCYTES NFR BLD: 27 % (ref 21–52)
MAGNESIUM SERPL-MCNC: 1.9 MG/DL (ref 1.6–2.6)
MCH RBC QN AUTO: 27.3 PG (ref 24–34)
MCHC RBC AUTO-ENTMCNC: 32 G/DL (ref 31–37)
MCV RBC AUTO: 85.3 FL (ref 74–97)
MONOCYTES # BLD: 0.8 K/UL (ref 0.05–1.2)
MONOCYTES NFR BLD: 8 % (ref 3–10)
NEUTS SEG # BLD: 6.3 K/UL (ref 1.8–8)
NEUTS SEG NFR BLD: 64 % (ref 40–73)
PLATELET # BLD AUTO: 232 K/UL (ref 135–420)
PMV BLD AUTO: 11.8 FL (ref 9.2–11.8)
POTASSIUM SERPL-SCNC: 3.6 MMOL/L (ref 3.5–5.5)
PROT SERPL-MCNC: 7 G/DL (ref 6.4–8.2)
RBC # BLD AUTO: 3.88 M/UL (ref 4.2–5.3)
SODIUM SERPL-SCNC: 142 MMOL/L (ref 136–145)
TROPONIN I SERPL-MCNC: <0.02 NG/ML (ref 0–0.04)
TSH SERPL DL<=0.05 MIU/L-ACNC: 1.11 UIU/ML (ref 0.36–3.74)
WBC # BLD AUTO: 9.9 K/UL (ref 4.6–13.2)

## 2019-10-22 PROCEDURE — 71045 X-RAY EXAM CHEST 1 VIEW: CPT

## 2019-10-22 PROCEDURE — 84443 ASSAY THYROID STIM HORMONE: CPT

## 2019-10-22 PROCEDURE — 93005 ELECTROCARDIOGRAM TRACING: CPT

## 2019-10-22 PROCEDURE — 99285 EMERGENCY DEPT VISIT HI MDM: CPT

## 2019-10-22 PROCEDURE — 83690 ASSAY OF LIPASE: CPT

## 2019-10-22 PROCEDURE — 80053 COMPREHEN METABOLIC PANEL: CPT

## 2019-10-22 PROCEDURE — 84484 ASSAY OF TROPONIN QUANT: CPT

## 2019-10-22 PROCEDURE — 83735 ASSAY OF MAGNESIUM: CPT

## 2019-10-22 PROCEDURE — 85025 COMPLETE CBC W/AUTO DIFF WBC: CPT

## 2019-10-23 VITALS
BODY MASS INDEX: 23.04 KG/M2 | OXYGEN SATURATION: 98 % | TEMPERATURE: 97.5 F | RESPIRATION RATE: 15 BRPM | SYSTOLIC BLOOD PRESSURE: 121 MMHG | WEIGHT: 130 LBS | DIASTOLIC BLOOD PRESSURE: 30 MMHG | HEIGHT: 63 IN | HEART RATE: 69 BPM

## 2019-10-23 LAB
ATRIAL RATE: 84 BPM
CALCULATED P AXIS, ECG09: 81 DEGREES
CALCULATED R AXIS, ECG10: 51 DEGREES
CALCULATED T AXIS, ECG11: 75 DEGREES
DIAGNOSIS, 93000: NORMAL
P-R INTERVAL, ECG05: 158 MS
Q-T INTERVAL, ECG07: 360 MS
QRS DURATION, ECG06: 76 MS
QTC CALCULATION (BEZET), ECG08: 425 MS
TROPONIN I SERPL-MCNC: <0.02 NG/ML (ref 0–0.04)
VENTRICULAR RATE, ECG03: 84 BPM

## 2019-10-23 NOTE — ED PROVIDER NOTES
EMERGENCY DEPARTMENT HISTORY AND PHYSICAL EXAM    9:03 PM      Date: 10/22/2019  Patient Name: Carlos Avilez    History of Presenting Illness     Chief Complaint   Patient presents with    Palpitations         HISTORY: Carlos Avilez is a 81 y.o. female with medical history as listed below who presents with palpitations while eating dinner this evening at 7 PM.  She states her heart was flipping and flopping and she felt pressure. Nothing made it better including her trying one sublingual nitroglycerin. Nothing made it worse. There is no radiation to any discomfort. She denies shortness of breath. She has had recent issues with nausea and vomiting and is following GI for this. Denies that the pain was similar to when she was admitted in August.  She had an admission in August overnight for cardiac evaluation. She has had a stress test this year that was low risk. She denies recent illness with fever, cough, congestion, abdominal pain, dysuria. PCP: Chanell Zarate MD    Current Outpatient Medications   Medication Sig Dispense Refill    nitroglycerin (NITROSTAT) 0.4 mg SL tablet 1 Tab by SubLINGual route as needed for Chest Pain. Up to 3 doses. 20 Tab 0    ondansetron hcl (ZOFRAN) 4 mg tablet Take 2 Tabs by mouth every eight (8) hours as needed for Nausea. 12 Tab 0    diphenoxylate-atropine (LOMOTIL) 2.5-0.025 mg per tablet Take 1 Tab by mouth four (4) times daily as needed for Diarrhea. Max Daily Amount: 4 Tabs. 20 Tab 0    acetaminophen (TYLENOL) 500 mg tablet Take 1 Tab by mouth every six (6) hours as needed for Pain or Fever. 40 Tab 0    dicyclomine (BENTYL) 10 mg capsule Take 1 Cap by mouth four (4) times daily as needed (abd cramps). 20 Cap 0    ondansetron (ZOFRAN ODT) 4 mg disintegrating tablet Take 1 Tab by mouth every eight (8) hours as needed for Nausea. 12 Tab 0    omega 3-dha-epa-fish oil (FISH OIL) 100-160-1,000 mg cap Take  by mouth.       OTHER       cyanocobalamin/folic ac/vit B6 (FOLIC ACID-VIT W6-IQF C75 PO) Take 1 Tab by mouth daily.  metoprolol tartrate (LOPRESSOR) 50 mg tablet Take 1 Tab by mouth daily. 30 Tab 0    gabapentin (NEURONTIN) 300 mg capsule Take 1 Cap by mouth three (3) times daily. 12/22/16, QTY#90, 30 Days, 2 Refills. Dr. Grisel Ireland  2    cholecalciferol (VITAMIN D3) 1,000 unit tablet Take 5,000 Units by mouth daily.  lisinopril-hydrochlorothiazide (PRINZIDE, ZESTORETIC) 20-25 mg per tablet Take 1 Tab by mouth daily. Past History     Past Medical History:  Past Medical History:   Diagnosis Date    Chronic kidney disease     [facility] osteomyelitis of ankle ([facility] [address]     DM (diabetes mellitus) ([facility] [address]     Hypertension     Lumbar spinal stenosis     Normocytic anemia     Rhabdomyolysis     Splenomegaly     Stroke ([facility] [address]     TIA x2    Vitamin D deficiency        Past Surgical History:  Past Surgical History:   Procedure Laterality Date    COLONOSCOPY N/A 3/1/2017    COLONOSCOPY performed by [facility] Bates [facility] at [facility] ENDOSCOPY    HX CHOLECYSTECTOMY      HX FREE SKIN GRAFT      HX HYSTERECTOMY      HX OOPHORECTOMY      HX OPEN REDUCTION INTERNAL FIXATION Left     Tx LLE Ankle Trimalleolar Fx by Dr. Gene Villaf[facility]te [facility])    HX ORTHOPAEDIC Left 06/11/2013    [address] - LLE Ankle Removal of Hardware     HX VEIN STRIPPING Bilateral 09/18/2013    Dr. Dang Pablo 20 SQ CM<         Family History:  Family History   Problem Relation Age of Onset    Diabetes Other     Hypertension Other        Social History:  Social History     Tobacco Use    Smoking status: Never Smoker    Smokeless tobacco: Never Used   Substance Use Topics    Alcohol use: No    Drug use: No       Allergies:  No Known Allergies      Review of Systems       Review of Systems   Constitutional: Negative for chills. HENT: Negative for congestion and sore throat.     Respiratory: Negative for cough and shortness of breath. Cardiovascular: Positive for chest pain and palpitations. Gastrointestinal: Positive for nausea and vomiting. Negative for abdominal pain and diarrhea. Genitourinary: Negative for dysuria. Musculoskeletal: Negative for back pain. Skin: Negative for rash. Neurological: Negative for dizziness and headaches. All other systems reviewed and are negative. Physical Exam     Visit Vitals  /74 (BP 1 Location: Right arm, BP Patient Position: At rest)   Pulse 84   Temp 97.5 °F (36.4 °C)   Resp 25   Ht 5' 3\" (1.6 m)   Wt 59 kg (130 lb)   SpO2 100%   BMI 23.03 kg/m²     Physical Exam  General Exam: Patient is a well developed and well nourished in no distress. Patient does not appear acutely ill or toxic. Elderly, frail appearing  Eye Exam: Lids and conjunctiva are normal  ENT Exam: The general head and facial exam is normal.  The neck is supple without meningeal signs. No significant adenopathy. Pulmonary Exam: No respiratory distress. The respiratory rate is normal.  No stridor. The breath sounds are equal bilaterally. There are no wheezes, rales, or rhonchi noted. Cardiac Exam: The cardiac rate and rhythm are normal.  No significant murmurs, rubs, or gallops. The peripheral pulses of the upper extremities are normal.  Abdominal Exam: Abdomen is soft and non-distended. No pulsatile masses. There is no local tenderness. There is no rebound or guarding noted. Skin and Soft Tissue: The skin is warm and dry, without significant abnormality. Good color. Musculoskeletal Exam: No peripheral edema. The musculoskeletal exam of the lower extremities is normal without significant local tenderness. Psychiatric: Normal adult with appropriate demeanor and interpersonal interaction. Is oriented to person, place, and time. Diagnostic Study Results     Labs -  No results found for this or any previous visit (from the past 12 hour(s)).     Radiologic Studies -   XR CHEST PORT    (Results Pending)         Medical Decision Making   I am the first provider for this patient. I reviewed the vital signs, available nursing notes, past medical history, past surgical history, family history and social history. Vital Signs-Reviewed the patient's vital signs. EKG: Interpreted by the EP. EKG performed at 2053. Interpretation rate 84, NSR with PVC, no STEMI    Records Reviewed: Nursing Notes (Time of Review: 9:03 PM)    ED Course: Progress Notes, Reevaluation, and Consults:      Provider Notes (Medical Decision Making): Patient presenting for evaluation of chest pain in setting of having palpitations. Upon arrival to the emergency department she is pain-free no longer has any palpitations. No other symptoms to suggest a pulmonary embolism. EKG is reassuring without signs of acute ischemia. Troponin is negative. Patient remained chest pain-free. Remainder of her labs were normal.  Repeat troponin is also negative. Do not think she needs admission at this time for her symptoms as they have resolved and seem very atypical for ACS. Doubt a dissection. Patient is comfortable with plan for discharge and follow-up with her primary care doctor. Diagnosis     Clinical Impression:   1. Palpitations    2. Chest pain, unspecified type        Disposition: DC    Follow-up Information    None          Patient's Medications   Start Taking    No medications on file   Continue Taking    ACETAMINOPHEN (TYLENOL) 500 MG TABLET    Take 1 Tab by mouth every six (6) hours as needed for Pain or Fever. CHOLECALCIFEROL (VITAMIN D3) 1,000 UNIT TABLET    Take 5,000 Units by mouth daily. CYANOCOBALAMIN/FOLIC AC/VIT B6 (FOLIC ACID-VIT R6-ZSP F84 PO)    Take 1 Tab by mouth daily. DICYCLOMINE (BENTYL) 10 MG CAPSULE    Take 1 Cap by mouth four (4) times daily as needed (abd cramps). DIPHENOXYLATE-ATROPINE (LOMOTIL) 2.5-0.025 MG PER TABLET    Take 1 Tab by mouth four (4) times daily as needed for Diarrhea. Max Daily Amount: 4 Tabs. GABAPENTIN (NEURONTIN) 300 MG CAPSULE    Take 1 Cap by mouth three (3) times daily. 12/22/16, QTY#90, 30 Days, 2 Refills. Dr. Marybeth Mattson    LISINOPRIL-HYDROCHLOROTHIAZIDE (PRINZIDE, ZESTORETIC) 20-25 MG PER TABLET    Take 1 Tab by mouth daily. METOPROLOL TARTRATE (LOPRESSOR) 50 MG TABLET    Take 1 Tab by mouth daily. NITROGLYCERIN (NITROSTAT) 0.4 MG SL TABLET    1 Tab by SubLINGual route as needed for Chest Pain. Up to 3 doses. OMEGA 3-DHA-EPA-FISH OIL (FISH OIL) 100-160-1,000 MG CAP    Take  by mouth. ONDANSETRON (ZOFRAN ODT) 4 MG DISINTEGRATING TABLET    Take 1 Tab by mouth every eight (8) hours as needed for Nausea. ONDANSETRON HCL (ZOFRAN) 4 MG TABLET    Take 2 Tabs by mouth every eight (8) hours as needed for Nausea. OTHER       These Medications have changed    No medications on file   Stop Taking    No medications on file     _______________________________    Please note that this dictation was completed with [employer], the computer voice recognition software. Quite often unanticipated grammatical, syntax, homophones, and other interpretive errors are inadvertently transcribed by the computer software. Please disregard these errors. Please excuse any errors that have escaped final proofreading.

## 2019-10-23 NOTE — ED NOTES
I have reviewed discharge instructions with the patient. The patient verbalized understanding. Patient armband removed and shredded. Pt d/c'd to home awake, alert and in NAD. All questions answered.   Pt taken to ed lobby via wheelchair

## 2019-10-23 NOTE — ED TRIAGE NOTES
Patient arrived via EMS c/o rapid heart rate. States that while she was eating, patient began to feel like her heart \"was fluttering took 1 sublingual nitro.  Patient currently denies pain

## 2019-10-23 NOTE — DISCHARGE INSTRUCTIONS

## 2019-12-16 PROBLEM — D50.9 IRON DEFICIENCY ANEMIA, UNSPECIFIED: Status: ACTIVE | Noted: 2019-12-16

## 2019-12-16 RX ORDER — ONDANSETRON 2 MG/ML
8 INJECTION INTRAMUSCULAR; INTRAVENOUS AS NEEDED
Status: CANCELLED | OUTPATIENT
Start: 2019-12-23

## 2019-12-16 RX ORDER — SODIUM CHLORIDE 9 MG/ML
10 INJECTION INTRAMUSCULAR; INTRAVENOUS; SUBCUTANEOUS AS NEEDED
Status: CANCELLED | OUTPATIENT
Start: 2019-12-23

## 2019-12-16 RX ORDER — DIPHENHYDRAMINE HYDROCHLORIDE 50 MG/ML
50 INJECTION, SOLUTION INTRAMUSCULAR; INTRAVENOUS AS NEEDED
Status: CANCELLED
Start: 2019-12-23

## 2019-12-16 RX ORDER — HYDROCORTISONE SODIUM SUCCINATE 100 MG/2ML
100 INJECTION, POWDER, FOR SOLUTION INTRAMUSCULAR; INTRAVENOUS AS NEEDED
Status: CANCELLED | OUTPATIENT
Start: 2019-12-23

## 2019-12-16 RX ORDER — ALBUTEROL SULFATE 0.83 MG/ML
2.5 SOLUTION RESPIRATORY (INHALATION) AS NEEDED
Status: CANCELLED
Start: 2019-12-23

## 2019-12-16 RX ORDER — EPINEPHRINE 1 MG/ML
0.3 INJECTION, SOLUTION, CONCENTRATE INTRAVENOUS AS NEEDED
Status: CANCELLED | OUTPATIENT
Start: 2019-12-23

## 2019-12-16 RX ORDER — HEPARIN 100 UNIT/ML
300-500 SYRINGE INTRAVENOUS AS NEEDED
Status: CANCELLED
Start: 2019-12-23

## 2019-12-16 RX ORDER — ACETAMINOPHEN 325 MG/1
650 TABLET ORAL AS NEEDED
Status: CANCELLED
Start: 2019-12-23

## 2019-12-23 ENCOUNTER — HOSPITAL ENCOUNTER (OUTPATIENT)
Dept: INFUSION THERAPY | Age: 84
Discharge: HOME | End: 2019-12-23
Payer: MEDICARE

## 2019-12-23 VITALS
TEMPERATURE: 97 F | RESPIRATION RATE: 18 BRPM | HEART RATE: 50 BPM | DIASTOLIC BLOOD PRESSURE: 59 MMHG | SYSTOLIC BLOOD PRESSURE: 143 MMHG | OXYGEN SATURATION: 100 %

## 2019-12-23 DIAGNOSIS — D50.9 IRON DEFICIENCY ANEMIA, UNSPECIFIED IRON DEFICIENCY ANEMIA TYPE: Primary | ICD-10-CM

## 2019-12-23 DIAGNOSIS — D50.9 IRON DEFICIENCY ANEMIA, UNSPECIFIED IRON DEFICIENCY ANEMIA TYPE: ICD-10-CM

## 2019-12-23 PROCEDURE — 74011250636 HC RX REV CODE- 250/636: Performed by: INTERNAL MEDICINE

## 2019-12-23 PROCEDURE — 96365 THER/PROPH/DIAG IV INF INIT: CPT

## 2019-12-23 RX ORDER — ONDANSETRON 2 MG/ML
8 INJECTION INTRAMUSCULAR; INTRAVENOUS AS NEEDED
Status: CANCELLED | OUTPATIENT
Start: 2019-12-30

## 2019-12-23 RX ORDER — DIPHENHYDRAMINE HYDROCHLORIDE 50 MG/ML
50 INJECTION, SOLUTION INTRAMUSCULAR; INTRAVENOUS AS NEEDED
Status: CANCELLED
Start: 2019-12-30

## 2019-12-23 RX ORDER — SODIUM CHLORIDE 9 MG/ML
10 INJECTION INTRAMUSCULAR; INTRAVENOUS; SUBCUTANEOUS AS NEEDED
Status: CANCELLED | OUTPATIENT
Start: 2019-12-30

## 2019-12-23 RX ORDER — EPINEPHRINE 1 MG/ML
0.3 INJECTION, SOLUTION, CONCENTRATE INTRAVENOUS AS NEEDED
Status: CANCELLED | OUTPATIENT
Start: 2019-12-30

## 2019-12-23 RX ORDER — HYDROCORTISONE SODIUM SUCCINATE 100 MG/2ML
100 INJECTION, POWDER, FOR SOLUTION INTRAMUSCULAR; INTRAVENOUS AS NEEDED
Status: CANCELLED | OUTPATIENT
Start: 2019-12-30

## 2019-12-23 RX ORDER — SODIUM CHLORIDE 0.9 % (FLUSH) 0.9 %
10 SYRINGE (ML) INJECTION AS NEEDED
Status: CANCELLED
Start: 2019-12-30

## 2019-12-23 RX ORDER — SODIUM CHLORIDE 0.9 % (FLUSH) 0.9 %
10 SYRINGE (ML) INJECTION AS NEEDED
Status: DISCONTINUED | OUTPATIENT
Start: 2019-12-23 | End: 2019-12-24 | Stop reason: HOSPADM

## 2019-12-23 RX ORDER — SODIUM CHLORIDE 9 MG/ML
25 INJECTION, SOLUTION INTRAVENOUS CONTINUOUS
Status: CANCELLED | OUTPATIENT
Start: 2019-12-30

## 2019-12-23 RX ORDER — SODIUM CHLORIDE 9 MG/ML
25 INJECTION, SOLUTION INTRAVENOUS CONTINUOUS
Status: DISCONTINUED | OUTPATIENT
Start: 2019-12-23 | End: 2019-12-24 | Stop reason: HOSPADM

## 2019-12-23 RX ORDER — ACETAMINOPHEN 325 MG/1
650 TABLET ORAL AS NEEDED
Status: CANCELLED
Start: 2019-12-30

## 2019-12-23 RX ORDER — ALBUTEROL SULFATE 0.83 MG/ML
2.5 SOLUTION RESPIRATORY (INHALATION) AS NEEDED
Status: CANCELLED
Start: 2019-12-30

## 2019-12-23 RX ORDER — HEPARIN 100 UNIT/ML
300-500 SYRINGE INTRAVENOUS AS NEEDED
Status: CANCELLED
Start: 2019-12-30

## 2019-12-23 RX ADMIN — FERRIC CARBOXYMALTOSE INJECTION 750 MG: 50 INJECTION, SOLUTION INTRAVENOUS at 14:01

## 2019-12-23 RX ADMIN — SODIUM CHLORIDE 25 ML/HR: 0.9 INJECTION, SOLUTION INTRAVENOUS at 14:01

## 2019-12-23 RX ADMIN — Medication 10 ML: at 14:25

## 2019-12-23 NOTE — PROGRESS NOTES
[facility] Progress Note    Date: 2019    Name: Lewis Jean Baptiste    MRN: [id number]         : 1935    Injectafer 1 of 2    Ms. Pushpa Hurst was assessed and education was provided. Ms. Kylah Munoz vitals were reviewed and patient was observed for 5 minutes prior to treatment. Visit Vitals  /59 (BP 1 Location: Right arm, BP Patient Position: Sitting)   Pulse (!) 50   Temp 97 °F (36.1 °C)   Resp 18   SpO2 100%       Lab results were obtained and reviewed. No results found for this or any previous visit (from the past 12 hour(s)). Saline lock started to Left wrist x3 attempts using 24g catheter, line flushes briskly. Injectafer 750 mg/250 ml ns was infused over 20 mins. Ms. Pushpa Hurst was observed for 30 minutes after infusion. No s/s of reaction occurred during this time. After infusion line flushed with normal saline then removed. Gauze and coban applied to site. Ms. Pushpa Hurst tolerated the infusion, and had no complaints. Patient armband removed and shredded. Ms. Pushpa Hurst was discharged from [place] [address] in stable condition at 1500. She is to return on 19 at 1400 for her next appointment.     Diana Underwood RN  2019  6:06 PM

## 2019-12-30 ENCOUNTER — HOSPITAL ENCOUNTER (OUTPATIENT)
Dept: INFUSION THERAPY | Age: 84
Discharge: HOME | End: 2019-12-30
Payer: MEDICARE

## 2019-12-30 VITALS
OXYGEN SATURATION: 97 % | HEART RATE: 60 BPM | SYSTOLIC BLOOD PRESSURE: 104 MMHG | DIASTOLIC BLOOD PRESSURE: 62 MMHG | TEMPERATURE: 97 F | RESPIRATION RATE: 18 BRPM

## 2019-12-30 DIAGNOSIS — D50.9 IRON DEFICIENCY ANEMIA, UNSPECIFIED IRON DEFICIENCY ANEMIA TYPE: Primary | ICD-10-CM

## 2019-12-30 DIAGNOSIS — D50.9 IRON DEFICIENCY ANEMIA, UNSPECIFIED IRON DEFICIENCY ANEMIA TYPE: ICD-10-CM

## 2019-12-30 PROCEDURE — 74011250636 HC RX REV CODE- 250/636: Performed by: INTERNAL MEDICINE

## 2019-12-30 PROCEDURE — 96365 THER/PROPH/DIAG IV INF INIT: CPT

## 2019-12-30 RX ORDER — HEPARIN 100 UNIT/ML
300-500 SYRINGE INTRAVENOUS AS NEEDED
Status: CANCELLED
Start: 2019-12-30

## 2019-12-30 RX ORDER — HYDROCORTISONE SODIUM SUCCINATE 100 MG/2ML
100 INJECTION, POWDER, FOR SOLUTION INTRAMUSCULAR; INTRAVENOUS AS NEEDED
Status: CANCELLED | OUTPATIENT
Start: 2019-12-30

## 2019-12-30 RX ORDER — SODIUM CHLORIDE 9 MG/ML
10 INJECTION INTRAMUSCULAR; INTRAVENOUS; SUBCUTANEOUS AS NEEDED
Status: CANCELLED | OUTPATIENT
Start: 2019-12-30

## 2019-12-30 RX ORDER — SODIUM CHLORIDE 9 MG/ML
25 INJECTION, SOLUTION INTRAVENOUS CONTINUOUS
Status: DISCONTINUED | OUTPATIENT
Start: 2019-12-30 | End: 2019-12-31 | Stop reason: HOSPADM

## 2019-12-30 RX ORDER — DIPHENHYDRAMINE HYDROCHLORIDE 50 MG/ML
50 INJECTION, SOLUTION INTRAMUSCULAR; INTRAVENOUS AS NEEDED
Status: CANCELLED
Start: 2019-12-30

## 2019-12-30 RX ORDER — ONDANSETRON 2 MG/ML
8 INJECTION INTRAMUSCULAR; INTRAVENOUS AS NEEDED
Status: CANCELLED | OUTPATIENT
Start: 2019-12-30

## 2019-12-30 RX ORDER — ACETAMINOPHEN 325 MG/1
650 TABLET ORAL AS NEEDED
Status: CANCELLED
Start: 2019-12-30

## 2019-12-30 RX ORDER — ALBUTEROL SULFATE 0.83 MG/ML
2.5 SOLUTION RESPIRATORY (INHALATION) AS NEEDED
Status: CANCELLED
Start: 2019-12-30

## 2019-12-30 RX ORDER — SODIUM CHLORIDE 0.9 % (FLUSH) 0.9 %
10 SYRINGE (ML) INJECTION AS NEEDED
Status: CANCELLED
Start: 2019-12-30

## 2019-12-30 RX ORDER — EPINEPHRINE 1 MG/ML
0.3 INJECTION, SOLUTION, CONCENTRATE INTRAVENOUS AS NEEDED
Status: CANCELLED | OUTPATIENT
Start: 2019-12-30

## 2019-12-30 RX ORDER — SODIUM CHLORIDE 9 MG/ML
25 INJECTION, SOLUTION INTRAVENOUS CONTINUOUS
Status: CANCELLED | OUTPATIENT
Start: 2019-12-30

## 2019-12-30 RX ADMIN — SODIUM CHLORIDE 25 ML/HR: 9 INJECTION, SOLUTION INTRAVENOUS at 14:27

## 2019-12-30 RX ADMIN — FERRIC CARBOXYMALTOSE INJECTION 750 MG: 50 INJECTION, SOLUTION INTRAVENOUS at 14:27

## 2019-12-30 NOTE — PROGRESS NOTES
[facility] Progress Note    Date: 2019    Name: Keith Morgan    MRN: [id number]         : 1935    Injectafer 2 of 2    Ms. oRd Rosales was assessed and education was provided. Ms. Jacqueline Mehta vitals were reviewed and patient was observed for 5 minutes prior to treatment. Visit Vitals  /62   Pulse 60   Temp 97 °F (36.1 °C)   Resp 18   SpO2 97%     Saline lock started to RAC x 1 attempt using 24g catheter, line flushes briskly. Injectafer 750 mg/250 ml ns was infused over 20 mins. After infusion line flushed with normal saline then removed. Gauze and coban applied to site. Ms. Rod Rosales tolerated the infusion, and had no complaints. Patient armband removed and shredded. Ms. Rod Rosales was discharged from [place] [id number] in stable condition at 1450. She has no future appointment with [facility] at this time.     Kayli Shields  2019  6:06 PM

## 2020-03-16 ENCOUNTER — HOSPITAL ENCOUNTER (EMERGENCY)
Age: 85
Discharge: HOME | End: 2020-03-16
Attending: EMERGENCY MEDICINE | Admitting: EMERGENCY MEDICINE
Payer: MEDICARE

## 2020-03-16 VITALS
WEIGHT: 125 LBS | HEART RATE: 82 BPM | SYSTOLIC BLOOD PRESSURE: 117 MMHG | HEIGHT: 64 IN | DIASTOLIC BLOOD PRESSURE: 93 MMHG | OXYGEN SATURATION: 100 % | TEMPERATURE: 98 F | RESPIRATION RATE: 20 BRPM | BODY MASS INDEX: 21.34 KG/M2

## 2020-03-16 DIAGNOSIS — R82.998 URINE LEUKOCYTES: ICD-10-CM

## 2020-03-16 DIAGNOSIS — R19.7 DIARRHEA, UNSPECIFIED TYPE: ICD-10-CM

## 2020-03-16 DIAGNOSIS — R10.84 ABDOMINAL PAIN, GENERALIZED: Primary | ICD-10-CM

## 2020-03-16 DIAGNOSIS — R11.2 NAUSEA AND VOMITING, INTRACTABILITY OF VOMITING NOT SPECIFIED, UNSPECIFIED VOMITING TYPE: ICD-10-CM

## 2020-03-16 LAB
ALBUMIN SERPL-MCNC: 3.9 G/DL (ref 3.4–5)
ALBUMIN/GLOB SERPL: 1.1 {RATIO} (ref 0.8–1.7)
ALP SERPL-CCNC: 102 U/L (ref 45–117)
ALT SERPL-CCNC: 13 U/L (ref 13–56)
ANION GAP SERPL CALC-SCNC: 6 MMOL/L (ref 3–18)
APPEARANCE UR: CLEAR
AST SERPL-CCNC: 13 U/L (ref 10–38)
ATRIAL RATE: 69 BPM
BACTERIA URNS QL MICRO: ABNORMAL /HPF
BASOPHILS # BLD: 0 K/UL (ref 0–0.1)
BASOPHILS NFR BLD: 0 % (ref 0–2)
BILIRUB SERPL-MCNC: 0.7 MG/DL (ref 0.2–1)
BILIRUB UR QL: NEGATIVE
BUN SERPL-MCNC: 23 MG/DL (ref 7–18)
BUN/CREAT SERPL: 23 (ref 12–20)
CALCIUM SERPL-MCNC: 9.3 MG/DL (ref 8.5–10.1)
CALCULATED P AXIS, ECG09: 49 DEGREES
CALCULATED R AXIS, ECG10: 59 DEGREES
CALCULATED T AXIS, ECG11: 80 DEGREES
CHLORIDE SERPL-SCNC: 110 MMOL/L (ref 100–111)
CK MB CFR SERPL CALC: NORMAL % (ref 0–4)
CK MB SERPL-MCNC: <1 NG/ML (ref 5–25)
CK SERPL-CCNC: 38 U/L (ref 26–192)
CO2 SERPL-SCNC: 27 MMOL/L (ref 21–32)
COLOR UR: YELLOW
CREAT SERPL-MCNC: 0.98 MG/DL (ref 0.6–1.3)
DIAGNOSIS, 93000: NORMAL
DIFFERENTIAL METHOD BLD: ABNORMAL
EOSINOPHIL # BLD: 0.2 K/UL (ref 0–0.4)
EOSINOPHIL NFR BLD: 2 % (ref 0–5)
EPITH CASTS URNS QL MICRO: ABNORMAL /LPF (ref 0–5)
ERYTHROCYTE [DISTWIDTH] IN BLOOD BY AUTOMATED COUNT: 17.4 % (ref 11.6–14.5)
GLOBULIN SER CALC-MCNC: 3.7 G/DL (ref 2–4)
GLUCOSE SERPL-MCNC: 100 MG/DL (ref 74–99)
GLUCOSE UR STRIP.AUTO-MCNC: NEGATIVE MG/DL
HCT VFR BLD AUTO: 39.9 % (ref 35–45)
HGB BLD-MCNC: 12.5 G/DL (ref 12–16)
HGB UR QL STRIP: NEGATIVE
KETONES UR QL STRIP.AUTO: NEGATIVE MG/DL
LEUKOCYTE ESTERASE UR QL STRIP.AUTO: ABNORMAL
LIPASE SERPL-CCNC: 147 U/L (ref 73–393)
LYMPHOCYTES # BLD: 1.3 K/UL (ref 0.9–3.6)
LYMPHOCYTES NFR BLD: 12 % (ref 21–52)
MCH RBC QN AUTO: 28.5 PG (ref 24–34)
MCHC RBC AUTO-ENTMCNC: 31.3 G/DL (ref 31–37)
MCV RBC AUTO: 91.1 FL (ref 74–97)
MONOCYTES # BLD: 0.8 K/UL (ref 0.05–1.2)
MONOCYTES NFR BLD: 7 % (ref 3–10)
NEUTS SEG # BLD: 8.7 K/UL (ref 1.8–8)
NEUTS SEG NFR BLD: 79 % (ref 40–73)
NITRITE UR QL STRIP.AUTO: NEGATIVE
P-R INTERVAL, ECG05: 182 MS
PH UR STRIP: 5 [PH] (ref 5–8)
PLATELET # BLD AUTO: 215 K/UL (ref 135–420)
PMV BLD AUTO: 12.2 FL (ref 9.2–11.8)
POTASSIUM SERPL-SCNC: 3.5 MMOL/L (ref 3.5–5.5)
PROT SERPL-MCNC: 7.6 G/DL (ref 6.4–8.2)
PROT UR STRIP-MCNC: NEGATIVE MG/DL
Q-T INTERVAL, ECG07: 404 MS
QRS DURATION, ECG06: 72 MS
QTC CALCULATION (BEZET), ECG08: 432 MS
RBC # BLD AUTO: 4.38 M/UL (ref 4.2–5.3)
RBC #/AREA URNS HPF: ABNORMAL /HPF (ref 0–5)
SODIUM SERPL-SCNC: 143 MMOL/L (ref 136–145)
SP GR UR REFRACTOMETRY: 1.02 (ref 1–1.03)
TROPONIN I SERPL-MCNC: <0.02 NG/ML (ref 0–0.04)
UROBILINOGEN UR QL STRIP.AUTO: 1 EU/DL (ref 0.2–1)
VENTRICULAR RATE, ECG03: 69 BPM
WBC # BLD AUTO: 11 K/UL (ref 4.6–13.2)
WBC URNS QL MICRO: ABNORMAL /HPF (ref 0–4)

## 2020-03-16 PROCEDURE — 83690 ASSAY OF LIPASE: CPT

## 2020-03-16 PROCEDURE — 85025 COMPLETE CBC W/AUTO DIFF WBC: CPT

## 2020-03-16 PROCEDURE — 96374 THER/PROPH/DIAG INJ IV PUSH: CPT

## 2020-03-16 PROCEDURE — 74011250637 HC RX REV CODE- 250/637: Performed by: EMERGENCY MEDICINE

## 2020-03-16 PROCEDURE — 80053 COMPREHEN METABOLIC PANEL: CPT

## 2020-03-16 PROCEDURE — 82550 ASSAY OF CK (CPK): CPT

## 2020-03-16 PROCEDURE — 74011250636 HC RX REV CODE- 250/636: Performed by: EMERGENCY MEDICINE

## 2020-03-16 PROCEDURE — 93005 ELECTROCARDIOGRAM TRACING: CPT

## 2020-03-16 PROCEDURE — 81001 URINALYSIS AUTO W/SCOPE: CPT

## 2020-03-16 PROCEDURE — 87086 URINE CULTURE/COLONY COUNT: CPT

## 2020-03-16 PROCEDURE — 99283 EMERGENCY DEPT VISIT LOW MDM: CPT

## 2020-03-16 PROCEDURE — 96361 HYDRATE IV INFUSION ADD-ON: CPT

## 2020-03-16 RX ORDER — ONDANSETRON 2 MG/ML
4 INJECTION INTRAMUSCULAR; INTRAVENOUS
Status: COMPLETED | OUTPATIENT
Start: 2020-03-16 | End: 2020-03-16

## 2020-03-16 RX ORDER — LOPERAMIDE HYDROCHLORIDE 2 MG/1
4 CAPSULE ORAL ONCE
Status: COMPLETED | OUTPATIENT
Start: 2020-03-16 | End: 2020-03-16

## 2020-03-16 RX ORDER — CEPHALEXIN 500 MG/1
500 CAPSULE ORAL 4 TIMES DAILY
Qty: 28 CAP | Refills: 0 | Status: SHIPPED | OUTPATIENT
Start: 2020-03-16 | End: 2020-03-23

## 2020-03-16 RX ORDER — LOPERAMIDE HCL 2 MG
2 TABLET ORAL
Qty: 12 TAB | Refills: 0 | Status: SHIPPED | OUTPATIENT
Start: 2020-03-16 | End: 2020-03-19

## 2020-03-16 RX ADMIN — ONDANSETRON 4 MG: 2 INJECTION INTRAMUSCULAR; INTRAVENOUS at 12:51

## 2020-03-16 RX ADMIN — SODIUM CHLORIDE 1000 ML: 900 INJECTION, SOLUTION INTRAVENOUS at 12:51

## 2020-03-16 RX ADMIN — LOPERAMIDE HYDROCHLORIDE 4 MG: 2 CAPSULE ORAL at 12:54

## 2020-03-16 NOTE — ED TRIAGE NOTES
Pt arrives with c/o diarrhea and nausea starting last evening with abdominal pain. Pt denies cough, fever or shortness of breath. No travel.

## 2020-03-16 NOTE — ED PROVIDER NOTES
Date: 3/16/2020  Patient Name: Sailaja Hernandez    History of Presenting Illness     Chief Complaint   Patient presents with    Diarrhea    Vomiting    Abdominal Pain       History Provided By: Patient    HPI/Chief Complaint: (Context):who presents with chief complaint of nausea today. Patient said yesterday evening started to have vomiting multiple times and multiple loose stools and abdominal pain or lower pelvis. No vomiting or abdominal pain today. Patient that she still having some loose stool. No traveling no sick contact no antibiotics no unusual meals  No black or bloody stool or blood in the vomit  There is no radiation of symptoms no acute alleviating exacerbating factors. Patient that this is happened before unsure if the pain is similar to the last year but she was not clear what was the diagnosis that point. Patient denies patient denies any fever or upper respiratory symptoms no exposure to coronavirus  Patient states she is feeling much better and has no pain currently but just wanted to get checked out. Patient is no dysuria  ---  Patient's triage note is reviewed  Patient with DEEPAK level 3  Patient's chief complaint of diarrhea vomiting and abdominal pain  Patient's vitals are stable with elevated blood pressure 169/78  Patient symptoms started with loose stool and nausea last night with abdominal pain denies any upper respiratory symptoms no traveling  Pain scale is 0  Allergies are none  Patient's home medication include Tylenol Bentyl Lomotil Neurontin Lopressor Zofran  Medical history includes rhabdomyolysis splenomegaly hypertension diabetes chronic kidney disease  Patient social history is no smoking no alcohol no drugs  Patient surgical history is cholecystectomy hysterectomy orthopedic surgery colonoscopy  Patient's prior visit is for chest pain and abdominal pain last year.     PCP: Carlos Can MD    Current Outpatient Medications   Medication Sig Dispense Refill    cephALEXin (Keflex) 500 mg capsule Take 1 Cap by mouth four (4) times daily for 7 days. 28 Cap 0    loperamide (Imodium A-D) 2 mg tablet Take 1 Tab by mouth four (4) times daily as needed for Diarrhea for up to 3 days. 12 Tab 0    nitroglycerin (NITROSTAT) 0.4 mg SL tablet 1 Tab by SubLINGual route as needed for Chest Pain. Up to 3 doses. 20 Tab 0    ondansetron hcl (ZOFRAN) 4 mg tablet Take 2 Tabs by mouth every eight (8) hours as needed for Nausea. 12 Tab 0    diphenoxylate-atropine (LOMOTIL) 2.5-0.025 mg per tablet Take 1 Tab by mouth four (4) times daily as needed for Diarrhea. Max Daily Amount: 4 Tabs. 20 Tab 0    acetaminophen (TYLENOL) 500 mg tablet Take 1 Tab by mouth every six (6) hours as needed for Pain or Fever. 40 Tab 0    dicyclomine (BENTYL) 10 mg capsule Take 1 Cap by mouth four (4) times daily as needed (abd cramps). 20 Cap 0    ondansetron (ZOFRAN ODT) 4 mg disintegrating tablet Take 1 Tab by mouth every eight (8) hours as needed for Nausea. 12 Tab 0    omega 3-dha-epa-fish oil (FISH OIL) 100-160-1,000 mg cap Take  by mouth.  OTHER       cyanocobalamin/folic ac/vit B6 (FOLIC ACID-VIT F3-EJU M17 PO) Take 1 Tab by mouth daily.  metoprolol tartrate (LOPRESSOR) 50 mg tablet Take 1 Tab by mouth daily. 30 Tab 0    gabapentin (NEURONTIN) 300 mg capsule Take 1 Cap by mouth three (3) times daily. 12/22/16, QTY#90, 30 Days, 2 Refills. Dr. Niki Odonnell  2    cholecalciferol (VITAMIN D3) 1,000 unit tablet Take 5,000 Units by mouth daily.  lisinopril-hydrochlorothiazide (PRINZIDE, ZESTORETIC) 20-25 mg per tablet Take 1 Tab by mouth daily.          Past History     Past Medical History:  Past Medical History:   Diagnosis Date    Chronic kidney disease     [facility]omyel[facility] [address]     DM (diabetes mellitus) ([facility] [address]     Hypertension     Lumbar spinal stenosis     Normocytic anemia     Rhabdomyolysis     Splenomegaly     Stroke ([address]     TIA x2    Vitamin D deficiency        Past Surgical History:  Past Surgical History:   Procedure Laterality Date    COLONOSCOPY N/A 3/1/2017    COLONOSCOPY performed by [facility]ine Werner [facility] at [facility] ENDOSCOPY    HX CHOLECYSTECTOMY      HX FREE SKIN GRAFT      HX HYSTERECTOMY      HX OOPHORECTOMY      HX OPEN REDUCTION INTERNAL FIXATION Left     Tx LLE Ankle Trimalleolar Fx by Dr. Yeny Mel[facility]rez [facility])    HX ORTHOPAEDIC Left 06/11/2013    [address] - LLE Ankle Removal of Hardware     HX VEIN STRIPPING Bilateral 09/18/2013    Dr. Lemar Jeans 20 SQ CM<         Family History:  Family History   Problem Relation Age of Onset    Diabetes Other     Hypertension Other        Social History:  Social History     Tobacco Use    Smoking status: Never Smoker    Smokeless tobacco: Never Used   Substance Use Topics    Alcohol use: No    Drug use: No       Allergies:  No Known Allergies      Review of Systems   Review of Systems   Constitutional: Negative for activity change, fatigue and fever. HENT: Negative for congestion and rhinorrhea. Eyes: Negative for visual disturbance. Respiratory: Negative for shortness of breath. Cardiovascular: Negative for chest pain and palpitations. Gastrointestinal: Positive for abdominal pain, diarrhea, nausea and vomiting. Genitourinary: Negative for dysuria and hematuria. Musculoskeletal: Negative for back pain. Skin: Negative for rash. Neurological: Negative for dizziness, weakness and light-headedness. Psychiatric/Behavioral: Negative for agitation. All other systems reviewed and are negative. Physical Exam     Physical Exam  Vitals signs and nursing note reviewed. Constitutional:       General: She is not in acute distress. Appearance: She is well-developed. HENT:      Head: Normocephalic and atraumatic.       Right Ear: External ear normal.      Left Ear: External ear normal.      Nose: Nose normal.   Eyes: General: No scleral icterus. Conjunctiva/sclera: Conjunctivae normal.      Pupils: Pupils are equal, round, and reactive to light. Neck:      Musculoskeletal: Normal range of motion and neck supple. Thyroid: No thyromegaly. Vascular: No JVD. Trachea: No tracheal deviation. Cardiovascular:      Rate and Rhythm: Normal rate and regular rhythm. Heart sounds: No murmur. No friction rub. Pulmonary:      Effort: Pulmonary effort is normal.      Breath sounds: Normal breath sounds. No stridor. Chest:      Chest wall: No tenderness. Abdominal:      General: Bowel sounds are normal. There is no distension. Palpations: Abdomen is soft. Tenderness: There is no abdominal tenderness. There is no right CVA tenderness, left CVA tenderness, guarding or rebound. Negative signs include Kline's sign, Rovsing's sign, McBurney's sign, psoas sign and obturator sign. Musculoskeletal: Normal range of motion. General: No tenderness. Lymphadenopathy:      Cervical: No cervical adenopathy. Skin:     General: Skin is warm and dry. Neurological:      Mental Status: She is alert. Cranial Nerves: No cranial nerve deficit. Coordination: Coordination normal.   Psychiatric:         Behavior: Behavior normal.         Thought Content: Thought content normal.         Judgment: Judgment normal.         Medical Decision Making   I am the first provider for this patient. I reviewed the vital signs, available nursing notes, past medical history, past surgical history, family history and social history.       Provider Notes (Medical Decision Making): Patient presented to emergency department with abdominal pain vomiting diarrhea the symptoms are last night today only some loose stool no abdominal pain  Physical exam is been completely normal with no abdominal tenderness  Patient appears well slightly dry I will start hydration check patient's basic lab rule out pancreatitis with lipase    Check urinalysis for any UTI  I will reevaluate patient hydrate patient  I will check EKG and troponin as well patient is diabetic hypertensive and with nausea as well. I will start symptomatic relief in the emergency department. Patient denies any exertional chest pain or shortness of breath      Vital Signs-Reviewed the patient's vital signs. Pulse Oximetry Analysis -98%, room air, normal she  Cardiac Monitor:  Rate/Rhythm: 82, sinus rhythm    EKG: Interpreted by the EP. EKG done today 12:21 PM, 61 heart rate normal intervals axes no sign of acute ischemia or dysrhythmia  Patient does have a poor R wave progression in the anterior wall with concern for ischemia  Today's EKG is compared with October 2019 EKG. Morphology of the R wave is similar to the prior. Vitals:    03/16/20 1138 03/16/20 1223 03/16/20 1224 03/16/20 1300   BP: 169/78 174/55  178/65   Pulse: 82      Resp: 20      Temp: 98 °F (36.7 °C)      SpO2: 98%  100% 99%   Weight: 56.7 kg (125 lb)      Height: 5' 4\" (1.626 m)          Records Reviewed: Nursing Notes    ED Course:    1:41 PM  Reevaluated no nausea no no chest pain no shortness of breath no abdominal pain patient is well-appearing no distress in the emergency department discussed patient's labs with her outpatient follow-up is requested. Anything change or worsen she is to come to the emergency department. I have given her my contact information if there is any questions. Discharge Note:  1:41 PM  The pt is ready for discharge. The pt's signs, symptoms, diagnosis, and discharge instructions have been discussed and pt has conveyed their understanding. The pt is to follow up as recommended or return to ER should their symptoms worsen. Plan has been discussed and pt is in agreement.         Diagnostic Study Results     Orders Placed This Encounter    CULTURE, URINE     Standing Status:   Standing     Number of Occurrences:   1     Order Specific Question:   Reason for Culture     Answer:   Pelvic pain    CBC WITH AUTOMATED DIFF     Standing Status:   Standing     Number of Occurrences:   1    METABOLIC PANEL, COMPREHENSIVE     Standing Status:   Standing     Number of Occurrences:   1    CARDIAC PANEL,(CK, CKMB & TROPONIN)     Standing Status:   Standing     Number of Occurrences:   1    URINALYSIS W/ RFLX MICROSCOPIC     Standing Status:   Standing     Number of Occurrences:   1    LIPASE     Standing Status:   Standing     Number of Occurrences:   1    URINE MICROSCOPIC ONLY     Standing Status:   Standing     Number of Occurrences:   1    EKG, 12 LEAD, INITIAL     Standing Status:   Standing     Number of Occurrences:   1     Order Specific Question:   Reason for Exam:     Answer:   nausea    sodium chloride 0.9 % bolus infusion 1,000 mL    loperamide (IMODIUM) capsule 4 mg    ondansetron (ZOFRAN) injection 4 mg    cephALEXin (Keflex) 500 mg capsule     Sig: Take 1 Cap by mouth four (4) times daily for 7 days. Dispense:  28 Cap     Refill:  0    loperamide (Imodium A-D) 2 mg tablet     Sig: Take 1 Tab by mouth four (4) times daily as needed for Diarrhea for up to 3 days.      Dispense:  12 Tab     Refill:  0       Labs -     Recent Results (from the past 12 hour(s))   EKG, 12 LEAD, INITIAL    Collection Time: 03/16/20 12:21 PM   Result Value Ref Range    Ventricular Rate 69 BPM    Atrial Rate 69 BPM    P-R Interval 182 ms    QRS Duration 72 ms    Q-T Interval 404 ms    QTC Calculation (Bezet) 432 ms    Calculated P Axis 49 degrees    Calculated R Axis 59 degrees    Calculated T Axis 80 degrees    Diagnosis       Normal sinus rhythm  Anteroseptal infarct (cited on or before 14-AUG-2019)  Abnormal ECG  When compared with ECG of 22-OCT-2019 20:53,  premature supraventricular complexes are no longer present  Nonspecific T wave abnormality, improved in Lateral leads     CBC WITH AUTOMATED DIFF    Collection Time: 03/16/20 12:39 PM   Result Value Ref Range    WBC 11.0 4.6 - 13.2 K/uL    RBC 4.38 4.20 - 5.30 M/uL    HGB 12.5 12.0 - 16.0 g/dL    HCT 39.9 35.0 - 45.0 %    MCV 91.1 74.0 - 97.0 FL    MCH 28.5 24.0 - 34.0 PG    MCHC 31.3 31.0 - 37.0 g/dL    RDW 17.4 (H) 11.6 - 14.5 %    PLATELET 378 049 - 344 K/uL    MPV 12.2 (H) 9.2 - 11.8 FL    NEUTROPHILS 79 (H) 40 - 73 %    LYMPHOCYTES 12 (L) 21 - 52 %    MONOCYTES 7 3 - 10 %    EOSINOPHILS 2 0 - 5 %    BASOPHILS 0 0 - 2 %    ABS. NEUTROPHILS 8.7 (H) 1.8 - 8.0 K/UL    ABS. LYMPHOCYTES 1.3 0.9 - 3.6 K/UL    ABS. MONOCYTES 0.8 0.05 - 1.2 K/UL    ABS. EOSINOPHILS 0.2 0.0 - 0.4 K/UL    ABS. BASOPHILS 0.0 0.0 - 0.1 K/UL    DF AUTOMATED     METABOLIC PANEL, COMPREHENSIVE    Collection Time: 03/16/20 12:39 PM   Result Value Ref Range    Sodium 143 136 - 145 mmol/L    Potassium 3.5 3.5 - 5.5 mmol/L    Chloride 110 100 - 111 mmol/L    CO2 27 21 - 32 mmol/L    Anion gap 6 3.0 - 18 mmol/L    Glucose 100 (H) 74 - 99 mg/dL    BUN 23 (H) 7.0 - 18 MG/DL    Creatinine 0.98 0.6 - 1.3 MG/DL    BUN/Creatinine ratio 23 (H) 12 - 20      GFR est AA >60 >60 ml/min/1.73m2    GFR est non-AA 54 (L) >60 ml/min/1.73m2    Calcium 9.3 8.5 - 10.1 MG/DL    Bilirubin, total 0.7 0.2 - 1.0 MG/DL    ALT (SGPT) 13 13 - 56 U/L    AST (SGOT) 13 10 - 38 U/L    Alk.  phosphatase 102 45 - 117 U/L    Protein, total 7.6 6.4 - 8.2 g/dL    Albumin 3.9 3.4 - 5.0 g/dL    Globulin 3.7 2.0 - 4.0 g/dL    A-G Ratio 1.1 0.8 - 1.7     CARDIAC PANEL,(CK, CKMB & TROPONIN)    Collection Time: 03/16/20 12:39 PM   Result Value Ref Range    CK 38 26 - 192 U/L    CK - MB <1.0 <3.6 ng/ml    CK-MB Index  0.0 - 4.0 %     CALCULATION NOT PERFORMED WHEN RESULT IS BELOW LINEAR LIMIT    Troponin-I, QT <0.02 0.0 - 0.045 NG/ML   LIPASE    Collection Time: 03/16/20 12:39 PM   Result Value Ref Range    Lipase 147 73 - 393 U/L   URINALYSIS W/ RFLX MICROSCOPIC    Collection Time: 03/16/20 12:47 PM   Result Value Ref Range    Color YELLOW      Appearance CLEAR      Specific gravity 1.020 1.005 - 1.030 pH (UA) 5.0 5.0 - 8.0      Protein NEGATIVE  NEG mg/dL    Glucose NEGATIVE  NEG mg/dL    Ketone NEGATIVE  NEG mg/dL    Bilirubin NEGATIVE  NEG      Blood NEGATIVE  NEG      Urobilinogen 1.0 0.2 - 1.0 EU/dL    Nitrites NEGATIVE  NEG      Leukocyte Esterase SMALL (A) NEG     URINE MICROSCOPIC ONLY    Collection Time: 03/16/20 12:47 PM   Result Value Ref Range    WBC 4 to 10 0 - 4 /hpf    RBC 0 to 3 0 - 5 /hpf    Epithelial cells 2+ 0 - 5 /lpf    Bacteria 1+ (A) NEG /hpf       Radiologic Studies -   No orders to display     CT Results  (Last 48 hours)    None        CXR Results  (Last 48 hours)    None              Discharge     Clinical Impression:   1. Abdominal pain, generalized    2. Nausea and vomiting, intractability of vomiting not specified, unspecified vomiting type    3. Diarrhea, unspecified type    4. Urine leukocytes        Disposition:  Home    It should be noted that I will be the provider of record for this patient  Nenita Monroe MD      Follow-up Information     Follow up With Specialties Details Why [facility] EMERGENCY DEPT Emergency Medicine  If symptoms worsen [address] [place] [address] 51    Maryjo Costello MD Internal Medicine Call today Follow Up From Emergency Department [address] [place] [address],[address] [facility] [place]  [phone]            Current Discharge Medication List      START taking these medications    Details   cephALEXin (Keflex) 500 mg capsule Take 1 Cap by mouth four (4) times daily for 7 days. Qty: 28 Cap, Refills: 0      loperamide (Imodium A-D) 2 mg tablet Take 1 Tab by mouth four (4) times daily as needed for Diarrhea for up to 3 days.   Qty: 12 Tab, Refills: 0

## 2020-03-16 NOTE — DISCHARGE INSTRUCTIONS
Patient Education        Abdominal Pain: Care Instructions  Your Care Instructions    Abdominal pain has many possible causes. Some aren't serious and get better on their own in a few days. Others need more testing and treatment. If your pain continues or gets worse, you need to be rechecked and may need more tests to find out what is wrong. You may need surgery to correct the problem. Don't ignore new symptoms, such as fever, nausea and vomiting, urination problems, pain that gets worse, and dizziness. These may be signs of a more serious problem. Your doctor may have recommended a follow-up visit in the next 8 to 12 hours. If you are not getting better, you may need more tests or treatment. The doctor has checked you carefully, but problems can develop later. If you notice any problems or new symptoms, get medical treatment right away. Follow-up care is a key part of your treatment and safety. Be sure to make and go to all appointments, and call your doctor if you are having problems. It's also a good idea to know your test results and keep a list of the medicines you take. How can you care for yourself at home? · Rest until you feel better. · To prevent dehydration, drink plenty of fluids, enough so that your urine is light yellow or clear like water. Choose water and other caffeine-free clear liquids until you feel better. If you have kidney, heart, or liver disease and have to limit fluids, talk with your doctor before you increase the amount of fluids you drink. · If your stomach is upset, eat mild foods, such as rice, dry toast or crackers, bananas, and applesauce. Try eating several small meals instead of two or three large ones. · Wait until 48 hours after all symptoms have gone away before you have spicy foods, alcohol, and drinks that contain caffeine. · Do not eat foods that are high in fat. · Avoid anti-inflammatory medicines such as aspirin, ibuprofen (Advil, Motrin), and naproxen (Aleve). These can cause stomach upset. Talk to your doctor if you take daily aspirin for another health problem. When should you call for help? Call 911 anytime you think you may need emergency care. For example, call if:    · You passed out (lost consciousness).     · You pass maroon or very bloody stools.     · You vomit blood or what looks like coffee grounds.     · You have new, severe belly pain.    Call your doctor now or seek immediate medical care if:    · Your pain gets worse, especially if it becomes focused in one area of your belly.     · You have a new or higher fever.     · Your stools are black and look like tar, or they have streaks of blood.     · You have unexpected vaginal bleeding.     · You have symptoms of a urinary tract infection. These may include:  ? Pain when you urinate. ? Urinating more often than usual.  ? Blood in your urine.     · You are dizzy or lightheaded, or you feel like you may faint.    Watch closely for changes in your health, and be sure to contact your doctor if:    · You are not getting better after 1 day (24 hours). Where can you learn more? Go to http://[employer]jn.info/  Enter S382 in the search box to learn more about \"Abdominal Pain: Care Instructions. \"  Current as of: June 26, 2019Content Version: 12.4  © 5235-6877 Healthwise, Incorporated. Care instructions adapted under license by [employer] (which disclaims liability or warranty for this information). If you have questions about a medical condition or this instruction, always ask your healthcare professional. [place] [address] any warranty or liability for your use of this information. Patient Education        Diarrhea: Care Instructions  Your Care Instructions    Diarrhea is loose, watery stools (bowel movements). The exact cause is often hard to find. Sometimes diarrhea is your body's way of getting rid of what caused an upset stomach.  Viruses, food poisoning, and many medicines can cause diarrhea. Some people get diarrhea in response to emotional stress, anxiety, or certain foods. Almost everyone has diarrhea now and then. It usually isn't serious, and your stools will return to normal soon. The important thing to do is replace the fluids you have lost, so you can prevent dehydration. The doctor has checked you carefully, but problems can develop later. If you notice any problems or new symptoms, get medical treatment right away. Follow-up care is a key part of your treatment and safety. Be sure to make and go to all appointments, and call your doctor if you are having problems. It's also a good idea to know your test results and keep a list of the medicines you take. How can you care for yourself at home? · Watch for signs of dehydration, which means your body has lost too much water. Dehydration is a serious condition and should be treated right away. Signs of dehydration are:  ? Increasing thirst and dry eyes and mouth. ? Feeling faint or lightheaded. ? A smaller amount of urine than normal.  · To prevent dehydration, drink plenty of fluids. Choose water and other caffeine-free clear liquids until you feel better. If you have kidney, heart, or liver disease and have to limit fluids, talk with your doctor before you increase the amount of fluids you drink. · Begin eating small amounts of mild foods the next day, if you feel like it. ? Try yogurt that has live cultures of Lactobacillus. (Check the label.)  ? Avoid spicy foods, fruits, alcohol, and caffeine until 48 hours after all symptoms are gone. ? Avoid chewing gum that contains sorbitol. ? Avoid dairy products (except for yogurt with Lactobacillus) while you have diarrhea and for 3 days after symptoms are gone. · The doctor may recommend that you take over-the-counter medicine, such as loperamide (Imodium), if you still have diarrhea after 6 hours.  Read and follow all instructions on the label. Do not use this medicine if you have bloody diarrhea, a high fever, or other signs of serious illness. Call your doctor if you think you are having a problem with your medicine. When should you call for help? Call 911 anytime you think you may need emergency care. For example, call if:    · You passed out (lost consciousness).     · Your stools are maroon or very bloody.    Call your doctor now or seek immediate medical care if:    · You are dizzy or lightheaded, or you feel like you may faint.     · Your stools are black and look like tar, or they have streaks of blood.     · You have new or worse belly pain.     · You have symptoms of dehydration, such as:  ? Dry eyes and a dry mouth. ? Passing only a little dark urine. ? Feeling thirstier than usual.     · You have a new or higher fever.    Watch closely for changes in your health, and be sure to contact your doctor if:    · Your diarrhea is getting worse.     · You see pus in the diarrhea.     · You are not getting better after 2 days (48 hours). Where can you learn more? Go to http://dorinda-jn.info/  Enter [id number] in the search box to learn more about \"Diarrhea: Care Instructions. \"  Current as of: June 26, 2019Content Version: 12.4  © 0761-6350 Healthwise, Incorporated. Care instructions adapted under license by [employer] (which disclaims liability or warranty for this information). If you have questions about a medical condition or this instruction, always ask your healthcare professional. [place] [address] any warranty or liability for your use of this information. Patient Education        Nausea and Vomiting: Care Instructions  Your Care Instructions    When you are nauseated, you may feel weak and sweaty and notice a lot of saliva in your mouth. Nausea often leads to vomiting.  Most of the time you do not need to worry about nausea and vomiting, but they can be signs of other illnesses. Two common causes of nausea and vomiting are stomach flu and food poisoning. Nausea and vomiting from viral stomach flu will usually start to improve within 24 hours. Nausea and vomiting from food poisoning may last from 12 to 48 hours. The doctor has checked you carefully, but problems can develop later. If you notice any problems or new symptoms, get medical treatment right away. Follow-up care is a key part of your treatment and safety. Be sure to make and go to all appointments, and call your doctor if you are having problems. It's also a good idea to know your test results and keep a list of the medicines you take. How can you care for yourself at home? · To prevent dehydration, drink plenty of fluids, enough so that your urine is light yellow or clear like water. Choose water and other caffeine-free clear liquids until you feel better. If you have kidney, heart, or liver disease and have to limit fluids, talk with your doctor before you increase the amount of fluids you drink. · Rest in bed until you feel better. · When you are able to eat, try clear soups, mild foods, and liquids until all symptoms are gone for 12 to 48 hours. Other good choices include dry toast, crackers, cooked cereal, and gelatin dessert, such as Jell-O. When should you call for help? Call 911 anytime you think you may need emergency care. For example, call if:    · You passed out (lost consciousness).    Call your doctor now or seek immediate medical care if:    · You have symptoms of dehydration, such as:  ? Dry eyes and a dry mouth. ? Passing only a little dark urine. ?  Feeling thirstier than usual.     · You have new or worsening belly pain.     · You have a new or higher fever.     · You vomit blood or what looks like coffee grounds.    Watch closely for changes in your health, and be sure to contact your doctor if:    · You have ongoing nausea and vomiting.     · Your vomiting is getting worse.     · Your vomiting lasts longer than 2 days.     · You are not getting better as expected. Where can you learn more? Go to http://dorinda-jn.info/  Enter H591 in the search box to learn more about \"Nausea and Vomiting: Care Instructions. \"  Current as of: June 26, 2019Content Version: 12.4  © 6192-9689 Healthwise, [employer]. Care instructions adapted under license by [employer] (which disclaims liability or warranty for this information). If you have questions about a medical condition or this instruction, always ask your healthcare professional. [address] any warranty or liability for your use of this information.

## 2020-03-17 ENCOUNTER — PATIENT OUTREACH (OUTPATIENT)
Dept: CASE MANAGEMENT | Age: 85
End: 2020-03-17

## 2020-03-18 LAB
BACTERIA SPEC CULT: ABNORMAL
BACTERIA SPEC CULT: ABNORMAL
SERVICE CMNT-IMP: ABNORMAL

## 2020-03-26 ENCOUNTER — PATIENT OUTREACH (OUTPATIENT)
Dept: CASE MANAGEMENT | Age: 85
End: 2020-03-26

## 2020-04-02 ENCOUNTER — PATIENT OUTREACH (OUTPATIENT)
Dept: CASE MANAGEMENT | Age: 85
End: 2020-04-02

## 2020-06-09 ENCOUNTER — HOSPITAL ENCOUNTER (OUTPATIENT)
Dept: NON INVASIVE DIAGNOSTICS | Age: 85
Discharge: HOME | End: 2020-06-09
Attending: INTERNAL MEDICINE
Payer: MEDICARE

## 2020-06-09 VITALS
HEIGHT: 64 IN | WEIGHT: 125 LBS | DIASTOLIC BLOOD PRESSURE: 74 MMHG | BODY MASS INDEX: 21.34 KG/M2 | SYSTOLIC BLOOD PRESSURE: 166 MMHG

## 2020-06-09 DIAGNOSIS — R06.09 DOE (DYSPNEA ON EXERTION): ICD-10-CM

## 2020-06-09 DIAGNOSIS — R01.1 SYSTOLIC MURMUR: ICD-10-CM

## 2020-06-09 LAB
ECHO AO ROOT DIAM: 2.87 CM
ECHO IVC SNIFF: 1.03 CM
ECHO LA MAJOR AXIS: 2.88 CM
ECHO LA TO AORTIC ROOT RATIO: 1
ECHO LV EDV A2C: 91.3 ML
ECHO LV EDV A4C: 80.6 ML
ECHO LV EDV BP: 87 ML (ref 56–104)
ECHO LV EDV INDEX A4C: 50.3 ML/M2
ECHO LV EDV INDEX BP: 54.3 ML/M2
ECHO LV EDV NDEX A2C: 57 ML/M2
ECHO LV EDV TEICHHOLZ: 0.4 ML
ECHO LV EJECTION FRACTION A2C: 52 %
ECHO LV EJECTION FRACTION A4C: 60 %
ECHO LV EJECTION FRACTION BIPLANE: 56.6 % (ref 55–100)
ECHO LV ESV A2C: 44 ML
ECHO LV ESV A4C: 32.1 ML
ECHO LV ESV BP: 37.7 ML (ref 19–49)
ECHO LV ESV INDEX A2C: 27.5 ML/M2
ECHO LV ESV INDEX A4C: 20 ML/M2
ECHO LV ESV INDEX BP: 23.5 ML/M2
ECHO LV ESV TEICHHOLZ: 0.14 ML
ECHO LV INTERNAL DIMENSION DIASTOLIC: 3.88 CM (ref 3.9–5.3)
ECHO LV INTERNAL DIMENSION SYSTOLIC: 2.49 CM
ECHO LV IVSD: 0.86 CM (ref 0.6–0.9)
ECHO LV MASS 2D: 100.8 G (ref 67–162)
ECHO LV MASS INDEX 2D: 62.9 G/M2 (ref 43–95)
ECHO LV POSTERIOR WALL DIASTOLIC: 0.78 CM (ref 0.6–0.9)
ECHO LVOT DIAM: 2.07 CM
ECHO MV A VELOCITY: 67.78 CM/S
ECHO MV E DECELERATION TIME (DT): 233.1 MS
ECHO MV E VELOCITY: 82.34 CM/S
ECHO MV E/A RATIO: 1.21
ECHO RA MINOR AXIS: 3.23 CM
LVFS 2D: 35.91 %
LVSV (MOD BI): 30.76 ML
LVSV (MOD SINGLE 4C): 30.36 ML
LVSV (MOD SINGLE): 29.54 ML
LVSV (TEICH): 26.96 ML
MV DEC SLOPE: 3.53

## 2020-06-09 PROCEDURE — 93306 TTE W/DOPPLER COMPLETE: CPT

## 2020-08-12 NOTE — PROGRESS NOTES
Theraskin and Debridement Procedure Note    Pre-Operative Diagnosis: Non-Healing Wound  Post-Operative Diagnosis: Same    Site: medial left ankle     Procedure:   1. Selective sharp instrument debridement of slough and devitilized tissue  2. Application of Skin Biograft - Theraskin    Indications: 1. Removal of devitalized and necrotic tissue to promote healing and evaluate the extent of healing. 2. Stage 5 of a planned staged series of        10                  applications of Theraskin in wound not responding to conventional therapy     After the benefits/risks/SE were discussed, the patient agreed to proceed. Time out was done:   * Patient was identified by name and date of birth   * Agreement on procedure being performed was verified   * Procedure site verified and marked as necessary   * Patient was positioned for comfort   * Consent was signed and verified. Instruments used:    [x]  Dermal curette  Blade        [] #15  [] #10  [] Forceps  [] Tissue nippers  [] sterile scissors  [] Other     Anesthesia used:    [x]  EMLA 2.5% cream: applied to wound beds for approximately 15minutes. []   Lidocaine 2% Topical Gel      []  Lidocaine injectable 1% with epinephrine 1:100,000; Amount used: mL    []  Lidocaine injectable 1% without epinephrine; Amount used: mL    []  Other:     []  None         [] patient is insensate due to neuropathy         [] patient declines        [] allergy to anesthetic        [] tissue for debridement is either superficial, loosely adherent and/or necrotic and denervated     Description of Procedure: After usual preparation, sharp debridement of slough and devitalized tissue was performed utilizing the instruments as above. Tissue was removed from the muscle layer. The wound was debrided to remove non-viable tissue and to clearly reveal the wound margins. The wound was debrided to an acute state with some bleeding from the capillary bed.      Pre-debridement measurement: see accompanying progress note  Post debridement measurement:  3.5_x_2.0_x_0.4_cm . Bleeding: <5mL Resolved with light focal pressure. Wounds were cleaned and irrigated with saline. After usual preparation, Theraskin applied to the wound and affixed to the skin with steri strips and covered with non-adherent contact layer. The patient tolerated the procedure without complication. Theraskin 13 sq cm sheet  Theraskin used: 13 sq cm  Theraskin discarded: 0 sq cm    Wound care applied:   [x]   Hydrogell   []  Hypergel   []   Hydrofiber/Aquacel      []   Cadexomer Iodine (Iodosorb)   []  Silver Alginate    []   Medihoney:    []   Collagenase:Santyl   []  Calcium Alginate   []   Collagen:    []   Foam   []  Non-Adherent Contact Layer   []   Xeroform    []   Adaptec:   []   Hydrocolloid   []   Transparent Film    []  Antibiotic ointment/cream    []  hyderofera blue   []   Other (see below)    [] Mesalt       Other:cutimed sorbact     [x]   Dry Gauze and Roll Gauze    []   Foam and Roll Gauze    []   Dry Gauze    []    Bordered gauze:     []   Secure with Tape    []   Other     [x]   Compression Wrap:          []   Unna Boot    []Multi-Layer    [x]Tubular Bandages       Ligia-Ulcer Care    []   Cream     []   Lotion     []   Ointment     []   Barrier     []   Other:       The patient tolerated the procedure well with no complications. The patient left the exam room in satisfactory and stable condition.      Sudha Ahumada DPM 12-Aug-2020 13:52

## 2020-09-10 ENCOUNTER — HOSPITAL ENCOUNTER (OUTPATIENT)
Age: 85
Setting detail: OBSERVATION
Discharge: HOME | End: 2020-09-11
Attending: EMERGENCY MEDICINE | Admitting: HOSPITALIST
Payer: MEDICARE

## 2020-09-10 ENCOUNTER — APPOINTMENT (OUTPATIENT)
Dept: GENERAL RADIOLOGY | Age: 85
End: 2020-09-10
Attending: EMERGENCY MEDICINE
Payer: MEDICARE

## 2020-09-10 DIAGNOSIS — R07.9 CHEST PAIN, UNSPECIFIED TYPE: Primary | ICD-10-CM

## 2020-09-10 LAB
ALBUMIN SERPL-MCNC: 3.7 G/DL (ref 3.4–5)
ALBUMIN/GLOB SERPL: 1 {RATIO} (ref 0.8–1.7)
ALP SERPL-CCNC: 93 U/L (ref 45–117)
ALT SERPL-CCNC: 13 U/L (ref 13–56)
ANION GAP SERPL CALC-SCNC: 4 MMOL/L (ref 3–18)
AST SERPL-CCNC: 12 U/L (ref 10–38)
BASOPHILS # BLD: 0.1 K/UL (ref 0–0.1)
BASOPHILS NFR BLD: 1 % (ref 0–2)
BILIRUB SERPL-MCNC: 0.6 MG/DL (ref 0.2–1)
BUN SERPL-MCNC: 23 MG/DL (ref 7–18)
BUN/CREAT SERPL: 19 (ref 12–20)
CALCIUM SERPL-MCNC: 9 MG/DL (ref 8.5–10.1)
CHLORIDE SERPL-SCNC: 108 MMOL/L (ref 100–111)
CK MB CFR SERPL CALC: 4.2 % (ref 0–4)
CK MB CFR SERPL CALC: 5.1 % (ref 0–4)
CK MB SERPL-MCNC: 1.8 NG/ML (ref 5–25)
CK MB SERPL-MCNC: 1.8 NG/ML (ref 5–25)
CK SERPL-CCNC: 35 U/L (ref 26–192)
CK SERPL-CCNC: 43 U/L (ref 26–192)
CO2 SERPL-SCNC: 29 MMOL/L (ref 21–32)
CREAT SERPL-MCNC: 1.22 MG/DL (ref 0.6–1.3)
DIFFERENTIAL METHOD BLD: ABNORMAL
EOSINOPHIL # BLD: 0.1 K/UL (ref 0–0.4)
EOSINOPHIL NFR BLD: 1 % (ref 0–5)
ERYTHROCYTE [DISTWIDTH] IN BLOOD BY AUTOMATED COUNT: 14 % (ref 11.6–14.5)
GLOBULIN SER CALC-MCNC: 3.8 G/DL (ref 2–4)
GLUCOSE SERPL-MCNC: 103 MG/DL (ref 74–99)
HCT VFR BLD AUTO: 35.8 % (ref 35–45)
HGB BLD-MCNC: 11.2 G/DL (ref 12–16)
LYMPHOCYTES # BLD: 1.8 K/UL (ref 0.9–3.6)
LYMPHOCYTES NFR BLD: 18 % (ref 21–52)
MCH RBC QN AUTO: 28.1 PG (ref 24–34)
MCHC RBC AUTO-ENTMCNC: 31.3 G/DL (ref 31–37)
MCV RBC AUTO: 89.7 FL (ref 74–97)
MONOCYTES # BLD: 0.5 K/UL (ref 0.05–1.2)
MONOCYTES NFR BLD: 5 % (ref 3–10)
NEUTS SEG # BLD: 7.6 K/UL (ref 1.8–8)
NEUTS SEG NFR BLD: 75 % (ref 40–73)
PLATELET # BLD AUTO: 307 K/UL (ref 135–420)
PMV BLD AUTO: 11.5 FL (ref 9.2–11.8)
POTASSIUM SERPL-SCNC: 3.4 MMOL/L (ref 3.5–5.5)
PROT SERPL-MCNC: 7.5 G/DL (ref 6.4–8.2)
RBC # BLD AUTO: 3.99 M/UL (ref 4.2–5.3)
SODIUM SERPL-SCNC: 141 MMOL/L (ref 136–145)
TROPONIN I SERPL-MCNC: <0.02 NG/ML (ref 0–0.04)
TROPONIN I SERPL-MCNC: <0.02 NG/ML (ref 0–0.04)
WBC # BLD AUTO: 10 K/UL (ref 4.6–13.2)

## 2020-09-10 PROCEDURE — 74011250637 HC RX REV CODE- 250/637: Performed by: EMERGENCY MEDICINE

## 2020-09-10 PROCEDURE — 82550 ASSAY OF CK (CPK): CPT

## 2020-09-10 PROCEDURE — 99218 HC RM OBSERVATION: CPT

## 2020-09-10 PROCEDURE — 71045 X-RAY EXAM CHEST 1 VIEW: CPT

## 2020-09-10 PROCEDURE — 93005 ELECTROCARDIOGRAM TRACING: CPT

## 2020-09-10 PROCEDURE — 85025 COMPLETE CBC W/AUTO DIFF WBC: CPT

## 2020-09-10 PROCEDURE — 80053 COMPREHEN METABOLIC PANEL: CPT

## 2020-09-10 PROCEDURE — 99285 EMERGENCY DEPT VISIT HI MDM: CPT

## 2020-09-10 RX ORDER — GUAIFENESIN 100 MG/5ML
324 LIQUID (ML) ORAL
Status: COMPLETED | OUTPATIENT
Start: 2020-09-10 | End: 2020-09-10

## 2020-09-10 RX ADMIN — ASPIRIN 81 MG 324 MG: 81 TABLET ORAL at 14:27

## 2020-09-10 NOTE — ED PROVIDER NOTES
Patient is an 28-year-old female presents the emergency department today with chief complaint of sternal chest pain which she describes as \"sharp\". It is nonradiating.,  She reports symptoms have been intermittent, when she had an episode earlier today lasting approximately 1 hour. Symptoms resolve spontaneously, they are not related to exertion or inspiration. She reports they feel similar to prior episodes of chest pain she has had however they are more severe than what she is experienced previously. She denies any associated shortness of breath in contrast to the triage note. She denies any fever or cough. No nausea, vomiting, diarrhea or abdominal pain. She rates the pain is approximately a 0 now at worst it was probably a 6 or 7 per patient.              Past Medical History:   Diagnosis Date    Chronic kidney disease     Chronic osteomyelitis of ankle ([address]     DM (diabetes mellitus) ([address]     Hypertension     Lumbar spinal stenosis     Normocytic anemia     Rhabdomyolysis     Splenomegaly     Stroke (HCC)     TIA x2    Vitamin D deficiency        Past Surgical History:   Procedure Laterality Date    COLONOSCOPY N/A 3/1/2017    COLONOSCOPY performed by B[facility]ains [facility] at [facility] ENDOSCOPY    HX CHOLECYSTECTOMY      HX FREE SKIN GRAFT      HX HYSTERECTOMY      HX OOPHORECTOMY      HX OPEN REDUCTION INTERNAL FIXATION Left     Tx LLE Ankle Trimalleolar Fx by Dr. Calli [facility])    HX ORTHOPAEDIC Left 06/11/2013    [address] - LLE Ankle Removal of Hardware     HX VEIN STRIPPING Bilateral 09/18/2013    Dr. Barrera Colon 20 SQ CM<           Family History:   Problem Relation Age of Onset    Diabetes Other     Hypertension Other        Social History     Socioeconomic History    Marital status:      Spouse name: Not on file    Number of children: Not on file    Years of education: Not on file    Highest education level: Not on file   Occupational History    Not on file   Social Needs    Financial resource strain: Not on file    Food insecurity     Worry: Not on file     Inability: Not on file    Transportation needs     Medical: Not on file     Non-medical: Not on file   Tobacco Use    Smoking status: Never Smoker    Smokeless tobacco: Never Used   Substance and Sexual Activity    Alcohol use: No    Drug use: No    Sexual activity: Never   Lifestyle    Physical activity     Days per week: Not on file     Minutes per session: Not on file    Stress: Not on file   Relationships    Social connections     Talks on phone: Not on file     Gets together: Not on file     Attends Faith service: Not on file     Active member of club or organization: Not on file     Attends meetings of clubs or organizations: Not on file     Relationship status: Not on file    Intimate partner violence     Fear of current or ex partner: Not on file     Emotionally abused: Not on file     Physically abused: Not on file     Forced sexual activity: Not on file   Other Topics Concern    Not on file   Social History Narrative    Not on file         ALLERGIES: Patient has no known allergies. Review of Systems   Constitutional: Negative for chills and fever. HENT: Negative for congestion, rhinorrhea, sinus pressure and sneezing. Eyes: Negative for visual disturbance. Respiratory: Negative for cough and shortness of breath. Cardiovascular: Positive for chest pain. Gastrointestinal: Negative for abdominal pain, diarrhea, nausea and vomiting. Genitourinary: Negative for dysuria, frequency and urgency. Musculoskeletal: Negative for back pain and neck pain. Skin: Negative for rash. Neurological: Negative for syncope, numbness and headaches.        Vitals:    09/10/20 1128 09/10/20 1215   BP: 145/71 156/47   Pulse: 70 60   Resp: 16 15   Temp: 97.7 °F (36.5 °C)    SpO2: 97% 98%   Weight: 59 kg (130 lb)    Height: 5' 3.5\" (1.613 m) Physical Exam  Constitutional:       General: She is not in acute distress. Appearance: Normal appearance. She is normal weight. She is not ill-appearing or toxic-appearing. HENT:      Head: Normocephalic and atraumatic. Right Ear: External ear normal.      Left Ear: External ear normal.      Nose: Nose normal. No congestion or rhinorrhea. Mouth/Throat:      Mouth: Mucous membranes are moist.      Pharynx: Oropharynx is clear. No oropharyngeal exudate or posterior oropharyngeal erythema. Eyes:      Extraocular Movements: Extraocular movements intact. Pupils: Pupils are equal, round, and reactive to light. Neck:      Musculoskeletal: Normal range of motion and neck supple. No muscular tenderness. Cardiovascular:      Rate and Rhythm: Normal rate and regular rhythm. Pulses: Normal pulses. Radial pulses are 2+ on the right side and 2+ on the left side. Dorsalis pedis pulses are 2+ on the right side and 2+ on the left side. Posterior tibial pulses are 2+ on the right side and 2+ on the left side. Heart sounds: Normal heart sounds. No murmur. Comments: Mild midsternal chest tenderness on palpation. Pulmonary:      Effort: Pulmonary effort is normal. No tachypnea. Breath sounds: Normal breath sounds. No wheezing, rhonchi or rales. Abdominal:      General: Abdomen is flat. Palpations: Abdomen is soft. Tenderness: There is no abdominal tenderness. There is no guarding or rebound. Musculoskeletal: Normal range of motion. General: No swelling, tenderness or deformity. Right lower leg: She exhibits no tenderness. No edema. Left lower leg: She exhibits no tenderness. No edema. Skin:     General: Skin is warm and dry. Capillary Refill: Capillary refill takes less than 2 seconds. Findings: No rash. Neurological:      General: No focal deficit present.       Mental Status: She is alert and oriented to person, place, and time. Motor: No weakness. EKG: Sinus bradycardia, no ST segment or T wave changes. Normal axis. MDM  Number of Diagnoses or Management Options  Chest pain, unspecified type:   Diagnosis management comments: Patient is a 81-year-old female presenting today with a chief complaint of chest pain. She has no chest pain now but it did occur prior to arrival and over the past 2 days. On exam she does have some chest wall tenderness mostly in the sternal region. Her EKG shows sinus bradycardia without any acute change. No signs of ST segment elevation MI. Patient has no pain during the course of her ER stay. Differential includes musculoskeletal chest pain, ACS, PE, stable or unstable angina, aortic dissection, pneumothorax, pneumonia, etc.    Patient work-up reveals negative troponin x2. Doubt PE as patient with normal heart rate normal oxygen saturation, nonpleuritic chest pain with no major risk factors outside of her age. Electrolytes and renal function are stable at baseline. Chest x-ray reveals no acute process. Patient has a heart score of approximately 45. Although patient is chest pain description is not particularly concerning for angina, certainly at a higher risk for ischemic heart disease given her peripheral vascular disease and her age. Case was discussed with cardiology BRANDIN Townsend, standard discussion at length regarding the patient's presentation as well as her risk factors, her clinical findings. Recommendation was made for admission for a stress test and serial troponins. Contacted hospitalist for admission. He agreed to admit the patient. Discussed with the patient regarding admission and she was agreeable as well. Patient expressed understanding of the plan and all questions were answered. Patient stable for transfer to the floor. ED Course as of Sep 10 2204   Thu Sep 10, 2020   5584 I reviewed patient's laboratory studies troponin is negative x2.   Potassium is slightly low. Repeat EKG showing no acute changes in her EKG. She did have a stress test done last June which was unremarkable. She was admitted last August for similar symptoms. I will contact cardiologist on-call for further recommendations patient has a heart score of approximately 4-5. [[facility]]   5064 Discussed with cardiology. Recommendation was made for inpatient observation stay for stress testing, given that is almost the weekend and patient would not likely get a full work-up on the weekend. [[facility]]      ED Course User Index  [[facility]] Maureen Whiting DO       Procedures        Diagnosis:   1. Chest pain, unspecified type          Disposition: Admit    Follow-up Information    None         Patient's Medications   Start Taking    No medications on file   Continue Taking    ACETAMINOPHEN (TYLENOL) 500 MG TABLET    Take 1 Tab by mouth every six (6) hours as needed for Pain or Fever. CHOLECALCIFEROL (VITAMIN D3) 1,000 UNIT TABLET    Take 5,000 Units by mouth daily. CYANOCOBALAMIN/FOLIC AC/VIT B6 (FOLIC ACID-VIT O2-ZDG E71 PO)    Take 1 Tab by mouth daily. DICYCLOMINE (BENTYL) 10 MG CAPSULE    Take 1 Cap by mouth four (4) times daily as needed (abd cramps). DIPHENOXYLATE-ATROPINE (LOMOTIL) 2.5-0.025 MG PER TABLET    Take 1 Tab by mouth four (4) times daily as needed for Diarrhea. Max Daily Amount: 4 Tabs. GABAPENTIN (NEURONTIN) 300 MG CAPSULE    Take 1 Cap by mouth three (3) times daily. 12/22/16, QTY#90, 30 Days, 2 Refills. Dr. Madeline Glasgow    LISINOPRIL-HYDROCHLOROTHIAZIDE (PRINZIDE, ZESTORETIC) 20-25 MG PER TABLET    Take 1 Tab by mouth daily. METOPROLOL TARTRATE (LOPRESSOR) 50 MG TABLET    Take 1 Tab by mouth daily. NITROGLYCERIN (NITROSTAT) 0.4 MG SL TABLET    1 Tab by SubLINGual route as needed for Chest Pain. Up to 3 doses. OMEGA 3-DHA-EPA-FISH OIL (FISH OIL) 100-160-1,000 MG CAP    Take  by mouth.     ONDANSETRON (ZOFRAN ODT) 4 MG DISINTEGRATING TABLET    Take 1 Tab by mouth every eight (8) hours as needed for Nausea. ONDANSETRON HCL (ZOFRAN) 4 MG TABLET    Take 2 Tabs by mouth every eight (8) hours as needed for Nausea.     OTHER       These Medications have changed    No medications on file   Stop Taking    No medications on file

## 2020-09-11 ENCOUNTER — APPOINTMENT (OUTPATIENT)
Dept: NON INVASIVE DIAGNOSTICS | Age: 85
End: 2020-09-11
Attending: INTERNAL MEDICINE
Payer: MEDICARE

## 2020-09-11 ENCOUNTER — APPOINTMENT (OUTPATIENT)
Dept: NUCLEAR MEDICINE | Age: 85
End: 2020-09-11
Attending: HOSPITALIST
Payer: MEDICARE

## 2020-09-11 ENCOUNTER — APPOINTMENT (OUTPATIENT)
Dept: NON INVASIVE DIAGNOSTICS | Age: 85
End: 2020-09-11
Attending: HOSPITALIST
Payer: MEDICARE

## 2020-09-11 VITALS
DIASTOLIC BLOOD PRESSURE: 107 MMHG | RESPIRATION RATE: 16 BRPM | SYSTOLIC BLOOD PRESSURE: 168 MMHG | BODY MASS INDEX: 23.04 KG/M2 | TEMPERATURE: 97.6 F | HEIGHT: 63 IN | WEIGHT: 130 LBS | HEART RATE: 63 BPM | OXYGEN SATURATION: 97 %

## 2020-09-11 PROBLEM — I67.9 CEREBROVASCULAR DISEASE: Status: ACTIVE | Noted: 2020-09-11

## 2020-09-11 LAB
ANION GAP SERPL CALC-SCNC: 8 MMOL/L (ref 3–18)
ATRIAL RATE: 52 BPM
ATRIAL RATE: 73 BPM
BASOPHILS # BLD: 0.1 K/UL (ref 0–0.1)
BASOPHILS NFR BLD: 1 % (ref 0–2)
BUN SERPL-MCNC: 23 MG/DL (ref 7–18)
BUN/CREAT SERPL: 25 (ref 12–20)
CALCIUM SERPL-MCNC: 8.5 MG/DL (ref 8.5–10.1)
CALCULATED P AXIS, ECG09: 56 DEGREES
CALCULATED P AXIS, ECG09: 72 DEGREES
CALCULATED R AXIS, ECG10: 55 DEGREES
CALCULATED R AXIS, ECG10: 82 DEGREES
CALCULATED T AXIS, ECG11: 64 DEGREES
CALCULATED T AXIS, ECG11: 85 DEGREES
CHLORIDE SERPL-SCNC: 110 MMOL/L (ref 100–111)
CK MB CFR SERPL CALC: 4.5 % (ref 0–4)
CK MB SERPL-MCNC: 1.9 NG/ML (ref 5–25)
CK SERPL-CCNC: 42 U/L (ref 26–192)
CO2 SERPL-SCNC: 28 MMOL/L (ref 21–32)
CREAT SERPL-MCNC: 0.93 MG/DL (ref 0.6–1.3)
DIAGNOSIS, 93000: NORMAL
DIAGNOSIS, 93000: NORMAL
DIFFERENTIAL METHOD BLD: ABNORMAL
ECHO IVC PROX: 2.42 CM
ECHO IVC SNIFF: 2.42 CM
ECHO TV REGURGITANT MAX VELOCITY: 214.44 CM/S
ECHO TV REGURGITANT PEAK GRADIENT: 18.4 MMHG
EOSINOPHIL # BLD: 0.1 K/UL (ref 0–0.4)
EOSINOPHIL NFR BLD: 2 % (ref 0–5)
ERYTHROCYTE [DISTWIDTH] IN BLOOD BY AUTOMATED COUNT: 14 % (ref 11.6–14.5)
GLUCOSE BLD STRIP.AUTO-MCNC: 88 MG/DL (ref 70–110)
GLUCOSE SERPL-MCNC: 87 MG/DL (ref 74–99)
HCT VFR BLD AUTO: 30.8 % (ref 35–45)
HGB BLD-MCNC: 9.4 G/DL (ref 12–16)
LYMPHOCYTES # BLD: 2 K/UL (ref 0.9–3.6)
LYMPHOCYTES NFR BLD: 24 % (ref 21–52)
MCH RBC QN AUTO: 27.3 PG (ref 24–34)
MCHC RBC AUTO-ENTMCNC: 30.5 G/DL (ref 31–37)
MCV RBC AUTO: 89.5 FL (ref 74–97)
MONOCYTES # BLD: 0.5 K/UL (ref 0.05–1.2)
MONOCYTES NFR BLD: 6 % (ref 3–10)
NEUTS SEG # BLD: 5.6 K/UL (ref 1.8–8)
NEUTS SEG NFR BLD: 67 % (ref 40–73)
P-R INTERVAL, ECG05: 146 MS
P-R INTERVAL, ECG05: 168 MS
PLATELET # BLD AUTO: 252 K/UL (ref 135–420)
PMV BLD AUTO: 11.7 FL (ref 9.2–11.8)
POTASSIUM SERPL-SCNC: 3.2 MMOL/L (ref 3.5–5.5)
Q-T INTERVAL, ECG07: 404 MS
Q-T INTERVAL, ECG07: 478 MS
QRS DURATION, ECG06: 68 MS
QRS DURATION, ECG06: 76 MS
QTC CALCULATION (BEZET), ECG08: 444 MS
QTC CALCULATION (BEZET), ECG08: 445 MS
RBC # BLD AUTO: 3.44 M/UL (ref 4.2–5.3)
SODIUM SERPL-SCNC: 146 MMOL/L (ref 136–145)
STRESS BASELINE HR: 58 BPM
STRESS ESTIMATED WORKLOAD: 1 METS
STRESS EXERCISE DUR MIN: NORMAL
STRESS PEAK DIAS BP: 66 MMHG
STRESS PEAK SYS BP: 191 MMHG
STRESS PERCENT HR ACHIEVED: 93 %
STRESS POST PEAK HR: 126 BPM
STRESS RATE PRESSURE PRODUCT: NORMAL BPM*MMHG
STRESS TARGET HR: 136 BPM
TROPONIN I SERPL-MCNC: <0.02 NG/ML (ref 0–0.04)
VENTRICULAR RATE, ECG03: 52 BPM
VENTRICULAR RATE, ECG03: 73 BPM
WBC # BLD AUTO: 8.3 K/UL (ref 4.6–13.2)

## 2020-09-11 PROCEDURE — 96372 THER/PROPH/DIAG INJ SC/IM: CPT

## 2020-09-11 PROCEDURE — 99218 HC RM OBSERVATION: CPT

## 2020-09-11 PROCEDURE — 85025 COMPLETE CBC W/AUTO DIFF WBC: CPT

## 2020-09-11 PROCEDURE — 74011250636 HC RX REV CODE- 250/636: Performed by: HOSPITALIST

## 2020-09-11 PROCEDURE — 74011250637 HC RX REV CODE- 250/637: Performed by: HOSPITALIST

## 2020-09-11 PROCEDURE — 80048 BASIC METABOLIC PNL TOTAL CA: CPT

## 2020-09-11 PROCEDURE — 82962 GLUCOSE BLOOD TEST: CPT

## 2020-09-11 PROCEDURE — 93017 CV STRESS TEST TRACING ONLY: CPT

## 2020-09-11 PROCEDURE — 93308 TTE F-UP OR LMTD: CPT

## 2020-09-11 PROCEDURE — 77030021352 HC CBL LD SYS DISP COVD -B

## 2020-09-11 PROCEDURE — A9500 TC99M SESTAMIBI: HCPCS

## 2020-09-11 PROCEDURE — 36415 COLL VENOUS BLD VENIPUNCTURE: CPT

## 2020-09-11 RX ORDER — DIPHENOXYLATE HYDROCHLORIDE AND ATROPINE SULFATE 2.5; .025 MG/1; MG/1
1 TABLET ORAL
Status: DISCONTINUED | OUTPATIENT
Start: 2020-09-11 | End: 2020-09-11 | Stop reason: HOSPADM

## 2020-09-11 RX ORDER — ACETAMINOPHEN 325 MG/1
650 TABLET ORAL
Status: DISCONTINUED | OUTPATIENT
Start: 2020-09-11 | End: 2020-09-11 | Stop reason: HOSPADM

## 2020-09-11 RX ORDER — SODIUM CHLORIDE 0.9 % (FLUSH) 0.9 %
5-40 SYRINGE (ML) INJECTION EVERY 8 HOURS
Status: DISCONTINUED | OUTPATIENT
Start: 2020-09-11 | End: 2020-09-11 | Stop reason: HOSPADM

## 2020-09-11 RX ORDER — ONDANSETRON 2 MG/ML
4 INJECTION INTRAMUSCULAR; INTRAVENOUS
Status: DISCONTINUED | OUTPATIENT
Start: 2020-09-11 | End: 2020-09-11 | Stop reason: HOSPADM

## 2020-09-11 RX ORDER — ACETAMINOPHEN 650 MG/1
650 SUPPOSITORY RECTAL
Status: DISCONTINUED | OUTPATIENT
Start: 2020-09-11 | End: 2020-09-11 | Stop reason: HOSPADM

## 2020-09-11 RX ORDER — SODIUM CHLORIDE 0.9 % (FLUSH) 0.9 %
5-40 SYRINGE (ML) INJECTION AS NEEDED
Status: DISCONTINUED | OUTPATIENT
Start: 2020-09-11 | End: 2020-09-11 | Stop reason: HOSPADM

## 2020-09-11 RX ORDER — HYDRALAZINE HYDROCHLORIDE 20 MG/ML
10 INJECTION INTRAMUSCULAR; INTRAVENOUS
Status: DISCONTINUED | OUTPATIENT
Start: 2020-09-11 | End: 2020-09-11 | Stop reason: HOSPADM

## 2020-09-11 RX ORDER — ONDANSETRON 4 MG/1
4 TABLET, ORALLY DISINTEGRATING ORAL
Status: DISCONTINUED | OUTPATIENT
Start: 2020-09-11 | End: 2020-09-11 | Stop reason: HOSPADM

## 2020-09-11 RX ORDER — GABAPENTIN 300 MG/1
300 CAPSULE ORAL 3 TIMES DAILY
Status: DISCONTINUED | OUTPATIENT
Start: 2020-09-11 | End: 2020-09-11 | Stop reason: HOSPADM

## 2020-09-11 RX ORDER — PROMETHAZINE HYDROCHLORIDE 25 MG/1
12.5 TABLET ORAL
Status: DISCONTINUED | OUTPATIENT
Start: 2020-09-11 | End: 2020-09-11 | Stop reason: HOSPADM

## 2020-09-11 RX ORDER — NITROGLYCERIN 0.4 MG/1
0.4 TABLET SUBLINGUAL AS NEEDED
Status: DISCONTINUED | OUTPATIENT
Start: 2020-09-11 | End: 2020-09-11 | Stop reason: HOSPADM

## 2020-09-11 RX ORDER — METOPROLOL TARTRATE 50 MG/1
50 TABLET ORAL DAILY
Status: DISCONTINUED | OUTPATIENT
Start: 2020-09-11 | End: 2020-09-11 | Stop reason: HOSPADM

## 2020-09-11 RX ORDER — POLYETHYLENE GLYCOL 3350 17 G/17G
17 POWDER, FOR SOLUTION ORAL DAILY PRN
Status: DISCONTINUED | OUTPATIENT
Start: 2020-09-11 | End: 2020-09-11 | Stop reason: HOSPADM

## 2020-09-11 RX ORDER — ENOXAPARIN SODIUM 100 MG/ML
30 INJECTION SUBCUTANEOUS DAILY
Status: DISCONTINUED | OUTPATIENT
Start: 2020-09-11 | End: 2020-09-11 | Stop reason: HOSPADM

## 2020-09-11 RX ORDER — DICYCLOMINE HYDROCHLORIDE 10 MG/1
10 CAPSULE ORAL
Status: DISCONTINUED | OUTPATIENT
Start: 2020-09-11 | End: 2020-09-11 | Stop reason: HOSPADM

## 2020-09-11 RX ADMIN — HYDROCHLOROTHIAZIDE: 25 TABLET ORAL at 09:10

## 2020-09-11 RX ADMIN — ENOXAPARIN SODIUM 30 MG: 30 INJECTION SUBCUTANEOUS at 08:37

## 2020-09-11 RX ADMIN — GABAPENTIN 300 MG: 300 CAPSULE ORAL at 09:10

## 2020-09-11 RX ADMIN — REGADENOSON 0.4 MG: 0.08 INJECTION, SOLUTION INTRAVENOUS at 11:25

## 2020-09-11 NOTE — DISCHARGE SUMMARY
Discharge Summary    Patient: Ron Waterman               Sex: female          DOA: 9/10/2020         YOB: 1935      Age:  81 y.o.        LOS:  LOS: 0 days                Admit Date: 9/10/2020    Discharge Date: 9/11/2020    Discharge Medications:     Current Discharge Medication List      CONTINUE these medications which have NOT CHANGED    Details   nitroglycerin (NITROSTAT) 0.4 mg SL tablet 1 Tab by SubLINGual route as needed for Chest Pain. Up to 3 doses. Qty: 20 Tab, Refills: 0      dicyclomine (BENTYL) 10 mg capsule Take 1 Cap by mouth four (4) times daily as needed (abd cramps). Qty: 20 Cap, Refills: 0      metoprolol tartrate (LOPRESSOR) 50 mg tablet Take 1 Tab by mouth daily. Qty: 30 Tab, Refills: 0      gabapentin (NEURONTIN) 300 mg capsule Take 1 Cap by mouth three (3) times daily. 12/22/16, QTY#90, 30 Days, 2 Refills. Dr. Genevieve Oppenheim: 2      cholecalciferol (VITAMIN D3) 1,000 unit tablet Take 5,000 Units by mouth daily. lisinopril-hydrochlorothiazide (PRINZIDE, ZESTORETIC) 20-25 mg per tablet Take 1 Tab by mouth daily. diphenoxylate-atropine (LOMOTIL) 2.5-0.025 mg per tablet Take 1 Tab by mouth four (4) times daily as needed for Diarrhea. Max Daily Amount: 4 Tabs. Qty: 20 Tab, Refills: 0    Associated Diagnoses: Non-intractable vomiting with nausea, unspecified vomiting type; Diarrhea, unspecified type      acetaminophen (TYLENOL) 500 mg tablet Take 1 Tab by mouth every six (6) hours as needed for Pain or Fever. Qty: 40 Tab, Refills: 0      ondansetron (ZOFRAN ODT) 4 mg disintegrating tablet Take 1 Tab by mouth every eight (8) hours as needed for Nausea. Qty: 12 Tab, Refills: 0      omega 3-dha-epa-fish oil (FISH OIL) 100-160-1,000 mg cap Take  by mouth. OTHER       cyanocobalamin/folic ac/vit B6 (FOLIC ACID-VIT N7-ZHO B85 PO) Take 1 Tab by mouth daily.              Follow-up: PCP    Discharge Condition: Stable    Activity: Activity as tolerated    Diet: Cardiac Diet    Labs:  Labs: Results:       Chemistry Recent Labs     09/11/20  0516 09/10/20  1223   GLU 87 103*   * 141   K 3.2* 3.4*    108   CO2 28 29   BUN 23* 23*   CREA 0.93 1.22   CA 8.5 9.0   AGAP 8 4   BUCR 25* 19   AP  --  93   TP  --  7.5   ALB  --  3.7   GLOB  --  3.8   AGRAT  --  1.0      CBC w/Diff Recent Labs     09/11/20  0516 09/10/20  1223   WBC 8.3 10.0   RBC 3.44* 3.99*   HGB 9.4* 11.2*   HCT 30.8* 35.8    307   GRANS 67 75*   LYMPH 24 18*   EOS 2 1      Cardiac Enzymes Recent Labs     09/10/20  2357 09/10/20  1524   CPK 42 35   CKND1 4.5* 5.1*      Coagulation No results for input(s): PTP, INR, APTT, INREXT in the last 72 hours. Lipid Panel Lab Results   Component Value Date/Time    Cholesterol, total 162 05/06/2019 06:25 AM    HDL Cholesterol 45 05/06/2019 06:25 AM    LDL, calculated 85.4 05/06/2019 06:25 AM    VLDL, calculated 31.6 05/06/2019 06:25 AM    Triglyceride 158 (H) 05/06/2019 06:25 AM    CHOL/HDL Ratio 3.6 05/06/2019 06:25 AM      BNP No results for input(s): BNPP in the last 72 hours. Liver Enzymes Recent Labs     09/10/20  1223   TP 7.5   ALB 3.7   AP 93      Thyroid Studies Lab Results   Component Value Date/Time    T4, Total 8.2 06/21/2012 07:50 AM    TSH 1.11 10/22/2019 09:07 PM          Imaging:    Xr Chest Port    Result Date: 9/10/2020  EXAM: One-view chest CLINICAL HISTORY: Chest pain , COMPARISON: None FINDINGS: Frontal view of the chest demonstrate clear lungs. Cardiac silhouette is normal in size and contour. No acute bony or soft tissue abnormality. IMPRESSION: No acute pulmonary process identified. Consults: Cardiology    Treatment Team: Treatment Team: Attending Provider: Anshu Cook MD; Consulting Provider:  Carlos De Los Santosma; Consulting Provider: Danelle Estrada MD; Primary Nurse: Ricco Willard; Utilization Review: Patti Lipscomb RN    Significant Diagnostic Studies: labs:   Recent Results (from the past 24 hour(s))   CARDIAC PANEL,(CK, CKMB & TROPONIN)    Collection Time: 09/10/20 12:23 PM   Result Value Ref Range    CK - MB 1.8 <3.6 ng/ml    CK-MB Index 4.2 (H) 0.0 - 4.0 %    CK 43 26 - 192 U/L    Troponin-I, QT <0.02 0.0 - 0.045 NG/ML   CBC WITH AUTOMATED DIFF    Collection Time: 09/10/20 12:23 PM   Result Value Ref Range    WBC 10.0 4.6 - 13.2 K/uL    RBC 3.99 (L) 4.20 - 5.30 M/uL    HGB 11.2 (L) 12.0 - 16.0 g/dL    HCT 35.8 35.0 - 45.0 %    MCV 89.7 74.0 - 97.0 FL    MCH 28.1 24.0 - 34.0 PG    MCHC 31.3 31.0 - 37.0 g/dL    RDW 14.0 11.6 - 14.5 %    PLATELET 690 471 - 422 K/uL    MPV 11.5 9.2 - 11.8 FL    NEUTROPHILS 75 (H) 40 - 73 %    LYMPHOCYTES 18 (L) 21 - 52 %    MONOCYTES 5 3 - 10 %    EOSINOPHILS 1 0 - 5 %    BASOPHILS 1 0 - 2 %    ABS. NEUTROPHILS 7.6 1.8 - 8.0 K/UL    ABS. LYMPHOCYTES 1.8 0.9 - 3.6 K/UL    ABS. MONOCYTES 0.5 0.05 - 1.2 K/UL    ABS. EOSINOPHILS 0.1 0.0 - 0.4 K/UL    ABS. BASOPHILS 0.1 0.0 - 0.1 K/UL    DF AUTOMATED     METABOLIC PANEL, COMPREHENSIVE    Collection Time: 09/10/20 12:23 PM   Result Value Ref Range    Sodium 141 136 - 145 mmol/L    Potassium 3.4 (L) 3.5 - 5.5 mmol/L    Chloride 108 100 - 111 mmol/L    CO2 29 21 - 32 mmol/L    Anion gap 4 3.0 - 18 mmol/L    Glucose 103 (H) 74 - 99 mg/dL    BUN 23 (H) 7.0 - 18 MG/DL    Creatinine 1.22 0.6 - 1.3 MG/DL    BUN/Creatinine ratio 19 12 - 20      GFR est AA 51 (L) >60 ml/min/1.73m2    GFR est non-AA 42 (L) >60 ml/min/1.73m2    Calcium 9.0 8.5 - 10.1 MG/DL    Bilirubin, total 0.6 0.2 - 1.0 MG/DL    ALT (SGPT) 13 13 - 56 U/L    AST (SGOT) 12 10 - 38 U/L    Alk.  phosphatase 93 45 - 117 U/L    Protein, total 7.5 6.4 - 8.2 g/dL    Albumin 3.7 3.4 - 5.0 g/dL    Globulin 3.8 2.0 - 4.0 g/dL    A-G Ratio 1.0 0.8 - 1.7     CARDIAC PANEL,(CK, CKMB & TROPONIN)    Collection Time: 09/10/20  3:24 PM   Result Value Ref Range    CK - MB 1.8 <3.6 ng/ml    CK-MB Index 5.1 (H) 0.0 - 4.0 %    CK 35 26 - 192 U/L    Troponin-I, QT <0.02 0.0 - 0.045 NG/ML   EKG, 12 LEAD, SUBSEQUENT    Collection Time: 09/10/20  3:32 PM   Result Value Ref Range    Ventricular Rate 52 BPM    Atrial Rate 52 BPM    P-R Interval 168 ms    QRS Duration 76 ms    Q-T Interval 478 ms    QTC Calculation (Bezet) 444 ms    Calculated P Axis 56 degrees    Calculated R Axis 55 degrees    Calculated T Axis 85 degrees    Diagnosis       Sinus bradycardia  Low voltage QRS  Anteroseptal infarct (cited on or before 14-AUG-2019)  Abnormal ECG  When compared with ECG of 10-SEP-2020 11:16,  premature atrial complexes are no longer present  Confirmed by Yaron Pinto ([id number]) on 9/11/2020 8:56:41 AM     CARDIAC PANEL,(CK, CKMB & TROPONIN)    Collection Time: 09/10/20 11:57 PM   Result Value Ref Range    CK - MB 1.9 <3.6 ng/ml    CK-MB Index 4.5 (H) 0.0 - 4.0 %    CK 42 26 - 192 U/L    Troponin-I, QT <0.02 0.0 - 0.045 NG/ML   CBC WITH AUTOMATED DIFF    Collection Time: 09/11/20  5:16 AM   Result Value Ref Range    WBC 8.3 4.6 - 13.2 K/uL    RBC 3.44 (L) 4.20 - 5.30 M/uL    HGB 9.4 (L) 12.0 - 16.0 g/dL    HCT 30.8 (L) 35.0 - 45.0 %    MCV 89.5 74.0 - 97.0 FL    MCH 27.3 24.0 - 34.0 PG    MCHC 30.5 (L) 31.0 - 37.0 g/dL    RDW 14.0 11.6 - 14.5 %    PLATELET 110 817 - 698 K/uL    MPV 11.7 9.2 - 11.8 FL    NEUTROPHILS 67 40 - 73 %    LYMPHOCYTES 24 21 - 52 %    MONOCYTES 6 3 - 10 %    EOSINOPHILS 2 0 - 5 %    BASOPHILS 1 0 - 2 %    ABS. NEUTROPHILS 5.6 1.8 - 8.0 K/UL    ABS. LYMPHOCYTES 2.0 0.9 - 3.6 K/UL    ABS. MONOCYTES 0.5 0.05 - 1.2 K/UL    ABS. EOSINOPHILS 0.1 0.0 - 0.4 K/UL    ABS.  BASOPHILS 0.1 0.0 - 0.1 K/UL    DF AUTOMATED     METABOLIC PANEL, BASIC    Collection Time: 09/11/20  5:16 AM   Result Value Ref Range    Sodium 146 (H) 136 - 145 mmol/L    Potassium 3.2 (L) 3.5 - 5.5 mmol/L    Chloride 110 100 - 111 mmol/L    CO2 28 21 - 32 mmol/L    Anion gap 8 3.0 - 18 mmol/L    Glucose 87 74 - 99 mg/dL    BUN 23 (H) 7.0 - 18 MG/DL    Creatinine 0.93 0.6 - 1.3 MG/DL BUN/Creatinine ratio 25 (H) 12 - 20      GFR est AA >60 >60 ml/min/1.73m2    GFR est non-AA 57 (L) >60 ml/min/1.73m2    Calcium 8.5 8.5 - 10.1 MG/DL   GLUCOSE, POC    Collection Time: 09/11/20  8:17 AM   Result Value Ref Range    Glucose (POC) 88 70 - 110 mg/dL   ECHO ADULT FOLLOW-UP OR LIMITED    Collection Time: 09/11/20 10:17 AM   Result Value Ref Range    Triscuspid Valve Regurgitation Peak Gradient 18.4 mmHg    TR Max Velocity 214.44 cm/s    IVC proximal 2.42 cm    Inferior Vena Cava Diameter Sniffing 2.42 cm   NUCLEAR CARDIAC STRESS TEST    Collection Time: 09/11/20 11:57 AM   Result Value Ref Range    Target  bpm    Exercise duration time 00:02:00     Stress Base Systolic  mmHg    Stress Base Diastolic BP 66 mmHg    Post peak  BPM    Baseline HR 58 BPM    Estimated workload 1.0 METS    Percent HR 93 %    Stress Rate Pressure Product 24,066 BPM*mmHg         Discharge diagnoses:    Problem List as of 9/11/2020 Date Reviewed: 8/13/2019          Codes Class Noted - Resolved    Cerebrovascular disease ICD-10-CM: I67.9  ICD-9-CM: 437.9  9/11/2020 - Present    Overview Signed 9/11/2020 12:07 AM by Dimitry Joiner MD     Previous TIA's              Iron deficiency anemia, unspecified ICD-10-CM: D50.9  ICD-9-CM: 280.9  12/16/2019 - Present        * (Principal) Chest pain ICD-10-CM: R07.9  ICD-9-CM: 786.50  8/14/2019 - Present        Nausea and vomiting ICD-10-CM: R11.2  ICD-9-CM: 787.01  8/13/2019 - Present        Chest pain at rest ICD-10-CM: R07.9  ICD-9-CM: 786.50  5/5/2019 - Present        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) ICD-10-CM: A33.500  ICD-9-CM: 707.13  1/3/2019 - Present        Cellulitis ICD-10-CM: L03.90  ICD-9-CM: 682.9  10/10/2018 - Present        Personal history of noncompliance with medical treatment, presenting hazards to health ICD-10-CM: Z91.19  ICD-9-CM: V15.81  5/31/2018 - Present        Contact dermatitis due to adhesive bandage ICD-10-CM: L25.8  ICD-9-CM: 692.89  5/31/2018 - Present        Midfoot ulcer, left, with fat layer exposed ([facility] ICD-10-CM: G88.095  ICD-9-CM: 707.14  5/17/2018 - Present        Hypertensive left ventricular hypertrophy, without heart failure ICD-10-CM: I11.9  ICD-9-CM: 402.90  1/15/2018 - Present        Atrial arrhythmia ICD-10-CM: I49.8  ICD-9-CM: 427.9  1/15/2018 - Present    Overview Signed 1/15/2018  4:08 PM by Judge Prasanna MD     Noted on 12 lead ECG 12/3/2017             Type 2 diabetes mellitus with nephropathy ([facility] ICD-10-CM: E11.21  ICD-9-CM: 250.40, 583.81  1/3/2018 - Present        Type 2 diabetes mellitus with diabetic neuropathy ([facility] ICD-10-CM: E11.40  ICD-9-CM: 250.60, 357.2  1/3/2018 - Present        Vascular disease, peripheral ([facility] ICD-10-CM: I73.9  ICD-9-CM: 443.9  12/7/2017 - Present        Non-pressure chronic ulcer of right ankle with fat layer exposed ([facility] ICD-10-CM: N51.177  ICD-9-CM: 707.13  12/7/2017 - Present        Syncope ICD-10-CM: R55  ICD-9-CM: 780.2  12/3/2017 - Present        Skin ulcer of left ankle, with fat layer exposed ([facility] ICD-10-CM: F08.980  ICD-9-CM: 707.13  11/16/2017 - Present        Venous ulcer of ankle, left (HCC) ICD-10-CM: G81.827, L97.329  ICD-9-CM: 454.0  11/16/2017 - Present        Weak pulse ICD-10-CM: R09.89  ICD-9-CM: 785.9  11/16/2017 - Present        UTI (urinary tract infection) ICD-10-CM: N39.0  ICD-9-CM: 599.0  11/6/2017 - Present        Encephalopathy ICD-10-CM: G93.40  ICD-9-CM: 348.30  11/6/2017 - Present        LAURA (acute kidney injury) ([facility] ICD-10-CM: N17.9  ICD-9-CM: 584.9  11/6/2017 - Present        Iron (Fe) deficiency anemia ICD-10-CM: D50.9  ICD-9-CM: 280.9  2/28/2017 - Present        Normocytic anemia ICD-10-CM: D64.9  ICD-9-CM: 285. 9  Unknown - Present        Stroke ([facility] ICD-10-CM: I63.9  ICD-9-CM: 434.91  Unknown - Present        Hypertension ICD-10-CM: I10  ICD-9-CM: 401.9  Unknown - Present        DM (diabetes mellitus) ([facility] ICD-10-CM: E11.9  ICD-9-CM: 250.00  Unknown - Present        Type 2 diabetes mellitus ([facility] ICD-10-CM: E11.9  ICD-9-CM: 250.00  8/8/2013 - Present        CVA, old, hemiparesis ([facility] ICD-10-CM: I65.262  ICD-9-CM: 438.20  8/7/2013 - Present        Acute on chronic renal insufficiency ICD-10-CM: N28.9, N18.9  ICD-9-CM: 593.9, 585.9  7/11/2013 - Present    Overview Signed 2/2/2017 11:39 PM by Pal Shafer     Overview:    7/8/13 7/10/13  Cr  1.2 1.5             Leukocytosis ICD-10-CM: F78.701  ICD-9-CM: 288.60  7/11/2013 - Present    Overview Signed 2/2/2017 11:39 PM by Pal Shafer     Overview:   WBC 11.4  P 86             Microscopic hematuria ICD-10-CM: R31.29  ICD-9-CM: 599.72  7/11/2013 - Present        Rhabdomyolysis ICD-10-CM: M62.82  ICD-9-CM: 728.88  7/11/2013 - Present    Overview Signed 2/2/2017 11:39 PM by Pal Shafer     Overview:   Ck  930, 1053             Injury of foot ICD-10-CM: G61.973H  ICD-9-CM: 959.7  7/10/2013 - Present        CKD (chronic kidney disease) stage 3, GFR 30-59 ml/min ([facility]) ICD-10-CM: N18.3  ICD-9-CM: 585.3  6/24/2013 - Present    Overview Signed 2/3/2017 12:25 AM by Ron Aguilar noted (08/06/13) BUN/sCr: 54/2.49, GFR 20.               Abnormal finding on thyroid function test ICD-10-CM: R94.6  ICD-9-CM: 794.5  6/11/2013 - Present        Anemia ICD-10-CM: D64.9  ICD-9-CM: 285.9  6/11/2013 - Present        Chronic osteomyelitis of ankle ([facility] ICD-10-CM: M68.618  ICD-9-CM: 730.17  6/11/2013 - Present        Spinal stenosis of lumbar region ICD-10-CM: M48.061  ICD-9-CM: 724.02  6/11/2013 - Present        Vitamin D deficiency ICD-10-CM: E55.9  ICD-9-CM: 268.9  6/11/2013 - Present        Elevated TSH ICD-10-CM: R79.89  ICD-9-CM: 794.5  6/25/2012 - Present    Overview Signed 2/3/2017 12:29 AM by Pal Shafer     5.08              RESOLVED: ARF (acute renal failure) ([facility] ICD-10-CM: N17.9  ICD-9-CM: 584.9  8/7/2013 - 8/8/2013        RESOLVED: Dehydration ICD-10-CM: E86.0  ICD-9-CM: 276.51  8/7/2013 - 8/8/2013        RESOLVED: Altered mental status ICD-10-CM: R41.82  ICD-9-CM: 780.97  8/7/2013 - 8/8/2013        RESOLVED: Confusion ICD-10-CM: R41.0  ICD-9-CM: 298.9  8/7/2013 - 8/8/2013        RESOLVED: Metabolic encephalopathy MEF-17-UV: G93.41  ICD-9-CM: 348.31  8/7/2013 - 8/8/2013            Hospital Course:  Major issues addressed during hospitalization outlined  below. Isabelle Lim is a 81 y.o. female who presented to the ER with pain in her chest.  It has been intermittent in nature over the past two days. When the pain has been present it is reported as severe, 10/10 pain. She does not have diaphoresis but she does have SOB. She reportedly has had negative stress testing about 18 months ago. The PA in the ER discussed case with Cardiology who recommended admission for further testing. Patient underwent stress test and echo which are negative. Patient can f/u with PCP in 1 week.   Home today  Rola Torres MD  September 11, 2020        Total time spent 35 minutes

## 2020-09-11 NOTE — DISCHARGE INSTRUCTIONS
DISCHARGE SUMMARY from Nurse    PATIENT INSTRUCTIONS:    After general anesthesia or intravenous sedation, for 24 hours or while taking prescription Narcotics:  · Limit your activities  · Do not drive and operate hazardous machinery  · Do not make important personal or business decisions  · Do  not drink alcoholic beverages  · If you have not urinated within 8 hours after discharge, please contact your surgeon on call. Report the following to your surgeon:  · Excessive pain, swelling, redness or odor of or around the surgical area  · Temperature over 100.5  · Nausea and vomiting lasting longer than 4 hours or if unable to take medications  · Any signs of decreased circulation or nerve impairment to extremity: change in color, persistent  numbness, tingling, coldness or increase pain  · Any questions    What to do at Home:  Recommended activity: Activity as tolerated    If you experience any of the following symptoms worsening shortness of breath, palpitations, or chest pain, please follow up with cardiologist/emergency room. *  Please give a list of your current medications to your Primary Care Provider. *  Please update this list whenever your medications are discontinued, doses are      changed, or new medications (including over-the-counter products) are added. *  Please carry medication information at all times in case of emergency situations. These are general instructions for a healthy lifestyle:    No smoking/ No tobacco products/ Avoid exposure to second hand smoke  Surgeon General's Warning:  Quitting smoking now greatly reduces serious risk to your health.     Obesity, smoking, and sedentary lifestyle greatly increases your risk for illness    A healthy diet, regular physical exercise & weight monitoring are important for maintaining a healthy lifestyle    You may be retaining fluid if you have a history of heart failure or if you experience any of the following symptoms:  Weight gain of 3 pounds or more overnight or 5 pounds in a week, increased swelling in our hands or feet or shortness of breath while lying flat in bed. Please call your doctor as soon as you notice any of these symptoms; do not wait until your next office visit. The discharge information has been reviewed with the patient. The patient verbalized understanding. Discharge medications reviewed with the patient and appropriate educational materials and side effects teaching were provided. Patient armband removed and shredded.

## 2020-09-11 NOTE — PROGRESS NOTES
Problem: Falls - Risk of  Goal: *Absence of Falls  Description: Document Stanislaw Gina Fall Risk and appropriate interventions in the flowsheet. Outcome: Progressing Towards Goal  Note: Fall Risk Interventions:  Mobility Interventions: Communicate number of staff needed for ambulation/transfer, Patient to call before getting OOB, Utilize walker, cane, or other assistive device         Medication Interventions: Patient to call before getting OOB, Teach patient to arise slowly    Elimination Interventions: Call light in reach, Patient to call for help with toileting needs    History of Falls Interventions:  Investigate reason for fall         Problem: Pain  Goal: *Control of Pain  Outcome: Progressing Towards Goal

## 2020-09-11 NOTE — PROGRESS NOTES
TRANSFER - IN REPORT:    Verbal report received from Alphonse De La Cruz RN(name) on [place] [facility]  being received from ED(unit) for routine progression of care      Report consisted of patients Situation, Background, Assessment and   Recommendations(SBAR). Information from the following report(s) SBAR, Kardex, ED Summary, Intake/Output, MAR and Recent Results was reviewed with the receiving nurse. Opportunity for questions and clarification was provided. Assessment completed upon patients arrival to unit and care assumed. 8732 Dual skin assessment with Gianluca Ribeiro RN. Arrived to floor via wheelchair, Ambulatory to bathroom with assistance of 1 and quad cane. Denies pain or discomfort at present. Bed locked in lowest position. Instructed on use of call light and understand to use for assistance and needs. 0330 Ambulatory to bathroom with assistance of 1 and walking cane, unsteady gait noted. 9539 Ambulatory to bathroom with assistance of 1 and walking cane. 0725 Bedside and Verbal shift change report given to PATIENCE Rueda (oncoming nurse) by Tesha Silva RN (offgoing nurse). Report given with SBAR, Kardex, Intake/Output, MAR and Recent Results.

## 2020-09-11 NOTE — ED NOTES
Patient orders are for NPO after midnight. Patient given meal in ED and finished eating it at this time.

## 2020-09-11 NOTE — H&P
History and Physical    Patient: Lewis Jean Baptiste               Sex: female          DOA: 9/10/2020       YOB: 1935      Age:  81 y.o.        LOS:  LOS: 0 days        HPI:     Lewis Jean Baptiste is a 81 y.o. female who presented to the ER with pain in her chest.  It has been intermittent in nature over the past two days. When the pain has been present it is reported as severe, 10/10 pain. She does not have diaphoresis but she does have SOB. She reportedly has had negative stress testing about 18 months ago. The PA in the ER discussed case with Cardiology who recommended admission for further testing. Past Medical History:   Diagnosis Date    Chronic kidney disease     Chronic osteomyelitis of ankle ([address]     DM (diabetes mellitus) ([address]     Hypertension     Lumbar spinal stenosis     Normocytic anemia     Rhabdomyolysis     Splenomegaly     Stroke (HCC)     TIA x2    Vitamin D deficiency        Social History:   Tobacco use:  Patient does not smoke   Alcohol use:  Patient does not use alcohol   Patient lives with her  and daughter    Family History: Mother was reportedly healhty   Father  of CAD    Review of Systems    Constitutional:  No fever or weight loss  HEENT:  No headache or visual changes  Cardiovascular:  Chest pain as above, no diaphoresis  Respiratory:  No coughing, wheezing, or shortness of breath. GI:  No nausea or vomitting. No diarrhea  :  No hematuria or dysuria  Skin:  No rashes or moles  Neuro:  No seizures or syncope  Hematological:  No bruising or bleeding  Endocrine:  No diabetes or thyroid disease    Physical Exam:      Visit Vitals  BP (!) 168/39   Pulse 70   Temp 97.7 °F (36.5 °C)   Resp 18   Ht 5' 3.5\" (1.613 m)   Wt 59 kg (130 lb)   SpO2 100%   BMI 22.67 kg/m²       Physical Exam:    Gen:  No distress, alert  HEENT:  Normal cephalic atraumatic, extra-occular movements are intact.   Neck:  Supple, No JVD  Lungs:  Clear bilaterally, no wheeze, no rales, normal effort  Heart:  Regular Rate and Rhythm, normal S1 and S2, no edema  Abdomen:  Soft, non tender, normal bowel sounds, no guarding. Extremities:  Well perfused, no cyanosis or edema  Neurological:  Awake and alert, CN's are intact, normal strength throughout extremities  Skin:  No rashes or moles  Mental Status:  Normal thought process, does not appear anxious    Laboratory Studies:    BMP:   Lab Results   Component Value Date/Time     09/10/2020 12:23 PM    K 3.4 (L) 09/10/2020 12:23 PM     09/10/2020 12:23 PM    CO2 29 09/10/2020 12:23 PM    AGAP 4 09/10/2020 12:23 PM     (H) 09/10/2020 12:23 PM    BUN 23 (H) 09/10/2020 12:23 PM    CREA 1.22 09/10/2020 12:23 PM    GFRAA 51 (L) 09/10/2020 12:23 PM    GFRNA 42 (L) 09/10/2020 12:23 PM     CBC:   Lab Results   Component Value Date/Time    WBC 10.0 09/10/2020 12:23 PM    HGB 11.2 (L) 09/10/2020 12:23 PM    HCT 35.8 09/10/2020 12:23 PM     09/10/2020 12:23 PM     All Cardiac Markers in the last 24 hours:   Lab Results   Component Value Date/Time    CPK 35 09/10/2020 03:24 PM    CPK 43 09/10/2020 12:23 PM    CKMB 1.8 09/10/2020 03:24 PM    CKMB 1.8 09/10/2020 12:23 PM    CKND1 5.1 (H) 09/10/2020 03:24 PM    CKND1 4.2 (H) 09/10/2020 12:23 PM    Maggie Schwab <0.02 09/10/2020 03:24 PM    Maggie Schwab <0.02 09/10/2020 12:23 PM       Assessment/Plan     Principal Problem:    Chest pain (8/14/2019)    Active Problems:    Hypertension ()      Type 2 diabetes mellitus with diabetic neuropathy ([facility] (1/3/2018)      Cerebrovascular disease (9/11/2020)      Overview: Previous TIA's         PLAN:    Admitted under Observation status  Serial cardiac enzymes  Cardiology engaged from ER. Proceed with further stress testing  BS control  BP control.

## 2020-09-11 NOTE — CONSULTS
[facility] - Consult Note    Consultation request by Ronen Garcia MD for advice/opinion related to evaluating Chest pain [R07.9]    Date of  Admission: 9/10/2020 11:35 AM   Primary Care Physician:  Berhane Salas MD     Assessment:     - Chest pain, cardiac enzymes negative x2, no acute EKG changes  - Hypertension  - History of TIA's  - Diabetes  - Advance age     Plan:     -MI ruled out by negative cardiac biomarker. -EKG with nonspecific baseline ST changes. No dynamic ST changes of ischemia  -Aspirin 325 mg given yesterday  -Continue antihypertensives with lisinopril and hydrochlorothiazide  -Echo with normal EF without any wall motion abnormality  -Nuclear stress test performed and images reviewed. No evidence of fixed or reversible defect. EF is normal.    Discussed echo and stress test finding with the patient. MI has been ruled out with negative cardiac biomarker and noninvasive risk stratification has been low risk. No further cardiac work-up is necessary at this time. She would follow-up with us in the clinic after discharge. History of Present Illness: This is a 81 y.o. female admitted for Chest pain [R07.9]. Patient complains of:    27-year-old female with history of diabetes, hypertension, TIA. Came to hospital with chest pain which was 10 out of 10 in intensity without any nausea vomiting diaphoresis. There was no radiation. Partially relieved with nitroglycerin. Since she has been admitted, she had no more chest pain since admission. Cardiology asked for further evaluation. She has been having this chest pain on and off for last several years. Cardiac work-up in the past has been unrevealing.     Cardiac risk factors: none      Review of Symptoms:  Except as stated above include:  Constitutional:  negative  Respiratory:  negative  Cardiovascular:  negative  Gastrointestinal: negative  Genitourinary:  negative  Musculoskeletal:  Negative  Neurological: Negative  Dermatological:  Negative  Endocrinological: Negative  Psychological:  Negative    A comprehensive review of systems was negative except for that written in the HPI.      Past Medical History:     Past Medical History:   Diagnosis Date    Chronic kidney disease     [facility] osteomyelitis of ankle ([facility]     DM (diabetes mellitus) ([facility]     Hypertension     Lumbar spinal stenosis     Normocytic anemia     Rhabdomyolysis     Splenomegaly     Stroke (HCC)     TIA x2    Vitamin D deficiency          Social History:     Social History     Socioeconomic History    Marital status:      Spouse name: Not on file    Number of children: Not on file    Years of education: Not on file    Highest education level: Not on file   Tobacco Use    Smoking status: Never Smoker    Smokeless tobacco: Never Used   Substance and Sexual Activity    Alcohol use: No    Drug use: No    Sexual activity: Never        Family History:     Family History   Problem Relation Age of Onset    Diabetes Other     Hypertension Other         Medications:   No Known Allergies     Current Facility-Administered Medications   Medication Dose Route Frequency    dicyclomine (BENTYL) capsule 10 mg  10 mg Oral QID PRN    diphenoxylate-atropine (LOMOTIL) tablet 1 Tab  1 Tab Oral QID PRN    gabapentin (NEURONTIN) capsule 300 mg  300 mg Oral TID    lisinopril-hydrochlorothiazide (PRINZIDE, ZESTORETIC) 20-25 mg   Oral DAILY    metoprolol tartrate (LOPRESSOR) tablet 50 mg  50 mg Oral DAILY    nitroglycerin (NITROSTAT) tablet 0.4 mg  0.4 mg SubLINGual PRN    ondansetron (ZOFRAN ODT) tablet 4 mg  4 mg Oral Q8H PRN    sodium chloride (NS) flush 5-40 mL  5-40 mL IntraVENous Q8H    sodium chloride (NS) flush 5-40 mL  5-40 mL IntraVENous PRN    acetaminophen (TYLENOL) tablet 650 mg  650 mg Oral Q6H PRN    Or    acetaminophen (TYLENOL) suppository 650 mg  650 mg Rectal Q6H PRN    polyethylene glycol (MIRALAX) packet 17 g  17 g Oral DAILY PRN    promethazine (PHENERGAN) tablet 12.5 mg  12.5 mg Oral Q6H PRN    Or    ondansetron (ZOFRAN) injection 4 mg  4 mg IntraVENous Q6H PRN    enoxaparin (LOVENOX) injection 30 mg  30 mg SubCUTAneous DAILY    hydrALAZINE (APRESOLINE) 20 mg/mL injection 10 mg  10 mg IntraVENous Q6H PRN    technetium sestamibi (CARDIOLITE) injection 9.7 millicurie  9.7 millicurie IntraVENous RAD ONCE    regadenoson (LEXISCAN) injection 0.4 mg  0.4 mg IntraVENous RAD ONCE    technetium sestamibi (CARDIOLITE) injection 36 millicurie  36 millicurie IntraVENous RAD ONCE         Physical Exam:     Visit Vitals  /66   Pulse 63   Temp 97.6 °F (36.4 °C)   Resp 16   Ht 5' 3\" (1.6 m)   Wt 130 lb (59 kg)   SpO2 97%   Breastfeeding No   BMI 23.03 kg/m²     BP Readings from Last 3 Encounters:   09/11/20 199/66   06/09/20 166/74   03/16/20 (!) 117/93     Pulse Readings from Last 3 Encounters:   09/11/20 63   03/16/20 82   12/30/19 60     Wt Readings from Last 3 Encounters:   09/11/20 130 lb (59 kg)   06/09/20 125 lb (56.7 kg)   03/16/20 125 lb (56.7 kg)       General:  alert, cooperative, no distress, appears stated age  Neck:  no carotid bruit, no JVD  Lungs:  clear to auscultation bilaterally  Heart:  regular rate and rhythm, S1, S2 normal, no murmur, click, rub or gallop  Abdomen:  abdomen is soft without significant tenderness, masses, organomegaly or guarding  Extremities:  extremities normal, atraumatic, no cyanosis or edema  Skin: Warm and dry.  no hyperpigmentation, vitiligo, or suspicious lesions  Neuro: alert, oriented x3, affect appropriate, no focal neurological deficits, moves all extremities well,   Psych: non focal     Data Review:     Recent Labs     09/11/20  0516 09/10/20  1223   WBC 8.3 10.0   HGB 9.4* 11.2*   HCT 30.8* 35.8    307     Recent Labs     09/11/20  0516 09/10/20  1223   * 141   K 3.2* 3.4*    108   CO2 28 29   GLU 87 103*   BUN 23* 23*   CREA 0.93 1.22   CA 8.5 9.0   ALB  -- 3. 7   ALT  --  13       Results for orders placed or performed during the hospital encounter of 09/10/20   EKG, 12 LEAD, INITIAL   Result Value Ref Range    Ventricular Rate 73 BPM    Atrial Rate 73 BPM    P-R Interval 146 ms    QRS Duration 68 ms    Q-T Interval 404 ms    QTC Calculation (Bezet) 445 ms    Calculated P Axis 72 degrees    Calculated R Axis 82 degrees    Calculated T Axis 64 degrees    Diagnosis       Sinus rhythm with premature atrial complexes  Septal infarct (cited on or before 14-AUG-2019)  Abnormal ECG  When compared with ECG of 16-MAR-2020 12:21,  premature atrial complexes are now present    Confirmed by Juan Whitman ([id number]) on 2020 8:54:07 AM     Results for orders placed or performed during the hospital encounter of 18   ECG HOLTER MONITOR, UP TO 59 [address]                                         Test Date:    2018  Pat Name:     Ewelina Lozada             Department:     Patient ID:   [id number]                Room:           Gender:       Female                   Technician:     :          11-               Requested By: Dr. Belcher [place] Number:                          Juma MD:   Cecily Holguin MD                    Interpretive Statements  Rosalba Perez was in Sinus. The average heart rate, excluding ectopy, was 64 BPM with a minimum of 43 BPM   at 22:51 D1 and a maximum of 121 BPM at  10:51 D3. Heart beats, including ectopy, totaled [id number] beats. VENTRICULAR ECTOPICS totaled 3392  averaging  70.7 per hour  with 2971   single, 266 paired, 61 trigeminy and 0 R on T.  BIGEMINY runs occurred 20   times and totaled 64 beats. VENTRICULAR TACHYCARDIA occurred 9 times.    The fastest run was at 156 BPM and occurred at 10:55 D3 with 4 beats   The longest run was 5 beats and occurred at  05:19 D3 at a rate of 60 BPM.    SUPRAVENTRICULAR ECTOPICS totaled 09503  averaging  497.7 per hour  ,with   53372 single and 3268 paired beats. SUPRAVENTRICULAR TACHYCARDIA occurred 337 times. The fastest run was at 146 BPM and occurred at 15:00 D2 with 3 beats. The longest run was 14 beats at 23:41 D2 at a rate of 120 BPM.    Basic rhythm is sinus with an average heart rate of 64 bpm. Range was 43 to   121 bpm.  occasional PVCs ( 3392 per 48 hour recording ) with 133 couplets and 9   ventricular salvos, the longest of which was 5 beats. Frequent PACs and episodes of SVT. Fastest run was 146 bpm with only 3 beats. Longest run was 14 beats.   Electronically signed by Daisha Dao MD on Feb 1 2018  8:07AM CDT       All Cardiac Markers in the last 24 hours:    Lab Results   Component Value Date/Time    CPK 42 09/10/2020 11:57 PM    CPK 35 09/10/2020 03:24 PM    CPK 43 09/10/2020 12:23 PM    CKMB 1.9 09/10/2020 11:57 PM    CKMB 1.8 09/10/2020 03:24 PM    CKMB 1.8 09/10/2020 12:23 PM    CKND1 4.5 (H) 09/10/2020 11:57 PM    CKND1 5.1 (H) 09/10/2020 03:24 PM    CKND1 4.2 (H) 09/10/2020 12:23 PM    TROIQ <0.02 09/10/2020 11:57 PM    TROIQ <0.02 09/10/2020 03:24 PM    TROIQ <0.02 09/10/2020 12:23 PM       Last Lipid:    Lab Results   Component Value Date/Time    Cholesterol, total 162 05/06/2019 06:25 AM    HDL Cholesterol 45 05/06/2019 06:25 AM    LDL, calculated 85.4 05/06/2019 06:25 AM    Triglyceride 158 (H) 05/06/2019 06:25 AM    CHOL/HDL Ratio 3.6 05/06/2019 06:25 AM       Signed By: Marta Dominguez MD     September 11, 2020

## 2020-09-11 NOTE — MED STUDENT NOTES
H&P 
 
Name: Duane Veliz. Claudette Six : [birth year] AMOS: 20 Assessment/Plan: 1. Chest pain, probability that this could be anginal in nature due to CAD based on clinical presentation; stress test to assess for ischemic heart disease; medical management with aspirin, plavix and statin; obtain lipid panel 2. Hypertension; managed by Dr. June Christine, PCP, with lisinopril 20 mg every day po and metoprolol 25 mg BID po; subsequent EKG showed sinus bradycardia, patient continues to have low heart rate; change metoprolol to 25 mg QD 3. Type II diabetes mellitus, complicated by diabetic neuropathy; patient manages with diet; last A1c 6.4 in 2019; obtain HbA1c 
4. Transient ischemic attack x1; patient needs to be on prophylactic therapy with plavix, asp, and a statin; CT scan 2017 showed chronic ischemic changes due to age, CT 2017 showed atherosclerotic calcifications of bilateral carotid siphons 5. Normocytic anemia; monitor for changes in [facility]; she has maintained a baseline anemia for the past several months Subjective: 
CC: chest pain HPI: Ms. Claudette Six is an 81year old  female with a history of hypertension and type II diabetes mellitus that presents 9/10/20 with severe, sharp, non-radiating pressure that started while she was riding in her 's car. The past 2-3 years the patient has had self-limiting sharp, pressure-like pain in the center of her chest. She does not have to be exerting herself for the pain to occur. She had chest pain three days ago that is similar to her recurrent chest pains that resolves with nitroglycerin. She has never had pain like this before, she claims, \"it brought tears to my eyes. \" She was brought to the ED by her , where the pain began to subside. She is currently not having any chest pain. She denies shortness of breath, sweating, nausea or vomiting during these episodes. She denies belly pain or swelling. ROS: 
Const: no fevers HEENT: no headaches or dizziness Cardiac: no chest pain, no orthopnea Pulm: no cough, no shortness of breath GI: no belly pain, no nausea, no vomiting Extremities: no swelling, no rashes PMH:  
Hypertension Type II Diabetes mellitus Diabetic neuropathy TIA Vitamin D deficiency Lumbar stenosis Medications: No current facility-administered medications on file prior to encounter. Current Outpatient Medications on File Prior to Encounter Medication Sig Dispense Refill  nitroglycerin (NITROSTAT) 0.4 mg SL tablet 1 Tab by SubLINGual route as needed for Chest Pain. Up to 3 doses. 20 Tab 0  
 dicyclomine (BENTYL) 10 mg capsule Take 1 Cap by mouth four (4) times daily as needed (abd cramps). 20 Cap 0  
 metoprolol tartrate (LOPRESSOR) 50 mg tablet Take 1 Tab by mouth daily. 30 Tab 0  
 gabapentin (NEURONTIN) 300 mg capsule Take 1 Cap by mouth three (3) times daily. 12/22/16, QTY#90, 30 Days, 2 Refills. Dr. Dana Yang  2  cholecalciferol (VITAMIN D3) 1,000 unit tablet Take 5,000 Units by mouth daily.  lisinopril-hydrochlorothiazide (PRINZIDE, ZESTORETIC) 20-25 mg per tablet Take 1 Tab by mouth daily.  ondansetron hcl (ZOFRAN) 4 mg tablet Take 2 Tabs by mouth every eight (8) hours as needed for Nausea. 12 Tab 0  
 diphenoxylate-atropine (LOMOTIL) 2.5-0.025 mg per tablet Take 1 Tab by mouth four (4) times daily as needed for Diarrhea. Max Daily Amount: 4 Tabs. 20 Tab 0  
 acetaminophen (TYLENOL) 500 mg tablet Take 1 Tab by mouth every six (6) hours as needed for Pain or Fever. 40 Tab 0  
 ondansetron (ZOFRAN ODT) 4 mg disintegrating tablet Take 1 Tab by mouth every eight (8) hours as needed for Nausea. 12 Tab 0  
 omega 3-dha-epa-fish oil (FISH OIL) 100-160-1,000 mg cap Take  by mouth.  OTHER     
 cyanocobalamin/folic ac/vit B6 (FOLIC ACID-VIT W1-FTO L93 PO) Take 1 Tab by mouth daily. Allergies:  
No known allergies to food, drugs or environment Past surgical history: 
Cholecystectomy Oophorectomy Hysterectomy Family history: 
Paternal grandfather - MI, heart disease Maternal grandfather - heart disease Father - heart disease,  at 47 due to MI Mother - healthy,  Social History: 
Lives with , never smoker, no alcohol use, no recreational drug use, able to get around on her own, able to execute ADL's 
 
Objective: 
GA: patient is recumbent in no acute distress; oriented to person, place, and time; well-nourished, well-developed VS: /57 (BP 1 Location: Right arm, BP Patient Position: At rest)   Pulse 63   Temp 97.6 °F (36.4 °C)   Resp 16   Ht 5' 3.5\" (1.613 m)   Wt 130 lb (59 kg)   SpO2 97%   Breastfeeding No   BMI 22.67 kg/m² Physical exam: 
HEENT: head atraumatic, normocephalic; PERRLA, EOMI; external auditory canals patent and non-erythematous bilaterally; oral mucosa pink and moist; neck supple; thyroid gland symmetric and non-enlarged Cardiac: no cyanosis, no tenderness to palpation of chest or back; no JVD ausculated; heart rate slowed at 55 bpm, grade 2/6 blowing systolic murmur head best at the LUSB with sound radiating to the carotid arteries; no rubs or gallops auscultated; 2+ radial pulses bilaterally and equal; no pedal edema in bilaterally lateral extremities Pulm: no clubbing in the fingers; lungs clear to auscultation in all fields bilaterally without wheezes, rhonchi or rales GI: abdomen scaphoid and soft; normoactive bowel sounds; no tenderness to palpation in all four quadrants; no guarding or rebound tenderness Labs: 
Recent Results (from the past 24 hour(s)) EKG, 12 LEAD, INITIAL Collection Time: 09/10/20 11:16 AM  
Result Value Ref Range Ventricular Rate 73 BPM  
 Atrial Rate 73 BPM  
 P-R Interval 146 ms  
 QRS Duration 68 ms Q-T Interval 404 ms QTC Calculation (Bezet) 445 ms Calculated P Axis 72 degrees Calculated R Axis 82 degrees Calculated T Axis 64 degrees Diagnosis Sinus rhythm with premature atrial complexes Septal infarct (cited on or before 14-AUG-2019) Abnormal ECG When compared with ECG of 16-MAR-2020 12:21, 
premature atrial complexes are now present Confirmed by Tello Ramos ([id number]) on 9/11/2020 8:54:07 AM 
  
CARDIAC PANEL,(CK, CKMB & TROPONIN) Collection Time: 09/10/20 12:23 PM  
Result Value Ref Range CK - MB 1.8 <3.6 ng/ml CK-MB Index 4.2 (H) 0.0 - 4.0 % CK 43 26 - 192 U/L Troponin-I, QT <0.02 0.0 - 0.045 NG/ML  
CBC WITH AUTOMATED DIFF Collection Time: 09/10/20 12:23 PM  
Result Value Ref Range WBC 10.0 4.6 - 13.2 K/uL  
 RBC 3.99 (L) 4.20 - 5.30 M/uL  
 HGB 11.2 (L) 12.0 - 16.0 g/dL HCT 35.8 35.0 - 45.0 % MCV 89.7 74.0 - 97.0 FL  
 MCH 28.1 24.0 - 34.0 PG  
 MCHC 31.3 31.0 - 37.0 g/dL  
 RDW 14.0 11.6 - 14.5 % PLATELET 746 364 - 546 K/uL MPV 11.5 9.2 - 11.8 FL  
 NEUTROPHILS 75 (H) 40 - 73 % LYMPHOCYTES 18 (L) 21 - 52 % MONOCYTES 5 3 - 10 % EOSINOPHILS 1 0 - 5 % BASOPHILS 1 0 - 2 %  
 ABS. NEUTROPHILS 7.6 1.8 - 8.0 K/UL  
 ABS. LYMPHOCYTES 1.8 0.9 - 3.6 K/UL  
 ABS. MONOCYTES 0.5 0.05 - 1.2 K/UL  
 ABS. EOSINOPHILS 0.1 0.0 - 0.4 K/UL  
 ABS. BASOPHILS 0.1 0.0 - 0.1 K/UL  
 DF AUTOMATED METABOLIC PANEL, COMPREHENSIVE Collection Time: 09/10/20 12:23 PM  
Result Value Ref Range Sodium 141 136 - 145 mmol/L Potassium 3.4 (L) 3.5 - 5.5 mmol/L Chloride 108 100 - 111 mmol/L  
 CO2 29 21 - 32 mmol/L Anion gap 4 3.0 - 18 mmol/L Glucose 103 (H) 74 - 99 mg/dL BUN 23 (H) 7.0 - 18 MG/DL Creatinine 1.22 0.6 - 1.3 MG/DL  
 BUN/Creatinine ratio 19 12 - 20 GFR est AA 51 (L) >60 ml/min/1.73m2 GFR est non-AA 42 (L) >60 ml/min/1.73m2 Calcium 9.0 8.5 - 10.1 MG/DL Bilirubin, total 0.6 0.2 - 1.0 MG/DL  
 ALT (SGPT) 13 13 - 56 U/L  
 AST (SGOT) 12 10 - 38 U/L Alk. phosphatase 93 45 - 117 U/L Protein, total 7.5 6.4 - 8.2 g/dL Albumin 3.7 3.4 - 5.0 g/dL Globulin 3.8 2.0 - 4.0 g/dL A-G Ratio 1.0 0.8 - 1.7 CARDIAC PANEL,(CK, CKMB & TROPONIN) Collection Time: 09/10/20  3:24 PM  
Result Value Ref Range CK - MB 1.8 <3.6 ng/ml CK-MB Index 5.1 (H) 0.0 - 4.0 % CK 35 26 - 192 U/L Troponin-I, QT <0.02 0.0 - 0.045 NG/ML  
EKG, 12 LEAD, SUBSEQUENT Collection Time: 09/10/20  3:32 PM  
Result Value Ref Range Ventricular Rate 52 BPM  
 Atrial Rate 52 BPM  
 P-R Interval 168 ms QRS Duration 76 ms  
 Q-T Interval 478 ms QTC Calculation (Bezet) 444 ms Calculated P Axis 56 degrees Calculated R Axis 55 degrees Calculated T Axis 85 degrees Diagnosis Sinus bradycardia Low voltage QRS Anteroseptal infarct (cited on or before 14-AUG-2019) Abnormal ECG When compared with ECG of 10-SEP-2020 11:16, 
premature atrial complexes are no longer present Confirmed by Elaine Shi ([id number]) on 9/11/2020 8:56:41 AM 
  
CARDIAC PANEL,(CK, CKMB & TROPONIN) Collection Time: 09/10/20 11:57 PM  
Result Value Ref Range CK - MB 1.9 <3.6 ng/ml CK-MB Index 4.5 (H) 0.0 - 4.0 % CK 42 26 - 192 U/L Troponin-I, QT <0.02 0.0 - 0.045 NG/ML  
CBC WITH AUTOMATED DIFF Collection Time: 09/11/20  5:16 AM  
Result Value Ref Range WBC 8.3 4.6 - 13.2 K/uL  
 RBC 3.44 (L) 4.20 - 5.30 M/uL HGB 9.4 (L) 12.0 - 16.0 g/dL HCT 30.8 (L) 35.0 - 45.0 % MCV 89.5 74.0 - 97.0 FL  
 MCH 27.3 24.0 - 34.0 PG  
 MCHC 30.5 (L) 31.0 - 37.0 g/dL  
 RDW 14.0 11.6 - 14.5 % PLATELET 577 044 - 730 K/uL MPV 11.7 9.2 - 11.8 FL  
 NEUTROPHILS 67 40 - 73 % LYMPHOCYTES 24 21 - 52 % MONOCYTES 6 3 - 10 % EOSINOPHILS 2 0 - 5 % BASOPHILS 1 0 - 2 %  
 ABS. NEUTROPHILS 5.6 1.8 - 8.0 K/UL  
 ABS. LYMPHOCYTES 2.0 0.9 - 3.6 K/UL  
 ABS. MONOCYTES 0.5 0.05 - 1.2 K/UL  
 ABS. EOSINOPHILS 0.1 0.0 - 0.4 K/UL  
 ABS. BASOPHILS 0.1 0.0 - 0.1 K/UL  
 DF AUTOMATED METABOLIC PANEL, BASIC  Collection Time: 09/11/20  5:16 AM  
 Result Value Ref Range Sodium 146 (H) 136 - 145 mmol/L Potassium 3.2 (L) 3.5 - 5.5 mmol/L Chloride 110 100 - 111 mmol/L  
 CO2 28 21 - 32 mmol/L Anion gap 8 3.0 - 18 mmol/L Glucose 87 74 - 99 mg/dL BUN 23 (H) 7.0 - 18 MG/DL Creatinine 0.93 0.6 - 1.3 MG/DL  
 BUN/Creatinine ratio 25 (H) 12 - 20 GFR est AA >60 >60 ml/min/1.73m2 GFR est non-AA 57 (L) >60 ml/min/1.73m2 Calcium 8.5 8.5 - 10.1 MG/DL  
GLUCOSE, POC Collection Time: 09/11/20  8:17 AM  
Result Value Ref Range Glucose (POC) 88 70 - 110 mg/dL NUCLEAR CARDIAC STRESS TEST Collection Time: 09/11/20  9:37 AM  
Result Value Ref Range Target  bpm  
 
EKG INITIAL 9/10/20: 
Sinus rhythm with PAC Septal infarct, noted previously on 8/2019 EKG EKG SUBSEQUENT 9/10/20: 
Sinus bradycardia Anteroseptal infarct, noted previously on 8/2019 EKG Nuclear Cardiac Stress Test 5/6/2019: 
Baseline EKG: normal  
Negative stress test 
Gated SPECT: LV function post-stress was normal; calc EF 66% LV perfusion is normal 
No convincing evidence of significant reversible defect to suggest on-going major ischemia. No fixed defect to suggest infarct Low risk stress test 
 
ECHO 6/9/20: 
Estimated LVEF 55-60% Mitral valve non-specific thickening Trace mitral regurgitation is present Trivial circumferential pericardial effusion *ATTENTION:  This note has been created by a medical student for educational purposes only. Please do not refer to the content of this note for clinical decision-making, billing, or other purposes. Please see attending physicians note to obtain clinical information on this patient. *

## 2020-09-11 NOTE — PROGRESS NOTES
Comprehensive Nutrition Assessment    Type and Reason for Visit: Initial, Positive nutrition screen(MST)    Nutrition Recommendations/Plan:   - Monitor readiness for diet initiation following test      Nutrition Assessment:  Pt off unit at time of visit. Is NPO for test. Noted wt loss PTA per chart hx; decreased po intake PTA per nutrition screen. Noted possible plan for discharge today    Malnutrition Assessment:  Malnutrition Status: At risk for malnutrition (specify)(decreased po intake nutrition screen and wt loss PTA per chart hx)        Nutrition History and Allergies: medical hx:  CKD, DM, HTN, stroke, vitamin D deficiency.   -37 lb, 22.2% x 1.5 year PTA per chart hx. Decrease po intake PTA per nutrition screen. No known food allergies      Estimated Daily Nutrient Needs:  Energy (kcal):  5036-8399  Protein (g):  47-59       Fluid (ml/day):  8660-5430    Nutrition Related Findings:  BM 9/10.     no edema.       Wounds:    None       Current Nutrition Therapies:   DIET NPO    Anthropometric Measures:  · Height:  5' 3\" (160 cm)  · Current Body Wt:  59 kg (130 lb 1.1 oz)   · Admission Body Wt:  130 lb(pt stated)    · Usual Body Wt:  (175-180 lb wt range in 2017)     · Ideal Body Wt:  115 lbs:  113.1 %    · BMI Category:  Normal weight (BMI 22.0-24.9) age over 72       Nutrition Diagnosis:   · Inadequate energy intake related to (insufficient calorie diet) as evidenced by (pt NPO for test)    · Unintended weight loss related to (predicted prolonged inadequate energy intake) as evidenced by (wt loss of 22.2% x 1.5 year PTA per chart hx)      Nutrition Interventions:   Food and/or Nutrient Delivery: Continue NPO  Nutrition Education and Counseling: No recommendations at this time  Coordination of Nutrition Care: Continued inpatient monitoring    Goals:  PO nutrition intake will meet >75% of patient's estimated nutrition needs within the next 7 days       Nutrition Monitoring and Evaluation:   Food/Nutrient Intake Outcomes: Diet advancement/tolerance  Physical Signs/Symptoms Outcomes: Biochemical data, Chewing or swallowing, GI status, Meal time behavior, Nutrition focused physical findings    Discharge Planning:     Too soon to determine     Electronically signed by Ronen Santos RD on 9/11/2020 at 12:32 PM    Contact:  [phone]

## 2020-09-11 NOTE — PROGRESS NOTES
Discharge/Transition Planning  Problem: Discharge Planning  Goal: *Discharge to safe environment  Outcome: Resolved/Met   Home    Patient and/or next of kin has been given and has signed the [facility] Outpatient Observation  Notification letter and all questions answered. Copy of this notice given to patient and copy placed on chart. Patient and/or next of kin has been given the Outpatient Observation Information and Notification letter and all questions answered. Reason for Admission:   Chest Pain                   RUR Score:     n/a                Plan for utilizing home health:    n/a     PCP: First and Last name:  Rody Gregorio   Name of Practice:    Are you a current patient: Yes/No: yes   Approximate date of last visit: 2   Can you participate in a virtual visit with your PCP: no smart phone                    Current Advanced Directive/Advance Care Plan: yes                         Transition of Care Plan:                    Interviewed patient. Verified demographics listed on face sheet with patient; all information correct. [employer]   Patient lives in private residence with spouse and daughter. Patient independent with ADLs prior to admission. DME prior to admission: cane. Discharge plan is Home and spouse will drive       Patient has designated ________________________ to participate in his/her discharge plan and to receive any needed information.     Camden Steven Spouse   [phone]      Anne-Marie Mai Daughter   [phone]              Care Management Interventions  PCP Verified by CM: Yes(Dr Torrey Bowen)  Last Visit to PCP: 07/09/20  Mode of Transport at Discharge: Self  Transition of Care Consult (CM Consult): Discharge Planning  Current Support Network: Lives with Spouse, Own Home  Confirm Follow Up Transport: Self  The Plan for Transition of Care is Related to the Following Treatment Goals : resolution of acute needs  Discharge Location  Discharge Placement: Home

## 2020-09-11 NOTE — PROGRESS NOTES
Bedside shift change report given to PATIENCE Rueda (oncoming nurse) by Karsten Ramos RN (offgoing nurse). Report included the following information SBAR, Kardex, Intake/Output, MAR and Recent Results. 4347 -- A/O x4, no SOB or pain noted, clear lung sounds, scattered bruising from blood work. Lovenox given, other AM medications held for now, will check with cardiology about BP meds. 0900 -- Spoke with the patient's , gave updates, and also the patient's vitals, and room phone number so he can her; himself.    6140 -- AM medications given, Metoprolol held for Stress Test. [facility] is sending for the patient. Meron called in tele room to inform the cardiac monitor is being taken off. Patient up to the bathroom, with her cane, output 100 ml clear yellow urine, before transport comes. 3674 -- Patient off the unit to [address] for stress test.    1231 -- Patient back on the unit from [address].    1250 -- Reassessment completed, no change in patient condition. [phone] -- Nurse picked up lunch for the patient before she leaves. 1423 -- Patient waiting for her discharge instructions. 1600 -- Patient discharged.

## 2020-09-14 ENCOUNTER — PATIENT OUTREACH (OUTPATIENT)
Dept: CASE MANAGEMENT | Age: 85
End: 2020-09-14

## 2020-09-21 ENCOUNTER — PATIENT OUTREACH (OUTPATIENT)
Dept: CASE MANAGEMENT | Age: 85
End: 2020-09-21

## 2020-09-21 DIAGNOSIS — Z71.89 ADVANCE CARE PLANNING: Primary | ICD-10-CM

## 2020-09-21 NOTE — PROGRESS NOTES
Patient stated she was feel ing same symptoms that lead her to the ER on 9-10-20, but to a much lesser degree. Advised patient to be seen at an Urgent Care or ER - patient stated she wanted to wait to see if discomfort lessened. No report of pain in jaw, shoulders or back, feeling of discomfort but no pressure pain. No GI symptoms. COVID-19 Screening Follow-up Note    Patient contacted regarding COVID-19 risk and screening. Care Transition Nurse/ Ambulatory Care Manager contacted the patient by telephone to perform follow-up assessment. Verified name and  with patient as identifiers. Patient has following risk factors of: diabetes and chronic kidney disease. Symptoms reviewed with patient who verbalized the following symptoms: chest pain, no new symptoms and no worsening symptoms. Due to no new or worsening symptoms encounter was not routed to provider for escalation. Education provided regarding infection prevention, and signs and symptoms of COVID-19 and when to seek medical attention with patient who verbalized understanding. Discussed exposure protocols and quarantine from [address] you at higher risk for severe illness  and given an opportunity for questions and concerns. The patient agrees to contact the COVID-19 hotline [phone] or PCP office for questions related to their healthcare. CTN/ACM provided contact information for future reference. From CDC: Are you at higher risk for severe illness?  Wash your hands often.  Avoid close contact (6 feet, which is about two arm lengths) with people who are sick.  Put distance between yourself and other people if COVID-19 is spreading in your community.  Clean and disinfect frequently touched surfaces.  Avoid all cruise travel and non-essential air travel.  Call your healthcare professional if you have concerns about COVID-19 and your underlying condition or if you are sick.     For more information on steps you can take to protect yourself, see CDC's How to [facility] for follow-up call in 7-14 days based on severity of symptoms and risk factors.     Advance Care Planning:   Does patient have an Advance Directive: ACP referral placed

## 2020-09-28 ENCOUNTER — PATIENT OUTREACH (OUTPATIENT)
Dept: CASE MANAGEMENT | Age: 85
End: 2020-09-28

## 2020-09-28 NOTE — PROGRESS NOTES
Date/Time:  9/28/2020 12:44 PM  Attempted to reach patient by telephone. Left HIPPA compliant message requesting a return call. Patient resolved from Transition of Care episode on 9/28/2020  Patient/family has been provided the following resources and education related to COVID-19:                         Signs, symptoms and red flags related to COVID-19            CDC exposure and quarantine guidelines            [employer] exposure contact - [phone]            Contact for their local Department of Health                   No further outreach scheduled with this CTN/ACM. Episode of Care resolved. Patient has this CTN/ACM contact information if future needs arise.

## 2020-10-29 ENCOUNTER — TELEPHONE (OUTPATIENT)
Dept: CASE MANAGEMENT | Age: 85
End: 2020-10-29

## 2020-10-29 NOTE — TELEPHONE ENCOUNTER
Referral received earlier this month. I contacted pt today who informed she just was released from the hospital today after being there for one week. I will call next week. Her  continues to be pt contact.   Fermin Griffith LCSW

## 2020-11-12 ENCOUNTER — TELEPHONE (OUTPATIENT)
Dept: CASE MANAGEMENT | Age: 85
End: 2020-11-12

## 2020-11-12 NOTE — TELEPHONE ENCOUNTER
Attempted to call pt this afternoon. No answer. Will close referral at this time.   Karie Solorzano LCSW

## 2021-08-03 PROBLEM — R07.9 CHEST PAIN: Status: RESOLVED | Noted: 2019-08-14 | Resolved: 2021-08-03

## 2021-10-11 ENCOUNTER — OFFICE VISIT (OUTPATIENT)
Dept: CARDIOLOGY CLINIC | Age: 86
End: 2021-10-11
Payer: MEDICARE

## 2021-10-11 VITALS
RESPIRATION RATE: 16 BRPM | DIASTOLIC BLOOD PRESSURE: 57 MMHG | SYSTOLIC BLOOD PRESSURE: 166 MMHG | TEMPERATURE: 98.8 F | OXYGEN SATURATION: 99 % | HEART RATE: 54 BPM

## 2021-10-11 DIAGNOSIS — I48.0 PAF (PAROXYSMAL ATRIAL FIBRILLATION) (HCC): ICD-10-CM

## 2021-10-11 DIAGNOSIS — E11.40 TYPE 2 DIABETES MELLITUS WITH DIABETIC NEUROPATHY, UNSPECIFIED WHETHER LONG TERM INSULIN USE (HCC): ICD-10-CM

## 2021-10-11 DIAGNOSIS — I69.359 CVA, OLD, HEMIPARESIS (HCC): ICD-10-CM

## 2021-10-11 DIAGNOSIS — I73.9 VASCULAR DISEASE, PERIPHERAL (HCC): ICD-10-CM

## 2021-10-11 DIAGNOSIS — Z87.19 H/O: UPPER GI BLEED: ICD-10-CM

## 2021-10-11 DIAGNOSIS — D50.9 IRON DEFICIENCY ANEMIA, UNSPECIFIED IRON DEFICIENCY ANEMIA TYPE: ICD-10-CM

## 2021-10-11 DIAGNOSIS — Z95.828 S/P INSERTION OF IVC (INFERIOR VENA CAVAL) FILTER: ICD-10-CM

## 2021-10-11 DIAGNOSIS — N18.30 STAGE 3 CHRONIC KIDNEY DISEASE, UNSPECIFIED WHETHER STAGE 3A OR 3B CKD (HCC): ICD-10-CM

## 2021-10-11 DIAGNOSIS — E11.21 TYPE 2 DIABETES MELLITUS WITH NEPHROPATHY (HCC): ICD-10-CM

## 2021-10-11 DIAGNOSIS — Z92.89 HISTORY OF ECHOCARDIOGRAM: ICD-10-CM

## 2021-10-11 DIAGNOSIS — I10 PRIMARY HYPERTENSION: Primary | ICD-10-CM

## 2021-10-11 DIAGNOSIS — I82.4Y1 ACUTE DEEP VEIN THROMBOSIS (DVT) OF PROXIMAL VEIN OF RIGHT LOWER EXTREMITY (HCC): ICD-10-CM

## 2021-10-11 DIAGNOSIS — I63.9 CEREBROVASCULAR ACCIDENT (CVA), UNSPECIFIED MECHANISM (HCC): ICD-10-CM

## 2021-10-11 PROCEDURE — G8400 PT W/DXA NO RESULTS DOC: HCPCS | Performed by: INTERNAL MEDICINE

## 2021-10-11 PROCEDURE — G8754 DIAS BP LESS 90: HCPCS | Performed by: INTERNAL MEDICINE

## 2021-10-11 PROCEDURE — G8753 SYS BP > OR = 140: HCPCS | Performed by: INTERNAL MEDICINE

## 2021-10-11 PROCEDURE — G8510 SCR DEP NEG, NO PLAN REQD: HCPCS | Performed by: INTERNAL MEDICINE

## 2021-10-11 PROCEDURE — G8427 DOCREV CUR MEDS BY ELIG CLIN: HCPCS | Performed by: INTERNAL MEDICINE

## 2021-10-11 PROCEDURE — 1101F PT FALLS ASSESS-DOCD LE1/YR: CPT | Performed by: INTERNAL MEDICINE

## 2021-10-11 PROCEDURE — 1090F PRES/ABSN URINE INCON ASSESS: CPT | Performed by: INTERNAL MEDICINE

## 2021-10-11 PROCEDURE — G8421 BMI NOT CALCULATED: HCPCS | Performed by: INTERNAL MEDICINE

## 2021-10-11 PROCEDURE — 99204 OFFICE O/P NEW MOD 45 MIN: CPT | Performed by: INTERNAL MEDICINE

## 2021-10-11 PROCEDURE — G8536 NO DOC ELDER MAL SCRN: HCPCS | Performed by: INTERNAL MEDICINE

## 2021-10-11 NOTE — PROGRESS NOTES
Ovidio  presents today for   Chief Complaint   Patient presents with   [facility] Follow Up       Ovidio Kumari preferred language for health care discussion is english/other. Personal Protective Equipment:   Personal Protective Equipment was used including: mask-surgical and hands-gloves. Patient was placed on no precaution(s). Patient was masked. Precautions:   Patient currently on None  Patient currently roomed with door closed    Is someone accompanying this pt? Yes male and female adult    Is the patient using any DME equipment during [address]? Yes WC    Depression Screening:  3 most recent PHQ Screens 10/11/2021   Little interest or pleasure in doing things Not at all   Feeling down, depressed, irritable, or hopeless Not at all   Total Score PHQ 2 0       Learning Assessment:  Learning Assessment 1/3/2018   PRIMARY LEARNER Patient   PRIMARY LANGUAGE ENGLISH   LEARNER PREFERENCE PRIMARY LISTENING   ANSWERED BY Patient   RELATIONSHIP SELF       Abuse Screening:  Abuse Screening Questionnaire 5/16/2019   Do you ever feel afraid of your partner? N   Are you in a relationship with someone who physically or mentally threatens you? N   Is it safe for you to go home? Y       Fall Risk  Fall Risk Assessment, last 12 mths 10/11/2021   Able to walk? Yes   Fall in past 12 months? 0   Do you feel unsteady? 0   Are you worried about falling 0       Pt currently taking Anticoagulant therapy? no    Coordination of Care:  1. Have you been to the ER, urgent care clinic since your last visit? Hospitalized since your last visit? yes    2. Have you seen or consulted any other health care providers outside of the [address] [place] [address] since your last visit? Include any pap smears or colon screening.  no

## 2021-10-14 ENCOUNTER — TELEPHONE (OUTPATIENT)
Dept: CARDIOLOGY CLINIC | Age: 86
End: 2021-10-14

## 2021-10-14 RX ORDER — PHENOL/SODIUM PHENOLATE
20 AEROSOL, SPRAY (ML) MUCOUS MEMBRANE DAILY
COMMUNITY

## 2021-10-14 RX ORDER — ASPIRIN 81 MG/1
TABLET ORAL DAILY
COMMUNITY

## 2021-10-14 RX ORDER — METOPROLOL TARTRATE 25 MG/1
TABLET, FILM COATED ORAL 2 TIMES DAILY
COMMUNITY

## 2021-10-14 RX ORDER — FERROUS FUMARATE 324(106)MG
TABLET ORAL
COMMUNITY

## 2021-10-14 RX ORDER — AMIODARONE HYDROCHLORIDE 200 MG/1
200 TABLET ORAL
COMMUNITY

## 2021-10-14 RX ORDER — ATORVASTATIN CALCIUM 80 MG/1
80 TABLET, FILM COATED ORAL
COMMUNITY
Start: 2020-10-28 | End: 2021-10-15

## 2021-10-14 RX ORDER — CLOPIDOGREL BISULFATE 75 MG/1
TABLET ORAL
COMMUNITY

## 2021-10-14 RX ORDER — AMLODIPINE BESYLATE 2.5 MG/1
TABLET ORAL DAILY
COMMUNITY

## 2021-10-14 NOTE — TELEPHONE ENCOUNTER
Daughter came in earlier today with Ms. Bro's meds and wanted to know which Lipitor to take the 40mg or the 80 mg?

## 2021-10-15 RX ORDER — ATORVASTATIN CALCIUM 40 MG/1
40 TABLET, FILM COATED ORAL DAILY
COMMUNITY

## 2021-10-15 NOTE — TELEPHONE ENCOUNTER
Attempted to contact EC at number, no answer. Lvm for pt to return call to office at [phone]. Will continue to try to contact pt.

## 2021-10-15 NOTE — TELEPHONE ENCOUNTER
Attempted to contact pt at  number, no answer. Lvm for pt to return call to office at [phone]. Will continue to try to contact pt.

## 2021-10-17 PROBLEM — Z87.19 H/O: UPPER GI BLEED: Status: ACTIVE | Noted: 2021-10-17

## 2021-10-17 PROBLEM — I82.401 DEEP VEIN THROMBOSIS (DVT) OF RIGHT LOWER EXTREMITY (HCC): Status: ACTIVE | Noted: 2021-10-17

## 2021-10-17 PROBLEM — I48.0 PAF (PAROXYSMAL ATRIAL FIBRILLATION) (HCC): Status: ACTIVE | Noted: 2021-10-17

## 2021-10-17 PROBLEM — Z92.89 HISTORY OF ECHOCARDIOGRAM: Status: ACTIVE | Noted: 2021-10-17

## 2021-10-17 PROBLEM — Z95.828 S/P INSERTION OF IVC (INFERIOR VENA CAVAL) FILTER: Status: ACTIVE | Noted: 2021-10-17

## 2021-10-17 NOTE — PROGRESS NOTES
Subjective:      Raymond Harris is in the office today for cardiac evaluation. The patient has a history of prior hospitalizations at [facility]. She was recently hospitalized at [facility] (8/28/2021) for GI bleeding, atrial fibrillation  with a rapid ventricular response and DVT of the right lower extremity. She so has a history of known severe PAD. She was subsequently discharged to [facility] and rehab. The patient did not bring her medications to the office today and there is some uncertainty about what she is taking. She is supposedly on Cordarone 200 mg daily. In regards to her symptoms, she can occasionally feel her heart \"flutter \". Her breathing has been \"all right \". She says she is short of breath when she walks fast. She has had no PND. She sleeps on one pillow. Patient's cardiac risk factors are family history, dyslipidemia, diabetes mellitus, hypertension.         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 ([address] 01/03/2019    Cellulitis 10/10/2018    Personal history of noncompliance with medical treatment, presenting hazards to health 05/31/2018    Contact dermatitis due to adhesive bandage 05/31/2018    Midfoot ulcer, left, with fat layer exposed ([address] 05/17/2018    Hypertensive left ventricular hypertrophy, without heart failure 01/15/2018    Atrial arrhythmia 01/15/2018    Type 2 diabetes mellitus with nephropathy ([address] 01/03/2018    Type 2 diabetes mellitus with diabetic neuropathy ([facility] 01/03/2018    Vascular disease, peripheral ([facility] 12/07/2017    Non-pressure chronic ulcer of right ankle with fat layer exposed ([facility] 12/07/2017    Syncope 12/03/2017    Skin ulcer of left ankle, with fat layer exposed ([facility] 11/16/2017    Venous ulcer of ankle, left ([facility]) 11/16/2017    Weak pulse 11/16/2017    UTI (urinary tract infection) 11/06/2017    Encephalopathy 11/06/2017    LAURA (acute kidney injury) ([facility] 11/06/2017    Iron (Fe) deficiency anemia 02/28/2017    Normocytic anemia     Stroke ([facility]     Hypertension     DM (diabetes mellitus) ([facility]     Type 2 diabetes mellitus ([facility] 08/08/2013    CVA, old, hemiparesis ([facility] 08/07/2013    Acute on chronic renal insufficiency 07/11/2013    Leukocytosis 07/11/2013    Microscopic hematuria 07/11/2013    Rhabdomyolysis 07/11/2013    Injury of foot 07/10/2013    CKD (chronic kidney disease) stage 3, GFR 30-59 ml/min ([facility]) 06/24/2013    Abnormal finding on thyroid function test 06/11/2013    Chronic osteomyelitis of ankle ([facility] 06/11/2013    Spinal stenosis of lumbar region 06/11/2013    Vitamin D deficiency 06/11/2013    Elevated TSH 06/25/2012     Current Outpatient Medications   Medication Sig Dispense Refill    amiodarone (CORDARONE) 200 mg tablet Take 200 mg by mouth.  Ferrous Fumarate (Ferretts) 325 mg (106 mg iron) tab Take  by mouth.  metoprolol tartrate (LOPRESSOR) 25 mg tablet Take  by mouth two (2) times a day.  Omeprazole delayed release (PRILOSEC D/R) 20 mg tablet Take 20 mg by mouth daily.  amLODIPine (NORVASC) 2.5 mg tablet Take  by mouth daily.  clopidogreL (Plavix) 75 mg tab Take  by mouth.  aspirin delayed-release 81 mg tablet Take  by mouth daily.  nitroglycerin (NITROSTAT) 0.4 mg SL tablet 1 Tab by SubLINGual route as needed for Chest Pain. Up to 3 doses.  20 Tab 0    diphenoxylate-atropine (LOMOTIL) 2.5-0.025 mg per tablet Take 1 Tab by mouth four (4) times daily as needed for Diarrhea. Max Daily Amount: 4 Tabs. 20 Tab 0    acetaminophen (TYLENOL) 500 mg tablet Take 1 Tab by mouth every six (6) hours as needed for Pain or Fever. 40 Tab 0    dicyclomine (BENTYL) 10 mg capsule Take 1 Cap by mouth four (4) times daily as needed (abd cramps). 20 Cap 0    omega 3-dha-epa-fish oil (FISH OIL) 100-160-1,000 mg cap Take  by mouth.  gabapentin (NEURONTIN) 300 mg capsule Take 1 Cap by mouth three (3) times daily. 12/22/16, QTY#90, 30 Days, 2 Refills. Dr. [place]  2    cholecalciferol (VITAMIN D3) 1,000 unit tablet Take 5,000 Units by mouth daily.  lisinopril-hydrochlorothiazide (PRINZIDE, ZESTORETIC) 20-25 mg per tablet Take 1 Tab by mouth daily.  atorvastatin (LIPITOR) 40 mg tablet Take 40 mg by mouth daily.        No Known Allergies  Past Medical History:   Diagnosis Date    Chronic kidney disease     [facility]el[facility]     DM (diabetes mellitus) ([facility]     Hypertension     Lumbar spinal stenosis     Normocytic anemia     Rhabdomyolysis     Splenomegaly     Stroke (HCC)     TIA x2    Vitamin D deficiency      Past Surgical History:   Procedure Laterality Date    COLONOSCOPY N/A 3/1/2017    COLONOS[facility] performed [facility]tan [facility]per [facility] at [facility] ENDOSCOPY    HX CHOLECYSTECTOMY      HX FREE SKIN GRAFT      HX HYSTERECTOMY      HX OOPHORECTOMY      HX OPEN REDUCTION INTERNAL FIXATION Left     Tx LLE Ankle Trimalleolar Fx by [facility])    HX ORTHOPAEDIC Left 06/11/2013    [address] - LLE Ankle Removal of Hardware     HX VEIN STRIPPING Bilateral 09/18/2013    Dr. Dalal Starch 20 SQ CM<       Family History   Problem Relation Age of Onset    Diabetes Other     Hypertension Other      Social History     Tobacco Use   Smoking Status Never Smoker   Smokeless Tobacco Never Used          Review of Systems, additional:  Constitutional: negative  Eyes: negative  Respiratory: positive for dyspnea on exertion  Cardiovascular: positive for palpitations, dyspnea on exertion  Gastrointestinal: negative  Musculoskeletal:negative  Neurological: negative  Behvioral/Psych: negative  Endocrine: negative  ENT: negative    Objective:     Visit Vitals  BP (!) 166/57   Pulse (!) 54   Temp 98.8 °F (37.1 °C) (Temporal)   Resp 16   SpO2 99%     General:  alert, cooperative, no distress   Chest Wall: inspection normal - no chest wall deformities or tenderness, respiratory effort normal   Lung: clear to auscultation bilaterally   Heart:  normal rate and regular rhythm, S1 and S2 normal, no murmurs noted, S4 present, no JVD   Abdomen: soft, non-tender. Bowel sounds normal. No masses,  no organomegaly   Extremities:  Lower extremities wrapped, no cyanosis or edema Skin: no rashes   Neuro: alert, oriented, normal speech, no focal findings or movement disorder noted         Assessment/Plan:       ICD-10-CM ICD-9-CM    1. Essential hypertension , elevated systolic BP in the office I10 401.9    2. Cerebrovascular accident (CVA), unspecified mechanism ([facility] , MRI 11/28/2017 demonstrated a right supra lenticular corona radiata area with presumed remote infarct or hemorrhage I63.9 434.91    3. Vascular disease, peripheral ([address] recent episode of DVT right lower extremity, IVC filter inserted I73.9 443.9    4. Type 2 diabetes mellitus with nephropathy ([facility])  E11.21 250.40      583.81    5. Type 2 diabetes mellitus with diabetic neuropathy, unspecified whether long term insulin use ([facility])  E11.40 250.60      357.2    6. Iron deficiency anemia, unspecified iron deficiency anemia type  D50.9 280.9    7. History of echocardiogram , echo 9/1/2021. EF 65%. Grade 3 diastolic dysfunction. No hemodynamically significant valvular disease. Z92.89 V15.89    8.  PAF (paroxysmal atrial fibrillation) ([facility]) , [id number] - VASc score 9, episode of 9/7/2021 in the setting of a upper GI bleed, hospitalized at THE [facility]. Patient's medications are available. She believes she is taking Cordarone 200 mg daily and she will call and let us know when she returns home. Return in 6 weeks I48.0 427.31    9. CVA, old, hemiparesis [facility])  E70.162 438.20    10. H/O: upper GI bleed , admitted to [facility] 8/28/2021. EGD demonstrated single gastric AVM of the antrum ablated with APC. Plavix was discontinued. Z87.19 V12.79    11. Stage 3 chronic kidney disease, unspecified whether stage 3a or 3b CKD ([facility])  N18.30 585.3    12. Acute deep vein thrombosis (DVT) of proximal vein of right lower extremity ([facility])  I82.4Y1 453.41    13.  S/P insertion of IVC (inferior vena caval) filter  Z95.828 V45.89

## 2021-10-18 NOTE — TELEPHONE ENCOUNTER
Attempted to contact pt at  number, no answer. Lvm for pt to return call to office at [phone]. Will continue to try to contact pt.

## 2021-10-19 NOTE — TELEPHONE ENCOUNTER
Contacted pt at [facility] number. Two patient Identifiers confirmed. Advised pt per Dr Acosta Speedy  Pt verbalized understanding.

## 2023-09-21 NOTE — ED PROVIDER NOTES
HPI Comments: Clarisse Johnson is a 81 y.o. female with hx of TIA, HTN, Stroke, CKD, CAD and DM that presents to the ED with a complaint of fatigue and intermittent slurred speech. Pt states that she was in the car earlier today and when she tried to get out her arms and legs wouldn't work. Pt states that because of her hx her  decided to come straight to the ED. Pt states that she feels better although he tongue still feels to big for her mouth and she is not speaking right. Pt admit sto associated cp and sob    Patient is a 81 y.o. female presenting with other event. Other          Past Medical History:   Diagnosis Date    CAD (coronary artery disease)     Chronic osteomyelitis of ankle ([facility] [address]     CKD (chronic kidney disease) stage 3, GFR 30-59 ml/min 06/24/2013    Worst noted (08/06/13) BUN/sCr: 54/2.49, GFR 20.     DM (diabetes mellitus) (HCC)     Elevated TSH 06/25/2012    5.08     Hypertension     Leukocytosis     Lumbar spinal stenosis     Normocytic anemia     Rhabdomyolysis     Splenomegaly     Stroke ([facility] [address]     Vitamin D deficiency        Past Surgical History:   Procedure Laterality Date    COLONOSCOPY N/A 3/1/2017    COLONOSCOPY performed by Haylee Lee [facility] at [facility] ENDOSCOPY    HX CHOLECYSTECTOMY      HX FREE SKIN GRAFT      HX HYSTERECTOMY      HX OPEN REDUCTION INTERNAL FIXATION Left     Tx LLE Ankle Trimalleolar Fx by Dr. [facility])    HX ORTHOPAEDIC Left 06/11/2013    [address] - LLE Ankle Removal of Hardware     HX VEIN STRIPPING Bilateral 09/18/2013    Dr. Reji Lozano 20 SQ CM<           Family History:   Problem Relation Age of Onset    Diabetes Other     Hypertension Other        Social History     Social History    Marital status:      Spouse name: N/A    Number of children: N/A    Years of education: N/A     Occupational History    Not on file.      Social History Main Topics    Smoking status: Never Smoker    Smokeless tobacco: Not on file    Alcohol use No    Drug use: No    Sexual activity: No     Other Topics Concern    Not on file     Social History Narrative         ALLERGIES: Review of patient's allergies indicates no known allergies. Review of Systems    Vitals:    11/06/17 1700 11/06/17 1715 11/06/17 1724 11/06/17 1900   BP: 147/52 149/54  166/53   Pulse: 74 77 79 83   Resp: 16 16 16 17   Temp:       SpO2: 100% 99% 99% 100%            Physical Exam     MDM  Number of Diagnoses or Management Options  Acute cystitis without hematuria:   LAURA (acute kidney injury) ([facility] [address]): Fatigue, unspecified type:   Slurred speech:   Diagnosis management comments: 5:55 PM :Pt care assumed from Chantelle Wu , ED provider. Pt complaint(s), current treatment plan, progression and available diagnostic results have been discussed thoroughly. Rounding occurred: yes  Intended Disposition: TBD   Pending diagnostic reports and/or labs (please list): UA  7:55 PM Patient agrees with plan for admission, telenurology recommends for 24 hour observation, WBC is WNL, but large leuks noted on UA with too numerous to count WBC, and worsening renal function requiring admission for hydration and monitoring. Urine culture and rocephin have been ordered. Hospitalist consult pending. CONSULT NOTE:   8:07 PM  I spoke with Dr Chase Dawn   Specialty: Hospitlaist  Discussed pt's hx, disposition, and available diagnostic and imaging results. Reviewed care plans. Consulting physician agrees with plans as outlined. Will admit patient for TIA work-up and continued monitoring, treatment of UTI.        ED Course       Procedures      RESULTS:    XR CHEST PORT   Final Result      CT HEAD WO CONT   Final Result          Labs Reviewed   CBC WITH AUTOMATED DIFF - Abnormal; Notable for the following:        Result Value    RBC 4.04 (*)     HGB 11.7 (*)     NEUTROPHILS 77 (*)     LYMPHOCYTES 15 (*)     All other components within normal limits   METABOLIC PANEL, BASIC - Abnormal; Notable for the following:     Glucose 149 (*)     BUN 44 (*)     Creatinine 2.11 (*)     BUN/Creatinine ratio 21 (*)     GFR est AA 27 (*)     GFR est non-AA 22 (*)     All other components within normal limits   URINALYSIS W/ RFLX MICROSCOPIC - Abnormal; Notable for the following:     Protein TRACE (*)     Ketone TRACE (*)     Blood MODERATE (*)     Leukocyte Esterase LARGE (*)     All other components within normal limits   HEPATIC FUNCTION PANEL - Abnormal; Notable for the following:     Globulin 4.2 (*)     All other components within normal limits   URINE MICROSCOPIC ONLY - Abnormal; Notable for the following:     Bacteria 3+ (*)     All other components within normal limits   CULTURE, URINE   PTT   PROTHROMBIN TIME + INR   LIPASE   CARDIAC PANEL,(CK, CKMB & TROPONIN)       Recent Results (from the past 12 hour(s))   EKG, 12 LEAD, INITIAL    Collection Time: 11/06/17  3:03 PM   Result Value Ref Range    Ventricular Rate 80 BPM    Atrial Rate 80 BPM    P-R Interval 164 ms    QRS Duration 76 ms    Q-T Interval 372 ms    QTC Calculation (Bezet) 429 ms    Calculated P Axis 63 degrees    Calculated R Axis 34 degrees    Calculated T Axis 104 degrees    Diagnosis       Normal sinus rhythm  Low voltage QRS  Septal infarct (cited on or before 21-JUN-2012)  Abnormal ECG  When compared with ECG of 25-FEB-2017 14:24,  Nonspecific T wave abnormality, worse in Lateral leads     CBC WITH AUTOMATED DIFF    Collection Time: 11/06/17  3:12 PM   Result Value Ref Range    WBC 10.0 4.6 - 13.2 K/uL    RBC 4.04 (L) 4.20 - 5.30 M/uL    HGB 11.7 (L) 12.0 - 16.0 g/dL    HCT 36.1 35.0 - 45.0 %    MCV 89.4 74.0 - 97.0 FL    MCH 29.0 24.0 - 34.0 PG    MCHC 32.4 31.0 - 37.0 g/dL    RDW 14.3 11.6 - 14.5 %    PLATELET 863 724 - 408 K/uL    MPV 10.7 9.2 - 11.8 FL    NEUTROPHILS 77 (H) 40 - 73 %    LYMPHOCYTES 15 (L) 21 - 52 %    MONOCYTES 7 3 - 10 %    EOSINOPHILS 1 0 - 5 %    BASOPHILS 0 0 - 2 % ABS. NEUTROPHILS 7.6 1.8 - 8.0 K/UL    ABS. LYMPHOCYTES 1.5 0.9 - 3.6 K/UL    ABS. MONOCYTES 0.7 0.05 - 1.2 K/UL    ABS. EOSINOPHILS 0.1 0.0 - 0.4 K/UL    ABS. BASOPHILS 0.0 0.0 - 0.06 K/UL    DF AUTOMATED     METABOLIC PANEL, BASIC    Collection Time: 11/06/17  3:12 PM   Result Value Ref Range    Sodium 140 136 - 145 mmol/L    Potassium 3.6 3.5 - 5.5 mmol/L    Chloride 106 100 - 108 mmol/L    CO2 22 21 - 32 mmol/L    Anion gap 12 3.0 - 18 mmol/L    Glucose 149 (H) 74 - 99 mg/dL    BUN 44 (H) 7.0 - 18 MG/DL    Creatinine 2.11 (H) 0.6 - 1.3 MG/DL    BUN/Creatinine ratio 21 (H) 12 - 20      GFR est AA 27 (L) >60 ml/min/1.73m2    GFR est non-AA 22 (L) >60 ml/min/1.73m2    Calcium 8.9 8.5 - 10.1 MG/DL   PTT    Collection Time: 11/06/17  3:12 PM   Result Value Ref Range    aPTT 27.0 23.0 - 36.4 SEC   PROTHROMBIN TIME + INR    Collection Time: 11/06/17  3:12 PM   Result Value Ref Range    Prothrombin time 14.1 11.5 - 15.2 sec    INR 1.1 0.8 - 1.2     HEPATIC FUNCTION PANEL    Collection Time: 11/06/17  3:12 PM   Result Value Ref Range    Protein, total 7.7 6.4 - 8.2 g/dL    Albumin 3.5 3.4 - 5.0 g/dL    Globulin 4.2 (H) 2.0 - 4.0 g/dL    A-G Ratio 0.8 0.8 - 1.7      Bilirubin, total 0.4 0.2 - 1.0 MG/DL    Bilirubin, direct 0.1 0.0 - 0.2 MG/DL    Alk.  phosphatase 99 45 - 117 U/L    AST (SGOT) 24 15 - 37 U/L    ALT (SGPT) 34 13 - 56 U/L   LIPASE    Collection Time: 11/06/17  3:12 PM   Result Value Ref Range    Lipase 192 73 - 393 U/L   CARDIAC PANEL,(CK, CKMB & TROPONIN)    Collection Time: 11/06/17  3:12 PM   Result Value Ref Range    CK 60 26 - 192 U/L    CK - MB 1.3 <3.6 ng/ml    CK-MB Index 2.2 0.0 - 4.0 %    Troponin-I, Qt. <0.02 0.0 - 0.045 NG/ML   URINALYSIS W/ RFLX MICROSCOPIC    Collection Time: 11/06/17  5:30 PM   Result Value Ref Range    Color YELLOW      Appearance CLOUDY      Specific gravity 1.021 1.005 - 1.030      pH (UA) 5.0 5.0 - 8.0      Protein TRACE (A) NEG mg/dL    Glucose NEGATIVE  NEG mg/dL Ketone TRACE (A) NEG mg/dL    Bilirubin NEGATIVE  NEG      Blood MODERATE (A) NEG      Urobilinogen 0.2 0.2 - 1.0 EU/dL    Nitrites NEGATIVE  NEG      Leukocyte Esterase LARGE (A) NEG     URINE MICROSCOPIC ONLY    Collection Time: 11/06/17  5:30 PM   Result Value Ref Range    WBC TOO NUMEROUS TO COUNT 0 - 4 /hpf    RBC 3 to 6 0 - 5 /hpf    Epithelial cells FEW 0 - 5 /lpf    Bacteria 3+ (A) NEG /hpf       8:09 PM  DISPOSITION - admitted by Dr Soledad Madison.   Marleny Whyte NP Consent: The patient's consent was obtained including but not limited to risks of crusting, scabbing, blistering, scarring, darker or lighter pigmentary change, recurrence, incomplete removal and infection. Render Post-Care Instructions In Note?: no Number Of Freeze-Thaw Cycles: 3 freeze-thaw cycles Detail Level: Detailed Post-Care Instructions: I reviewed with the patient in detail post-care instructions. Patient is to wear sunprotection, and avoid picking at any of the treated lesions. Pt may apply Vaseline to crusted or scabbing areas. Duration Of Freeze Thaw-Cycle (Seconds): 2 Show Applicator Variable?: Yes Application Tool (Optional): Liquid Nitrogen Sprayer

## 2025-02-17 NOTE — PROGRESS NOTES
[facility] Progress Note    Date: 2/17/2025  Subjective     Patient seen and examined today.  No acute events overnight.  Pain controlled for now.  No nausea no vomiting.    Objective     Temp:  [97.9 °F (36.6 °C)-98.2 °F (36.8 °C)] 97.9 °F (36.6 °C)  Heart Rate:  [68-82] 82  Resp:  [14-18] 16  BP: (126-152)/(77-84) 149/84  SpO2 Readings from Last 1 Encounters:   02/17/25 96%         GEN: Comfortable no distress.  HEENT: Normocephalic, atraumatic.  ABD: Soft, nontender, nondistended  EXT: No edema  NEURO: AAOx3. No obvious focal deficits  PSYCH: Cooperative, normal mood, normal affect.    Labs   Recent Labs   Lab 02/14/25  1019   WBC 5.4   HCT 36.1*   HGB 12.3*      SODIUM 142   POTASSIUM 4.2   CHLORIDE 106   CO2 26   GLUCOSE 178*   BUN 15   CREATININE 0.90          Imaging      XR PELVIS 1 VIEW   Final Result   No fracture.                  Electronically Signed by: ANTONIO PARKER MD    Signed on: 2/13/2025 11:22 AM    Workstation ID: [id number]      FL INTRAOPERATIVE C ARM NO REPORT   Final Result       Encounter Date: 11/19/24   Electrocardiogram 12-Lead   Result Value    Ventricular Rate EKG/Min (BPM) 82    Atrial Rate (BPM) 82    AR-Interval (MSEC) 166    QRS-Interval (MSEC) 104    QT-Interval (MSEC) 398    QTc 465    P Axis (Degrees) 39    R Axis (Degrees) -38    T Axis (Degrees) 53    REPORT TEXT      Normal sinus rhythm  Left axis deviation  Inferior infarct  (cited on or before  20-JUN-2024)  Abnormal ECG  When compared with ECG of  23-SEP-2024 14:36,  Criteria for  Anterior infarct  are no longer  present  Criteria for  Anterolateral infarct  are no longer  present  Confirmed by RALPH CUMMINGS, [facility] ([id number]) on 11/20/2024 1:14:57 PM           Lines     Available Intravenous Access       PICC Line / CVC Line / PIV Line / Intraosseous Line / Line / UAC Line  Duration             Peripheral IV 02/13/25 Left Hand 20 4 days                     I/Os     Intake/Output         02/16  Discharge instructions reviewed with pt on behalf of primary nurse Marybeth Robles RN. No new prescriptions ordered. Peripheral iv and telemetry monitor removed. Pt calling  to pick her up. 0700  02/17 0659 02/17 0700  02/18 0659    P.O.      Total Intake      Urine      Total Output      Net            Unmeasured Urine Occurrence 1 x     Unmeasured Stool Occurrence 2 x              Active Meds     Current Facility-Administered Medications   Medication Dose Route Frequency Provider Last Rate Last Admin    lidocaine (LIDOCARE) 4 % patch 1 patch  1 patch Transdermal Daily Karan Covarrubias PA-C   1 patch at 02/17/25 0923    cyclobenzaprine (FLEXERIL) tablet 5 mg  5 mg Oral TID PRN Mita Angel CNP        gabapentin (NEURONTIN) capsule 300 mg  300 mg Oral TID Aaron Hernandez MD   300 mg at 02/17/25 0923    aspirin (ECOTRIN) enteric coated tablet 81 mg  81 mg Oral BID Aaron Hernandez MD   81 mg at 02/17/25 0923    dextrose 5 % infusion   Intravenous Continuous PRN Denis Fuller MD        calcium carbonate (TUMS) chewable tablet 1,000 mg  1,000 mg Oral Q4H PRN Markos Celestin PA-C        sodium chloride 0.9 % injection 10 mL  10 mL Intravenous PRN Markos Celestin PA-C        sodium chloride 0.9 % injection 2 mL  2 mL Intracatheter 2 times per day Markos Celestin PA-C   2 mL at 02/17/25 0924    sodium chloride (NORMAL SALINE) 0.9 % bolus 500 mL  500 mL Intravenous PRN Markos Celestin PA-C        morphine injection 2 mg  2 mg Intravenous Q3H PRN Markos Celestin PA-C   2 mg at 02/15/25 1416    traMADol (ULTRAM) tablet 50 mg  50 mg Oral Q6H PRN Markos Celestin PA-C   50 mg at 02/14/25 1211    oxyCODONE (IMM REL) (ROXICODONE) tablet 5 mg  5 mg Oral Q4H PRN Markos Celestin PA-C   5 mg at 02/17/25 0613    acetaminophen (TYLENOL) tablet 1,000 mg  1,000 mg Oral Q6H Markos Celestin PA-C   1,000 mg at 02/16/25 1827    polyethylene glycol (MIRALAX) packet 17 g  17 g Oral Daily PRN Markos Celestin PA-C        docusate sodium-sennosides (SENOKOT S) 50-8.6 MG 2 tablet  2 tablet Oral BID PRN Markos Celestin, [place]        bisacodyl (DULCOLAX) suppository 10 mg  10 mg Rectal  Daily PRN Markos Celestin PA-C        magnesium hydroxide (MILK OF MAGNESIA) 400 MG/5ML suspension 30 mL  30 mL Oral Daily PRN Markos Celestin PA-C        ondansetron (ZOFRAN ODT) disintegrating tablet 4 mg  4 mg Oral Q12H PRN Markos Celestin PA-C        Or    ondansetron (ZOFRAN) injection 4 mg  4 mg Intravenous Q12H PRN Markos Celestin PA-C        amLODIPine (NORVASC) tablet 5 mg  5 mg Oral Fide Curtis MD   5 mg at 02/17/25 0609    atorvastatin (LIPITOR) tablet 80 mg  80 mg Oral Fide Curtis MD   80 mg at 02/17/25 0608    imipramine (TOFRANIL) tablet 25 mg  25 mg Oral Fide Curtis MD   25 mg at 02/17/25 0609    losartan (COZAAR) tablet 25 mg  25 mg Oral Fide Curtis MD   25 mg at 02/17/25 0609    metFORMIN (GLUCOPHAGE) tablet 1,000 mg  1,000 mg Oral BID WC Fide Shaw MD   1,000 mg at 02/17/25 0923    metoPROLOL succinate (TOPROL-XL) ER tablet 50 mg  50 mg Oral Fide Curtis MD   50 mg at 02/17/25 0609    montelukast (SINGULAIR) tablet 10 mg  10 mg Oral Nightly Fide Shaw MD   10 mg at 02/16/25 2116    oxybutynin (DITROPAN-XL) ER tablet 15 mg  15 mg Oral Fide Curtis MD   15 mg at 02/17/25 0609              Dietary Orders (From admission, onward)       Start     Ordered    02/13/25 1252  Consistent Carb Moderate (45-75 gm/meal); Yes, Medical Nutrition Management by JESSICA (Registered Dietitian) Diet  DIET EFFECTIVE NOW        References:    [facility] Medical Nutrition Therapy   Question Answer Comment   Diet Modifiers Consistent Carb Moderate (45-75 gm/meal)    Medical Nutrition Management by JESSICA (Registered Dietitian) Yes, Medical Nutrition Management by JESSICA (Registered Dietitian)        02/13/25 1251                       Assessment    1.  Advanced left hip osteoarthritis status post left total hip arthroplasty  2.  Acute postoperative pain  3.  Essential hypertension  4.  Hyperlipidemia  5.  Diabetes mellitus type 2  6.   Obstructive sleep apnea  7.  Obesity BMI 44  8.  KASI  9.  CAD  10.  BPH  11.  Anxiety    Plan   Continue current pain regimen.  Patient to be weightbearing as tolerated to the left lower extremity.  Aspirin for prophylaxis.  Appreciate PT eval plan for likely SNF on discharge.  Physiatry also to see.  Continue other chronic home medications as noted on the active medication list.  DVT prophylaxis: Aspirin twice daily      I have discussed and educated the patient regarding treatment plan. I have discussed with RN at length regarding treatment plan.       Primary Care Physician  Joce Ortega MD    Code Status    Code Status: Full Resuscitation    [facility]

## 2025-05-13 NOTE — ED NOTES
Patient notified via [employer].  Labs pended and sent to Dr. Hubbard for dx codes.    Patient Portal: Active   Last Logon:  5-12-25   pericare performed.

## (undated) DEVICE — BITE BLK ENDOSCP AD 54FR GRN POLYETH ENDOSCP W STRP SLD

## (undated) DEVICE — BIPOLAR ELECTROHEMOSTASIS CATHETER: Brand: GOLD PROBE

## (undated) DEVICE — CONTAINER PREFIL FRMLN 15ML --

## (undated) DEVICE — FLEX ADVANTAGE 1500CC: Brand: FLEX ADVANTAGE

## (undated) DEVICE — KIT COLON W/ 1.1OZ LUB AND 2 END

## (undated) DEVICE — MEDI-VAC NON-CONDUCTIVE SUCTION TUBING: Brand: CARDINAL HEALTH

## (undated) DEVICE — DEVICE INFL 60ML 12ATM CONVENIENT LOK REL HNDL HI PRSS FLX

## (undated) DEVICE — FORCEPS BX L240CM JAW DIA2.8MM L CAP W/ NDL MIC MESH TOOTH

## (undated) DEVICE — KENDALL 500 SERIES DIAPHORETIC FOAM MONITORING ELECTRODE - TEAR DROP SHAPE ( 30/PK): Brand: KENDALL

## (undated) DEVICE — BASIN EMESIS 500CC ROSE 250/CS 60/PLT: Brand: MEDEGEN MEDICAL PRODUCTS, LLC

## (undated) DEVICE — SYR 50ML SLIP TIP NSAF LF STRL --